# Patient Record
Sex: FEMALE | Race: WHITE | NOT HISPANIC OR LATINO | Employment: OTHER | URBAN - METROPOLITAN AREA
[De-identification: names, ages, dates, MRNs, and addresses within clinical notes are randomized per-mention and may not be internally consistent; named-entity substitution may affect disease eponyms.]

---

## 2017-01-03 ENCOUNTER — OFFICE VISIT - HEALTHEAST (OUTPATIENT)
Dept: INTERNAL MEDICINE | Facility: CLINIC | Age: 77
End: 2017-01-03

## 2017-01-03 ENCOUNTER — COMMUNICATION - HEALTHEAST (OUTPATIENT)
Dept: NURSING | Facility: CLINIC | Age: 77
End: 2017-01-03

## 2017-01-03 DIAGNOSIS — N39.0 RECURRENT UTI (URINARY TRACT INFECTION): ICD-10-CM

## 2017-01-03 DIAGNOSIS — N17.9 AKI (ACUTE KIDNEY INJURY) (H): ICD-10-CM

## 2017-01-03 DIAGNOSIS — R13.10 DYSPHAGIA: ICD-10-CM

## 2017-01-03 DIAGNOSIS — I10 HTN (HYPERTENSION): ICD-10-CM

## 2017-01-03 DIAGNOSIS — G47.00 INSOMNIA: ICD-10-CM

## 2017-01-03 DIAGNOSIS — Z95.2 S/P MVR (MITRAL VALVE REPLACEMENT): ICD-10-CM

## 2017-01-03 DIAGNOSIS — R14.0 ABDOMINAL BLOATING: ICD-10-CM

## 2017-01-03 DIAGNOSIS — F41.1 ANXIETY STATE: ICD-10-CM

## 2017-01-05 ENCOUNTER — RECORDS - HEALTHEAST (OUTPATIENT)
Dept: ADMINISTRATIVE | Facility: OTHER | Age: 77
End: 2017-01-05

## 2017-01-10 ENCOUNTER — COMMUNICATION - HEALTHEAST (OUTPATIENT)
Dept: NURSING | Facility: CLINIC | Age: 77
End: 2017-01-10

## 2017-01-10 ENCOUNTER — AMBULATORY - HEALTHEAST (OUTPATIENT)
Dept: LAB | Facility: CLINIC | Age: 77
End: 2017-01-10

## 2017-01-10 DIAGNOSIS — Z95.2 S/P MVR (MITRAL VALVE REPLACEMENT): ICD-10-CM

## 2017-01-19 ENCOUNTER — COMMUNICATION - HEALTHEAST (OUTPATIENT)
Dept: LAB | Facility: CLINIC | Age: 77
End: 2017-01-19

## 2017-01-19 ENCOUNTER — AMBULATORY - HEALTHEAST (OUTPATIENT)
Dept: LAB | Facility: CLINIC | Age: 77
End: 2017-01-19

## 2017-01-19 DIAGNOSIS — Z95.2 S/P MVR (MITRAL VALVE REPLACEMENT): ICD-10-CM

## 2017-01-27 ENCOUNTER — OFFICE VISIT - HEALTHEAST (OUTPATIENT)
Dept: INTERNAL MEDICINE | Facility: CLINIC | Age: 77
End: 2017-01-27

## 2017-01-27 DIAGNOSIS — F43.21 GRIEF REACTION: ICD-10-CM

## 2017-01-27 DIAGNOSIS — S91.109A OPEN TOE WOUND: ICD-10-CM

## 2017-01-27 DIAGNOSIS — E11.9 DM TYPE 2 (DIABETES MELLITUS, TYPE 2) (H): ICD-10-CM

## 2017-01-27 DIAGNOSIS — I25.10 ASCVD (ARTERIOSCLEROTIC CARDIOVASCULAR DISEASE): ICD-10-CM

## 2017-01-27 DIAGNOSIS — G89.29 CHRONIC PAIN: ICD-10-CM

## 2017-01-27 DIAGNOSIS — Z95.2 S/P MVR (MITRAL VALVE REPLACEMENT): ICD-10-CM

## 2017-01-29 ENCOUNTER — COMMUNICATION - HEALTHEAST (OUTPATIENT)
Dept: INTERNAL MEDICINE | Facility: CLINIC | Age: 77
End: 2017-01-29

## 2017-01-29 DIAGNOSIS — Z95.2 S/P MVR (MITRAL VALVE REPLACEMENT): ICD-10-CM

## 2017-02-03 ENCOUNTER — AMBULATORY - HEALTHEAST (OUTPATIENT)
Dept: LAB | Facility: CLINIC | Age: 77
End: 2017-02-03

## 2017-02-03 ENCOUNTER — COMMUNICATION - HEALTHEAST (OUTPATIENT)
Dept: NURSING | Facility: CLINIC | Age: 77
End: 2017-02-03

## 2017-02-03 DIAGNOSIS — Z95.2 S/P MVR (MITRAL VALVE REPLACEMENT): ICD-10-CM

## 2017-02-04 ENCOUNTER — RECORDS - HEALTHEAST (OUTPATIENT)
Dept: GENERAL RADIOLOGY | Age: 77
End: 2017-02-04

## 2017-02-04 ENCOUNTER — OFFICE VISIT - HEALTHEAST (OUTPATIENT)
Dept: FAMILY MEDICINE | Facility: CLINIC | Age: 77
End: 2017-02-04

## 2017-02-04 DIAGNOSIS — E11.621 DIABETIC TOE ULCER (H): ICD-10-CM

## 2017-02-04 DIAGNOSIS — Z79.4 LONG TERM (CURRENT) USE OF INSULIN (H): ICD-10-CM

## 2017-02-04 DIAGNOSIS — Z79.4 TYPE 2 DIABETES MELLITUS WITHOUT COMPLICATION, WITH LONG-TERM CURRENT USE OF INSULIN (H): ICD-10-CM

## 2017-02-04 DIAGNOSIS — L03.116 CELLULITIS OF FOOT, LEFT: ICD-10-CM

## 2017-02-04 DIAGNOSIS — E11.9 TYPE 2 DIABETES MELLITUS WITHOUT COMPLICATION, WITH LONG-TERM CURRENT USE OF INSULIN (H): ICD-10-CM

## 2017-02-04 DIAGNOSIS — E11.9 TYPE 2 DIABETES MELLITUS WITHOUT COMPLICATIONS (H): ICD-10-CM

## 2017-02-04 DIAGNOSIS — L97.509 DIABETIC TOE ULCER (H): ICD-10-CM

## 2017-02-04 DIAGNOSIS — L03.116 CELLULITIS OF LEFT LOWER LIMB: ICD-10-CM

## 2017-02-06 ENCOUNTER — AMBULATORY - HEALTHEAST (OUTPATIENT)
Dept: FAMILY MEDICINE | Facility: CLINIC | Age: 77
End: 2017-02-06

## 2017-02-06 ENCOUNTER — OFFICE VISIT - HEALTHEAST (OUTPATIENT)
Dept: PODIATRY | Facility: CLINIC | Age: 77
End: 2017-02-06

## 2017-02-06 ENCOUNTER — COMMUNICATION - HEALTHEAST (OUTPATIENT)
Dept: INTERNAL MEDICINE | Facility: CLINIC | Age: 77
End: 2017-02-06

## 2017-02-06 DIAGNOSIS — Z22.322 MRSA (METHICILLIN RESISTANT STAPH AUREUS) CULTURE POSITIVE: ICD-10-CM

## 2017-02-06 DIAGNOSIS — L97.529 DIABETIC ULCER OF TOE OF LEFT FOOT (H): ICD-10-CM

## 2017-02-06 DIAGNOSIS — Z95.2 S/P MVR (MITRAL VALVE REPLACEMENT): ICD-10-CM

## 2017-02-06 DIAGNOSIS — L97.509 DIABETIC FOOT ULCER (H): ICD-10-CM

## 2017-02-06 DIAGNOSIS — E11.621 DIABETIC ULCER OF TOE OF LEFT FOOT (H): ICD-10-CM

## 2017-02-06 DIAGNOSIS — E11.621 DIABETIC FOOT ULCER (H): ICD-10-CM

## 2017-02-06 ASSESSMENT — MIFFLIN-ST. JEOR: SCORE: 1168.49

## 2017-02-07 ENCOUNTER — COMMUNICATION - HEALTHEAST (OUTPATIENT)
Dept: NURSING | Facility: CLINIC | Age: 77
End: 2017-02-07

## 2017-02-09 ENCOUNTER — OFFICE VISIT - HEALTHEAST (OUTPATIENT)
Dept: INTERNAL MEDICINE | Facility: CLINIC | Age: 77
End: 2017-02-09

## 2017-02-09 ENCOUNTER — COMMUNICATION - HEALTHEAST (OUTPATIENT)
Dept: NURSING | Facility: CLINIC | Age: 77
End: 2017-02-09

## 2017-02-09 DIAGNOSIS — Z95.2 S/P MVR (MITRAL VALVE REPLACEMENT): ICD-10-CM

## 2017-02-09 DIAGNOSIS — L97.509 DIABETIC FOOT ULCER (H): ICD-10-CM

## 2017-02-09 DIAGNOSIS — L03.032 CELLULITIS OF TOE OF LEFT FOOT: ICD-10-CM

## 2017-02-09 DIAGNOSIS — S91.109A OPEN TOE WOUND: ICD-10-CM

## 2017-02-09 DIAGNOSIS — Z22.322 MRSA (METHICILLIN RESISTANT STAPH AUREUS) CULTURE POSITIVE: ICD-10-CM

## 2017-02-09 DIAGNOSIS — E11.621 DIABETIC FOOT ULCER (H): ICD-10-CM

## 2017-02-09 ASSESSMENT — MIFFLIN-ST. JEOR: SCORE: 1182.1

## 2017-02-13 ENCOUNTER — AMBULATORY - HEALTHEAST (OUTPATIENT)
Dept: LAB | Facility: CLINIC | Age: 77
End: 2017-02-13

## 2017-02-13 ENCOUNTER — COMMUNICATION - HEALTHEAST (OUTPATIENT)
Dept: NURSING | Facility: CLINIC | Age: 77
End: 2017-02-13

## 2017-02-13 DIAGNOSIS — Z95.2 S/P MVR (MITRAL VALVE REPLACEMENT): ICD-10-CM

## 2017-02-17 ENCOUNTER — AMBULATORY - HEALTHEAST (OUTPATIENT)
Dept: LAB | Facility: CLINIC | Age: 77
End: 2017-02-17

## 2017-02-17 ENCOUNTER — OFFICE VISIT - HEALTHEAST (OUTPATIENT)
Dept: INTERNAL MEDICINE | Facility: CLINIC | Age: 77
End: 2017-02-17

## 2017-02-17 ENCOUNTER — COMMUNICATION - HEALTHEAST (OUTPATIENT)
Dept: NURSING | Facility: CLINIC | Age: 77
End: 2017-02-17

## 2017-02-17 DIAGNOSIS — Z22.322 MRSA (METHICILLIN RESISTANT STAPH AUREUS) CULTURE POSITIVE: ICD-10-CM

## 2017-02-17 DIAGNOSIS — Z95.2 S/P MVR (MITRAL VALVE REPLACEMENT): ICD-10-CM

## 2017-02-17 DIAGNOSIS — S91.109A OPEN TOE WOUND: ICD-10-CM

## 2017-02-21 ENCOUNTER — AMBULATORY - HEALTHEAST (OUTPATIENT)
Dept: LAB | Facility: CLINIC | Age: 77
End: 2017-02-21

## 2017-02-21 ENCOUNTER — COMMUNICATION - HEALTHEAST (OUTPATIENT)
Dept: NURSING | Facility: CLINIC | Age: 77
End: 2017-02-21

## 2017-02-21 DIAGNOSIS — Z95.2 S/P MVR (MITRAL VALVE REPLACEMENT): ICD-10-CM

## 2017-02-27 ENCOUNTER — COMMUNICATION - HEALTHEAST (OUTPATIENT)
Dept: NURSING | Facility: CLINIC | Age: 77
End: 2017-02-27

## 2017-02-27 ENCOUNTER — AMBULATORY - HEALTHEAST (OUTPATIENT)
Dept: LAB | Facility: CLINIC | Age: 77
End: 2017-02-27

## 2017-02-27 DIAGNOSIS — Z95.2 S/P MVR (MITRAL VALVE REPLACEMENT): ICD-10-CM

## 2017-03-06 ENCOUNTER — AMBULATORY - HEALTHEAST (OUTPATIENT)
Dept: LAB | Facility: CLINIC | Age: 77
End: 2017-03-06

## 2017-03-06 ENCOUNTER — COMMUNICATION - HEALTHEAST (OUTPATIENT)
Dept: NURSING | Facility: CLINIC | Age: 77
End: 2017-03-06

## 2017-03-06 DIAGNOSIS — Z95.2 S/P MVR (MITRAL VALVE REPLACEMENT): ICD-10-CM

## 2017-03-11 ENCOUNTER — COMMUNICATION - HEALTHEAST (OUTPATIENT)
Dept: CARDIOLOGY | Facility: CLINIC | Age: 77
End: 2017-03-11

## 2017-03-11 DIAGNOSIS — K25.7 CHRONIC GASTRIC ULCER: ICD-10-CM

## 2017-03-14 ENCOUNTER — OFFICE VISIT - HEALTHEAST (OUTPATIENT)
Dept: INTERNAL MEDICINE | Facility: CLINIC | Age: 77
End: 2017-03-14

## 2017-03-14 ENCOUNTER — COMMUNICATION - HEALTHEAST (OUTPATIENT)
Dept: NURSING | Facility: CLINIC | Age: 77
End: 2017-03-14

## 2017-03-14 DIAGNOSIS — Z95.2 S/P MVR (MITRAL VALVE REPLACEMENT): ICD-10-CM

## 2017-03-14 DIAGNOSIS — I10 HTN (HYPERTENSION): ICD-10-CM

## 2017-03-14 DIAGNOSIS — G47.00 INSOMNIA: ICD-10-CM

## 2017-03-14 DIAGNOSIS — S91.109A OPEN TOE WOUND: ICD-10-CM

## 2017-03-14 DIAGNOSIS — F41.1 ANXIETY STATE: ICD-10-CM

## 2017-03-14 DIAGNOSIS — Z78.9 FULL CODE STATUS: ICD-10-CM

## 2017-03-14 DIAGNOSIS — E11.9 DM TYPE 2 (DIABETES MELLITUS, TYPE 2) (H): ICD-10-CM

## 2017-03-21 ENCOUNTER — COMMUNICATION - HEALTHEAST (OUTPATIENT)
Dept: NURSING | Facility: CLINIC | Age: 77
End: 2017-03-21

## 2017-03-21 ENCOUNTER — AMBULATORY - HEALTHEAST (OUTPATIENT)
Dept: LAB | Facility: CLINIC | Age: 77
End: 2017-03-21

## 2017-03-21 DIAGNOSIS — Z95.2 S/P MVR (MITRAL VALVE REPLACEMENT): ICD-10-CM

## 2017-03-31 ENCOUNTER — AMBULATORY - HEALTHEAST (OUTPATIENT)
Dept: LAB | Facility: CLINIC | Age: 77
End: 2017-03-31

## 2017-03-31 ENCOUNTER — COMMUNICATION - HEALTHEAST (OUTPATIENT)
Dept: NURSING | Facility: CLINIC | Age: 77
End: 2017-03-31

## 2017-03-31 DIAGNOSIS — Z95.2 S/P MVR (MITRAL VALVE REPLACEMENT): ICD-10-CM

## 2017-04-03 ENCOUNTER — COMMUNICATION - HEALTHEAST (OUTPATIENT)
Dept: INTERNAL MEDICINE | Facility: CLINIC | Age: 77
End: 2017-04-03

## 2017-04-03 DIAGNOSIS — Z95.4 S/P MITRAL VALVE REPLACEMENT WITH METALLIC VALVE: ICD-10-CM

## 2017-04-14 ENCOUNTER — COMMUNICATION - HEALTHEAST (OUTPATIENT)
Dept: NURSING | Facility: CLINIC | Age: 77
End: 2017-04-14

## 2017-04-14 ENCOUNTER — AMBULATORY - HEALTHEAST (OUTPATIENT)
Dept: LAB | Facility: CLINIC | Age: 77
End: 2017-04-14

## 2017-04-14 DIAGNOSIS — Z95.2 S/P MVR (MITRAL VALVE REPLACEMENT): ICD-10-CM

## 2017-04-20 ENCOUNTER — COMMUNICATION - HEALTHEAST (OUTPATIENT)
Dept: INTERNAL MEDICINE | Facility: CLINIC | Age: 77
End: 2017-04-20

## 2017-04-24 ENCOUNTER — AMBULATORY - HEALTHEAST (OUTPATIENT)
Dept: INTERNAL MEDICINE | Facility: CLINIC | Age: 77
End: 2017-04-24

## 2017-04-25 ENCOUNTER — OFFICE VISIT - HEALTHEAST (OUTPATIENT)
Dept: INTERNAL MEDICINE | Facility: CLINIC | Age: 77
End: 2017-04-25

## 2017-04-25 ENCOUNTER — COMMUNICATION - HEALTHEAST (OUTPATIENT)
Dept: NURSING | Facility: CLINIC | Age: 77
End: 2017-04-25

## 2017-04-25 DIAGNOSIS — E03.9 HYPOTHYROIDISM: ICD-10-CM

## 2017-04-25 DIAGNOSIS — S90.426A: ICD-10-CM

## 2017-04-25 DIAGNOSIS — M54.9 BACK PAIN: ICD-10-CM

## 2017-04-25 DIAGNOSIS — E11.9 DM TYPE 2 (DIABETES MELLITUS, TYPE 2) (H): ICD-10-CM

## 2017-04-25 DIAGNOSIS — G47.00 INSOMNIA: ICD-10-CM

## 2017-04-25 DIAGNOSIS — Z95.2 S/P MVR (MITRAL VALVE REPLACEMENT): ICD-10-CM

## 2017-04-25 DIAGNOSIS — I10 HTN (HYPERTENSION): ICD-10-CM

## 2017-04-25 DIAGNOSIS — G54.1 LUMBOSACRAL PLEXOPATHY: ICD-10-CM

## 2017-04-25 DIAGNOSIS — I25.10 ASCVD (ARTERIOSCLEROTIC CARDIOVASCULAR DISEASE): ICD-10-CM

## 2017-04-25 DIAGNOSIS — R80.9 PROTEINURIA: ICD-10-CM

## 2017-04-25 DIAGNOSIS — G89.29 CHRONIC PAIN: ICD-10-CM

## 2017-04-25 DIAGNOSIS — K59.00 CN (CONSTIPATION): ICD-10-CM

## 2017-04-25 DIAGNOSIS — M25.50 JOINT PAIN: ICD-10-CM

## 2017-04-25 LAB — HBA1C MFR BLD: 7.3 % (ref 3.5–6)

## 2017-05-01 ENCOUNTER — COMMUNICATION - HEALTHEAST (OUTPATIENT)
Dept: INTERNAL MEDICINE | Facility: CLINIC | Age: 77
End: 2017-05-01

## 2017-05-08 ENCOUNTER — COMMUNICATION - HEALTHEAST (OUTPATIENT)
Dept: INTERNAL MEDICINE | Facility: CLINIC | Age: 77
End: 2017-05-08

## 2017-05-11 ENCOUNTER — COMMUNICATION - HEALTHEAST (OUTPATIENT)
Dept: INTERNAL MEDICINE | Facility: CLINIC | Age: 77
End: 2017-05-11

## 2017-05-16 ENCOUNTER — AMBULATORY - HEALTHEAST (OUTPATIENT)
Dept: INTERNAL MEDICINE | Facility: CLINIC | Age: 77
End: 2017-05-16

## 2017-05-18 ENCOUNTER — OFFICE VISIT - HEALTHEAST (OUTPATIENT)
Dept: INTERNAL MEDICINE | Facility: CLINIC | Age: 77
End: 2017-05-18

## 2017-05-18 ENCOUNTER — COMMUNICATION - HEALTHEAST (OUTPATIENT)
Dept: NURSING | Facility: CLINIC | Age: 77
End: 2017-05-18

## 2017-05-18 DIAGNOSIS — Z79.899 CONTROLLED SUBSTANCE AGREEMENT SIGNED: ICD-10-CM

## 2017-05-18 DIAGNOSIS — E11.9 TYPE 2 DIABETES MELLITUS WITHOUT COMPLICATION, WITH LONG-TERM CURRENT USE OF INSULIN (H): ICD-10-CM

## 2017-05-18 DIAGNOSIS — Z95.2 S/P MVR (MITRAL VALVE REPLACEMENT): ICD-10-CM

## 2017-05-18 DIAGNOSIS — G47.00 INSOMNIA: ICD-10-CM

## 2017-05-18 DIAGNOSIS — S90.426A: ICD-10-CM

## 2017-05-18 DIAGNOSIS — M54.9 BACK PAIN: ICD-10-CM

## 2017-05-18 DIAGNOSIS — Z79.4 TYPE 2 DIABETES MELLITUS WITHOUT COMPLICATION, WITH LONG-TERM CURRENT USE OF INSULIN (H): ICD-10-CM

## 2017-05-18 DIAGNOSIS — M19.90 INFLAMMATORY ARTHROPATHY: ICD-10-CM

## 2017-05-23 ENCOUNTER — RECORDS - HEALTHEAST (OUTPATIENT)
Dept: ADMINISTRATIVE | Facility: OTHER | Age: 77
End: 2017-05-23

## 2017-05-24 ENCOUNTER — COMMUNICATION - HEALTHEAST (OUTPATIENT)
Dept: NURSING | Facility: CLINIC | Age: 77
End: 2017-05-24

## 2017-05-24 ENCOUNTER — AMBULATORY - HEALTHEAST (OUTPATIENT)
Dept: LAB | Facility: CLINIC | Age: 77
End: 2017-05-24

## 2017-05-24 DIAGNOSIS — Z95.2 S/P MVR (MITRAL VALVE REPLACEMENT): ICD-10-CM

## 2017-05-29 ENCOUNTER — RECORDS - HEALTHEAST (OUTPATIENT)
Dept: ADMINISTRATIVE | Facility: OTHER | Age: 77
End: 2017-05-29

## 2017-06-01 ENCOUNTER — AMBULATORY - HEALTHEAST (OUTPATIENT)
Dept: LAB | Facility: CLINIC | Age: 77
End: 2017-06-01

## 2017-06-01 ENCOUNTER — COMMUNICATION - HEALTHEAST (OUTPATIENT)
Dept: FAMILY MEDICINE | Facility: CLINIC | Age: 77
End: 2017-06-01

## 2017-06-01 DIAGNOSIS — Z95.2 S/P MVR (MITRAL VALVE REPLACEMENT): ICD-10-CM

## 2017-06-22 ENCOUNTER — COMMUNICATION - HEALTHEAST (OUTPATIENT)
Dept: FAMILY MEDICINE | Facility: CLINIC | Age: 77
End: 2017-06-22

## 2017-06-22 ENCOUNTER — AMBULATORY - HEALTHEAST (OUTPATIENT)
Dept: LAB | Facility: CLINIC | Age: 77
End: 2017-06-22

## 2017-06-22 DIAGNOSIS — Z95.2 S/P MVR (MITRAL VALVE REPLACEMENT): ICD-10-CM

## 2017-06-30 ENCOUNTER — AMBULATORY - HEALTHEAST (OUTPATIENT)
Dept: INTERNAL MEDICINE | Facility: CLINIC | Age: 77
End: 2017-06-30

## 2017-07-03 ENCOUNTER — COMMUNICATION - HEALTHEAST (OUTPATIENT)
Dept: CARDIOLOGY | Facility: CLINIC | Age: 77
End: 2017-07-03

## 2017-07-03 DIAGNOSIS — R60.0 BILATERAL LEG EDEMA: ICD-10-CM

## 2017-07-05 ENCOUNTER — RECORDS - HEALTHEAST (OUTPATIENT)
Dept: ADMINISTRATIVE | Facility: OTHER | Age: 77
End: 2017-07-05

## 2017-07-06 ENCOUNTER — OFFICE VISIT - HEALTHEAST (OUTPATIENT)
Dept: INTERNAL MEDICINE | Facility: CLINIC | Age: 77
End: 2017-07-06

## 2017-07-06 ENCOUNTER — COMMUNICATION - HEALTHEAST (OUTPATIENT)
Dept: NURSING | Facility: CLINIC | Age: 77
End: 2017-07-06

## 2017-07-06 DIAGNOSIS — G89.29 CHRONIC PAIN: ICD-10-CM

## 2017-07-06 DIAGNOSIS — M54.50 LOW BACK PAIN: ICD-10-CM

## 2017-07-06 DIAGNOSIS — Z95.2 S/P MVR (MITRAL VALVE REPLACEMENT): ICD-10-CM

## 2017-07-06 DIAGNOSIS — E11.9 DM TYPE 2 (DIABETES MELLITUS, TYPE 2) (H): ICD-10-CM

## 2017-07-06 DIAGNOSIS — I10 HTN (HYPERTENSION): ICD-10-CM

## 2017-07-06 DIAGNOSIS — B07.9 VIRAL WART ON FINGER: ICD-10-CM

## 2017-07-06 DIAGNOSIS — G54.1 LUMBOSACRAL PLEXOPATHY: ICD-10-CM

## 2017-07-06 LAB — HBA1C MFR BLD: 8.1 % (ref 3.5–6)

## 2017-07-09 ENCOUNTER — COMMUNICATION - HEALTHEAST (OUTPATIENT)
Dept: INTERNAL MEDICINE | Facility: CLINIC | Age: 77
End: 2017-07-09

## 2017-07-18 ENCOUNTER — COMMUNICATION - HEALTHEAST (OUTPATIENT)
Dept: NURSING | Facility: CLINIC | Age: 77
End: 2017-07-18

## 2017-07-18 ENCOUNTER — AMBULATORY - HEALTHEAST (OUTPATIENT)
Dept: LAB | Facility: CLINIC | Age: 77
End: 2017-07-18

## 2017-07-18 DIAGNOSIS — Z95.2 S/P MVR (MITRAL VALVE REPLACEMENT): ICD-10-CM

## 2017-07-28 ENCOUNTER — COMMUNICATION - HEALTHEAST (OUTPATIENT)
Dept: NURSING | Facility: CLINIC | Age: 77
End: 2017-07-28

## 2017-07-28 ENCOUNTER — AMBULATORY - HEALTHEAST (OUTPATIENT)
Dept: LAB | Facility: CLINIC | Age: 77
End: 2017-07-28

## 2017-07-28 DIAGNOSIS — Z95.2 S/P MVR (MITRAL VALVE REPLACEMENT): ICD-10-CM

## 2017-08-01 ENCOUNTER — COMMUNICATION - HEALTHEAST (OUTPATIENT)
Dept: FAMILY MEDICINE | Facility: CLINIC | Age: 77
End: 2017-08-01

## 2017-08-01 DIAGNOSIS — I48.0 PAROXYSMAL ATRIAL FIBRILLATION (H): ICD-10-CM

## 2017-08-01 DIAGNOSIS — Z95.2 S/P MVR (MITRAL VALVE REPLACEMENT): ICD-10-CM

## 2017-08-11 ENCOUNTER — COMMUNICATION - HEALTHEAST (OUTPATIENT)
Dept: NURSING | Facility: CLINIC | Age: 77
End: 2017-08-11

## 2017-08-11 ENCOUNTER — AMBULATORY - HEALTHEAST (OUTPATIENT)
Dept: LAB | Facility: CLINIC | Age: 77
End: 2017-08-11

## 2017-08-11 DIAGNOSIS — Z95.2 S/P MVR (MITRAL VALVE REPLACEMENT): ICD-10-CM

## 2017-08-11 DIAGNOSIS — I48.0 PAROXYSMAL ATRIAL FIBRILLATION (H): ICD-10-CM

## 2017-09-08 ENCOUNTER — RECORDS - HEALTHEAST (OUTPATIENT)
Dept: ADMINISTRATIVE | Facility: OTHER | Age: 77
End: 2017-09-08

## 2017-09-11 ENCOUNTER — OFFICE VISIT - HEALTHEAST (OUTPATIENT)
Dept: INTERNAL MEDICINE | Facility: CLINIC | Age: 77
End: 2017-09-11

## 2017-09-11 ENCOUNTER — COMMUNICATION - HEALTHEAST (OUTPATIENT)
Dept: NURSING | Facility: CLINIC | Age: 77
End: 2017-09-11

## 2017-09-11 DIAGNOSIS — E78.5 HLD (HYPERLIPIDEMIA): ICD-10-CM

## 2017-09-11 DIAGNOSIS — Z79.01 ANTICOAGULATION GOAL OF INR 2.5 TO 3.5: ICD-10-CM

## 2017-09-11 DIAGNOSIS — H26.9 CATARACT, BILATERAL: ICD-10-CM

## 2017-09-11 DIAGNOSIS — I48.0 PAROXYSMAL ATRIAL FIBRILLATION (H): ICD-10-CM

## 2017-09-11 DIAGNOSIS — I25.10 ASCVD (ARTERIOSCLEROTIC CARDIOVASCULAR DISEASE): ICD-10-CM

## 2017-09-11 DIAGNOSIS — Z95.2 S/P MVR (MITRAL VALVE REPLACEMENT): ICD-10-CM

## 2017-09-11 DIAGNOSIS — E11.9 DM TYPE 2 (DIABETES MELLITUS, TYPE 2) (H): ICD-10-CM

## 2017-09-11 DIAGNOSIS — Z51.81 ANTICOAGULATION GOAL OF INR 2.5 TO 3.5: ICD-10-CM

## 2017-09-11 DIAGNOSIS — Z00.00 HEALTHCARE MAINTENANCE: ICD-10-CM

## 2017-09-11 DIAGNOSIS — Z01.810 PREOPERATIVE CARDIOVASCULAR EXAMINATION: ICD-10-CM

## 2017-09-11 DIAGNOSIS — E03.9 HYPOTHYROIDISM: ICD-10-CM

## 2017-09-11 ASSESSMENT — MIFFLIN-ST. JEOR: SCORE: 1150.35

## 2017-09-20 ENCOUNTER — COMMUNICATION - HEALTHEAST (OUTPATIENT)
Dept: INTERNAL MEDICINE | Facility: CLINIC | Age: 77
End: 2017-09-20

## 2017-09-20 DIAGNOSIS — Z95.4 S/P MITRAL VALVE REPLACEMENT WITH METALLIC VALVE: ICD-10-CM

## 2017-09-21 ENCOUNTER — COMMUNICATION - HEALTHEAST (OUTPATIENT)
Dept: INTERNAL MEDICINE | Facility: CLINIC | Age: 77
End: 2017-09-21

## 2017-10-03 ENCOUNTER — COMMUNICATION - HEALTHEAST (OUTPATIENT)
Dept: NURSING | Facility: CLINIC | Age: 77
End: 2017-10-03

## 2017-10-03 ENCOUNTER — AMBULATORY - HEALTHEAST (OUTPATIENT)
Dept: LAB | Facility: CLINIC | Age: 77
End: 2017-10-03

## 2017-10-03 DIAGNOSIS — Z95.2 S/P MVR (MITRAL VALVE REPLACEMENT): ICD-10-CM

## 2017-10-03 DIAGNOSIS — I48.0 PAROXYSMAL ATRIAL FIBRILLATION (H): ICD-10-CM

## 2017-10-31 ENCOUNTER — AMBULATORY - HEALTHEAST (OUTPATIENT)
Dept: LAB | Facility: CLINIC | Age: 77
End: 2017-10-31

## 2017-10-31 ENCOUNTER — COMMUNICATION - HEALTHEAST (OUTPATIENT)
Dept: NURSING | Facility: CLINIC | Age: 77
End: 2017-10-31

## 2017-10-31 DIAGNOSIS — Z95.2 S/P MVR (MITRAL VALVE REPLACEMENT): ICD-10-CM

## 2017-10-31 DIAGNOSIS — I48.0 PAROXYSMAL ATRIAL FIBRILLATION (H): ICD-10-CM

## 2017-11-28 ENCOUNTER — RECORDS - HEALTHEAST (OUTPATIENT)
Dept: ADMINISTRATIVE | Facility: OTHER | Age: 77
End: 2017-11-28

## 2017-11-28 ENCOUNTER — AMBULATORY - HEALTHEAST (OUTPATIENT)
Dept: LAB | Facility: CLINIC | Age: 77
End: 2017-11-28

## 2017-11-28 ENCOUNTER — OFFICE VISIT - HEALTHEAST (OUTPATIENT)
Dept: FAMILY MEDICINE | Facility: CLINIC | Age: 77
End: 2017-11-28

## 2017-11-28 ENCOUNTER — COMMUNICATION - HEALTHEAST (OUTPATIENT)
Dept: FAMILY MEDICINE | Facility: CLINIC | Age: 77
End: 2017-11-28

## 2017-11-28 DIAGNOSIS — R42 DIZZINESS: ICD-10-CM

## 2017-11-28 DIAGNOSIS — Z95.2 S/P MVR (MITRAL VALVE REPLACEMENT): ICD-10-CM

## 2017-11-28 DIAGNOSIS — M25.512 ACUTE PAIN OF LEFT SHOULDER: ICD-10-CM

## 2017-11-28 DIAGNOSIS — I48.0 PAROXYSMAL ATRIAL FIBRILLATION (H): ICD-10-CM

## 2017-11-29 ENCOUNTER — RECORDS - HEALTHEAST (OUTPATIENT)
Dept: ADMINISTRATIVE | Facility: OTHER | Age: 77
End: 2017-11-29

## 2017-11-29 ASSESSMENT — MIFFLIN-ST. JEOR: SCORE: 1188.91

## 2017-11-30 ENCOUNTER — COMMUNICATION - HEALTHEAST (OUTPATIENT)
Dept: INTERNAL MEDICINE | Facility: CLINIC | Age: 77
End: 2017-11-30

## 2017-11-30 ENCOUNTER — RECORDS - HEALTHEAST (OUTPATIENT)
Dept: ADMINISTRATIVE | Facility: OTHER | Age: 77
End: 2017-11-30

## 2017-11-30 ASSESSMENT — MIFFLIN-ST. JEOR: SCORE: 1179.38

## 2017-12-01 ENCOUNTER — RECORDS - HEALTHEAST (OUTPATIENT)
Dept: ADMINISTRATIVE | Facility: OTHER | Age: 77
End: 2017-12-01

## 2017-12-01 ASSESSMENT — MIFFLIN-ST. JEOR: SCORE: 1167.59

## 2017-12-02 ENCOUNTER — RECORDS - HEALTHEAST (OUTPATIENT)
Dept: ADMINISTRATIVE | Facility: OTHER | Age: 77
End: 2017-12-02

## 2017-12-02 ASSESSMENT — MIFFLIN-ST. JEOR: SCORE: 1145.36

## 2017-12-03 ENCOUNTER — SURGERY - HEALTHEAST (OUTPATIENT)
Dept: GASTROENTEROLOGY | Facility: HOSPITAL | Age: 77
End: 2017-12-03

## 2017-12-03 ENCOUNTER — RECORDS - HEALTHEAST (OUTPATIENT)
Dept: ADMINISTRATIVE | Facility: OTHER | Age: 77
End: 2017-12-03

## 2017-12-03 ASSESSMENT — MIFFLIN-ST. JEOR: SCORE: 1159.42

## 2017-12-04 ENCOUNTER — RECORDS - HEALTHEAST (OUTPATIENT)
Dept: ADMINISTRATIVE | Facility: OTHER | Age: 77
End: 2017-12-04

## 2017-12-04 ASSESSMENT — MIFFLIN-ST. JEOR: SCORE: 1142.18

## 2017-12-05 ENCOUNTER — RECORDS - HEALTHEAST (OUTPATIENT)
Dept: ADMINISTRATIVE | Facility: OTHER | Age: 77
End: 2017-12-05

## 2017-12-05 ASSESSMENT — MIFFLIN-ST. JEOR: SCORE: 1132.21

## 2017-12-06 ENCOUNTER — COMMUNICATION - HEALTHEAST (OUTPATIENT)
Dept: NURSING | Facility: CLINIC | Age: 77
End: 2017-12-06

## 2017-12-06 ENCOUNTER — COMMUNICATION - HEALTHEAST (OUTPATIENT)
Dept: INTERNAL MEDICINE | Facility: CLINIC | Age: 77
End: 2017-12-06

## 2017-12-06 ENCOUNTER — AMBULATORY - HEALTHEAST (OUTPATIENT)
Dept: LAB | Facility: CLINIC | Age: 77
End: 2017-12-06

## 2017-12-06 ENCOUNTER — COMMUNICATION - HEALTHEAST (OUTPATIENT)
Dept: ADMINISTRATIVE | Facility: CLINIC | Age: 77
End: 2017-12-06

## 2017-12-06 DIAGNOSIS — Z95.2 S/P MITRAL VALVE REPLACEMENT: ICD-10-CM

## 2017-12-06 DIAGNOSIS — I48.0 PAROXYSMAL ATRIAL FIBRILLATION (H): ICD-10-CM

## 2017-12-06 DIAGNOSIS — Z95.2 S/P MVR (MITRAL VALVE REPLACEMENT): ICD-10-CM

## 2017-12-08 ENCOUNTER — AMBULATORY - HEALTHEAST (OUTPATIENT)
Dept: LAB | Facility: CLINIC | Age: 77
End: 2017-12-08

## 2017-12-08 ENCOUNTER — COMMUNICATION - HEALTHEAST (OUTPATIENT)
Dept: NURSING | Facility: CLINIC | Age: 77
End: 2017-12-08

## 2017-12-08 DIAGNOSIS — I48.0 PAROXYSMAL ATRIAL FIBRILLATION (H): ICD-10-CM

## 2017-12-08 DIAGNOSIS — Z95.2 S/P MITRAL VALVE REPLACEMENT: ICD-10-CM

## 2017-12-08 DIAGNOSIS — Z95.2 S/P MVR (MITRAL VALVE REPLACEMENT): ICD-10-CM

## 2017-12-10 ENCOUNTER — AMBULATORY - HEALTHEAST (OUTPATIENT)
Dept: INTERNAL MEDICINE | Facility: CLINIC | Age: 77
End: 2017-12-10

## 2017-12-11 ENCOUNTER — RECORDS - HEALTHEAST (OUTPATIENT)
Dept: ADMINISTRATIVE | Facility: OTHER | Age: 77
End: 2017-12-11

## 2017-12-11 ENCOUNTER — COMMUNICATION - HEALTHEAST (OUTPATIENT)
Dept: INTERNAL MEDICINE | Facility: CLINIC | Age: 77
End: 2017-12-11

## 2017-12-11 ENCOUNTER — COMMUNICATION - HEALTHEAST (OUTPATIENT)
Dept: NURSING | Facility: CLINIC | Age: 77
End: 2017-12-11

## 2017-12-11 ENCOUNTER — OFFICE VISIT - HEALTHEAST (OUTPATIENT)
Dept: INTERNAL MEDICINE | Facility: CLINIC | Age: 77
End: 2017-12-11

## 2017-12-11 DIAGNOSIS — Z09 HOSPITAL DISCHARGE FOLLOW-UP: ICD-10-CM

## 2017-12-11 DIAGNOSIS — I48.0 PAROXYSMAL ATRIAL FIBRILLATION (H): ICD-10-CM

## 2017-12-11 DIAGNOSIS — E11.9 DM TYPE 2 (DIABETES MELLITUS, TYPE 2) (H): ICD-10-CM

## 2017-12-11 DIAGNOSIS — D64.9 ANEMIA: ICD-10-CM

## 2017-12-11 DIAGNOSIS — K59.00 CONSTIPATION: ICD-10-CM

## 2017-12-11 DIAGNOSIS — G89.29 CHRONIC PAIN: ICD-10-CM

## 2017-12-11 DIAGNOSIS — Z95.2 S/P MVR (MITRAL VALVE REPLACEMENT): ICD-10-CM

## 2017-12-11 DIAGNOSIS — M54.9 BACK PAIN: ICD-10-CM

## 2017-12-11 DIAGNOSIS — Z95.2 S/P MITRAL VALVE REPLACEMENT: ICD-10-CM

## 2017-12-18 ENCOUNTER — COMMUNICATION - HEALTHEAST (OUTPATIENT)
Dept: NURSING | Facility: CLINIC | Age: 77
End: 2017-12-18

## 2017-12-18 ENCOUNTER — AMBULATORY - HEALTHEAST (OUTPATIENT)
Dept: LAB | Facility: CLINIC | Age: 77
End: 2017-12-18

## 2017-12-18 DIAGNOSIS — Z95.2 S/P MVR (MITRAL VALVE REPLACEMENT): ICD-10-CM

## 2017-12-18 DIAGNOSIS — I48.0 PAROXYSMAL ATRIAL FIBRILLATION (H): ICD-10-CM

## 2017-12-18 DIAGNOSIS — Z95.2 S/P MITRAL VALVE REPLACEMENT: ICD-10-CM

## 2017-12-22 ENCOUNTER — OFFICE VISIT - HEALTHEAST (OUTPATIENT)
Dept: SURGERY | Facility: CLINIC | Age: 77
End: 2017-12-22

## 2017-12-22 DIAGNOSIS — K63.5 POLYP OF ASCENDING COLON, UNSPECIFIED TYPE: ICD-10-CM

## 2017-12-22 ASSESSMENT — MIFFLIN-ST. JEOR: SCORE: 1132.21

## 2018-01-02 ENCOUNTER — OFFICE VISIT - HEALTHEAST (OUTPATIENT)
Dept: INTERNAL MEDICINE | Facility: CLINIC | Age: 78
End: 2018-01-02

## 2018-01-02 ENCOUNTER — RECORDS - HEALTHEAST (OUTPATIENT)
Dept: ADMINISTRATIVE | Facility: OTHER | Age: 78
End: 2018-01-02

## 2018-01-02 ENCOUNTER — COMMUNICATION - HEALTHEAST (OUTPATIENT)
Dept: NURSING | Facility: CLINIC | Age: 78
End: 2018-01-02

## 2018-01-02 DIAGNOSIS — D50.0 BLOOD LOSS ANEMIA: ICD-10-CM

## 2018-01-02 DIAGNOSIS — M54.50 LOW BACK PAIN RADIATING TO BOTH LEGS: ICD-10-CM

## 2018-01-02 DIAGNOSIS — Z95.2 S/P MITRAL VALVE REPLACEMENT: ICD-10-CM

## 2018-01-02 DIAGNOSIS — D12.6 TUBULAR ADENOMA OF COLON: ICD-10-CM

## 2018-01-02 DIAGNOSIS — Z95.2 S/P MVR (MITRAL VALVE REPLACEMENT): ICD-10-CM

## 2018-01-02 DIAGNOSIS — Z79.4 TYPE 2 DIABETES MELLITUS WITHOUT COMPLICATION, WITH LONG-TERM CURRENT USE OF INSULIN (H): ICD-10-CM

## 2018-01-02 DIAGNOSIS — M79.605 LOW BACK PAIN RADIATING TO BOTH LEGS: ICD-10-CM

## 2018-01-02 DIAGNOSIS — M79.604 LOW BACK PAIN RADIATING TO BOTH LEGS: ICD-10-CM

## 2018-01-02 DIAGNOSIS — R14.0 ABDOMINAL BLOATING: ICD-10-CM

## 2018-01-02 DIAGNOSIS — I10 HTN (HYPERTENSION): ICD-10-CM

## 2018-01-02 DIAGNOSIS — E11.9 TYPE 2 DIABETES MELLITUS WITHOUT COMPLICATION, WITH LONG-TERM CURRENT USE OF INSULIN (H): ICD-10-CM

## 2018-01-02 DIAGNOSIS — I48.0 PAROXYSMAL ATRIAL FIBRILLATION (H): ICD-10-CM

## 2018-01-02 LAB
ERYTHROCYTE [DISTWIDTH] IN BLOOD BY AUTOMATED COUNT: 15.4 % (ref 11–14.5)
HCT VFR BLD AUTO: 35.6 % (ref 35–47)
HGB BLD-MCNC: 11.7 G/DL (ref 12–16)
INR PPP: 2.5 (ref 0.9–1.1)
MCH RBC QN AUTO: 27.9 PG (ref 27–34)
MCHC RBC AUTO-ENTMCNC: 32.8 G/DL (ref 32–36)
MCV RBC AUTO: 85 FL (ref 80–100)
PLATELET # BLD AUTO: 153 THOU/UL (ref 140–440)
PMV BLD AUTO: 9.3 FL (ref 7–10)
RBC # BLD AUTO: 4.19 MILL/UL (ref 3.8–5.4)
WBC: 5.6 THOU/UL (ref 4–11)

## 2018-01-02 ASSESSMENT — MIFFLIN-ST. JEOR: SCORE: 1124.5

## 2018-01-03 ENCOUNTER — COMMUNICATION - HEALTHEAST (OUTPATIENT)
Dept: INTERNAL MEDICINE | Facility: CLINIC | Age: 78
End: 2018-01-03

## 2018-01-03 LAB
ANION GAP SERPL CALCULATED.3IONS-SCNC: 12 MMOL/L (ref 5–18)
BUN SERPL-MCNC: 15 MG/DL (ref 8–28)
CALCIUM SERPL-MCNC: 9.2 MG/DL (ref 8.5–10.5)
CHLORIDE BLD-SCNC: 98 MMOL/L (ref 98–107)
CO2 SERPL-SCNC: 26 MMOL/L (ref 22–31)
CREAT SERPL-MCNC: 0.88 MG/DL (ref 0.6–1.1)
FERRITIN SERPL-MCNC: 84 NG/ML (ref 10–130)
GFR SERPL CREATININE-BSD FRML MDRD: >60 ML/MIN/1.73M2
GLUCOSE BLD-MCNC: 195 MG/DL (ref 70–125)
IRON SATN MFR SERPL: 14 % (ref 20–50)
IRON SERPL-MCNC: 53 UG/DL (ref 42–175)
POTASSIUM BLD-SCNC: 4.7 MMOL/L (ref 3.5–5)
SODIUM SERPL-SCNC: 136 MMOL/L (ref 136–145)
TIBC SERPL-MCNC: 383 UG/DL (ref 313–563)
TRANSFERRIN SERPL-MCNC: 307 MG/DL (ref 212–360)

## 2018-01-14 ENCOUNTER — COMMUNICATION - HEALTHEAST (OUTPATIENT)
Dept: INTERNAL MEDICINE | Facility: CLINIC | Age: 78
End: 2018-01-14

## 2018-01-23 ENCOUNTER — COMMUNICATION - HEALTHEAST (OUTPATIENT)
Dept: NURSING | Facility: CLINIC | Age: 78
End: 2018-01-23

## 2018-01-26 ENCOUNTER — COMMUNICATION - HEALTHEAST (OUTPATIENT)
Dept: INTERNAL MEDICINE | Facility: CLINIC | Age: 78
End: 2018-01-26

## 2018-01-30 ENCOUNTER — COMMUNICATION - HEALTHEAST (OUTPATIENT)
Dept: NURSING | Facility: CLINIC | Age: 78
End: 2018-01-30

## 2018-01-30 ENCOUNTER — COMMUNICATION - HEALTHEAST (OUTPATIENT)
Dept: INTERNAL MEDICINE | Facility: CLINIC | Age: 78
End: 2018-01-30

## 2018-01-30 ENCOUNTER — OFFICE VISIT - HEALTHEAST (OUTPATIENT)
Dept: INTERNAL MEDICINE | Facility: CLINIC | Age: 78
End: 2018-01-30

## 2018-01-30 ENCOUNTER — RECORDS - HEALTHEAST (OUTPATIENT)
Dept: ADMINISTRATIVE | Facility: OTHER | Age: 78
End: 2018-01-30

## 2018-01-30 ENCOUNTER — AMBULATORY - HEALTHEAST (OUTPATIENT)
Dept: LAB | Facility: CLINIC | Age: 78
End: 2018-01-30

## 2018-01-30 DIAGNOSIS — Z79.899 CONTROLLED SUBSTANCE AGREEMENT SIGNED: ICD-10-CM

## 2018-01-30 DIAGNOSIS — Z95.2 S/P MVR (MITRAL VALVE REPLACEMENT): ICD-10-CM

## 2018-01-30 DIAGNOSIS — K63.89 COLON DISTENTION: ICD-10-CM

## 2018-01-30 DIAGNOSIS — G47.00 INSOMNIA: ICD-10-CM

## 2018-01-30 DIAGNOSIS — M79.671 RIGHT FOOT PAIN: ICD-10-CM

## 2018-01-30 DIAGNOSIS — Z95.2 S/P MITRAL VALVE REPLACEMENT: ICD-10-CM

## 2018-01-30 DIAGNOSIS — D50.0 BLOOD LOSS ANEMIA: ICD-10-CM

## 2018-01-30 DIAGNOSIS — E11.9 DM TYPE 2 (DIABETES MELLITUS, TYPE 2) (H): ICD-10-CM

## 2018-01-30 DIAGNOSIS — D64.9 ANEMIA: ICD-10-CM

## 2018-01-30 DIAGNOSIS — I48.0 PAROXYSMAL ATRIAL FIBRILLATION (H): ICD-10-CM

## 2018-01-30 DIAGNOSIS — E03.9 HYPOTHYROIDISM: ICD-10-CM

## 2018-01-30 DIAGNOSIS — M25.50 JOINT PAIN: ICD-10-CM

## 2018-01-30 LAB
ERYTHROCYTE [DISTWIDTH] IN BLOOD BY AUTOMATED COUNT: 14.5 % (ref 11–14.5)
FERRITIN SERPL-MCNC: 43 NG/ML (ref 10–130)
HCT VFR BLD AUTO: 38 % (ref 35–47)
HGB BLD-MCNC: 12.7 G/DL (ref 12–16)
INR PPP: 2.1 (ref 0.9–1.1)
IRON SATN MFR SERPL: 25 % (ref 20–50)
IRON SERPL-MCNC: 84 UG/DL (ref 42–175)
MCH RBC QN AUTO: 28.5 PG (ref 27–34)
MCHC RBC AUTO-ENTMCNC: 33.4 G/DL (ref 32–36)
MCV RBC AUTO: 85 FL (ref 80–100)
PLATELET # BLD AUTO: 164 THOU/UL (ref 140–440)
PMV BLD AUTO: 8.8 FL (ref 7–10)
RBC # BLD AUTO: 4.45 MILL/UL (ref 3.8–5.4)
TIBC SERPL-MCNC: 338 UG/DL (ref 313–563)
TRANSFERRIN SERPL-MCNC: 270 MG/DL (ref 212–360)
WBC: 7.1 THOU/UL (ref 4–11)

## 2018-02-05 ENCOUNTER — AMBULATORY - HEALTHEAST (OUTPATIENT)
Dept: LAB | Facility: CLINIC | Age: 78
End: 2018-02-05

## 2018-02-05 ENCOUNTER — COMMUNICATION - HEALTHEAST (OUTPATIENT)
Dept: NURSING | Facility: CLINIC | Age: 78
End: 2018-02-05

## 2018-02-05 ENCOUNTER — COMMUNICATION - HEALTHEAST (OUTPATIENT)
Dept: INTERNAL MEDICINE | Facility: CLINIC | Age: 78
End: 2018-02-05

## 2018-02-05 DIAGNOSIS — I48.0 PAROXYSMAL ATRIAL FIBRILLATION (H): ICD-10-CM

## 2018-02-05 DIAGNOSIS — Z95.2 S/P MVR (MITRAL VALVE REPLACEMENT): ICD-10-CM

## 2018-02-05 DIAGNOSIS — Z95.2 S/P MITRAL VALVE REPLACEMENT: ICD-10-CM

## 2018-02-05 LAB — INR PPP: 3.1 (ref 0.9–1.1)

## 2018-02-07 ENCOUNTER — COMMUNICATION - HEALTHEAST (OUTPATIENT)
Dept: INTERNAL MEDICINE | Facility: CLINIC | Age: 78
End: 2018-02-07

## 2018-02-07 DIAGNOSIS — E11.9 TYPE 2 DIABETES MELLITUS WITHOUT COMPLICATION, WITH LONG-TERM CURRENT USE OF INSULIN (H): ICD-10-CM

## 2018-02-07 DIAGNOSIS — Z79.4 TYPE 2 DIABETES MELLITUS WITHOUT COMPLICATION, WITH LONG-TERM CURRENT USE OF INSULIN (H): ICD-10-CM

## 2018-02-08 ENCOUNTER — COMMUNICATION - HEALTHEAST (OUTPATIENT)
Dept: INTERNAL MEDICINE | Facility: CLINIC | Age: 78
End: 2018-02-08

## 2018-02-08 DIAGNOSIS — E11.9 TYPE 2 DIABETES MELLITUS (H): ICD-10-CM

## 2018-02-13 ENCOUNTER — COMMUNICATION - HEALTHEAST (OUTPATIENT)
Dept: NURSING | Facility: CLINIC | Age: 78
End: 2018-02-13

## 2018-02-13 ENCOUNTER — AMBULATORY - HEALTHEAST (OUTPATIENT)
Dept: LAB | Facility: CLINIC | Age: 78
End: 2018-02-13

## 2018-02-13 ENCOUNTER — COMMUNICATION - HEALTHEAST (OUTPATIENT)
Dept: INTERNAL MEDICINE | Facility: CLINIC | Age: 78
End: 2018-02-13

## 2018-02-13 DIAGNOSIS — Z95.2 S/P MVR (MITRAL VALVE REPLACEMENT): ICD-10-CM

## 2018-02-13 DIAGNOSIS — I48.0 PAROXYSMAL ATRIAL FIBRILLATION (H): ICD-10-CM

## 2018-02-13 DIAGNOSIS — Z95.2 S/P MITRAL VALVE REPLACEMENT: ICD-10-CM

## 2018-02-13 LAB — INR PPP: 3 (ref 0.9–1.1)

## 2018-02-23 ENCOUNTER — RECORDS - HEALTHEAST (OUTPATIENT)
Dept: ADMINISTRATIVE | Facility: OTHER | Age: 78
End: 2018-02-23

## 2018-02-26 ENCOUNTER — COMMUNICATION - HEALTHEAST (OUTPATIENT)
Dept: NURSING | Facility: CLINIC | Age: 78
End: 2018-02-26

## 2018-02-26 ENCOUNTER — OFFICE VISIT - HEALTHEAST (OUTPATIENT)
Dept: INTERNAL MEDICINE | Facility: CLINIC | Age: 78
End: 2018-02-26

## 2018-02-26 DIAGNOSIS — K63.89 COLON DISTENTION: ICD-10-CM

## 2018-02-26 DIAGNOSIS — Z95.2 S/P MITRAL VALVE REPLACEMENT: ICD-10-CM

## 2018-02-26 DIAGNOSIS — Z51.81 ANTICOAGULATION GOAL OF INR 2.5 TO 3.5: ICD-10-CM

## 2018-02-26 DIAGNOSIS — M79.604 LOW BACK PAIN RADIATING TO BOTH LEGS: ICD-10-CM

## 2018-02-26 DIAGNOSIS — M54.50 LOW BACK PAIN RADIATING TO BOTH LEGS: ICD-10-CM

## 2018-02-26 DIAGNOSIS — M79.605 LOW BACK PAIN RADIATING TO BOTH LEGS: ICD-10-CM

## 2018-02-26 DIAGNOSIS — Z79.01 ANTICOAGULATION GOAL OF INR 2.5 TO 3.5: ICD-10-CM

## 2018-02-26 DIAGNOSIS — I25.10 ASCVD (ARTERIOSCLEROTIC CARDIOVASCULAR DISEASE): ICD-10-CM

## 2018-02-26 DIAGNOSIS — Z01.810 PREOPERATIVE CARDIOVASCULAR EXAMINATION: ICD-10-CM

## 2018-02-26 DIAGNOSIS — G89.29 CHRONIC PAIN: ICD-10-CM

## 2018-02-26 DIAGNOSIS — I10 HTN (HYPERTENSION): ICD-10-CM

## 2018-02-26 DIAGNOSIS — R14.0 ABDOMINAL BLOATING: ICD-10-CM

## 2018-02-26 DIAGNOSIS — M25.50 JOINT PAIN: ICD-10-CM

## 2018-02-26 DIAGNOSIS — R93.3 ABNORMAL CT SCAN, COLON: ICD-10-CM

## 2018-02-26 DIAGNOSIS — D64.9 ANEMIA: ICD-10-CM

## 2018-02-26 DIAGNOSIS — I48.0 PAROXYSMAL ATRIAL FIBRILLATION (H): ICD-10-CM

## 2018-02-26 DIAGNOSIS — E11.9 DM TYPE 2 (DIABETES MELLITUS, TYPE 2) (H): ICD-10-CM

## 2018-02-26 DIAGNOSIS — Z95.2 S/P MVR (MITRAL VALVE REPLACEMENT): ICD-10-CM

## 2018-02-26 LAB
C REACTIVE PROTEIN LHE: 0.6 MG/DL (ref 0–0.8)
ERYTHROCYTE [DISTWIDTH] IN BLOOD BY AUTOMATED COUNT: 14.3 % (ref 11–14.5)
ERYTHROCYTE [SEDIMENTATION RATE] IN BLOOD BY WESTERGREN METHOD: 43 MM/HR (ref 0–20)
HCT VFR BLD AUTO: 36.3 % (ref 35–47)
HGB BLD-MCNC: 12.1 G/DL (ref 12–16)
INR PPP: 3.7 (ref 0.9–1.1)
MCH RBC QN AUTO: 27.9 PG (ref 27–34)
MCHC RBC AUTO-ENTMCNC: 33.4 G/DL (ref 32–36)
MCV RBC AUTO: 84 FL (ref 80–100)
PLATELET # BLD AUTO: 135 THOU/UL (ref 140–440)
PMV BLD AUTO: 9.5 FL (ref 7–10)
RBC # BLD AUTO: 4.34 MILL/UL (ref 3.8–5.4)
RHEUMATOID FACT SERPL-ACNC: <15 IU/ML (ref 0–30)
WBC: 4.7 THOU/UL (ref 4–11)

## 2018-02-26 ASSESSMENT — MIFFLIN-ST. JEOR: SCORE: 1107.94

## 2018-02-27 LAB — CCP AB SER IA-ACNC: 1.2 U/ML

## 2018-03-01 ENCOUNTER — COMMUNICATION - HEALTHEAST (OUTPATIENT)
Dept: INTERNAL MEDICINE | Facility: CLINIC | Age: 78
End: 2018-03-01

## 2018-03-09 ENCOUNTER — COMMUNICATION - HEALTHEAST (OUTPATIENT)
Dept: CARDIOLOGY | Facility: CLINIC | Age: 78
End: 2018-03-09

## 2018-03-12 ENCOUNTER — ANESTHESIA - HEALTHEAST (OUTPATIENT)
Dept: SURGERY | Facility: HOSPITAL | Age: 78
End: 2018-03-12

## 2018-03-13 ENCOUNTER — SURGERY - HEALTHEAST (OUTPATIENT)
Dept: SURGERY | Facility: HOSPITAL | Age: 78
End: 2018-03-13

## 2018-03-14 ENCOUNTER — AMBULATORY - HEALTHEAST (OUTPATIENT)
Dept: NURSING | Facility: CLINIC | Age: 78
End: 2018-03-14

## 2018-03-16 ENCOUNTER — AMBULATORY - HEALTHEAST (OUTPATIENT)
Dept: LAB | Facility: CLINIC | Age: 78
End: 2018-03-16

## 2018-03-16 ENCOUNTER — COMMUNICATION - HEALTHEAST (OUTPATIENT)
Dept: NURSING | Facility: CLINIC | Age: 78
End: 2018-03-16

## 2018-03-16 DIAGNOSIS — I48.0 PAROXYSMAL ATRIAL FIBRILLATION (H): ICD-10-CM

## 2018-03-16 DIAGNOSIS — Z95.2 S/P MVR (MITRAL VALVE REPLACEMENT): ICD-10-CM

## 2018-03-16 DIAGNOSIS — Z95.2 S/P MITRAL VALVE REPLACEMENT: ICD-10-CM

## 2018-03-16 LAB — INR PPP: 1.3 (ref 0.9–1.1)

## 2018-03-21 ENCOUNTER — COMMUNICATION - HEALTHEAST (OUTPATIENT)
Dept: NURSING | Facility: CLINIC | Age: 78
End: 2018-03-21

## 2018-03-21 ENCOUNTER — AMBULATORY - HEALTHEAST (OUTPATIENT)
Dept: LAB | Facility: CLINIC | Age: 78
End: 2018-03-21

## 2018-03-21 DIAGNOSIS — I48.0 PAROXYSMAL ATRIAL FIBRILLATION (H): ICD-10-CM

## 2018-03-21 DIAGNOSIS — Z95.2 S/P MVR (MITRAL VALVE REPLACEMENT): ICD-10-CM

## 2018-03-21 DIAGNOSIS — Z95.2 S/P MITRAL VALVE REPLACEMENT: ICD-10-CM

## 2018-03-21 LAB — INR PPP: 2.5 (ref 0.9–1.1)

## 2018-04-04 ENCOUNTER — COMMUNICATION - HEALTHEAST (OUTPATIENT)
Dept: ANTICOAGULATION | Facility: CLINIC | Age: 78
End: 2018-04-04

## 2018-04-04 ENCOUNTER — AMBULATORY - HEALTHEAST (OUTPATIENT)
Dept: LAB | Facility: CLINIC | Age: 78
End: 2018-04-04

## 2018-04-04 DIAGNOSIS — Z95.2 S/P MVR (MITRAL VALVE REPLACEMENT): ICD-10-CM

## 2018-04-04 DIAGNOSIS — I48.0 PAROXYSMAL ATRIAL FIBRILLATION (H): ICD-10-CM

## 2018-04-04 DIAGNOSIS — Z95.2 S/P MITRAL VALVE REPLACEMENT: ICD-10-CM

## 2018-04-04 LAB — INR PPP: 3.9 (ref 0.9–1.1)

## 2018-04-05 ENCOUNTER — RECORDS - HEALTHEAST (OUTPATIENT)
Dept: ADMINISTRATIVE | Facility: OTHER | Age: 78
End: 2018-04-05

## 2018-04-06 ENCOUNTER — OFFICE VISIT - HEALTHEAST (OUTPATIENT)
Dept: INTERNAL MEDICINE | Facility: CLINIC | Age: 78
End: 2018-04-06

## 2018-04-06 DIAGNOSIS — Z95.2 S/P MITRAL VALVE REPLACEMENT: ICD-10-CM

## 2018-04-06 DIAGNOSIS — D64.9 ANEMIA: ICD-10-CM

## 2018-04-06 DIAGNOSIS — G89.29 CHRONIC PAIN: ICD-10-CM

## 2018-04-06 DIAGNOSIS — I48.91 ATRIAL FIBRILLATION (H): ICD-10-CM

## 2018-04-06 DIAGNOSIS — N39.0 RECURRENT UTI (URINARY TRACT INFECTION): ICD-10-CM

## 2018-04-06 DIAGNOSIS — I25.10 ASCVD (ARTERIOSCLEROTIC CARDIOVASCULAR DISEASE): ICD-10-CM

## 2018-04-06 DIAGNOSIS — E11.9 DM TYPE 2 (DIABETES MELLITUS, TYPE 2) (H): ICD-10-CM

## 2018-04-06 DIAGNOSIS — M54.9 BACK PAIN: ICD-10-CM

## 2018-04-06 DIAGNOSIS — G54.1 LUMBOSACRAL PLEXOPATHY: ICD-10-CM

## 2018-04-06 DIAGNOSIS — D50.0 BLOOD LOSS ANEMIA: ICD-10-CM

## 2018-04-06 DIAGNOSIS — K63.5 COLON POLYPS: ICD-10-CM

## 2018-04-06 LAB
AMPHETAMINES UR QL SCN: ABNORMAL
BARBITURATES UR QL: ABNORMAL
BENZODIAZ UR QL: ABNORMAL
CANNABINOIDS UR QL SCN: ABNORMAL
COCAINE UR QL: ABNORMAL
CREAT UR-MCNC: 11.7 MG/DL
ERYTHROCYTE [DISTWIDTH] IN BLOOD BY AUTOMATED COUNT: 15.4 % (ref 11–14.5)
FERRITIN SERPL-MCNC: 19 NG/ML (ref 10–130)
HBA1C MFR BLD: 8.2 % (ref 3.5–6)
HCT VFR BLD AUTO: 30.9 % (ref 35–47)
HGB BLD-MCNC: 10.3 G/DL (ref 12–16)
IRON SATN MFR SERPL: 15 % (ref 20–50)
IRON SERPL-MCNC: 59 UG/DL (ref 42–175)
MCH RBC QN AUTO: 28.6 PG (ref 27–34)
MCHC RBC AUTO-ENTMCNC: 33.4 G/DL (ref 32–36)
MCV RBC AUTO: 86 FL (ref 80–100)
METHADONE UR QL SCN: ABNORMAL
OPIATES UR QL SCN: ABNORMAL
OXYCODONE UR QL: ABNORMAL
PCP UR QL SCN: ABNORMAL
PLATELET # BLD AUTO: 203 THOU/UL (ref 140–440)
PMV BLD AUTO: 9.2 FL (ref 7–10)
RBC # BLD AUTO: 3.6 MILL/UL (ref 3.8–5.4)
TIBC SERPL-MCNC: 406 UG/DL (ref 313–563)
TRANSFERRIN SERPL-MCNC: 325 MG/DL (ref 212–360)
WBC: 5.1 THOU/UL (ref 4–11)

## 2018-04-08 ENCOUNTER — COMMUNICATION - HEALTHEAST (OUTPATIENT)
Dept: INTERNAL MEDICINE | Facility: CLINIC | Age: 78
End: 2018-04-08

## 2018-04-08 ENCOUNTER — AMBULATORY - HEALTHEAST (OUTPATIENT)
Dept: INTERNAL MEDICINE | Facility: CLINIC | Age: 78
End: 2018-04-08

## 2018-04-08 DIAGNOSIS — D64.9 ANEMIA: ICD-10-CM

## 2018-04-12 ENCOUNTER — COMMUNICATION - HEALTHEAST (OUTPATIENT)
Dept: ANTICOAGULATION | Facility: CLINIC | Age: 78
End: 2018-04-12

## 2018-04-12 ENCOUNTER — AMBULATORY - HEALTHEAST (OUTPATIENT)
Dept: LAB | Facility: CLINIC | Age: 78
End: 2018-04-12

## 2018-04-12 DIAGNOSIS — Z95.2 S/P MITRAL VALVE REPLACEMENT: ICD-10-CM

## 2018-04-12 DIAGNOSIS — I48.0 PAROXYSMAL ATRIAL FIBRILLATION (H): ICD-10-CM

## 2018-04-12 DIAGNOSIS — Z95.2 S/P MVR (MITRAL VALVE REPLACEMENT): ICD-10-CM

## 2018-04-12 LAB — INR PPP: 3.8 (ref 0.9–1.1)

## 2018-04-16 ENCOUNTER — COMMUNICATION - HEALTHEAST (OUTPATIENT)
Dept: INTERNAL MEDICINE | Facility: CLINIC | Age: 78
End: 2018-04-16

## 2018-04-17 ENCOUNTER — AMBULATORY - HEALTHEAST (OUTPATIENT)
Dept: LAB | Facility: CLINIC | Age: 78
End: 2018-04-17

## 2018-04-17 ENCOUNTER — COMMUNICATION - HEALTHEAST (OUTPATIENT)
Dept: INTERNAL MEDICINE | Facility: CLINIC | Age: 78
End: 2018-04-17

## 2018-04-17 DIAGNOSIS — Z95.2 S/P MVR (MITRAL VALVE REPLACEMENT): ICD-10-CM

## 2018-04-17 DIAGNOSIS — D64.9 ANEMIA: ICD-10-CM

## 2018-04-17 DIAGNOSIS — I48.0 PAROXYSMAL ATRIAL FIBRILLATION (H): ICD-10-CM

## 2018-04-17 DIAGNOSIS — Z95.2 S/P MITRAL VALVE REPLACEMENT: ICD-10-CM

## 2018-04-17 LAB
HGB BLD-MCNC: 6.3 G/DL (ref 12–16)
INR PPP: 4.1 (ref 0.9–1.1)

## 2018-04-19 ENCOUNTER — COMMUNICATION - HEALTHEAST (OUTPATIENT)
Dept: INTERNAL MEDICINE | Facility: CLINIC | Age: 78
End: 2018-04-19

## 2018-04-19 DIAGNOSIS — D64.9 ANEMIA: ICD-10-CM

## 2018-04-20 ENCOUNTER — COMMUNICATION - HEALTHEAST (OUTPATIENT)
Dept: ANTICOAGULATION | Facility: CLINIC | Age: 78
End: 2018-04-20

## 2018-04-20 ENCOUNTER — AMBULATORY - HEALTHEAST (OUTPATIENT)
Dept: LAB | Facility: CLINIC | Age: 78
End: 2018-04-20

## 2018-04-20 ENCOUNTER — OFFICE VISIT - HEALTHEAST (OUTPATIENT)
Dept: INTERNAL MEDICINE | Facility: CLINIC | Age: 78
End: 2018-04-20

## 2018-04-20 DIAGNOSIS — K92.1 BLACK STOOLS: ICD-10-CM

## 2018-04-20 DIAGNOSIS — D64.9 ANEMIA: ICD-10-CM

## 2018-04-20 DIAGNOSIS — Z95.2 S/P MVR (MITRAL VALVE REPLACEMENT): ICD-10-CM

## 2018-04-20 DIAGNOSIS — Z95.2 S/P MITRAL VALVE REPLACEMENT: ICD-10-CM

## 2018-04-20 DIAGNOSIS — I48.0 PAROXYSMAL ATRIAL FIBRILLATION (H): ICD-10-CM

## 2018-04-20 DIAGNOSIS — Z09 HOSPITAL DISCHARGE FOLLOW-UP: ICD-10-CM

## 2018-04-20 DIAGNOSIS — D50.0 BLOOD LOSS ANEMIA: ICD-10-CM

## 2018-04-20 DIAGNOSIS — Z98.890 S/P COLONOSCOPIC POLYPECTOMY: ICD-10-CM

## 2018-04-20 DIAGNOSIS — K92.2 GIB (GASTROINTESTINAL BLEEDING): ICD-10-CM

## 2018-04-20 LAB
HGB BLD-MCNC: 9.3 G/DL (ref 12–16)
INR PPP: 2.8 (ref 0.9–1.1)

## 2018-04-23 ENCOUNTER — COMMUNICATION - HEALTHEAST (OUTPATIENT)
Dept: ANTICOAGULATION | Facility: CLINIC | Age: 78
End: 2018-04-23

## 2018-04-23 DIAGNOSIS — Z95.2 S/P MITRAL VALVE REPLACEMENT: ICD-10-CM

## 2018-04-26 ENCOUNTER — COMMUNICATION - HEALTHEAST (OUTPATIENT)
Dept: INTERNAL MEDICINE | Facility: CLINIC | Age: 78
End: 2018-04-26

## 2018-04-26 ENCOUNTER — AMBULATORY - HEALTHEAST (OUTPATIENT)
Dept: LAB | Facility: CLINIC | Age: 78
End: 2018-04-26

## 2018-04-26 ENCOUNTER — COMMUNICATION - HEALTHEAST (OUTPATIENT)
Dept: ANTICOAGULATION | Facility: CLINIC | Age: 78
End: 2018-04-26

## 2018-04-26 DIAGNOSIS — Z95.2 S/P MVR (MITRAL VALVE REPLACEMENT): ICD-10-CM

## 2018-04-26 DIAGNOSIS — Z95.2 S/P MITRAL VALVE REPLACEMENT: ICD-10-CM

## 2018-04-26 DIAGNOSIS — D64.9 ANEMIA: ICD-10-CM

## 2018-04-26 DIAGNOSIS — I48.0 PAROXYSMAL ATRIAL FIBRILLATION (H): ICD-10-CM

## 2018-04-26 LAB
FERRITIN SERPL-MCNC: 21 NG/ML (ref 10–130)
HGB BLD-MCNC: 9.9 G/DL (ref 12–16)
INR PPP: 3.6 (ref 0.9–1.1)

## 2018-05-03 ENCOUNTER — AMBULATORY - HEALTHEAST (OUTPATIENT)
Dept: LAB | Facility: CLINIC | Age: 78
End: 2018-05-03

## 2018-05-03 ENCOUNTER — COMMUNICATION - HEALTHEAST (OUTPATIENT)
Dept: ANTICOAGULATION | Facility: CLINIC | Age: 78
End: 2018-05-03

## 2018-05-03 DIAGNOSIS — Z95.2 S/P MVR (MITRAL VALVE REPLACEMENT): ICD-10-CM

## 2018-05-03 DIAGNOSIS — I48.0 PAROXYSMAL ATRIAL FIBRILLATION (H): ICD-10-CM

## 2018-05-03 DIAGNOSIS — Z95.2 S/P MITRAL VALVE REPLACEMENT: ICD-10-CM

## 2018-05-03 LAB — INR PPP: 3 (ref 0.9–1.1)

## 2018-05-07 ENCOUNTER — COMMUNICATION - HEALTHEAST (OUTPATIENT)
Dept: ANTICOAGULATION | Facility: CLINIC | Age: 78
End: 2018-05-07

## 2018-05-07 ENCOUNTER — OFFICE VISIT - HEALTHEAST (OUTPATIENT)
Dept: INTERNAL MEDICINE | Facility: CLINIC | Age: 78
End: 2018-05-07

## 2018-05-07 ENCOUNTER — RECORDS - HEALTHEAST (OUTPATIENT)
Dept: ADMINISTRATIVE | Facility: OTHER | Age: 78
End: 2018-05-07

## 2018-05-07 ENCOUNTER — AMBULATORY - HEALTHEAST (OUTPATIENT)
Dept: LAB | Facility: CLINIC | Age: 78
End: 2018-05-07

## 2018-05-07 DIAGNOSIS — D64.9 ANEMIA: ICD-10-CM

## 2018-05-07 DIAGNOSIS — D50.0 BLOOD LOSS ANEMIA: ICD-10-CM

## 2018-05-07 DIAGNOSIS — Z95.2 S/P MITRAL VALVE REPLACEMENT: ICD-10-CM

## 2018-05-07 DIAGNOSIS — M79.604 LOW BACK PAIN RADIATING TO BOTH LEGS: ICD-10-CM

## 2018-05-07 DIAGNOSIS — Z95.2 S/P MVR (MITRAL VALVE REPLACEMENT): ICD-10-CM

## 2018-05-07 DIAGNOSIS — K92.2 GIB (GASTROINTESTINAL BLEEDING): ICD-10-CM

## 2018-05-07 DIAGNOSIS — F40.240 CLAUSTROPHOBIA: ICD-10-CM

## 2018-05-07 DIAGNOSIS — Z01.810 PREOPERATIVE CARDIOVASCULAR EXAMINATION: ICD-10-CM

## 2018-05-07 DIAGNOSIS — M79.605 LOW BACK PAIN RADIATING TO BOTH LEGS: ICD-10-CM

## 2018-05-07 DIAGNOSIS — G89.29 CHRONIC PAIN: ICD-10-CM

## 2018-05-07 DIAGNOSIS — I48.0 PAROXYSMAL ATRIAL FIBRILLATION (H): ICD-10-CM

## 2018-05-07 DIAGNOSIS — M48.061 FORAMINAL STENOSIS OF LUMBAR REGION: ICD-10-CM

## 2018-05-07 DIAGNOSIS — M54.50 LOW BACK PAIN RADIATING TO BOTH LEGS: ICD-10-CM

## 2018-05-07 LAB
HGB BLD-MCNC: 10.8 G/DL (ref 12–16)
INR PPP: 3.4 (ref 0.9–1.1)

## 2018-05-08 ENCOUNTER — COMMUNICATION - HEALTHEAST (OUTPATIENT)
Dept: INTERNAL MEDICINE | Facility: CLINIC | Age: 78
End: 2018-05-08

## 2018-05-17 ENCOUNTER — COMMUNICATION - HEALTHEAST (OUTPATIENT)
Dept: ANTICOAGULATION | Facility: CLINIC | Age: 78
End: 2018-05-17

## 2018-05-17 ENCOUNTER — AMBULATORY - HEALTHEAST (OUTPATIENT)
Dept: LAB | Facility: CLINIC | Age: 78
End: 2018-05-17

## 2018-05-17 DIAGNOSIS — Z95.2 S/P MITRAL VALVE REPLACEMENT: ICD-10-CM

## 2018-05-17 DIAGNOSIS — Z95.2 S/P MVR (MITRAL VALVE REPLACEMENT): ICD-10-CM

## 2018-05-17 DIAGNOSIS — I48.0 PAROXYSMAL ATRIAL FIBRILLATION (H): ICD-10-CM

## 2018-05-17 LAB — INR PPP: 1.7 (ref 0.9–1.1)

## 2018-05-21 ENCOUNTER — COMMUNICATION - HEALTHEAST (OUTPATIENT)
Dept: INTERNAL MEDICINE | Facility: CLINIC | Age: 78
End: 2018-05-21

## 2018-05-21 DIAGNOSIS — E11.9 TYPE 2 DIABETES MELLITUS (H): ICD-10-CM

## 2018-05-21 DIAGNOSIS — E11.9 DM TYPE 2 (DIABETES MELLITUS, TYPE 2) (H): ICD-10-CM

## 2018-05-23 ENCOUNTER — HOSPITAL ENCOUNTER (OUTPATIENT)
Dept: MRI IMAGING | Facility: HOSPITAL | Age: 78
Discharge: HOME | End: 2018-05-23
Attending: INTERNAL MEDICINE

## 2018-05-23 ENCOUNTER — COMMUNICATION - HEALTHEAST (OUTPATIENT)
Dept: ANTICOAGULATION | Facility: CLINIC | Age: 78
End: 2018-05-23

## 2018-05-23 ENCOUNTER — RECORDS - HEALTHEAST (OUTPATIENT)
Dept: ADMINISTRATIVE | Facility: OTHER | Age: 78
End: 2018-05-23

## 2018-05-23 DIAGNOSIS — M79.605 LOW BACK PAIN RADIATING TO BOTH LEGS: ICD-10-CM

## 2018-05-23 DIAGNOSIS — M54.50 LOW BACK PAIN RADIATING TO BOTH LEGS: ICD-10-CM

## 2018-05-23 DIAGNOSIS — M79.604 LOW BACK PAIN RADIATING TO BOTH LEGS: ICD-10-CM

## 2018-05-23 DIAGNOSIS — M99.83 OTHER BIOMECHANICAL LESIONS OF LUMBAR REGION: ICD-10-CM

## 2018-05-23 DIAGNOSIS — Z95.2 S/P MITRAL VALVE REPLACEMENT: ICD-10-CM

## 2018-05-23 DIAGNOSIS — M48.061 FORAMINAL STENOSIS OF LUMBAR REGION: ICD-10-CM

## 2018-05-23 DIAGNOSIS — G89.29 CHRONIC PAIN: ICD-10-CM

## 2018-05-23 ASSESSMENT — MIFFLIN-ST. JEOR: SCORE: 1123.13

## 2018-05-24 ENCOUNTER — COMMUNICATION - HEALTHEAST (OUTPATIENT)
Dept: INTERNAL MEDICINE | Facility: CLINIC | Age: 78
End: 2018-05-24

## 2018-05-24 LAB — BACTERIA SPEC CULT: NORMAL

## 2018-05-25 ENCOUNTER — COMMUNICATION - HEALTHEAST (OUTPATIENT)
Dept: ANTICOAGULATION | Facility: CLINIC | Age: 78
End: 2018-05-25

## 2018-05-25 ENCOUNTER — AMBULATORY - HEALTHEAST (OUTPATIENT)
Dept: LAB | Facility: CLINIC | Age: 78
End: 2018-05-25

## 2018-05-25 DIAGNOSIS — Z95.2 S/P MITRAL VALVE REPLACEMENT: ICD-10-CM

## 2018-05-25 DIAGNOSIS — Z95.2 S/P MVR (MITRAL VALVE REPLACEMENT): ICD-10-CM

## 2018-05-25 DIAGNOSIS — I48.0 PAROXYSMAL ATRIAL FIBRILLATION (H): ICD-10-CM

## 2018-05-25 LAB — INR PPP: 3.6 (ref 0.9–1.1)

## 2018-05-29 ENCOUNTER — OFFICE VISIT - HEALTHEAST (OUTPATIENT)
Dept: INTERNAL MEDICINE | Facility: CLINIC | Age: 78
End: 2018-05-29

## 2018-05-29 ENCOUNTER — AMBULATORY - HEALTHEAST (OUTPATIENT)
Dept: LAB | Facility: CLINIC | Age: 78
End: 2018-05-29

## 2018-05-29 ENCOUNTER — COMMUNICATION - HEALTHEAST (OUTPATIENT)
Dept: ANTICOAGULATION | Facility: CLINIC | Age: 78
End: 2018-05-29

## 2018-05-29 DIAGNOSIS — R05.9 COUGH: ICD-10-CM

## 2018-05-29 DIAGNOSIS — I48.0 PAROXYSMAL ATRIAL FIBRILLATION (H): ICD-10-CM

## 2018-05-29 DIAGNOSIS — M48.00 MULTILEVEL NEURAL FORAMINAL STENOSIS: ICD-10-CM

## 2018-05-29 DIAGNOSIS — K92.2 GIB (GASTROINTESTINAL BLEEDING): ICD-10-CM

## 2018-05-29 DIAGNOSIS — E11.9 DM TYPE 2 (DIABETES MELLITUS, TYPE 2) (H): ICD-10-CM

## 2018-05-29 DIAGNOSIS — Z95.2 S/P MITRAL VALVE REPLACEMENT: ICD-10-CM

## 2018-05-29 DIAGNOSIS — Z95.2 S/P MVR (MITRAL VALVE REPLACEMENT): ICD-10-CM

## 2018-05-29 DIAGNOSIS — M54.17 LUMBOSACRAL RADICULOPATHY AT S1: ICD-10-CM

## 2018-05-29 DIAGNOSIS — M54.50 LOW BACK PAIN: ICD-10-CM

## 2018-05-29 LAB — INR PPP: 4.2 (ref 0.9–1.1)

## 2018-05-30 ENCOUNTER — AMBULATORY - HEALTHEAST (OUTPATIENT)
Dept: PHYSICAL MEDICINE AND REHAB | Facility: CLINIC | Age: 78
End: 2018-05-30

## 2018-05-30 DIAGNOSIS — M54.16 LUMBAR RADICULITIS: ICD-10-CM

## 2018-05-31 ENCOUNTER — HOSPITAL ENCOUNTER (OUTPATIENT)
Dept: PHYSICAL MEDICINE AND REHAB | Facility: CLINIC | Age: 78
Discharge: HOME | End: 2018-05-31
Attending: PHYSICAL MEDICINE & REHABILITATION

## 2018-05-31 ENCOUNTER — COMMUNICATION - HEALTHEAST (OUTPATIENT)
Dept: INTERNAL MEDICINE | Facility: CLINIC | Age: 78
End: 2018-05-31

## 2018-05-31 DIAGNOSIS — E11.9 TYPE 2 DIABETES MELLITUS WITHOUT COMPLICATION, WITH LONG-TERM CURRENT USE OF INSULIN (H): ICD-10-CM

## 2018-05-31 DIAGNOSIS — Z79.01 ON WARFARIN THERAPY: ICD-10-CM

## 2018-05-31 DIAGNOSIS — M54.16 LUMBAR RADICULITIS: ICD-10-CM

## 2018-05-31 DIAGNOSIS — Z79.4 TYPE 2 DIABETES MELLITUS WITHOUT COMPLICATION, WITH LONG-TERM CURRENT USE OF INSULIN (H): ICD-10-CM

## 2018-05-31 LAB
GLUCOSE BLDC GLUCOMTR-MCNC: 256 MG/DL (ref 70–125)
POC INR - HE - HISTORICAL: 2.5 (ref 0.9–1.1)

## 2018-06-07 ENCOUNTER — AMBULATORY - HEALTHEAST (OUTPATIENT)
Dept: LAB | Facility: CLINIC | Age: 78
End: 2018-06-07

## 2018-06-07 ENCOUNTER — COMMUNICATION - HEALTHEAST (OUTPATIENT)
Dept: INTERNAL MEDICINE | Facility: CLINIC | Age: 78
End: 2018-06-07

## 2018-06-07 ENCOUNTER — COMMUNICATION - HEALTHEAST (OUTPATIENT)
Dept: ANTICOAGULATION | Facility: CLINIC | Age: 78
End: 2018-06-07

## 2018-06-07 DIAGNOSIS — Z95.2 S/P MVR (MITRAL VALVE REPLACEMENT): ICD-10-CM

## 2018-06-07 DIAGNOSIS — I48.0 PAROXYSMAL ATRIAL FIBRILLATION (H): ICD-10-CM

## 2018-06-07 DIAGNOSIS — Z95.2 S/P MITRAL VALVE REPLACEMENT: ICD-10-CM

## 2018-06-07 LAB — INR PPP: 4.3 (ref 0.9–1.1)

## 2018-06-15 ENCOUNTER — AMBULATORY - HEALTHEAST (OUTPATIENT)
Dept: LAB | Facility: CLINIC | Age: 78
End: 2018-06-15

## 2018-06-15 ENCOUNTER — COMMUNICATION - HEALTHEAST (OUTPATIENT)
Dept: ANTICOAGULATION | Facility: CLINIC | Age: 78
End: 2018-06-15

## 2018-06-15 DIAGNOSIS — Z95.2 S/P MITRAL VALVE REPLACEMENT: ICD-10-CM

## 2018-06-15 DIAGNOSIS — Z95.2 S/P MVR (MITRAL VALVE REPLACEMENT): ICD-10-CM

## 2018-06-15 DIAGNOSIS — I48.0 PAROXYSMAL ATRIAL FIBRILLATION (H): ICD-10-CM

## 2018-06-15 LAB — INR PPP: 3.4 (ref 0.9–1.1)

## 2018-06-29 ENCOUNTER — AMBULATORY - HEALTHEAST (OUTPATIENT)
Dept: LAB | Facility: CLINIC | Age: 78
End: 2018-06-29

## 2018-06-29 ENCOUNTER — COMMUNICATION - HEALTHEAST (OUTPATIENT)
Dept: ANTICOAGULATION | Facility: CLINIC | Age: 78
End: 2018-06-29

## 2018-06-29 DIAGNOSIS — Z95.2 S/P MVR (MITRAL VALVE REPLACEMENT): ICD-10-CM

## 2018-06-29 DIAGNOSIS — I48.0 PAROXYSMAL ATRIAL FIBRILLATION (H): ICD-10-CM

## 2018-06-29 DIAGNOSIS — Z95.2 S/P MITRAL VALVE REPLACEMENT: ICD-10-CM

## 2018-06-29 LAB — INR PPP: 2.6 (ref 0.9–1.1)

## 2018-07-16 ENCOUNTER — COMMUNICATION - HEALTHEAST (OUTPATIENT)
Dept: ANTICOAGULATION | Facility: CLINIC | Age: 78
End: 2018-07-16

## 2018-07-16 DIAGNOSIS — Z51.81 ANTICOAGULATION GOAL OF INR 2.5 TO 3.5: ICD-10-CM

## 2018-07-16 DIAGNOSIS — I48.91 ATRIAL FIBRILLATION (H): ICD-10-CM

## 2018-07-16 DIAGNOSIS — Z95.2 S/P MITRAL VALVE REPLACEMENT: ICD-10-CM

## 2018-07-16 DIAGNOSIS — Z79.01 ANTICOAGULATION GOAL OF INR 2.5 TO 3.5: ICD-10-CM

## 2018-07-17 ENCOUNTER — COMMUNICATION - HEALTHEAST (OUTPATIENT)
Dept: ANTICOAGULATION | Facility: CLINIC | Age: 78
End: 2018-07-17

## 2018-07-17 ENCOUNTER — OFFICE VISIT - HEALTHEAST (OUTPATIENT)
Dept: INTERNAL MEDICINE | Facility: CLINIC | Age: 78
End: 2018-07-17

## 2018-07-17 ENCOUNTER — COMMUNICATION - HEALTHEAST (OUTPATIENT)
Dept: INTERNAL MEDICINE | Facility: CLINIC | Age: 78
End: 2018-07-17

## 2018-07-17 DIAGNOSIS — M48.061 SPINAL STENOSIS OF LUMBAR REGION: ICD-10-CM

## 2018-07-17 DIAGNOSIS — B07.9 VIRAL WART ON FINGER: ICD-10-CM

## 2018-07-17 DIAGNOSIS — I48.91 ATRIAL FIBRILLATION (H): ICD-10-CM

## 2018-07-17 DIAGNOSIS — Z51.81 ANTICOAGULATION GOAL OF INR 2.5 TO 3.5: ICD-10-CM

## 2018-07-17 DIAGNOSIS — Z95.2 S/P MITRAL VALVE REPLACEMENT: ICD-10-CM

## 2018-07-17 DIAGNOSIS — Z79.01 ANTICOAGULATION GOAL OF INR 2.5 TO 3.5: ICD-10-CM

## 2018-07-17 DIAGNOSIS — M54.50 LOW BACK PAIN: ICD-10-CM

## 2018-07-17 DIAGNOSIS — D64.9 ANEMIA: ICD-10-CM

## 2018-07-17 DIAGNOSIS — M48.00 MULTILEVEL NEURAL FORAMINAL STENOSIS: ICD-10-CM

## 2018-07-17 LAB
ERYTHROCYTE [DISTWIDTH] IN BLOOD BY AUTOMATED COUNT: 14.1 % (ref 11–14.5)
HCT VFR BLD AUTO: 35.7 % (ref 35–47)
HGB BLD-MCNC: 11.8 G/DL (ref 12–16)
INR PPP: 1.9 (ref 0.9–1.1)
MCH RBC QN AUTO: 28.1 PG (ref 27–34)
MCHC RBC AUTO-ENTMCNC: 33.1 G/DL (ref 32–36)
MCV RBC AUTO: 85 FL (ref 80–100)
PLATELET # BLD AUTO: 157 THOU/UL (ref 140–440)
PMV BLD AUTO: 9.5 FL (ref 7–10)
RBC # BLD AUTO: 4.21 MILL/UL (ref 3.8–5.4)
WBC: 5.3 THOU/UL (ref 4–11)

## 2018-07-24 ENCOUNTER — AMBULATORY - HEALTHEAST (OUTPATIENT)
Dept: LAB | Facility: CLINIC | Age: 78
End: 2018-07-24

## 2018-07-24 ENCOUNTER — COMMUNICATION - HEALTHEAST (OUTPATIENT)
Dept: ANTICOAGULATION | Facility: CLINIC | Age: 78
End: 2018-07-24

## 2018-07-24 DIAGNOSIS — Z79.01 ANTICOAGULATION GOAL OF INR 2.5 TO 3.5: ICD-10-CM

## 2018-07-24 DIAGNOSIS — Z95.2 S/P MITRAL VALVE REPLACEMENT: ICD-10-CM

## 2018-07-24 DIAGNOSIS — Z51.81 ANTICOAGULATION GOAL OF INR 2.5 TO 3.5: ICD-10-CM

## 2018-07-24 DIAGNOSIS — I48.91 ATRIAL FIBRILLATION (H): ICD-10-CM

## 2018-07-24 LAB — INR PPP: 2.6 (ref 0.9–1.1)

## 2018-07-29 ENCOUNTER — COMMUNICATION - HEALTHEAST (OUTPATIENT)
Dept: CARDIOLOGY | Facility: CLINIC | Age: 78
End: 2018-07-29

## 2018-07-29 DIAGNOSIS — R60.0 BILATERAL LEG EDEMA: ICD-10-CM

## 2018-08-01 ENCOUNTER — HOSPITAL ENCOUNTER (OUTPATIENT)
Dept: PALLIATIVE MEDICINE | Facility: OTHER | Age: 78
Discharge: HOME | End: 2018-08-01
Attending: INTERNAL MEDICINE

## 2018-08-01 DIAGNOSIS — M54.16 LUMBAR RADICULOPATHY: ICD-10-CM

## 2018-08-01 DIAGNOSIS — G89.4 CHRONIC PAIN SYNDROME: ICD-10-CM

## 2018-08-01 DIAGNOSIS — M48.062 SPINAL STENOSIS OF LUMBAR REGION WITH NEUROGENIC CLAUDICATION: ICD-10-CM

## 2018-08-01 DIAGNOSIS — M51.369 DDD (DEGENERATIVE DISC DISEASE), LUMBAR: ICD-10-CM

## 2018-08-01 ASSESSMENT — MIFFLIN-ST. JEOR: SCORE: 1132.21

## 2018-08-02 ENCOUNTER — COMMUNICATION - HEALTHEAST (OUTPATIENT)
Dept: INTERNAL MEDICINE | Facility: CLINIC | Age: 78
End: 2018-08-02

## 2018-08-07 ENCOUNTER — AMBULATORY - HEALTHEAST (OUTPATIENT)
Dept: LAB | Facility: CLINIC | Age: 78
End: 2018-08-07

## 2018-08-07 ENCOUNTER — COMMUNICATION - HEALTHEAST (OUTPATIENT)
Dept: ANTICOAGULATION | Facility: CLINIC | Age: 78
End: 2018-08-07

## 2018-08-07 DIAGNOSIS — Z95.2 S/P MITRAL VALVE REPLACEMENT: ICD-10-CM

## 2018-08-07 DIAGNOSIS — I48.91 ATRIAL FIBRILLATION (H): ICD-10-CM

## 2018-08-07 DIAGNOSIS — Z51.81 ANTICOAGULATION GOAL OF INR 2.5 TO 3.5: ICD-10-CM

## 2018-08-07 DIAGNOSIS — Z79.01 ANTICOAGULATION GOAL OF INR 2.5 TO 3.5: ICD-10-CM

## 2018-08-07 LAB — INR PPP: 2.9 (ref 0.9–1.1)

## 2018-08-10 ENCOUNTER — COMMUNICATION - HEALTHEAST (OUTPATIENT)
Dept: PALLIATIVE MEDICINE | Facility: OTHER | Age: 78
End: 2018-08-10

## 2018-08-13 ENCOUNTER — COMMUNICATION - HEALTHEAST (OUTPATIENT)
Dept: ANTICOAGULATION | Facility: CLINIC | Age: 78
End: 2018-08-13

## 2018-08-13 ENCOUNTER — RECORDS - HEALTHEAST (OUTPATIENT)
Dept: ADMINISTRATIVE | Facility: OTHER | Age: 78
End: 2018-08-13

## 2018-08-13 ENCOUNTER — AMBULATORY - HEALTHEAST (OUTPATIENT)
Dept: LAB | Facility: CLINIC | Age: 78
End: 2018-08-13

## 2018-08-13 ENCOUNTER — HOSPITAL ENCOUNTER (OUTPATIENT)
Dept: PALLIATIVE MEDICINE | Facility: OTHER | Age: 78
Discharge: HOME | End: 2018-08-13
Attending: PSYCHIATRY & NEUROLOGY | Admitting: PSYCHIATRY & NEUROLOGY

## 2018-08-13 ENCOUNTER — COMMUNICATION - HEALTHEAST (OUTPATIENT)
Dept: INTERNAL MEDICINE | Facility: CLINIC | Age: 78
End: 2018-08-13

## 2018-08-13 DIAGNOSIS — Z95.2 S/P MITRAL VALVE REPLACEMENT: ICD-10-CM

## 2018-08-13 DIAGNOSIS — G89.4 CHRONIC PAIN SYNDROME: ICD-10-CM

## 2018-08-13 DIAGNOSIS — Z79.01 ANTICOAGULATION GOAL OF INR 2.5 TO 3.5: ICD-10-CM

## 2018-08-13 DIAGNOSIS — M51.369 DDD (DEGENERATIVE DISC DISEASE), LUMBAR: ICD-10-CM

## 2018-08-13 DIAGNOSIS — G89.29 CHRONIC PAIN: ICD-10-CM

## 2018-08-13 DIAGNOSIS — I48.91 ATRIAL FIBRILLATION (H): ICD-10-CM

## 2018-08-13 DIAGNOSIS — Z51.81 ANTICOAGULATION GOAL OF INR 2.5 TO 3.5: ICD-10-CM

## 2018-08-13 DIAGNOSIS — M54.16 LUMBAR RADICULOPATHY: ICD-10-CM

## 2018-08-13 DIAGNOSIS — M48.062 SPINAL STENOSIS OF LUMBAR REGION WITH NEUROGENIC CLAUDICATION: ICD-10-CM

## 2018-08-13 LAB — INR PPP: 1 (ref 0.9–1.1)

## 2018-08-13 ASSESSMENT — MIFFLIN-ST. JEOR: SCORE: 1132.21

## 2018-08-14 ENCOUNTER — RECORDS - HEALTHEAST (OUTPATIENT)
Dept: ADMINISTRATIVE | Facility: OTHER | Age: 78
End: 2018-08-14

## 2018-08-14 ENCOUNTER — COMMUNICATION - HEALTHEAST (OUTPATIENT)
Dept: PALLIATIVE MEDICINE | Facility: OTHER | Age: 78
End: 2018-08-14

## 2018-08-17 ENCOUNTER — AMBULATORY - HEALTHEAST (OUTPATIENT)
Dept: LAB | Facility: CLINIC | Age: 78
End: 2018-08-17

## 2018-08-17 ENCOUNTER — COMMUNICATION - HEALTHEAST (OUTPATIENT)
Dept: ANTICOAGULATION | Facility: CLINIC | Age: 78
End: 2018-08-17

## 2018-08-17 ENCOUNTER — COMMUNICATION - HEALTHEAST (OUTPATIENT)
Dept: SCHEDULING | Facility: CLINIC | Age: 78
End: 2018-08-17

## 2018-08-17 DIAGNOSIS — Z95.2 S/P MITRAL VALVE REPLACEMENT: ICD-10-CM

## 2018-08-17 DIAGNOSIS — I48.91 ATRIAL FIBRILLATION (H): ICD-10-CM

## 2018-08-17 DIAGNOSIS — Z79.01 ANTICOAGULATION GOAL OF INR 2.5 TO 3.5: ICD-10-CM

## 2018-08-17 DIAGNOSIS — Z51.81 ANTICOAGULATION GOAL OF INR 2.5 TO 3.5: ICD-10-CM

## 2018-08-17 LAB — INR PPP: 1.3 (ref 0.9–1.1)

## 2018-08-20 ENCOUNTER — COMMUNICATION - HEALTHEAST (OUTPATIENT)
Dept: ANTICOAGULATION | Facility: CLINIC | Age: 78
End: 2018-08-20

## 2018-08-20 ENCOUNTER — AMBULATORY - HEALTHEAST (OUTPATIENT)
Dept: LAB | Facility: CLINIC | Age: 78
End: 2018-08-20

## 2018-08-20 DIAGNOSIS — I48.91 ATRIAL FIBRILLATION (H): ICD-10-CM

## 2018-08-20 DIAGNOSIS — Z51.81 ANTICOAGULATION GOAL OF INR 2.5 TO 3.5: ICD-10-CM

## 2018-08-20 DIAGNOSIS — Z95.2 S/P MITRAL VALVE REPLACEMENT: ICD-10-CM

## 2018-08-20 DIAGNOSIS — Z79.01 ANTICOAGULATION GOAL OF INR 2.5 TO 3.5: ICD-10-CM

## 2018-08-20 LAB — INR PPP: 2.2 (ref 0.9–1.1)

## 2018-08-28 ENCOUNTER — COMMUNICATION - HEALTHEAST (OUTPATIENT)
Dept: INTERNAL MEDICINE | Facility: CLINIC | Age: 78
End: 2018-08-28

## 2018-08-28 DIAGNOSIS — G47.00 INSOMNIA: ICD-10-CM

## 2018-08-31 ENCOUNTER — OFFICE VISIT - HEALTHEAST (OUTPATIENT)
Dept: INTERNAL MEDICINE | Facility: CLINIC | Age: 78
End: 2018-08-31

## 2018-08-31 ENCOUNTER — COMMUNICATION - HEALTHEAST (OUTPATIENT)
Dept: ANTICOAGULATION | Facility: CLINIC | Age: 78
End: 2018-08-31

## 2018-08-31 DIAGNOSIS — I48.91 ATRIAL FIBRILLATION (H): ICD-10-CM

## 2018-08-31 DIAGNOSIS — Z79.4 TYPE 2 DIABETES MELLITUS WITH MICROALBUMINURIA, WITH LONG-TERM CURRENT USE OF INSULIN (H): ICD-10-CM

## 2018-08-31 DIAGNOSIS — E03.9 HYPOTHYROIDISM: ICD-10-CM

## 2018-08-31 DIAGNOSIS — I10 HTN (HYPERTENSION): ICD-10-CM

## 2018-08-31 DIAGNOSIS — D69.9 BLEEDING DIATHESIS (H): ICD-10-CM

## 2018-08-31 DIAGNOSIS — Z79.01 ANTICOAGULATION GOAL OF INR 2.5 TO 3.5: ICD-10-CM

## 2018-08-31 DIAGNOSIS — Z95.2 S/P MITRAL VALVE REPLACEMENT: ICD-10-CM

## 2018-08-31 DIAGNOSIS — I25.10 ASCVD (ARTERIOSCLEROTIC CARDIOVASCULAR DISEASE): ICD-10-CM

## 2018-08-31 DIAGNOSIS — M18.0 OSTEOARTHRITIS OF CARPOMETACARPAL (CMC) JOINT OF BOTH THUMBS: ICD-10-CM

## 2018-08-31 DIAGNOSIS — M48.061 SPINAL STENOSIS OF LUMBAR REGION: ICD-10-CM

## 2018-08-31 DIAGNOSIS — Z51.81 ANTICOAGULATION GOAL OF INR 2.5 TO 3.5: ICD-10-CM

## 2018-08-31 DIAGNOSIS — M48.00 MULTILEVEL NEURAL FORAMINAL STENOSIS: ICD-10-CM

## 2018-08-31 DIAGNOSIS — E11.29 TYPE 2 DIABETES MELLITUS WITH MICROALBUMINURIA, WITH LONG-TERM CURRENT USE OF INSULIN (H): ICD-10-CM

## 2018-08-31 DIAGNOSIS — R80.9 TYPE 2 DIABETES MELLITUS WITH MICROALBUMINURIA, WITH LONG-TERM CURRENT USE OF INSULIN (H): ICD-10-CM

## 2018-08-31 DIAGNOSIS — M54.50 LOW BACK PAIN: ICD-10-CM

## 2018-08-31 DIAGNOSIS — I77.1 SUBCLAVIAN ARTERY STENOSIS, LEFT (H): ICD-10-CM

## 2018-08-31 LAB
ANION GAP SERPL CALCULATED.3IONS-SCNC: 11 MMOL/L (ref 5–18)
BUN SERPL-MCNC: 22 MG/DL (ref 8–28)
CALCIUM SERPL-MCNC: 9.2 MG/DL (ref 8.5–10.5)
CHLORIDE BLD-SCNC: 107 MMOL/L (ref 98–107)
CO2 SERPL-SCNC: 24 MMOL/L (ref 22–31)
CREAT SERPL-MCNC: 0.82 MG/DL (ref 0.6–1.1)
GFR SERPL CREATININE-BSD FRML MDRD: >60 ML/MIN/1.73M2
GLUCOSE BLD-MCNC: 83 MG/DL (ref 70–125)
HBA1C MFR BLD: 8.5 % (ref 3.5–6)
HGB BLD-MCNC: 11.6 G/DL (ref 12–16)
INR PPP: 2.8 (ref 0.9–1.1)
POTASSIUM BLD-SCNC: 3.9 MMOL/L (ref 3.5–5)
SODIUM SERPL-SCNC: 142 MMOL/L (ref 136–145)
TSH SERPL DL<=0.005 MIU/L-ACNC: 1.28 UIU/ML (ref 0.3–5)

## 2018-09-03 ENCOUNTER — COMMUNICATION - HEALTHEAST (OUTPATIENT)
Dept: INTERNAL MEDICINE | Facility: CLINIC | Age: 78
End: 2018-09-03

## 2018-09-04 ENCOUNTER — COMMUNICATION - HEALTHEAST (OUTPATIENT)
Dept: INTERNAL MEDICINE | Facility: CLINIC | Age: 78
End: 2018-09-04

## 2018-09-12 ENCOUNTER — AMBULATORY - HEALTHEAST (OUTPATIENT)
Dept: LAB | Facility: CLINIC | Age: 78
End: 2018-09-12

## 2018-09-12 ENCOUNTER — COMMUNICATION - HEALTHEAST (OUTPATIENT)
Dept: ANTICOAGULATION | Facility: CLINIC | Age: 78
End: 2018-09-12

## 2018-09-12 DIAGNOSIS — Z51.81 ANTICOAGULATION GOAL OF INR 2.5 TO 3.5: ICD-10-CM

## 2018-09-12 DIAGNOSIS — Z95.2 S/P MITRAL VALVE REPLACEMENT: ICD-10-CM

## 2018-09-12 DIAGNOSIS — I48.91 ATRIAL FIBRILLATION (H): ICD-10-CM

## 2018-09-12 DIAGNOSIS — Z79.01 ANTICOAGULATION GOAL OF INR 2.5 TO 3.5: ICD-10-CM

## 2018-09-12 LAB — INR PPP: 2.3 (ref 0.9–1.1)

## 2018-09-26 ENCOUNTER — COMMUNICATION - HEALTHEAST (OUTPATIENT)
Dept: ANTICOAGULATION | Facility: CLINIC | Age: 78
End: 2018-09-26

## 2018-09-26 ENCOUNTER — AMBULATORY - HEALTHEAST (OUTPATIENT)
Dept: LAB | Facility: CLINIC | Age: 78
End: 2018-09-26

## 2018-09-26 DIAGNOSIS — Z51.81 ANTICOAGULATION GOAL OF INR 2.5 TO 3.5: ICD-10-CM

## 2018-09-26 DIAGNOSIS — Z95.2 S/P MITRAL VALVE REPLACEMENT: ICD-10-CM

## 2018-09-26 DIAGNOSIS — Z79.01 ANTICOAGULATION GOAL OF INR 2.5 TO 3.5: ICD-10-CM

## 2018-09-26 DIAGNOSIS — I48.91 ATRIAL FIBRILLATION (H): ICD-10-CM

## 2018-09-26 LAB — INR PPP: 3.1 (ref 0.9–1.1)

## 2018-10-10 ENCOUNTER — AMBULATORY - HEALTHEAST (OUTPATIENT)
Dept: LAB | Facility: CLINIC | Age: 78
End: 2018-10-10

## 2018-10-10 ENCOUNTER — COMMUNICATION - HEALTHEAST (OUTPATIENT)
Dept: ANTICOAGULATION | Facility: CLINIC | Age: 78
End: 2018-10-10

## 2018-10-10 DIAGNOSIS — Z95.2 S/P MITRAL VALVE REPLACEMENT: ICD-10-CM

## 2018-10-10 DIAGNOSIS — I48.91 ATRIAL FIBRILLATION (H): ICD-10-CM

## 2018-10-10 DIAGNOSIS — Z51.81 ANTICOAGULATION GOAL OF INR 2.5 TO 3.5: ICD-10-CM

## 2018-10-10 DIAGNOSIS — Z79.01 ANTICOAGULATION GOAL OF INR 2.5 TO 3.5: ICD-10-CM

## 2018-10-10 LAB — INR PPP: 1.6 (ref 0.9–1.1)

## 2018-10-16 ENCOUNTER — COMMUNICATION - HEALTHEAST (OUTPATIENT)
Dept: ANTICOAGULATION | Facility: CLINIC | Age: 78
End: 2018-10-16

## 2018-10-16 ENCOUNTER — AMBULATORY - HEALTHEAST (OUTPATIENT)
Dept: LAB | Facility: CLINIC | Age: 78
End: 2018-10-16

## 2018-10-16 DIAGNOSIS — I48.91 ATRIAL FIBRILLATION (H): ICD-10-CM

## 2018-10-16 DIAGNOSIS — Z95.2 S/P MITRAL VALVE REPLACEMENT: ICD-10-CM

## 2018-10-16 DIAGNOSIS — Z79.01 ANTICOAGULATION GOAL OF INR 2.5 TO 3.5: ICD-10-CM

## 2018-10-16 DIAGNOSIS — Z51.81 ANTICOAGULATION GOAL OF INR 2.5 TO 3.5: ICD-10-CM

## 2018-10-16 LAB — INR PPP: 3.3 (ref 0.9–1.1)

## 2018-10-30 ENCOUNTER — COMMUNICATION - HEALTHEAST (OUTPATIENT)
Dept: INTERNAL MEDICINE | Facility: CLINIC | Age: 78
End: 2018-10-30

## 2018-10-30 ENCOUNTER — COMMUNICATION - HEALTHEAST (OUTPATIENT)
Dept: ANTICOAGULATION | Facility: CLINIC | Age: 78
End: 2018-10-30

## 2018-10-30 ENCOUNTER — OFFICE VISIT - HEALTHEAST (OUTPATIENT)
Dept: INTERNAL MEDICINE | Facility: CLINIC | Age: 78
End: 2018-10-30

## 2018-10-30 DIAGNOSIS — D69.9 BLEEDING DIATHESIS (H): ICD-10-CM

## 2018-10-30 DIAGNOSIS — Z79.01 ANTICOAGULATION GOAL OF INR 2.5 TO 3.5: ICD-10-CM

## 2018-10-30 DIAGNOSIS — I48.91 ATRIAL FIBRILLATION (H): ICD-10-CM

## 2018-10-30 DIAGNOSIS — Z23 NEED FOR INFLUENZA VACCINATION: ICD-10-CM

## 2018-10-30 DIAGNOSIS — Z51.81 ANTICOAGULATION GOAL OF INR 2.5 TO 3.5: ICD-10-CM

## 2018-10-30 DIAGNOSIS — M18.0 ARTHRITIS OF CARPOMETACARPAL (CMC) JOINT OF BOTH THUMBS: ICD-10-CM

## 2018-10-30 DIAGNOSIS — E11.9 DM TYPE 2 (DIABETES MELLITUS, TYPE 2) (H): ICD-10-CM

## 2018-10-30 DIAGNOSIS — Z95.2 S/P MITRAL VALVE REPLACEMENT: ICD-10-CM

## 2018-10-30 DIAGNOSIS — M48.061 SPINAL STENOSIS OF LUMBAR REGION: ICD-10-CM

## 2018-10-30 DIAGNOSIS — R52 DIFFUSE PAIN: ICD-10-CM

## 2018-10-30 DIAGNOSIS — D50.0 BLOOD LOSS ANEMIA: ICD-10-CM

## 2018-10-30 LAB
ANION GAP SERPL CALCULATED.3IONS-SCNC: 15 MMOL/L (ref 5–18)
BUN SERPL-MCNC: 24 MG/DL (ref 8–28)
C REACTIVE PROTEIN LHE: 0.4 MG/DL (ref 0–0.8)
CALCIUM SERPL-MCNC: 9.9 MG/DL (ref 8.5–10.5)
CHLORIDE BLD-SCNC: 98 MMOL/L (ref 98–107)
CO2 SERPL-SCNC: 26 MMOL/L (ref 22–31)
CREAT SERPL-MCNC: 0.88 MG/DL (ref 0.6–1.1)
ERYTHROCYTE [DISTWIDTH] IN BLOOD BY AUTOMATED COUNT: 12.3 % (ref 11–14.5)
ERYTHROCYTE [SEDIMENTATION RATE] IN BLOOD BY WESTERGREN METHOD: 42 MM/HR (ref 0–20)
FERRITIN SERPL-MCNC: 40 NG/ML (ref 10–130)
GFR SERPL CREATININE-BSD FRML MDRD: >60 ML/MIN/1.73M2
GLUCOSE BLD-MCNC: 155 MG/DL (ref 70–125)
HBA1C MFR BLD: 7.8 % (ref 3.5–6)
HCT VFR BLD AUTO: 36.7 % (ref 35–47)
HGB BLD-MCNC: 12.8 G/DL (ref 12–16)
INR PPP: 3.6 (ref 0.9–1.1)
IRON SATN MFR SERPL: 14 % (ref 20–50)
IRON SERPL-MCNC: 50 UG/DL (ref 42–175)
MCH RBC QN AUTO: 30.3 PG (ref 27–34)
MCHC RBC AUTO-ENTMCNC: 34.9 G/DL (ref 32–36)
MCV RBC AUTO: 87 FL (ref 80–100)
PLATELET # BLD AUTO: 162 THOU/UL (ref 140–440)
PMV BLD AUTO: 9.5 FL (ref 7–10)
POTASSIUM BLD-SCNC: 4.2 MMOL/L (ref 3.5–5)
RBC # BLD AUTO: 4.23 MILL/UL (ref 3.8–5.4)
SODIUM SERPL-SCNC: 139 MMOL/L (ref 136–145)
TIBC SERPL-MCNC: 348 UG/DL (ref 313–563)
TRANSFERRIN SERPL-MCNC: 278 MG/DL (ref 212–360)
WBC: 5.9 THOU/UL (ref 4–11)

## 2018-11-12 ENCOUNTER — COMMUNICATION - HEALTHEAST (OUTPATIENT)
Dept: INTERNAL MEDICINE | Facility: CLINIC | Age: 78
End: 2018-11-12

## 2018-11-12 ENCOUNTER — COMMUNICATION - HEALTHEAST (OUTPATIENT)
Dept: ANTICOAGULATION | Facility: CLINIC | Age: 78
End: 2018-11-12

## 2018-11-12 ENCOUNTER — AMBULATORY - HEALTHEAST (OUTPATIENT)
Dept: LAB | Facility: CLINIC | Age: 78
End: 2018-11-12

## 2018-11-12 DIAGNOSIS — I48.91 ATRIAL FIBRILLATION (H): ICD-10-CM

## 2018-11-12 DIAGNOSIS — G89.29 CHRONIC PAIN: ICD-10-CM

## 2018-11-12 DIAGNOSIS — Z95.2 S/P MITRAL VALVE REPLACEMENT: ICD-10-CM

## 2018-11-12 DIAGNOSIS — Z51.81 ANTICOAGULATION GOAL OF INR 2.5 TO 3.5: ICD-10-CM

## 2018-11-12 DIAGNOSIS — Z79.01 ANTICOAGULATION GOAL OF INR 2.5 TO 3.5: ICD-10-CM

## 2018-11-12 LAB — INR PPP: 3.7 (ref 0.9–1.1)

## 2018-11-28 ENCOUNTER — COMMUNICATION - HEALTHEAST (OUTPATIENT)
Dept: ANTICOAGULATION | Facility: CLINIC | Age: 78
End: 2018-11-28

## 2018-11-28 ENCOUNTER — AMBULATORY - HEALTHEAST (OUTPATIENT)
Dept: LAB | Facility: CLINIC | Age: 78
End: 2018-11-28

## 2018-11-28 DIAGNOSIS — Z95.2 S/P MITRAL VALVE REPLACEMENT: ICD-10-CM

## 2018-11-28 DIAGNOSIS — I48.91 ATRIAL FIBRILLATION (H): ICD-10-CM

## 2018-11-28 DIAGNOSIS — Z51.81 ANTICOAGULATION GOAL OF INR 2.5 TO 3.5: ICD-10-CM

## 2018-11-28 DIAGNOSIS — Z79.01 ANTICOAGULATION GOAL OF INR 2.5 TO 3.5: ICD-10-CM

## 2018-11-28 LAB — INR PPP: 3.5 (ref 0.9–1.1)

## 2018-12-12 ENCOUNTER — COMMUNICATION - HEALTHEAST (OUTPATIENT)
Dept: ANTICOAGULATION | Facility: CLINIC | Age: 78
End: 2018-12-12

## 2018-12-12 ENCOUNTER — AMBULATORY - HEALTHEAST (OUTPATIENT)
Dept: LAB | Facility: CLINIC | Age: 78
End: 2018-12-12

## 2018-12-12 DIAGNOSIS — Z95.2 S/P MITRAL VALVE REPLACEMENT: ICD-10-CM

## 2018-12-12 DIAGNOSIS — Z79.01 ANTICOAGULATION GOAL OF INR 2.5 TO 3.5: ICD-10-CM

## 2018-12-12 DIAGNOSIS — Z51.81 ANTICOAGULATION GOAL OF INR 2.5 TO 3.5: ICD-10-CM

## 2018-12-12 DIAGNOSIS — I48.91 ATRIAL FIBRILLATION (H): ICD-10-CM

## 2018-12-12 LAB — INR PPP: 3.3 (ref 0.9–1.1)

## 2019-01-11 ENCOUNTER — COMMUNICATION - HEALTHEAST (OUTPATIENT)
Dept: INTERNAL MEDICINE | Facility: CLINIC | Age: 79
End: 2019-01-11

## 2019-01-11 DIAGNOSIS — G89.29 CHRONIC PAIN: ICD-10-CM

## 2019-01-15 ENCOUNTER — OFFICE VISIT - HEALTHEAST (OUTPATIENT)
Dept: INTERNAL MEDICINE | Facility: CLINIC | Age: 79
End: 2019-01-15

## 2019-01-15 ENCOUNTER — COMMUNICATION - HEALTHEAST (OUTPATIENT)
Dept: INTERNAL MEDICINE | Facility: CLINIC | Age: 79
End: 2019-01-15

## 2019-01-15 ENCOUNTER — COMMUNICATION - HEALTHEAST (OUTPATIENT)
Dept: ANTICOAGULATION | Facility: CLINIC | Age: 79
End: 2019-01-15

## 2019-01-15 ENCOUNTER — HOSPITAL ENCOUNTER (OUTPATIENT)
Dept: LAB | Age: 79
Setting detail: SPECIMEN
Discharge: HOME | End: 2019-01-15

## 2019-01-15 DIAGNOSIS — Z95.2 S/P MITRAL VALVE REPLACEMENT: ICD-10-CM

## 2019-01-15 DIAGNOSIS — G89.4 CHRONIC PAIN SYNDROME: ICD-10-CM

## 2019-01-15 DIAGNOSIS — D69.9 BLEEDING DIATHESIS (H): ICD-10-CM

## 2019-01-15 DIAGNOSIS — R14.0 ABDOMINAL BLOATING: ICD-10-CM

## 2019-01-15 DIAGNOSIS — I10 ESSENTIAL HYPERTENSION: ICD-10-CM

## 2019-01-15 DIAGNOSIS — E11.29 MICROALBUMINURIA DUE TO TYPE 2 DIABETES MELLITUS (H): ICD-10-CM

## 2019-01-15 DIAGNOSIS — R80.9 TYPE 2 DIABETES MELLITUS WITH MICROALBUMINURIA, WITH LONG-TERM CURRENT USE OF INSULIN (H): ICD-10-CM

## 2019-01-15 DIAGNOSIS — I77.1 SUBCLAVIAN ARTERY STENOSIS, LEFT (H): ICD-10-CM

## 2019-01-15 DIAGNOSIS — Z51.81 ANTICOAGULATION GOAL OF INR 2.5 TO 3.5: ICD-10-CM

## 2019-01-15 DIAGNOSIS — Z79.01 ANTICOAGULATION GOAL OF INR 2.5 TO 3.5: ICD-10-CM

## 2019-01-15 DIAGNOSIS — D50.0 BLOOD LOSS ANEMIA: ICD-10-CM

## 2019-01-15 DIAGNOSIS — M48.061 SPINAL STENOSIS OF LUMBAR REGION, UNSPECIFIED WHETHER NEUROGENIC CLAUDICATION PRESENT: ICD-10-CM

## 2019-01-15 DIAGNOSIS — Z79.4 TYPE 2 DIABETES MELLITUS WITH MICROALBUMINURIA, WITH LONG-TERM CURRENT USE OF INSULIN (H): ICD-10-CM

## 2019-01-15 DIAGNOSIS — E11.29 TYPE 2 DIABETES MELLITUS WITH MICROALBUMINURIA, WITH LONG-TERM CURRENT USE OF INSULIN (H): ICD-10-CM

## 2019-01-15 DIAGNOSIS — I48.91 ATRIAL FIBRILLATION (H): ICD-10-CM

## 2019-01-15 DIAGNOSIS — I48.91 ATRIAL FIBRILLATION, UNSPECIFIED TYPE (H): ICD-10-CM

## 2019-01-15 DIAGNOSIS — E11.3293 MILD NONPROLIFERATIVE DIABETIC RETINOPATHY OF BOTH EYES WITHOUT MACULAR EDEMA ASSOCIATED WITH TYPE 2 DIABETES MELLITUS (H): ICD-10-CM

## 2019-01-15 DIAGNOSIS — R80.9 MICROALBUMINURIA DUE TO TYPE 2 DIABETES MELLITUS (H): ICD-10-CM

## 2019-01-15 LAB
ANION GAP SERPL CALCULATED.3IONS-SCNC: 9 MMOL/L (ref 5–18)
BUN SERPL-MCNC: 22 MG/DL (ref 8–28)
CALCIUM SERPL-MCNC: 9.2 MG/DL (ref 8.5–10.5)
CHLORIDE BLD-SCNC: 102 MMOL/L (ref 98–107)
CO2 SERPL-SCNC: 26 MMOL/L (ref 22–31)
CREAT SERPL-MCNC: 1 MG/DL (ref 0.6–1.1)
ERYTHROCYTE [DISTWIDTH] IN BLOOD BY AUTOMATED COUNT: 12 % (ref 11–14.5)
GFR SERPL CREATININE-BSD FRML MDRD: 53 ML/MIN/1.73M2
GLUCOSE BLD-MCNC: 171 MG/DL (ref 70–125)
HCT VFR BLD AUTO: 36.9 % (ref 35–47)
HGB BLD-MCNC: 12.4 G/DL (ref 12–16)
INR PPP: 3.6 (ref 0.9–1.1)
MCH RBC QN AUTO: 29 PG (ref 27–34)
MCHC RBC AUTO-ENTMCNC: 33.5 G/DL (ref 32–36)
MCV RBC AUTO: 87 FL (ref 80–100)
PLATELET # BLD AUTO: 146 THOU/UL (ref 140–440)
PMV BLD AUTO: 9.5 FL (ref 7–10)
POTASSIUM BLD-SCNC: 4.2 MMOL/L (ref 3.5–5)
RBC # BLD AUTO: 4.27 MILL/UL (ref 3.8–5.4)
SODIUM SERPL-SCNC: 137 MMOL/L (ref 136–145)
WBC: 4.9 THOU/UL (ref 4–11)

## 2019-02-05 ENCOUNTER — AMBULATORY - HEALTHEAST (OUTPATIENT)
Dept: LAB | Facility: CLINIC | Age: 79
End: 2019-02-05

## 2019-02-05 ENCOUNTER — COMMUNICATION - HEALTHEAST (OUTPATIENT)
Dept: ANTICOAGULATION | Facility: CLINIC | Age: 79
End: 2019-02-05

## 2019-02-05 DIAGNOSIS — Z95.2 S/P MITRAL VALVE REPLACEMENT: ICD-10-CM

## 2019-02-05 DIAGNOSIS — Z79.01 ANTICOAGULATION GOAL OF INR 2.5 TO 3.5: ICD-10-CM

## 2019-02-05 DIAGNOSIS — Z51.81 ANTICOAGULATION GOAL OF INR 2.5 TO 3.5: ICD-10-CM

## 2019-02-05 DIAGNOSIS — I48.91 ATRIAL FIBRILLATION (H): ICD-10-CM

## 2019-02-05 LAB — INR PPP: 4.3 (ref 0.9–1.1)

## 2019-02-11 ENCOUNTER — COMMUNICATION - HEALTHEAST (OUTPATIENT)
Dept: INTERNAL MEDICINE | Facility: CLINIC | Age: 79
End: 2019-02-11

## 2019-02-11 DIAGNOSIS — E03.9 HYPOTHYROIDISM: ICD-10-CM

## 2019-02-12 ENCOUNTER — AMBULATORY - HEALTHEAST (OUTPATIENT)
Dept: LAB | Facility: CLINIC | Age: 79
End: 2019-02-12

## 2019-02-12 ENCOUNTER — COMMUNICATION - HEALTHEAST (OUTPATIENT)
Dept: ANTICOAGULATION | Facility: CLINIC | Age: 79
End: 2019-02-12

## 2019-02-12 DIAGNOSIS — Z51.81 ANTICOAGULATION GOAL OF INR 2.5 TO 3.5: ICD-10-CM

## 2019-02-12 DIAGNOSIS — Z95.2 S/P MITRAL VALVE REPLACEMENT: ICD-10-CM

## 2019-02-12 DIAGNOSIS — I48.91 ATRIAL FIBRILLATION (H): ICD-10-CM

## 2019-02-12 DIAGNOSIS — Z79.01 ANTICOAGULATION GOAL OF INR 2.5 TO 3.5: ICD-10-CM

## 2019-02-12 LAB — INR PPP: 3 (ref 0.9–1.1)

## 2019-02-21 ENCOUNTER — COMMUNICATION - HEALTHEAST (OUTPATIENT)
Dept: INTERNAL MEDICINE | Facility: CLINIC | Age: 79
End: 2019-02-21

## 2019-03-05 ENCOUNTER — COMMUNICATION - HEALTHEAST (OUTPATIENT)
Dept: ANTICOAGULATION | Facility: CLINIC | Age: 79
End: 2019-03-05

## 2019-03-07 ENCOUNTER — COMMUNICATION - HEALTHEAST (OUTPATIENT)
Dept: TELEHEALTH | Facility: CLINIC | Age: 79
End: 2019-03-07

## 2019-03-07 ENCOUNTER — AMBULATORY - HEALTHEAST (OUTPATIENT)
Dept: LAB | Facility: CLINIC | Age: 79
End: 2019-03-07

## 2019-03-07 ENCOUNTER — COMMUNICATION - HEALTHEAST (OUTPATIENT)
Dept: ANTICOAGULATION | Facility: CLINIC | Age: 79
End: 2019-03-07

## 2019-03-07 DIAGNOSIS — Z95.2 S/P MITRAL VALVE REPLACEMENT: ICD-10-CM

## 2019-03-07 DIAGNOSIS — I48.91 ATRIAL FIBRILLATION (H): ICD-10-CM

## 2019-03-07 DIAGNOSIS — Z79.01 ANTICOAGULATION GOAL OF INR 2.5 TO 3.5: ICD-10-CM

## 2019-03-07 DIAGNOSIS — Z51.81 ANTICOAGULATION GOAL OF INR 2.5 TO 3.5: ICD-10-CM

## 2019-03-07 LAB — INR PPP: 3.5 (ref 0.9–1.1)

## 2019-03-19 ENCOUNTER — OFFICE VISIT - HEALTHEAST (OUTPATIENT)
Dept: INTERNAL MEDICINE | Facility: CLINIC | Age: 79
End: 2019-03-19

## 2019-03-19 ENCOUNTER — COMMUNICATION - HEALTHEAST (OUTPATIENT)
Dept: ANTICOAGULATION | Facility: CLINIC | Age: 79
End: 2019-03-19

## 2019-03-19 DIAGNOSIS — L97.529 SKIN ULCER OF LEFT GREAT TOE, UNSPECIFIED ULCER STAGE (H): ICD-10-CM

## 2019-03-19 DIAGNOSIS — Z51.81 ANTICOAGULATION GOAL OF INR 2.5 TO 3.5: ICD-10-CM

## 2019-03-19 DIAGNOSIS — R80.9 MICROALBUMINURIA DUE TO TYPE 2 DIABETES MELLITUS (H): ICD-10-CM

## 2019-03-19 DIAGNOSIS — Z95.2 S/P MITRAL VALVE REPLACEMENT: ICD-10-CM

## 2019-03-19 DIAGNOSIS — E03.9 HYPOTHYROIDISM, UNSPECIFIED TYPE: ICD-10-CM

## 2019-03-19 DIAGNOSIS — Z79.4 TYPE 2 DIABETES MELLITUS WITH MICROALBUMINURIA, WITH LONG-TERM CURRENT USE OF INSULIN (H): ICD-10-CM

## 2019-03-19 DIAGNOSIS — Z79.01 ANTICOAGULATION GOAL OF INR 2.5 TO 3.5: ICD-10-CM

## 2019-03-19 DIAGNOSIS — R80.9 TYPE 2 DIABETES MELLITUS WITH MICROALBUMINURIA, WITH LONG-TERM CURRENT USE OF INSULIN (H): ICD-10-CM

## 2019-03-19 DIAGNOSIS — E11.29 MICROALBUMINURIA DUE TO TYPE 2 DIABETES MELLITUS (H): ICD-10-CM

## 2019-03-19 DIAGNOSIS — R07.89 CHEST WALL PAIN: ICD-10-CM

## 2019-03-19 DIAGNOSIS — R14.0 ABDOMINAL BLOATING: ICD-10-CM

## 2019-03-19 DIAGNOSIS — E11.29 TYPE 2 DIABETES MELLITUS WITH MICROALBUMINURIA, WITH LONG-TERM CURRENT USE OF INSULIN (H): ICD-10-CM

## 2019-03-19 DIAGNOSIS — I48.91 ATRIAL FIBRILLATION, UNSPECIFIED TYPE (H): ICD-10-CM

## 2019-03-19 DIAGNOSIS — D50.0 BLOOD LOSS ANEMIA: ICD-10-CM

## 2019-03-19 DIAGNOSIS — G89.4 CHRONIC PAIN SYNDROME: ICD-10-CM

## 2019-03-19 DIAGNOSIS — I10 ESSENTIAL HYPERTENSION: ICD-10-CM

## 2019-03-19 LAB
ANION GAP SERPL CALCULATED.3IONS-SCNC: 11 MMOL/L (ref 5–18)
BUN SERPL-MCNC: 23 MG/DL (ref 8–28)
CALCIUM SERPL-MCNC: 9.7 MG/DL (ref 8.5–10.5)
CHLORIDE BLD-SCNC: 97 MMOL/L (ref 98–107)
CO2 SERPL-SCNC: 29 MMOL/L (ref 22–31)
CREAT SERPL-MCNC: 1.01 MG/DL (ref 0.6–1.1)
ERYTHROCYTE [DISTWIDTH] IN BLOOD BY AUTOMATED COUNT: 13 % (ref 11–14.5)
FERRITIN SERPL-MCNC: 57 NG/ML (ref 10–130)
GFR SERPL CREATININE-BSD FRML MDRD: 53 ML/MIN/1.73M2
GLUCOSE BLD-MCNC: 215 MG/DL (ref 70–125)
HBA1C MFR BLD: 8.2 % (ref 3.5–6)
HCT VFR BLD AUTO: 37.6 % (ref 35–47)
HGB BLD-MCNC: 12.7 G/DL (ref 12–16)
INR PPP: 3.5 (ref 0.9–1.1)
IRON SATN MFR SERPL: 26 % (ref 20–50)
IRON SERPL-MCNC: 85 UG/DL (ref 42–175)
MCH RBC QN AUTO: 29.2 PG (ref 27–34)
MCHC RBC AUTO-ENTMCNC: 33.7 G/DL (ref 32–36)
MCV RBC AUTO: 87 FL (ref 80–100)
PLATELET # BLD AUTO: 140 THOU/UL (ref 140–440)
PMV BLD AUTO: 9.6 FL (ref 7–10)
POTASSIUM BLD-SCNC: 4.2 MMOL/L (ref 3.5–5)
RBC # BLD AUTO: 4.33 MILL/UL (ref 3.8–5.4)
SODIUM SERPL-SCNC: 137 MMOL/L (ref 136–145)
TIBC SERPL-MCNC: 326 UG/DL (ref 313–563)
TRANSFERRIN SERPL-MCNC: 260 MG/DL (ref 212–360)
TSH SERPL DL<=0.005 MIU/L-ACNC: 1.46 UIU/ML (ref 0.3–5)
WBC: 4.9 THOU/UL (ref 4–11)

## 2019-03-19 ASSESSMENT — MIFFLIN-ST. JEOR: SCORE: 1140.37

## 2019-03-20 ENCOUNTER — AMBULATORY - HEALTHEAST (OUTPATIENT)
Dept: INTERNAL MEDICINE | Facility: CLINIC | Age: 79
End: 2019-03-20

## 2019-03-21 ENCOUNTER — COMMUNICATION - HEALTHEAST (OUTPATIENT)
Dept: INTERNAL MEDICINE | Facility: CLINIC | Age: 79
End: 2019-03-21

## 2019-03-28 ENCOUNTER — COMMUNICATION - HEALTHEAST (OUTPATIENT)
Dept: INTERNAL MEDICINE | Facility: CLINIC | Age: 79
End: 2019-03-28

## 2019-03-28 DIAGNOSIS — E11.9 DM TYPE 2 (DIABETES MELLITUS, TYPE 2) (H): ICD-10-CM

## 2019-03-28 DIAGNOSIS — R80.9 TYPE 2 DIABETES MELLITUS WITH MICROALBUMINURIA, WITH LONG-TERM CURRENT USE OF INSULIN (H): ICD-10-CM

## 2019-03-28 DIAGNOSIS — Z79.4 TYPE 2 DIABETES MELLITUS WITH MICROALBUMINURIA, WITH LONG-TERM CURRENT USE OF INSULIN (H): ICD-10-CM

## 2019-03-28 DIAGNOSIS — E11.29 TYPE 2 DIABETES MELLITUS WITH MICROALBUMINURIA, WITH LONG-TERM CURRENT USE OF INSULIN (H): ICD-10-CM

## 2019-04-01 ENCOUNTER — RECORDS - HEALTHEAST (OUTPATIENT)
Dept: ADMINISTRATIVE | Facility: OTHER | Age: 79
End: 2019-04-01

## 2019-04-09 ENCOUNTER — RECORDS - HEALTHEAST (OUTPATIENT)
Dept: HEALTH INFORMATION MANAGEMENT | Facility: CLINIC | Age: 79
End: 2019-04-09

## 2019-04-15 ENCOUNTER — COMMUNICATION - HEALTHEAST (OUTPATIENT)
Dept: INTERNAL MEDICINE | Facility: CLINIC | Age: 79
End: 2019-04-15

## 2019-04-15 DIAGNOSIS — G47.00 INSOMNIA: ICD-10-CM

## 2019-04-16 ENCOUNTER — AMBULATORY - HEALTHEAST (OUTPATIENT)
Dept: LAB | Facility: CLINIC | Age: 79
End: 2019-04-16

## 2019-04-16 ENCOUNTER — COMMUNICATION - HEALTHEAST (OUTPATIENT)
Dept: ANTICOAGULATION | Facility: CLINIC | Age: 79
End: 2019-04-16

## 2019-04-16 DIAGNOSIS — Z79.01 ANTICOAGULATION GOAL OF INR 2.5 TO 3.5: ICD-10-CM

## 2019-04-16 DIAGNOSIS — Z95.2 S/P MITRAL VALVE REPLACEMENT: ICD-10-CM

## 2019-04-16 DIAGNOSIS — Z51.81 ANTICOAGULATION GOAL OF INR 2.5 TO 3.5: ICD-10-CM

## 2019-04-16 DIAGNOSIS — I48.91 ATRIAL FIBRILLATION, UNSPECIFIED TYPE (H): ICD-10-CM

## 2019-04-16 LAB — INR PPP: 3.9 (ref 0.9–1.1)

## 2019-04-30 ENCOUNTER — AMBULATORY - HEALTHEAST (OUTPATIENT)
Dept: LAB | Facility: CLINIC | Age: 79
End: 2019-04-30

## 2019-04-30 ENCOUNTER — COMMUNICATION - HEALTHEAST (OUTPATIENT)
Dept: ANTICOAGULATION | Facility: CLINIC | Age: 79
End: 2019-04-30

## 2019-04-30 DIAGNOSIS — Z79.01 ANTICOAGULATION GOAL OF INR 2.5 TO 3.5: ICD-10-CM

## 2019-04-30 DIAGNOSIS — Z95.2 S/P MITRAL VALVE REPLACEMENT: ICD-10-CM

## 2019-04-30 DIAGNOSIS — Z51.81 ANTICOAGULATION GOAL OF INR 2.5 TO 3.5: ICD-10-CM

## 2019-04-30 DIAGNOSIS — I48.91 ATRIAL FIBRILLATION, UNSPECIFIED TYPE (H): ICD-10-CM

## 2019-04-30 LAB — INR PPP: 4.7 (ref 0.9–1.1)

## 2019-05-08 ENCOUNTER — AMBULATORY - HEALTHEAST (OUTPATIENT)
Dept: LAB | Facility: CLINIC | Age: 79
End: 2019-05-08

## 2019-05-08 ENCOUNTER — COMMUNICATION - HEALTHEAST (OUTPATIENT)
Dept: ANTICOAGULATION | Facility: CLINIC | Age: 79
End: 2019-05-08

## 2019-05-08 DIAGNOSIS — Z79.01 ANTICOAGULATION GOAL OF INR 2.5 TO 3.5: ICD-10-CM

## 2019-05-08 DIAGNOSIS — Z95.2 S/P MITRAL VALVE REPLACEMENT: ICD-10-CM

## 2019-05-08 DIAGNOSIS — I48.91 ATRIAL FIBRILLATION, UNSPECIFIED TYPE (H): ICD-10-CM

## 2019-05-08 DIAGNOSIS — Z51.81 ANTICOAGULATION GOAL OF INR 2.5 TO 3.5: ICD-10-CM

## 2019-05-08 LAB — INR PPP: 2.9 (ref 0.9–1.1)

## 2019-05-22 ENCOUNTER — AMBULATORY - HEALTHEAST (OUTPATIENT)
Dept: LAB | Facility: CLINIC | Age: 79
End: 2019-05-22

## 2019-05-22 ENCOUNTER — COMMUNICATION - HEALTHEAST (OUTPATIENT)
Dept: ANTICOAGULATION | Facility: CLINIC | Age: 79
End: 2019-05-22

## 2019-05-22 DIAGNOSIS — Z95.2 S/P MITRAL VALVE REPLACEMENT: ICD-10-CM

## 2019-05-22 DIAGNOSIS — Z51.81 ANTICOAGULATION GOAL OF INR 2.5 TO 3.5: ICD-10-CM

## 2019-05-22 DIAGNOSIS — I48.91 ATRIAL FIBRILLATION, UNSPECIFIED TYPE (H): ICD-10-CM

## 2019-05-22 DIAGNOSIS — Z79.01 ANTICOAGULATION GOAL OF INR 2.5 TO 3.5: ICD-10-CM

## 2019-05-22 LAB — INR PPP: 2.7 (ref 0.9–1.1)

## 2019-05-30 ENCOUNTER — COMMUNICATION - HEALTHEAST (OUTPATIENT)
Dept: INTERNAL MEDICINE | Facility: CLINIC | Age: 79
End: 2019-05-30

## 2019-05-30 ENCOUNTER — COMMUNICATION - HEALTHEAST (OUTPATIENT)
Dept: ANTICOAGULATION | Facility: CLINIC | Age: 79
End: 2019-05-30

## 2019-05-30 ENCOUNTER — OFFICE VISIT - HEALTHEAST (OUTPATIENT)
Dept: INTERNAL MEDICINE | Facility: CLINIC | Age: 79
End: 2019-05-30

## 2019-05-30 DIAGNOSIS — I48.91 ATRIAL FIBRILLATION, UNSPECIFIED TYPE (H): ICD-10-CM

## 2019-05-30 DIAGNOSIS — L97.521 SKIN ULCER OF LEFT GREAT TOE, LIMITED TO BREAKDOWN OF SKIN (H): ICD-10-CM

## 2019-05-30 DIAGNOSIS — G89.4 CHRONIC PAIN SYNDROME: ICD-10-CM

## 2019-05-30 DIAGNOSIS — Z51.81 ANTICOAGULATION GOAL OF INR 2.5 TO 3.5: ICD-10-CM

## 2019-05-30 DIAGNOSIS — Z95.2 S/P MITRAL VALVE REPLACEMENT: ICD-10-CM

## 2019-05-30 DIAGNOSIS — M48.00 MULTILEVEL NEURAL FORAMINAL STENOSIS: ICD-10-CM

## 2019-05-30 DIAGNOSIS — M48.062 SPINAL STENOSIS OF LUMBAR REGION WITH NEUROGENIC CLAUDICATION: ICD-10-CM

## 2019-05-30 DIAGNOSIS — R14.0 ABDOMINAL BLOATING: ICD-10-CM

## 2019-05-30 DIAGNOSIS — Z79.01 ANTICOAGULATION GOAL OF INR 2.5 TO 3.5: ICD-10-CM

## 2019-05-30 LAB
ERYTHROCYTE [DISTWIDTH] IN BLOOD BY AUTOMATED COUNT: 12.4 % (ref 11–14.5)
HCT VFR BLD AUTO: 38.3 % (ref 35–47)
HGB BLD-MCNC: 12.8 G/DL (ref 12–16)
INR PPP: 2.7 (ref 0.9–1.1)
MCH RBC QN AUTO: 29.6 PG (ref 27–34)
MCHC RBC AUTO-ENTMCNC: 33.6 G/DL (ref 32–36)
MCV RBC AUTO: 88 FL (ref 80–100)
PLATELET # BLD AUTO: 145 THOU/UL (ref 140–440)
PMV BLD AUTO: 10.1 FL (ref 7–10)
RBC # BLD AUTO: 4.34 MILL/UL (ref 3.8–5.4)
WBC: 7.8 THOU/UL (ref 4–11)

## 2019-06-17 ENCOUNTER — COMMUNICATION - HEALTHEAST (OUTPATIENT)
Dept: INTERNAL MEDICINE | Facility: CLINIC | Age: 79
End: 2019-06-17

## 2019-06-17 DIAGNOSIS — G89.4 CHRONIC PAIN SYNDROME: ICD-10-CM

## 2019-06-19 ENCOUNTER — AMBULATORY - HEALTHEAST (OUTPATIENT)
Dept: LAB | Facility: CLINIC | Age: 79
End: 2019-06-19

## 2019-06-19 ENCOUNTER — COMMUNICATION - HEALTHEAST (OUTPATIENT)
Dept: ANTICOAGULATION | Facility: CLINIC | Age: 79
End: 2019-06-19

## 2019-06-19 DIAGNOSIS — Z95.2 S/P MITRAL VALVE REPLACEMENT: ICD-10-CM

## 2019-06-19 DIAGNOSIS — Z79.01 ANTICOAGULATION GOAL OF INR 2.5 TO 3.5: ICD-10-CM

## 2019-06-19 DIAGNOSIS — Z51.81 ANTICOAGULATION GOAL OF INR 2.5 TO 3.5: ICD-10-CM

## 2019-06-19 DIAGNOSIS — I48.91 ATRIAL FIBRILLATION, UNSPECIFIED TYPE (H): ICD-10-CM

## 2019-06-19 LAB — INR PPP: 2.6 (ref 0.9–1.1)

## 2019-06-26 ENCOUNTER — COMMUNICATION - HEALTHEAST (OUTPATIENT)
Dept: ANTICOAGULATION | Facility: CLINIC | Age: 79
End: 2019-06-26

## 2019-06-26 ENCOUNTER — AMBULATORY - HEALTHEAST (OUTPATIENT)
Dept: LAB | Facility: CLINIC | Age: 79
End: 2019-06-26

## 2019-06-26 DIAGNOSIS — I48.91 ATRIAL FIBRILLATION, UNSPECIFIED TYPE (H): ICD-10-CM

## 2019-06-26 DIAGNOSIS — Z95.2 S/P MITRAL VALVE REPLACEMENT: ICD-10-CM

## 2019-06-26 DIAGNOSIS — Z51.81 ANTICOAGULATION GOAL OF INR 2.5 TO 3.5: ICD-10-CM

## 2019-06-26 DIAGNOSIS — Z79.01 ANTICOAGULATION GOAL OF INR 2.5 TO 3.5: ICD-10-CM

## 2019-06-26 LAB — INR PPP: 2.3 (ref 0.9–1.1)

## 2019-07-10 ENCOUNTER — COMMUNICATION - HEALTHEAST (OUTPATIENT)
Dept: ANTICOAGULATION | Facility: CLINIC | Age: 79
End: 2019-07-10

## 2019-07-10 ENCOUNTER — AMBULATORY - HEALTHEAST (OUTPATIENT)
Dept: LAB | Facility: CLINIC | Age: 79
End: 2019-07-10

## 2019-07-10 DIAGNOSIS — Z79.01 ANTICOAGULATION GOAL OF INR 2.5 TO 3.5: ICD-10-CM

## 2019-07-10 DIAGNOSIS — Z95.2 S/P MITRAL VALVE REPLACEMENT: ICD-10-CM

## 2019-07-10 DIAGNOSIS — I48.91 ATRIAL FIBRILLATION, UNSPECIFIED TYPE (H): ICD-10-CM

## 2019-07-10 DIAGNOSIS — Z51.81 ANTICOAGULATION GOAL OF INR 2.5 TO 3.5: ICD-10-CM

## 2019-07-10 LAB — INR PPP: 2.9 (ref 0.9–1.1)

## 2019-07-16 ENCOUNTER — COMMUNICATION - HEALTHEAST (OUTPATIENT)
Dept: ANTICOAGULATION | Facility: CLINIC | Age: 79
End: 2019-07-16

## 2019-07-16 DIAGNOSIS — Z51.81 ANTICOAGULATION GOAL OF INR 2.5 TO 3.5: ICD-10-CM

## 2019-07-16 DIAGNOSIS — Z95.2 S/P MITRAL VALVE REPLACEMENT: ICD-10-CM

## 2019-07-16 DIAGNOSIS — Z79.01 ANTICOAGULATION GOAL OF INR 2.5 TO 3.5: ICD-10-CM

## 2019-07-16 DIAGNOSIS — I48.91 ATRIAL FIBRILLATION, UNSPECIFIED TYPE (H): ICD-10-CM

## 2019-07-17 ENCOUNTER — COMMUNICATION - HEALTHEAST (OUTPATIENT)
Dept: INTERNAL MEDICINE | Facility: CLINIC | Age: 79
End: 2019-07-17

## 2019-07-18 ENCOUNTER — COMMUNICATION - HEALTHEAST (OUTPATIENT)
Dept: INTERNAL MEDICINE | Facility: CLINIC | Age: 79
End: 2019-07-18

## 2019-07-18 DIAGNOSIS — G89.4 CHRONIC PAIN SYNDROME: ICD-10-CM

## 2019-07-20 ENCOUNTER — COMMUNICATION - HEALTHEAST (OUTPATIENT)
Dept: INTERNAL MEDICINE | Facility: CLINIC | Age: 79
End: 2019-07-20

## 2019-07-20 DIAGNOSIS — G89.4 CHRONIC PAIN SYNDROME: ICD-10-CM

## 2019-07-26 ENCOUNTER — AMBULATORY - HEALTHEAST (OUTPATIENT)
Dept: LAB | Facility: CLINIC | Age: 79
End: 2019-07-26

## 2019-07-26 ENCOUNTER — COMMUNICATION - HEALTHEAST (OUTPATIENT)
Dept: ANTICOAGULATION | Facility: CLINIC | Age: 79
End: 2019-07-26

## 2019-07-26 DIAGNOSIS — Z95.2 S/P MITRAL VALVE REPLACEMENT: ICD-10-CM

## 2019-07-26 DIAGNOSIS — Z79.01 ANTICOAGULATION GOAL OF INR 2.5 TO 3.5: ICD-10-CM

## 2019-07-26 DIAGNOSIS — I48.91 ATRIAL FIBRILLATION, UNSPECIFIED TYPE (H): ICD-10-CM

## 2019-07-26 DIAGNOSIS — Z51.81 ANTICOAGULATION GOAL OF INR 2.5 TO 3.5: ICD-10-CM

## 2019-07-26 LAB — INR PPP: 2.1 (ref 0.9–1.1)

## 2019-07-27 ENCOUNTER — COMMUNICATION - HEALTHEAST (OUTPATIENT)
Dept: INTERNAL MEDICINE | Facility: CLINIC | Age: 79
End: 2019-07-27

## 2019-07-27 DIAGNOSIS — E11.9 DM TYPE 2 (DIABETES MELLITUS, TYPE 2) (H): ICD-10-CM

## 2019-08-05 ENCOUNTER — COMMUNICATION - HEALTHEAST (OUTPATIENT)
Dept: INTERNAL MEDICINE | Facility: CLINIC | Age: 79
End: 2019-08-05

## 2019-08-05 ENCOUNTER — COMMUNICATION - HEALTHEAST (OUTPATIENT)
Dept: ANTICOAGULATION | Facility: CLINIC | Age: 79
End: 2019-08-05

## 2019-08-05 ENCOUNTER — OFFICE VISIT - HEALTHEAST (OUTPATIENT)
Dept: INTERNAL MEDICINE | Facility: CLINIC | Age: 79
End: 2019-08-05

## 2019-08-05 DIAGNOSIS — Z79.01 ANTICOAGULATION GOAL OF INR 2.5 TO 3.5: ICD-10-CM

## 2019-08-05 DIAGNOSIS — R80.9 TYPE 2 DIABETES MELLITUS WITH MICROALBUMINURIA, WITH LONG-TERM CURRENT USE OF INSULIN (H): ICD-10-CM

## 2019-08-05 DIAGNOSIS — Z51.81 ENCOUNTER FOR THERAPEUTIC DRUG MONITORING: ICD-10-CM

## 2019-08-05 DIAGNOSIS — M48.062 SPINAL STENOSIS OF LUMBAR REGION WITH NEUROGENIC CLAUDICATION: ICD-10-CM

## 2019-08-05 DIAGNOSIS — R00.0 TACHYCARDIA: ICD-10-CM

## 2019-08-05 DIAGNOSIS — I48.92 ATRIAL FLUTTER, UNSPECIFIED TYPE (H): ICD-10-CM

## 2019-08-05 DIAGNOSIS — D62 ANEMIA DUE TO BLOOD LOSS, ACUTE: ICD-10-CM

## 2019-08-05 DIAGNOSIS — Z79.4 TYPE 2 DIABETES MELLITUS WITH MICROALBUMINURIA, WITH LONG-TERM CURRENT USE OF INSULIN (H): ICD-10-CM

## 2019-08-05 DIAGNOSIS — E11.29 TYPE 2 DIABETES MELLITUS WITH MICROALBUMINURIA, WITH LONG-TERM CURRENT USE OF INSULIN (H): ICD-10-CM

## 2019-08-05 DIAGNOSIS — Z51.81 ANTICOAGULATION GOAL OF INR 2.5 TO 3.5: ICD-10-CM

## 2019-08-05 DIAGNOSIS — Z95.2 S/P MITRAL VALVE REPLACEMENT: ICD-10-CM

## 2019-08-05 DIAGNOSIS — G89.4 CHRONIC PAIN SYNDROME: ICD-10-CM

## 2019-08-05 DIAGNOSIS — R06.09 DOE (DYSPNEA ON EXERTION): ICD-10-CM

## 2019-08-05 DIAGNOSIS — M48.00 MULTILEVEL NEURAL FORAMINAL STENOSIS: ICD-10-CM

## 2019-08-05 DIAGNOSIS — D69.9 BLEEDING DIATHESIS (H): ICD-10-CM

## 2019-08-05 LAB
ANION GAP SERPL CALCULATED.3IONS-SCNC: 11 MMOL/L (ref 5–18)
ATRIAL RATE - MUSE: 129 BPM
BUN SERPL-MCNC: 28 MG/DL (ref 8–28)
CALCIUM SERPL-MCNC: 9.3 MG/DL (ref 8.5–10.5)
CHLORIDE BLD-SCNC: 100 MMOL/L (ref 98–107)
CO2 SERPL-SCNC: 28 MMOL/L (ref 22–31)
CREAT SERPL-MCNC: 1.12 MG/DL (ref 0.6–1.1)
DIASTOLIC BLOOD PRESSURE - MUSE: NORMAL MMHG
ERYTHROCYTE [DISTWIDTH] IN BLOOD BY AUTOMATED COUNT: 14.2 % (ref 11–14.5)
GFR SERPL CREATININE-BSD FRML MDRD: 47 ML/MIN/1.73M2
GLUCOSE BLD-MCNC: 207 MG/DL (ref 70–125)
HBA1C MFR BLD: 6.7 % (ref 3.5–6)
HCT VFR BLD AUTO: 21 % (ref 35–47)
HGB BLD-MCNC: 6.8 G/DL (ref 12–16)
INR PPP: 3.7 (ref 0.9–1.1)
INTERPRETATION ECG - MUSE: NORMAL
MCH RBC QN AUTO: 29.5 PG (ref 27–34)
MCHC RBC AUTO-ENTMCNC: 32.2 G/DL (ref 32–36)
MCV RBC AUTO: 92 FL (ref 80–100)
P AXIS - MUSE: NORMAL DEGREES
PLATELET # BLD AUTO: 181 THOU/UL (ref 140–440)
PMV BLD AUTO: 9.5 FL (ref 7–10)
POTASSIUM BLD-SCNC: 3.8 MMOL/L (ref 3.5–5)
PR INTERVAL - MUSE: NORMAL MS
QRS DURATION - MUSE: 74 MS
QT - MUSE: 332 MS
QTC - MUSE: 486 MS
R AXIS - MUSE: -38 DEGREES
RBC # BLD AUTO: 2.29 MILL/UL (ref 3.8–5.4)
SODIUM SERPL-SCNC: 139 MMOL/L (ref 136–145)
SYSTOLIC BLOOD PRESSURE - MUSE: NORMAL MMHG
T AXIS - MUSE: -50 DEGREES
TSH SERPL DL<=0.005 MIU/L-ACNC: 16.41 UIU/ML (ref 0.3–5)
VENTRICULAR RATE- MUSE: 129 BPM
WBC: 5.8 THOU/UL (ref 4–11)

## 2019-08-06 ENCOUNTER — AMBULATORY - HEALTHEAST (OUTPATIENT)
Dept: INFUSION THERAPY | Facility: HOSPITAL | Age: 79
End: 2019-08-06

## 2019-08-06 DIAGNOSIS — D64.9 ANEMIA: ICD-10-CM

## 2019-08-07 ENCOUNTER — HOSPITAL ENCOUNTER (OUTPATIENT)
Dept: NUCLEAR MEDICINE | Facility: HOSPITAL | Age: 79
Discharge: HOME | End: 2019-08-07
Attending: INTERNAL MEDICINE

## 2019-08-07 DIAGNOSIS — D62 ANEMIA DUE TO BLOOD LOSS, ACUTE: ICD-10-CM

## 2019-08-07 LAB — BNP SERPL-MCNC: 162 PG/ML (ref 0–155)

## 2019-08-07 ASSESSMENT — MIFFLIN-ST. JEOR: SCORE: 1132.21

## 2019-08-08 ENCOUNTER — INFUSION - HEALTHEAST (OUTPATIENT)
Dept: INFUSION THERAPY | Facility: HOSPITAL | Age: 79
End: 2019-08-08

## 2019-08-08 DIAGNOSIS — D64.9 ANEMIA: ICD-10-CM

## 2019-08-08 LAB
ABO/RH(D): NORMAL
ANTIBODY SCREEN: NORMAL

## 2019-08-09 ENCOUNTER — OFFICE VISIT - HEALTHEAST (OUTPATIENT)
Dept: INTERNAL MEDICINE | Facility: CLINIC | Age: 79
End: 2019-08-09

## 2019-08-09 ENCOUNTER — COMMUNICATION - HEALTHEAST (OUTPATIENT)
Dept: INTERNAL MEDICINE | Facility: CLINIC | Age: 79
End: 2019-08-09

## 2019-08-09 DIAGNOSIS — I48.92 ATRIAL FLUTTER, UNSPECIFIED TYPE (H): ICD-10-CM

## 2019-08-09 DIAGNOSIS — G89.4 CHRONIC PAIN SYNDROME: ICD-10-CM

## 2019-08-09 DIAGNOSIS — Z95.2 S/P MITRAL VALVE REPLACEMENT: ICD-10-CM

## 2019-08-09 DIAGNOSIS — D64.9 ANEMIA, UNSPECIFIED TYPE: ICD-10-CM

## 2019-08-09 DIAGNOSIS — I25.10 ASCVD (ARTERIOSCLEROTIC CARDIOVASCULAR DISEASE): ICD-10-CM

## 2019-08-09 LAB
BLD PROD TYP BPU: NORMAL
BLD PROD TYP BPU: NORMAL
BLOOD EXPIRATION DATE: NORMAL
BLOOD EXPIRATION DATE: NORMAL
BLOOD TYPE: 5100
BLOOD TYPE: 5100
CODING SYSTEM: NORMAL
CODING SYSTEM: NORMAL
COMPONENT (HISTORICAL CONVERSION): NORMAL
COMPONENT (HISTORICAL CONVERSION): NORMAL
CROSSMATCH: NORMAL
CROSSMATCH: NORMAL
DIGOXIN LEVEL LHE- HISTORICAL: <0.3 NG/ML (ref 0.5–2)
FERRITIN SERPL-MCNC: 25 NG/ML (ref 10–130)
HGB BLD-MCNC: 9.1 G/DL (ref 12–16)
IRON SATN MFR SERPL: 92 % (ref 20–50)
IRON SERPL-MCNC: 336 UG/DL (ref 42–175)
ISSUE DATE AND TIME: NORMAL
ISSUE DATE AND TIME: NORMAL
STATUS (HISTORICAL CONVERSION): NORMAL
STATUS (HISTORICAL CONVERSION): NORMAL
TIBC SERPL-MCNC: 365 UG/DL (ref 313–563)
TRANSFERRIN SERPL-MCNC: 292 MG/DL (ref 212–360)
UNIT ABO/RH (HISTORICAL CONVERSION): NORMAL
UNIT ABO/RH (HISTORICAL CONVERSION): NORMAL
UNIT NUMBER: NORMAL
UNIT NUMBER: NORMAL

## 2019-08-12 ENCOUNTER — COMMUNICATION - HEALTHEAST (OUTPATIENT)
Dept: ANTICOAGULATION | Facility: CLINIC | Age: 79
End: 2019-08-12

## 2019-08-12 ENCOUNTER — OFFICE VISIT - HEALTHEAST (OUTPATIENT)
Dept: INTERNAL MEDICINE | Facility: CLINIC | Age: 79
End: 2019-08-12

## 2019-08-12 DIAGNOSIS — I48.92 ATRIAL FLUTTER, UNSPECIFIED TYPE (H): ICD-10-CM

## 2019-08-12 DIAGNOSIS — I10 ESSENTIAL HYPERTENSION: ICD-10-CM

## 2019-08-12 DIAGNOSIS — D64.9 ANEMIA, UNSPECIFIED TYPE: ICD-10-CM

## 2019-08-12 DIAGNOSIS — Z79.01 ANTICOAGULATION GOAL OF INR 2.5 TO 3.5: ICD-10-CM

## 2019-08-12 DIAGNOSIS — Z95.2 S/P MITRAL VALVE REPLACEMENT: ICD-10-CM

## 2019-08-12 DIAGNOSIS — Z51.81 ANTICOAGULATION GOAL OF INR 2.5 TO 3.5: ICD-10-CM

## 2019-08-12 LAB — INR PPP: 3.2 (ref 0.9–1.1)

## 2019-08-16 ENCOUNTER — COMMUNICATION - HEALTHEAST (OUTPATIENT)
Dept: INTERNAL MEDICINE | Facility: CLINIC | Age: 79
End: 2019-08-16

## 2019-08-16 ENCOUNTER — COMMUNICATION - HEALTHEAST (OUTPATIENT)
Dept: CARDIOLOGY | Facility: CLINIC | Age: 79
End: 2019-08-16

## 2019-08-16 DIAGNOSIS — R60.0 BILATERAL LEG EDEMA: ICD-10-CM

## 2019-08-16 DIAGNOSIS — G89.4 CHRONIC PAIN SYNDROME: ICD-10-CM

## 2019-08-20 ENCOUNTER — COMMUNICATION - HEALTHEAST (OUTPATIENT)
Dept: INTERNAL MEDICINE | Facility: CLINIC | Age: 79
End: 2019-08-20

## 2019-08-20 ENCOUNTER — OFFICE VISIT - HEALTHEAST (OUTPATIENT)
Dept: INTERNAL MEDICINE | Facility: CLINIC | Age: 79
End: 2019-08-20

## 2019-08-20 ENCOUNTER — COMMUNICATION - HEALTHEAST (OUTPATIENT)
Dept: ANTICOAGULATION | Facility: CLINIC | Age: 79
End: 2019-08-20

## 2019-08-20 DIAGNOSIS — Z51.81 ANTICOAGULATION GOAL OF INR 2.5 TO 3.5: ICD-10-CM

## 2019-08-20 DIAGNOSIS — D64.9 ANEMIA, UNSPECIFIED TYPE: ICD-10-CM

## 2019-08-20 DIAGNOSIS — Z51.81 ENCOUNTER FOR THERAPEUTIC DRUG MONITORING: ICD-10-CM

## 2019-08-20 DIAGNOSIS — Z95.2 S/P MITRAL VALVE REPLACEMENT: ICD-10-CM

## 2019-08-20 DIAGNOSIS — I48.92 ATRIAL FLUTTER, UNSPECIFIED TYPE (H): ICD-10-CM

## 2019-08-20 DIAGNOSIS — Z79.01 ANTICOAGULATION GOAL OF INR 2.5 TO 3.5: ICD-10-CM

## 2019-08-20 DIAGNOSIS — M48.00 MULTILEVEL NEURAL FORAMINAL STENOSIS: ICD-10-CM

## 2019-08-20 DIAGNOSIS — I10 ESSENTIAL HYPERTENSION: ICD-10-CM

## 2019-08-20 LAB
HGB BLD-MCNC: 8 G/DL (ref 12–16)
INR PPP: 3.7 (ref 0.9–1.1)

## 2019-08-21 ENCOUNTER — AMBULATORY - HEALTHEAST (OUTPATIENT)
Dept: LAB | Facility: CLINIC | Age: 79
End: 2019-08-21

## 2019-08-21 DIAGNOSIS — Z51.81 ENCOUNTER FOR THERAPEUTIC DRUG MONITORING: ICD-10-CM

## 2019-08-21 LAB
AMPHETAMINES UR QL SCN: ABNORMAL
BARBITURATES UR QL: ABNORMAL
BENZODIAZ UR QL: ABNORMAL
CANNABINOIDS UR QL SCN: ABNORMAL
COCAINE UR QL: ABNORMAL
CREAT UR-MCNC: 86.4 MG/DL
OPIATES UR QL SCN: ABNORMAL
OXYCODONE UR QL: ABNORMAL
PCP UR QL SCN: ABNORMAL

## 2019-09-03 ENCOUNTER — OFFICE VISIT - HEALTHEAST (OUTPATIENT)
Dept: INTERNAL MEDICINE | Facility: CLINIC | Age: 79
End: 2019-09-03

## 2019-09-03 ENCOUNTER — COMMUNICATION - HEALTHEAST (OUTPATIENT)
Dept: ANTICOAGULATION | Facility: CLINIC | Age: 79
End: 2019-09-03

## 2019-09-03 DIAGNOSIS — I48.92 ATRIAL FLUTTER, UNSPECIFIED TYPE (H): ICD-10-CM

## 2019-09-03 DIAGNOSIS — Z95.2 S/P MITRAL VALVE REPLACEMENT: ICD-10-CM

## 2019-09-03 DIAGNOSIS — R06.02 SOB (SHORTNESS OF BREATH): ICD-10-CM

## 2019-09-03 DIAGNOSIS — Z51.81 ANTICOAGULATION GOAL OF INR 2.5 TO 3.5: ICD-10-CM

## 2019-09-03 DIAGNOSIS — G89.4 CHRONIC PAIN SYNDROME: ICD-10-CM

## 2019-09-03 DIAGNOSIS — Z79.01 ANTICOAGULATION GOAL OF INR 2.5 TO 3.5: ICD-10-CM

## 2019-09-03 DIAGNOSIS — I10 ESSENTIAL HYPERTENSION: ICD-10-CM

## 2019-09-03 DIAGNOSIS — M48.00 MULTILEVEL NEURAL FORAMINAL STENOSIS: ICD-10-CM

## 2019-09-03 DIAGNOSIS — R60.0 BILATERAL LOWER EXTREMITY EDEMA: ICD-10-CM

## 2019-09-03 DIAGNOSIS — D64.9 ANEMIA, UNSPECIFIED TYPE: ICD-10-CM

## 2019-09-03 LAB
ANION GAP SERPL CALCULATED.3IONS-SCNC: 14 MMOL/L (ref 5–18)
BNP SERPL-MCNC: 192 PG/ML (ref 0–155)
BUN SERPL-MCNC: 21 MG/DL (ref 8–28)
CALCIUM SERPL-MCNC: 9 MG/DL (ref 8.5–10.5)
CHLORIDE BLD-SCNC: 103 MMOL/L (ref 98–107)
CO2 SERPL-SCNC: 24 MMOL/L (ref 22–31)
CREAT SERPL-MCNC: 1.16 MG/DL (ref 0.6–1.1)
ERYTHROCYTE [DISTWIDTH] IN BLOOD BY AUTOMATED COUNT: 13.6 % (ref 11–14.5)
GFR SERPL CREATININE-BSD FRML MDRD: 45 ML/MIN/1.73M2
GLUCOSE BLD-MCNC: 268 MG/DL (ref 70–125)
HCT VFR BLD AUTO: 26.2 % (ref 35–47)
HGB BLD-MCNC: 8.3 G/DL (ref 12–16)
INR PPP: 4.5 (ref 0.9–1.1)
MCH RBC QN AUTO: 30.6 PG (ref 27–34)
MCHC RBC AUTO-ENTMCNC: 31.8 G/DL (ref 32–36)
MCV RBC AUTO: 96 FL (ref 80–100)
PLATELET # BLD AUTO: 172 THOU/UL (ref 140–440)
PMV BLD AUTO: 10 FL (ref 7–10)
POTASSIUM BLD-SCNC: 4.4 MMOL/L (ref 3.5–5)
RBC # BLD AUTO: 2.72 MILL/UL (ref 3.8–5.4)
RETICS # AUTO: 0.21 MILL/UL (ref 0.01–0.11)
RETICS/RBC NFR AUTO: 8.09 % (ref 0.8–2.7)
SODIUM SERPL-SCNC: 141 MMOL/L (ref 136–145)
WBC: 4.6 THOU/UL (ref 4–11)

## 2019-09-04 ENCOUNTER — COMMUNICATION - HEALTHEAST (OUTPATIENT)
Dept: INTERNAL MEDICINE | Facility: CLINIC | Age: 79
End: 2019-09-04

## 2019-09-04 LAB — DIGOXIN LEVEL LHE- HISTORICAL: 0.5 NG/ML (ref 0.5–2)

## 2019-09-10 ENCOUNTER — COMMUNICATION - HEALTHEAST (OUTPATIENT)
Dept: ANTICOAGULATION | Facility: CLINIC | Age: 79
End: 2019-09-10

## 2019-09-10 ENCOUNTER — AMBULATORY - HEALTHEAST (OUTPATIENT)
Dept: LAB | Facility: CLINIC | Age: 79
End: 2019-09-10

## 2019-09-10 DIAGNOSIS — Z95.2 S/P MITRAL VALVE REPLACEMENT: ICD-10-CM

## 2019-09-10 DIAGNOSIS — Z79.01 ANTICOAGULATION GOAL OF INR 2.5 TO 3.5: ICD-10-CM

## 2019-09-10 DIAGNOSIS — I48.92 ATRIAL FLUTTER, UNSPECIFIED TYPE (H): ICD-10-CM

## 2019-09-10 DIAGNOSIS — Z51.81 ANTICOAGULATION GOAL OF INR 2.5 TO 3.5: ICD-10-CM

## 2019-09-10 LAB — INR PPP: 3 (ref 0.9–1.1)

## 2019-09-12 ENCOUNTER — RECORDS - HEALTHEAST (OUTPATIENT)
Dept: ADMINISTRATIVE | Facility: OTHER | Age: 79
End: 2019-09-12

## 2019-09-20 ENCOUNTER — COMMUNICATION - HEALTHEAST (OUTPATIENT)
Dept: INTERNAL MEDICINE | Facility: CLINIC | Age: 79
End: 2019-09-20

## 2019-09-26 ENCOUNTER — COMMUNICATION - HEALTHEAST (OUTPATIENT)
Dept: LAB | Facility: CLINIC | Age: 79
End: 2019-09-26

## 2019-09-26 ENCOUNTER — COMMUNICATION - HEALTHEAST (OUTPATIENT)
Dept: INTERNAL MEDICINE | Facility: CLINIC | Age: 79
End: 2019-09-26

## 2019-09-26 ENCOUNTER — OFFICE VISIT - HEALTHEAST (OUTPATIENT)
Dept: INTERNAL MEDICINE | Facility: CLINIC | Age: 79
End: 2019-09-26

## 2019-09-26 DIAGNOSIS — D50.0 BLOOD LOSS ANEMIA: ICD-10-CM

## 2019-09-26 DIAGNOSIS — Z95.2 S/P MITRAL VALVE REPLACEMENT: ICD-10-CM

## 2019-09-26 DIAGNOSIS — D69.9 BLEEDING DIATHESIS (H): ICD-10-CM

## 2019-09-26 DIAGNOSIS — M48.062 SPINAL STENOSIS OF LUMBAR REGION WITH NEUROGENIC CLAUDICATION: ICD-10-CM

## 2019-09-26 DIAGNOSIS — Z79.01 ANTICOAGULATION GOAL OF INR 2.5 TO 3.5: ICD-10-CM

## 2019-09-26 DIAGNOSIS — G89.4 CHRONIC PAIN SYNDROME: ICD-10-CM

## 2019-09-26 DIAGNOSIS — D64.9 ANEMIA, UNSPECIFIED TYPE: ICD-10-CM

## 2019-09-26 DIAGNOSIS — R60.0 BILATERAL LEG EDEMA: ICD-10-CM

## 2019-09-26 DIAGNOSIS — I48.92 ATRIAL FLUTTER, UNSPECIFIED TYPE (H): ICD-10-CM

## 2019-09-26 DIAGNOSIS — M48.00 MULTILEVEL NEURAL FORAMINAL STENOSIS: ICD-10-CM

## 2019-09-26 DIAGNOSIS — Z51.81 ANTICOAGULATION GOAL OF INR 2.5 TO 3.5: ICD-10-CM

## 2019-09-26 LAB
ANION GAP SERPL CALCULATED.3IONS-SCNC: 11 MMOL/L (ref 5–18)
BUN SERPL-MCNC: 19 MG/DL (ref 8–28)
CALCIUM SERPL-MCNC: 9.4 MG/DL (ref 8.5–10.5)
CHLORIDE BLD-SCNC: 99 MMOL/L (ref 98–107)
CO2 SERPL-SCNC: 28 MMOL/L (ref 22–31)
CREAT SERPL-MCNC: 1.08 MG/DL (ref 0.6–1.1)
ERYTHROCYTE [DISTWIDTH] IN BLOOD BY AUTOMATED COUNT: 12.9 % (ref 11–14.5)
GFR SERPL CREATININE-BSD FRML MDRD: 49 ML/MIN/1.73M2
GLUCOSE BLD-MCNC: 309 MG/DL (ref 70–125)
HCT VFR BLD AUTO: 36.8 % (ref 35–47)
HGB BLD-MCNC: 11.6 G/DL (ref 12–16)
INR PPP: 4.72 (ref 0.9–1.1)
MCH RBC QN AUTO: 29.3 PG (ref 27–34)
MCHC RBC AUTO-ENTMCNC: 31.5 G/DL (ref 32–36)
MCV RBC AUTO: 93 FL (ref 80–100)
PLATELET # BLD AUTO: 168 THOU/UL (ref 140–440)
PMV BLD AUTO: 12.6 FL (ref 8.5–12.5)
POTASSIUM BLD-SCNC: 4.1 MMOL/L (ref 3.5–5)
RBC # BLD AUTO: 3.96 MILL/UL (ref 3.8–5.4)
SODIUM SERPL-SCNC: 138 MMOL/L (ref 136–145)
WBC: 7.2 THOU/UL (ref 4–11)

## 2019-09-27 LAB
BASOPHILS # BLD AUTO: 0 THOU/UL (ref 0–0.2)
BASOPHILS NFR BLD AUTO: 1 % (ref 0–2)
EOSINOPHIL # BLD AUTO: 0.4 THOU/UL (ref 0–0.4)
EOSINOPHIL NFR BLD AUTO: 6 % (ref 0–6)
ERYTHROCYTE [DISTWIDTH] IN BLOOD BY AUTOMATED COUNT: 12.7 % (ref 11–14.5)
HCT VFR BLD AUTO: 37 % (ref 35–47)
HGB BLD-MCNC: 11.7 G/DL (ref 12–16)
LAB AP CHARGES (HE HISTORICAL CONVERSION): NORMAL
LYMPHOCYTES # BLD AUTO: 0.9 THOU/UL (ref 0.8–4.4)
LYMPHOCYTES NFR BLD AUTO: 13 % (ref 20–40)
MCH RBC QN AUTO: 29.7 PG (ref 27–34)
MCHC RBC AUTO-ENTMCNC: 31.6 G/DL (ref 32–36)
MCV RBC AUTO: 94 FL (ref 80–100)
MONOCYTES # BLD AUTO: 0.5 THOU/UL (ref 0–0.9)
MONOCYTES NFR BLD AUTO: 8 % (ref 2–10)
NEUTROPHILS # BLD AUTO: 5.2 THOU/UL (ref 2–7.7)
NEUTROPHILS NFR BLD AUTO: 72 % (ref 50–70)
PATH REPORT.COMMENTS IMP SPEC: NORMAL
PATH REPORT.COMMENTS IMP SPEC: NORMAL
PATH REPORT.FINAL DX SPEC: NORMAL
PATH REPORT.MICROSCOPIC SPEC OTHER STN: ABNORMAL
PLATELET # BLD AUTO: 165 THOU/UL (ref 140–440)
PMV BLD AUTO: 13 FL (ref 8.5–12.5)
RBC # BLD AUTO: 3.94 MILL/UL (ref 3.8–5.4)
WBC: 7.1 THOU/UL (ref 4–11)

## 2019-09-29 ENCOUNTER — COMMUNICATION - HEALTHEAST (OUTPATIENT)
Dept: INTERNAL MEDICINE | Facility: CLINIC | Age: 79
End: 2019-09-29

## 2019-10-08 ENCOUNTER — COMMUNICATION - HEALTHEAST (OUTPATIENT)
Dept: ANTICOAGULATION | Facility: CLINIC | Age: 79
End: 2019-10-08

## 2019-10-10 ENCOUNTER — AMBULATORY - HEALTHEAST (OUTPATIENT)
Dept: LAB | Facility: CLINIC | Age: 79
End: 2019-10-10

## 2019-10-10 ENCOUNTER — COMMUNICATION - HEALTHEAST (OUTPATIENT)
Dept: ANTICOAGULATION | Facility: CLINIC | Age: 79
End: 2019-10-10

## 2019-10-10 DIAGNOSIS — Z95.2 S/P MITRAL VALVE REPLACEMENT: ICD-10-CM

## 2019-10-10 DIAGNOSIS — Z51.81 ANTICOAGULATION GOAL OF INR 2.5 TO 3.5: ICD-10-CM

## 2019-10-10 DIAGNOSIS — Z79.01 ANTICOAGULATION GOAL OF INR 2.5 TO 3.5: ICD-10-CM

## 2019-10-10 DIAGNOSIS — I48.92 ATRIAL FLUTTER, UNSPECIFIED TYPE (H): ICD-10-CM

## 2019-10-10 LAB — INR PPP: 4.8 (ref 0.9–1.1)

## 2019-10-11 ENCOUNTER — COMMUNICATION - HEALTHEAST (OUTPATIENT)
Dept: INTERNAL MEDICINE | Facility: CLINIC | Age: 79
End: 2019-10-11

## 2019-10-18 ENCOUNTER — OFFICE VISIT - HEALTHEAST (OUTPATIENT)
Dept: INTERNAL MEDICINE | Facility: CLINIC | Age: 79
End: 2019-10-18

## 2019-10-18 ENCOUNTER — COMMUNICATION - HEALTHEAST (OUTPATIENT)
Dept: INTERNAL MEDICINE | Facility: CLINIC | Age: 79
End: 2019-10-18

## 2019-10-18 ENCOUNTER — COMMUNICATION - HEALTHEAST (OUTPATIENT)
Dept: ANTICOAGULATION | Facility: CLINIC | Age: 79
End: 2019-10-18

## 2019-10-18 DIAGNOSIS — G89.4 CHRONIC PAIN SYNDROME: ICD-10-CM

## 2019-10-18 DIAGNOSIS — D50.0 BLOOD LOSS ANEMIA: ICD-10-CM

## 2019-10-18 DIAGNOSIS — Z79.01 ANTICOAGULATION GOAL OF INR 2.5 TO 3.5: ICD-10-CM

## 2019-10-18 DIAGNOSIS — Z95.2 S/P MITRAL VALVE REPLACEMENT: ICD-10-CM

## 2019-10-18 DIAGNOSIS — I48.91 A-FIB (H): ICD-10-CM

## 2019-10-18 DIAGNOSIS — Z51.81 ANTICOAGULATION GOAL OF INR 2.5 TO 3.5: ICD-10-CM

## 2019-10-18 DIAGNOSIS — I48.92 ATRIAL FLUTTER, UNSPECIFIED TYPE (H): ICD-10-CM

## 2019-10-18 DIAGNOSIS — M48.00 MULTILEVEL NEURAL FORAMINAL STENOSIS: ICD-10-CM

## 2019-10-18 DIAGNOSIS — M48.062 SPINAL STENOSIS OF LUMBAR REGION WITH NEUROGENIC CLAUDICATION: ICD-10-CM

## 2019-10-18 LAB
ANION GAP SERPL CALCULATED.3IONS-SCNC: 14 MMOL/L (ref 5–18)
ATRIAL RATE - MUSE: 85 BPM
BUN SERPL-MCNC: 23 MG/DL (ref 8–28)
CALCIUM SERPL-MCNC: 10.2 MG/DL (ref 8.5–10.5)
CHLORIDE BLD-SCNC: 93 MMOL/L (ref 98–107)
CO2 SERPL-SCNC: 30 MMOL/L (ref 22–31)
CREAT SERPL-MCNC: 1.22 MG/DL (ref 0.6–1.1)
DIASTOLIC BLOOD PRESSURE - MUSE: NORMAL
GFR SERPL CREATININE-BSD FRML MDRD: 42 ML/MIN/1.73M2
GLUCOSE BLD-MCNC: 334 MG/DL (ref 70–125)
HGB BLD-MCNC: 12.8 G/DL (ref 12–16)
INR PPP: 4.7 (ref 0.9–1.1)
INTERPRETATION ECG - MUSE: NORMAL
P AXIS - MUSE: NORMAL
POTASSIUM BLD-SCNC: 4.2 MMOL/L (ref 3.5–5)
PR INTERVAL - MUSE: NORMAL
QRS DURATION - MUSE: 82 MS
QT - MUSE: 366 MS
QTC - MUSE: 435 MS
R AXIS - MUSE: -46 DEGREES
SODIUM SERPL-SCNC: 137 MMOL/L (ref 136–145)
SYSTOLIC BLOOD PRESSURE - MUSE: NORMAL
T AXIS - MUSE: -60 DEGREES
VENTRICULAR RATE- MUSE: 85 BPM

## 2019-10-25 ENCOUNTER — OFFICE VISIT - HEALTHEAST (OUTPATIENT)
Dept: INTERNAL MEDICINE | Facility: CLINIC | Age: 79
End: 2019-10-25

## 2019-10-25 ENCOUNTER — AMBULATORY - HEALTHEAST (OUTPATIENT)
Dept: LAB | Facility: CLINIC | Age: 79
End: 2019-10-25

## 2019-10-25 DIAGNOSIS — M79.604 BILATERAL LEG PAIN: ICD-10-CM

## 2019-10-25 DIAGNOSIS — M25.552 BILATERAL HIP PAIN: ICD-10-CM

## 2019-10-25 DIAGNOSIS — M25.551 BILATERAL HIP PAIN: ICD-10-CM

## 2019-10-25 DIAGNOSIS — M25.562 CHRONIC PAIN OF BOTH KNEES: ICD-10-CM

## 2019-10-25 DIAGNOSIS — R14.0 ABDOMINAL BLOATING: ICD-10-CM

## 2019-10-25 DIAGNOSIS — M79.89 OTHER SPECIFIED SOFT TISSUE DISORDERS: ICD-10-CM

## 2019-10-25 DIAGNOSIS — Z79.01 ANTICOAGULATION GOAL OF INR 2.5 TO 3.5: ICD-10-CM

## 2019-10-25 DIAGNOSIS — Z51.81 ANTICOAGULATION GOAL OF INR 2.5 TO 3.5: ICD-10-CM

## 2019-10-25 DIAGNOSIS — G89.29 CHRONIC PAIN OF BOTH KNEES: ICD-10-CM

## 2019-10-25 DIAGNOSIS — E03.9 HYPOTHYROIDISM: ICD-10-CM

## 2019-10-25 DIAGNOSIS — I48.92 ATRIAL FLUTTER, UNSPECIFIED TYPE (H): ICD-10-CM

## 2019-10-25 DIAGNOSIS — M79.605 BILATERAL LEG PAIN: ICD-10-CM

## 2019-10-25 DIAGNOSIS — I10 ESSENTIAL (PRIMARY) HYPERTENSION: ICD-10-CM

## 2019-10-25 DIAGNOSIS — M48.062 SPINAL STENOSIS OF LUMBAR REGION WITH NEUROGENIC CLAUDICATION: ICD-10-CM

## 2019-10-25 DIAGNOSIS — Z95.2 S/P MITRAL VALVE REPLACEMENT: ICD-10-CM

## 2019-10-25 DIAGNOSIS — M25.561 CHRONIC PAIN OF BOTH KNEES: ICD-10-CM

## 2019-10-25 LAB
ALBUMIN SERPL-MCNC: 3.9 G/DL (ref 3.5–5)
ALP SERPL-CCNC: 70 U/L (ref 45–120)
ALT SERPL W P-5'-P-CCNC: 21 U/L (ref 0–45)
ANION GAP SERPL CALCULATED.3IONS-SCNC: 13 MMOL/L (ref 5–18)
AST SERPL W P-5'-P-CCNC: 21 U/L (ref 0–40)
BILIRUB SERPL-MCNC: 0.4 MG/DL (ref 0–1)
BUN SERPL-MCNC: 28 MG/DL (ref 8–28)
C REACTIVE PROTEIN LHE: 1.5 MG/DL (ref 0–0.8)
CALCIUM SERPL-MCNC: 8.9 MG/DL (ref 8.5–10.5)
CHLORIDE BLD-SCNC: 93 MMOL/L (ref 98–107)
CO2 SERPL-SCNC: 28 MMOL/L (ref 22–31)
CREAT SERPL-MCNC: 1.21 MG/DL (ref 0.6–1.1)
ERYTHROCYTE [DISTWIDTH] IN BLOOD BY AUTOMATED COUNT: 12.2 % (ref 11–14.5)
ERYTHROCYTE [SEDIMENTATION RATE] IN BLOOD BY WESTERGREN METHOD: 45 MM/HR (ref 0–20)
GFR SERPL CREATININE-BSD FRML MDRD: 43 ML/MIN/1.73M2
GLUCOSE BLD-MCNC: 293 MG/DL (ref 70–125)
HCT VFR BLD AUTO: 38 % (ref 35–47)
HGB BLD-MCNC: 12.7 G/DL (ref 12–16)
INR PPP: 5.4 (ref 0.9–1.1)
MCH RBC QN AUTO: 29.6 PG (ref 27–34)
MCHC RBC AUTO-ENTMCNC: 33.4 G/DL (ref 32–36)
MCV RBC AUTO: 88 FL (ref 80–100)
PLATELET # BLD AUTO: 142 THOU/UL (ref 140–440)
PMV BLD AUTO: 10.2 FL (ref 7–10)
POTASSIUM BLD-SCNC: 4.2 MMOL/L (ref 3.5–5)
PROT SERPL-MCNC: 7.6 G/DL (ref 6–8)
RBC # BLD AUTO: 4.3 MILL/UL (ref 3.8–5.4)
SODIUM SERPL-SCNC: 134 MMOL/L (ref 136–145)
TSH SERPL DL<=0.005 MIU/L-ACNC: 12.66 UIU/ML (ref 0.3–5)
WBC: 7.7 THOU/UL (ref 4–11)

## 2019-10-26 ENCOUNTER — RECORDS - HEALTHEAST (OUTPATIENT)
Dept: ADMINISTRATIVE | Facility: OTHER | Age: 79
End: 2019-10-26

## 2019-10-26 ENCOUNTER — COMMUNICATION - HEALTHEAST (OUTPATIENT)
Dept: LAB | Facility: CLINIC | Age: 79
End: 2019-10-26

## 2019-10-26 ENCOUNTER — COMMUNICATION - HEALTHEAST (OUTPATIENT)
Dept: SCHEDULING | Facility: CLINIC | Age: 79
End: 2019-10-26

## 2019-10-27 ENCOUNTER — RECORDS - HEALTHEAST (OUTPATIENT)
Dept: ADMINISTRATIVE | Facility: OTHER | Age: 79
End: 2019-10-27

## 2019-10-28 ENCOUNTER — RECORDS - HEALTHEAST (OUTPATIENT)
Dept: ADMINISTRATIVE | Facility: OTHER | Age: 79
End: 2019-10-28

## 2019-10-28 ENCOUNTER — COMMUNICATION - HEALTHEAST (OUTPATIENT)
Dept: ANTICOAGULATION | Facility: CLINIC | Age: 79
End: 2019-10-28

## 2019-10-29 ENCOUNTER — RECORDS - HEALTHEAST (OUTPATIENT)
Dept: ADMINISTRATIVE | Facility: OTHER | Age: 79
End: 2019-10-29

## 2019-10-29 LAB
B LOCUS: NORMAL
B27TEST METHOD: NORMAL

## 2019-10-30 ENCOUNTER — RECORDS - HEALTHEAST (OUTPATIENT)
Dept: ADMINISTRATIVE | Facility: OTHER | Age: 79
End: 2019-10-30

## 2019-10-31 ENCOUNTER — HOME CARE/HOSPICE - HEALTHEAST (OUTPATIENT)
Dept: HOME HEALTH SERVICES | Facility: HOME HEALTH | Age: 79
End: 2019-10-31

## 2019-11-01 ENCOUNTER — COMMUNICATION - HEALTHEAST (OUTPATIENT)
Dept: CARE COORDINATION | Facility: CLINIC | Age: 79
End: 2019-11-01

## 2019-11-01 ENCOUNTER — COMMUNICATION - HEALTHEAST (OUTPATIENT)
Dept: ANTICOAGULATION | Facility: CLINIC | Age: 79
End: 2019-11-01

## 2019-11-01 ENCOUNTER — COMMUNICATION - HEALTHEAST (OUTPATIENT)
Dept: NURSING | Facility: CLINIC | Age: 79
End: 2019-11-01

## 2019-11-01 ENCOUNTER — AMBULATORY - HEALTHEAST (OUTPATIENT)
Dept: CARE COORDINATION | Facility: CLINIC | Age: 79
End: 2019-11-01

## 2019-11-01 ENCOUNTER — COMMUNICATION - HEALTHEAST (OUTPATIENT)
Dept: INTERNAL MEDICINE | Facility: CLINIC | Age: 79
End: 2019-11-01

## 2019-11-01 DIAGNOSIS — I10 ESSENTIAL HYPERTENSION: ICD-10-CM

## 2019-11-01 DIAGNOSIS — M79.605 LEG PAIN, BILATERAL: ICD-10-CM

## 2019-11-01 DIAGNOSIS — Z95.2 S/P MITRAL VALVE REPLACEMENT: ICD-10-CM

## 2019-11-01 DIAGNOSIS — M79.604 LEG PAIN, BILATERAL: ICD-10-CM

## 2019-11-01 DIAGNOSIS — I25.10 ATHEROSCLEROSIS OF NATIVE CORONARY ARTERY OF NATIVE HEART WITHOUT ANGINA PECTORIS: ICD-10-CM

## 2019-11-04 ENCOUNTER — OFFICE VISIT - HEALTHEAST (OUTPATIENT)
Dept: INTERNAL MEDICINE | Facility: CLINIC | Age: 79
End: 2019-11-04

## 2019-11-04 ENCOUNTER — COMMUNICATION - HEALTHEAST (OUTPATIENT)
Dept: ANTICOAGULATION | Facility: CLINIC | Age: 79
End: 2019-11-04

## 2019-11-04 ENCOUNTER — COMMUNICATION - HEALTHEAST (OUTPATIENT)
Dept: INTERNAL MEDICINE | Facility: CLINIC | Age: 79
End: 2019-11-04

## 2019-11-04 ENCOUNTER — COMMUNICATION - HEALTHEAST (OUTPATIENT)
Dept: NURSING | Facility: CLINIC | Age: 79
End: 2019-11-04

## 2019-11-04 DIAGNOSIS — T46.2X5A ADVERSE EFFECT OF AMIODARONE, INITIAL ENCOUNTER: ICD-10-CM

## 2019-11-04 DIAGNOSIS — I73.9 PVD (PERIPHERAL VASCULAR DISEASE) (H): ICD-10-CM

## 2019-11-04 DIAGNOSIS — L97.521 SKIN ULCER OF LEFT GREAT TOE, LIMITED TO BREAKDOWN OF SKIN (H): ICD-10-CM

## 2019-11-04 DIAGNOSIS — D64.9 NORMOCYTIC ANEMIA: ICD-10-CM

## 2019-11-04 DIAGNOSIS — Z95.2 S/P MITRAL VALVE REPLACEMENT: ICD-10-CM

## 2019-11-04 DIAGNOSIS — I48.92 ATRIAL FLUTTER, UNSPECIFIED TYPE (H): ICD-10-CM

## 2019-11-04 DIAGNOSIS — Z09 HOSPITAL DISCHARGE FOLLOW-UP: ICD-10-CM

## 2019-11-04 DIAGNOSIS — Z79.01 ANTICOAGULATION GOAL OF INR 2.5 TO 3.5: ICD-10-CM

## 2019-11-04 DIAGNOSIS — D50.0 BLOOD LOSS ANEMIA: ICD-10-CM

## 2019-11-04 DIAGNOSIS — Z51.81 ANTICOAGULATION GOAL OF INR 2.5 TO 3.5: ICD-10-CM

## 2019-11-04 DIAGNOSIS — I47.21 TORSADES DE POINTES (H): ICD-10-CM

## 2019-11-04 DIAGNOSIS — I35.0 MODERATE AORTIC STENOSIS: ICD-10-CM

## 2019-11-04 DIAGNOSIS — M48.00 MULTILEVEL NEURAL FORAMINAL STENOSIS: ICD-10-CM

## 2019-11-04 DIAGNOSIS — Z53.09 ASPIRIN CONTRAINDICATED: ICD-10-CM

## 2019-11-04 DIAGNOSIS — M48.062 SPINAL STENOSIS OF LUMBAR REGION WITH NEUROGENIC CLAUDICATION: ICD-10-CM

## 2019-11-04 LAB — INR PPP: 2.2 (ref 0.9–1.1)

## 2019-11-08 ENCOUNTER — COMMUNICATION - HEALTHEAST (OUTPATIENT)
Dept: ANTICOAGULATION | Facility: CLINIC | Age: 79
End: 2019-11-08

## 2019-11-12 ENCOUNTER — COMMUNICATION - HEALTHEAST (OUTPATIENT)
Dept: ANTICOAGULATION | Facility: CLINIC | Age: 79
End: 2019-11-12

## 2019-11-12 ENCOUNTER — AMBULATORY - HEALTHEAST (OUTPATIENT)
Dept: LAB | Facility: CLINIC | Age: 79
End: 2019-11-12

## 2019-11-12 DIAGNOSIS — I48.92 ATRIAL FLUTTER, UNSPECIFIED TYPE (H): ICD-10-CM

## 2019-11-12 DIAGNOSIS — Z79.01 ANTICOAGULATION GOAL OF INR 2.5 TO 3.5: ICD-10-CM

## 2019-11-12 DIAGNOSIS — Z95.2 S/P MITRAL VALVE REPLACEMENT: ICD-10-CM

## 2019-11-12 DIAGNOSIS — Z51.81 ANTICOAGULATION GOAL OF INR 2.5 TO 3.5: ICD-10-CM

## 2019-11-12 LAB — INR PPP: 2.9 (ref 0.9–1.1)

## 2019-11-18 ENCOUNTER — AMBULATORY - HEALTHEAST (OUTPATIENT)
Dept: LAB | Facility: CLINIC | Age: 79
End: 2019-11-18

## 2019-11-18 ENCOUNTER — COMMUNICATION - HEALTHEAST (OUTPATIENT)
Dept: ANTICOAGULATION | Facility: CLINIC | Age: 79
End: 2019-11-18

## 2019-11-18 DIAGNOSIS — I48.92 ATRIAL FLUTTER, UNSPECIFIED TYPE (H): ICD-10-CM

## 2019-11-18 DIAGNOSIS — Z95.2 S/P MITRAL VALVE REPLACEMENT: ICD-10-CM

## 2019-11-18 DIAGNOSIS — Z51.81 ANTICOAGULATION GOAL OF INR 2.5 TO 3.5: ICD-10-CM

## 2019-11-18 DIAGNOSIS — Z79.01 ANTICOAGULATION GOAL OF INR 2.5 TO 3.5: ICD-10-CM

## 2019-11-18 LAB — INR PPP: 5.3 (ref 0.9–1.1)

## 2019-11-25 ENCOUNTER — COMMUNICATION - HEALTHEAST (OUTPATIENT)
Dept: ANTICOAGULATION | Facility: CLINIC | Age: 79
End: 2019-11-25

## 2019-11-25 ENCOUNTER — OFFICE VISIT - HEALTHEAST (OUTPATIENT)
Dept: INTERNAL MEDICINE | Facility: CLINIC | Age: 79
End: 2019-11-25

## 2019-11-25 DIAGNOSIS — Z51.81 ANTICOAGULATION GOAL OF INR 2.5 TO 3.5: ICD-10-CM

## 2019-11-25 DIAGNOSIS — Z95.2 S/P MITRAL VALVE REPLACEMENT: ICD-10-CM

## 2019-11-25 DIAGNOSIS — I48.92 ATRIAL FLUTTER, UNSPECIFIED TYPE (H): ICD-10-CM

## 2019-11-25 DIAGNOSIS — D50.0 BLOOD LOSS ANEMIA: ICD-10-CM

## 2019-11-25 DIAGNOSIS — M48.00 MULTILEVEL NEURAL FORAMINAL STENOSIS: ICD-10-CM

## 2019-11-25 DIAGNOSIS — D64.9 ANEMIA, UNSPECIFIED TYPE: ICD-10-CM

## 2019-11-25 DIAGNOSIS — I47.21 TORSADES DE POINTES (H): ICD-10-CM

## 2019-11-25 DIAGNOSIS — E03.9 ACQUIRED HYPOTHYROIDISM: ICD-10-CM

## 2019-11-25 DIAGNOSIS — Z79.01 ANTICOAGULATION GOAL OF INR 2.5 TO 3.5: ICD-10-CM

## 2019-11-25 DIAGNOSIS — I73.9 PVD (PERIPHERAL VASCULAR DISEASE) (H): ICD-10-CM

## 2019-11-25 DIAGNOSIS — M48.062 SPINAL STENOSIS OF LUMBAR REGION WITH NEUROGENIC CLAUDICATION: ICD-10-CM

## 2019-11-25 LAB
HGB BLD-MCNC: 13.5 G/DL (ref 12–16)
INR PPP: 4 (ref 0.9–1.1)

## 2019-11-26 ENCOUNTER — COMMUNICATION - HEALTHEAST (OUTPATIENT)
Dept: INTERNAL MEDICINE | Facility: CLINIC | Age: 79
End: 2019-11-26

## 2019-11-27 ENCOUNTER — AMBULATORY - HEALTHEAST (OUTPATIENT)
Dept: INTERNAL MEDICINE | Facility: CLINIC | Age: 79
End: 2019-11-27

## 2019-12-03 ENCOUNTER — COMMUNICATION - HEALTHEAST (OUTPATIENT)
Dept: LAB | Facility: CLINIC | Age: 79
End: 2019-12-03

## 2019-12-03 ENCOUNTER — OFFICE VISIT - HEALTHEAST (OUTPATIENT)
Dept: INTERNAL MEDICINE | Facility: CLINIC | Age: 79
End: 2019-12-03

## 2019-12-03 DIAGNOSIS — Z51.81 ANTICOAGULATION GOAL OF INR 2.5 TO 3.5: ICD-10-CM

## 2019-12-03 DIAGNOSIS — G89.4 CHRONIC PAIN SYNDROME: ICD-10-CM

## 2019-12-03 DIAGNOSIS — Z79.01 ANTICOAGULATION GOAL OF INR 2.5 TO 3.5: ICD-10-CM

## 2019-12-03 DIAGNOSIS — F51.01 PRIMARY INSOMNIA: ICD-10-CM

## 2019-12-03 DIAGNOSIS — I48.92 ATRIAL FLUTTER, UNSPECIFIED TYPE (H): ICD-10-CM

## 2019-12-03 DIAGNOSIS — Z95.2 S/P MITRAL VALVE REPLACEMENT: ICD-10-CM

## 2019-12-03 DIAGNOSIS — K59.00 CONSTIPATION, UNSPECIFIED CONSTIPATION TYPE: ICD-10-CM

## 2019-12-03 LAB — INR PPP: 3.95 (ref 0.9–1.1)

## 2019-12-09 ENCOUNTER — AMBULATORY - HEALTHEAST (OUTPATIENT)
Dept: NURSING | Facility: CLINIC | Age: 79
End: 2019-12-09

## 2019-12-09 ENCOUNTER — AMBULATORY - HEALTHEAST (OUTPATIENT)
Dept: LAB | Facility: CLINIC | Age: 79
End: 2019-12-09

## 2019-12-09 DIAGNOSIS — Z95.2 S/P MITRAL VALVE REPLACEMENT: ICD-10-CM

## 2019-12-09 DIAGNOSIS — Z51.81 ANTICOAGULATION GOAL OF INR 2.5 TO 3.5: ICD-10-CM

## 2019-12-09 DIAGNOSIS — Z79.01 ANTICOAGULATION GOAL OF INR 2.5 TO 3.5: ICD-10-CM

## 2019-12-09 DIAGNOSIS — I48.92 ATRIAL FLUTTER, UNSPECIFIED TYPE (H): ICD-10-CM

## 2019-12-09 LAB — INR PPP: 2.7 (ref 0.9–1.1)

## 2019-12-16 ENCOUNTER — AMBULATORY - HEALTHEAST (OUTPATIENT)
Dept: NURSING | Facility: CLINIC | Age: 79
End: 2019-12-16

## 2019-12-16 ENCOUNTER — AMBULATORY - HEALTHEAST (OUTPATIENT)
Dept: LAB | Facility: CLINIC | Age: 79
End: 2019-12-16

## 2019-12-16 DIAGNOSIS — I48.92 ATRIAL FLUTTER, UNSPECIFIED TYPE (H): ICD-10-CM

## 2019-12-16 DIAGNOSIS — Z95.2 S/P MITRAL VALVE REPLACEMENT: ICD-10-CM

## 2019-12-16 DIAGNOSIS — Z79.01 ANTICOAGULATION GOAL OF INR 2.5 TO 3.5: ICD-10-CM

## 2019-12-16 DIAGNOSIS — Z51.81 ANTICOAGULATION GOAL OF INR 2.5 TO 3.5: ICD-10-CM

## 2019-12-16 LAB — INR PPP: 2.2 (ref 0.9–1.1)

## 2019-12-19 ENCOUNTER — COMMUNICATION - HEALTHEAST (OUTPATIENT)
Dept: INTERNAL MEDICINE | Facility: CLINIC | Age: 79
End: 2019-12-19

## 2019-12-19 DIAGNOSIS — F51.01 PRIMARY INSOMNIA: ICD-10-CM

## 2019-12-31 ENCOUNTER — COMMUNICATION - HEALTHEAST (OUTPATIENT)
Dept: ANTICOAGULATION | Facility: CLINIC | Age: 79
End: 2019-12-31

## 2020-01-01 ENCOUNTER — COMMUNICATION - HEALTHEAST (OUTPATIENT)
Dept: INTERNAL MEDICINE | Facility: CLINIC | Age: 80
End: 2020-01-01

## 2020-01-01 ENCOUNTER — AMBULATORY - HEALTHEAST (OUTPATIENT)
Dept: ANTICOAGULATION | Facility: CLINIC | Age: 80
End: 2020-01-01

## 2020-01-01 ENCOUNTER — COMMUNICATION - HEALTHEAST (OUTPATIENT)
Dept: SCHEDULING | Facility: CLINIC | Age: 80
End: 2020-01-01

## 2020-01-01 ENCOUNTER — RECORDS - HEALTHEAST (OUTPATIENT)
Dept: ADMINISTRATIVE | Facility: OTHER | Age: 80
End: 2020-01-01

## 2020-01-01 ENCOUNTER — OFFICE VISIT - HEALTHEAST (OUTPATIENT)
Dept: INTERNAL MEDICINE | Facility: CLINIC | Age: 80
End: 2020-01-01

## 2020-01-01 ENCOUNTER — AMBULATORY - HEALTHEAST (OUTPATIENT)
Dept: CARE COORDINATION | Facility: CLINIC | Age: 80
End: 2020-01-01

## 2020-01-01 ENCOUNTER — AMBULATORY - HEALTHEAST (OUTPATIENT)
Dept: LAB | Facility: CLINIC | Age: 80
End: 2020-01-01

## 2020-01-01 ENCOUNTER — COMMUNICATION - HEALTHEAST (OUTPATIENT)
Dept: ANTICOAGULATION | Facility: CLINIC | Age: 80
End: 2020-01-01

## 2020-01-01 ENCOUNTER — COMMUNICATION - HEALTHEAST (OUTPATIENT)
Dept: NURSING | Facility: CLINIC | Age: 80
End: 2020-01-01

## 2020-01-01 DIAGNOSIS — E11.29 TYPE 2 DIABETES MELLITUS WITH MICROALBUMINURIA, WITH LONG-TERM CURRENT USE OF INSULIN (H): ICD-10-CM

## 2020-01-01 DIAGNOSIS — G89.29 CHRONIC MIDLINE LOW BACK PAIN WITH BILATERAL SCIATICA: ICD-10-CM

## 2020-01-01 DIAGNOSIS — Z95.2 S/P MITRAL VALVE REPLACEMENT: ICD-10-CM

## 2020-01-01 DIAGNOSIS — G89.4 CHRONIC PAIN SYNDROME: ICD-10-CM

## 2020-01-01 DIAGNOSIS — M62.82 NON-TRAUMATIC RHABDOMYOLYSIS: ICD-10-CM

## 2020-01-01 DIAGNOSIS — Z71.89 OTHER SPECIFIED COUNSELING: Chronic | ICD-10-CM

## 2020-01-01 DIAGNOSIS — I48.0 PAROXYSMAL ATRIAL FIBRILLATION (H): ICD-10-CM

## 2020-01-01 DIAGNOSIS — M54.42 CHRONIC MIDLINE LOW BACK PAIN WITH BILATERAL SCIATICA: ICD-10-CM

## 2020-01-01 DIAGNOSIS — Z79.4 TYPE 2 DIABETES MELLITUS WITH MICROALBUMINURIA, WITH LONG-TERM CURRENT USE OF INSULIN (H): ICD-10-CM

## 2020-01-01 DIAGNOSIS — L97.429 ULCER OF HEEL AND MIDFOOT, LEFT, WITH UNSPECIFIED SEVERITY (H): ICD-10-CM

## 2020-01-01 DIAGNOSIS — R14.0 POSTPRANDIAL ABDOMINAL BLOATING: ICD-10-CM

## 2020-01-01 DIAGNOSIS — I48.0 INTERMITTENT ATRIAL FIBRILLATION (H): ICD-10-CM

## 2020-01-01 DIAGNOSIS — I48.91 ATRIAL FIBRILLATION WITH RAPID VENTRICULAR RESPONSE (H): ICD-10-CM

## 2020-01-01 DIAGNOSIS — R80.9 TYPE 2 DIABETES MELLITUS WITH MICROALBUMINURIA, WITH LONG-TERM CURRENT USE OF INSULIN (H): ICD-10-CM

## 2020-01-01 DIAGNOSIS — R52 DIFFUSE PAIN: ICD-10-CM

## 2020-01-01 DIAGNOSIS — E03.9 ACQUIRED HYPOTHYROIDISM: ICD-10-CM

## 2020-01-01 DIAGNOSIS — K59.01 SLOW TRANSIT CONSTIPATION: ICD-10-CM

## 2020-01-01 DIAGNOSIS — Z91.148 PATIENT FORGETS TO TAKE MEDICATION: ICD-10-CM

## 2020-01-01 DIAGNOSIS — N39.0 SEPSIS DUE TO URINARY TRACT INFECTION (H): ICD-10-CM

## 2020-01-01 DIAGNOSIS — A41.9 SEPSIS DUE TO URINARY TRACT INFECTION (H): ICD-10-CM

## 2020-01-01 DIAGNOSIS — R41.82 ALTERED MENTAL STATUS: ICD-10-CM

## 2020-01-01 DIAGNOSIS — F51.01 PRIMARY INSOMNIA: ICD-10-CM

## 2020-01-01 DIAGNOSIS — Z79.01 CHRONIC ANTICOAGULATION: ICD-10-CM

## 2020-01-01 DIAGNOSIS — R60.0 BILATERAL LOWER EXTREMITY EDEMA: ICD-10-CM

## 2020-01-01 DIAGNOSIS — R06.02 SOB (SHORTNESS OF BREATH): ICD-10-CM

## 2020-01-01 DIAGNOSIS — Z09 HOSPITAL DISCHARGE FOLLOW-UP: ICD-10-CM

## 2020-01-01 DIAGNOSIS — M48.00 MULTILEVEL NEURAL FORAMINAL STENOSIS: ICD-10-CM

## 2020-01-01 DIAGNOSIS — I10 ESSENTIAL HYPERTENSION: ICD-10-CM

## 2020-01-01 DIAGNOSIS — N18.31 STAGE 3A CHRONIC KIDNEY DISEASE (H): ICD-10-CM

## 2020-01-01 DIAGNOSIS — D64.9 NORMOCYTIC ANEMIA: ICD-10-CM

## 2020-01-01 DIAGNOSIS — M54.41 CHRONIC MIDLINE LOW BACK PAIN WITH BILATERAL SCIATICA: ICD-10-CM

## 2020-01-01 LAB
ALBUMIN SERPL-MCNC: 3.6 G/DL (ref 3.5–5)
ALP SERPL-CCNC: 76 U/L (ref 45–120)
ALT SERPL W P-5'-P-CCNC: 20 U/L (ref 0–45)
ANION GAP SERPL CALCULATED.3IONS-SCNC: 10 MMOL/L (ref 5–18)
ANION GAP SERPL CALCULATED.3IONS-SCNC: 9 MMOL/L (ref 5–18)
AST SERPL W P-5'-P-CCNC: 20 U/L (ref 0–40)
BILIRUB SERPL-MCNC: 0.4 MG/DL (ref 0–1)
BNP SERPL-MCNC: 404 PG/ML (ref 0–163)
BUN SERPL-MCNC: 24 MG/DL (ref 8–28)
BUN SERPL-MCNC: 33 MG/DL (ref 8–28)
CALCIUM SERPL-MCNC: 8.5 MG/DL (ref 8.5–10.5)
CALCIUM SERPL-MCNC: 8.8 MG/DL (ref 8.5–10.5)
CHLORIDE BLD-SCNC: 105 MMOL/L (ref 98–107)
CHLORIDE BLD-SCNC: 107 MMOL/L (ref 98–107)
CO2 SERPL-SCNC: 23 MMOL/L (ref 22–31)
CO2 SERPL-SCNC: 24 MMOL/L (ref 22–31)
CREAT SERPL-MCNC: 0.87 MG/DL (ref 0.6–1.1)
CREAT SERPL-MCNC: 1.14 MG/DL (ref 0.6–1.1)
ERYTHROCYTE [DISTWIDTH] IN BLOOD BY AUTOMATED COUNT: 13.3 % (ref 11–14.5)
ERYTHROCYTE [DISTWIDTH] IN BLOOD BY AUTOMATED COUNT: 13.7 % (ref 11–14.5)
ERYTHROCYTE [DISTWIDTH] IN BLOOD BY AUTOMATED COUNT: 14 % (ref 11–14.5)
GFR SERPL CREATININE-BSD FRML MDRD: 46 ML/MIN/1.73M2
GFR SERPL CREATININE-BSD FRML MDRD: >60 ML/MIN/1.73M2
GLUCOSE BLD-MCNC: 167 MG/DL (ref 70–125)
GLUCOSE BLD-MCNC: 258 MG/DL (ref 70–125)
HBA1C MFR BLD: 11.9 %
HBA1C MFR BLD: 7 %
HCT VFR BLD AUTO: 21.6 % (ref 35–47)
HCT VFR BLD AUTO: 30.8 % (ref 35–47)
HCT VFR BLD AUTO: 37.7 % (ref 35–47)
HGB BLD-MCNC: 10.1 G/DL (ref 12–16)
HGB BLD-MCNC: 13 G/DL (ref 12–16)
HGB BLD-MCNC: 7.4 G/DL (ref 12–16)
INR PPP: 1.5 (ref 0.9–1.1)
INR PPP: 2.1 (ref 0.9–1.1)
INR PPP: 2.6 (ref 0.9–1.1)
MCH RBC QN AUTO: 30.9 PG (ref 27–34)
MCH RBC QN AUTO: 31 PG (ref 27–34)
MCH RBC QN AUTO: 31.8 PG (ref 27–34)
MCHC RBC AUTO-ENTMCNC: 32.8 G/DL (ref 32–36)
MCHC RBC AUTO-ENTMCNC: 34.2 G/DL (ref 32–36)
MCHC RBC AUTO-ENTMCNC: 34.5 G/DL (ref 32–36)
MCV RBC AUTO: 90 FL (ref 80–100)
MCV RBC AUTO: 93 FL (ref 80–100)
MCV RBC AUTO: 94 FL (ref 80–100)
PLATELET # BLD AUTO: 104 THOU/UL (ref 140–440)
PLATELET # BLD AUTO: 178 THOU/UL (ref 140–440)
PLATELET # BLD AUTO: 179 THOU/UL (ref 140–440)
PMV BLD AUTO: 10 FL (ref 7–10)
PMV BLD AUTO: 9.5 FL (ref 7–10)
PMV BLD AUTO: 9.7 FL (ref 7–10)
POTASSIUM BLD-SCNC: 4.3 MMOL/L (ref 3.5–5)
POTASSIUM BLD-SCNC: 4.7 MMOL/L (ref 3.5–5)
PROT SERPL-MCNC: 6.9 G/DL (ref 6–8)
RBC # BLD AUTO: 2.32 MILL/UL (ref 3.8–5.4)
RBC # BLD AUTO: 3.26 MILL/UL (ref 3.8–5.4)
RBC # BLD AUTO: 4.2 MILL/UL (ref 3.8–5.4)
SODIUM SERPL-SCNC: 138 MMOL/L (ref 136–145)
SODIUM SERPL-SCNC: 140 MMOL/L (ref 136–145)
WBC: 5.7 THOU/UL (ref 4–11)
WBC: 5.7 THOU/UL (ref 4–11)
WBC: 6 THOU/UL (ref 4–11)

## 2020-01-01 RX ORDER — GLUCOSAMINE HCL/CHONDROITIN SU 500-400 MG
1 CAPSULE ORAL 3 TIMES DAILY
Qty: 300 STRIP | Refills: 3 | Status: SHIPPED | OUTPATIENT
Start: 2020-01-01

## 2020-01-01 RX ORDER — LEVOTHYROXINE SODIUM 125 UG/1
125 TABLET ORAL DAILY
Qty: 90 TABLET | Refills: 3 | Status: ON HOLD | OUTPATIENT
Start: 2020-01-01 | End: 2021-01-01

## 2020-01-01 ASSESSMENT — MIFFLIN-ST. JEOR: SCORE: 1154.89

## 2020-01-10 ENCOUNTER — COMMUNICATION - HEALTHEAST (OUTPATIENT)
Dept: ANTICOAGULATION | Facility: CLINIC | Age: 80
End: 2020-01-10

## 2020-01-10 ENCOUNTER — AMBULATORY - HEALTHEAST (OUTPATIENT)
Dept: LAB | Facility: CLINIC | Age: 80
End: 2020-01-10

## 2020-01-10 DIAGNOSIS — I48.92 ATRIAL FLUTTER, UNSPECIFIED TYPE (H): ICD-10-CM

## 2020-01-10 DIAGNOSIS — Z79.01 ANTICOAGULATION GOAL OF INR 2.5 TO 3.5: ICD-10-CM

## 2020-01-10 DIAGNOSIS — Z51.81 ANTICOAGULATION GOAL OF INR 2.5 TO 3.5: ICD-10-CM

## 2020-01-10 DIAGNOSIS — Z95.2 S/P MITRAL VALVE REPLACEMENT: ICD-10-CM

## 2020-01-10 LAB — INR PPP: 3.7 (ref 0.9–1.1)

## 2020-01-24 ENCOUNTER — COMMUNICATION - HEALTHEAST (OUTPATIENT)
Dept: INTERNAL MEDICINE | Facility: CLINIC | Age: 80
End: 2020-01-24

## 2020-01-24 DIAGNOSIS — G89.4 CHRONIC PAIN SYNDROME: ICD-10-CM

## 2020-01-27 ENCOUNTER — AMBULATORY - HEALTHEAST (OUTPATIENT)
Dept: NURSING | Facility: CLINIC | Age: 80
End: 2020-01-27

## 2020-01-27 ENCOUNTER — AMBULATORY - HEALTHEAST (OUTPATIENT)
Dept: LAB | Facility: CLINIC | Age: 80
End: 2020-01-27

## 2020-01-27 DIAGNOSIS — Z95.2 S/P MITRAL VALVE REPLACEMENT: ICD-10-CM

## 2020-01-27 DIAGNOSIS — Z79.01 ANTICOAGULATION GOAL OF INR 2.5 TO 3.5: ICD-10-CM

## 2020-01-27 DIAGNOSIS — Z51.81 ANTICOAGULATION GOAL OF INR 2.5 TO 3.5: ICD-10-CM

## 2020-01-27 DIAGNOSIS — I48.92 ATRIAL FLUTTER, UNSPECIFIED TYPE (H): ICD-10-CM

## 2020-01-27 LAB — INR PPP: 4.4 (ref 0.9–1.1)

## 2020-02-03 ENCOUNTER — AMBULATORY - HEALTHEAST (OUTPATIENT)
Dept: LAB | Facility: CLINIC | Age: 80
End: 2020-02-03

## 2020-02-03 ENCOUNTER — AMBULATORY - HEALTHEAST (OUTPATIENT)
Dept: NURSING | Facility: CLINIC | Age: 80
End: 2020-02-03

## 2020-02-03 DIAGNOSIS — Z79.01 ANTICOAGULATION GOAL OF INR 2.5 TO 3.5: ICD-10-CM

## 2020-02-03 DIAGNOSIS — Z51.81 ANTICOAGULATION GOAL OF INR 2.5 TO 3.5: ICD-10-CM

## 2020-02-03 DIAGNOSIS — Z95.2 S/P MITRAL VALVE REPLACEMENT: ICD-10-CM

## 2020-02-03 DIAGNOSIS — I48.92 ATRIAL FLUTTER, UNSPECIFIED TYPE (H): ICD-10-CM

## 2020-02-03 LAB — INR PPP: 2.7 (ref 0.9–1.1)

## 2020-02-10 ENCOUNTER — COMMUNICATION - HEALTHEAST (OUTPATIENT)
Dept: INTERNAL MEDICINE | Facility: CLINIC | Age: 80
End: 2020-02-10

## 2020-02-10 ENCOUNTER — OFFICE VISIT - HEALTHEAST (OUTPATIENT)
Dept: INTERNAL MEDICINE | Facility: CLINIC | Age: 80
End: 2020-02-10

## 2020-02-10 ENCOUNTER — AMBULATORY - HEALTHEAST (OUTPATIENT)
Dept: NURSING | Facility: CLINIC | Age: 80
End: 2020-02-10

## 2020-02-10 DIAGNOSIS — R53.83 FATIGUE, UNSPECIFIED TYPE: ICD-10-CM

## 2020-02-10 DIAGNOSIS — E11.29 TYPE 2 DIABETES MELLITUS WITH MICROALBUMINURIA, WITH LONG-TERM CURRENT USE OF INSULIN (H): ICD-10-CM

## 2020-02-10 DIAGNOSIS — Z51.81 ANTICOAGULATION GOAL OF INR 2.5 TO 3.5: ICD-10-CM

## 2020-02-10 DIAGNOSIS — I35.0 MODERATE AORTIC STENOSIS: ICD-10-CM

## 2020-02-10 DIAGNOSIS — L97.511 SKIN ULCER OF RIGHT GREAT TOE, LIMITED TO BREAKDOWN OF SKIN (H): ICD-10-CM

## 2020-02-10 DIAGNOSIS — I73.9 PVD (PERIPHERAL VASCULAR DISEASE) (H): ICD-10-CM

## 2020-02-10 DIAGNOSIS — E03.9 ACQUIRED HYPOTHYROIDISM: ICD-10-CM

## 2020-02-10 DIAGNOSIS — Z95.2 S/P MITRAL VALVE REPLACEMENT: ICD-10-CM

## 2020-02-10 DIAGNOSIS — Z79.4 TYPE 2 DIABETES MELLITUS WITH MICROALBUMINURIA, WITH LONG-TERM CURRENT USE OF INSULIN (H): ICD-10-CM

## 2020-02-10 DIAGNOSIS — G89.4 CHRONIC PAIN SYNDROME: ICD-10-CM

## 2020-02-10 DIAGNOSIS — M48.062 SPINAL STENOSIS OF LUMBAR REGION WITH NEUROGENIC CLAUDICATION: ICD-10-CM

## 2020-02-10 DIAGNOSIS — R80.9 TYPE 2 DIABETES MELLITUS WITH MICROALBUMINURIA, WITH LONG-TERM CURRENT USE OF INSULIN (H): ICD-10-CM

## 2020-02-10 DIAGNOSIS — D69.9 BLEEDING DIATHESIS (H): ICD-10-CM

## 2020-02-10 DIAGNOSIS — Z79.01 ANTICOAGULATION GOAL OF INR 2.5 TO 3.5: ICD-10-CM

## 2020-02-10 DIAGNOSIS — I48.92 ATRIAL FLUTTER, UNSPECIFIED TYPE (H): ICD-10-CM

## 2020-02-10 LAB
ANION GAP SERPL CALCULATED.3IONS-SCNC: 9 MMOL/L (ref 5–18)
BUN SERPL-MCNC: 27 MG/DL (ref 8–28)
CALCIUM SERPL-MCNC: 9.6 MG/DL (ref 8.5–10.5)
CHLORIDE BLD-SCNC: 102 MMOL/L (ref 98–107)
CO2 SERPL-SCNC: 27 MMOL/L (ref 22–31)
CREAT SERPL-MCNC: 1.04 MG/DL (ref 0.6–1.1)
ERYTHROCYTE [DISTWIDTH] IN BLOOD BY AUTOMATED COUNT: 13.7 % (ref 11–14.5)
GFR SERPL CREATININE-BSD FRML MDRD: 51 ML/MIN/1.73M2
GLUCOSE BLD-MCNC: 136 MG/DL (ref 70–125)
HBA1C MFR BLD: 10.2 % (ref 3.5–6)
HCT VFR BLD AUTO: 39.6 % (ref 35–47)
HGB BLD-MCNC: 12.5 G/DL (ref 12–16)
INR PPP: 3.2 (ref 0.9–1.1)
MCH RBC QN AUTO: 29.3 PG (ref 27–34)
MCHC RBC AUTO-ENTMCNC: 31.6 G/DL (ref 32–36)
MCV RBC AUTO: 93 FL (ref 80–100)
PLATELET # BLD AUTO: 163 THOU/UL (ref 140–440)
PMV BLD AUTO: 13.2 FL (ref 8.5–12.5)
POTASSIUM BLD-SCNC: 4.4 MMOL/L (ref 3.5–5)
RBC # BLD AUTO: 4.27 MILL/UL (ref 3.8–5.4)
SODIUM SERPL-SCNC: 138 MMOL/L (ref 136–145)
TSH SERPL DL<=0.005 MIU/L-ACNC: 4.96 UIU/ML (ref 0.3–5)
WBC: 7.2 THOU/UL (ref 4–11)

## 2020-02-12 ENCOUNTER — COMMUNICATION - HEALTHEAST (OUTPATIENT)
Dept: INTERNAL MEDICINE | Facility: CLINIC | Age: 80
End: 2020-02-12

## 2020-02-12 ENCOUNTER — COMMUNICATION - HEALTHEAST (OUTPATIENT)
Dept: SCHEDULING | Facility: CLINIC | Age: 80
End: 2020-02-12

## 2020-02-12 ENCOUNTER — OFFICE VISIT - HEALTHEAST (OUTPATIENT)
Dept: INTERNAL MEDICINE | Facility: CLINIC | Age: 80
End: 2020-02-12

## 2020-02-12 DIAGNOSIS — M65.949 TENOSYNOVITIS OF FINGER: ICD-10-CM

## 2020-02-18 ENCOUNTER — COMMUNICATION - HEALTHEAST (OUTPATIENT)
Dept: SCHEDULING | Facility: CLINIC | Age: 80
End: 2020-02-18

## 2020-02-22 ENCOUNTER — HOME CARE/HOSPICE - HEALTHEAST (OUTPATIENT)
Dept: HOME HEALTH SERVICES | Facility: HOME HEALTH | Age: 80
End: 2020-02-22

## 2020-02-24 ENCOUNTER — AMBULATORY - HEALTHEAST (OUTPATIENT)
Dept: ANTICOAGULATION | Facility: CLINIC | Age: 80
End: 2020-02-24

## 2020-02-24 DIAGNOSIS — Z95.2 S/P MITRAL VALVE REPLACEMENT: ICD-10-CM

## 2020-02-25 ENCOUNTER — COMMUNICATION - HEALTHEAST (OUTPATIENT)
Dept: HOME HEALTH SERVICES | Facility: HOME HEALTH | Age: 80
End: 2020-02-25

## 2020-02-25 ENCOUNTER — COMMUNICATION - HEALTHEAST (OUTPATIENT)
Dept: INTERNAL MEDICINE | Facility: CLINIC | Age: 80
End: 2020-02-25

## 2020-02-25 ENCOUNTER — COMMUNICATION - HEALTHEAST (OUTPATIENT)
Dept: CARE COORDINATION | Facility: CLINIC | Age: 80
End: 2020-02-25

## 2020-02-28 ENCOUNTER — COMMUNICATION - HEALTHEAST (OUTPATIENT)
Dept: ANTICOAGULATION | Facility: CLINIC | Age: 80
End: 2020-02-28

## 2020-03-02 ENCOUNTER — AMBULATORY - HEALTHEAST (OUTPATIENT)
Dept: NURSING | Facility: CLINIC | Age: 80
End: 2020-03-02

## 2020-03-02 ENCOUNTER — OFFICE VISIT - HEALTHEAST (OUTPATIENT)
Dept: INTERNAL MEDICINE | Facility: CLINIC | Age: 80
End: 2020-03-02

## 2020-03-02 DIAGNOSIS — I48.92 ATRIAL FLUTTER, UNSPECIFIED TYPE (H): ICD-10-CM

## 2020-03-02 DIAGNOSIS — G89.29 CHRONIC HEEL PAIN, LEFT: ICD-10-CM

## 2020-03-02 DIAGNOSIS — M53.3 PAIN IN THE COCCYX: ICD-10-CM

## 2020-03-02 DIAGNOSIS — Z79.01 ANTICOAGULATION GOAL OF INR 2.5 TO 3.5: ICD-10-CM

## 2020-03-02 DIAGNOSIS — Z95.2 S/P MITRAL VALVE REPLACEMENT: ICD-10-CM

## 2020-03-02 DIAGNOSIS — T40.2X5A THERAPEUTIC OPIOID INDUCED CONSTIPATION: ICD-10-CM

## 2020-03-02 DIAGNOSIS — L97.429 ULCER OF LEFT HEEL AND MIDFOOT, UNSPECIFIED ULCER STAGE (H): ICD-10-CM

## 2020-03-02 DIAGNOSIS — I73.9 PAD (PERIPHERAL ARTERY DISEASE) (H): ICD-10-CM

## 2020-03-02 DIAGNOSIS — F51.01 PRIMARY INSOMNIA: ICD-10-CM

## 2020-03-02 DIAGNOSIS — Z09 HOSPITAL DISCHARGE FOLLOW-UP: ICD-10-CM

## 2020-03-02 DIAGNOSIS — M79.672 CHRONIC HEEL PAIN, LEFT: ICD-10-CM

## 2020-03-02 DIAGNOSIS — Z51.81 ANTICOAGULATION GOAL OF INR 2.5 TO 3.5: ICD-10-CM

## 2020-03-02 DIAGNOSIS — K59.00 CONSTIPATION, UNSPECIFIED CONSTIPATION TYPE: ICD-10-CM

## 2020-03-02 DIAGNOSIS — I48.91 ATRIAL FIBRILLATION WITH RAPID VENTRICULAR RESPONSE (H): ICD-10-CM

## 2020-03-02 DIAGNOSIS — K59.03 THERAPEUTIC OPIOID INDUCED CONSTIPATION: ICD-10-CM

## 2020-03-02 LAB — INR PPP: 2.3 (ref 0.9–1.1)

## 2020-03-03 ENCOUNTER — HOSPITAL ENCOUNTER (OUTPATIENT)
Dept: MRI IMAGING | Facility: HOSPITAL | Age: 80
Discharge: HOME | End: 2020-03-03
Attending: NURSE PRACTITIONER

## 2020-03-03 ENCOUNTER — RECORDS - HEALTHEAST (OUTPATIENT)
Dept: VASCULAR ULTRASOUND | Facility: CLINIC | Age: 80
End: 2020-03-03

## 2020-03-03 ENCOUNTER — OFFICE VISIT - HEALTHEAST (OUTPATIENT)
Dept: VASCULAR SURGERY | Facility: CLINIC | Age: 80
End: 2020-03-03

## 2020-03-03 DIAGNOSIS — M79.604 PAIN IN BOTH LOWER EXTREMITIES: ICD-10-CM

## 2020-03-03 DIAGNOSIS — I73.9 PAD (PERIPHERAL ARTERY DISEASE) (H): ICD-10-CM

## 2020-03-03 DIAGNOSIS — I73.9 CLAUDICATION (H): ICD-10-CM

## 2020-03-03 DIAGNOSIS — I73.9 PERIPHERAL VASCULAR DISEASE, UNSPECIFIED (H): ICD-10-CM

## 2020-03-03 DIAGNOSIS — L97.514 NON-PRESSURE CHRONIC ULCER OF OTHER PART OF RIGHT FOOT WITH NECROSIS OF BONE (H): ICD-10-CM

## 2020-03-03 DIAGNOSIS — M79.605 PAIN IN LEFT LEG: ICD-10-CM

## 2020-03-03 DIAGNOSIS — L97.529 SKIN ULCER OF LEFT GREAT TOE, UNSPECIFIED ULCER STAGE (H): ICD-10-CM

## 2020-03-03 DIAGNOSIS — E11.621 TYPE 2 DIABETES MELLITUS WITH FOOT ULCER, UNSPECIFIED WHETHER LONG TERM INSULIN USE (H): ICD-10-CM

## 2020-03-03 DIAGNOSIS — M79.605 PAIN IN BOTH LOWER EXTREMITIES: ICD-10-CM

## 2020-03-03 DIAGNOSIS — L89.620 PRESSURE INJURY OF LEFT HEEL, UNSTAGEABLE (H): ICD-10-CM

## 2020-03-03 DIAGNOSIS — L97.514 SKIN ULCER OF RIGHT GREAT TOE WITH NECROSIS OF BONE (H): ICD-10-CM

## 2020-03-03 DIAGNOSIS — L97.529 NON-PRESSURE CHRONIC ULCER OF OTHER PART OF LEFT FOOT WITH UNSPECIFIED SEVERITY (H): ICD-10-CM

## 2020-03-03 DIAGNOSIS — L89.620 PRESSURE ULCER OF LEFT HEEL, UNSTAGEABLE (H): ICD-10-CM

## 2020-03-03 DIAGNOSIS — L97.509 TYPE 2 DIABETES MELLITUS WITH FOOT ULCER, UNSPECIFIED WHETHER LONG TERM INSULIN USE (H): ICD-10-CM

## 2020-03-03 DIAGNOSIS — M79.604 PAIN IN RIGHT LEG: ICD-10-CM

## 2020-03-03 ASSESSMENT — MIFFLIN-ST. JEOR: SCORE: 1095.01

## 2020-03-04 ENCOUNTER — COMMUNICATION - HEALTHEAST (OUTPATIENT)
Dept: VASCULAR SURGERY | Facility: CLINIC | Age: 80
End: 2020-03-04

## 2020-03-04 ENCOUNTER — AMBULATORY - HEALTHEAST (OUTPATIENT)
Dept: VASCULAR SURGERY | Facility: CLINIC | Age: 80
End: 2020-03-04

## 2020-03-06 ENCOUNTER — AMBULATORY - HEALTHEAST (OUTPATIENT)
Dept: VASCULAR SURGERY | Facility: CLINIC | Age: 80
End: 2020-03-06

## 2020-03-08 ENCOUNTER — HOSPITAL ENCOUNTER (OUTPATIENT)
Dept: MRI IMAGING | Facility: CLINIC | Age: 80
Discharge: HOME | End: 2020-03-08
Attending: NURSE PRACTITIONER

## 2020-03-08 DIAGNOSIS — M79.604 PAIN IN BOTH LOWER EXTREMITIES: ICD-10-CM

## 2020-03-08 DIAGNOSIS — I73.9 CLAUDICATION (H): ICD-10-CM

## 2020-03-08 DIAGNOSIS — M79.605 PAIN IN BOTH LOWER EXTREMITIES: ICD-10-CM

## 2020-03-08 DIAGNOSIS — L89.620 PRESSURE INJURY OF LEFT HEEL, UNSTAGEABLE (H): ICD-10-CM

## 2020-03-08 DIAGNOSIS — L97.529 SKIN ULCER OF LEFT GREAT TOE, UNSPECIFIED ULCER STAGE (H): ICD-10-CM

## 2020-03-08 DIAGNOSIS — E11.621 TYPE 2 DIABETES MELLITUS WITH FOOT ULCER, UNSPECIFIED WHETHER LONG TERM INSULIN USE (H): ICD-10-CM

## 2020-03-08 DIAGNOSIS — L97.514 SKIN ULCER OF RIGHT GREAT TOE WITH NECROSIS OF BONE (H): ICD-10-CM

## 2020-03-08 DIAGNOSIS — I73.9 PAD (PERIPHERAL ARTERY DISEASE) (H): ICD-10-CM

## 2020-03-08 DIAGNOSIS — L97.509 TYPE 2 DIABETES MELLITUS WITH FOOT ULCER, UNSPECIFIED WHETHER LONG TERM INSULIN USE (H): ICD-10-CM

## 2020-03-09 ENCOUNTER — COMMUNICATION - HEALTHEAST (OUTPATIENT)
Dept: VASCULAR SURGERY | Facility: CLINIC | Age: 80
End: 2020-03-09

## 2020-03-10 ENCOUNTER — OFFICE VISIT - HEALTHEAST (OUTPATIENT)
Dept: VASCULAR SURGERY | Facility: CLINIC | Age: 80
End: 2020-03-10

## 2020-03-10 DIAGNOSIS — L97.429 ULCER OF HEEL AND MIDFOOT, LEFT, WITH UNSPECIFIED SEVERITY (H): ICD-10-CM

## 2020-03-10 DIAGNOSIS — M86.9 OSTEOMYELITIS OF ANKLE AND FOOT (H): ICD-10-CM

## 2020-03-13 ENCOUNTER — COMMUNICATION - HEALTHEAST (OUTPATIENT)
Dept: INTERNAL MEDICINE | Facility: CLINIC | Age: 80
End: 2020-03-13

## 2020-03-16 ENCOUNTER — OFFICE VISIT - HEALTHEAST (OUTPATIENT)
Dept: INTERNAL MEDICINE | Facility: CLINIC | Age: 80
End: 2020-03-16

## 2020-03-16 ENCOUNTER — COMMUNICATION - HEALTHEAST (OUTPATIENT)
Dept: INTERNAL MEDICINE | Facility: CLINIC | Age: 80
End: 2020-03-16

## 2020-03-16 ENCOUNTER — COMMUNICATION - HEALTHEAST (OUTPATIENT)
Dept: ANTICOAGULATION | Facility: CLINIC | Age: 80
End: 2020-03-16

## 2020-03-16 DIAGNOSIS — Z95.2 S/P MITRAL VALVE REPLACEMENT: ICD-10-CM

## 2020-03-16 DIAGNOSIS — L97.429 ULCER OF HEEL AND MIDFOOT, LEFT, WITH UNSPECIFIED SEVERITY (H): ICD-10-CM

## 2020-03-16 DIAGNOSIS — Z51.81 ANTICOAGULATION GOAL OF INR 2.5 TO 3.5: ICD-10-CM

## 2020-03-16 DIAGNOSIS — I48.0 PAROXYSMAL ATRIAL FIBRILLATION (H): ICD-10-CM

## 2020-03-16 DIAGNOSIS — Z79.01 ANTICOAGULATION GOAL OF INR 2.5 TO 3.5: ICD-10-CM

## 2020-03-16 DIAGNOSIS — I48.92 ATRIAL FLUTTER, UNSPECIFIED TYPE (H): ICD-10-CM

## 2020-03-16 DIAGNOSIS — I73.9 PAD (PERIPHERAL ARTERY DISEASE) (H): ICD-10-CM

## 2020-03-16 DIAGNOSIS — E03.9 ACQUIRED HYPOTHYROIDISM: ICD-10-CM

## 2020-03-16 DIAGNOSIS — L97.501 SKIN ULCER OF TOE, LIMITED TO BREAKDOWN OF SKIN, UNSPECIFIED LATERALITY (H): ICD-10-CM

## 2020-03-16 DIAGNOSIS — I35.0 MODERATE AORTIC STENOSIS: ICD-10-CM

## 2020-03-16 LAB — INR PPP: 3.3 (ref 0.9–1.1)

## 2020-03-18 ENCOUNTER — RECORDS - HEALTHEAST (OUTPATIENT)
Dept: ADMINISTRATIVE | Facility: OTHER | Age: 80
End: 2020-03-18

## 2020-03-25 ENCOUNTER — COMMUNICATION - HEALTHEAST (OUTPATIENT)
Dept: INTERNAL MEDICINE | Facility: CLINIC | Age: 80
End: 2020-03-25

## 2020-03-26 ENCOUNTER — COMMUNICATION - HEALTHEAST (OUTPATIENT)
Dept: INTERNAL MEDICINE | Facility: CLINIC | Age: 80
End: 2020-03-26

## 2020-03-26 ENCOUNTER — COMMUNICATION - HEALTHEAST (OUTPATIENT)
Dept: ANTICOAGULATION | Facility: CLINIC | Age: 80
End: 2020-03-26

## 2020-03-26 ENCOUNTER — OFFICE VISIT - HEALTHEAST (OUTPATIENT)
Dept: INTERNAL MEDICINE | Facility: CLINIC | Age: 80
End: 2020-03-26

## 2020-03-26 DIAGNOSIS — I48.92 ATRIAL FLUTTER, UNSPECIFIED TYPE (H): ICD-10-CM

## 2020-03-26 DIAGNOSIS — Z95.2 S/P MITRAL VALVE REPLACEMENT: ICD-10-CM

## 2020-03-26 DIAGNOSIS — I48.0 INTERMITTENT ATRIAL FIBRILLATION (H): ICD-10-CM

## 2020-03-26 DIAGNOSIS — Z79.899 ON POTASSIUM WASTING DIURETIC THERAPY: ICD-10-CM

## 2020-03-26 DIAGNOSIS — L97.429 ULCER OF HEEL AND MIDFOOT, LEFT, WITH UNSPECIFIED SEVERITY (H): ICD-10-CM

## 2020-03-26 DIAGNOSIS — Z79.01 ANTICOAGULATION GOAL OF INR 2.5 TO 3.5: ICD-10-CM

## 2020-03-26 DIAGNOSIS — Z51.81 ANTICOAGULATION GOAL OF INR 2.5 TO 3.5: ICD-10-CM

## 2020-03-26 DIAGNOSIS — L97.501 SKIN ULCER OF TOE, LIMITED TO BREAKDOWN OF SKIN, UNSPECIFIED LATERALITY (H): ICD-10-CM

## 2020-03-26 DIAGNOSIS — M86.9 OSTEOMYELITIS OF GREAT TOE (H): ICD-10-CM

## 2020-03-26 DIAGNOSIS — I35.0 MODERATE AORTIC STENOSIS: ICD-10-CM

## 2020-03-26 DIAGNOSIS — G89.4 CHRONIC PAIN SYNDROME: ICD-10-CM

## 2020-03-26 LAB
ANION GAP SERPL CALCULATED.3IONS-SCNC: 12 MMOL/L (ref 5–18)
BASOPHILS # BLD AUTO: 0 THOU/UL (ref 0–0.2)
BASOPHILS NFR BLD AUTO: 0 % (ref 0–2)
BUN SERPL-MCNC: 14 MG/DL (ref 8–28)
C REACTIVE PROTEIN LHE: 1.4 MG/DL (ref 0–0.8)
CALCIUM SERPL-MCNC: 8.8 MG/DL (ref 8.5–10.5)
CHLORIDE BLD-SCNC: 96 MMOL/L (ref 98–107)
CO2 SERPL-SCNC: 24 MMOL/L (ref 22–31)
CREAT SERPL-MCNC: 0.92 MG/DL (ref 0.6–1.1)
EOSINOPHIL # BLD AUTO: 0.2 THOU/UL (ref 0–0.4)
EOSINOPHIL NFR BLD AUTO: 2 % (ref 0–6)
ERYTHROCYTE [DISTWIDTH] IN BLOOD BY AUTOMATED COUNT: 14.2 % (ref 11–14.5)
ERYTHROCYTE [SEDIMENTATION RATE] IN BLOOD BY WESTERGREN METHOD: 53 MM/HR (ref 0–20)
GFR SERPL CREATININE-BSD FRML MDRD: 59 ML/MIN/1.73M2
GLUCOSE BLD-MCNC: 353 MG/DL (ref 70–125)
HCT VFR BLD AUTO: 39.3 % (ref 35–47)
HGB BLD-MCNC: 13 G/DL (ref 12–16)
INR PPP: 5.4 (ref 0.9–1.1)
LYMPHOCYTES # BLD AUTO: 1.1 THOU/UL (ref 0.8–4.4)
LYMPHOCYTES NFR BLD AUTO: 15 % (ref 20–40)
MCH RBC QN AUTO: 29 PG (ref 27–34)
MCHC RBC AUTO-ENTMCNC: 33.1 G/DL (ref 32–36)
MCV RBC AUTO: 88 FL (ref 80–100)
MONOCYTES # BLD AUTO: 0.5 THOU/UL (ref 0–0.9)
MONOCYTES NFR BLD AUTO: 8 % (ref 2–10)
NEUTROPHILS # BLD AUTO: 5.3 THOU/UL (ref 2–7.7)
NEUTROPHILS NFR BLD AUTO: 74 % (ref 50–70)
PLATELET # BLD AUTO: 168 THOU/UL (ref 140–440)
PMV BLD AUTO: 12.9 FL (ref 8.5–12.5)
POTASSIUM BLD-SCNC: 4.1 MMOL/L (ref 3.5–5)
RBC # BLD AUTO: 4.48 MILL/UL (ref 3.8–5.4)
SODIUM SERPL-SCNC: 132 MMOL/L (ref 136–145)
WBC: 7.2 THOU/UL (ref 4–11)

## 2020-03-27 ENCOUNTER — COMMUNICATION - HEALTHEAST (OUTPATIENT)
Dept: INTERNAL MEDICINE | Facility: CLINIC | Age: 80
End: 2020-03-27

## 2020-03-30 ENCOUNTER — AMBULATORY - HEALTHEAST (OUTPATIENT)
Dept: LAB | Facility: CLINIC | Age: 80
End: 2020-03-30

## 2020-03-30 ENCOUNTER — COMMUNICATION - HEALTHEAST (OUTPATIENT)
Dept: ANTICOAGULATION | Facility: CLINIC | Age: 80
End: 2020-03-30

## 2020-03-30 DIAGNOSIS — Z79.01 ANTICOAGULATION GOAL OF INR 2.5 TO 3.5: ICD-10-CM

## 2020-03-30 DIAGNOSIS — Z51.81 ANTICOAGULATION GOAL OF INR 2.5 TO 3.5: ICD-10-CM

## 2020-03-30 DIAGNOSIS — Z95.2 S/P MITRAL VALVE REPLACEMENT: ICD-10-CM

## 2020-03-30 DIAGNOSIS — I48.92 ATRIAL FLUTTER, UNSPECIFIED TYPE (H): ICD-10-CM

## 2020-03-30 LAB — INR PPP: 2.3 (ref 0.9–1.1)

## 2020-04-03 ENCOUNTER — COMMUNICATION - HEALTHEAST (OUTPATIENT)
Dept: INTERNAL MEDICINE | Facility: CLINIC | Age: 80
End: 2020-04-03

## 2020-04-06 ENCOUNTER — COMMUNICATION - HEALTHEAST (OUTPATIENT)
Dept: INTERNAL MEDICINE | Facility: CLINIC | Age: 80
End: 2020-04-06

## 2020-04-06 ENCOUNTER — OFFICE VISIT - HEALTHEAST (OUTPATIENT)
Dept: INTERNAL MEDICINE | Facility: CLINIC | Age: 80
End: 2020-04-06

## 2020-04-06 DIAGNOSIS — Z95.2 S/P MITRAL VALVE REPLACEMENT: ICD-10-CM

## 2020-04-06 DIAGNOSIS — M86.9 OSTEOMYELITIS OF GREAT TOE (H): ICD-10-CM

## 2020-04-06 DIAGNOSIS — L97.429 ULCER OF HEEL AND MIDFOOT, LEFT, WITH UNSPECIFIED SEVERITY (H): ICD-10-CM

## 2020-04-06 DIAGNOSIS — L97.501 SKIN ULCER OF TOE, LIMITED TO BREAKDOWN OF SKIN, UNSPECIFIED LATERALITY (H): ICD-10-CM

## 2020-04-06 DIAGNOSIS — I48.0 INTERMITTENT ATRIAL FIBRILLATION (H): ICD-10-CM

## 2020-04-07 ENCOUNTER — COMMUNICATION - HEALTHEAST (OUTPATIENT)
Dept: ANTICOAGULATION | Facility: CLINIC | Age: 80
End: 2020-04-07

## 2020-04-07 ENCOUNTER — RECORDS - HEALTHEAST (OUTPATIENT)
Dept: ADMINISTRATIVE | Facility: OTHER | Age: 80
End: 2020-04-07

## 2020-04-20 ENCOUNTER — COMMUNICATION - HEALTHEAST (OUTPATIENT)
Dept: INTERNAL MEDICINE | Facility: CLINIC | Age: 80
End: 2020-04-20

## 2020-04-20 ENCOUNTER — OFFICE VISIT - HEALTHEAST (OUTPATIENT)
Dept: INTERNAL MEDICINE | Facility: CLINIC | Age: 80
End: 2020-04-20

## 2020-04-20 DIAGNOSIS — M86.9 OSTEOMYELITIS OF GREAT TOE (H): ICD-10-CM

## 2020-04-20 DIAGNOSIS — Z79.4 TYPE 2 DIABETES MELLITUS WITH MICROALBUMINURIA, WITH LONG-TERM CURRENT USE OF INSULIN (H): ICD-10-CM

## 2020-04-20 DIAGNOSIS — R80.9 TYPE 2 DIABETES MELLITUS WITH MICROALBUMINURIA, WITH LONG-TERM CURRENT USE OF INSULIN (H): ICD-10-CM

## 2020-04-20 DIAGNOSIS — Z95.2 S/P MITRAL VALVE REPLACEMENT: ICD-10-CM

## 2020-04-20 DIAGNOSIS — L97.429 ULCER OF HEEL AND MIDFOOT, LEFT, WITH UNSPECIFIED SEVERITY (H): ICD-10-CM

## 2020-04-20 DIAGNOSIS — E11.29 TYPE 2 DIABETES MELLITUS WITH MICROALBUMINURIA, WITH LONG-TERM CURRENT USE OF INSULIN (H): ICD-10-CM

## 2020-04-20 DIAGNOSIS — L97.501 SKIN ULCER OF TOE, LIMITED TO BREAKDOWN OF SKIN, UNSPECIFIED LATERALITY (H): ICD-10-CM

## 2020-04-20 DIAGNOSIS — I48.0 INTERMITTENT ATRIAL FIBRILLATION (H): ICD-10-CM

## 2020-04-22 ENCOUNTER — COMMUNICATION - HEALTHEAST (OUTPATIENT)
Dept: INTERNAL MEDICINE | Facility: CLINIC | Age: 80
End: 2020-04-22

## 2020-04-22 DIAGNOSIS — F51.01 PRIMARY INSOMNIA: ICD-10-CM

## 2020-04-22 DIAGNOSIS — Z79.4 TYPE 2 DIABETES MELLITUS WITH MICROALBUMINURIA, WITH LONG-TERM CURRENT USE OF INSULIN (H): ICD-10-CM

## 2020-04-22 DIAGNOSIS — T81.49XA WOUND, SURGICAL, INFECTED: ICD-10-CM

## 2020-04-22 DIAGNOSIS — R80.9 TYPE 2 DIABETES MELLITUS WITH MICROALBUMINURIA, WITH LONG-TERM CURRENT USE OF INSULIN (H): ICD-10-CM

## 2020-04-22 DIAGNOSIS — E11.29 TYPE 2 DIABETES MELLITUS WITH MICROALBUMINURIA, WITH LONG-TERM CURRENT USE OF INSULIN (H): ICD-10-CM

## 2020-04-22 DIAGNOSIS — R60.0 BILATERAL LEG EDEMA: ICD-10-CM

## 2020-04-22 RX ORDER — GABAPENTIN 300 MG/1
300 CAPSULE ORAL AT BEDTIME
Qty: 90 CAPSULE | Refills: 3 | Status: ON HOLD | OUTPATIENT
Start: 2020-04-22 | End: 2021-01-01

## 2020-04-24 ENCOUNTER — COMMUNICATION - HEALTHEAST (OUTPATIENT)
Dept: INTERNAL MEDICINE | Facility: CLINIC | Age: 80
End: 2020-04-24

## 2020-04-27 ENCOUNTER — OFFICE VISIT - HEALTHEAST (OUTPATIENT)
Dept: INTERNAL MEDICINE | Facility: CLINIC | Age: 80
End: 2020-04-27

## 2020-04-27 ENCOUNTER — COMMUNICATION - HEALTHEAST (OUTPATIENT)
Dept: ANTICOAGULATION | Facility: CLINIC | Age: 80
End: 2020-04-27

## 2020-04-27 ENCOUNTER — AMBULATORY - HEALTHEAST (OUTPATIENT)
Dept: LAB | Facility: CLINIC | Age: 80
End: 2020-04-27

## 2020-04-27 ENCOUNTER — COMMUNICATION - HEALTHEAST (OUTPATIENT)
Dept: LAB | Facility: CLINIC | Age: 80
End: 2020-04-27

## 2020-04-27 DIAGNOSIS — Z91.148 PATIENT FORGETS TO TAKE MEDICATION: ICD-10-CM

## 2020-04-27 DIAGNOSIS — I48.91 ATRIAL FIBRILLATION WITH RAPID VENTRICULAR RESPONSE (H): ICD-10-CM

## 2020-04-27 DIAGNOSIS — Z95.2 S/P MITRAL VALVE REPLACEMENT: ICD-10-CM

## 2020-04-27 DIAGNOSIS — M48.062 SPINAL STENOSIS OF LUMBAR REGION WITH NEUROGENIC CLAUDICATION: ICD-10-CM

## 2020-04-27 DIAGNOSIS — Z51.81 ANTICOAGULATION GOAL OF INR 2.5 TO 3.5: ICD-10-CM

## 2020-04-27 DIAGNOSIS — L97.429 ULCER OF HEEL AND MIDFOOT, LEFT, WITH UNSPECIFIED SEVERITY (H): ICD-10-CM

## 2020-04-27 DIAGNOSIS — I48.92 ATRIAL FLUTTER, UNSPECIFIED TYPE (H): ICD-10-CM

## 2020-04-27 DIAGNOSIS — Z79.01 ANTICOAGULATION GOAL OF INR 2.5 TO 3.5: ICD-10-CM

## 2020-04-27 DIAGNOSIS — T46.2X5A ADVERSE EFFECT OF AMIODARONE, INITIAL ENCOUNTER: ICD-10-CM

## 2020-04-27 DIAGNOSIS — E03.9 ACQUIRED HYPOTHYROIDISM: ICD-10-CM

## 2020-04-27 DIAGNOSIS — L97.521 SKIN ULCER OF TOE OF LEFT FOOT, LIMITED TO BREAKDOWN OF SKIN (H): ICD-10-CM

## 2020-04-27 DIAGNOSIS — M86.9 OSTEOMYELITIS OF GREAT TOE (H): ICD-10-CM

## 2020-04-27 LAB — INR PPP: 1.8 (ref 0.9–1.1)

## 2020-04-28 ENCOUNTER — RECORDS - HEALTHEAST (OUTPATIENT)
Dept: ADMINISTRATIVE | Facility: OTHER | Age: 80
End: 2020-04-28

## 2020-04-29 ENCOUNTER — COMMUNICATION - HEALTHEAST (OUTPATIENT)
Dept: INTERNAL MEDICINE | Facility: CLINIC | Age: 80
End: 2020-04-29

## 2020-04-30 ENCOUNTER — OFFICE VISIT - HEALTHEAST (OUTPATIENT)
Dept: INTERNAL MEDICINE | Facility: CLINIC | Age: 80
End: 2020-04-30

## 2020-04-30 DIAGNOSIS — Z53.21 PATIENT LEFT WITHOUT BEING SEEN: ICD-10-CM

## 2020-05-01 ENCOUNTER — COMMUNICATION - HEALTHEAST (OUTPATIENT)
Dept: INTERNAL MEDICINE | Facility: CLINIC | Age: 80
End: 2020-05-01

## 2020-05-05 ENCOUNTER — COMMUNICATION - HEALTHEAST (OUTPATIENT)
Dept: ANTICOAGULATION | Facility: CLINIC | Age: 80
End: 2020-05-05

## 2020-05-07 ENCOUNTER — AMBULATORY - HEALTHEAST (OUTPATIENT)
Dept: LAB | Facility: CLINIC | Age: 80
End: 2020-05-07

## 2020-05-07 ENCOUNTER — COMMUNICATION - HEALTHEAST (OUTPATIENT)
Dept: ANTICOAGULATION | Facility: CLINIC | Age: 80
End: 2020-05-07

## 2020-05-07 DIAGNOSIS — Z79.01 ANTICOAGULATION GOAL OF INR 2.5 TO 3.5: ICD-10-CM

## 2020-05-07 DIAGNOSIS — I48.92 ATRIAL FLUTTER, UNSPECIFIED TYPE (H): ICD-10-CM

## 2020-05-07 DIAGNOSIS — Z51.81 ANTICOAGULATION GOAL OF INR 2.5 TO 3.5: ICD-10-CM

## 2020-05-07 DIAGNOSIS — Z95.2 S/P MITRAL VALVE REPLACEMENT: ICD-10-CM

## 2020-05-07 LAB — INR PPP: 1.6 (ref 0.9–1.1)

## 2020-05-08 ENCOUNTER — OFFICE VISIT - HEALTHEAST (OUTPATIENT)
Dept: INTERNAL MEDICINE | Facility: CLINIC | Age: 80
End: 2020-05-08

## 2020-05-08 DIAGNOSIS — Z91.148 PATIENT FORGETS TO TAKE MEDICATION: ICD-10-CM

## 2020-05-08 DIAGNOSIS — L97.521 SKIN ULCER OF TOE OF LEFT FOOT, LIMITED TO BREAKDOWN OF SKIN (H): ICD-10-CM

## 2020-05-08 DIAGNOSIS — G89.4 CHRONIC PAIN SYNDROME: ICD-10-CM

## 2020-05-08 DIAGNOSIS — M48.00 MULTILEVEL NEURAL FORAMINAL STENOSIS: ICD-10-CM

## 2020-05-08 DIAGNOSIS — L97.429 ULCER OF HEEL AND MIDFOOT, LEFT, WITH UNSPECIFIED SEVERITY (H): ICD-10-CM

## 2020-05-08 DIAGNOSIS — M48.062 SPINAL STENOSIS OF LUMBAR REGION WITH NEUROGENIC CLAUDICATION: ICD-10-CM

## 2020-05-08 DIAGNOSIS — M86.9 OSTEOMYELITIS OF GREAT TOE (H): ICD-10-CM

## 2020-05-13 ENCOUNTER — COMMUNICATION - HEALTHEAST (OUTPATIENT)
Dept: ANTICOAGULATION | Facility: CLINIC | Age: 80
End: 2020-05-13

## 2020-05-13 ENCOUNTER — AMBULATORY - HEALTHEAST (OUTPATIENT)
Dept: LAB | Facility: CLINIC | Age: 80
End: 2020-05-13

## 2020-05-13 DIAGNOSIS — I48.92 ATRIAL FLUTTER, UNSPECIFIED TYPE (H): ICD-10-CM

## 2020-05-13 DIAGNOSIS — Z51.81 ANTICOAGULATION GOAL OF INR 2.5 TO 3.5: ICD-10-CM

## 2020-05-13 DIAGNOSIS — Z95.2 S/P MITRAL VALVE REPLACEMENT: ICD-10-CM

## 2020-05-13 DIAGNOSIS — Z79.01 ANTICOAGULATION GOAL OF INR 2.5 TO 3.5: ICD-10-CM

## 2020-05-13 LAB — INR PPP: 2.2 (ref 0.9–1.1)

## 2020-05-22 ENCOUNTER — AMBULATORY - HEALTHEAST (OUTPATIENT)
Dept: LAB | Facility: CLINIC | Age: 80
End: 2020-05-22

## 2020-05-22 ENCOUNTER — COMMUNICATION - HEALTHEAST (OUTPATIENT)
Dept: ANTICOAGULATION | Facility: CLINIC | Age: 80
End: 2020-05-22

## 2020-05-22 ENCOUNTER — OFFICE VISIT - HEALTHEAST (OUTPATIENT)
Dept: INTERNAL MEDICINE | Facility: CLINIC | Age: 80
End: 2020-05-22

## 2020-05-22 ENCOUNTER — RECORDS - HEALTHEAST (OUTPATIENT)
Dept: ADMINISTRATIVE | Facility: OTHER | Age: 80
End: 2020-05-22

## 2020-05-22 DIAGNOSIS — I10 ESSENTIAL HYPERTENSION: ICD-10-CM

## 2020-05-22 DIAGNOSIS — Z95.2 S/P MITRAL VALVE REPLACEMENT: ICD-10-CM

## 2020-05-22 DIAGNOSIS — M86.9 OSTEOMYELITIS OF GREAT TOE (H): ICD-10-CM

## 2020-05-22 DIAGNOSIS — R11.0 NAUSEA: ICD-10-CM

## 2020-05-22 DIAGNOSIS — Z79.01 ANTICOAGULATION GOAL OF INR 2.5 TO 3.5: ICD-10-CM

## 2020-05-22 DIAGNOSIS — L97.511 SKIN ULCER OF RIGHT GREAT TOE, LIMITED TO BREAKDOWN OF SKIN (H): ICD-10-CM

## 2020-05-22 DIAGNOSIS — L97.429 ULCER OF HEEL AND MIDFOOT, LEFT, WITH UNSPECIFIED SEVERITY (H): ICD-10-CM

## 2020-05-22 DIAGNOSIS — Z51.81 ANTICOAGULATION GOAL OF INR 2.5 TO 3.5: ICD-10-CM

## 2020-05-22 DIAGNOSIS — I48.92 ATRIAL FLUTTER, UNSPECIFIED TYPE (H): ICD-10-CM

## 2020-05-22 LAB
ANION GAP SERPL CALCULATED.3IONS-SCNC: 12 MMOL/L (ref 5–18)
BASOPHILS # BLD AUTO: 0 THOU/UL (ref 0–0.2)
BASOPHILS NFR BLD AUTO: 1 % (ref 0–2)
BUN SERPL-MCNC: 31 MG/DL (ref 8–28)
C REACTIVE PROTEIN LHE: 0.4 MG/DL (ref 0–0.8)
CALCIUM SERPL-MCNC: 9.3 MG/DL (ref 8.5–10.5)
CHLORIDE BLD-SCNC: 100 MMOL/L (ref 98–107)
CO2 SERPL-SCNC: 26 MMOL/L (ref 22–31)
CREAT SERPL-MCNC: 1.25 MG/DL (ref 0.6–1.1)
EOSINOPHIL # BLD AUTO: 0.1 THOU/UL (ref 0–0.4)
EOSINOPHIL NFR BLD AUTO: 2 % (ref 0–6)
ERYTHROCYTE [DISTWIDTH] IN BLOOD BY AUTOMATED COUNT: 14.7 % (ref 11–14.5)
ERYTHROCYTE [SEDIMENTATION RATE] IN BLOOD BY WESTERGREN METHOD: 44 MM/HR (ref 0–20)
GFR SERPL CREATININE-BSD FRML MDRD: 41 ML/MIN/1.73M2
GLUCOSE BLD-MCNC: 252 MG/DL (ref 70–125)
HCT VFR BLD AUTO: 35.7 % (ref 35–47)
HGB BLD-MCNC: 11.7 G/DL (ref 12–16)
INR PPP: 2.5 (ref 0.9–1.1)
LYMPHOCYTES # BLD AUTO: 1.3 THOU/UL (ref 0.8–4.4)
LYMPHOCYTES NFR BLD AUTO: 20 % (ref 20–40)
MCH RBC QN AUTO: 30.4 PG (ref 27–34)
MCHC RBC AUTO-ENTMCNC: 32.8 G/DL (ref 32–36)
MCV RBC AUTO: 93 FL (ref 80–100)
MONOCYTES # BLD AUTO: 0.5 THOU/UL (ref 0–0.9)
MONOCYTES NFR BLD AUTO: 8 % (ref 2–10)
NEUTROPHILS # BLD AUTO: 4.1 THOU/UL (ref 2–7.7)
NEUTROPHILS NFR BLD AUTO: 65 % (ref 50–70)
PLATELET # BLD AUTO: 148 THOU/UL (ref 140–440)
PMV BLD AUTO: 13.4 FL (ref 8.5–12.5)
POTASSIUM BLD-SCNC: 4.5 MMOL/L (ref 3.5–5)
RBC # BLD AUTO: 3.85 MILL/UL (ref 3.8–5.4)
SODIUM SERPL-SCNC: 138 MMOL/L (ref 136–145)
WBC: 6.3 THOU/UL (ref 4–11)

## 2020-05-24 ENCOUNTER — COMMUNICATION - HEALTHEAST (OUTPATIENT)
Dept: INTERNAL MEDICINE | Facility: CLINIC | Age: 80
End: 2020-05-24

## 2020-05-25 ENCOUNTER — COMMUNICATION - HEALTHEAST (OUTPATIENT)
Dept: INTERNAL MEDICINE | Facility: CLINIC | Age: 80
End: 2020-05-25

## 2020-06-02 ENCOUNTER — RECORDS - HEALTHEAST (OUTPATIENT)
Dept: ADMINISTRATIVE | Facility: OTHER | Age: 80
End: 2020-06-02

## 2020-06-02 LAB — RETINOPATHY: POSITIVE

## 2020-06-08 ENCOUNTER — COMMUNICATION - HEALTHEAST (OUTPATIENT)
Dept: ANTICOAGULATION | Facility: CLINIC | Age: 80
End: 2020-06-08

## 2020-06-09 ENCOUNTER — RECORDS - HEALTHEAST (OUTPATIENT)
Dept: HEALTH INFORMATION MANAGEMENT | Facility: CLINIC | Age: 80
End: 2020-06-09

## 2020-06-12 ENCOUNTER — COMMUNICATION - HEALTHEAST (OUTPATIENT)
Dept: INTERNAL MEDICINE | Facility: CLINIC | Age: 80
End: 2020-06-12

## 2020-06-12 DIAGNOSIS — G89.4 CHRONIC PAIN SYNDROME: ICD-10-CM

## 2020-06-18 ENCOUNTER — AMBULATORY - HEALTHEAST (OUTPATIENT)
Dept: LAB | Facility: CLINIC | Age: 80
End: 2020-06-18

## 2020-06-18 ENCOUNTER — OFFICE VISIT - HEALTHEAST (OUTPATIENT)
Dept: INTERNAL MEDICINE | Facility: CLINIC | Age: 80
End: 2020-06-18

## 2020-06-18 ENCOUNTER — COMMUNICATION - HEALTHEAST (OUTPATIENT)
Dept: ANTICOAGULATION | Facility: CLINIC | Age: 80
End: 2020-06-18

## 2020-06-18 DIAGNOSIS — L97.429 ULCER OF HEEL AND MIDFOOT, LEFT, WITH UNSPECIFIED SEVERITY (H): ICD-10-CM

## 2020-06-18 DIAGNOSIS — R80.9 TYPE 2 DIABETES MELLITUS WITH MICROALBUMINURIA, WITH LONG-TERM CURRENT USE OF INSULIN (H): ICD-10-CM

## 2020-06-18 DIAGNOSIS — L89.159 PRESSURE INJURY OF SKIN OF SACRAL REGION, UNSPECIFIED INJURY STAGE: ICD-10-CM

## 2020-06-18 DIAGNOSIS — Z79.4 TYPE 2 DIABETES MELLITUS WITH MICROALBUMINURIA, WITH LONG-TERM CURRENT USE OF INSULIN (H): ICD-10-CM

## 2020-06-18 DIAGNOSIS — Z79.01 ANTICOAGULATION GOAL OF INR 2.5 TO 3.5: ICD-10-CM

## 2020-06-18 DIAGNOSIS — E11.29 TYPE 2 DIABETES MELLITUS WITH MICROALBUMINURIA, WITH LONG-TERM CURRENT USE OF INSULIN (H): ICD-10-CM

## 2020-06-18 DIAGNOSIS — L97.511 SKIN ULCER OF RIGHT GREAT TOE, LIMITED TO BREAKDOWN OF SKIN (H): ICD-10-CM

## 2020-06-18 DIAGNOSIS — Z95.2 S/P MITRAL VALVE REPLACEMENT: ICD-10-CM

## 2020-06-18 DIAGNOSIS — G89.4 CHRONIC PAIN SYNDROME: ICD-10-CM

## 2020-06-18 DIAGNOSIS — M48.00 MULTILEVEL NEURAL FORAMINAL STENOSIS: ICD-10-CM

## 2020-06-18 DIAGNOSIS — I48.92 ATRIAL FLUTTER, UNSPECIFIED TYPE (H): ICD-10-CM

## 2020-06-18 DIAGNOSIS — Z51.81 ANTICOAGULATION GOAL OF INR 2.5 TO 3.5: ICD-10-CM

## 2020-06-18 DIAGNOSIS — I10 ESSENTIAL HYPERTENSION: ICD-10-CM

## 2020-06-18 LAB — INR PPP: 3.9 (ref 0.9–1.1)

## 2020-07-06 ENCOUNTER — COMMUNICATION - HEALTHEAST (OUTPATIENT)
Dept: ANTICOAGULATION | Facility: CLINIC | Age: 80
End: 2020-07-06

## 2020-07-10 ENCOUNTER — COMMUNICATION - HEALTHEAST (OUTPATIENT)
Dept: INTERNAL MEDICINE | Facility: CLINIC | Age: 80
End: 2020-07-10

## 2020-07-10 DIAGNOSIS — I48.91 ATRIAL FIBRILLATION WITH RAPID VENTRICULAR RESPONSE (H): ICD-10-CM

## 2020-07-10 DIAGNOSIS — Z95.2 S/P MITRAL VALVE REPLACEMENT: ICD-10-CM

## 2020-07-13 ENCOUNTER — AMBULATORY - HEALTHEAST (OUTPATIENT)
Dept: LAB | Facility: CLINIC | Age: 80
End: 2020-07-13

## 2020-07-13 ENCOUNTER — COMMUNICATION - HEALTHEAST (OUTPATIENT)
Dept: ANTICOAGULATION | Facility: CLINIC | Age: 80
End: 2020-07-13

## 2020-07-13 DIAGNOSIS — I48.92 ATRIAL FLUTTER, UNSPECIFIED TYPE (H): ICD-10-CM

## 2020-07-13 DIAGNOSIS — Z79.01 ANTICOAGULATION GOAL OF INR 2.5 TO 3.5: ICD-10-CM

## 2020-07-13 DIAGNOSIS — Z51.81 ANTICOAGULATION GOAL OF INR 2.5 TO 3.5: ICD-10-CM

## 2020-07-13 DIAGNOSIS — Z95.2 S/P MITRAL VALVE REPLACEMENT: ICD-10-CM

## 2020-07-13 LAB — INR PPP: 5.2 (ref 0.9–1.1)

## 2020-07-17 ENCOUNTER — AMBULATORY - HEALTHEAST (OUTPATIENT)
Dept: ANTICOAGULATION | Facility: CLINIC | Age: 80
End: 2020-07-17

## 2020-07-17 ENCOUNTER — COMMUNICATION - HEALTHEAST (OUTPATIENT)
Dept: ANTICOAGULATION | Facility: CLINIC | Age: 80
End: 2020-07-17

## 2020-07-17 ENCOUNTER — AMBULATORY - HEALTHEAST (OUTPATIENT)
Dept: LAB | Facility: CLINIC | Age: 80
End: 2020-07-17

## 2020-07-17 DIAGNOSIS — I48.0 PAROXYSMAL ATRIAL FIBRILLATION (H): ICD-10-CM

## 2020-07-17 DIAGNOSIS — Z95.2 S/P MITRAL VALVE REPLACEMENT: ICD-10-CM

## 2020-07-17 LAB — INR PPP: 2.4 (ref 0.9–1.1)

## 2020-07-21 ENCOUNTER — COMMUNICATION - HEALTHEAST (OUTPATIENT)
Dept: ANTICOAGULATION | Facility: CLINIC | Age: 80
End: 2020-07-21

## 2020-07-21 DIAGNOSIS — Z95.2 S/P MITRAL VALVE REPLACEMENT: ICD-10-CM

## 2020-07-23 ENCOUNTER — OFFICE VISIT - HEALTHEAST (OUTPATIENT)
Dept: INTERNAL MEDICINE | Facility: CLINIC | Age: 80
End: 2020-07-23

## 2020-07-23 ENCOUNTER — AMBULATORY - HEALTHEAST (OUTPATIENT)
Dept: LAB | Facility: CLINIC | Age: 80
End: 2020-07-23

## 2020-07-23 ENCOUNTER — COMMUNICATION - HEALTHEAST (OUTPATIENT)
Dept: ANTICOAGULATION | Facility: CLINIC | Age: 80
End: 2020-07-23

## 2020-07-23 DIAGNOSIS — I48.0 PAROXYSMAL ATRIAL FIBRILLATION (H): ICD-10-CM

## 2020-07-23 DIAGNOSIS — Z95.2 S/P MITRAL VALVE REPLACEMENT: ICD-10-CM

## 2020-07-23 DIAGNOSIS — L97.429 ULCER OF HEEL AND MIDFOOT, LEFT, WITH UNSPECIFIED SEVERITY (H): ICD-10-CM

## 2020-07-23 DIAGNOSIS — L97.511 SKIN ULCER OF RIGHT GREAT TOE, LIMITED TO BREAKDOWN OF SKIN (H): ICD-10-CM

## 2020-07-23 DIAGNOSIS — Z79.4 TYPE 2 DIABETES MELLITUS WITH MICROALBUMINURIA, WITH LONG-TERM CURRENT USE OF INSULIN (H): ICD-10-CM

## 2020-07-23 DIAGNOSIS — R80.9 TYPE 2 DIABETES MELLITUS WITH MICROALBUMINURIA, WITH LONG-TERM CURRENT USE OF INSULIN (H): ICD-10-CM

## 2020-07-23 DIAGNOSIS — G89.4 CHRONIC PAIN SYNDROME: ICD-10-CM

## 2020-07-23 DIAGNOSIS — M48.00 MULTILEVEL NEURAL FORAMINAL STENOSIS: ICD-10-CM

## 2020-07-23 DIAGNOSIS — K59.00 CONSTIPATION, UNSPECIFIED CONSTIPATION TYPE: ICD-10-CM

## 2020-07-23 DIAGNOSIS — E11.29 TYPE 2 DIABETES MELLITUS WITH MICROALBUMINURIA, WITH LONG-TERM CURRENT USE OF INSULIN (H): ICD-10-CM

## 2020-07-23 DIAGNOSIS — I48.0 INTERMITTENT ATRIAL FIBRILLATION (H): ICD-10-CM

## 2020-07-23 LAB
ANION GAP SERPL CALCULATED.3IONS-SCNC: 11 MMOL/L (ref 5–18)
BUN SERPL-MCNC: 23 MG/DL (ref 8–28)
CALCIUM SERPL-MCNC: 9.7 MG/DL (ref 8.5–10.5)
CHLORIDE BLD-SCNC: 99 MMOL/L (ref 98–107)
CHOLEST SERPL-MCNC: 228 MG/DL
CO2 SERPL-SCNC: 29 MMOL/L (ref 22–31)
CREAT SERPL-MCNC: 1.04 MG/DL (ref 0.6–1.1)
ERYTHROCYTE [DISTWIDTH] IN BLOOD BY AUTOMATED COUNT: 11.9 % (ref 11–14.5)
FASTING STATUS PATIENT QL REPORTED: NO
GFR SERPL CREATININE-BSD FRML MDRD: 51 ML/MIN/1.73M2
GLUCOSE BLD-MCNC: 213 MG/DL (ref 70–125)
HBA1C MFR BLD: 9 % (ref 3.5–6)
HCT VFR BLD AUTO: 34.3 % (ref 35–47)
HDLC SERPL-MCNC: 31 MG/DL
HGB BLD-MCNC: 11.9 G/DL (ref 12–16)
INR PPP: 3.6 (ref 0.9–1.1)
LDLC SERPL CALC-MCNC: 86 MG/DL
LDLC SERPL CALC-MCNC: ABNORMAL MG/DL
MCH RBC QN AUTO: 31.2 PG (ref 27–34)
MCHC RBC AUTO-ENTMCNC: 34.7 G/DL (ref 32–36)
MCV RBC AUTO: 90 FL (ref 80–100)
PLATELET # BLD AUTO: 141 THOU/UL (ref 140–440)
PMV BLD AUTO: 9.5 FL (ref 7–10)
POTASSIUM BLD-SCNC: 4.4 MMOL/L (ref 3.5–5)
RBC # BLD AUTO: 3.81 MILL/UL (ref 3.8–5.4)
SODIUM SERPL-SCNC: 139 MMOL/L (ref 136–145)
TRIGL SERPL-MCNC: 690 MG/DL
WBC: 6 THOU/UL (ref 4–11)

## 2020-07-23 RX ORDER — DILTIAZEM HYDROCHLORIDE 180 MG/1
180 CAPSULE, COATED, EXTENDED RELEASE ORAL DAILY
Qty: 90 CAPSULE | Refills: 3 | Status: SHIPPED | OUTPATIENT
Start: 2020-07-23 | End: 2021-01-01

## 2020-07-24 ENCOUNTER — COMMUNICATION - HEALTHEAST (OUTPATIENT)
Dept: INTERNAL MEDICINE | Facility: CLINIC | Age: 80
End: 2020-07-24

## 2020-07-24 DIAGNOSIS — Z79.4 TYPE 2 DIABETES MELLITUS WITH MICROALBUMINURIA, WITH LONG-TERM CURRENT USE OF INSULIN (H): ICD-10-CM

## 2020-07-24 DIAGNOSIS — E11.29 TYPE 2 DIABETES MELLITUS WITH MICROALBUMINURIA, WITH LONG-TERM CURRENT USE OF INSULIN (H): ICD-10-CM

## 2020-07-24 DIAGNOSIS — R80.9 TYPE 2 DIABETES MELLITUS WITH MICROALBUMINURIA, WITH LONG-TERM CURRENT USE OF INSULIN (H): ICD-10-CM

## 2020-07-27 ENCOUNTER — RECORDS - HEALTHEAST (OUTPATIENT)
Dept: ADMINISTRATIVE | Facility: OTHER | Age: 80
End: 2020-07-27

## 2020-07-29 ENCOUNTER — COMMUNICATION - HEALTHEAST (OUTPATIENT)
Dept: INTERNAL MEDICINE | Facility: CLINIC | Age: 80
End: 2020-07-29

## 2020-08-06 ENCOUNTER — COMMUNICATION - HEALTHEAST (OUTPATIENT)
Dept: ANTICOAGULATION | Facility: CLINIC | Age: 80
End: 2020-08-06

## 2020-08-06 ENCOUNTER — AMBULATORY - HEALTHEAST (OUTPATIENT)
Dept: LAB | Facility: CLINIC | Age: 80
End: 2020-08-06

## 2020-08-06 DIAGNOSIS — Z95.2 S/P MITRAL VALVE REPLACEMENT: ICD-10-CM

## 2020-08-06 DIAGNOSIS — I48.0 PAROXYSMAL ATRIAL FIBRILLATION (H): ICD-10-CM

## 2020-08-06 LAB — INR PPP: 4.2 (ref 0.9–1.1)

## 2020-08-18 ENCOUNTER — AMBULATORY - HEALTHEAST (OUTPATIENT)
Dept: INTERNAL MEDICINE | Facility: CLINIC | Age: 80
End: 2020-08-18

## 2020-08-18 ENCOUNTER — COMMUNICATION - HEALTHEAST (OUTPATIENT)
Dept: ANTICOAGULATION | Facility: CLINIC | Age: 80
End: 2020-08-18

## 2020-08-18 ENCOUNTER — AMBULATORY - HEALTHEAST (OUTPATIENT)
Dept: LAB | Facility: CLINIC | Age: 80
End: 2020-08-18

## 2020-08-18 DIAGNOSIS — L97.511 SKIN ULCER OF RIGHT GREAT TOE, LIMITED TO BREAKDOWN OF SKIN (H): ICD-10-CM

## 2020-08-18 DIAGNOSIS — Z95.2 S/P MITRAL VALVE REPLACEMENT: ICD-10-CM

## 2020-08-18 DIAGNOSIS — I48.0 PAROXYSMAL ATRIAL FIBRILLATION (H): ICD-10-CM

## 2020-08-18 LAB — INR PPP: 2.8 (ref 0.9–1.1)

## 2020-08-29 ENCOUNTER — COMMUNICATION - HEALTHEAST (OUTPATIENT)
Dept: SCHEDULING | Facility: CLINIC | Age: 80
End: 2020-08-29

## 2020-09-01 ENCOUNTER — COMMUNICATION - HEALTHEAST (OUTPATIENT)
Dept: ANTICOAGULATION | Facility: CLINIC | Age: 80
End: 2020-09-01

## 2020-09-01 ENCOUNTER — AMBULATORY - HEALTHEAST (OUTPATIENT)
Dept: LAB | Facility: CLINIC | Age: 80
End: 2020-09-01

## 2020-09-01 ENCOUNTER — OFFICE VISIT - HEALTHEAST (OUTPATIENT)
Dept: INTERNAL MEDICINE | Facility: CLINIC | Age: 80
End: 2020-09-01

## 2020-09-01 DIAGNOSIS — D69.9 BLEEDING DIATHESIS (H): ICD-10-CM

## 2020-09-01 DIAGNOSIS — M48.00 MULTILEVEL NEURAL FORAMINAL STENOSIS: ICD-10-CM

## 2020-09-01 DIAGNOSIS — Z23 IMMUNIZATION DUE: ICD-10-CM

## 2020-09-01 DIAGNOSIS — Z95.2 S/P MITRAL VALVE REPLACEMENT: ICD-10-CM

## 2020-09-01 DIAGNOSIS — M48.062 SPINAL STENOSIS OF LUMBAR REGION WITH NEUROGENIC CLAUDICATION: ICD-10-CM

## 2020-09-01 DIAGNOSIS — F51.01 PRIMARY INSOMNIA: ICD-10-CM

## 2020-09-01 DIAGNOSIS — I48.0 PAROXYSMAL ATRIAL FIBRILLATION (H): ICD-10-CM

## 2020-09-01 DIAGNOSIS — R80.9 TYPE 2 DIABETES MELLITUS WITH MICROALBUMINURIA, WITH LONG-TERM CURRENT USE OF INSULIN (H): ICD-10-CM

## 2020-09-01 DIAGNOSIS — G89.4 CHRONIC PAIN SYNDROME: ICD-10-CM

## 2020-09-01 DIAGNOSIS — L97.429 ULCER OF HEEL AND MIDFOOT, LEFT, WITH UNSPECIFIED SEVERITY (H): ICD-10-CM

## 2020-09-01 DIAGNOSIS — Z79.4 TYPE 2 DIABETES MELLITUS WITH MICROALBUMINURIA, WITH LONG-TERM CURRENT USE OF INSULIN (H): ICD-10-CM

## 2020-09-01 DIAGNOSIS — E11.29 TYPE 2 DIABETES MELLITUS WITH MICROALBUMINURIA, WITH LONG-TERM CURRENT USE OF INSULIN (H): ICD-10-CM

## 2020-09-01 LAB — INR PPP: 2.3 (ref 0.9–1.1)

## 2020-09-10 ENCOUNTER — COMMUNICATION - HEALTHEAST (OUTPATIENT)
Dept: INTERNAL MEDICINE | Facility: CLINIC | Age: 80
End: 2020-09-10

## 2020-09-10 DIAGNOSIS — Z79.4 TYPE 2 DIABETES MELLITUS WITH MICROALBUMINURIA, WITH LONG-TERM CURRENT USE OF INSULIN (H): ICD-10-CM

## 2020-09-10 DIAGNOSIS — R80.9 TYPE 2 DIABETES MELLITUS WITH MICROALBUMINURIA, WITH LONG-TERM CURRENT USE OF INSULIN (H): ICD-10-CM

## 2020-09-10 DIAGNOSIS — E11.29 TYPE 2 DIABETES MELLITUS WITH MICROALBUMINURIA, WITH LONG-TERM CURRENT USE OF INSULIN (H): ICD-10-CM

## 2020-09-12 ENCOUNTER — COMMUNICATION - HEALTHEAST (OUTPATIENT)
Dept: INTERNAL MEDICINE | Facility: CLINIC | Age: 80
End: 2020-09-12

## 2020-09-13 ENCOUNTER — COMMUNICATION - HEALTHEAST (OUTPATIENT)
Dept: INTERNAL MEDICINE | Facility: CLINIC | Age: 80
End: 2020-09-13

## 2020-09-13 DIAGNOSIS — G89.4 CHRONIC PAIN SYNDROME: ICD-10-CM

## 2020-09-15 ENCOUNTER — AMBULATORY - HEALTHEAST (OUTPATIENT)
Dept: LAB | Facility: CLINIC | Age: 80
End: 2020-09-15

## 2020-09-15 ENCOUNTER — COMMUNICATION - HEALTHEAST (OUTPATIENT)
Dept: ANTICOAGULATION | Facility: CLINIC | Age: 80
End: 2020-09-15

## 2020-09-15 DIAGNOSIS — I48.0 PAROXYSMAL ATRIAL FIBRILLATION (H): ICD-10-CM

## 2020-09-15 DIAGNOSIS — Z95.2 S/P MITRAL VALVE REPLACEMENT: ICD-10-CM

## 2020-09-15 LAB — INR PPP: 2 (ref 0.9–1.1)

## 2020-09-29 ENCOUNTER — COMMUNICATION - HEALTHEAST (OUTPATIENT)
Dept: ANTICOAGULATION | Facility: CLINIC | Age: 80
End: 2020-09-29

## 2020-09-29 ENCOUNTER — AMBULATORY - HEALTHEAST (OUTPATIENT)
Dept: LAB | Facility: CLINIC | Age: 80
End: 2020-09-29

## 2020-09-29 DIAGNOSIS — Z95.2 S/P MITRAL VALVE REPLACEMENT: ICD-10-CM

## 2020-09-29 DIAGNOSIS — I48.0 PAROXYSMAL ATRIAL FIBRILLATION (H): ICD-10-CM

## 2020-09-29 LAB — INR PPP: 2.5 (ref 0.9–1.1)

## 2020-10-15 ENCOUNTER — AMBULATORY - HEALTHEAST (OUTPATIENT)
Dept: LAB | Facility: CLINIC | Age: 80
End: 2020-10-15

## 2020-10-15 ENCOUNTER — COMMUNICATION - HEALTHEAST (OUTPATIENT)
Dept: ANTICOAGULATION | Facility: CLINIC | Age: 80
End: 2020-10-15

## 2020-10-15 DIAGNOSIS — Z95.2 S/P MITRAL VALVE REPLACEMENT: ICD-10-CM

## 2020-10-15 DIAGNOSIS — I48.0 PAROXYSMAL ATRIAL FIBRILLATION (H): ICD-10-CM

## 2020-10-15 LAB — INR PPP: 2.4 (ref 0.9–1.1)

## 2021-01-01 ENCOUNTER — COMMUNICATION - HEALTHEAST (OUTPATIENT)
Dept: ANTICOAGULATION | Facility: CLINIC | Age: 81
End: 2021-01-01

## 2021-01-01 ENCOUNTER — COMMUNICATION - HEALTHEAST (OUTPATIENT)
Dept: INTERNAL MEDICINE | Facility: CLINIC | Age: 81
End: 2021-01-01

## 2021-01-01 ENCOUNTER — APPOINTMENT (OUTPATIENT)
Dept: SPEECH THERAPY | Facility: HOSPITAL | Age: 81
DRG: 377 | End: 2021-01-01
Payer: MEDICARE

## 2021-01-01 ENCOUNTER — DOCUMENTATION ONLY (OUTPATIENT)
Dept: ANTICOAGULATION | Facility: CLINIC | Age: 81
End: 2021-01-01

## 2021-01-01 ENCOUNTER — APPOINTMENT (OUTPATIENT)
Dept: RADIOLOGY | Facility: HOSPITAL | Age: 81
DRG: 377 | End: 2021-01-01
Attending: INTERNAL MEDICINE
Payer: MEDICARE

## 2021-01-01 ENCOUNTER — TELEPHONE (OUTPATIENT)
Dept: INTERNAL MEDICINE | Facility: CLINIC | Age: 81
End: 2021-01-01

## 2021-01-01 ENCOUNTER — TELEPHONE (OUTPATIENT)
Dept: VASCULAR SURGERY | Facility: CLINIC | Age: 81
End: 2021-01-01

## 2021-01-01 ENCOUNTER — HOSPITAL ENCOUNTER (INPATIENT)
Facility: HOSPITAL | Age: 81
LOS: 9 days | Discharge: NURSING, REHAB OR LONG-TERM CARE | DRG: 474 | End: 2021-09-26
Attending: PODIATRIST | Admitting: PODIATRIST
Payer: MEDICARE

## 2021-01-01 ENCOUNTER — ANCILLARY PROCEDURE (OUTPATIENT)
Dept: ULTRASOUND IMAGING | Facility: HOSPITAL | Age: 81
DRG: 474 | End: 2021-01-01
Attending: ANESTHESIOLOGY
Payer: MEDICARE

## 2021-01-01 ENCOUNTER — APPOINTMENT (OUTPATIENT)
Dept: OCCUPATIONAL THERAPY | Facility: HOSPITAL | Age: 81
DRG: 474 | End: 2021-01-01
Attending: PODIATRIST
Payer: MEDICARE

## 2021-01-01 ENCOUNTER — AMBULATORY - HEALTHEAST (OUTPATIENT)
Dept: LAB | Facility: CLINIC | Age: 81
End: 2021-01-01

## 2021-01-01 ENCOUNTER — HOSPITAL ENCOUNTER (INPATIENT)
Facility: HOSPITAL | Age: 81
LOS: 15 days | Discharge: HOSPICE | DRG: 377 | End: 2021-10-26
Attending: EMERGENCY MEDICINE | Admitting: FAMILY MEDICINE
Payer: MEDICARE

## 2021-01-01 ENCOUNTER — DOCUMENTATION ONLY (OUTPATIENT)
Dept: INTERNAL MEDICINE | Facility: CLINIC | Age: 81
End: 2021-01-01

## 2021-01-01 ENCOUNTER — ANESTHESIA EVENT (OUTPATIENT)
Dept: SURGERY | Facility: HOSPITAL | Age: 81
DRG: 474 | End: 2021-01-01
Payer: MEDICARE

## 2021-01-01 ENCOUNTER — APPOINTMENT (OUTPATIENT)
Dept: PHYSICAL THERAPY | Facility: HOSPITAL | Age: 81
DRG: 474 | End: 2021-01-01
Attending: PODIATRIST
Payer: MEDICARE

## 2021-01-01 ENCOUNTER — TELEPHONE (OUTPATIENT)
Dept: GERIATRICS | Facility: CLINIC | Age: 81
End: 2021-01-01

## 2021-01-01 ENCOUNTER — OFFICE VISIT (OUTPATIENT)
Dept: VASCULAR SURGERY | Facility: CLINIC | Age: 81
End: 2021-01-01
Attending: PODIATRIST
Payer: MEDICARE

## 2021-01-01 ENCOUNTER — OFFICE VISIT - HEALTHEAST (OUTPATIENT)
Dept: INTERNAL MEDICINE | Facility: CLINIC | Age: 81
End: 2021-01-01

## 2021-01-01 ENCOUNTER — APPOINTMENT (OUTPATIENT)
Dept: PHYSICAL THERAPY | Facility: HOSPITAL | Age: 81
DRG: 377 | End: 2021-01-01
Attending: INTERNAL MEDICINE
Payer: MEDICARE

## 2021-01-01 ENCOUNTER — OFFICE VISIT (OUTPATIENT)
Dept: FAMILY MEDICINE | Facility: CLINIC | Age: 81
End: 2021-01-01
Payer: MEDICARE

## 2021-01-01 ENCOUNTER — ANTICOAGULATION THERAPY VISIT (OUTPATIENT)
Dept: ANTICOAGULATION | Facility: CLINIC | Age: 81
End: 2021-01-01

## 2021-01-01 ENCOUNTER — APPOINTMENT (OUTPATIENT)
Dept: PHYSICAL THERAPY | Facility: HOSPITAL | Age: 81
DRG: 377 | End: 2021-01-01
Payer: MEDICARE

## 2021-01-01 ENCOUNTER — APPOINTMENT (OUTPATIENT)
Dept: OCCUPATIONAL THERAPY | Facility: HOSPITAL | Age: 81
DRG: 377 | End: 2021-01-01
Payer: MEDICARE

## 2021-01-01 ENCOUNTER — LAB REQUISITION (OUTPATIENT)
Dept: LAB | Facility: CLINIC | Age: 81
End: 2021-01-01
Payer: MEDICARE

## 2021-01-01 ENCOUNTER — APPOINTMENT (OUTPATIENT)
Dept: OCCUPATIONAL THERAPY | Facility: HOSPITAL | Age: 81
DRG: 377 | End: 2021-01-01
Attending: FAMILY MEDICINE
Payer: MEDICARE

## 2021-01-01 ENCOUNTER — APPOINTMENT (OUTPATIENT)
Dept: CT IMAGING | Facility: HOSPITAL | Age: 81
DRG: 377 | End: 2021-01-01
Attending: EMERGENCY MEDICINE
Payer: MEDICARE

## 2021-01-01 ENCOUNTER — ANCILLARY PROCEDURE (OUTPATIENT)
Dept: VASCULAR ULTRASOUND | Facility: CLINIC | Age: 81
DRG: 474 | End: 2021-01-01
Attending: PODIATRIST
Payer: MEDICARE

## 2021-01-01 ENCOUNTER — OFFICE VISIT (OUTPATIENT)
Dept: VASCULAR SURGERY | Facility: CLINIC | Age: 81
End: 2021-01-01
Attending: FAMILY MEDICINE
Payer: MEDICARE

## 2021-01-01 ENCOUNTER — ANESTHESIA (OUTPATIENT)
Dept: SURGERY | Facility: HOSPITAL | Age: 81
DRG: 377 | End: 2021-01-01
Payer: MEDICARE

## 2021-01-01 ENCOUNTER — HOSPITAL ENCOUNTER (OUTPATIENT)
Dept: CARDIOLOGY | Facility: HOSPITAL | Age: 81
Discharge: HOME | End: 2021-07-22
Attending: INTERNAL MEDICINE | Admitting: INTERNAL MEDICINE
Payer: MEDICARE

## 2021-01-01 ENCOUNTER — TRANSITIONAL CARE UNIT VISIT (OUTPATIENT)
Dept: GERIATRICS | Facility: CLINIC | Age: 81
End: 2021-01-01
Payer: MEDICARE

## 2021-01-01 ENCOUNTER — ANESTHESIA EVENT (OUTPATIENT)
Dept: SURGERY | Facility: HOSPITAL | Age: 81
DRG: 377 | End: 2021-01-01
Payer: MEDICARE

## 2021-01-01 ENCOUNTER — PATIENT OUTREACH (OUTPATIENT)
Dept: NURSING | Facility: CLINIC | Age: 81
End: 2021-01-01

## 2021-01-01 ENCOUNTER — APPOINTMENT (OUTPATIENT)
Dept: CARDIOLOGY | Facility: HOSPITAL | Age: 81
DRG: 377 | End: 2021-01-01
Attending: INTERNAL MEDICINE
Payer: MEDICARE

## 2021-01-01 ENCOUNTER — OFFICE VISIT (OUTPATIENT)
Dept: INTERNAL MEDICINE | Facility: CLINIC | Age: 81
End: 2021-01-01
Payer: MEDICARE

## 2021-01-01 ENCOUNTER — COMMUNICATION - HEALTHEAST (OUTPATIENT)
Dept: SCHEDULING | Facility: CLINIC | Age: 81
End: 2021-01-01

## 2021-01-01 ENCOUNTER — TELEPHONE (OUTPATIENT)
Dept: PODIATRY | Facility: CLINIC | Age: 81
End: 2021-01-01

## 2021-01-01 ENCOUNTER — DOCUMENTATION ONLY (OUTPATIENT)
Dept: VASCULAR SURGERY | Facility: CLINIC | Age: 81
End: 2021-01-01

## 2021-01-01 ENCOUNTER — AMBULATORY - HEALTHEAST (OUTPATIENT)
Dept: NURSING | Facility: CLINIC | Age: 81
End: 2021-01-01

## 2021-01-01 ENCOUNTER — APPOINTMENT (OUTPATIENT)
Dept: ULTRASOUND IMAGING | Facility: HOSPITAL | Age: 81
DRG: 474 | End: 2021-01-01
Attending: INTERNAL MEDICINE
Payer: MEDICARE

## 2021-01-01 ENCOUNTER — PREP FOR PROCEDURE (OUTPATIENT)
Dept: PODIATRY | Facility: CLINIC | Age: 81
End: 2021-01-01

## 2021-01-01 ENCOUNTER — APPOINTMENT (OUTPATIENT)
Dept: RADIOLOGY | Facility: HOSPITAL | Age: 81
DRG: 377 | End: 2021-01-01
Attending: EMERGENCY MEDICINE
Payer: MEDICARE

## 2021-01-01 ENCOUNTER — DOCUMENTATION ONLY (OUTPATIENT)
Dept: PODIATRY | Facility: CLINIC | Age: 81
End: 2021-01-01

## 2021-01-01 ENCOUNTER — PATIENT OUTREACH (OUTPATIENT)
Dept: CARE COORDINATION | Facility: CLINIC | Age: 81
End: 2021-01-01

## 2021-01-01 ENCOUNTER — APPOINTMENT (OUTPATIENT)
Dept: CT IMAGING | Facility: HOSPITAL | Age: 81
DRG: 474 | End: 2021-01-01
Attending: INTERNAL MEDICINE
Payer: MEDICARE

## 2021-01-01 ENCOUNTER — ANESTHESIA (OUTPATIENT)
Dept: SURGERY | Facility: HOSPITAL | Age: 81
DRG: 474 | End: 2021-01-01
Payer: MEDICARE

## 2021-01-01 ENCOUNTER — RECORDS - HEALTHEAST (OUTPATIENT)
Dept: ADMINISTRATIVE | Facility: OTHER | Age: 81
End: 2021-01-01

## 2021-01-01 ENCOUNTER — APPOINTMENT (OUTPATIENT)
Dept: MRI IMAGING | Facility: HOSPITAL | Age: 81
DRG: 377 | End: 2021-01-01
Attending: PSYCHIATRY & NEUROLOGY
Payer: MEDICARE

## 2021-01-01 ENCOUNTER — HEALTH MAINTENANCE LETTER (OUTPATIENT)
Age: 81
End: 2021-01-01

## 2021-01-01 ENCOUNTER — COMMUNICATION - HEALTHEAST (OUTPATIENT)
Dept: ADMINISTRATIVE | Facility: CLINIC | Age: 81
End: 2021-01-01

## 2021-01-01 ENCOUNTER — APPOINTMENT (OUTPATIENT)
Dept: CT IMAGING | Facility: HOSPITAL | Age: 81
DRG: 377 | End: 2021-01-01
Payer: MEDICARE

## 2021-01-01 ENCOUNTER — TRANSFERRED RECORDS (OUTPATIENT)
Dept: HEALTH INFORMATION MANAGEMENT | Facility: CLINIC | Age: 81
End: 2021-01-01

## 2021-01-01 ENCOUNTER — LAB (OUTPATIENT)
Dept: FAMILY MEDICINE | Facility: CLINIC | Age: 81
End: 2021-01-01
Attending: PODIATRIST
Payer: MEDICARE

## 2021-01-01 ENCOUNTER — APPOINTMENT (OUTPATIENT)
Dept: RADIOLOGY | Facility: HOSPITAL | Age: 81
DRG: 377 | End: 2021-01-01
Payer: MEDICARE

## 2021-01-01 ENCOUNTER — TELEPHONE (OUTPATIENT)
Dept: ANTICOAGULATION | Facility: CLINIC | Age: 81
End: 2021-01-01

## 2021-01-01 ENCOUNTER — APPOINTMENT (OUTPATIENT)
Dept: RADIOLOGY | Facility: HOSPITAL | Age: 81
DRG: 377 | End: 2021-01-01
Attending: PSYCHIATRY & NEUROLOGY
Payer: MEDICARE

## 2021-01-01 ENCOUNTER — APPOINTMENT (OUTPATIENT)
Dept: CT IMAGING | Facility: HOSPITAL | Age: 81
DRG: 377 | End: 2021-01-01
Attending: INTERNAL MEDICINE
Payer: MEDICARE

## 2021-01-01 VITALS — HEART RATE: 72 BPM | SYSTOLIC BLOOD PRESSURE: 128 MMHG | OXYGEN SATURATION: 97 % | DIASTOLIC BLOOD PRESSURE: 60 MMHG

## 2021-01-01 VITALS — WEIGHT: 151.3 LBS | BODY MASS INDEX: 26.8 KG/M2 | WEIGHT: 150.8 LBS | BODY MASS INDEX: 26.71 KG/M2

## 2021-01-01 VITALS
TEMPERATURE: 98.3 F | OXYGEN SATURATION: 98 % | SYSTOLIC BLOOD PRESSURE: 110 MMHG | DIASTOLIC BLOOD PRESSURE: 70 MMHG | HEART RATE: 115 BPM | RESPIRATION RATE: 20 BRPM

## 2021-01-01 VITALS — BODY MASS INDEX: 29.41 KG/M2 | WEIGHT: 166 LBS

## 2021-01-01 VITALS
TEMPERATURE: 97.8 F | HEIGHT: 60 IN | SYSTOLIC BLOOD PRESSURE: 121 MMHG | RESPIRATION RATE: 18 BRPM | WEIGHT: 149.4 LBS | DIASTOLIC BLOOD PRESSURE: 82 MMHG | HEART RATE: 117 BPM | OXYGEN SATURATION: 97 % | BODY MASS INDEX: 29.33 KG/M2

## 2021-01-01 VITALS
DIASTOLIC BLOOD PRESSURE: 66 MMHG | HEART RATE: 76 BPM | SYSTOLIC BLOOD PRESSURE: 106 MMHG | WEIGHT: 148 LBS | BODY MASS INDEX: 26.22 KG/M2

## 2021-01-01 VITALS
DIASTOLIC BLOOD PRESSURE: 62 MMHG | SYSTOLIC BLOOD PRESSURE: 128 MMHG | HEART RATE: 101 BPM | WEIGHT: 147 LBS | BODY MASS INDEX: 26.04 KG/M2 | OXYGEN SATURATION: 95 %

## 2021-01-01 VITALS — BODY MASS INDEX: 28.88 KG/M2 | WEIGHT: 163 LBS | HEIGHT: 63 IN

## 2021-01-01 VITALS
WEIGHT: 146.8 LBS | TEMPERATURE: 97.8 F | SYSTOLIC BLOOD PRESSURE: 124 MMHG | DIASTOLIC BLOOD PRESSURE: 66 MMHG | RESPIRATION RATE: 16 BRPM | BODY MASS INDEX: 26.01 KG/M2 | HEART RATE: 84 BPM | HEIGHT: 63 IN

## 2021-01-01 VITALS — HEART RATE: 69 BPM | SYSTOLIC BLOOD PRESSURE: 134 MMHG | DIASTOLIC BLOOD PRESSURE: 78 MMHG | OXYGEN SATURATION: 98 %

## 2021-01-01 VITALS
DIASTOLIC BLOOD PRESSURE: 72 MMHG | BODY MASS INDEX: 25.86 KG/M2 | SYSTOLIC BLOOD PRESSURE: 128 MMHG | HEART RATE: 76 BPM | WEIGHT: 146 LBS | OXYGEN SATURATION: 97 %

## 2021-01-01 VITALS
BODY MASS INDEX: 30.1 KG/M2 | HEART RATE: 128 BPM | RESPIRATION RATE: 24 BRPM | DIASTOLIC BLOOD PRESSURE: 73 MMHG | OXYGEN SATURATION: 92 % | TEMPERATURE: 99 F | SYSTOLIC BLOOD PRESSURE: 150 MMHG | WEIGHT: 154.1 LBS

## 2021-01-01 VITALS
DIASTOLIC BLOOD PRESSURE: 72 MMHG | WEIGHT: 148.2 LBS | SYSTOLIC BLOOD PRESSURE: 168 MMHG | BODY MASS INDEX: 26.25 KG/M2 | HEART RATE: 60 BPM

## 2021-01-01 VITALS — BODY MASS INDEX: 28.84 KG/M2 | WEIGHT: 162.8 LBS | WEIGHT: 157.2 LBS | BODY MASS INDEX: 27.85 KG/M2

## 2021-01-01 VITALS
OXYGEN SATURATION: 95 % | DIASTOLIC BLOOD PRESSURE: 76 MMHG | BODY MASS INDEX: 27.48 KG/M2 | HEART RATE: 114 BPM | WEIGHT: 140 LBS | SYSTOLIC BLOOD PRESSURE: 128 MMHG | TEMPERATURE: 98.2 F | HEIGHT: 60 IN

## 2021-01-01 VITALS
DIASTOLIC BLOOD PRESSURE: 68 MMHG | HEART RATE: 90 BPM | OXYGEN SATURATION: 95 % | SYSTOLIC BLOOD PRESSURE: 134 MMHG | TEMPERATURE: 97.6 F

## 2021-01-01 VITALS — WEIGHT: 167.5 LBS | BODY MASS INDEX: 29.67 KG/M2

## 2021-01-01 VITALS — SYSTOLIC BLOOD PRESSURE: 132 MMHG | OXYGEN SATURATION: 99 % | DIASTOLIC BLOOD PRESSURE: 74 MMHG | HEART RATE: 122 BPM

## 2021-01-01 VITALS
SYSTOLIC BLOOD PRESSURE: 124 MMHG | HEIGHT: 60 IN | HEART RATE: 88 BPM | RESPIRATION RATE: 20 BRPM | TEMPERATURE: 98.1 F | BODY MASS INDEX: 27.48 KG/M2 | DIASTOLIC BLOOD PRESSURE: 68 MMHG | WEIGHT: 140 LBS

## 2021-01-01 VITALS — WEIGHT: 156.8 LBS | BODY MASS INDEX: 27.78 KG/M2 | HEIGHT: 63 IN

## 2021-01-01 VITALS — BODY MASS INDEX: 28.24 KG/M2 | WEIGHT: 159.4 LBS

## 2021-01-01 VITALS — WEIGHT: 155 LBS | BODY MASS INDEX: 27.46 KG/M2

## 2021-01-01 VITALS
BODY MASS INDEX: 25.9 KG/M2 | DIASTOLIC BLOOD PRESSURE: 58 MMHG | WEIGHT: 146.2 LBS | OXYGEN SATURATION: 96 % | SYSTOLIC BLOOD PRESSURE: 116 MMHG | HEART RATE: 78 BPM

## 2021-01-01 VITALS
SYSTOLIC BLOOD PRESSURE: 132 MMHG | HEART RATE: 74 BPM | OXYGEN SATURATION: 98 % | WEIGHT: 147 LBS | BODY MASS INDEX: 26.04 KG/M2 | DIASTOLIC BLOOD PRESSURE: 68 MMHG

## 2021-01-01 VITALS
HEART RATE: 80 BPM | SYSTOLIC BLOOD PRESSURE: 118 MMHG | BODY MASS INDEX: 26.11 KG/M2 | DIASTOLIC BLOOD PRESSURE: 60 MMHG | WEIGHT: 147.38 LBS

## 2021-01-01 VITALS
HEART RATE: 61 BPM | TEMPERATURE: 97.9 F | SYSTOLIC BLOOD PRESSURE: 138 MMHG | OXYGEN SATURATION: 96 % | DIASTOLIC BLOOD PRESSURE: 76 MMHG

## 2021-01-01 VITALS — WEIGHT: 156.9 LBS | BODY MASS INDEX: 27.79 KG/M2

## 2021-01-01 VITALS — BODY MASS INDEX: 29.44 KG/M2 | WEIGHT: 166.2 LBS

## 2021-01-01 VITALS — BODY MASS INDEX: 26.71 KG/M2 | HEIGHT: 63 IN | WEIGHT: 150.8 LBS | BODY MASS INDEX: 26.22 KG/M2

## 2021-01-01 VITALS — BODY MASS INDEX: 30.93 KG/M2 | WEIGHT: 161 LBS

## 2021-01-01 VITALS
SYSTOLIC BLOOD PRESSURE: 132 MMHG | DIASTOLIC BLOOD PRESSURE: 70 MMHG | WEIGHT: 147 LBS | OXYGEN SATURATION: 97 % | HEART RATE: 88 BPM

## 2021-01-01 VITALS — DIASTOLIC BLOOD PRESSURE: 76 MMHG | OXYGEN SATURATION: 96 % | HEART RATE: 63 BPM | SYSTOLIC BLOOD PRESSURE: 132 MMHG

## 2021-01-01 VITALS — WEIGHT: 155.6 LBS | BODY MASS INDEX: 27.56 KG/M2

## 2021-01-01 VITALS — WEIGHT: 155 LBS | HEIGHT: 63 IN | BODY MASS INDEX: 27.46 KG/M2

## 2021-01-01 VITALS — BODY MASS INDEX: 27.97 KG/M2 | WEIGHT: 157.9 LBS

## 2021-01-01 VITALS — HEIGHT: 63 IN | WEIGHT: 155 LBS | BODY MASS INDEX: 27.46 KG/M2 | WEIGHT: 155 LBS | BODY MASS INDEX: 27.46 KG/M2

## 2021-01-01 VITALS — WEIGHT: 159.5 LBS | BODY MASS INDEX: 29.17 KG/M2

## 2021-01-01 VITALS — HEART RATE: 70 BPM | DIASTOLIC BLOOD PRESSURE: 60 MMHG | OXYGEN SATURATION: 97 % | SYSTOLIC BLOOD PRESSURE: 132 MMHG

## 2021-01-01 VITALS
WEIGHT: 142.4 LBS | DIASTOLIC BLOOD PRESSURE: 58 MMHG | OXYGEN SATURATION: 98 % | HEART RATE: 74 BPM | SYSTOLIC BLOOD PRESSURE: 116 MMHG | BODY MASS INDEX: 25.23 KG/M2

## 2021-01-01 VITALS
DIASTOLIC BLOOD PRESSURE: 65 MMHG | OXYGEN SATURATION: 94 % | SYSTOLIC BLOOD PRESSURE: 115 MMHG | TEMPERATURE: 97.6 F | BODY MASS INDEX: 26.37 KG/M2 | WEIGHT: 148.89 LBS | RESPIRATION RATE: 10 BRPM | HEART RATE: 115 BPM

## 2021-01-01 VITALS — HEIGHT: 61 IN | BODY MASS INDEX: 30.43 KG/M2

## 2021-01-01 VITALS
SYSTOLIC BLOOD PRESSURE: 128 MMHG | OXYGEN SATURATION: 98 % | DIASTOLIC BLOOD PRESSURE: 62 MMHG | HEIGHT: 63 IN | BODY MASS INDEX: 28.35 KG/M2 | WEIGHT: 160 LBS | HEART RATE: 77 BPM

## 2021-01-01 VITALS — BODY MASS INDEX: 28.17 KG/M2 | WEIGHT: 159 LBS | HEIGHT: 63 IN

## 2021-01-01 VITALS — BODY MASS INDEX: 30.26 KG/M2 | HEIGHT: 61 IN | WEIGHT: 160.3 LBS

## 2021-01-01 VITALS — HEART RATE: 96 BPM | SYSTOLIC BLOOD PRESSURE: 122 MMHG | TEMPERATURE: 97.9 F | DIASTOLIC BLOOD PRESSURE: 70 MMHG

## 2021-01-01 VITALS
HEART RATE: 77 BPM | BODY MASS INDEX: 26.75 KG/M2 | WEIGHT: 151 LBS | SYSTOLIC BLOOD PRESSURE: 134 MMHG | DIASTOLIC BLOOD PRESSURE: 72 MMHG | OXYGEN SATURATION: 97 %

## 2021-01-01 VITALS — WEIGHT: 158.4 LBS | BODY MASS INDEX: 29.91 KG/M2 | HEIGHT: 61 IN

## 2021-01-01 VITALS
BODY MASS INDEX: 28.17 KG/M2 | WEIGHT: 159 LBS | WEIGHT: 160 LBS | HEIGHT: 63 IN | BODY MASS INDEX: 28.17 KG/M2 | BODY MASS INDEX: 28.34 KG/M2

## 2021-01-01 VITALS — WEIGHT: 159.2 LBS | BODY MASS INDEX: 28.2 KG/M2

## 2021-01-01 VITALS
OXYGEN SATURATION: 97 % | BODY MASS INDEX: 26.7 KG/M2 | WEIGHT: 150.7 LBS | SYSTOLIC BLOOD PRESSURE: 134 MMHG | HEART RATE: 126 BPM | DIASTOLIC BLOOD PRESSURE: 72 MMHG

## 2021-01-01 VITALS — HEIGHT: 63 IN | WEIGHT: 153 LBS | BODY MASS INDEX: 27.11 KG/M2

## 2021-01-01 VITALS — HEART RATE: 72 BPM | SYSTOLIC BLOOD PRESSURE: 134 MMHG | OXYGEN SATURATION: 97 % | DIASTOLIC BLOOD PRESSURE: 62 MMHG

## 2021-01-01 VITALS — WEIGHT: 161 LBS | BODY MASS INDEX: 28.52 KG/M2

## 2021-01-01 VITALS — WEIGHT: 155 LBS | BODY MASS INDEX: 27.46 KG/M2 | HEIGHT: 63 IN

## 2021-01-01 VITALS — BODY MASS INDEX: 28.98 KG/M2 | WEIGHT: 163.6 LBS

## 2021-01-01 VITALS — BODY MASS INDEX: 27.19 KG/M2 | WEIGHT: 153.5 LBS

## 2021-01-01 VITALS
WEIGHT: 167 LBS | SYSTOLIC BLOOD PRESSURE: 136 MMHG | OXYGEN SATURATION: 96 % | DIASTOLIC BLOOD PRESSURE: 78 MMHG | BODY MASS INDEX: 29.58 KG/M2 | HEART RATE: 78 BPM

## 2021-01-01 VITALS — BODY MASS INDEX: 27.46 KG/M2 | HEIGHT: 63 IN | WEIGHT: 155 LBS

## 2021-01-01 VITALS — HEIGHT: 63 IN | BODY MASS INDEX: 29.41 KG/M2 | WEIGHT: 166 LBS

## 2021-01-01 VITALS
BODY MASS INDEX: 29.18 KG/M2 | SYSTOLIC BLOOD PRESSURE: 99 MMHG | WEIGHT: 149.4 LBS | RESPIRATION RATE: 18 BRPM | OXYGEN SATURATION: 96 % | TEMPERATURE: 98.1 F | DIASTOLIC BLOOD PRESSURE: 58 MMHG | HEART RATE: 121 BPM

## 2021-01-01 VITALS — BODY MASS INDEX: 29.74 KG/M2 | WEIGHT: 157.4 LBS

## 2021-01-01 VITALS — WEIGHT: 163 LBS | BODY MASS INDEX: 28.87 KG/M2

## 2021-01-01 VITALS — BODY MASS INDEX: 29.21 KG/M2 | WEIGHT: 159.7 LBS

## 2021-01-01 VITALS — HEART RATE: 94 BPM | DIASTOLIC BLOOD PRESSURE: 70 MMHG | OXYGEN SATURATION: 95 % | SYSTOLIC BLOOD PRESSURE: 126 MMHG

## 2021-01-01 VITALS
HEART RATE: 99 BPM | BODY MASS INDEX: 26.22 KG/M2 | OXYGEN SATURATION: 94 % | WEIGHT: 148 LBS | SYSTOLIC BLOOD PRESSURE: 134 MMHG | DIASTOLIC BLOOD PRESSURE: 72 MMHG

## 2021-01-01 VITALS — WEIGHT: 147.6 LBS | BODY MASS INDEX: 26.15 KG/M2

## 2021-01-01 VITALS — WEIGHT: 153.3 LBS | BODY MASS INDEX: 27.16 KG/M2

## 2021-01-01 VITALS — BODY MASS INDEX: 27.55 KG/M2 | WEIGHT: 155.5 LBS

## 2021-01-01 VITALS — HEART RATE: 72 BPM | DIASTOLIC BLOOD PRESSURE: 68 MMHG | SYSTOLIC BLOOD PRESSURE: 130 MMHG | OXYGEN SATURATION: 97 %

## 2021-01-01 VITALS — WEIGHT: 162 LBS | BODY MASS INDEX: 28.7 KG/M2

## 2021-01-01 VITALS
SYSTOLIC BLOOD PRESSURE: 128 MMHG | OXYGEN SATURATION: 95 % | DIASTOLIC BLOOD PRESSURE: 76 MMHG | WEIGHT: 152 LBS | BODY MASS INDEX: 26.93 KG/M2 | HEART RATE: 77 BPM

## 2021-01-01 VITALS — WEIGHT: 150.3 LBS | BODY MASS INDEX: 26.62 KG/M2

## 2021-01-01 VITALS — WEIGHT: 142 LBS | BODY MASS INDEX: 25.15 KG/M2

## 2021-01-01 VITALS — BODY MASS INDEX: 29.3 KG/M2 | WEIGHT: 165.4 LBS

## 2021-01-01 VITALS — BODY MASS INDEX: 27.63 KG/M2 | WEIGHT: 156 LBS

## 2021-01-01 DIAGNOSIS — Z95.2 S/P MITRAL VALVE REPLACEMENT: ICD-10-CM

## 2021-01-01 DIAGNOSIS — Z20.822 CONTACT WITH AND (SUSPECTED) EXPOSURE TO COVID-19: ICD-10-CM

## 2021-01-01 DIAGNOSIS — M62.81 GENERALIZED MUSCLE WEAKNESS: ICD-10-CM

## 2021-01-01 DIAGNOSIS — I48.0 INTERMITTENT ATRIAL FIBRILLATION (H): ICD-10-CM

## 2021-01-01 DIAGNOSIS — E03.9 ACQUIRED HYPOTHYROIDISM: ICD-10-CM

## 2021-01-01 DIAGNOSIS — D69.9 BLEEDING DIATHESIS (H): ICD-10-CM

## 2021-01-01 DIAGNOSIS — M86.9 OSTEOMYELITIS OF SECOND TOE OF LEFT FOOT (H): ICD-10-CM

## 2021-01-01 DIAGNOSIS — I48.91 ATRIAL FIBRILLATION WITH RAPID VENTRICULAR RESPONSE (H): ICD-10-CM

## 2021-01-01 DIAGNOSIS — I35.0 MODERATE AORTIC STENOSIS: ICD-10-CM

## 2021-01-01 DIAGNOSIS — I87.2 VENOUS STASIS DERMATITIS OF BOTH LOWER EXTREMITIES: Primary | ICD-10-CM

## 2021-01-01 DIAGNOSIS — G89.4 CHRONIC PAIN SYNDROME: ICD-10-CM

## 2021-01-01 DIAGNOSIS — I07.1 MODERATE TRICUSPID INSUFFICIENCY: Primary | ICD-10-CM

## 2021-01-01 DIAGNOSIS — D48.5 NEOPLASM OF UNCERTAIN BEHAVIOR OF SKIN: ICD-10-CM

## 2021-01-01 DIAGNOSIS — N18.31 STAGE 3A CHRONIC KIDNEY DISEASE (H): ICD-10-CM

## 2021-01-01 DIAGNOSIS — I48.0 PAROXYSMAL ATRIAL FIBRILLATION (H): ICD-10-CM

## 2021-01-01 DIAGNOSIS — Z79.01 ANTICOAGULATED ON COUMADIN: ICD-10-CM

## 2021-01-01 DIAGNOSIS — G30.1 LATE ONSET ALZHEIMER'S DEMENTIA WITH BEHAVIORAL DISTURBANCE (H): Primary | ICD-10-CM

## 2021-01-01 DIAGNOSIS — S99.922A FOOT INJURY, LEFT, INITIAL ENCOUNTER: ICD-10-CM

## 2021-01-01 DIAGNOSIS — M86.9 OSTEOMYELITIS OF ANKLE AND FOOT (H): Primary | ICD-10-CM

## 2021-01-01 DIAGNOSIS — Z95.2 S/P MITRAL VALVE REPLACEMENT: Primary | ICD-10-CM

## 2021-01-01 DIAGNOSIS — E11.9 DIABETES MELLITUS TYPE 2, INSULIN DEPENDENT (H): ICD-10-CM

## 2021-01-01 DIAGNOSIS — I48.92 ATRIAL FLUTTER, UNSPECIFIED TYPE (H): ICD-10-CM

## 2021-01-01 DIAGNOSIS — R22.9 SKIN NODULE: ICD-10-CM

## 2021-01-01 DIAGNOSIS — R00.0 TACHYCARDIA: ICD-10-CM

## 2021-01-01 DIAGNOSIS — M86.9 OSTEOMYELITIS OF LEFT FOOT, UNSPECIFIED TYPE (H): Primary | ICD-10-CM

## 2021-01-01 DIAGNOSIS — K59.00 CONSTIPATION, UNSPECIFIED CONSTIPATION TYPE: ICD-10-CM

## 2021-01-01 DIAGNOSIS — R80.9 TYPE 2 DIABETES MELLITUS WITH MICROALBUMINURIA, WITH LONG-TERM CURRENT USE OF INSULIN (H): ICD-10-CM

## 2021-01-01 DIAGNOSIS — S90.822A BLISTER OF LEFT FOOT, INITIAL ENCOUNTER: ICD-10-CM

## 2021-01-01 DIAGNOSIS — Z01.810 PREOPERATIVE CARDIOVASCULAR EXAMINATION: Primary | ICD-10-CM

## 2021-01-01 DIAGNOSIS — Z51.81 ENCOUNTER FOR THERAPEUTIC DRUG LEVEL MONITORING: ICD-10-CM

## 2021-01-01 DIAGNOSIS — L97.521 SKIN ULCER OF TOE OF LEFT FOOT, LIMITED TO BREAKDOWN OF SKIN (H): ICD-10-CM

## 2021-01-01 DIAGNOSIS — R60.0 BILATERAL LOWER EXTREMITY EDEMA: ICD-10-CM

## 2021-01-01 DIAGNOSIS — R14.0 ABDOMINAL BLOATING: ICD-10-CM

## 2021-01-01 DIAGNOSIS — Z11.59 ENCOUNTER FOR SCREENING FOR OTHER VIRAL DISEASES: ICD-10-CM

## 2021-01-01 DIAGNOSIS — E11.29 TYPE 2 DIABETES MELLITUS WITH MICROALBUMINURIA, WITH LONG-TERM CURRENT USE OF INSULIN (H): ICD-10-CM

## 2021-01-01 DIAGNOSIS — I34.2 NONRHEUMATIC MITRAL VALVE STENOSIS: ICD-10-CM

## 2021-01-01 DIAGNOSIS — Z79.4 TYPE 2 DIABETES MELLITUS WITH MICROALBUMINURIA, WITH LONG-TERM CURRENT USE OF INSULIN (H): ICD-10-CM

## 2021-01-01 DIAGNOSIS — I10 ESSENTIAL HYPERTENSION: ICD-10-CM

## 2021-01-01 DIAGNOSIS — M79.605 BILATERAL LEG PAIN: ICD-10-CM

## 2021-01-01 DIAGNOSIS — I73.9 PAD (PERIPHERAL ARTERY DISEASE) (H): ICD-10-CM

## 2021-01-01 DIAGNOSIS — L97.524 DIABETIC ULCER OF TOE OF LEFT FOOT ASSOCIATED WITH TYPE 2 DIABETES MELLITUS, WITH NECROSIS OF BONE (H): ICD-10-CM

## 2021-01-01 DIAGNOSIS — E11.621 DIABETIC ULCER OF TOE OF LEFT FOOT ASSOCIATED WITH TYPE 2 DIABETES MELLITUS, WITH NECROSIS OF BONE (H): ICD-10-CM

## 2021-01-01 DIAGNOSIS — E11.621 DIABETIC ULCER OF TOE OF LEFT FOOT ASSOCIATED WITH TYPE 2 DIABETES MELLITUS, WITH NECROSIS OF BONE (H): Primary | ICD-10-CM

## 2021-01-01 DIAGNOSIS — R53.81 PHYSICAL DECONDITIONING: ICD-10-CM

## 2021-01-01 DIAGNOSIS — F02.818 LATE ONSET ALZHEIMER'S DEMENTIA WITH BEHAVIORAL DISTURBANCE (H): Primary | ICD-10-CM

## 2021-01-01 DIAGNOSIS — I87.2 VENOUS STASIS DERMATITIS OF BOTH LOWER EXTREMITIES: ICD-10-CM

## 2021-01-01 DIAGNOSIS — M79.604 BILATERAL LEG PAIN: ICD-10-CM

## 2021-01-01 DIAGNOSIS — Z89.422 HISTORY OF PARTIAL RAY AMPUTATION OF SECOND TOE OF LEFT FOOT (H): ICD-10-CM

## 2021-01-01 DIAGNOSIS — L89.620 PRESSURE INJURY OF LEFT HEEL, UNSTAGEABLE (H): ICD-10-CM

## 2021-01-01 DIAGNOSIS — G93.40 ENCEPHALOPATHY: ICD-10-CM

## 2021-01-01 DIAGNOSIS — L97.524 DIABETIC ULCER OF TOE OF LEFT FOOT ASSOCIATED WITH TYPE 2 DIABETES MELLITUS, WITH NECROSIS OF BONE (H): Primary | ICD-10-CM

## 2021-01-01 DIAGNOSIS — I07.1 MODERATE TRICUSPID INSUFFICIENCY: ICD-10-CM

## 2021-01-01 DIAGNOSIS — R06.02 SOB (SHORTNESS OF BREATH): ICD-10-CM

## 2021-01-01 DIAGNOSIS — N18.30 STAGE 3 CHRONIC KIDNEY DISEASE, UNSPECIFIED WHETHER STAGE 3A OR 3B CKD (H): ICD-10-CM

## 2021-01-01 DIAGNOSIS — M86.9 OSTEOMYELITIS OF ANKLE AND FOOT (H): ICD-10-CM

## 2021-01-01 DIAGNOSIS — L03.032 CELLULITIS OF LEFT TOE: ICD-10-CM

## 2021-01-01 DIAGNOSIS — F11.90 CHRONIC, CONTINUOUS USE OF OPIOIDS: ICD-10-CM

## 2021-01-01 DIAGNOSIS — I10 ESSENTIAL HYPERTENSION: Chronic | ICD-10-CM

## 2021-01-01 DIAGNOSIS — E11.42 TYPE 2 DIABETES MELLITUS WITH DIABETIC POLYNEUROPATHY, WITH LONG-TERM CURRENT USE OF INSULIN (H): ICD-10-CM

## 2021-01-01 DIAGNOSIS — T81.49XA WOUND, SURGICAL, INFECTED: ICD-10-CM

## 2021-01-01 DIAGNOSIS — Z79.01 ANTICOAGULATION GOAL OF INR 2.5 TO 3.5: ICD-10-CM

## 2021-01-01 DIAGNOSIS — I48.0 PAROXYSMAL ATRIAL FIBRILLATION WITH RVR (H): ICD-10-CM

## 2021-01-01 DIAGNOSIS — Z79.4 DIABETES MELLITUS TYPE 2, INSULIN DEPENDENT (H): ICD-10-CM

## 2021-01-01 DIAGNOSIS — D50.0 IRON DEFICIENCY ANEMIA DUE TO CHRONIC BLOOD LOSS: ICD-10-CM

## 2021-01-01 DIAGNOSIS — M86.9 OSTEOMYELITIS OF GREAT TOE (H): ICD-10-CM

## 2021-01-01 DIAGNOSIS — L03.032 CELLULITIS OF SECOND TOE OF LEFT FOOT: ICD-10-CM

## 2021-01-01 DIAGNOSIS — Z86.69 HISTORY OF ENCEPHALOPATHY: ICD-10-CM

## 2021-01-01 DIAGNOSIS — I35.0 MODERATE TO SEVERE AORTIC STENOSIS: ICD-10-CM

## 2021-01-01 DIAGNOSIS — K92.2 UPPER GI BLEED: ICD-10-CM

## 2021-01-01 DIAGNOSIS — I25.10 ASCVD (ARTERIOSCLEROTIC CARDIOVASCULAR DISEASE): ICD-10-CM

## 2021-01-01 DIAGNOSIS — I48.91 ATRIAL FIBRILLATION WITH RAPID VENTRICULAR RESPONSE (H): Primary | ICD-10-CM

## 2021-01-01 DIAGNOSIS — E11.59 TYPE 2 DIABETES MELLITUS WITH OTHER CIRCULATORY COMPLICATIONS (H): ICD-10-CM

## 2021-01-01 DIAGNOSIS — Z51.81 ANTICOAGULATION GOAL OF INR 2.5 TO 3.5: ICD-10-CM

## 2021-01-01 DIAGNOSIS — Z79.4 TYPE 2 DIABETES MELLITUS WITH DIABETIC POLYNEUROPATHY, WITH LONG-TERM CURRENT USE OF INSULIN (H): ICD-10-CM

## 2021-01-01 DIAGNOSIS — M86.9 OSTEOMYELITIS OF TOE OF LEFT FOOT (H): Primary | ICD-10-CM

## 2021-01-01 LAB
ABO/RH(D): NORMAL
ALBUMIN SERPL-MCNC: 2.7 G/DL (ref 3.5–5)
ALBUMIN SERPL-MCNC: 2.9 G/DL (ref 3.5–5)
ALBUMIN SERPL-MCNC: 2.9 G/DL (ref 3.5–5)
ALBUMIN SERPL-MCNC: 3.1 G/DL (ref 3.5–5)
ALBUMIN UR-MCNC: 10 MG/DL
ALBUMIN UR-MCNC: NEGATIVE MG/DL
ALP SERPL-CCNC: 52 U/L (ref 45–120)
ALP SERPL-CCNC: 55 U/L (ref 45–120)
ALP SERPL-CCNC: 58 U/L (ref 45–120)
ALP SERPL-CCNC: 65 U/L (ref 45–120)
ALT SERPL W P-5'-P-CCNC: 14 U/L (ref 0–45)
ALT SERPL W P-5'-P-CCNC: 16 U/L (ref 0–45)
ALT SERPL W P-5'-P-CCNC: 19 U/L (ref 0–45)
ALT SERPL W P-5'-P-CCNC: <9 U/L (ref 0–45)
AMPHETAMINES UR QL SCN: ABNORMAL
ANION GAP SERPL CALCULATED.3IONS-SCNC: 10 MMOL/L (ref 5–18)
ANION GAP SERPL CALCULATED.3IONS-SCNC: 10 MMOL/L (ref 5–18)
ANION GAP SERPL CALCULATED.3IONS-SCNC: 12 MMOL/L (ref 5–18)
ANION GAP SERPL CALCULATED.3IONS-SCNC: 12 MMOL/L (ref 5–18)
ANION GAP SERPL CALCULATED.3IONS-SCNC: 13 MMOL/L (ref 5–18)
ANION GAP SERPL CALCULATED.3IONS-SCNC: 14 MMOL/L (ref 5–18)
ANION GAP SERPL CALCULATED.3IONS-SCNC: 15 MMOL/L (ref 5–18)
ANION GAP SERPL CALCULATED.3IONS-SCNC: 6 MMOL/L (ref 5–18)
ANION GAP SERPL CALCULATED.3IONS-SCNC: 6 MMOL/L (ref 5–18)
ANION GAP SERPL CALCULATED.3IONS-SCNC: 8 MMOL/L (ref 5–18)
ANION GAP SERPL CALCULATED.3IONS-SCNC: 8 MMOL/L (ref 5–18)
ANION GAP SERPL CALCULATED.3IONS-SCNC: 9 MMOL/L (ref 5–18)
ANTIBODY SCREEN: NEGATIVE
APPEARANCE UR: ABNORMAL
APPEARANCE UR: CLEAR
AST SERPL W P-5'-P-CCNC: 18 U/L (ref 0–40)
AST SERPL W P-5'-P-CCNC: 23 U/L (ref 0–40)
AST SERPL W P-5'-P-CCNC: 29 U/L (ref 0–40)
AST SERPL W P-5'-P-CCNC: 33 U/L (ref 0–40)
ATRIAL RATE - MUSE: 117 BPM
ATRIAL RATE - MUSE: 117 BPM
ATRIAL RATE - MUSE: 122 BPM
ATRIAL RATE - MUSE: 122 BPM
ATRIAL RATE - MUSE: 129 BPM
ATRIAL RATE - MUSE: 250 BPM
ATRIAL RATE - MUSE: 78 BPM
BACTERIA #/AREA URNS HPF: ABNORMAL /HPF
BACTERIA SPEC CULT: NO GROWTH
BACTERIA SPEC CULT: NORMAL
BARBITURATES UR QL: ABNORMAL
BASE EXCESS BLDA CALC-SCNC: 0 MMOL/L
BASE EXCESS BLDV CALC-SCNC: 0.1 MMOL/L
BASOPHILS # BLD AUTO: 0 10E3/UL (ref 0–0.2)
BASOPHILS NFR BLD AUTO: 0 %
BENZODIAZ UR QL: ABNORMAL
BILIRUB SERPL-MCNC: 0.5 MG/DL (ref 0–1)
BILIRUB SERPL-MCNC: 1 MG/DL (ref 0–1)
BILIRUB SERPL-MCNC: 1 MG/DL (ref 0–1)
BILIRUB SERPL-MCNC: 1.2 MG/DL (ref 0–1)
BILIRUB UR QL STRIP: NEGATIVE
BILIRUB UR QL STRIP: NEGATIVE
BLD PROD TYP BPU: NORMAL
BLOOD COMPONENT TYPE: NORMAL
BNP SERPL-MCNC: 523 PG/ML (ref 0–167)
BUN SERPL-MCNC: 13 MG/DL (ref 8–28)
BUN SERPL-MCNC: 13 MG/DL (ref 8–28)
BUN SERPL-MCNC: 17 MG/DL (ref 8–28)
BUN SERPL-MCNC: 18 MG/DL (ref 8–28)
BUN SERPL-MCNC: 18 MG/DL (ref 8–28)
BUN SERPL-MCNC: 19 MG/DL (ref 8–28)
BUN SERPL-MCNC: 19 MG/DL (ref 8–28)
BUN SERPL-MCNC: 22 MG/DL (ref 8–28)
BUN SERPL-MCNC: 22 MG/DL (ref 8–28)
BUN SERPL-MCNC: 24 MG/DL (ref 8–28)
BUN SERPL-MCNC: 25 MG/DL (ref 8–28)
BUN SERPL-MCNC: 37 MG/DL (ref 8–28)
C DIFF TOX B STL QL: NEGATIVE
C REACTIVE PROTEIN LHE: 1 MG/DL (ref 0–0.8)
CALCIUM SERPL-MCNC: 7.7 MG/DL (ref 8.5–10.5)
CALCIUM SERPL-MCNC: 8.3 MG/DL (ref 8.5–10.5)
CALCIUM SERPL-MCNC: 8.4 MG/DL (ref 8.5–10.5)
CALCIUM SERPL-MCNC: 8.5 MG/DL (ref 8.5–10.5)
CALCIUM SERPL-MCNC: 8.6 MG/DL (ref 8.5–10.5)
CALCIUM SERPL-MCNC: 8.7 MG/DL (ref 8.5–10.5)
CALCIUM SERPL-MCNC: 9 MG/DL (ref 8.5–10.5)
CALCIUM SERPL-MCNC: 9 MG/DL (ref 8.5–10.5)
CALCIUM SERPL-MCNC: 9.1 MG/DL (ref 8.5–10.5)
CALCIUM SERPL-MCNC: 9.2 MG/DL (ref 8.5–10.5)
CANNABINOIDS UR QL SCN: ABNORMAL
CHLORIDE BLD-SCNC: 101 MMOL/L (ref 98–107)
CHLORIDE BLD-SCNC: 102 MMOL/L (ref 98–107)
CHLORIDE BLD-SCNC: 104 MMOL/L (ref 98–107)
CHLORIDE BLD-SCNC: 104 MMOL/L (ref 98–107)
CHLORIDE BLD-SCNC: 105 MMOL/L (ref 98–107)
CHLORIDE BLD-SCNC: 107 MMOL/L (ref 98–107)
CHLORIDE BLD-SCNC: 108 MMOL/L (ref 98–107)
CHLORIDE BLD-SCNC: 108 MMOL/L (ref 98–107)
CHLORIDE BLD-SCNC: 109 MMOL/L (ref 98–107)
CHLORIDE BLD-SCNC: 111 MMOL/L (ref 98–107)
CHLORIDE BLD-SCNC: 114 MMOL/L (ref 98–107)
CHLORIDE BLD-SCNC: 99 MMOL/L (ref 98–107)
CO2 SERPL-SCNC: 20 MMOL/L (ref 22–31)
CO2 SERPL-SCNC: 21 MMOL/L (ref 22–31)
CO2 SERPL-SCNC: 23 MMOL/L (ref 22–31)
CO2 SERPL-SCNC: 23 MMOL/L (ref 22–31)
CO2 SERPL-SCNC: 24 MMOL/L (ref 22–31)
CO2 SERPL-SCNC: 24 MMOL/L (ref 22–31)
CO2 SERPL-SCNC: 25 MMOL/L (ref 22–31)
CO2 SERPL-SCNC: 26 MMOL/L (ref 22–31)
CO2 SERPL-SCNC: 26 MMOL/L (ref 22–31)
CO2 SERPL-SCNC: 27 MMOL/L (ref 22–31)
COCAINE UR QL: ABNORMAL
CODING SYSTEM: NORMAL
COHGB MFR BLD: 95.5 % (ref 95–96)
COLOR UR AUTO: ABNORMAL
COLOR UR AUTO: COLORLESS
CREAT SERPL-MCNC: 0.81 MG/DL (ref 0.6–1.1)
CREAT SERPL-MCNC: 0.82 MG/DL (ref 0.6–1.1)
CREAT SERPL-MCNC: 0.82 MG/DL (ref 0.6–1.1)
CREAT SERPL-MCNC: 0.87 MG/DL (ref 0.6–1.1)
CREAT SERPL-MCNC: 0.9 MG/DL (ref 0.6–1.1)
CREAT SERPL-MCNC: 0.92 MG/DL (ref 0.6–1.1)
CREAT SERPL-MCNC: 0.92 MG/DL (ref 0.6–1.1)
CREAT SERPL-MCNC: 0.95 MG/DL (ref 0.6–1.1)
CREAT SERPL-MCNC: 1 MG/DL (ref 0.6–1.1)
CREAT SERPL-MCNC: 1 MG/DL (ref 0.6–1.1)
CREAT SERPL-MCNC: 1.14 MG/DL (ref 0.6–1.1)
CREAT SERPL-MCNC: 1.17 MG/DL (ref 0.6–1.1)
CREAT SERPL-MCNC: 1.22 MG/DL (ref 0.6–1.1)
CREAT UR-MCNC: 51.1 MG/DL
CROSSMATCH: NORMAL
DIASTOLIC BLOOD PRESSURE - MUSE: NORMAL MMHG
EOSINOPHIL # BLD AUTO: 0.1 10E3/UL (ref 0–0.7)
EOSINOPHIL NFR BLD AUTO: 1 %
ERYTHROCYTE [DISTWIDTH] IN BLOOD BY AUTOMATED COUNT: 15 % (ref 11–14.5)
ERYTHROCYTE [DISTWIDTH] IN BLOOD BY AUTOMATED COUNT: 16.6 % (ref 11–14.5)
ERYTHROCYTE [DISTWIDTH] IN BLOOD BY AUTOMATED COUNT: 18.6 % (ref 10–15)
ERYTHROCYTE [DISTWIDTH] IN BLOOD BY AUTOMATED COUNT: 18.9 % (ref 10–15)
ERYTHROCYTE [DISTWIDTH] IN BLOOD BY AUTOMATED COUNT: 19.4 % (ref 10–15)
ERYTHROCYTE [DISTWIDTH] IN BLOOD BY AUTOMATED COUNT: 19.6 % (ref 10–15)
ERYTHROCYTE [DISTWIDTH] IN BLOOD BY AUTOMATED COUNT: 19.6 % (ref 10–15)
ERYTHROCYTE [DISTWIDTH] IN BLOOD BY AUTOMATED COUNT: 20.1 % (ref 10–15)
ERYTHROCYTE [DISTWIDTH] IN BLOOD BY AUTOMATED COUNT: 20.5 % (ref 10–15)
ERYTHROCYTE [DISTWIDTH] IN BLOOD BY AUTOMATED COUNT: 21.1 % (ref 10–15)
ERYTHROCYTE [SEDIMENTATION RATE] IN BLOOD BY WESTERGREN METHOD: 63 MM/HR (ref 0–20)
GFR SERPL CREATININE-BSD FRML MDRD: 41 ML/MIN/1.73M2
GFR SERPL CREATININE-BSD FRML MDRD: 43 ML/MIN/1.73M2
GFR SERPL CREATININE-BSD FRML MDRD: 45 ML/MIN/1.73M2
GFR SERPL CREATININE-BSD FRML MDRD: 52 ML/MIN/1.73M2
GFR SERPL CREATININE-BSD FRML MDRD: 52 ML/MIN/1.73M2
GFR SERPL CREATININE-BSD FRML MDRD: 56 ML/MIN/1.73M2
GFR SERPL CREATININE-BSD FRML MDRD: 58 ML/MIN/1.73M2
GFR SERPL CREATININE-BSD FRML MDRD: 58 ML/MIN/1.73M2
GFR SERPL CREATININE-BSD FRML MDRD: 59 ML/MIN/1.73M2
GFR SERPL CREATININE-BSD FRML MDRD: 62 ML/MIN/1.73M2
GFR SERPL CREATININE-BSD FRML MDRD: 66 ML/MIN/1.73M2
GFR SERPL CREATININE-BSD FRML MDRD: 66 ML/MIN/1.73M2
GFR SERPL CREATININE-BSD FRML MDRD: 67 ML/MIN/1.73M2
GLUCOSE BLD-MCNC: 109 MG/DL (ref 70–125)
GLUCOSE BLD-MCNC: 127 MG/DL (ref 70–125)
GLUCOSE BLD-MCNC: 144 MG/DL (ref 70–125)
GLUCOSE BLD-MCNC: 182 MG/DL (ref 70–125)
GLUCOSE BLD-MCNC: 209 MG/DL (ref 70–125)
GLUCOSE BLD-MCNC: 216 MG/DL (ref 70–125)
GLUCOSE BLD-MCNC: 280 MG/DL (ref 70–125)
GLUCOSE BLD-MCNC: 310 MG/DL (ref 70–125)
GLUCOSE BLD-MCNC: 362 MG/DL (ref 70–125)
GLUCOSE BLD-MCNC: 68 MG/DL (ref 70–125)
GLUCOSE BLD-MCNC: 72 MG/DL (ref 70–125)
GLUCOSE BLD-MCNC: 92 MG/DL (ref 70–125)
GLUCOSE BLDC GLUCOMTR-MCNC: 100 MG/DL (ref 70–99)
GLUCOSE BLDC GLUCOMTR-MCNC: 106 MG/DL (ref 70–99)
GLUCOSE BLDC GLUCOMTR-MCNC: 108 MG/DL (ref 70–99)
GLUCOSE BLDC GLUCOMTR-MCNC: 110 MG/DL (ref 70–99)
GLUCOSE BLDC GLUCOMTR-MCNC: 111 MG/DL (ref 70–99)
GLUCOSE BLDC GLUCOMTR-MCNC: 118 MG/DL (ref 70–99)
GLUCOSE BLDC GLUCOMTR-MCNC: 118 MG/DL (ref 70–99)
GLUCOSE BLDC GLUCOMTR-MCNC: 122 MG/DL (ref 70–99)
GLUCOSE BLDC GLUCOMTR-MCNC: 128 MG/DL (ref 70–99)
GLUCOSE BLDC GLUCOMTR-MCNC: 134 MG/DL (ref 70–99)
GLUCOSE BLDC GLUCOMTR-MCNC: 138 MG/DL (ref 70–99)
GLUCOSE BLDC GLUCOMTR-MCNC: 139 MG/DL (ref 70–99)
GLUCOSE BLDC GLUCOMTR-MCNC: 143 MG/DL (ref 70–99)
GLUCOSE BLDC GLUCOMTR-MCNC: 143 MG/DL (ref 70–99)
GLUCOSE BLDC GLUCOMTR-MCNC: 145 MG/DL (ref 70–99)
GLUCOSE BLDC GLUCOMTR-MCNC: 146 MG/DL (ref 70–99)
GLUCOSE BLDC GLUCOMTR-MCNC: 150 MG/DL (ref 70–99)
GLUCOSE BLDC GLUCOMTR-MCNC: 150 MG/DL (ref 70–99)
GLUCOSE BLDC GLUCOMTR-MCNC: 152 MG/DL (ref 70–99)
GLUCOSE BLDC GLUCOMTR-MCNC: 153 MG/DL (ref 70–99)
GLUCOSE BLDC GLUCOMTR-MCNC: 162 MG/DL (ref 70–99)
GLUCOSE BLDC GLUCOMTR-MCNC: 163 MG/DL (ref 70–99)
GLUCOSE BLDC GLUCOMTR-MCNC: 163 MG/DL (ref 70–99)
GLUCOSE BLDC GLUCOMTR-MCNC: 164 MG/DL (ref 70–99)
GLUCOSE BLDC GLUCOMTR-MCNC: 165 MG/DL (ref 70–99)
GLUCOSE BLDC GLUCOMTR-MCNC: 166 MG/DL (ref 70–99)
GLUCOSE BLDC GLUCOMTR-MCNC: 167 MG/DL (ref 70–99)
GLUCOSE BLDC GLUCOMTR-MCNC: 167 MG/DL (ref 70–99)
GLUCOSE BLDC GLUCOMTR-MCNC: 171 MG/DL (ref 70–99)
GLUCOSE BLDC GLUCOMTR-MCNC: 173 MG/DL (ref 70–99)
GLUCOSE BLDC GLUCOMTR-MCNC: 173 MG/DL (ref 70–99)
GLUCOSE BLDC GLUCOMTR-MCNC: 174 MG/DL (ref 70–99)
GLUCOSE BLDC GLUCOMTR-MCNC: 174 MG/DL (ref 70–99)
GLUCOSE BLDC GLUCOMTR-MCNC: 176 MG/DL (ref 70–99)
GLUCOSE BLDC GLUCOMTR-MCNC: 179 MG/DL (ref 70–99)
GLUCOSE BLDC GLUCOMTR-MCNC: 183 MG/DL (ref 70–99)
GLUCOSE BLDC GLUCOMTR-MCNC: 185 MG/DL (ref 70–99)
GLUCOSE BLDC GLUCOMTR-MCNC: 186 MG/DL (ref 70–99)
GLUCOSE BLDC GLUCOMTR-MCNC: 190 MG/DL (ref 70–99)
GLUCOSE BLDC GLUCOMTR-MCNC: 192 MG/DL (ref 70–99)
GLUCOSE BLDC GLUCOMTR-MCNC: 193 MG/DL (ref 70–99)
GLUCOSE BLDC GLUCOMTR-MCNC: 194 MG/DL (ref 70–99)
GLUCOSE BLDC GLUCOMTR-MCNC: 195 MG/DL (ref 70–99)
GLUCOSE BLDC GLUCOMTR-MCNC: 199 MG/DL (ref 70–99)
GLUCOSE BLDC GLUCOMTR-MCNC: 200 MG/DL (ref 70–99)
GLUCOSE BLDC GLUCOMTR-MCNC: 201 MG/DL (ref 70–99)
GLUCOSE BLDC GLUCOMTR-MCNC: 209 MG/DL (ref 70–99)
GLUCOSE BLDC GLUCOMTR-MCNC: 210 MG/DL (ref 70–99)
GLUCOSE BLDC GLUCOMTR-MCNC: 210 MG/DL (ref 70–99)
GLUCOSE BLDC GLUCOMTR-MCNC: 212 MG/DL (ref 70–99)
GLUCOSE BLDC GLUCOMTR-MCNC: 215 MG/DL (ref 70–99)
GLUCOSE BLDC GLUCOMTR-MCNC: 218 MG/DL (ref 70–99)
GLUCOSE BLDC GLUCOMTR-MCNC: 221 MG/DL (ref 70–99)
GLUCOSE BLDC GLUCOMTR-MCNC: 222 MG/DL (ref 70–99)
GLUCOSE BLDC GLUCOMTR-MCNC: 224 MG/DL (ref 70–99)
GLUCOSE BLDC GLUCOMTR-MCNC: 236 MG/DL (ref 70–99)
GLUCOSE BLDC GLUCOMTR-MCNC: 237 MG/DL (ref 70–99)
GLUCOSE BLDC GLUCOMTR-MCNC: 237 MG/DL (ref 70–99)
GLUCOSE BLDC GLUCOMTR-MCNC: 238 MG/DL (ref 70–99)
GLUCOSE BLDC GLUCOMTR-MCNC: 238 MG/DL (ref 70–99)
GLUCOSE BLDC GLUCOMTR-MCNC: 248 MG/DL (ref 70–99)
GLUCOSE BLDC GLUCOMTR-MCNC: 250 MG/DL (ref 70–99)
GLUCOSE BLDC GLUCOMTR-MCNC: 254 MG/DL (ref 70–99)
GLUCOSE BLDC GLUCOMTR-MCNC: 256 MG/DL (ref 70–99)
GLUCOSE BLDC GLUCOMTR-MCNC: 263 MG/DL (ref 70–99)
GLUCOSE BLDC GLUCOMTR-MCNC: 265 MG/DL (ref 70–99)
GLUCOSE BLDC GLUCOMTR-MCNC: 289 MG/DL (ref 70–99)
GLUCOSE BLDC GLUCOMTR-MCNC: 291 MG/DL (ref 70–99)
GLUCOSE BLDC GLUCOMTR-MCNC: 294 MG/DL (ref 70–99)
GLUCOSE BLDC GLUCOMTR-MCNC: 297 MG/DL (ref 70–99)
GLUCOSE BLDC GLUCOMTR-MCNC: 299 MG/DL (ref 70–99)
GLUCOSE BLDC GLUCOMTR-MCNC: 304 MG/DL (ref 70–99)
GLUCOSE BLDC GLUCOMTR-MCNC: 306 MG/DL (ref 70–99)
GLUCOSE BLDC GLUCOMTR-MCNC: 315 MG/DL (ref 70–99)
GLUCOSE BLDC GLUCOMTR-MCNC: 324 MG/DL (ref 70–99)
GLUCOSE BLDC GLUCOMTR-MCNC: 338 MG/DL (ref 70–99)
GLUCOSE BLDC GLUCOMTR-MCNC: 341 MG/DL (ref 70–99)
GLUCOSE BLDC GLUCOMTR-MCNC: 343 MG/DL (ref 70–99)
GLUCOSE BLDC GLUCOMTR-MCNC: 353 MG/DL (ref 70–99)
GLUCOSE BLDC GLUCOMTR-MCNC: 391 MG/DL (ref 70–99)
GLUCOSE BLDC GLUCOMTR-MCNC: 57 MG/DL (ref 70–99)
GLUCOSE BLDC GLUCOMTR-MCNC: 64 MG/DL (ref 70–99)
GLUCOSE BLDC GLUCOMTR-MCNC: 72 MG/DL (ref 70–99)
GLUCOSE BLDC GLUCOMTR-MCNC: 73 MG/DL (ref 70–99)
GLUCOSE BLDC GLUCOMTR-MCNC: 75 MG/DL (ref 70–99)
GLUCOSE BLDC GLUCOMTR-MCNC: 77 MG/DL (ref 70–99)
GLUCOSE BLDC GLUCOMTR-MCNC: 77 MG/DL (ref 70–99)
GLUCOSE BLDC GLUCOMTR-MCNC: 80 MG/DL (ref 70–99)
GLUCOSE BLDC GLUCOMTR-MCNC: 83 MG/DL (ref 70–99)
GLUCOSE BLDC GLUCOMTR-MCNC: 93 MG/DL (ref 70–99)
GLUCOSE UR STRIP-MCNC: 100 MG/DL
GLUCOSE UR STRIP-MCNC: NEGATIVE MG/DL
GRAM STAIN RESULT: ABNORMAL
GRAM STAIN RESULT: ABNORMAL
HBA1C MFR BLD: 7.7 %
HBA1C MFR BLD: 9 % (ref 0–5.6)
HCO3 BLD-SCNC: 25 MMOL/L (ref 23–29)
HCO3 BLDV-SCNC: 24 MMOL/L (ref 24–30)
HCT VFR BLD AUTO: 25.4 % (ref 35–47)
HCT VFR BLD AUTO: 29.1 % (ref 35–47)
HCT VFR BLD AUTO: 29.7 % (ref 35–47)
HCT VFR BLD AUTO: 30.7 % (ref 35–47)
HCT VFR BLD AUTO: 31.6 % (ref 35–47)
HCT VFR BLD AUTO: 34.1 % (ref 35–47)
HCT VFR BLD AUTO: 35.2 % (ref 35–47)
HCT VFR BLD AUTO: 35.7 % (ref 35–47)
HCT VFR BLD AUTO: 37 % (ref 35–47)
HCT VFR BLD AUTO: 37.6 % (ref 35–47)
HGB BLD-MCNC: 10.1 G/DL (ref 12–16)
HGB BLD-MCNC: 10.2 G/DL (ref 11.7–15.7)
HGB BLD-MCNC: 10.8 G/DL (ref 11.7–15.7)
HGB BLD-MCNC: 11.2 G/DL (ref 11.7–15.7)
HGB BLD-MCNC: 11.3 G/DL (ref 11.7–15.7)
HGB BLD-MCNC: 11.7 G/DL (ref 12–16)
HGB BLD-MCNC: 7.6 G/DL (ref 11.7–15.7)
HGB BLD-MCNC: 8.1 G/DL (ref 11.7–15.7)
HGB BLD-MCNC: 8.4 G/DL (ref 11.7–15.7)
HGB BLD-MCNC: 8.5 G/DL (ref 11.7–15.7)
HGB BLD-MCNC: 8.6 G/DL (ref 11.7–15.7)
HGB BLD-MCNC: 9.1 G/DL (ref 11.7–15.7)
HGB BLD-MCNC: 9.9 G/DL (ref 11.7–15.7)
HGB UR QL STRIP: NEGATIVE
HGB UR QL STRIP: NEGATIVE
HOLD SPECIMEN: NORMAL
IMM GRANULOCYTES # BLD: 0.2 10E3/UL
IMM GRANULOCYTES NFR BLD: 1 %
INR BLD: 3.5 (ref 0.9–1.1)
INR PPP: 1.54 (ref 0.85–1.15)
INR PPP: 1.57 (ref 0.85–1.15)
INR PPP: 1.79 (ref 0.85–1.15)
INR PPP: 1.9 (ref 0.9–1.1)
INR PPP: 1.9 (ref 0.9–1.15)
INR PPP: 2.11 (ref 0.9–1.15)
INR PPP: 2.45 (ref 0.85–1.15)
INR PPP: 2.52 (ref 0.85–1.15)
INR PPP: 2.52 (ref 0.85–1.15)
INR PPP: 2.54 (ref 0.85–1.15)
INR PPP: 2.62 (ref 0.85–1.15)
INR PPP: 2.8 (ref 0.9–1.1)
INR PPP: 2.9 (ref 0.9–1.1)
INR PPP: 3.1 (ref 0.9–1.1)
INR PPP: 3.12 (ref 0.85–1.15)
INR PPP: 3.18 (ref 0.9–1.15)
INR PPP: 3.18 (ref 0.9–1.15)
INR PPP: 3.19 (ref 0.9–1.15)
INR PPP: 3.2 (ref 0.9–1.1)
INR PPP: 3.2 (ref 0.9–1.1)
INR PPP: 3.37 (ref 0.9–1.15)
INR PPP: 3.6 (ref 0.9–1.1)
INR PPP: 3.72 (ref 0.9–1.15)
INR PPP: 3.9 (ref 0.9–1.1)
INR PPP: 3.9 (ref 0.9–1.1)
INR PPP: 4 (ref 0.9–1.1)
INR PPP: 4.02 (ref 0.85–1.15)
INR PPP: 4.2 (ref 0.9–1.15)
INR PPP: 4.6 (ref 0.9–1.1)
INR PPP: 4.7 (ref 0.85–1.15)
INR PPP: 4.8 (ref 0.9–1.1)
INTERPRETATION ECG - MUSE: NORMAL
KETONES UR STRIP-MCNC: NEGATIVE MG/DL
KETONES UR STRIP-MCNC: NEGATIVE MG/DL
LAB AP CHARGES (HE HISTORICAL CONVERSION): NORMAL
LACTATE SERPL-SCNC: 1 MMOL/L (ref 0.7–2)
LACTATE SERPL-SCNC: 1.2 MMOL/L (ref 0.7–2)
LACTATE SERPL-SCNC: 1.3 MMOL/L (ref 0.7–2)
LACTATE SERPL-SCNC: 1.5 MMOL/L (ref 0.7–2)
LACTATE SERPL-SCNC: 1.6 MMOL/L (ref 0.7–2)
LACTATE SERPL-SCNC: 4 MMOL/L (ref 0.7–2)
LEUKOCYTE ESTERASE UR QL STRIP: ABNORMAL
LEUKOCYTE ESTERASE UR QL STRIP: NEGATIVE
LVEF ECHO: NORMAL
LYMPHOCYTES # BLD AUTO: 1.2 10E3/UL (ref 0.8–5.3)
LYMPHOCYTES NFR BLD AUTO: 8 %
MAGNESIUM SERPL-MCNC: 1.7 MG/DL (ref 1.8–2.6)
MAGNESIUM SERPL-MCNC: 1.7 MG/DL (ref 1.8–2.6)
MAGNESIUM SERPL-MCNC: 1.8 MG/DL (ref 1.8–2.6)
MAGNESIUM SERPL-MCNC: 1.8 MG/DL (ref 1.8–2.6)
MAGNESIUM SERPL-MCNC: 1.9 MG/DL (ref 1.8–2.6)
MAGNESIUM SERPL-MCNC: 2 MG/DL (ref 1.8–2.6)
MAGNESIUM SERPL-MCNC: 2.1 MG/DL (ref 1.8–2.6)
MAGNESIUM SERPL-MCNC: 2.1 MG/DL (ref 1.8–2.6)
MAGNESIUM SERPL-MCNC: 2.2 MG/DL (ref 1.8–2.6)
MAGNESIUM SERPL-MCNC: 2.2 MG/DL (ref 1.8–2.6)
MCH RBC QN AUTO: 25.7 PG (ref 26.5–33)
MCH RBC QN AUTO: 25.8 PG (ref 26.5–33)
MCH RBC QN AUTO: 26 PG (ref 26.5–33)
MCH RBC QN AUTO: 26.1 PG (ref 26.5–33)
MCH RBC QN AUTO: 26.2 PG (ref 27–34)
MCH RBC QN AUTO: 26.4 PG (ref 26.5–33)
MCH RBC QN AUTO: 26.4 PG (ref 27–34)
MCH RBC QN AUTO: 26.8 PG (ref 26.5–33)
MCH RBC QN AUTO: 26.9 PG (ref 26.5–33)
MCH RBC QN AUTO: 27 PG (ref 26.5–33)
MCHC RBC AUTO-ENTMCNC: 28.6 G/DL (ref 31.5–36.5)
MCHC RBC AUTO-ENTMCNC: 28.9 G/DL (ref 31.5–36.5)
MCHC RBC AUTO-ENTMCNC: 29.6 G/DL (ref 31.5–36.5)
MCHC RBC AUTO-ENTMCNC: 29.9 G/DL (ref 31.5–36.5)
MCHC RBC AUTO-ENTMCNC: 29.9 G/DL (ref 31.5–36.5)
MCHC RBC AUTO-ENTMCNC: 30.1 G/DL (ref 31.5–36.5)
MCHC RBC AUTO-ENTMCNC: 30.7 G/DL (ref 31.5–36.5)
MCHC RBC AUTO-ENTMCNC: 31.4 G/DL (ref 31.5–36.5)
MCHC RBC AUTO-ENTMCNC: 31.6 G/DL (ref 32–36)
MCHC RBC AUTO-ENTMCNC: 32 G/DL (ref 32–36)
MCV RBC AUTO: 83 FL (ref 78–100)
MCV RBC AUTO: 83 FL (ref 80–100)
MCV RBC AUTO: 83 FL (ref 80–100)
MCV RBC AUTO: 84 FL (ref 78–100)
MCV RBC AUTO: 86 FL (ref 78–100)
MCV RBC AUTO: 87 FL (ref 78–100)
MCV RBC AUTO: 89 FL (ref 78–100)
MCV RBC AUTO: 90 FL (ref 78–100)
MCV RBC AUTO: 93 FL (ref 78–100)
MCV RBC AUTO: 94 FL (ref 78–100)
METHADONE UR QL SCN: ABNORMAL
MONOCYTES # BLD AUTO: 1 10E3/UL (ref 0–1.3)
MONOCYTES NFR BLD AUTO: 7 %
MUCOUS THREADS #/AREA URNS LPF: PRESENT /LPF
NEUTROPHILS # BLD AUTO: 12.4 10E3/UL (ref 1.6–8.3)
NEUTROPHILS NFR BLD AUTO: 83 %
NITRATE UR QL: NEGATIVE
NITRATE UR QL: NEGATIVE
NRBC # BLD AUTO: 0 10E3/UL
NRBC BLD AUTO-RTO: 0 /100
OPIATES UR QL SCN: ABNORMAL
OXYCODONE UR QL: ABNORMAL
OXYHGB MFR BLD: 93.4 % (ref 95–96)
OXYHGB MFR BLDV: 80.9 % (ref 70–75)
P AXIS - MUSE: NORMAL DEGREES
PATH REPORT.COMMENTS IMP SPEC: NORMAL
PATH REPORT.FINAL DX SPEC: NORMAL
PATH REPORT.GROSS SPEC: NORMAL
PATH REPORT.MICROSCOPIC SPEC OTHER STN: NORMAL
PATH REPORT.RELEVANT HX SPEC: NORMAL
PCO2 BLD: 36 MM HG (ref 35–45)
PCO2 BLDV: 37 MM HG (ref 35–50)
PCP UR QL SCN: ABNORMAL
PH BLD: 7.43 [PH] (ref 7.37–7.44)
PH BLDV: 7.43 [PH] (ref 7.35–7.45)
PH UR STRIP: 5 [PH] (ref 5–7)
PH UR STRIP: 7.5 [PH] (ref 5–7)
PHOTO IMAGE: NORMAL
PHOTO IMAGE: NORMAL
PLATELET # BLD AUTO: 139 10E3/UL (ref 150–450)
PLATELET # BLD AUTO: 154 10E3/UL (ref 150–450)
PLATELET # BLD AUTO: 154 10E3/UL (ref 150–450)
PLATELET # BLD AUTO: 173 10E3/UL (ref 150–450)
PLATELET # BLD AUTO: 179 10E3/UL (ref 150–450)
PLATELET # BLD AUTO: 182 10E3/UL (ref 150–450)
PLATELET # BLD AUTO: 204 10E3/UL (ref 150–450)
PLATELET # BLD AUTO: 207 THOU/UL (ref 140–440)
PLATELET # BLD AUTO: 232 THOU/UL (ref 140–440)
PLATELET # BLD AUTO: 239 10E3/UL (ref 150–450)
PMV BLD AUTO: 11.3 FL (ref 7–10)
PMV BLD AUTO: 12.1 FL (ref 7–10)
PO2 BLD: 74 MM HG (ref 75–85)
PO2 BLDV: 49 MM HG (ref 25–47)
POTASSIUM BLD-SCNC: 2.7 MMOL/L (ref 3.5–5)
POTASSIUM BLD-SCNC: 2.8 MMOL/L (ref 3.5–5)
POTASSIUM BLD-SCNC: 2.9 MMOL/L (ref 3.5–5)
POTASSIUM BLD-SCNC: 3 MMOL/L (ref 3.5–5)
POTASSIUM BLD-SCNC: 3.1 MMOL/L (ref 3.5–5)
POTASSIUM BLD-SCNC: 3.1 MMOL/L (ref 3.5–5)
POTASSIUM BLD-SCNC: 3.2 MMOL/L (ref 3.5–5)
POTASSIUM BLD-SCNC: 3.4 MMOL/L (ref 3.5–5)
POTASSIUM BLD-SCNC: 3.4 MMOL/L (ref 3.5–5)
POTASSIUM BLD-SCNC: 3.5 MMOL/L (ref 3.5–5)
POTASSIUM BLD-SCNC: 3.5 MMOL/L (ref 3.5–5)
POTASSIUM BLD-SCNC: 3.7 MMOL/L (ref 3.5–5)
POTASSIUM BLD-SCNC: 3.7 MMOL/L (ref 3.5–5)
POTASSIUM BLD-SCNC: 3.8 MMOL/L (ref 3.5–5)
POTASSIUM BLD-SCNC: 3.9 MMOL/L (ref 3.5–5)
POTASSIUM BLD-SCNC: 4.1 MMOL/L (ref 3.5–5)
POTASSIUM BLD-SCNC: 4.2 MMOL/L (ref 3.5–5)
POTASSIUM BLD-SCNC: 4.2 MMOL/L (ref 3.5–5)
POTASSIUM BLD-SCNC: 4.4 MMOL/L (ref 3.5–5)
POTASSIUM BLD-SCNC: 4.5 MMOL/L (ref 3.5–5)
POTASSIUM BLD-SCNC: 4.5 MMOL/L (ref 3.5–5)
POTASSIUM BLD-SCNC: 4.8 MMOL/L (ref 3.5–5)
PR INTERVAL - MUSE: 104 MS
PR INTERVAL - MUSE: 136 MS
PR INTERVAL - MUSE: 152 MS
PR INTERVAL - MUSE: NORMAL MS
PROCALCITONIN SERPL-MCNC: 0.06 NG/ML (ref 0–0.49)
PROT SERPL-MCNC: 5.7 G/DL (ref 6–8)
PROT SERPL-MCNC: 6.2 G/DL (ref 6–8)
PROT SERPL-MCNC: 6.3 G/DL (ref 6–8)
PROT SERPL-MCNC: 7.6 G/DL (ref 6–8)
QRS DURATION - MUSE: 76 MS
QRS DURATION - MUSE: 82 MS
QRS DURATION - MUSE: 84 MS
QRS DURATION - MUSE: 84 MS
QRS DURATION - MUSE: 90 MS
QT - MUSE: 342 MS
QT - MUSE: 352 MS
QT - MUSE: 352 MS
QT - MUSE: 358 MS
QT - MUSE: 366 MS
QT - MUSE: 372 MS
QT - MUSE: 378 MS
QTC - MUSE: 439 MS
QTC - MUSE: 486 MS
QTC - MUSE: 489 MS
QTC - MUSE: 501 MS
QTC - MUSE: 501 MS
QTC - MUSE: 510 MS
QTC - MUSE: 528 MS
R AXIS - MUSE: -41 DEGREES
R AXIS - MUSE: -43 DEGREES
R AXIS - MUSE: -52 DEGREES
R AXIS - MUSE: -54 DEGREES
R AXIS - MUSE: -57 DEGREES
R AXIS - MUSE: -69 DEGREES
R AXIS - MUSE: -82 DEGREES
RBC # BLD AUTO: 2.82 10E6/UL (ref 3.8–5.2)
RBC # BLD AUTO: 3.13 10E6/UL (ref 3.8–5.2)
RBC # BLD AUTO: 3.16 10E6/UL (ref 3.8–5.2)
RBC # BLD AUTO: 3.45 10E6/UL (ref 3.8–5.2)
RBC # BLD AUTO: 3.82 MILL/UL (ref 3.8–5.4)
RBC # BLD AUTO: 3.96 10E6/UL (ref 3.8–5.2)
RBC # BLD AUTO: 4.21 10E6/UL (ref 3.8–5.2)
RBC # BLD AUTO: 4.31 10E6/UL (ref 3.8–5.2)
RBC # BLD AUTO: 4.33 10E6/UL (ref 3.8–5.2)
RBC # BLD AUTO: 4.47 MILL/UL (ref 3.8–5.4)
RBC URINE: 2 /HPF
RESULT FLAG (HE HISTORICAL CONVERSION): NORMAL
SAO2 % BLDV: 82.8 % (ref 70–75)
SARS-COV-2 RNA RESP QL NAA+PROBE: NEGATIVE
SARS-COV-2 RNA RESP QL NAA+PROBE: NOT DETECTED
SARS-COV-2 RNA RESP QL NAA+PROBE: NOT DETECTED
SODIUM SERPL-SCNC: 135 MMOL/L (ref 136–145)
SODIUM SERPL-SCNC: 137 MMOL/L (ref 136–145)
SODIUM SERPL-SCNC: 138 MMOL/L (ref 136–145)
SODIUM SERPL-SCNC: 138 MMOL/L (ref 136–145)
SODIUM SERPL-SCNC: 140 MMOL/L (ref 136–145)
SODIUM SERPL-SCNC: 140 MMOL/L (ref 136–145)
SODIUM SERPL-SCNC: 141 MMOL/L (ref 136–145)
SODIUM SERPL-SCNC: 142 MMOL/L (ref 136–145)
SODIUM SERPL-SCNC: 142 MMOL/L (ref 136–145)
SODIUM SERPL-SCNC: 143 MMOL/L (ref 136–145)
SODIUM SERPL-SCNC: 143 MMOL/L (ref 136–145)
SODIUM SERPL-SCNC: 145 MMOL/L (ref 136–145)
SP GR UR STRIP: 1.01 (ref 1–1.03)
SP GR UR STRIP: 1.01 (ref 1–1.03)
SPECIMEN EXPIRATION DATE: NORMAL
SQUAMOUS EPITHELIAL: 1 /HPF
SYSTOLIC BLOOD PRESSURE - MUSE: NORMAL MMHG
T AXIS - MUSE: -63 DEGREES
T AXIS - MUSE: -64 DEGREES
T AXIS - MUSE: -72 DEGREES
T AXIS - MUSE: 0 DEGREES
T AXIS - MUSE: 207 DEGREES
T AXIS - MUSE: 223 DEGREES
T AXIS - MUSE: 61 DEGREES
TEMPERATURE: 37 DEGREES C
TROPONIN I SERPL-MCNC: 0.02 NG/ML (ref 0–0.29)
TROPONIN I SERPL-MCNC: <0.01 NG/ML (ref 0–0.29)
TSH SERPL DL<=0.005 MIU/L-ACNC: 0.21 UIU/ML (ref 0.3–5)
TSH SERPL DL<=0.005 MIU/L-ACNC: 0.44 UIU/ML (ref 0.3–5)
TSH SERPL DL<=0.005 MIU/L-ACNC: 3.84 UIU/ML (ref 0.3–5)
TSH SERPL DL<=0.005 MIU/L-ACNC: 4.55 UIU/ML (ref 0.3–5)
UNIT ABO/RH: NORMAL
UNIT NUMBER: NORMAL
UNIT STATUS: NORMAL
UNIT TYPE ISBT: 5100
UPPER GI ENDOSCOPY: NORMAL
UROBILINOGEN UR STRIP-MCNC: <2 MG/DL
UROBILINOGEN UR STRIP-MCNC: <2 MG/DL
VANCOMYCIN SERPL-MCNC: 14.9 MG/L
VANCOMYCIN SERPL-MCNC: 8.6 MG/L
VENTRICULAR RATE- MUSE: 111 BPM
VENTRICULAR RATE- MUSE: 116 BPM
VENTRICULAR RATE- MUSE: 117 BPM
VENTRICULAR RATE- MUSE: 121 BPM
VENTRICULAR RATE- MUSE: 122 BPM
VENTRICULAR RATE- MUSE: 129 BPM
VENTRICULAR RATE- MUSE: 81 BPM
VIT B1 PYROPHOSHATE BLD-SCNC: 125 NMOL/L
VIT B12 SERPL-MCNC: 653 PG/ML (ref 213–816)
WBC # BLD AUTO: 10.3 10E3/UL (ref 4–11)
WBC # BLD AUTO: 14.7 10E3/UL (ref 4–11)
WBC # BLD AUTO: 6.5 10E3/UL (ref 4–11)
WBC # BLD AUTO: 6.8 10E3/UL (ref 4–11)
WBC # BLD AUTO: 6.8 10E3/UL (ref 4–11)
WBC # BLD AUTO: 7.9 10E3/UL (ref 4–11)
WBC # BLD AUTO: 9.4 10E3/UL (ref 4–11)
WBC # BLD AUTO: 9.5 10E3/UL (ref 4–11)
WBC URINE: 2 /HPF
WBC: 8.4 THOU/UL (ref 4–11)
WBC: 9.3 THOU/UL (ref 4–11)

## 2021-01-01 PROCEDURE — 84132 ASSAY OF SERUM POTASSIUM: CPT | Performed by: STUDENT IN AN ORGANIZED HEALTH CARE EDUCATION/TRAINING PROGRAM

## 2021-01-01 PROCEDURE — 210N000001 HC R&B IMCU HEART CARE

## 2021-01-01 PROCEDURE — 88305 TISSUE EXAM BY PATHOLOGIST: CPT | Mod: TC | Performed by: INTERNAL MEDICINE

## 2021-01-01 PROCEDURE — 99232 SBSQ HOSP IP/OBS MODERATE 35: CPT | Performed by: STUDENT IN AN ORGANIZED HEALTH CARE EDUCATION/TRAINING PROGRAM

## 2021-01-01 PROCEDURE — 99232 SBSQ HOSP IP/OBS MODERATE 35: CPT | Performed by: INTERNAL MEDICINE

## 2021-01-01 PROCEDURE — 82565 ASSAY OF CREATININE: CPT | Performed by: INTERNAL MEDICINE

## 2021-01-01 PROCEDURE — 258N000003 HC RX IP 258 OP 636: Performed by: INTERNAL MEDICINE

## 2021-01-01 PROCEDURE — 120N000001 HC R&B MED SURG/OB

## 2021-01-01 PROCEDURE — 250N000012 HC RX MED GY IP 250 OP 636 PS 637: Performed by: INTERNAL MEDICINE

## 2021-01-01 PROCEDURE — 28122 PARTIAL REMOVAL OF FOOT BONE: CPT | Mod: LT | Performed by: PODIATRIST

## 2021-01-01 PROCEDURE — 88305 TISSUE EXAM BY PATHOLOGIST: CPT | Mod: 26 | Performed by: PATHOLOGY

## 2021-01-01 PROCEDURE — 97535 SELF CARE MNGMENT TRAINING: CPT | Mod: GO

## 2021-01-01 PROCEDURE — 250N000013 HC RX MED GY IP 250 OP 250 PS 637: Performed by: INTERNAL MEDICINE

## 2021-01-01 PROCEDURE — 36415 COLL VENOUS BLD VENIPUNCTURE: CPT | Performed by: INTERNAL MEDICINE

## 2021-01-01 PROCEDURE — 360N000075 HC SURGERY LEVEL 2, PER MIN: Performed by: INTERNAL MEDICINE

## 2021-01-01 PROCEDURE — 97162 PT EVAL MOD COMPLEX 30 MIN: CPT | Mod: GP

## 2021-01-01 PROCEDURE — 93010 ELECTROCARDIOGRAM REPORT: CPT | Mod: HIP | Performed by: INTERNAL MEDICINE

## 2021-01-01 PROCEDURE — 250N000013 HC RX MED GY IP 250 OP 250 PS 637: Performed by: FAMILY MEDICINE

## 2021-01-01 PROCEDURE — 250N000013 HC RX MED GY IP 250 OP 250 PS 637: Performed by: PSYCHIATRY & NEUROLOGY

## 2021-01-01 PROCEDURE — 85610 PROTHROMBIN TIME: CPT | Performed by: INTERNAL MEDICINE

## 2021-01-01 PROCEDURE — 258N000003 HC RX IP 258 OP 636: Performed by: ANESTHESIOLOGY

## 2021-01-01 PROCEDURE — 93005 ELECTROCARDIOGRAM TRACING: CPT

## 2021-01-01 PROCEDURE — 258N000001 HC RX 258: Performed by: INTERNAL MEDICINE

## 2021-01-01 PROCEDURE — 250N000011 HC RX IP 250 OP 636: Performed by: HOSPITALIST

## 2021-01-01 PROCEDURE — 90662 IIV NO PRSV INCREASED AG IM: CPT | Performed by: INTERNAL MEDICINE

## 2021-01-01 PROCEDURE — 84155 ASSAY OF PROTEIN SERUM: CPT | Performed by: STUDENT IN AN ORGANIZED HEALTH CARE EDUCATION/TRAINING PROGRAM

## 2021-01-01 PROCEDURE — 250N000009 HC RX 250: Performed by: FAMILY MEDICINE

## 2021-01-01 PROCEDURE — 93005 ELECTROCARDIOGRAM TRACING: CPT | Performed by: INTERNAL MEDICINE

## 2021-01-01 PROCEDURE — 99233 SBSQ HOSP IP/OBS HIGH 50: CPT | Performed by: INTERNAL MEDICINE

## 2021-01-01 PROCEDURE — 250N000009 HC RX 250: Performed by: INTERNAL MEDICINE

## 2021-01-01 PROCEDURE — 250N000011 HC RX IP 250 OP 636: Performed by: INTERNAL MEDICINE

## 2021-01-01 PROCEDURE — 87635 SARS-COV-2 COVID-19 AMP PRB: CPT | Performed by: INTERNAL MEDICINE

## 2021-01-01 PROCEDURE — 250N000013 HC RX MED GY IP 250 OP 250 PS 637: Performed by: PODIATRIST

## 2021-01-01 PROCEDURE — 71045 X-RAY EXAM CHEST 1 VIEW: CPT

## 2021-01-01 PROCEDURE — 81001 URINALYSIS AUTO W/SCOPE: CPT | Performed by: EMERGENCY MEDICINE

## 2021-01-01 PROCEDURE — 94640 AIRWAY INHALATION TREATMENT: CPT | Mod: 76

## 2021-01-01 PROCEDURE — 94640 AIRWAY INHALATION TREATMENT: CPT

## 2021-01-01 PROCEDURE — 250N000013 HC RX MED GY IP 250 OP 250 PS 637: Performed by: NURSE PRACTITIONER

## 2021-01-01 PROCEDURE — 97530 THERAPEUTIC ACTIVITIES: CPT | Mod: GP

## 2021-01-01 PROCEDURE — 99207 PR CDG-CUT & PASTE-POTENTIAL IMPACT ON LEVEL: CPT | Performed by: STUDENT IN AN ORGANIZED HEALTH CARE EDUCATION/TRAINING PROGRAM

## 2021-01-01 PROCEDURE — 999N000157 HC STATISTIC RCP TIME EA 10 MIN

## 2021-01-01 PROCEDURE — 87205 SMEAR GRAM STAIN: CPT | Performed by: PODIATRIST

## 2021-01-01 PROCEDURE — 84132 ASSAY OF SERUM POTASSIUM: CPT | Performed by: FAMILY MEDICINE

## 2021-01-01 PROCEDURE — 258N000003 HC RX IP 258 OP 636: Performed by: EMERGENCY MEDICINE

## 2021-01-01 PROCEDURE — 83605 ASSAY OF LACTIC ACID: CPT | Performed by: STUDENT IN AN ORGANIZED HEALTH CARE EDUCATION/TRAINING PROGRAM

## 2021-01-01 PROCEDURE — 99358 PROLONG SERVICE W/O CONTACT: CPT | Performed by: FAMILY MEDICINE

## 2021-01-01 PROCEDURE — 99232 SBSQ HOSP IP/OBS MODERATE 35: CPT | Performed by: FAMILY MEDICINE

## 2021-01-01 PROCEDURE — 87635 SARS-COV-2 COVID-19 AMP PRB: CPT | Performed by: STUDENT IN AN ORGANIZED HEALTH CARE EDUCATION/TRAINING PROGRAM

## 2021-01-01 PROCEDURE — 85027 COMPLETE CBC AUTOMATED: CPT | Performed by: INTERNAL MEDICINE

## 2021-01-01 PROCEDURE — C9113 INJ PANTOPRAZOLE SODIUM, VIA: HCPCS | Performed by: EMERGENCY MEDICINE

## 2021-01-01 PROCEDURE — 82805 BLOOD GASES W/O2 SATURATION: CPT | Performed by: INTERNAL MEDICINE

## 2021-01-01 PROCEDURE — 258N000003 HC RX IP 258 OP 636: Performed by: NURSE ANESTHETIST, CERTIFIED REGISTERED

## 2021-01-01 PROCEDURE — 84484 ASSAY OF TROPONIN QUANT: CPT | Performed by: EMERGENCY MEDICINE

## 2021-01-01 PROCEDURE — 36415 COLL VENOUS BLD VENIPUNCTURE: CPT | Performed by: FAMILY MEDICINE

## 2021-01-01 PROCEDURE — 250N000009 HC RX 250: Performed by: NURSE ANESTHETIST, CERTIFIED REGISTERED

## 2021-01-01 PROCEDURE — 99232 SBSQ HOSP IP/OBS MODERATE 35: CPT | Performed by: PSYCHIATRY & NEUROLOGY

## 2021-01-01 PROCEDURE — 83735 ASSAY OF MAGNESIUM: CPT | Performed by: INTERNAL MEDICINE

## 2021-01-01 PROCEDURE — 97110 THERAPEUTIC EXERCISES: CPT | Mod: GO | Performed by: OCCUPATIONAL THERAPIST

## 2021-01-01 PROCEDURE — C9113 INJ PANTOPRAZOLE SODIUM, VIA: HCPCS | Performed by: INTERNAL MEDICINE

## 2021-01-01 PROCEDURE — 84132 ASSAY OF SERUM POTASSIUM: CPT | Performed by: INTERNAL MEDICINE

## 2021-01-01 PROCEDURE — 84484 ASSAY OF TROPONIN QUANT: CPT | Performed by: INTERNAL MEDICINE

## 2021-01-01 PROCEDURE — G0008 ADMIN INFLUENZA VIRUS VAC: HCPCS | Performed by: INTERNAL MEDICINE

## 2021-01-01 PROCEDURE — 36415 COLL VENOUS BLD VENIPUNCTURE: CPT | Performed by: EMERGENCY MEDICINE

## 2021-01-01 PROCEDURE — 36415 COLL VENOUS BLD VENIPUNCTURE: CPT | Performed by: HOSPITALIST

## 2021-01-01 PROCEDURE — 87075 CULTR BACTERIA EXCEPT BLOOD: CPT | Performed by: PODIATRIST

## 2021-01-01 PROCEDURE — 99233 SBSQ HOSP IP/OBS HIGH 50: CPT | Performed by: NURSE PRACTITIONER

## 2021-01-01 PROCEDURE — 96376 TX/PRO/DX INJ SAME DRUG ADON: CPT

## 2021-01-01 PROCEDURE — 81003 URINALYSIS AUTO W/O SCOPE: CPT | Performed by: INTERNAL MEDICINE

## 2021-01-01 PROCEDURE — 92526 ORAL FUNCTION THERAPY: CPT | Mod: GN

## 2021-01-01 PROCEDURE — C9113 INJ PANTOPRAZOLE SODIUM, VIA: HCPCS | Performed by: FAMILY MEDICINE

## 2021-01-01 PROCEDURE — 93306 TTE W/DOPPLER COMPLETE: CPT | Mod: 26 | Performed by: INTERNAL MEDICINE

## 2021-01-01 PROCEDURE — 85027 COMPLETE CBC AUTOMATED: CPT | Performed by: FAMILY MEDICINE

## 2021-01-01 PROCEDURE — 999N000208 ECHOCARDIOGRAM COMPLETE

## 2021-01-01 PROCEDURE — 93971 EXTREMITY STUDY: CPT | Mod: LT

## 2021-01-01 PROCEDURE — 370N000017 HC ANESTHESIA TECHNICAL FEE, PER MIN: Performed by: INTERNAL MEDICINE

## 2021-01-01 PROCEDURE — 99232 SBSQ HOSP IP/OBS MODERATE 35: CPT | Mod: 25 | Performed by: INTERNAL MEDICINE

## 2021-01-01 PROCEDURE — 272N000001 HC OR GENERAL SUPPLY STERILE: Performed by: INTERNAL MEDICINE

## 2021-01-01 PROCEDURE — 80048 BASIC METABOLIC PNL TOTAL CA: CPT | Performed by: EMERGENCY MEDICINE

## 2021-01-01 PROCEDURE — 250N000011 HC RX IP 250 OP 636: Performed by: EMERGENCY MEDICINE

## 2021-01-01 PROCEDURE — 250N000011 HC RX IP 250 OP 636: Performed by: ANESTHESIOLOGY

## 2021-01-01 PROCEDURE — 88311 DECALCIFY TISSUE: CPT | Mod: 26 | Performed by: PATHOLOGY

## 2021-01-01 PROCEDURE — 99239 HOSP IP/OBS DSCHRG MGMT >30: CPT | Performed by: HOSPITALIST

## 2021-01-01 PROCEDURE — 99024 POSTOP FOLLOW-UP VISIT: CPT | Performed by: PODIATRIST

## 2021-01-01 PROCEDURE — 80048 BASIC METABOLIC PNL TOTAL CA: CPT | Performed by: INTERNAL MEDICINE

## 2021-01-01 PROCEDURE — 83605 ASSAY OF LACTIC ACID: CPT | Performed by: INTERNAL MEDICINE

## 2021-01-01 PROCEDURE — 99207 PR CONSULT E&M CHANGED TO INITIAL LEVEL: CPT | Performed by: INTERNAL MEDICINE

## 2021-01-01 PROCEDURE — 83735 ASSAY OF MAGNESIUM: CPT | Performed by: FAMILY MEDICINE

## 2021-01-01 PROCEDURE — 70450 CT HEAD/BRAIN W/O DYE: CPT

## 2021-01-01 PROCEDURE — 84132 ASSAY OF SERUM POTASSIUM: CPT | Performed by: HOSPITALIST

## 2021-01-01 PROCEDURE — 99222 1ST HOSP IP/OBS MODERATE 55: CPT | Mod: 24 | Performed by: NURSE PRACTITIONER

## 2021-01-01 PROCEDURE — 92526 ORAL FUNCTION THERAPY: CPT | Mod: GN | Performed by: SPEECH-LANGUAGE PATHOLOGIST

## 2021-01-01 PROCEDURE — 11044 DBRDMT BONE 1ST 20 SQ CM/<: CPT | Performed by: PODIATRIST

## 2021-01-01 PROCEDURE — 99233 SBSQ HOSP IP/OBS HIGH 50: CPT | Performed by: CLINICAL NURSE SPECIALIST

## 2021-01-01 PROCEDURE — 97166 OT EVAL MOD COMPLEX 45 MIN: CPT | Mod: GO

## 2021-01-01 PROCEDURE — 83036 HEMOGLOBIN GLYCOSYLATED A1C: CPT | Performed by: INTERNAL MEDICINE

## 2021-01-01 PROCEDURE — 92610 EVALUATE SWALLOWING FUNCTION: CPT | Mod: GN | Performed by: SPEECH-LANGUAGE PATHOLOGIST

## 2021-01-01 PROCEDURE — 88342 IMHCHEM/IMCYTCHM 1ST ANTB: CPT | Mod: 26 | Performed by: PATHOLOGY

## 2021-01-01 PROCEDURE — 82040 ASSAY OF SERUM ALBUMIN: CPT | Performed by: INTERNAL MEDICINE

## 2021-01-01 PROCEDURE — 85025 COMPLETE CBC W/AUTO DIFF WBC: CPT | Performed by: EMERGENCY MEDICINE

## 2021-01-01 PROCEDURE — 99222 1ST HOSP IP/OBS MODERATE 55: CPT | Performed by: INTERNAL MEDICINE

## 2021-01-01 PROCEDURE — 250N000011 HC RX IP 250 OP 636: Performed by: NURSE PRACTITIONER

## 2021-01-01 PROCEDURE — 96361 HYDRATE IV INFUSION ADD-ON: CPT

## 2021-01-01 PROCEDURE — 83880 ASSAY OF NATRIURETIC PEPTIDE: CPT | Performed by: EMERGENCY MEDICINE

## 2021-01-01 PROCEDURE — 87493 C DIFF AMPLIFIED PROBE: CPT | Performed by: STUDENT IN AN ORGANIZED HEALTH CARE EDUCATION/TRAINING PROGRAM

## 2021-01-01 PROCEDURE — 99222 1ST HOSP IP/OBS MODERATE 55: CPT | Performed by: PSYCHIATRY & NEUROLOGY

## 2021-01-01 PROCEDURE — 272N000452 HC KIT SHRLOCK 5FR POWER PICC TRIPLE LUMEN

## 2021-01-01 PROCEDURE — 36415 COLL VENOUS BLD VENIPUNCTURE: CPT | Performed by: STUDENT IN AN ORGANIZED HEALTH CARE EDUCATION/TRAINING PROGRAM

## 2021-01-01 PROCEDURE — 99306 1ST NF CARE HIGH MDM 50: CPT | Performed by: NURSE PRACTITIONER

## 2021-01-01 PROCEDURE — 999N000141 HC STATISTIC PRE-PROCEDURE NURSING ASSESSMENT: Performed by: INTERNAL MEDICINE

## 2021-01-01 PROCEDURE — 250N000013 HC RX MED GY IP 250 OP 250 PS 637: Performed by: EMERGENCY MEDICINE

## 2021-01-01 PROCEDURE — 83605 ASSAY OF LACTIC ACID: CPT | Performed by: FAMILY MEDICINE

## 2021-01-01 PROCEDURE — 36600 WITHDRAWAL OF ARTERIAL BLOOD: CPT

## 2021-01-01 PROCEDURE — 83735 ASSAY OF MAGNESIUM: CPT | Performed by: HOSPITALIST

## 2021-01-01 PROCEDURE — 36416 COLLJ CAPILLARY BLOOD SPEC: CPT | Performed by: INTERNAL MEDICINE

## 2021-01-01 PROCEDURE — 0DB68ZX EXCISION OF STOMACH, VIA NATURAL OR ARTIFICIAL OPENING ENDOSCOPIC, DIAGNOSTIC: ICD-10-PCS | Performed by: INTERNAL MEDICINE

## 2021-01-01 PROCEDURE — 97535 SELF CARE MNGMENT TRAINING: CPT | Mod: GO | Performed by: OCCUPATIONAL THERAPIST

## 2021-01-01 PROCEDURE — U0005 INFEC AGEN DETEC AMPLI PROBE: HCPCS

## 2021-01-01 PROCEDURE — 99239 HOSP IP/OBS DSCHRG MGMT >30: CPT | Performed by: INTERNAL MEDICINE

## 2021-01-01 PROCEDURE — 36415 COLL VENOUS BLD VENIPUNCTURE: CPT | Performed by: PSYCHIATRY & NEUROLOGY

## 2021-01-01 PROCEDURE — 85014 HEMATOCRIT: CPT | Performed by: INTERNAL MEDICINE

## 2021-01-01 PROCEDURE — 250N000009 HC RX 250: Performed by: PODIATRIST

## 2021-01-01 PROCEDURE — 82310 ASSAY OF CALCIUM: CPT | Performed by: FAMILY MEDICINE

## 2021-01-01 PROCEDURE — 97129 THER IVNTJ 1ST 15 MIN: CPT | Mod: GO | Performed by: OCCUPATIONAL THERAPIST

## 2021-01-01 PROCEDURE — 97162 PT EVAL MOD COMPLEX 30 MIN: CPT | Mod: GP | Performed by: PHYSICAL THERAPIST

## 2021-01-01 PROCEDURE — 99233 SBSQ HOSP IP/OBS HIGH 50: CPT | Performed by: PSYCHIATRY & NEUROLOGY

## 2021-01-01 PROCEDURE — 82805 BLOOD GASES W/O2 SATURATION: CPT | Performed by: PSYCHIATRY & NEUROLOGY

## 2021-01-01 PROCEDURE — 250N000011 HC RX IP 250 OP 636: Performed by: NURSE ANESTHETIST, CERTIFIED REGISTERED

## 2021-01-01 PROCEDURE — 999N000054 HC STATISTIC EKG NON-CHARGEABLE

## 2021-01-01 PROCEDURE — 87070 CULTURE OTHR SPECIMN AEROBIC: CPT | Performed by: PODIATRIST

## 2021-01-01 PROCEDURE — 85018 HEMOGLOBIN: CPT | Performed by: INTERNAL MEDICINE

## 2021-01-01 PROCEDURE — 88305 TISSUE EXAM BY PATHOLOGIST: CPT | Mod: 26 | Performed by: PODIATRIST

## 2021-01-01 PROCEDURE — 250N000011 HC RX IP 250 OP 636: Performed by: PODIATRIST

## 2021-01-01 PROCEDURE — 0Y6N0Z5 DETACHMENT AT LEFT FOOT, COMPLETE 2ND RAY, OPEN APPROACH: ICD-10-PCS | Performed by: PODIATRIST

## 2021-01-01 PROCEDURE — 86920 COMPATIBILITY TEST SPIN: CPT | Performed by: INTERNAL MEDICINE

## 2021-01-01 PROCEDURE — 97110 THERAPEUTIC EXERCISES: CPT | Mod: GP

## 2021-01-01 PROCEDURE — 84443 ASSAY THYROID STIM HORMONE: CPT | Performed by: INTERNAL MEDICINE

## 2021-01-01 PROCEDURE — 93925 LOWER EXTREMITY STUDY: CPT

## 2021-01-01 PROCEDURE — 250N000012 HC RX MED GY IP 250 OP 636 PS 637: Performed by: FAMILY MEDICINE

## 2021-01-01 PROCEDURE — 87635 SARS-COV-2 COVID-19 AMP PRB: CPT | Performed by: EMERGENCY MEDICINE

## 2021-01-01 PROCEDURE — 99213 OFFICE O/P EST LOW 20 MIN: CPT | Mod: 25 | Performed by: PODIATRIST

## 2021-01-01 PROCEDURE — 250N000011 HC RX IP 250 OP 636: Performed by: FAMILY MEDICINE

## 2021-01-01 PROCEDURE — 99356 PR PROLONGED SERV,INPATIENT,1ST HR: CPT | Performed by: INTERNAL MEDICINE

## 2021-01-01 PROCEDURE — 84145 PROCALCITONIN (PCT): CPT | Performed by: FAMILY MEDICINE

## 2021-01-01 PROCEDURE — 360N000075 HC SURGERY LEVEL 2, PER MIN: Performed by: PODIATRIST

## 2021-01-01 PROCEDURE — 258N000003 HC RX IP 258 OP 636: Performed by: STUDENT IN AN ORGANIZED HEALTH CARE EDUCATION/TRAINING PROGRAM

## 2021-01-01 PROCEDURE — G0463 HOSPITAL OUTPT CLINIC VISIT: HCPCS | Mod: 25

## 2021-01-01 PROCEDURE — 97112 NEUROMUSCULAR REEDUCATION: CPT | Mod: GP

## 2021-01-01 PROCEDURE — 250N000013 HC RX MED GY IP 250 OP 250 PS 637: Performed by: STUDENT IN AN ORGANIZED HEALTH CARE EDUCATION/TRAINING PROGRAM

## 2021-01-01 PROCEDURE — 70551 MRI BRAIN STEM W/O DYE: CPT

## 2021-01-01 PROCEDURE — 96375 TX/PRO/DX INJ NEW DRUG ADDON: CPT

## 2021-01-01 PROCEDURE — 99215 OFFICE O/P EST HI 40 MIN: CPT | Mod: 25 | Performed by: INTERNAL MEDICINE

## 2021-01-01 PROCEDURE — 258N000001 HC RX 258: Performed by: FAMILY MEDICINE

## 2021-01-01 PROCEDURE — 28002 TREATMENT OF FOOT INFECTION: CPT | Mod: XS | Performed by: PODIATRIST

## 2021-01-01 PROCEDURE — 85610 PROTHROMBIN TIME: CPT | Performed by: EMERGENCY MEDICINE

## 2021-01-01 PROCEDURE — 86901 BLOOD TYPING SEROLOGIC RH(D): CPT | Performed by: EMERGENCY MEDICINE

## 2021-01-01 PROCEDURE — 999N000127 HC STATISTIC PERIPHERAL IV START W US GUIDANCE

## 2021-01-01 PROCEDURE — 96365 THER/PROPH/DIAG IV INF INIT: CPT | Mod: 59

## 2021-01-01 PROCEDURE — 82607 VITAMIN B-12: CPT | Performed by: PSYCHIATRY & NEUROLOGY

## 2021-01-01 PROCEDURE — 99233 SBSQ HOSP IP/OBS HIGH 50: CPT | Performed by: FAMILY MEDICINE

## 2021-01-01 PROCEDURE — 86920 COMPATIBILITY TEST SPIN: CPT | Performed by: EMERGENCY MEDICINE

## 2021-01-01 PROCEDURE — U0003 INFECTIOUS AGENT DETECTION BY NUCLEIC ACID (DNA OR RNA); SEVERE ACUTE RESPIRATORY SYNDROME CORONAVIRUS 2 (SARS-COV-2) (CORONAVIRUS DISEASE [COVID-19]), AMPLIFIED PROBE TECHNIQUE, MAKING USE OF HIGH THROUGHPUT TECHNOLOGIES AS DESCRIBED BY CMS-2020-01-R: HCPCS

## 2021-01-01 PROCEDURE — 85652 RBC SED RATE AUTOMATED: CPT | Performed by: INTERNAL MEDICINE

## 2021-01-01 PROCEDURE — 10140 I&D HMTMA SEROMA/FLUID COLLJ: CPT | Mod: LT | Performed by: PODIATRIST

## 2021-01-01 PROCEDURE — 93922 UPR/L XTREMITY ART 2 LEVELS: CPT

## 2021-01-01 PROCEDURE — 250N000013 HC RX MED GY IP 250 OP 250 PS 637: Performed by: RADIOLOGY

## 2021-01-01 PROCEDURE — 84425 ASSAY OF VITAMIN B-1: CPT | Performed by: PSYCHIATRY & NEUROLOGY

## 2021-01-01 PROCEDURE — 84450 TRANSFERASE (AST) (SGOT): CPT | Performed by: STUDENT IN AN ORGANIZED HEALTH CARE EDUCATION/TRAINING PROGRAM

## 2021-01-01 PROCEDURE — 93306 TTE W/DOPPLER COMPLETE: CPT

## 2021-01-01 PROCEDURE — 93005 ELECTROCARDIOGRAM TRACING: CPT | Performed by: STUDENT IN AN ORGANIZED HEALTH CARE EDUCATION/TRAINING PROGRAM

## 2021-01-01 PROCEDURE — 250N000009 HC RX 250: Performed by: STUDENT IN AN ORGANIZED HEALTH CARE EDUCATION/TRAINING PROGRAM

## 2021-01-01 PROCEDURE — 97110 THERAPEUTIC EXERCISES: CPT | Mod: GO

## 2021-01-01 PROCEDURE — 250N000009 HC RX 250: Performed by: ANESTHESIOLOGY

## 2021-01-01 PROCEDURE — 80048 BASIC METABOLIC PNL TOTAL CA: CPT | Performed by: PSYCHIATRY & NEUROLOGY

## 2021-01-01 PROCEDURE — 99356 PR PROLONGED SERV,INPATIENT,1ST HR: CPT | Performed by: NURSE PRACTITIONER

## 2021-01-01 PROCEDURE — 710N000009 HC RECOVERY PHASE 1, LEVEL 1, PER MIN: Performed by: PODIATRIST

## 2021-01-01 PROCEDURE — 255N000002 HC RX 255 OP 636: Performed by: INTERNAL MEDICINE

## 2021-01-01 PROCEDURE — 74174 CTA ABD&PLVS W/CONTRAST: CPT

## 2021-01-01 PROCEDURE — G0463 HOSPITAL OUTPT CLINIC VISIT: HCPCS

## 2021-01-01 PROCEDURE — 272N000001 HC OR GENERAL SUPPLY STERILE: Performed by: PODIATRIST

## 2021-01-01 PROCEDURE — 28820 AMPUTATION OF TOE: CPT | Mod: 51 | Performed by: PODIATRIST

## 2021-01-01 PROCEDURE — U0003 INFECTIOUS AGENT DETECTION BY NUCLEIC ACID (DNA OR RNA); SEVERE ACUTE RESPIRATORY SYNDROME CORONAVIRUS 2 (SARS-COV-2) (CORONAVIRUS DISEASE [COVID-19]), AMPLIFIED PROBE TECHNIQUE, MAKING USE OF HIGH THROUGHPUT TECHNOLOGIES AS DESCRIBED BY CMS-2020-01-R: HCPCS | Performed by: INTERNAL MEDICINE

## 2021-01-01 PROCEDURE — 99215 OFFICE O/P EST HI 40 MIN: CPT | Performed by: FAMILY MEDICINE

## 2021-01-01 PROCEDURE — 71275 CT ANGIOGRAPHY CHEST: CPT

## 2021-01-01 PROCEDURE — 80202 ASSAY OF VANCOMYCIN: CPT | Performed by: INTERNAL MEDICINE

## 2021-01-01 PROCEDURE — 92611 MOTION FLUOROSCOPY/SWALLOW: CPT | Mod: GN

## 2021-01-01 PROCEDURE — 80053 COMPREHEN METABOLIC PANEL: CPT | Performed by: INTERNAL MEDICINE

## 2021-01-01 PROCEDURE — 99285 EMERGENCY DEPT VISIT HI MDM: CPT | Mod: 25

## 2021-01-01 PROCEDURE — 999N000141 HC STATISTIC PRE-PROCEDURE NURSING ASSESSMENT: Performed by: PODIATRIST

## 2021-01-01 PROCEDURE — C9803 HOPD COVID-19 SPEC COLLECT: HCPCS

## 2021-01-01 PROCEDURE — 74230 X-RAY XM SWLNG FUNCJ C+: CPT

## 2021-01-01 PROCEDURE — 93010 ELECTROCARDIOGRAM REPORT: CPT | Mod: OFF | Performed by: GENERAL ACUTE CARE HOSPITAL

## 2021-01-01 PROCEDURE — 370N000017 HC ANESTHESIA TECHNICAL FEE, PER MIN: Performed by: PODIATRIST

## 2021-01-01 PROCEDURE — 93005 ELECTROCARDIOGRAM TRACING: CPT | Performed by: EMERGENCY MEDICINE

## 2021-01-01 PROCEDURE — 88311 DECALCIFY TISSUE: CPT | Mod: TC | Performed by: PODIATRIST

## 2021-01-01 PROCEDURE — 36569 INSJ PICC 5 YR+ W/O IMAGING: CPT

## 2021-01-01 PROCEDURE — 99223 1ST HOSP IP/OBS HIGH 75: CPT | Mod: AI | Performed by: FAMILY MEDICINE

## 2021-01-01 PROCEDURE — 99223 1ST HOSP IP/OBS HIGH 75: CPT | Performed by: INTERNAL MEDICINE

## 2021-01-01 PROCEDURE — 86141 C-REACTIVE PROTEIN HS: CPT | Performed by: INTERNAL MEDICINE

## 2021-01-01 PROCEDURE — 99215 OFFICE O/P EST HI 40 MIN: CPT | Performed by: INTERNAL MEDICINE

## 2021-01-01 RX ORDER — DILTIAZEM HYDROCHLORIDE 120 MG/1
240 CAPSULE, COATED, EXTENDED RELEASE ORAL DAILY
Status: DISCONTINUED | OUTPATIENT
Start: 2021-01-01 | End: 2021-01-01

## 2021-01-01 RX ORDER — ACETAMINOPHEN 500 MG
1000 TABLET ORAL 3 TIMES DAILY
COMMUNITY
Start: 2021-01-01

## 2021-01-01 RX ORDER — OXYCODONE HYDROCHLORIDE 5 MG/1
5 TABLET ORAL EVERY 4 HOURS PRN
Status: DISCONTINUED | OUTPATIENT
Start: 2021-01-01 | End: 2021-01-01

## 2021-01-01 RX ORDER — NALOXONE HYDROCHLORIDE 0.4 MG/ML
0.4 INJECTION, SOLUTION INTRAMUSCULAR; INTRAVENOUS; SUBCUTANEOUS
Status: DISCONTINUED | OUTPATIENT
Start: 2021-01-01 | End: 2021-01-01 | Stop reason: HOSPADM

## 2021-01-01 RX ORDER — BISACODYL 10 MG
10 SUPPOSITORY, RECTAL RECTAL DAILY PRN
Status: DISCONTINUED | OUTPATIENT
Start: 2021-01-01 | End: 2021-01-01

## 2021-01-01 RX ORDER — WARFARIN SODIUM 5 MG/1
5 TABLET ORAL
Status: COMPLETED | OUTPATIENT
Start: 2021-01-01 | End: 2021-01-01

## 2021-01-01 RX ORDER — NALOXONE HYDROCHLORIDE 0.4 MG/ML
0.2 INJECTION, SOLUTION INTRAMUSCULAR; INTRAVENOUS; SUBCUTANEOUS
Status: DISCONTINUED | OUTPATIENT
Start: 2021-01-01 | End: 2021-01-01

## 2021-01-01 RX ORDER — POTASSIUM CHLORIDE 1500 MG/1
40 TABLET, EXTENDED RELEASE ORAL ONCE
Status: DISCONTINUED | OUTPATIENT
Start: 2021-01-01 | End: 2021-01-01

## 2021-01-01 RX ORDER — SODIUM CHLORIDE 9 MG/ML
INJECTION, SOLUTION INTRAVENOUS CONTINUOUS
Status: CANCELLED | OUTPATIENT
Start: 2021-01-01

## 2021-01-01 RX ORDER — HALOPERIDOL 5 MG/ML
1 INJECTION INTRAMUSCULAR
Status: DISCONTINUED | OUTPATIENT
Start: 2021-01-01 | End: 2021-01-01 | Stop reason: HOSPADM

## 2021-01-01 RX ORDER — PROPOFOL 10 MG/ML
INJECTION, EMULSION INTRAVENOUS CONTINUOUS PRN
Status: DISCONTINUED | OUTPATIENT
Start: 2021-01-01 | End: 2021-01-01

## 2021-01-01 RX ORDER — DILTIAZEM HYDROCHLORIDE 180 MG/1
180 CAPSULE, COATED, EXTENDED RELEASE ORAL DAILY
Qty: 90 CAPSULE | Refills: 3 | Status: ON HOLD | OUTPATIENT
Start: 2021-01-01 | End: 2021-01-01

## 2021-01-01 RX ORDER — ONDANSETRON 4 MG/1
4 TABLET, ORALLY DISINTEGRATING ORAL EVERY 6 HOURS PRN
Status: DISCONTINUED | OUTPATIENT
Start: 2021-01-01 | End: 2021-01-01 | Stop reason: HOSPADM

## 2021-01-01 RX ORDER — HYDROMORPHONE HYDROCHLORIDE 4 MG/1
2-4 TABLET ORAL 4 TIMES DAILY PRN
Qty: 120 TABLET | Refills: 0 | Status: ON HOLD | OUTPATIENT
Start: 2021-01-01 | End: 2021-01-01

## 2021-01-01 RX ORDER — METOPROLOL TARTRATE 25 MG/1
100 TABLET, FILM COATED ORAL 2 TIMES DAILY
Status: DISCONTINUED | OUTPATIENT
Start: 2021-01-01 | End: 2021-01-01

## 2021-01-01 RX ORDER — HALOPERIDOL 1 MG/1
2 TABLET ORAL ONCE
Status: COMPLETED | OUTPATIENT
Start: 2021-01-01 | End: 2021-01-01

## 2021-01-01 RX ORDER — CEFAZOLIN SODIUM 2 G/100ML
2 INJECTION, SOLUTION INTRAVENOUS SEE ADMIN INSTRUCTIONS
Status: DISCONTINUED | OUTPATIENT
Start: 2021-01-01 | End: 2021-01-01

## 2021-01-01 RX ORDER — BISACODYL 10 MG
10 SUPPOSITORY, RECTAL RECTAL DAILY PRN
Qty: 2 SUPPOSITORY | Refills: 11 | Status: SHIPPED | OUTPATIENT
Start: 2021-01-01

## 2021-01-01 RX ORDER — QUETIAPINE FUMARATE 25 MG/1
25 TABLET, FILM COATED ORAL 4 TIMES DAILY
Status: DISCONTINUED | OUTPATIENT
Start: 2021-01-01 | End: 2021-01-01

## 2021-01-01 RX ORDER — HYDROMORPHONE HYDROCHLORIDE 4 MG/1
2-4 TABLET ORAL 4 TIMES DAILY PRN
Qty: 120 TABLET | Refills: 0 | Status: SHIPPED | OUTPATIENT
Start: 2021-01-01 | End: 2021-01-01

## 2021-01-01 RX ORDER — DOXYCYCLINE 100 MG/1
100 CAPSULE ORAL 2 TIMES DAILY
Qty: 4 CAPSULE | Refills: 0 | Status: SHIPPED | OUTPATIENT
Start: 2021-01-01 | End: 2021-01-01

## 2021-01-01 RX ORDER — ACETAMINOPHEN 325 MG/1
325 TABLET ORAL EVERY 6 HOURS PRN
Status: DISCONTINUED | OUTPATIENT
Start: 2021-01-01 | End: 2021-01-01

## 2021-01-01 RX ORDER — ACETAMINOPHEN 325 MG/1
975 TABLET ORAL EVERY 8 HOURS
Status: DISPENSED | OUTPATIENT
Start: 2021-01-01 | End: 2021-01-01

## 2021-01-01 RX ORDER — FUROSEMIDE 10 MG/ML
20 INJECTION INTRAMUSCULAR; INTRAVENOUS ONCE
Status: DISCONTINUED | OUTPATIENT
Start: 2021-01-01 | End: 2021-01-01

## 2021-01-01 RX ORDER — SODIUM CHLORIDE, SODIUM LACTATE, POTASSIUM CHLORIDE, CALCIUM CHLORIDE 600; 310; 30; 20 MG/100ML; MG/100ML; MG/100ML; MG/100ML
INJECTION, SOLUTION INTRAVENOUS CONTINUOUS PRN
Status: DISCONTINUED | OUTPATIENT
Start: 2021-01-01 | End: 2021-01-01

## 2021-01-01 RX ORDER — CEPHALEXIN 500 MG/1
500 CAPSULE ORAL 2 TIMES DAILY
Qty: 6 CAPSULE | Refills: 0 | Status: SHIPPED | OUTPATIENT
Start: 2021-01-01 | End: 2021-01-01

## 2021-01-01 RX ORDER — ONDANSETRON 2 MG/ML
4 INJECTION INTRAMUSCULAR; INTRAVENOUS EVERY 6 HOURS PRN
Status: DISCONTINUED | OUTPATIENT
Start: 2021-01-01 | End: 2021-01-01 | Stop reason: HOSPADM

## 2021-01-01 RX ORDER — DILTIAZEM HYDROCHLORIDE 300 MG/1
300 CAPSULE, COATED, EXTENDED RELEASE ORAL DAILY
Status: DISCONTINUED | OUTPATIENT
Start: 2021-01-01 | End: 2021-01-01

## 2021-01-01 RX ORDER — HYDROMORPHONE HCL IN WATER/PF 6 MG/30 ML
.1-.2 PATIENT CONTROLLED ANALGESIA SYRINGE INTRAVENOUS
Status: DISCONTINUED | OUTPATIENT
Start: 2021-01-01 | End: 2021-01-01

## 2021-01-01 RX ORDER — PROCHLORPERAZINE 25 MG
12.5 SUPPOSITORY, RECTAL RECTAL EVERY 12 HOURS PRN
Status: DISCONTINUED | OUTPATIENT
Start: 2021-01-01 | End: 2021-01-01

## 2021-01-01 RX ORDER — POTASSIUM CHLORIDE 1500 MG/1
40 TABLET, EXTENDED RELEASE ORAL ONCE
Status: COMPLETED | OUTPATIENT
Start: 2021-01-01 | End: 2021-01-01

## 2021-01-01 RX ORDER — METOPROLOL TARTRATE 1 MG/ML
2.5 INJECTION, SOLUTION INTRAVENOUS EVERY 6 HOURS PRN
Status: DISCONTINUED | OUTPATIENT
Start: 2021-01-01 | End: 2021-01-01

## 2021-01-01 RX ORDER — GABAPENTIN 300 MG/1
300 CAPSULE ORAL AT BEDTIME
Qty: 90 CAPSULE | Refills: 3 | Status: SHIPPED | OUTPATIENT
Start: 2021-01-01 | End: 2021-01-01

## 2021-01-01 RX ORDER — METOPROLOL TARTRATE 25 MG/1
50 TABLET, FILM COATED ORAL 2 TIMES DAILY
Status: DISCONTINUED | OUTPATIENT
Start: 2021-01-01 | End: 2021-01-01 | Stop reason: HOSPADM

## 2021-01-01 RX ORDER — DILTIAZEM HCL 60 MG
60 TABLET ORAL EVERY 6 HOURS SCHEDULED
Status: DISCONTINUED | OUTPATIENT
Start: 2021-01-01 | End: 2021-01-01

## 2021-01-01 RX ORDER — HYDROMORPHONE HCL IN WATER/PF 6 MG/30 ML
0.2 PATIENT CONTROLLED ANALGESIA SYRINGE INTRAVENOUS EVERY 5 MIN PRN
Status: DISCONTINUED | OUTPATIENT
Start: 2021-01-01 | End: 2021-01-01 | Stop reason: HOSPADM

## 2021-01-01 RX ORDER — METOPROLOL TARTRATE 50 MG
50 TABLET ORAL 2 TIMES DAILY
Status: ON HOLD
Start: 2021-01-01 | End: 2021-01-01

## 2021-01-01 RX ORDER — PIPERACILLIN SODIUM, TAZOBACTAM SODIUM 3; .375 G/15ML; G/15ML
3.38 INJECTION, POWDER, LYOPHILIZED, FOR SOLUTION INTRAVENOUS ONCE
Status: COMPLETED | OUTPATIENT
Start: 2021-01-01 | End: 2021-01-01

## 2021-01-01 RX ORDER — CEFAZOLIN SODIUM 1 G/50ML
1250 SOLUTION INTRAVENOUS EVERY 24 HOURS
Status: DISCONTINUED | OUTPATIENT
Start: 2021-01-01 | End: 2021-01-01

## 2021-01-01 RX ORDER — LIDOCAINE 40 MG/G
CREAM TOPICAL
Status: ACTIVE | OUTPATIENT
Start: 2021-01-01 | End: 2021-01-01

## 2021-01-01 RX ORDER — HYDROMORPHONE HYDROCHLORIDE 2 MG/1
2-4 TABLET ORAL 4 TIMES DAILY PRN
Status: DISCONTINUED | OUTPATIENT
Start: 2021-01-01 | End: 2021-01-01 | Stop reason: HOSPADM

## 2021-01-01 RX ORDER — SODIUM CHLORIDE, SODIUM LACTATE, POTASSIUM CHLORIDE, CALCIUM CHLORIDE 600; 310; 30; 20 MG/100ML; MG/100ML; MG/100ML; MG/100ML
INJECTION, SOLUTION INTRAVENOUS CONTINUOUS
Status: DISCONTINUED | OUTPATIENT
Start: 2021-01-01 | End: 2021-01-01

## 2021-01-01 RX ORDER — WARFARIN SODIUM 2 MG/1
4 TABLET ORAL
Status: COMPLETED | OUTPATIENT
Start: 2021-01-01 | End: 2021-01-01

## 2021-01-01 RX ORDER — BISACODYL 10 MG
10 SUPPOSITORY, RECTAL RECTAL DAILY
Status: DISCONTINUED | OUTPATIENT
Start: 2021-01-01 | End: 2021-01-01 | Stop reason: HOSPADM

## 2021-01-01 RX ORDER — NALOXONE HYDROCHLORIDE 0.4 MG/ML
0.4 INJECTION, SOLUTION INTRAMUSCULAR; INTRAVENOUS; SUBCUTANEOUS
Status: CANCELLED | OUTPATIENT
Start: 2021-01-01

## 2021-01-01 RX ORDER — LIDOCAINE HYDROCHLORIDE 20 MG/ML
JELLY TOPICAL DAILY PRN
Status: DISCONTINUED | OUTPATIENT
Start: 2021-01-01 | End: 2021-01-01

## 2021-01-01 RX ORDER — DILTIAZEM HYDROCHLORIDE 180 MG/1
180 CAPSULE, COATED, EXTENDED RELEASE ORAL DAILY
Status: DISCONTINUED | OUTPATIENT
Start: 2021-01-01 | End: 2021-01-01

## 2021-01-01 RX ORDER — QUETIAPINE FUMARATE 50 MG/1
50 TABLET, FILM COATED ORAL 3 TIMES DAILY
Status: ON HOLD | COMMUNITY
Start: 2021-01-01 | End: 2021-01-01

## 2021-01-01 RX ORDER — CEFAZOLIN SODIUM 2 G/100ML
2 INJECTION, SOLUTION INTRAVENOUS SEE ADMIN INSTRUCTIONS
Status: CANCELLED | OUTPATIENT
Start: 2021-01-01

## 2021-01-01 RX ORDER — NALOXONE HYDROCHLORIDE 0.4 MG/ML
0.2 INJECTION, SOLUTION INTRAMUSCULAR; INTRAVENOUS; SUBCUTANEOUS
Status: DISCONTINUED | OUTPATIENT
Start: 2021-01-01 | End: 2021-01-01 | Stop reason: HOSPADM

## 2021-01-01 RX ORDER — NALOXONE HYDROCHLORIDE 0.4 MG/ML
0.2 INJECTION, SOLUTION INTRAMUSCULAR; INTRAVENOUS; SUBCUTANEOUS
Status: CANCELLED | OUTPATIENT
Start: 2021-01-01

## 2021-01-01 RX ORDER — POTASSIUM CHLORIDE 750 MG/1
10 TABLET, EXTENDED RELEASE ORAL ONCE
Status: COMPLETED | OUTPATIENT
Start: 2021-01-01 | End: 2021-01-01

## 2021-01-01 RX ORDER — POLYETHYLENE GLYCOL 3350 17 G/17G
17 POWDER, FOR SOLUTION ORAL DAILY
Status: DISCONTINUED | OUTPATIENT
Start: 2021-01-01 | End: 2021-01-01

## 2021-01-01 RX ORDER — OLANZAPINE 2.5 MG/1
2.5 TABLET, FILM COATED ORAL AT BEDTIME
Status: DISCONTINUED | OUTPATIENT
Start: 2021-01-01 | End: 2021-01-01

## 2021-01-01 RX ORDER — LIDOCAINE 50 MG/G
OINTMENT TOPICAL EVERY 4 HOURS PRN
Status: DISCONTINUED | OUTPATIENT
Start: 2021-01-01 | End: 2021-01-01 | Stop reason: HOSPADM

## 2021-01-01 RX ORDER — HALOPERIDOL 5 MG/ML
2 INJECTION INTRAMUSCULAR EVERY 6 HOURS PRN
Status: DISCONTINUED | OUTPATIENT
Start: 2021-01-01 | End: 2021-01-01

## 2021-01-01 RX ORDER — TEMAZEPAM 7.5 MG/1
7.5 CAPSULE ORAL AT BEDTIME
Status: DISCONTINUED | OUTPATIENT
Start: 2021-01-01 | End: 2021-01-01

## 2021-01-01 RX ORDER — WARFARIN SODIUM 3 MG/1
6 TABLET ORAL
Status: COMPLETED | OUTPATIENT
Start: 2021-01-01 | End: 2021-01-01

## 2021-01-01 RX ORDER — MAGNESIUM OXIDE 400 MG/1
400 TABLET ORAL 2 TIMES DAILY
Status: DISPENSED | OUTPATIENT
Start: 2021-01-01 | End: 2021-01-01

## 2021-01-01 RX ORDER — LIDOCAINE 40 MG/G
CREAM TOPICAL
Status: DISCONTINUED | OUTPATIENT
Start: 2021-01-01 | End: 2021-01-01 | Stop reason: HOSPADM

## 2021-01-01 RX ORDER — CEPHALEXIN 500 MG/1
500 CAPSULE ORAL 2 TIMES DAILY
Qty: 20 CAPSULE | Refills: 0 | Status: SHIPPED | OUTPATIENT
Start: 2021-01-01 | End: 2021-01-01

## 2021-01-01 RX ORDER — AMOXICILLIN 250 MG
1 CAPSULE ORAL 2 TIMES DAILY
Status: DISCONTINUED | OUTPATIENT
Start: 2021-01-01 | End: 2021-01-01 | Stop reason: HOSPADM

## 2021-01-01 RX ORDER — OXYCODONE HYDROCHLORIDE 5 MG/1
5 TABLET ORAL EVERY 4 HOURS PRN
Status: DISCONTINUED | OUTPATIENT
Start: 2021-01-01 | End: 2021-01-01 | Stop reason: HOSPADM

## 2021-01-01 RX ORDER — METOPROLOL TARTRATE 1 MG/ML
2.5 INJECTION, SOLUTION INTRAVENOUS EVERY 6 HOURS
Status: DISCONTINUED | OUTPATIENT
Start: 2021-01-01 | End: 2021-01-01

## 2021-01-01 RX ORDER — FENTANYL CITRATE 50 UG/ML
50 INJECTION, SOLUTION INTRAMUSCULAR; INTRAVENOUS
Status: DISCONTINUED | OUTPATIENT
Start: 2021-01-01 | End: 2021-01-01

## 2021-01-01 RX ORDER — NICOTINE POLACRILEX 4 MG
15-30 LOZENGE BUCCAL
Status: DISCONTINUED | OUTPATIENT
Start: 2021-01-01 | End: 2021-01-01

## 2021-01-01 RX ORDER — FERROUS SULFATE 325(65) MG
325 TABLET ORAL
Status: DISCONTINUED | OUTPATIENT
Start: 2021-01-01 | End: 2021-01-01 | Stop reason: HOSPADM

## 2021-01-01 RX ORDER — MORPHINE SULFATE 20 MG/ML
SOLUTION ORAL
Qty: 15 ML | Refills: 0 | Status: SHIPPED | OUTPATIENT
Start: 2021-01-01

## 2021-01-01 RX ORDER — LORAZEPAM 2 MG/ML
0.5 INJECTION INTRAMUSCULAR EVERY 6 HOURS PRN
Status: DISCONTINUED | OUTPATIENT
Start: 2021-01-01 | End: 2021-01-01

## 2021-01-01 RX ORDER — OLANZAPINE 5 MG/1
5 TABLET, ORALLY DISINTEGRATING ORAL AT BEDTIME
Status: DISCONTINUED | OUTPATIENT
Start: 2021-01-01 | End: 2021-01-01 | Stop reason: HOSPADM

## 2021-01-01 RX ORDER — POTASSIUM CHLORIDE 7.45 MG/ML
10 INJECTION INTRAVENOUS
Status: DISCONTINUED | OUTPATIENT
Start: 2021-01-01 | End: 2021-01-01 | Stop reason: CLARIF

## 2021-01-01 RX ORDER — QUETIAPINE FUMARATE 25 MG/1
25 TABLET, FILM COATED ORAL 4 TIMES DAILY
Status: DISPENSED | OUTPATIENT
Start: 2021-01-01 | End: 2021-01-01

## 2021-01-01 RX ORDER — METOPROLOL TARTRATE 25 MG/1
50 TABLET, FILM COATED ORAL 2 TIMES DAILY
Status: DISCONTINUED | OUTPATIENT
Start: 2021-01-01 | End: 2021-01-01

## 2021-01-01 RX ORDER — ONDANSETRON 4 MG/1
4 TABLET, ORALLY DISINTEGRATING ORAL EVERY 30 MIN PRN
Status: DISCONTINUED | OUTPATIENT
Start: 2021-01-01 | End: 2021-01-01 | Stop reason: HOSPADM

## 2021-01-01 RX ORDER — OLANZAPINE 5 MG/1
5 TABLET, ORALLY DISINTEGRATING ORAL ONCE
Status: COMPLETED | OUTPATIENT
Start: 2021-01-01 | End: 2021-01-01

## 2021-01-01 RX ORDER — LORAZEPAM 2 MG/ML
0.5 INJECTION INTRAMUSCULAR EVERY 30 MIN PRN
Status: COMPLETED | OUTPATIENT
Start: 2021-01-01 | End: 2021-01-01

## 2021-01-01 RX ORDER — TRIAMCINOLONE ACETONIDE 1 MG/G
CREAM TOPICAL DAILY
Qty: 453.6 G | Refills: 11 | Status: ON HOLD | OUTPATIENT
Start: 2021-01-01 | End: 2021-01-01

## 2021-01-01 RX ORDER — TRIAMCINOLONE ACETONIDE 1 MG/G
CREAM TOPICAL DAILY
Status: DISCONTINUED | OUTPATIENT
Start: 2021-01-01 | End: 2021-01-01 | Stop reason: HOSPADM

## 2021-01-01 RX ORDER — AMOXICILLIN 250 MG
1 CAPSULE ORAL 2 TIMES DAILY
Status: ON HOLD
Start: 2021-01-01 | End: 2021-01-01

## 2021-01-01 RX ORDER — OLANZAPINE 2.5 MG/1
2.5 TABLET, FILM COATED ORAL AT BEDTIME
Start: 2021-01-01 | End: 2021-01-01 | Stop reason: ALTCHOICE

## 2021-01-01 RX ORDER — FERROUS SULFATE 325(65) MG
1 TABLET ORAL
Qty: 50 TABLET | Refills: 3 | Status: ON HOLD | OUTPATIENT
Start: 2021-01-01 | End: 2021-01-01

## 2021-01-01 RX ORDER — QUETIAPINE FUMARATE 25 MG/1
25 TABLET, FILM COATED ORAL 3 TIMES DAILY
COMMUNITY
Start: 2021-01-01 | End: 2021-01-01 | Stop reason: DRUGHIGH

## 2021-01-01 RX ORDER — PROCHLORPERAZINE MALEATE 5 MG
5 TABLET ORAL EVERY 6 HOURS PRN
Status: DISCONTINUED | OUTPATIENT
Start: 2021-01-01 | End: 2021-01-01

## 2021-01-01 RX ORDER — IPRATROPIUM BROMIDE AND ALBUTEROL SULFATE 2.5; .5 MG/3ML; MG/3ML
3 SOLUTION RESPIRATORY (INHALATION) EVERY 4 HOURS PRN
Status: DISCONTINUED | OUTPATIENT
Start: 2021-01-01 | End: 2021-01-01

## 2021-01-01 RX ORDER — TEMAZEPAM 15 MG/1
15-30 CAPSULE ORAL AT BEDTIME
Status: ON HOLD | COMMUNITY
End: 2021-01-01

## 2021-01-01 RX ORDER — DEXTROSE MONOHYDRATE 25 G/50ML
25-50 INJECTION, SOLUTION INTRAVENOUS
Status: DISCONTINUED | OUTPATIENT
Start: 2021-01-01 | End: 2021-01-01

## 2021-01-01 RX ORDER — CEFAZOLIN SODIUM 2 G/100ML
2 INJECTION, SOLUTION INTRAVENOUS
Status: DISCONTINUED | OUTPATIENT
Start: 2021-01-01 | End: 2021-01-01

## 2021-01-01 RX ORDER — ACETAMINOPHEN 650 MG/1
650 SUPPOSITORY RECTAL EVERY 4 HOURS PRN
Status: DISCONTINUED | OUTPATIENT
Start: 2021-01-01 | End: 2021-01-01 | Stop reason: HOSPADM

## 2021-01-01 RX ORDER — BARIUM SULFATE 400 MG/ML
SUSPENSION ORAL ONCE
Status: COMPLETED | OUTPATIENT
Start: 2021-01-01 | End: 2021-01-01

## 2021-01-01 RX ORDER — MAGNESIUM OXIDE 400 MG/1
400 TABLET ORAL 2 TIMES DAILY
Status: COMPLETED | OUTPATIENT
Start: 2021-01-01 | End: 2021-01-01

## 2021-01-01 RX ORDER — IOPAMIDOL 755 MG/ML
75 INJECTION, SOLUTION INTRAVASCULAR ONCE
Status: COMPLETED | OUTPATIENT
Start: 2021-01-01 | End: 2021-01-01

## 2021-01-01 RX ORDER — PIPERACILLIN SODIUM, TAZOBACTAM SODIUM 3; .375 G/15ML; G/15ML
3.38 INJECTION, POWDER, LYOPHILIZED, FOR SOLUTION INTRAVENOUS EVERY 8 HOURS
Status: DISCONTINUED | OUTPATIENT
Start: 2021-01-01 | End: 2021-01-01

## 2021-01-01 RX ORDER — NICOTINE POLACRILEX 4 MG
15-30 LOZENGE BUCCAL
Status: DISCONTINUED | OUTPATIENT
Start: 2021-01-01 | End: 2021-01-01 | Stop reason: HOSPADM

## 2021-01-01 RX ORDER — MAGNESIUM HYDROXIDE 1200 MG/15ML
LIQUID ORAL PRN
Status: DISCONTINUED | OUTPATIENT
Start: 2021-01-01 | End: 2021-01-01 | Stop reason: HOSPADM

## 2021-01-01 RX ORDER — LIDOCAINE HYDROCHLORIDE 20 MG/ML
INJECTION, SOLUTION INFILTRATION; PERINEURAL PRN
Status: DISCONTINUED | OUTPATIENT
Start: 2021-01-01 | End: 2021-01-01

## 2021-01-01 RX ORDER — METHYLPREDNISOLONE SODIUM SUCCINATE 40 MG/ML
40 INJECTION, POWDER, LYOPHILIZED, FOR SOLUTION INTRAMUSCULAR; INTRAVENOUS EVERY 8 HOURS
Status: DISCONTINUED | OUTPATIENT
Start: 2021-01-01 | End: 2021-01-01

## 2021-01-01 RX ORDER — ATROPINE SULFATE 10 MG/ML
2 SOLUTION/ DROPS OPHTHALMIC
Status: DISCONTINUED | OUTPATIENT
Start: 2021-01-01 | End: 2021-01-01 | Stop reason: HOSPADM

## 2021-01-01 RX ORDER — BUPIVACAINE HYDROCHLORIDE 5 MG/ML
INJECTION, SOLUTION EPIDURAL; INTRACAUDAL
Status: COMPLETED | OUTPATIENT
Start: 2021-01-01 | End: 2021-01-01

## 2021-01-01 RX ORDER — NALOXONE HYDROCHLORIDE 0.4 MG/ML
0.4 INJECTION, SOLUTION INTRAMUSCULAR; INTRAVENOUS; SUBCUTANEOUS
Status: DISCONTINUED | OUTPATIENT
Start: 2021-01-01 | End: 2021-01-01

## 2021-01-01 RX ORDER — ACETAMINOPHEN 325 MG/1
975 TABLET ORAL 3 TIMES DAILY
Start: 2021-01-01 | End: 2021-01-01 | Stop reason: DRUGHIGH

## 2021-01-01 RX ORDER — SODIUM CHLORIDE, SODIUM LACTATE, POTASSIUM CHLORIDE, CALCIUM CHLORIDE 600; 310; 30; 20 MG/100ML; MG/100ML; MG/100ML; MG/100ML
INJECTION, SOLUTION INTRAVENOUS CONTINUOUS
Status: DISCONTINUED | OUTPATIENT
Start: 2021-01-01 | End: 2021-01-01 | Stop reason: HOSPADM

## 2021-01-01 RX ORDER — CEFAZOLIN SODIUM 2 G/100ML
2 INJECTION, SOLUTION INTRAVENOUS
Status: CANCELLED | OUTPATIENT
Start: 2021-01-01

## 2021-01-01 RX ORDER — ROSUVASTATIN CALCIUM 10 MG/1
20 TABLET, COATED ORAL EVERY EVENING
Status: DISCONTINUED | OUTPATIENT
Start: 2021-01-01 | End: 2021-01-01 | Stop reason: HOSPADM

## 2021-01-01 RX ORDER — GABAPENTIN 100 MG/1
100 CAPSULE ORAL AT BEDTIME
Status: DISCONTINUED | OUTPATIENT
Start: 2021-01-01 | End: 2021-01-01 | Stop reason: HOSPADM

## 2021-01-01 RX ORDER — WARFARIN SODIUM 5 MG/1
5 TABLET ORAL DAILY
Qty: 90 TABLET | Refills: 3 | Status: SHIPPED | OUTPATIENT
Start: 2021-01-01 | End: 2021-01-01 | Stop reason: DRUGHIGH

## 2021-01-01 RX ORDER — ATROPINE SULFATE 10 MG/ML
1 SOLUTION/ DROPS OPHTHALMIC EVERY 4 HOURS PRN
Qty: 5 ML | Refills: 11 | Status: SHIPPED | OUTPATIENT
Start: 2021-01-01

## 2021-01-01 RX ORDER — MAGNESIUM SULFATE HEPTAHYDRATE 40 MG/ML
2 INJECTION, SOLUTION INTRAVENOUS ONCE
Status: COMPLETED | OUTPATIENT
Start: 2021-01-01 | End: 2021-01-01

## 2021-01-01 RX ORDER — PROCHLORPERAZINE MALEATE 5 MG
5 TABLET ORAL EVERY 6 HOURS PRN
Status: DISCONTINUED | OUTPATIENT
Start: 2021-01-01 | End: 2021-01-01 | Stop reason: HOSPADM

## 2021-01-01 RX ORDER — ACETAMINOPHEN 325 MG/1
650 TABLET ORAL
Status: DISCONTINUED | OUTPATIENT
Start: 2021-01-01 | End: 2021-01-01

## 2021-01-01 RX ORDER — GABAPENTIN 100 MG/1
100 CAPSULE ORAL AT BEDTIME
Status: DISCONTINUED | OUTPATIENT
Start: 2021-01-01 | End: 2021-01-01

## 2021-01-01 RX ORDER — METOPROLOL TARTRATE 25 MG/1
75 TABLET, FILM COATED ORAL 2 TIMES DAILY
Status: DISCONTINUED | OUTPATIENT
Start: 2021-01-01 | End: 2021-01-01

## 2021-01-01 RX ORDER — FUROSEMIDE 10 MG/ML
40 INJECTION INTRAMUSCULAR; INTRAVENOUS ONCE
Status: COMPLETED | OUTPATIENT
Start: 2021-01-01 | End: 2021-01-01

## 2021-01-01 RX ORDER — PROPOFOL 10 MG/ML
INJECTION, EMULSION INTRAVENOUS PRN
Status: DISCONTINUED | OUTPATIENT
Start: 2021-01-01 | End: 2021-01-01

## 2021-01-01 RX ORDER — POTASSIUM CHLORIDE 1500 MG/1
20 TABLET, EXTENDED RELEASE ORAL ONCE
Status: COMPLETED | OUTPATIENT
Start: 2021-01-01 | End: 2021-01-01

## 2021-01-01 RX ORDER — HALOPERIDOL 5 MG/ML
1 INJECTION INTRAMUSCULAR EVERY 6 HOURS PRN
Status: DISCONTINUED | OUTPATIENT
Start: 2021-01-01 | End: 2021-01-01

## 2021-01-01 RX ORDER — GABAPENTIN 100 MG/1
100 CAPSULE ORAL AT BEDTIME
Status: ON HOLD
Start: 2021-01-01 | End: 2021-01-01

## 2021-01-01 RX ORDER — FUROSEMIDE 10 MG/ML
20 INJECTION INTRAMUSCULAR; INTRAVENOUS EVERY 12 HOURS
Status: CANCELLED | OUTPATIENT
Start: 2021-01-01

## 2021-01-01 RX ORDER — ADENOSINE 3 MG/ML
6 INJECTION, SOLUTION INTRAVENOUS ONCE
Status: COMPLETED | OUTPATIENT
Start: 2021-01-01 | End: 2021-01-01

## 2021-01-01 RX ORDER — ACETAMINOPHEN 325 MG/1
650 TABLET ORAL EVERY 4 HOURS PRN
Status: DISCONTINUED | OUTPATIENT
Start: 2021-01-01 | End: 2021-01-01 | Stop reason: HOSPADM

## 2021-01-01 RX ORDER — MORPHINE SULFATE 20 MG/ML
10 SOLUTION ORAL
Status: DISCONTINUED | OUTPATIENT
Start: 2021-01-01 | End: 2021-01-01 | Stop reason: HOSPADM

## 2021-01-01 RX ORDER — METOPROLOL TARTRATE 25 MG/1
25 TABLET, FILM COATED ORAL 2 TIMES DAILY
Status: DISCONTINUED | OUTPATIENT
Start: 2021-01-01 | End: 2021-01-01

## 2021-01-01 RX ORDER — BISACODYL 10 MG
10 SUPPOSITORY, RECTAL RECTAL DAILY PRN
Status: DISCONTINUED | OUTPATIENT
Start: 2021-01-01 | End: 2021-01-01 | Stop reason: HOSPADM

## 2021-01-01 RX ORDER — ACETAMINOPHEN 325 MG/1
975 TABLET ORAL EVERY 8 HOURS
Status: DISCONTINUED | OUTPATIENT
Start: 2021-01-01 | End: 2021-01-01

## 2021-01-01 RX ORDER — LIDOCAINE 40 MG/G
CREAM TOPICAL
Status: DISCONTINUED | OUTPATIENT
Start: 2021-01-01 | End: 2021-01-01

## 2021-01-01 RX ORDER — HYDROMORPHONE HYDROCHLORIDE 2 MG/1
2 TABLET ORAL EVERY 6 HOURS PRN
Qty: 120 TABLET | Refills: 0 | Status: ON HOLD | OUTPATIENT
Start: 2021-01-01 | End: 2021-01-01

## 2021-01-01 RX ORDER — LORAZEPAM 2 MG/ML
1 CONCENTRATE ORAL EVERY 4 HOURS PRN
Qty: 15 ML | Refills: 0 | Status: SHIPPED | OUTPATIENT
Start: 2021-01-01

## 2021-01-01 RX ORDER — DEXTROSE MONOHYDRATE 25 G/50ML
25-50 INJECTION, SOLUTION INTRAVENOUS
Status: DISCONTINUED | OUTPATIENT
Start: 2021-01-01 | End: 2021-01-01 | Stop reason: HOSPADM

## 2021-01-01 RX ORDER — MORPHINE SULFATE 20 MG/ML
10 SOLUTION ORAL EVERY 8 HOURS
Status: DISCONTINUED | OUTPATIENT
Start: 2021-01-01 | End: 2021-01-01 | Stop reason: HOSPADM

## 2021-01-01 RX ORDER — DILTIAZEM HYDROCHLORIDE 180 MG/1
180 CAPSULE, COATED, EXTENDED RELEASE ORAL DAILY
Status: DISCONTINUED | OUTPATIENT
Start: 2021-01-01 | End: 2021-01-01 | Stop reason: HOSPADM

## 2021-01-01 RX ORDER — ACETAMINOPHEN 500 MG
1000 TABLET ORAL 3 TIMES DAILY
Status: DISCONTINUED | OUTPATIENT
Start: 2021-01-01 | End: 2021-01-01

## 2021-01-01 RX ORDER — WARFARIN SODIUM 3 MG/1
3 TABLET ORAL
Status: COMPLETED | OUTPATIENT
Start: 2021-01-01 | End: 2021-01-01

## 2021-01-01 RX ORDER — POLYETHYLENE GLYCOL 3350 17 G/17G
17 POWDER, FOR SOLUTION ORAL DAILY
Status: DISCONTINUED | OUTPATIENT
Start: 2021-01-01 | End: 2021-01-01 | Stop reason: HOSPADM

## 2021-01-01 RX ORDER — OLANZAPINE 5 MG/1
5 TABLET, ORALLY DISINTEGRATING ORAL
Status: DISCONTINUED | OUTPATIENT
Start: 2021-01-01 | End: 2021-01-01 | Stop reason: HOSPADM

## 2021-01-01 RX ORDER — SODIUM CHLORIDE 9 MG/ML
INJECTION, SOLUTION INTRAVENOUS CONTINUOUS
Status: DISCONTINUED | OUTPATIENT
Start: 2021-01-01 | End: 2021-01-01 | Stop reason: CLARIF

## 2021-01-01 RX ORDER — LORAZEPAM 2 MG/ML
1 CONCENTRATE ORAL EVERY 4 HOURS PRN
Status: DISCONTINUED | OUTPATIENT
Start: 2021-01-01 | End: 2021-01-01 | Stop reason: HOSPADM

## 2021-01-01 RX ORDER — HALOPERIDOL 5 MG/ML
2 INJECTION INTRAMUSCULAR
Status: DISCONTINUED | OUTPATIENT
Start: 2021-01-01 | End: 2021-01-01 | Stop reason: HOSPADM

## 2021-01-01 RX ORDER — LANOLIN ALCOHOL/MO/W.PET/CERES
3 CREAM (GRAM) TOPICAL EVERY 24 HOURS
Status: DISCONTINUED | OUTPATIENT
Start: 2021-01-01 | End: 2021-01-01 | Stop reason: HOSPADM

## 2021-01-01 RX ORDER — PREDNISONE 20 MG/1
40 TABLET ORAL DAILY
Status: COMPLETED | OUTPATIENT
Start: 2021-01-01 | End: 2021-01-01

## 2021-01-01 RX ORDER — LORAZEPAM 2 MG/ML
0.25 INJECTION INTRAMUSCULAR 2 TIMES DAILY PRN
Status: DISCONTINUED | OUTPATIENT
Start: 2021-01-01 | End: 2021-01-01

## 2021-01-01 RX ORDER — GABAPENTIN 300 MG/1
300 CAPSULE ORAL AT BEDTIME
Status: DISCONTINUED | OUTPATIENT
Start: 2021-01-01 | End: 2021-01-01

## 2021-01-01 RX ORDER — CEPHALEXIN 500 MG/1
500 CAPSULE ORAL 2 TIMES DAILY
Qty: 6 CAPSULE | Refills: 0 | Status: ON HOLD | OUTPATIENT
Start: 2021-01-01 | End: 2021-01-01

## 2021-01-01 RX ORDER — HYDROMORPHONE HYDROCHLORIDE 1 MG/ML
0.3 INJECTION, SOLUTION INTRAMUSCULAR; INTRAVENOUS; SUBCUTANEOUS 2 TIMES DAILY
Status: DISCONTINUED | OUTPATIENT
Start: 2021-01-01 | End: 2021-01-01

## 2021-01-01 RX ORDER — FUROSEMIDE 20 MG
80 TABLET ORAL DAILY
Status: DISCONTINUED | OUTPATIENT
Start: 2021-01-01 | End: 2021-01-01 | Stop reason: HOSPADM

## 2021-01-01 RX ORDER — OLANZAPINE 2.5 MG/1
2.5 TABLET, FILM COATED ORAL 2 TIMES DAILY PRN
Status: DISCONTINUED | OUTPATIENT
Start: 2021-01-01 | End: 2021-01-01

## 2021-01-01 RX ORDER — FUROSEMIDE 40 MG
80 TABLET ORAL DAILY
Qty: 120 TABLET | Refills: 1 | Status: ON HOLD | OUTPATIENT
Start: 2021-01-01 | End: 2021-01-01

## 2021-01-01 RX ORDER — DILTIAZEM HYDROCHLORIDE 30 MG/1
30 TABLET, FILM COATED ORAL EVERY 6 HOURS PRN
Status: DISCONTINUED | OUTPATIENT
Start: 2021-01-01 | End: 2021-01-01

## 2021-01-01 RX ORDER — DIPHENHYDRAMINE HYDROCHLORIDE 50 MG/ML
12.5 INJECTION INTRAMUSCULAR; INTRAVENOUS EVERY 6 HOURS PRN
Status: DISCONTINUED | OUTPATIENT
Start: 2021-01-01 | End: 2021-01-01 | Stop reason: HOSPADM

## 2021-01-01 RX ORDER — TRIAMCINOLONE ACETONIDE 1 MG/G
CREAM TOPICAL DAILY
Qty: 453.6 G | Refills: 11 | Status: SHIPPED | OUTPATIENT
Start: 2021-01-01 | End: 2021-01-01

## 2021-01-01 RX ORDER — ONDANSETRON 2 MG/ML
4 INJECTION INTRAMUSCULAR; INTRAVENOUS EVERY 30 MIN PRN
Status: DISCONTINUED | OUTPATIENT
Start: 2021-01-01 | End: 2021-01-01 | Stop reason: HOSPADM

## 2021-01-01 RX ORDER — MUPIROCIN 20 MG/G
OINTMENT TOPICAL 2 TIMES DAILY
Qty: 22 G | Refills: 0 | Status: ON HOLD | OUTPATIENT
Start: 2021-01-01 | End: 2021-01-01

## 2021-01-01 RX ORDER — NALOXONE HYDROCHLORIDE 0.4 MG/ML
0.1 INJECTION, SOLUTION INTRAMUSCULAR; INTRAVENOUS; SUBCUTANEOUS
Status: DISCONTINUED | OUTPATIENT
Start: 2021-01-01 | End: 2021-01-01

## 2021-01-01 RX ORDER — HYDROMORPHONE HYDROCHLORIDE 2 MG/1
2 TABLET ORAL EVERY 4 HOURS PRN
Status: DISCONTINUED | OUTPATIENT
Start: 2021-01-01 | End: 2021-01-01

## 2021-01-01 RX ORDER — FENTANYL CITRATE 50 UG/ML
25 INJECTION, SOLUTION INTRAMUSCULAR; INTRAVENOUS EVERY 5 MIN PRN
Status: DISCONTINUED | OUTPATIENT
Start: 2021-01-01 | End: 2021-01-01 | Stop reason: HOSPADM

## 2021-01-01 RX ORDER — LANOLIN ALCOHOL/MO/W.PET/CERES
100 CREAM (GRAM) TOPICAL DAILY
Status: DISCONTINUED | OUTPATIENT
Start: 2021-01-01 | End: 2021-01-01

## 2021-01-01 RX ORDER — SENNOSIDES 8.6 MG
8.6 TABLET ORAL 2 TIMES DAILY
Status: DISCONTINUED | OUTPATIENT
Start: 2021-01-01 | End: 2021-01-01

## 2021-01-01 RX ORDER — IPRATROPIUM BROMIDE AND ALBUTEROL SULFATE 2.5; .5 MG/3ML; MG/3ML
3 SOLUTION RESPIRATORY (INHALATION)
Status: DISCONTINUED | OUTPATIENT
Start: 2021-01-01 | End: 2021-01-01

## 2021-01-01 RX ORDER — ALBUTEROL SULFATE 0.83 MG/ML
2.5 SOLUTION RESPIRATORY (INHALATION) EVERY 6 HOURS PRN
Status: DISCONTINUED | OUTPATIENT
Start: 2021-01-01 | End: 2021-01-01

## 2021-01-01 RX ORDER — DIPHENHYDRAMINE HCL 12.5MG/5ML
12.5 LIQUID (ML) ORAL EVERY 6 HOURS PRN
Status: DISCONTINUED | OUTPATIENT
Start: 2021-01-01 | End: 2021-01-01 | Stop reason: HOSPADM

## 2021-01-01 RX ORDER — HYDROMORPHONE HYDROCHLORIDE 2 MG/1
2 TABLET ORAL 2 TIMES DAILY
Status: DISCONTINUED | OUTPATIENT
Start: 2021-01-01 | End: 2021-01-01

## 2021-01-01 RX ORDER — FUROSEMIDE 10 MG/ML
40 INJECTION INTRAMUSCULAR; INTRAVENOUS EVERY 12 HOURS
Status: DISCONTINUED | OUTPATIENT
Start: 2021-01-01 | End: 2021-01-01

## 2021-01-01 RX ORDER — PEN NEEDLE, DIABETIC 32GX 5/32"
1 NEEDLE, DISPOSABLE MISCELLANEOUS 2 TIMES DAILY
Qty: 100 EACH | Refills: 11 | Status: SHIPPED | OUTPATIENT
Start: 2021-01-01

## 2021-01-01 RX ORDER — FUROSEMIDE 40 MG
40 TABLET ORAL DAILY
Qty: 60 TABLET | Refills: 1 | Status: SHIPPED | OUTPATIENT
Start: 2021-01-01 | End: 2021-01-01

## 2021-01-01 RX ORDER — FUROSEMIDE 10 MG/ML
20 INJECTION INTRAMUSCULAR; INTRAVENOUS EVERY 12 HOURS
Status: DISCONTINUED | OUTPATIENT
Start: 2021-01-01 | End: 2021-01-01

## 2021-01-01 RX ORDER — SODIUM CHLORIDE 9 MG/ML
50 INJECTION, SOLUTION INTRAVENOUS ONCE
Status: DISCONTINUED | OUTPATIENT
Start: 2021-01-01 | End: 2021-01-01

## 2021-01-01 RX ORDER — HYDROMORPHONE HCL IN WATER/PF 6 MG/30 ML
0.2 PATIENT CONTROLLED ANALGESIA SYRINGE INTRAVENOUS
Status: DISCONTINUED | OUTPATIENT
Start: 2021-01-01 | End: 2021-01-01 | Stop reason: HOSPADM

## 2021-01-01 RX ORDER — HYDROMORPHONE HYDROCHLORIDE 2 MG/1
2 TABLET ORAL EVERY 6 HOURS PRN
Qty: 10 TABLET | Refills: 0 | Status: SHIPPED | OUTPATIENT
Start: 2021-01-01 | End: 2021-01-01

## 2021-01-01 RX ADMIN — MORPHINE SULFATE 10 MG: 20 SOLUTION ORAL at 04:12

## 2021-01-01 RX ADMIN — Medication 1 MG: at 00:49

## 2021-01-01 RX ADMIN — ACETAMINOPHEN 650 MG: 325 TABLET ORAL at 12:45

## 2021-01-01 RX ADMIN — PIPERACILLIN AND TAZOBACTAM 3.38 G: 3; .375 INJECTION, POWDER, FOR SOLUTION INTRAVENOUS at 12:56

## 2021-01-01 RX ADMIN — PANTOPRAZOLE SODIUM 40 MG: 40 INJECTION, POWDER, FOR SOLUTION INTRAVENOUS at 18:09

## 2021-01-01 RX ADMIN — DOCUSATE SODIUM 50 MG AND SENNOSIDES 8.6 MG 1 TABLET: 8.6; 5 TABLET, FILM COATED ORAL at 21:04

## 2021-01-01 RX ADMIN — Medication 100 MG: at 08:01

## 2021-01-01 RX ADMIN — NALOXONE HYDROCHLORIDE 0.2 MG: 0.4 INJECTION, SOLUTION INTRAMUSCULAR; INTRAVENOUS; SUBCUTANEOUS at 07:04

## 2021-01-01 RX ADMIN — DILTIAZEM HYDROCHLORIDE 240 MG: 120 CAPSULE, COATED, EXTENDED RELEASE ORAL at 09:15

## 2021-01-01 RX ADMIN — LEVOTHYROXINE SODIUM 125 MCG: 0.03 TABLET ORAL at 08:52

## 2021-01-01 RX ADMIN — SODIUM CHLORIDE, POTASSIUM CHLORIDE, SODIUM LACTATE AND CALCIUM CHLORIDE: 600; 310; 30; 20 INJECTION, SOLUTION INTRAVENOUS at 14:10

## 2021-01-01 RX ADMIN — Medication 100 MG: at 09:05

## 2021-01-01 RX ADMIN — MAGNESIUM SULFATE HEPTAHYDRATE 2 G: 40 INJECTION, SOLUTION INTRAVENOUS at 15:41

## 2021-01-01 RX ADMIN — HYDROMORPHONE HYDROCHLORIDE 2 MG: 2 TABLET ORAL at 01:56

## 2021-01-01 RX ADMIN — Medication 100 MG: at 08:10

## 2021-01-01 RX ADMIN — INSULIN ASPART 2 UNITS: 100 INJECTION, SOLUTION INTRAVENOUS; SUBCUTANEOUS at 08:16

## 2021-01-01 RX ADMIN — INSULIN GLARGINE 15 UNITS: 100 INJECTION, SOLUTION SUBCUTANEOUS at 21:58

## 2021-01-01 RX ADMIN — STANDARDIZED SENNA CONCENTRATE 8.6 MG: 8.6 TABLET ORAL at 21:06

## 2021-01-01 RX ADMIN — QUETIAPINE 25 MG: 25 TABLET ORAL at 16:29

## 2021-01-01 RX ADMIN — SODIUM CHLORIDE 500 ML: 9 INJECTION, SOLUTION INTRAVENOUS at 18:07

## 2021-01-01 RX ADMIN — MAGNESIUM OXIDE TAB 400 MG (241.3 MG ELEMENTAL MG) 400 MG: 400 (241.3 MG) TAB at 09:17

## 2021-01-01 RX ADMIN — INSULIN ASPART 4 UNITS: 100 INJECTION, SOLUTION INTRAVENOUS; SUBCUTANEOUS at 08:52

## 2021-01-01 RX ADMIN — DILTIAZEM HYDROCHLORIDE 180 MG: 180 CAPSULE, COATED, EXTENDED RELEASE ORAL at 08:14

## 2021-01-01 RX ADMIN — HYDROMORPHONE HYDROCHLORIDE 2 MG: 2 TABLET ORAL at 11:39

## 2021-01-01 RX ADMIN — INSULIN GLARGINE 5 UNITS: 100 INJECTION, SOLUTION SUBCUTANEOUS at 07:45

## 2021-01-01 RX ADMIN — VANCOMYCIN HYDROCHLORIDE 1500 MG: 5 INJECTION, POWDER, LYOPHILIZED, FOR SOLUTION INTRAVENOUS at 18:06

## 2021-01-01 RX ADMIN — SODIUM CHLORIDE, POTASSIUM CHLORIDE, SODIUM LACTATE AND CALCIUM CHLORIDE: 600; 310; 30; 20 INJECTION, SOLUTION INTRAVENOUS at 16:28

## 2021-01-01 RX ADMIN — PIPERACILLIN AND TAZOBACTAM 3.38 G: 3; .375 INJECTION, POWDER, FOR SOLUTION INTRAVENOUS at 11:51

## 2021-01-01 RX ADMIN — METOPROLOL TARTRATE 25 MG: 25 TABLET, FILM COATED ORAL at 10:20

## 2021-01-01 RX ADMIN — HYDROMORPHONE HYDROCHLORIDE 0.2 MG: 0.2 INJECTION, SOLUTION INTRAMUSCULAR; INTRAVENOUS; SUBCUTANEOUS at 00:11

## 2021-01-01 RX ADMIN — SODIUM CHLORIDE, POTASSIUM CHLORIDE, SODIUM LACTATE AND CALCIUM CHLORIDE: 600; 310; 30; 20 INJECTION, SOLUTION INTRAVENOUS at 12:44

## 2021-01-01 RX ADMIN — LEVOTHYROXINE SODIUM 125 MCG: 0.03 TABLET ORAL at 08:10

## 2021-01-01 RX ADMIN — METHYLPREDNISOLONE SODIUM SUCCINATE 40 MG: 40 INJECTION, POWDER, FOR SOLUTION INTRAMUSCULAR; INTRAVENOUS at 21:12

## 2021-01-01 RX ADMIN — MORPHINE SULFATE 10 MG: 20 SOLUTION ORAL at 20:22

## 2021-01-01 RX ADMIN — INSULIN ASPART 6 UNITS: 100 INJECTION, SOLUTION INTRAVENOUS; SUBCUTANEOUS at 12:40

## 2021-01-01 RX ADMIN — FUROSEMIDE 20 MG: 10 INJECTION, SOLUTION INTRAMUSCULAR; INTRAVENOUS at 13:54

## 2021-01-01 RX ADMIN — PIPERACILLIN AND TAZOBACTAM 3.38 G: 3; .375 INJECTION, POWDER, FOR SOLUTION INTRAVENOUS at 19:35

## 2021-01-01 RX ADMIN — INSULIN ASPART 2 UNITS: 100 INJECTION, SOLUTION INTRAVENOUS; SUBCUTANEOUS at 12:21

## 2021-01-01 RX ADMIN — INSULIN DETEMIR 30 UNITS: 100 INJECTION, SOLUTION SUBCUTANEOUS at 08:52

## 2021-01-01 RX ADMIN — HYDROMORPHONE HYDROCHLORIDE 0.2 MG: 0.2 INJECTION, SOLUTION INTRAMUSCULAR; INTRAVENOUS; SUBCUTANEOUS at 22:17

## 2021-01-01 RX ADMIN — ACETAMINOPHEN 650 MG: 325 TABLET ORAL at 12:31

## 2021-01-01 RX ADMIN — MELATONIN TAB 3 MG 3 MG: 3 TAB at 20:37

## 2021-01-01 RX ADMIN — HUMAN INSULIN 5 UNITS: 100 INJECTION, SUSPENSION SUBCUTANEOUS at 09:21

## 2021-01-01 RX ADMIN — TEMAZEPAM 7.5 MG: 7.5 CAPSULE ORAL at 21:59

## 2021-01-01 RX ADMIN — INSULIN GLARGINE 10 UNITS: 100 INJECTION, SOLUTION SUBCUTANEOUS at 09:13

## 2021-01-01 RX ADMIN — ACETAMINOPHEN 325 MG: 325 TABLET ORAL at 08:24

## 2021-01-01 RX ADMIN — PANTOPRAZOLE SODIUM 40 MG: 40 INJECTION, POWDER, FOR SOLUTION INTRAVENOUS at 20:45

## 2021-01-01 RX ADMIN — INSULIN ASPART 3 UNITS: 100 INJECTION, SOLUTION INTRAVENOUS; SUBCUTANEOUS at 16:58

## 2021-01-01 RX ADMIN — PIPERACILLIN AND TAZOBACTAM 3.38 G: 3; .375 INJECTION, POWDER, FOR SOLUTION INTRAVENOUS at 12:38

## 2021-01-01 RX ADMIN — MORPHINE SULFATE 10 MG: 20 SOLUTION ORAL at 16:51

## 2021-01-01 RX ADMIN — POTASSIUM CHLORIDE 40 MEQ: 20 TABLET, EXTENDED RELEASE ORAL at 14:51

## 2021-01-01 RX ADMIN — INSULIN ASPART 7 UNITS: 100 INJECTION, SOLUTION INTRAVENOUS; SUBCUTANEOUS at 12:01

## 2021-01-01 RX ADMIN — HYDROMORPHONE HYDROCHLORIDE 2 MG: 2 TABLET ORAL at 03:46

## 2021-01-01 RX ADMIN — ACETAMINOPHEN 650 MG: 325 TABLET ORAL at 08:24

## 2021-01-01 RX ADMIN — INSULIN ASPART 4 UNITS: 100 INJECTION, SOLUTION INTRAVENOUS; SUBCUTANEOUS at 09:03

## 2021-01-01 RX ADMIN — INSULIN ASPART 7 UNITS: 100 INJECTION, SOLUTION INTRAVENOUS; SUBCUTANEOUS at 13:25

## 2021-01-01 RX ADMIN — MIDAZOLAM HYDROCHLORIDE 1 MG: 1 INJECTION, SOLUTION INTRAMUSCULAR; INTRAVENOUS at 15:35

## 2021-01-01 RX ADMIN — HYDROMORPHONE HYDROCHLORIDE 4 MG: 2 TABLET ORAL at 01:16

## 2021-01-01 RX ADMIN — BARIUM SULFATE: 400 SUSPENSION ORAL at 14:44

## 2021-01-01 RX ADMIN — PIPERACILLIN AND TAZOBACTAM 3.38 G: 3; .375 INJECTION, POWDER, FOR SOLUTION INTRAVENOUS at 20:46

## 2021-01-01 RX ADMIN — MORPHINE SULFATE 10 MG: 20 SOLUTION ORAL at 17:21

## 2021-01-01 RX ADMIN — HYDROMORPHONE HYDROCHLORIDE 0.2 MG: 0.2 INJECTION, SOLUTION INTRAMUSCULAR; INTRAVENOUS; SUBCUTANEOUS at 02:50

## 2021-01-01 RX ADMIN — INSULIN ASPART 1 UNITS: 100 INJECTION, SOLUTION INTRAVENOUS; SUBCUTANEOUS at 09:00

## 2021-01-01 RX ADMIN — PHYTONADIONE 2 MG: 10 INJECTION, EMULSION INTRAMUSCULAR; INTRAVENOUS; SUBCUTANEOUS at 20:07

## 2021-01-01 RX ADMIN — MELATONIN TAB 3 MG 3 MG: 3 TAB at 18:13

## 2021-01-01 RX ADMIN — ACETAMINOPHEN 650 MG: 325 TABLET ORAL at 02:43

## 2021-01-01 RX ADMIN — ACETAMINOPHEN 975 MG: 325 TABLET ORAL at 01:00

## 2021-01-01 RX ADMIN — INSULIN ASPART 10 UNITS: 100 INJECTION, SOLUTION INTRAVENOUS; SUBCUTANEOUS at 13:47

## 2021-01-01 RX ADMIN — ACETAMINOPHEN 975 MG: 325 TABLET ORAL at 12:40

## 2021-01-01 RX ADMIN — PERFLUTREN 2 ML: 6.52 INJECTION, SUSPENSION INTRAVENOUS at 14:00

## 2021-01-01 RX ADMIN — ACETAMINOPHEN 975 MG: 325 TABLET ORAL at 10:36

## 2021-01-01 RX ADMIN — ACETAMINOPHEN 650 MG: 325 TABLET ORAL at 17:37

## 2021-01-01 RX ADMIN — DILTIAZEM HYDROCHLORIDE 180 MG: 180 CAPSULE, COATED, EXTENDED RELEASE ORAL at 08:52

## 2021-01-01 RX ADMIN — MORPHINE SULFATE 10 MG: 20 SOLUTION ORAL at 20:47

## 2021-01-01 RX ADMIN — POLYETHYLENE GLYCOL 3350 17 G: 17 POWDER, FOR SOLUTION ORAL at 09:01

## 2021-01-01 RX ADMIN — LEVOTHYROXINE SODIUM 125 MCG: 0.03 TABLET ORAL at 10:25

## 2021-01-01 RX ADMIN — FUROSEMIDE 20 MG: 10 INJECTION, SOLUTION INTRAMUSCULAR; INTRAVENOUS at 01:56

## 2021-01-01 RX ADMIN — DOCUSATE SODIUM 50 MG AND SENNOSIDES 8.6 MG 1 TABLET: 8.6; 5 TABLET, FILM COATED ORAL at 08:12

## 2021-01-01 RX ADMIN — SODIUM CHLORIDE 500 ML: 9 INJECTION, SOLUTION INTRAVENOUS at 15:48

## 2021-01-01 RX ADMIN — POLYETHYLENE GLYCOL 3350 17 G: 17 POWDER, FOR SOLUTION ORAL at 09:05

## 2021-01-01 RX ADMIN — PIPERACILLIN AND TAZOBACTAM 3.38 G: 3; .375 INJECTION, POWDER, FOR SOLUTION INTRAVENOUS at 03:32

## 2021-01-01 RX ADMIN — MORPHINE SULFATE 10 MG: 20 SOLUTION ORAL at 01:01

## 2021-01-01 RX ADMIN — PROPOFOL 20 MG: 10 INJECTION, EMULSION INTRAVENOUS at 12:59

## 2021-01-01 RX ADMIN — MELATONIN TAB 3 MG 3 MG: 3 TAB at 20:29

## 2021-01-01 RX ADMIN — INSULIN ASPART 2 UNITS: 100 INJECTION, SOLUTION INTRAVENOUS; SUBCUTANEOUS at 12:26

## 2021-01-01 RX ADMIN — INSULIN DETEMIR 30 UNITS: 100 INJECTION, SOLUTION SUBCUTANEOUS at 09:36

## 2021-01-01 RX ADMIN — INSULIN ASPART 5 UNITS: 100 INJECTION, SOLUTION INTRAVENOUS; SUBCUTANEOUS at 12:01

## 2021-01-01 RX ADMIN — LEVOTHYROXINE SODIUM 125 MCG: 0.03 TABLET ORAL at 06:29

## 2021-01-01 RX ADMIN — INSULIN ASPART 2 UNITS: 100 INJECTION, SOLUTION INTRAVENOUS; SUBCUTANEOUS at 21:15

## 2021-01-01 RX ADMIN — MELATONIN TAB 3 MG 3 MG: 3 TAB at 18:33

## 2021-01-01 RX ADMIN — TEMAZEPAM 7.5 MG: 7.5 CAPSULE ORAL at 20:08

## 2021-01-01 RX ADMIN — POTASSIUM CHLORIDE 10 MEQ: 750 TABLET, EXTENDED RELEASE ORAL at 08:04

## 2021-01-01 RX ADMIN — Medication 2.5 MG: at 17:42

## 2021-01-01 RX ADMIN — METOPROLOL TARTRATE 25 MG: 25 TABLET, FILM COATED ORAL at 12:19

## 2021-01-01 RX ADMIN — METOPROLOL TARTRATE 2.5 MG: 5 INJECTION INTRAVENOUS at 06:01

## 2021-01-01 RX ADMIN — INSULIN ASPART 10 UNITS: 100 INJECTION, SOLUTION INTRAVENOUS; SUBCUTANEOUS at 12:27

## 2021-01-01 RX ADMIN — GABAPENTIN 300 MG: 300 CAPSULE ORAL at 21:04

## 2021-01-01 RX ADMIN — INSULIN ASPART 1 UNITS: 100 INJECTION, SOLUTION INTRAVENOUS; SUBCUTANEOUS at 17:30

## 2021-01-01 RX ADMIN — INSULIN ASPART 2 UNITS: 100 INJECTION, SOLUTION INTRAVENOUS; SUBCUTANEOUS at 09:05

## 2021-01-01 RX ADMIN — BUPIVACAINE HYDROCHLORIDE 20 ML: 5 INJECTION, SOLUTION EPIDURAL; INTRACAUDAL at 16:02

## 2021-01-01 RX ADMIN — INSULIN GLARGINE 20 UNITS: 100 INJECTION, SOLUTION SUBCUTANEOUS at 08:42

## 2021-01-01 RX ADMIN — INSULIN ASPART 12 UNITS: 100 INJECTION, SOLUTION INTRAVENOUS; SUBCUTANEOUS at 08:53

## 2021-01-01 RX ADMIN — METOPROLOL TARTRATE 50 MG: 25 TABLET, FILM COATED ORAL at 20:09

## 2021-01-01 RX ADMIN — INSULIN ASPART 4 UNITS: 100 INJECTION, SOLUTION INTRAVENOUS; SUBCUTANEOUS at 21:42

## 2021-01-01 RX ADMIN — ACETAMINOPHEN 650 MG: 325 TABLET ORAL at 18:07

## 2021-01-01 RX ADMIN — INSULIN ASPART 6 UNITS: 100 INJECTION, SOLUTION INTRAVENOUS; SUBCUTANEOUS at 18:09

## 2021-01-01 RX ADMIN — DILTIAZEM HYDROCHLORIDE 240 MG: 120 CAPSULE, COATED, EXTENDED RELEASE ORAL at 08:10

## 2021-01-01 RX ADMIN — TEMAZEPAM 7.5 MG: 7.5 CAPSULE ORAL at 20:29

## 2021-01-01 RX ADMIN — INFLUENZA A VIRUS A/VICTORIA/2570/2019 IVR-215 (H1N1) ANTIGEN (FORMALDEHYDE INACTIVATED), INFLUENZA A VIRUS A/TASMANIA/503/2020 IVR-221 (H3N2) ANTIGEN (FORMALDEHYDE INACTIVATED), INFLUENZA B VIRUS B/PHUKET/3073/2013 ANTIGEN (FORMALDEHYDE INACTIVATED), AND INFLUENZA B VIRUS B/WASHINGTON/02/2019 ANTIGEN (FORMALDEHYDE INACTIVATED) 0.7 ML: 60; 60; 60; 60 INJECTION, SUSPENSION INTRAMUSCULAR at 12:07

## 2021-01-01 RX ADMIN — PANTOPRAZOLE SODIUM 40 MG: 40 INJECTION, POWDER, FOR SOLUTION INTRAVENOUS at 08:00

## 2021-01-01 RX ADMIN — DOCUSATE SODIUM 50 MG AND SENNOSIDES 8.6 MG 1 TABLET: 8.6; 5 TABLET, FILM COATED ORAL at 10:19

## 2021-01-01 RX ADMIN — MAGNESIUM OXIDE TAB 400 MG (241.3 MG ELEMENTAL MG) 400 MG: 400 (241.3 MG) TAB at 21:01

## 2021-01-01 RX ADMIN — HYDROMORPHONE HYDROCHLORIDE 0.3 MG: 1 INJECTION, SOLUTION INTRAMUSCULAR; INTRAVENOUS; SUBCUTANEOUS at 20:37

## 2021-01-01 RX ADMIN — HYDROMORPHONE HYDROCHLORIDE 2 MG: 2 TABLET ORAL at 21:16

## 2021-01-01 RX ADMIN — Medication 100 MG: at 08:46

## 2021-01-01 RX ADMIN — PIPERACILLIN AND TAZOBACTAM 3.38 G: 3; .375 INJECTION, POWDER, FOR SOLUTION INTRAVENOUS at 21:03

## 2021-01-01 RX ADMIN — MORPHINE SULFATE 10 MG: 20 SOLUTION ORAL at 09:27

## 2021-01-01 RX ADMIN — INSULIN ASPART 2 UNITS: 100 INJECTION, SOLUTION INTRAVENOUS; SUBCUTANEOUS at 21:17

## 2021-01-01 RX ADMIN — MORPHINE SULFATE 10 MG: 20 SOLUTION ORAL at 01:48

## 2021-01-01 RX ADMIN — FUROSEMIDE 20 MG: 10 INJECTION, SOLUTION INTRAMUSCULAR; INTRAVENOUS at 02:09

## 2021-01-01 RX ADMIN — INSULIN GLARGINE 10 UNITS: 100 INJECTION, SOLUTION SUBCUTANEOUS at 08:26

## 2021-01-01 RX ADMIN — LEVOTHYROXINE SODIUM 125 MCG: 0.03 TABLET ORAL at 08:02

## 2021-01-01 RX ADMIN — ACETAMINOPHEN 650 MG: 325 TABLET ORAL at 09:04

## 2021-01-01 RX ADMIN — LIDOCAINE HYDROCHLORIDE 1.5 ML: 10 INJECTION, SOLUTION EPIDURAL; INFILTRATION; INTRACAUDAL; PERINEURAL at 21:35

## 2021-01-01 RX ADMIN — IPRATROPIUM BROMIDE AND ALBUTEROL SULFATE 3 ML: 2.5; .5 SOLUTION RESPIRATORY (INHALATION) at 08:22

## 2021-01-01 RX ADMIN — POLYETHYLENE GLYCOL 3350 17 G: 17 POWDER, FOR SOLUTION ORAL at 08:50

## 2021-01-01 RX ADMIN — PROPOFOL 100 MCG/KG/MIN: 10 INJECTION, EMULSION INTRAVENOUS at 12:46

## 2021-01-01 RX ADMIN — VANCOMYCIN HYDROCHLORIDE 1250 MG: 5 INJECTION, POWDER, LYOPHILIZED, FOR SOLUTION INTRAVENOUS at 00:29

## 2021-01-01 RX ADMIN — INSULIN ASPART 8 UNITS: 100 INJECTION, SOLUTION INTRAVENOUS; SUBCUTANEOUS at 13:13

## 2021-01-01 RX ADMIN — DILTIAZEM HYDROCHLORIDE 300 MG: 300 CAPSULE, COATED, EXTENDED RELEASE ORAL at 08:48

## 2021-01-01 RX ADMIN — PHENYLEPHRINE HYDROCHLORIDE 200 MCG: 10 INJECTION INTRAVENOUS at 13:02

## 2021-01-01 RX ADMIN — INSULIN ASPART 2 UNITS: 100 INJECTION, SOLUTION INTRAVENOUS; SUBCUTANEOUS at 17:55

## 2021-01-01 RX ADMIN — INSULIN ASPART 8 UNITS: 100 INJECTION, SOLUTION INTRAVENOUS; SUBCUTANEOUS at 12:55

## 2021-01-01 RX ADMIN — VANCOMYCIN HYDROCHLORIDE 1250 MG: 5 INJECTION, POWDER, LYOPHILIZED, FOR SOLUTION INTRAVENOUS at 22:04

## 2021-01-01 RX ADMIN — GABAPENTIN 300 MG: 300 CAPSULE ORAL at 20:35

## 2021-01-01 RX ADMIN — HYDROMORPHONE HYDROCHLORIDE 0.2 MG: 0.2 INJECTION, SOLUTION INTRAMUSCULAR; INTRAVENOUS; SUBCUTANEOUS at 02:23

## 2021-01-01 RX ADMIN — OLANZAPINE 5 MG: 5 TABLET, ORALLY DISINTEGRATING ORAL at 20:48

## 2021-01-01 RX ADMIN — PANTOPRAZOLE SODIUM 40 MG: 40 INJECTION, POWDER, FOR SOLUTION INTRAVENOUS at 08:01

## 2021-01-01 RX ADMIN — MAGNESIUM OXIDE TAB 400 MG (241.3 MG ELEMENTAL MG) 400 MG: 400 (241.3 MG) TAB at 21:51

## 2021-01-01 RX ADMIN — INSULIN ASPART 2 UNITS: 100 INJECTION, SOLUTION INTRAVENOUS; SUBCUTANEOUS at 08:26

## 2021-01-01 RX ADMIN — METOPROLOL TARTRATE 100 MG: 25 TABLET, FILM COATED ORAL at 08:47

## 2021-01-01 RX ADMIN — PROPOFOL 30 MG: 10 INJECTION, EMULSION INTRAVENOUS at 12:46

## 2021-01-01 RX ADMIN — INSULIN ASPART 4 UNITS: 100 INJECTION, SOLUTION INTRAVENOUS; SUBCUTANEOUS at 04:12

## 2021-01-01 RX ADMIN — POTASSIUM CHLORIDE 10 MEQ: 750 TABLET, EXTENDED RELEASE ORAL at 06:24

## 2021-01-01 RX ADMIN — PANTOPRAZOLE SODIUM 40 MG: 40 INJECTION, POWDER, FOR SOLUTION INTRAVENOUS at 21:51

## 2021-01-01 RX ADMIN — LEVOTHYROXINE SODIUM 125 MCG: 0.03 TABLET ORAL at 08:49

## 2021-01-01 RX ADMIN — WARFARIN SODIUM 4 MG: 2 TABLET ORAL at 17:10

## 2021-01-01 RX ADMIN — DOCUSATE SODIUM 50 MG AND SENNOSIDES 8.6 MG 1 TABLET: 8.6; 5 TABLET, FILM COATED ORAL at 20:48

## 2021-01-01 RX ADMIN — DILTIAZEM HYDROCHLORIDE 180 MG: 180 CAPSULE, COATED, EXTENDED RELEASE ORAL at 08:50

## 2021-01-01 RX ADMIN — INSULIN ASPART 9 UNITS: 100 INJECTION, SOLUTION INTRAVENOUS; SUBCUTANEOUS at 09:22

## 2021-01-01 RX ADMIN — MORPHINE SULFATE 10 MG: 20 SOLUTION ORAL at 17:45

## 2021-01-01 RX ADMIN — DOCUSATE SODIUM 50 MG AND SENNOSIDES 8.6 MG 1 TABLET: 8.6; 5 TABLET, FILM COATED ORAL at 10:38

## 2021-01-01 RX ADMIN — HYDROMORPHONE HYDROCHLORIDE 0.2 MG: 0.2 INJECTION, SOLUTION INTRAMUSCULAR; INTRAVENOUS; SUBCUTANEOUS at 21:47

## 2021-01-01 RX ADMIN — HYDROMORPHONE HYDROCHLORIDE 0.3 MG: 1 INJECTION, SOLUTION INTRAMUSCULAR; INTRAVENOUS; SUBCUTANEOUS at 21:51

## 2021-01-01 RX ADMIN — INSULIN DETEMIR 30 UNITS: 100 INJECTION, SOLUTION SUBCUTANEOUS at 08:25

## 2021-01-01 RX ADMIN — INSULIN ASPART 1 UNITS: 100 INJECTION, SOLUTION INTRAVENOUS; SUBCUTANEOUS at 08:03

## 2021-01-01 RX ADMIN — INSULIN DETEMIR 30 UNITS: 100 INJECTION, SOLUTION SUBCUTANEOUS at 09:12

## 2021-01-01 RX ADMIN — NALOXONE HYDROCHLORIDE 0.4 MG: 0.4 INJECTION, SOLUTION INTRAMUSCULAR; INTRAVENOUS; SUBCUTANEOUS at 20:27

## 2021-01-01 RX ADMIN — DOCUSATE SODIUM 50 MG AND SENNOSIDES 8.6 MG 1 TABLET: 8.6; 5 TABLET, FILM COATED ORAL at 21:00

## 2021-01-01 RX ADMIN — METOPROLOL TARTRATE 100 MG: 25 TABLET, FILM COATED ORAL at 20:40

## 2021-01-01 RX ADMIN — SODIUM CHLORIDE 250 ML: 9 INJECTION, SOLUTION INTRAVENOUS at 17:34

## 2021-01-01 RX ADMIN — HYDROMORPHONE HYDROCHLORIDE 0.2 MG: 0.2 INJECTION, SOLUTION INTRAMUSCULAR; INTRAVENOUS; SUBCUTANEOUS at 02:28

## 2021-01-01 RX ADMIN — FUROSEMIDE 40 MG: 10 INJECTION, SOLUTION INTRAMUSCULAR; INTRAVENOUS at 09:51

## 2021-01-01 RX ADMIN — INSULIN ASPART 1 UNITS: 100 INJECTION, SOLUTION INTRAVENOUS; SUBCUTANEOUS at 21:47

## 2021-01-01 RX ADMIN — INSULIN GLARGINE 7 UNITS: 100 INJECTION, SOLUTION SUBCUTANEOUS at 02:08

## 2021-01-01 RX ADMIN — INSULIN ASPART 1 UNITS: 100 INJECTION, SOLUTION INTRAVENOUS; SUBCUTANEOUS at 21:38

## 2021-01-01 RX ADMIN — DILTIAZEM HYDROCHLORIDE 240 MG: 120 CAPSULE, COATED, EXTENDED RELEASE ORAL at 09:05

## 2021-01-01 RX ADMIN — HYDROMORPHONE HYDROCHLORIDE 2 MG: 2 TABLET ORAL at 02:18

## 2021-01-01 RX ADMIN — LEVOTHYROXINE SODIUM 125 MCG: 0.03 TABLET ORAL at 08:59

## 2021-01-01 RX ADMIN — GABAPENTIN 100 MG: 100 CAPSULE ORAL at 21:00

## 2021-01-01 RX ADMIN — DOCUSATE SODIUM 50 MG AND SENNOSIDES 8.6 MG 1 TABLET: 8.6; 5 TABLET, FILM COATED ORAL at 08:52

## 2021-01-01 RX ADMIN — HUMAN INSULIN 5 UNITS: 100 INJECTION, SUSPENSION SUBCUTANEOUS at 08:12

## 2021-01-01 RX ADMIN — POTASSIUM CHLORIDE 40 MEQ: 20 TABLET, EXTENDED RELEASE ORAL at 22:26

## 2021-01-01 RX ADMIN — INSULIN ASPART 4 UNITS: 100 INJECTION, SOLUTION INTRAVENOUS; SUBCUTANEOUS at 17:44

## 2021-01-01 RX ADMIN — Medication 25 MG: at 09:22

## 2021-01-01 RX ADMIN — HYDROMORPHONE HYDROCHLORIDE 2 MG: 2 TABLET ORAL at 16:51

## 2021-01-01 RX ADMIN — ACETAMINOPHEN 325 MG: 325 TABLET ORAL at 00:19

## 2021-01-01 RX ADMIN — ROSUVASTATIN CALCIUM 20 MG: 10 TABLET, FILM COATED ORAL at 20:25

## 2021-01-01 RX ADMIN — WARFARIN SODIUM 3 MG: 3 TABLET ORAL at 17:06

## 2021-01-01 RX ADMIN — INSULIN ASPART 1 UNITS: 100 INJECTION, SOLUTION INTRAVENOUS; SUBCUTANEOUS at 17:10

## 2021-01-01 RX ADMIN — LORAZEPAM 1 MG: 2 LIQUID ORAL at 20:47

## 2021-01-01 RX ADMIN — TEMAZEPAM 7.5 MG: 7.5 CAPSULE ORAL at 20:37

## 2021-01-01 RX ADMIN — LEVOTHYROXINE SODIUM 125 MCG: 0.03 TABLET ORAL at 09:13

## 2021-01-01 RX ADMIN — INSULIN ASPART 1 UNITS: 100 INJECTION, SOLUTION INTRAVENOUS; SUBCUTANEOUS at 09:33

## 2021-01-01 RX ADMIN — QUETIAPINE 25 MG: 25 TABLET ORAL at 10:39

## 2021-01-01 RX ADMIN — IPRATROPIUM BROMIDE AND ALBUTEROL SULFATE 3 ML: .5; 2.5 SOLUTION RESPIRATORY (INHALATION) at 14:43

## 2021-01-01 RX ADMIN — OLANZAPINE 5 MG: 5 TABLET, ORALLY DISINTEGRATING ORAL at 21:00

## 2021-01-01 RX ADMIN — ACETAMINOPHEN 650 MG: 325 TABLET ORAL at 08:53

## 2021-01-01 RX ADMIN — MAGNESIUM OXIDE TAB 400 MG (241.3 MG ELEMENTAL MG) 400 MG: 400 (241.3 MG) TAB at 20:37

## 2021-01-01 RX ADMIN — POLYETHYLENE GLYCOL 3350 17 G: 17 POWDER, FOR SOLUTION ORAL at 08:52

## 2021-01-01 RX ADMIN — MORPHINE SULFATE 10 MG: 20 SOLUTION ORAL at 05:43

## 2021-01-01 RX ADMIN — LORAZEPAM 1 MG: 2 LIQUID ORAL at 02:48

## 2021-01-01 RX ADMIN — FUROSEMIDE 80 MG: 20 TABLET ORAL at 10:20

## 2021-01-01 RX ADMIN — FERROUS SULFATE TAB 325 MG (65 MG ELEMENTAL FE) 325 MG: 325 (65 FE) TAB at 08:26

## 2021-01-01 RX ADMIN — WARFARIN SODIUM 7.5 MG: 5 TABLET ORAL at 17:42

## 2021-01-01 RX ADMIN — PIPERACILLIN AND TAZOBACTAM 3.38 G: 3; .375 INJECTION, POWDER, FOR SOLUTION INTRAVENOUS at 20:47

## 2021-01-01 RX ADMIN — PIPERACILLIN AND TAZOBACTAM 3.38 G: 3; .375 INJECTION, POWDER, FOR SOLUTION INTRAVENOUS at 21:49

## 2021-01-01 RX ADMIN — Medication 100 MG: at 09:15

## 2021-01-01 RX ADMIN — HYDROMORPHONE HYDROCHLORIDE 4 MG: 2 TABLET ORAL at 03:06

## 2021-01-01 RX ADMIN — OLANZAPINE 5 MG: 5 TABLET, ORALLY DISINTEGRATING ORAL at 22:01

## 2021-01-01 RX ADMIN — DILTIAZEM HYDROCHLORIDE 180 MG: 180 CAPSULE, COATED, EXTENDED RELEASE ORAL at 09:04

## 2021-01-01 RX ADMIN — PREDNISONE 40 MG: 20 TABLET ORAL at 11:39

## 2021-01-01 RX ADMIN — MORPHINE SULFATE 10 MG: 20 SOLUTION ORAL at 01:28

## 2021-01-01 RX ADMIN — OLANZAPINE 5 MG: 5 TABLET, ORALLY DISINTEGRATING ORAL at 20:25

## 2021-01-01 RX ADMIN — HYDROMORPHONE HYDROCHLORIDE 0.2 MG: 0.2 INJECTION, SOLUTION INTRAMUSCULAR; INTRAVENOUS; SUBCUTANEOUS at 10:41

## 2021-01-01 RX ADMIN — FUROSEMIDE 20 MG: 10 INJECTION, SOLUTION INTRAMUSCULAR; INTRAVENOUS at 01:44

## 2021-01-01 RX ADMIN — QUETIAPINE 25 MG: 25 TABLET ORAL at 20:23

## 2021-01-01 RX ADMIN — METOPROLOL TARTRATE 75 MG: 25 TABLET, FILM COATED ORAL at 21:51

## 2021-01-01 RX ADMIN — PANTOPRAZOLE SODIUM 40 MG: 40 INJECTION, POWDER, FOR SOLUTION INTRAVENOUS at 08:27

## 2021-01-01 RX ADMIN — GABAPENTIN 100 MG: 100 CAPSULE ORAL at 20:25

## 2021-01-01 RX ADMIN — INSULIN ASPART 5 UNITS: 100 INJECTION, SOLUTION INTRAVENOUS; SUBCUTANEOUS at 16:58

## 2021-01-01 RX ADMIN — PIPERACILLIN AND TAZOBACTAM 3.38 G: 3; .375 INJECTION, POWDER, FOR SOLUTION INTRAVENOUS at 22:38

## 2021-01-01 RX ADMIN — LEVOTHYROXINE SODIUM 125 MCG: 0.03 TABLET ORAL at 09:16

## 2021-01-01 RX ADMIN — DILTIAZEM HYDROCHLORIDE 180 MG: 180 CAPSULE, COATED, EXTENDED RELEASE ORAL at 09:03

## 2021-01-01 RX ADMIN — PIPERACILLIN AND TAZOBACTAM 3.38 G: 3; .375 INJECTION, POWDER, FOR SOLUTION INTRAVENOUS at 11:38

## 2021-01-01 RX ADMIN — POLYETHYLENE GLYCOL 3350 17 G: 17 POWDER, FOR SOLUTION ORAL at 08:16

## 2021-01-01 RX ADMIN — DILTIAZEM HYDROCHLORIDE 30 MG: 30 TABLET, FILM COATED ORAL at 02:39

## 2021-01-01 RX ADMIN — HYDROMORPHONE HYDROCHLORIDE 2 MG: 2 TABLET ORAL at 16:11

## 2021-01-01 RX ADMIN — HYDROMORPHONE HYDROCHLORIDE 2 MG: 2 TABLET ORAL at 19:23

## 2021-01-01 RX ADMIN — DILTIAZEM HYDROCHLORIDE 180 MG: 180 CAPSULE, COATED, EXTENDED RELEASE ORAL at 09:35

## 2021-01-01 RX ADMIN — INSULIN ASPART 1 UNITS: 100 INJECTION, SOLUTION INTRAVENOUS; SUBCUTANEOUS at 12:31

## 2021-01-01 RX ADMIN — HYDROMORPHONE HYDROCHLORIDE 2 MG: 2 TABLET ORAL at 20:54

## 2021-01-01 RX ADMIN — HYDROMORPHONE HYDROCHLORIDE 0.3 MG: 1 INJECTION, SOLUTION INTRAMUSCULAR; INTRAVENOUS; SUBCUTANEOUS at 08:02

## 2021-01-01 RX ADMIN — GABAPENTIN 300 MG: 300 CAPSULE ORAL at 20:44

## 2021-01-01 RX ADMIN — ACETAMINOPHEN 975 MG: 325 TABLET ORAL at 02:38

## 2021-01-01 RX ADMIN — HYDROMORPHONE HYDROCHLORIDE 0.3 MG: 1 INJECTION, SOLUTION INTRAMUSCULAR; INTRAVENOUS; SUBCUTANEOUS at 08:12

## 2021-01-01 RX ADMIN — PIPERACILLIN AND TAZOBACTAM 3.38 G: 3; .375 INJECTION, POWDER, FOR SOLUTION INTRAVENOUS at 12:20

## 2021-01-01 RX ADMIN — MORPHINE SULFATE 10 MG: 20 SOLUTION ORAL at 15:09

## 2021-01-01 RX ADMIN — METOPROLOL TARTRATE 100 MG: 25 TABLET, FILM COATED ORAL at 09:05

## 2021-01-01 RX ADMIN — LEVOTHYROXINE SODIUM 125 MCG: 0.03 TABLET ORAL at 08:16

## 2021-01-01 RX ADMIN — LEVOTHYROXINE SODIUM 125 MCG: 0.03 TABLET ORAL at 06:18

## 2021-01-01 RX ADMIN — MORPHINE SULFATE 10 MG: 20 SOLUTION ORAL at 10:56

## 2021-01-01 RX ADMIN — ACETAMINOPHEN 650 MG: 325 TABLET ORAL at 20:48

## 2021-01-01 RX ADMIN — Medication 100 MG: at 13:23

## 2021-01-01 RX ADMIN — POTASSIUM CHLORIDE 40 MEQ: 20 TABLET, EXTENDED RELEASE ORAL at 19:35

## 2021-01-01 RX ADMIN — INSULIN GLARGINE 20 UNITS: 100 INJECTION, SOLUTION SUBCUTANEOUS at 20:55

## 2021-01-01 RX ADMIN — ACETAMINOPHEN 325 MG: 325 TABLET ORAL at 16:18

## 2021-01-01 RX ADMIN — INSULIN ASPART 3 UNITS: 100 INJECTION, SOLUTION INTRAVENOUS; SUBCUTANEOUS at 12:04

## 2021-01-01 RX ADMIN — MAGNESIUM OXIDE TAB 400 MG (241.3 MG ELEMENTAL MG) 400 MG: 400 (241.3 MG) TAB at 09:04

## 2021-01-01 RX ADMIN — INSULIN ASPART 2 UNITS: 100 INJECTION, SOLUTION INTRAVENOUS; SUBCUTANEOUS at 09:07

## 2021-01-01 RX ADMIN — PIPERACILLIN AND TAZOBACTAM 3.38 G: 3; .375 INJECTION, POWDER, FOR SOLUTION INTRAVENOUS at 04:27

## 2021-01-01 RX ADMIN — HYDROMORPHONE HYDROCHLORIDE 2 MG: 2 TABLET ORAL at 06:19

## 2021-01-01 RX ADMIN — TEMAZEPAM 7.5 MG: 7.5 CAPSULE ORAL at 22:06

## 2021-01-01 RX ADMIN — ROSUVASTATIN CALCIUM 20 MG: 10 TABLET, FILM COATED ORAL at 20:47

## 2021-01-01 RX ADMIN — INSULIN ASPART 4 UNITS: 100 INJECTION, SOLUTION INTRAVENOUS; SUBCUTANEOUS at 08:14

## 2021-01-01 RX ADMIN — OLANZAPINE 5 MG: 5 TABLET, ORALLY DISINTEGRATING ORAL at 20:44

## 2021-01-01 RX ADMIN — METOPROLOL TARTRATE 75 MG: 25 TABLET, FILM COATED ORAL at 20:08

## 2021-01-01 RX ADMIN — METOPROLOL TARTRATE 2.5 MG: 5 INJECTION INTRAVENOUS at 21:40

## 2021-01-01 RX ADMIN — POLYETHYLENE GLYCOL 3350 17 G: 17 POWDER, FOR SOLUTION ORAL at 10:41

## 2021-01-01 RX ADMIN — INSULIN ASPART 10 UNITS: 100 INJECTION, SOLUTION INTRAVENOUS; SUBCUTANEOUS at 19:27

## 2021-01-01 RX ADMIN — ACETAMINOPHEN 975 MG: 325 TABLET ORAL at 09:50

## 2021-01-01 RX ADMIN — DEXTROSE 50 % IN WATER (D50W) INTRAVENOUS SYRINGE 25 ML: at 20:26

## 2021-01-01 RX ADMIN — BISACODYL SUPPOSITORY 10 MG: 10 SUPPOSITORY RECTAL at 12:31

## 2021-01-01 RX ADMIN — TEMAZEPAM 7.5 MG: 7.5 CAPSULE ORAL at 22:29

## 2021-01-01 RX ADMIN — Medication 400 MG: at 08:48

## 2021-01-01 RX ADMIN — HYDROMORPHONE HYDROCHLORIDE 0.3 MG: 1 INJECTION, SOLUTION INTRAMUSCULAR; INTRAVENOUS; SUBCUTANEOUS at 09:06

## 2021-01-01 RX ADMIN — HYDROMORPHONE HYDROCHLORIDE 0.2 MG: 0.2 INJECTION, SOLUTION INTRAMUSCULAR; INTRAVENOUS; SUBCUTANEOUS at 06:10

## 2021-01-01 RX ADMIN — LORAZEPAM 1 MG: 2 LIQUID ORAL at 10:56

## 2021-01-01 RX ADMIN — LIDOCAINE HYDROCHLORIDE: 20 JELLY TOPICAL at 16:01

## 2021-01-01 RX ADMIN — POLYETHYLENE GLYCOL 3350 17 G: 17 POWDER, FOR SOLUTION ORAL at 09:04

## 2021-01-01 RX ADMIN — INSULIN DETEMIR 30 UNITS: 100 INJECTION, SOLUTION SUBCUTANEOUS at 08:14

## 2021-01-01 RX ADMIN — WARFARIN SODIUM 5 MG: 5 TABLET ORAL at 19:27

## 2021-01-01 RX ADMIN — Medication 12.5 MG: at 09:31

## 2021-01-01 RX ADMIN — PREDNISONE 40 MG: 20 TABLET ORAL at 09:04

## 2021-01-01 RX ADMIN — PANTOPRAZOLE SODIUM 40 MG: 40 INJECTION, POWDER, FOR SOLUTION INTRAVENOUS at 08:29

## 2021-01-01 RX ADMIN — POTASSIUM CHLORIDE 10 MEQ: 750 TABLET, EXTENDED RELEASE ORAL at 09:04

## 2021-01-01 RX ADMIN — ADENOSINE 6 MG: 3 INJECTION, SOLUTION INTRAVENOUS at 16:09

## 2021-01-01 RX ADMIN — PIPERACILLIN AND TAZOBACTAM 3.38 G: 3; .375 INJECTION, POWDER, FOR SOLUTION INTRAVENOUS at 13:12

## 2021-01-01 RX ADMIN — INSULIN ASPART 3 UNITS: 100 INJECTION, SOLUTION INTRAVENOUS; SUBCUTANEOUS at 17:39

## 2021-01-01 RX ADMIN — INSULIN ASPART 5 UNITS: 100 INJECTION, SOLUTION INTRAVENOUS; SUBCUTANEOUS at 13:45

## 2021-01-01 RX ADMIN — POLYETHYLENE GLYCOL 3350 17 G: 17 POWDER, FOR SOLUTION ORAL at 10:20

## 2021-01-01 RX ADMIN — Medication 400 MG: at 21:59

## 2021-01-01 RX ADMIN — HYDROMORPHONE HYDROCHLORIDE 2 MG: 2 TABLET ORAL at 01:09

## 2021-01-01 RX ADMIN — METOPROLOL TARTRATE 50 MG: 25 TABLET, FILM COATED ORAL at 08:25

## 2021-01-01 RX ADMIN — INSULIN ASPART 3 UNITS: 100 INJECTION, SOLUTION INTRAVENOUS; SUBCUTANEOUS at 08:50

## 2021-01-01 RX ADMIN — WARFARIN SODIUM 5 MG: 5 TABLET ORAL at 17:11

## 2021-01-01 RX ADMIN — MORPHINE SULFATE 10 MG: 20 SOLUTION ORAL at 09:33

## 2021-01-01 RX ADMIN — INSULIN GLARGINE 10 UNITS: 100 INJECTION, SOLUTION SUBCUTANEOUS at 09:00

## 2021-01-01 RX ADMIN — FUROSEMIDE 80 MG: 20 TABLET ORAL at 10:26

## 2021-01-01 RX ADMIN — ACETAMINOPHEN 650 MG: 325 TABLET ORAL at 00:58

## 2021-01-01 RX ADMIN — TEMAZEPAM 7.5 MG: 7.5 CAPSULE ORAL at 20:57

## 2021-01-01 RX ADMIN — PROPOFOL 75 MCG/KG/MIN: 10 INJECTION, EMULSION INTRAVENOUS at 16:44

## 2021-01-01 RX ADMIN — ROSUVASTATIN CALCIUM 20 MG: 10 TABLET, FILM COATED ORAL at 21:00

## 2021-01-01 RX ADMIN — HYDROMORPHONE HYDROCHLORIDE 0.2 MG: 0.2 INJECTION, SOLUTION INTRAMUSCULAR; INTRAVENOUS; SUBCUTANEOUS at 09:33

## 2021-01-01 RX ADMIN — POTASSIUM CHLORIDE 40 MEQ: 20 TABLET, EXTENDED RELEASE ORAL at 08:29

## 2021-01-01 RX ADMIN — MORPHINE SULFATE 10 MG: 20 SOLUTION ORAL at 09:59

## 2021-01-01 RX ADMIN — LIDOCAINE HYDROCHLORIDE 80 MG: 20 INJECTION, SOLUTION INFILTRATION; PERINEURAL at 12:58

## 2021-01-01 RX ADMIN — HYDROMORPHONE HYDROCHLORIDE 2 MG: 2 TABLET ORAL at 12:27

## 2021-01-01 RX ADMIN — MORPHINE SULFATE 10 MG: 20 SOLUTION ORAL at 18:02

## 2021-01-01 RX ADMIN — FENTANYL CITRATE 50 MCG: 50 INJECTION, SOLUTION INTRAMUSCULAR; INTRAVENOUS at 15:36

## 2021-01-01 RX ADMIN — MORPHINE SULFATE 10 MG: 20 SOLUTION ORAL at 02:10

## 2021-01-01 RX ADMIN — HYDROMORPHONE HYDROCHLORIDE 2 MG: 2 TABLET ORAL at 08:50

## 2021-01-01 RX ADMIN — VANCOMYCIN HYDROCHLORIDE 1250 MG: 5 INJECTION, POWDER, LYOPHILIZED, FOR SOLUTION INTRAVENOUS at 20:50

## 2021-01-01 RX ADMIN — PANTOPRAZOLE SODIUM 40 MG: 40 INJECTION, POWDER, FOR SOLUTION INTRAVENOUS at 20:56

## 2021-01-01 RX ADMIN — METOPROLOL TARTRATE 100 MG: 25 TABLET, FILM COATED ORAL at 08:02

## 2021-01-01 RX ADMIN — DEXTROSE AND SODIUM CHLORIDE 50 ML/HR: 5; 450 INJECTION, SOLUTION INTRAVENOUS at 10:40

## 2021-01-01 RX ADMIN — Medication 100 MG: at 08:42

## 2021-01-01 RX ADMIN — INSULIN ASPART 5 UNITS: 100 INJECTION, SOLUTION INTRAVENOUS; SUBCUTANEOUS at 17:12

## 2021-01-01 RX ADMIN — GABAPENTIN 300 MG: 300 CAPSULE ORAL at 20:47

## 2021-01-01 RX ADMIN — PIPERACILLIN AND TAZOBACTAM 3.38 G: 3; .375 INJECTION, POWDER, FOR SOLUTION INTRAVENOUS at 04:37

## 2021-01-01 RX ADMIN — SODIUM CHLORIDE, POTASSIUM CHLORIDE, SODIUM LACTATE AND CALCIUM CHLORIDE: 600; 310; 30; 20 INJECTION, SOLUTION INTRAVENOUS at 15:28

## 2021-01-01 RX ADMIN — LEVOTHYROXINE SODIUM 125 MCG: 0.03 TABLET ORAL at 08:00

## 2021-01-01 RX ADMIN — ACETAMINOPHEN 650 MG: 325 TABLET ORAL at 18:05

## 2021-01-01 RX ADMIN — METOPROLOL TARTRATE 2.5 MG: 5 INJECTION INTRAVENOUS at 10:06

## 2021-01-01 RX ADMIN — MELATONIN TAB 3 MG 3 MG: 3 TAB at 21:42

## 2021-01-01 RX ADMIN — ACETAMINOPHEN 975 MG: 325 TABLET ORAL at 20:35

## 2021-01-01 RX ADMIN — INSULIN ASPART 2 UNITS: 100 INJECTION, SOLUTION INTRAVENOUS; SUBCUTANEOUS at 13:37

## 2021-01-01 RX ADMIN — FERROUS SULFATE TAB 325 MG (65 MG ELEMENTAL FE) 325 MG: 325 (65 FE) TAB at 10:24

## 2021-01-01 RX ADMIN — PIPERACILLIN AND TAZOBACTAM 3.38 G: 3; .375 INJECTION, POWDER, FOR SOLUTION INTRAVENOUS at 20:50

## 2021-01-01 RX ADMIN — ACETAMINOPHEN 325 MG: 325 TABLET ORAL at 00:49

## 2021-01-01 RX ADMIN — HYDROMORPHONE HYDROCHLORIDE 2 MG: 2 TABLET ORAL at 11:22

## 2021-01-01 RX ADMIN — INSULIN ASPART 3 UNITS: 100 INJECTION, SOLUTION INTRAVENOUS; SUBCUTANEOUS at 13:12

## 2021-01-01 RX ADMIN — METOPROLOL TARTRATE 50 MG: 25 TABLET, FILM COATED ORAL at 20:24

## 2021-01-01 RX ADMIN — OXYCODONE HYDROCHLORIDE 5 MG: 5 TABLET ORAL at 05:51

## 2021-01-01 RX ADMIN — METOPROLOL TARTRATE 75 MG: 25 TABLET, FILM COATED ORAL at 08:59

## 2021-01-01 RX ADMIN — PIPERACILLIN AND TAZOBACTAM 3.38 G: 3; .375 INJECTION, POWDER, FOR SOLUTION INTRAVENOUS at 03:27

## 2021-01-01 RX ADMIN — INSULIN GLARGINE 20 UNITS: 100 INJECTION, SOLUTION SUBCUTANEOUS at 21:06

## 2021-01-01 RX ADMIN — PANTOPRAZOLE SODIUM 40 MG: 40 INJECTION, POWDER, FOR SOLUTION INTRAVENOUS at 09:51

## 2021-01-01 RX ADMIN — ACETAMINOPHEN 650 MG: 325 TABLET ORAL at 01:28

## 2021-01-01 RX ADMIN — WARFARIN SODIUM 5 MG: 5 TABLET ORAL at 17:37

## 2021-01-01 RX ADMIN — POLYETHYLENE GLYCOL 3350 17 G: 17 POWDER, FOR SOLUTION ORAL at 08:26

## 2021-01-01 RX ADMIN — GABAPENTIN 300 MG: 300 CAPSULE ORAL at 22:19

## 2021-01-01 RX ADMIN — DOCUSATE SODIUM 50 MG AND SENNOSIDES 8.6 MG 1 TABLET: 8.6; 5 TABLET, FILM COATED ORAL at 20:25

## 2021-01-01 RX ADMIN — FUROSEMIDE 80 MG: 20 TABLET ORAL at 09:04

## 2021-01-01 RX ADMIN — INSULIN ASPART 3 UNITS: 100 INJECTION, SOLUTION INTRAVENOUS; SUBCUTANEOUS at 12:40

## 2021-01-01 RX ADMIN — SODIUM CHLORIDE: 9 INJECTION, SOLUTION INTRAVENOUS at 08:44

## 2021-01-01 RX ADMIN — DEXTROSE AND SODIUM CHLORIDE: 5; 450 INJECTION, SOLUTION INTRAVENOUS at 05:54

## 2021-01-01 RX ADMIN — METOPROLOL TARTRATE 2.5 MG: 5 INJECTION INTRAVENOUS at 17:40

## 2021-01-01 RX ADMIN — HYDROMORPHONE HYDROCHLORIDE 0.3 MG: 1 INJECTION, SOLUTION INTRAMUSCULAR; INTRAVENOUS; SUBCUTANEOUS at 21:05

## 2021-01-01 RX ADMIN — METOPROLOL TARTRATE 2.5 MG: 5 INJECTION INTRAVENOUS at 17:35

## 2021-01-01 RX ADMIN — INSULIN ASPART 2 UNITS: 100 INJECTION, SOLUTION INTRAVENOUS; SUBCUTANEOUS at 09:12

## 2021-01-01 RX ADMIN — PREDNISONE 40 MG: 20 TABLET ORAL at 08:09

## 2021-01-01 RX ADMIN — LORAZEPAM 0.5 MG: 2 INJECTION INTRAMUSCULAR; INTRAVENOUS at 14:31

## 2021-01-01 RX ADMIN — PANTOPRAZOLE SODIUM 40 MG: 40 INJECTION, POWDER, FOR SOLUTION INTRAVENOUS at 20:10

## 2021-01-01 RX ADMIN — PANTOPRAZOLE SODIUM 40 MG: 40 INJECTION, POWDER, FOR SOLUTION INTRAVENOUS at 20:09

## 2021-01-01 RX ADMIN — LEVOTHYROXINE SODIUM 125 MCG: 0.03 TABLET ORAL at 10:05

## 2021-01-01 RX ADMIN — MELATONIN TAB 3 MG 3 MG: 3 TAB at 18:03

## 2021-01-01 RX ADMIN — FUROSEMIDE 20 MG: 10 INJECTION, SOLUTION INTRAMUSCULAR; INTRAVENOUS at 02:10

## 2021-01-01 RX ADMIN — PANTOPRAZOLE SODIUM 40 MG: 40 INJECTION, POWDER, FOR SOLUTION INTRAVENOUS at 08:11

## 2021-01-01 RX ADMIN — INSULIN GLARGINE 20 UNITS: 100 INJECTION, SOLUTION SUBCUTANEOUS at 21:16

## 2021-01-01 RX ADMIN — PANTOPRAZOLE SODIUM 40 MG: 40 INJECTION, POWDER, FOR SOLUTION INTRAVENOUS at 09:07

## 2021-01-01 RX ADMIN — MELATONIN TAB 3 MG 3 MG: 3 TAB at 19:27

## 2021-01-01 RX ADMIN — IOPAMIDOL 75 ML: 755 INJECTION, SOLUTION INTRAVENOUS at 21:38

## 2021-01-01 RX ADMIN — METHYLPREDNISOLONE SODIUM SUCCINATE 40 MG: 40 INJECTION, POWDER, FOR SOLUTION INTRAMUSCULAR; INTRAVENOUS at 05:24

## 2021-01-01 RX ADMIN — INSULIN ASPART 3 UNITS: 100 INJECTION, SOLUTION INTRAVENOUS; SUBCUTANEOUS at 17:38

## 2021-01-01 RX ADMIN — PANTOPRAZOLE SODIUM 40 MG: 40 INJECTION, POWDER, FOR SOLUTION INTRAVENOUS at 20:40

## 2021-01-01 RX ADMIN — OLANZAPINE 2.5 MG: 2.5 TABLET, FILM COATED ORAL at 21:06

## 2021-01-01 RX ADMIN — MORPHINE SULFATE 10 MG: 20 SOLUTION ORAL at 08:42

## 2021-01-01 RX ADMIN — HALOPERIDOL LACTATE 1 MG: 5 INJECTION, SOLUTION INTRAMUSCULAR at 00:20

## 2021-01-01 RX ADMIN — INSULIN ASPART 1 UNITS: 100 INJECTION, SOLUTION INTRAVENOUS; SUBCUTANEOUS at 21:58

## 2021-01-01 RX ADMIN — HYDROMORPHONE HYDROCHLORIDE 2 MG: 2 TABLET ORAL at 08:26

## 2021-01-01 RX ADMIN — DEXTROSE AND SODIUM CHLORIDE: 5; 450 INJECTION, SOLUTION INTRAVENOUS at 16:19

## 2021-01-01 RX ADMIN — LEVOTHYROXINE SODIUM 125 MCG: 0.03 TABLET ORAL at 06:53

## 2021-01-01 RX ADMIN — METOPROLOL TARTRATE 75 MG: 25 TABLET, FILM COATED ORAL at 09:14

## 2021-01-01 RX ADMIN — DOCUSATE SODIUM 50 MG AND SENNOSIDES 8.6 MG 1 TABLET: 8.6; 5 TABLET, FILM COATED ORAL at 08:50

## 2021-01-01 RX ADMIN — POTASSIUM CHLORIDE 20 MEQ: 20 TABLET, EXTENDED RELEASE ORAL at 21:38

## 2021-01-01 RX ADMIN — MAGNESIUM OXIDE TAB 400 MG (241.3 MG ELEMENTAL MG) 400 MG: 400 (241.3 MG) TAB at 08:23

## 2021-01-01 RX ADMIN — INSULIN ASPART 5 UNITS: 100 INJECTION, SOLUTION INTRAVENOUS; SUBCUTANEOUS at 00:36

## 2021-01-01 RX ADMIN — METOPROLOL TARTRATE 75 MG: 25 TABLET, FILM COATED ORAL at 08:10

## 2021-01-01 RX ADMIN — INSULIN DETEMIR 20 UNITS: 100 INJECTION, SOLUTION SUBCUTANEOUS at 08:50

## 2021-01-01 RX ADMIN — FUROSEMIDE 80 MG: 20 TABLET ORAL at 08:50

## 2021-01-01 RX ADMIN — METOPROLOL TARTRATE 50 MG: 25 TABLET, FILM COATED ORAL at 08:01

## 2021-01-01 RX ADMIN — INSULIN ASPART 2 UNITS: 100 INJECTION, SOLUTION INTRAVENOUS; SUBCUTANEOUS at 12:04

## 2021-01-01 RX ADMIN — DOCUSATE SODIUM 50 MG AND SENNOSIDES 8.6 MG 1 TABLET: 8.6; 5 TABLET, FILM COATED ORAL at 20:44

## 2021-01-01 RX ADMIN — HALOPERIDOL LACTATE 2 MG: 5 INJECTION, SOLUTION INTRAMUSCULAR at 14:49

## 2021-01-01 RX ADMIN — DOCUSATE SODIUM 50 MG AND SENNOSIDES 8.6 MG 1 TABLET: 8.6; 5 TABLET, FILM COATED ORAL at 09:04

## 2021-01-01 RX ADMIN — INSULIN ASPART 7 UNITS: 100 INJECTION, SOLUTION INTRAVENOUS; SUBCUTANEOUS at 17:07

## 2021-01-01 RX ADMIN — VANCOMYCIN HYDROCHLORIDE 1500 MG: 5 INJECTION, POWDER, LYOPHILIZED, FOR SOLUTION INTRAVENOUS at 18:45

## 2021-01-01 RX ADMIN — INSULIN ASPART 2 UNITS: 100 INJECTION, SOLUTION INTRAVENOUS; SUBCUTANEOUS at 08:25

## 2021-01-01 RX ADMIN — PIPERACILLIN AND TAZOBACTAM 3.38 G: 3; .375 INJECTION, POWDER, FOR SOLUTION INTRAVENOUS at 13:22

## 2021-01-01 RX ADMIN — TEMAZEPAM 7.5 MG: 7.5 CAPSULE ORAL at 21:07

## 2021-01-01 RX ADMIN — DILTIAZEM HYDROCHLORIDE 240 MG: 120 CAPSULE, COATED, EXTENDED RELEASE ORAL at 08:15

## 2021-01-01 RX ADMIN — LORAZEPAM 0.5 MG: 2 INJECTION INTRAMUSCULAR; INTRAVENOUS at 02:17

## 2021-01-01 RX ADMIN — INSULIN GLARGINE 20 UNITS: 100 INJECTION, SOLUTION SUBCUTANEOUS at 08:30

## 2021-01-01 RX ADMIN — INSULIN ASPART 2 UNITS: 100 INJECTION, SOLUTION INTRAVENOUS; SUBCUTANEOUS at 21:10

## 2021-01-01 RX ADMIN — VANCOMYCIN HYDROCHLORIDE 1500 MG: 5 INJECTION, POWDER, LYOPHILIZED, FOR SOLUTION INTRAVENOUS at 20:17

## 2021-01-01 RX ADMIN — WARFARIN SODIUM 6 MG: 3 TABLET ORAL at 18:08

## 2021-01-01 RX ADMIN — WARFARIN SODIUM 7.5 MG: 5 TABLET ORAL at 20:35

## 2021-01-01 RX ADMIN — MELATONIN TAB 3 MG 3 MG: 3 TAB at 20:08

## 2021-01-01 RX ADMIN — METOPROLOL TARTRATE 2.5 MG: 5 INJECTION INTRAVENOUS at 04:06

## 2021-01-01 RX ADMIN — PROCHLORPERAZINE EDISYLATE 5 MG: 5 INJECTION INTRAMUSCULAR; INTRAVENOUS at 09:33

## 2021-01-01 RX ADMIN — FUROSEMIDE 20 MG: 10 INJECTION, SOLUTION INTRAMUSCULAR; INTRAVENOUS at 13:39

## 2021-01-01 RX ADMIN — INSULIN ASPART 13 UNITS: 100 INJECTION, SOLUTION INTRAVENOUS; SUBCUTANEOUS at 09:07

## 2021-01-01 RX ADMIN — WARFARIN SODIUM 4 MG: 2 TABLET ORAL at 18:41

## 2021-01-01 RX ADMIN — LORAZEPAM 0.5 MG: 2 INJECTION INTRAMUSCULAR; INTRAVENOUS at 00:58

## 2021-01-01 RX ADMIN — METOPROLOL TARTRATE 50 MG: 25 TABLET, FILM COATED ORAL at 21:37

## 2021-01-01 RX ADMIN — OLANZAPINE 5 MG: 5 TABLET, ORALLY DISINTEGRATING ORAL at 20:46

## 2021-01-01 RX ADMIN — INSULIN ASPART 1 UNITS: 100 INJECTION, SOLUTION INTRAVENOUS; SUBCUTANEOUS at 17:28

## 2021-01-01 RX ADMIN — DOCUSATE SODIUM 50 MG AND SENNOSIDES 8.6 MG 1 TABLET: 8.6; 5 TABLET, FILM COATED ORAL at 20:46

## 2021-01-01 RX ADMIN — Medication 100 MG: at 08:59

## 2021-01-01 RX ADMIN — HYDROMORPHONE HYDROCHLORIDE 0.2 MG: 0.2 INJECTION, SOLUTION INTRAMUSCULAR; INTRAVENOUS; SUBCUTANEOUS at 17:52

## 2021-01-01 RX ADMIN — INSULIN DETEMIR 25 UNITS: 100 INJECTION, SOLUTION SUBCUTANEOUS at 09:03

## 2021-01-01 RX ADMIN — FUROSEMIDE 20 MG: 10 INJECTION, SOLUTION INTRAMUSCULAR; INTRAVENOUS at 13:09

## 2021-01-01 RX ADMIN — LEVOTHYROXINE SODIUM 125 MCG: 0.03 TABLET ORAL at 09:04

## 2021-01-01 RX ADMIN — INSULIN ASPART 9 UNITS: 100 INJECTION, SOLUTION INTRAVENOUS; SUBCUTANEOUS at 18:21

## 2021-01-01 RX ADMIN — INSULIN ASPART 2 UNITS: 100 INJECTION, SOLUTION INTRAVENOUS; SUBCUTANEOUS at 08:24

## 2021-01-01 RX ADMIN — PIPERACILLIN SODIUM AND TAZOBACTAM SODIUM 3.38 G: 3; .375 INJECTION, POWDER, LYOPHILIZED, FOR SOLUTION INTRAVENOUS at 22:26

## 2021-01-01 RX ADMIN — METOPROLOL TARTRATE 100 MG: 25 TABLET, FILM COATED ORAL at 20:37

## 2021-01-01 RX ADMIN — LIDOCAINE HYDROCHLORIDE 60 ML: 20 INJECTION, SOLUTION INFILTRATION; PERINEURAL at 16:46

## 2021-01-01 RX ADMIN — METOPROLOL TARTRATE 25 MG: 25 TABLET, FILM COATED ORAL at 21:00

## 2021-01-01 RX ADMIN — INSULIN ASPART 2 UNITS: 100 INJECTION, SOLUTION INTRAVENOUS; SUBCUTANEOUS at 12:36

## 2021-01-01 RX ADMIN — INSULIN ASPART 8 UNITS: 100 INJECTION, SOLUTION INTRAVENOUS; SUBCUTANEOUS at 17:35

## 2021-01-01 RX ADMIN — MORPHINE SULFATE 10 MG: 20 SOLUTION ORAL at 08:09

## 2021-01-01 RX ADMIN — DILTIAZEM HYDROCHLORIDE 180 MG: 180 CAPSULE, COATED, EXTENDED RELEASE ORAL at 08:29

## 2021-01-01 RX ADMIN — HALOPERIDOL 2 MG: 1 TABLET ORAL at 03:53

## 2021-01-01 RX ADMIN — DEXTROSE AND SODIUM CHLORIDE: 5; 450 INJECTION, SOLUTION INTRAVENOUS at 20:51

## 2021-01-01 RX ADMIN — MORPHINE SULFATE 10 MG: 20 SOLUTION ORAL at 19:12

## 2021-01-01 RX ADMIN — INSULIN ASPART 12 UNITS: 100 INJECTION, SOLUTION INTRAVENOUS; SUBCUTANEOUS at 13:48

## 2021-01-01 RX ADMIN — DOCUSATE SODIUM 50 MG AND SENNOSIDES 8.6 MG 1 TABLET: 8.6; 5 TABLET, FILM COATED ORAL at 20:35

## 2021-01-01 RX ADMIN — VANCOMYCIN HYDROCHLORIDE 1500 MG: 5 INJECTION, POWDER, LYOPHILIZED, FOR SOLUTION INTRAVENOUS at 18:20

## 2021-01-01 RX ADMIN — INSULIN ASPART 4 UNITS: 100 INJECTION, SOLUTION INTRAVENOUS; SUBCUTANEOUS at 09:12

## 2021-01-01 RX ADMIN — PROCHLORPERAZINE EDISYLATE 5 MG: 5 INJECTION INTRAMUSCULAR; INTRAVENOUS at 17:57

## 2021-01-01 RX ADMIN — ACETAMINOPHEN 650 MG: 325 TABLET ORAL at 13:12

## 2021-01-01 RX ADMIN — VANCOMYCIN HYDROCHLORIDE 1500 MG: 5 INJECTION, POWDER, LYOPHILIZED, FOR SOLUTION INTRAVENOUS at 18:42

## 2021-01-01 RX ADMIN — WARFARIN SODIUM 5 MG: 5 TABLET ORAL at 17:34

## 2021-01-01 RX ADMIN — OLANZAPINE 5 MG: 5 TABLET, ORALLY DISINTEGRATING ORAL at 21:04

## 2021-01-01 RX ADMIN — MELATONIN TAB 3 MG 3 MG: 3 TAB at 21:00

## 2021-01-01 RX ADMIN — INSULIN DETEMIR 10 UNITS: 100 INJECTION, SOLUTION SUBCUTANEOUS at 22:20

## 2021-01-01 RX ADMIN — FUROSEMIDE 80 MG: 20 TABLET ORAL at 08:31

## 2021-01-01 RX ADMIN — METOPROLOL TARTRATE 100 MG: 25 TABLET, FILM COATED ORAL at 20:55

## 2021-01-01 RX ADMIN — FUROSEMIDE 20 MG: 10 INJECTION, SOLUTION INTRAMUSCULAR; INTRAVENOUS at 14:11

## 2021-01-01 RX ADMIN — HYDROMORPHONE HYDROCHLORIDE 0.3 MG: 1 INJECTION, SOLUTION INTRAMUSCULAR; INTRAVENOUS; SUBCUTANEOUS at 09:05

## 2021-01-01 RX ADMIN — ACETAMINOPHEN 650 MG: 325 TABLET ORAL at 08:12

## 2021-01-01 RX ADMIN — MAGNESIUM HYDROXIDE 30 ML: 400 SUSPENSION ORAL at 19:29

## 2021-01-01 RX ADMIN — INSULIN ASPART 2 UNITS: 100 INJECTION, SOLUTION INTRAVENOUS; SUBCUTANEOUS at 12:50

## 2021-01-01 RX ADMIN — FUROSEMIDE 40 MG: 10 INJECTION, SOLUTION INTRAMUSCULAR; INTRAVENOUS at 01:12

## 2021-01-01 RX ADMIN — FUROSEMIDE 20 MG: 10 INJECTION, SOLUTION INTRAMUSCULAR; INTRAVENOUS at 01:17

## 2021-01-01 RX ADMIN — PIPERACILLIN AND TAZOBACTAM 3.38 G: 3; .375 INJECTION, POWDER, FOR SOLUTION INTRAVENOUS at 19:27

## 2021-01-01 RX ADMIN — VANCOMYCIN HYDROCHLORIDE 1500 MG: 5 INJECTION, POWDER, LYOPHILIZED, FOR SOLUTION INTRAVENOUS at 20:47

## 2021-01-01 RX ADMIN — ACETAMINOPHEN 650 MG: 325 TABLET ORAL at 08:46

## 2021-01-01 RX ADMIN — PANTOPRAZOLE SODIUM 40 MG: 40 INJECTION, POWDER, FOR SOLUTION INTRAVENOUS at 21:02

## 2021-01-01 RX ADMIN — INSULIN DETEMIR 30 UNITS: 100 INJECTION, SOLUTION SUBCUTANEOUS at 09:02

## 2021-01-01 RX ADMIN — MELATONIN TAB 3 MG 3 MG: 3 TAB at 19:05

## 2021-01-01 RX ADMIN — INSULIN ASPART 4 UNITS: 100 INJECTION, SOLUTION INTRAVENOUS; SUBCUTANEOUS at 07:45

## 2021-01-01 RX ADMIN — LORAZEPAM 1 MG: 2 LIQUID ORAL at 16:41

## 2021-01-01 RX ADMIN — METOPROLOL TARTRATE 2.5 MG: 5 INJECTION INTRAVENOUS at 10:20

## 2021-01-01 RX ADMIN — MORPHINE SULFATE 10 MG: 20 SOLUTION ORAL at 19:27

## 2021-01-01 RX ADMIN — FERROUS SULFATE TAB 325 MG (65 MG ELEMENTAL FE) 325 MG: 325 (65 FE) TAB at 09:04

## 2021-01-01 RX ADMIN — NALOXONE HYDROCHLORIDE 0.2 MG: 0.4 INJECTION, SOLUTION INTRAMUSCULAR; INTRAVENOUS; SUBCUTANEOUS at 07:06

## 2021-01-01 RX ADMIN — PANTOPRAZOLE SODIUM 40 MG: 40 INJECTION, POWDER, FOR SOLUTION INTRAVENOUS at 09:00

## 2021-01-01 RX ADMIN — INSULIN DETEMIR 30 UNITS: 100 INJECTION, SOLUTION SUBCUTANEOUS at 09:05

## 2021-01-01 RX ADMIN — BISACODYL SUPPOSITORY 10 MG: 10 SUPPOSITORY RECTAL at 14:21

## 2021-01-01 RX ADMIN — LEVOTHYROXINE SODIUM 125 MCG: 0.03 TABLET ORAL at 09:52

## 2021-01-01 RX ADMIN — PIPERACILLIN AND TAZOBACTAM 3.38 G: 3; .375 INJECTION, POWDER, FOR SOLUTION INTRAVENOUS at 05:07

## 2021-01-01 RX ADMIN — WARFARIN SODIUM 5 MG: 5 TABLET ORAL at 18:33

## 2021-01-01 RX ADMIN — NALOXONE HYDROCHLORIDE 0.4 MG: 0.4 INJECTION, SOLUTION INTRAMUSCULAR; INTRAVENOUS; SUBCUTANEOUS at 10:10

## 2021-01-01 RX ADMIN — PANTOPRAZOLE SODIUM 40 MG: 40 INJECTION, POWDER, FOR SOLUTION INTRAVENOUS at 21:59

## 2021-01-01 RX ADMIN — MAGNESIUM OXIDE TAB 400 MG (241.3 MG ELEMENTAL MG) 400 MG: 400 (241.3 MG) TAB at 08:05

## 2021-01-01 RX ADMIN — HYDROMORPHONE HYDROCHLORIDE 4 MG: 2 TABLET ORAL at 11:03

## 2021-01-01 RX ADMIN — PROCHLORPERAZINE EDISYLATE 5 MG: 5 INJECTION INTRAMUSCULAR; INTRAVENOUS at 14:11

## 2021-01-01 RX ADMIN — PIPERACILLIN AND TAZOBACTAM 3.38 G: 3; .375 INJECTION, POWDER, FOR SOLUTION INTRAVENOUS at 03:09

## 2021-01-01 RX ADMIN — INSULIN ASPART 2 UNITS: 100 INJECTION, SOLUTION INTRAVENOUS; SUBCUTANEOUS at 12:20

## 2021-01-01 RX ADMIN — POTASSIUM CHLORIDE 40 MEQ: 20 TABLET, EXTENDED RELEASE ORAL at 15:25

## 2021-01-01 RX ADMIN — INSULIN GLARGINE 20 UNITS: 100 INJECTION, SOLUTION SUBCUTANEOUS at 08:58

## 2021-01-01 RX ADMIN — STANDARDIZED SENNA CONCENTRATE 8.6 MG: 8.6 TABLET ORAL at 08:47

## 2021-01-01 RX ADMIN — METOPROLOL TARTRATE 75 MG: 25 TABLET, FILM COATED ORAL at 21:01

## 2021-01-01 RX ADMIN — TEMAZEPAM 7.5 MG: 7.5 CAPSULE ORAL at 21:02

## 2021-01-01 RX ADMIN — BUPIVACAINE HYDROCHLORIDE 10 ML: 5 INJECTION, SOLUTION EPIDURAL; INTRACAUDAL; PERINEURAL at 16:00

## 2021-01-01 RX ADMIN — HYDROMORPHONE HYDROCHLORIDE 0.2 MG: 0.2 INJECTION, SOLUTION INTRAMUSCULAR; INTRAVENOUS; SUBCUTANEOUS at 06:32

## 2021-01-01 RX ADMIN — DOCUSATE SODIUM 50 MG AND SENNOSIDES 8.6 MG 1 TABLET: 8.6; 5 TABLET, FILM COATED ORAL at 08:26

## 2021-01-01 RX ADMIN — INSULIN ASPART 1 UNITS: 100 INJECTION, SOLUTION INTRAVENOUS; SUBCUTANEOUS at 22:19

## 2021-01-01 RX ADMIN — ACETAMINOPHEN 325 MG: 325 TABLET ORAL at 17:50

## 2021-01-01 RX ADMIN — IPRATROPIUM BROMIDE AND ALBUTEROL SULFATE 3 ML: .5; 2.5 SOLUTION RESPIRATORY (INHALATION) at 00:53

## 2021-01-01 RX ADMIN — HYDROMORPHONE HYDROCHLORIDE 0.3 MG: 1 INJECTION, SOLUTION INTRAMUSCULAR; INTRAVENOUS; SUBCUTANEOUS at 23:47

## 2021-01-01 RX ADMIN — HYDROMORPHONE HYDROCHLORIDE 0.3 MG: 1 INJECTION, SOLUTION INTRAMUSCULAR; INTRAVENOUS; SUBCUTANEOUS at 20:41

## 2021-01-01 RX ADMIN — PIPERACILLIN AND TAZOBACTAM 3.38 G: 3; .375 INJECTION, POWDER, FOR SOLUTION INTRAVENOUS at 04:43

## 2021-01-01 RX ADMIN — MAGNESIUM OXIDE TAB 400 MG (241.3 MG ELEMENTAL MG) 400 MG: 400 (241.3 MG) TAB at 20:42

## 2021-01-01 RX ADMIN — MORPHINE SULFATE 10 MG: 20 SOLUTION ORAL at 02:11

## 2021-01-01 RX ADMIN — ACETAMINOPHEN 975 MG: 325 TABLET ORAL at 03:53

## 2021-01-01 RX ADMIN — MAGNESIUM SULFATE HEPTAHYDRATE 2 G: 40 INJECTION, SOLUTION INTRAVENOUS at 22:38

## 2021-01-01 RX ADMIN — ACETAMINOPHEN 975 MG: 325 TABLET ORAL at 18:09

## 2021-01-01 RX ADMIN — PANTOPRAZOLE SODIUM 40 MG: 40 INJECTION, POWDER, FOR SOLUTION INTRAVENOUS at 08:50

## 2021-01-01 RX ADMIN — PANTOPRAZOLE SODIUM 40 MG: 40 INJECTION, POWDER, FOR SOLUTION INTRAVENOUS at 09:06

## 2021-01-01 RX ADMIN — METOPROLOL TARTRATE 2.5 MG: 5 INJECTION INTRAVENOUS at 04:36

## 2021-01-01 RX ADMIN — METHYLPREDNISOLONE SODIUM SUCCINATE 40 MG: 40 INJECTION, POWDER, FOR SOLUTION INTRAMUSCULAR; INTRAVENOUS at 14:13

## 2021-01-01 RX ADMIN — DOCUSATE SODIUM 50 MG AND SENNOSIDES 8.6 MG 1 TABLET: 8.6; 5 TABLET, FILM COATED ORAL at 09:02

## 2021-01-01 RX ADMIN — INSULIN ASPART 5 UNITS: 100 INJECTION, SOLUTION INTRAVENOUS; SUBCUTANEOUS at 17:07

## 2021-01-01 RX ADMIN — DILTIAZEM HYDROCHLORIDE 60 MG: 60 TABLET, FILM COATED ORAL at 09:50

## 2021-01-01 RX ADMIN — ACETAMINOPHEN 975 MG: 325 TABLET ORAL at 12:27

## 2021-01-01 RX ADMIN — INSULIN GLARGINE 20 UNITS: 100 INJECTION, SOLUTION SUBCUTANEOUS at 08:15

## 2021-01-01 RX ADMIN — OXYCODONE HYDROCHLORIDE 5 MG: 5 TABLET ORAL at 00:26

## 2021-01-01 RX ADMIN — METOPROLOL TARTRATE 2.5 MG: 5 INJECTION INTRAVENOUS at 22:28

## 2021-01-01 RX ADMIN — WARFARIN SODIUM 5 MG: 5 TABLET ORAL at 18:20

## 2021-01-01 RX ADMIN — INSULIN ASPART 1 UNITS: 100 INJECTION, SOLUTION INTRAVENOUS; SUBCUTANEOUS at 10:45

## 2021-01-01 RX ADMIN — INSULIN ASPART 1 UNITS: 100 INJECTION, SOLUTION INTRAVENOUS; SUBCUTANEOUS at 07:54

## 2021-01-01 RX ADMIN — HYDROMORPHONE HYDROCHLORIDE 0.2 MG: 0.2 INJECTION, SOLUTION INTRAMUSCULAR; INTRAVENOUS; SUBCUTANEOUS at 06:47

## 2021-01-01 RX ADMIN — DILTIAZEM HYDROCHLORIDE 180 MG: 180 CAPSULE, COATED, EXTENDED RELEASE ORAL at 10:19

## 2021-01-01 RX ADMIN — Medication 12.5 MG: at 09:33

## 2021-01-01 RX ADMIN — HYDROMORPHONE HYDROCHLORIDE 0.2 MG: 0.2 INJECTION, SOLUTION INTRAMUSCULAR; INTRAVENOUS; SUBCUTANEOUS at 12:44

## 2021-01-01 RX ADMIN — GABAPENTIN 300 MG: 300 CAPSULE ORAL at 20:48

## 2021-01-01 RX ADMIN — INSULIN ASPART 4 UNITS: 100 INJECTION, SOLUTION INTRAVENOUS; SUBCUTANEOUS at 17:14

## 2021-01-01 RX ADMIN — INSULIN ASPART 2 UNITS: 100 INJECTION, SOLUTION INTRAVENOUS; SUBCUTANEOUS at 16:56

## 2021-01-01 RX ADMIN — Medication 12.5 MG: at 18:45

## 2021-01-01 RX ADMIN — MAGNESIUM HYDROXIDE 30 ML: 400 SUSPENSION ORAL at 19:35

## 2021-01-01 RX ADMIN — DILTIAZEM HYDROCHLORIDE 180 MG: 180 CAPSULE, COATED, EXTENDED RELEASE ORAL at 08:25

## 2021-01-01 RX ADMIN — METOPROLOL TARTRATE 2.5 MG: 5 INJECTION INTRAVENOUS at 04:17

## 2021-01-01 RX ADMIN — DILTIAZEM HYDROCHLORIDE 180 MG: 180 CAPSULE, COATED, EXTENDED RELEASE ORAL at 09:02

## 2021-01-01 RX ADMIN — OLANZAPINE 2.5 MG: 2.5 TABLET, FILM COATED ORAL at 21:59

## 2021-01-01 RX ADMIN — INSULIN ASPART 1 UNITS: 100 INJECTION, SOLUTION INTRAVENOUS; SUBCUTANEOUS at 21:57

## 2021-01-01 RX ADMIN — FUROSEMIDE 20 MG: 10 INJECTION, SOLUTION INTRAMUSCULAR; INTRAVENOUS at 16:09

## 2021-01-01 RX ADMIN — INSULIN ASPART 5 UNITS: 100 INJECTION, SOLUTION INTRAVENOUS; SUBCUTANEOUS at 08:24

## 2021-01-01 RX ADMIN — DILTIAZEM HYDROCHLORIDE 240 MG: 120 CAPSULE, COATED, EXTENDED RELEASE ORAL at 09:01

## 2021-01-01 RX ADMIN — OLANZAPINE 5 MG: 5 TABLET, ORALLY DISINTEGRATING ORAL at 01:09

## 2021-01-01 RX ADMIN — HALOPERIDOL LACTATE 2 MG: 5 INJECTION, SOLUTION INTRAMUSCULAR at 00:46

## 2021-01-01 RX ADMIN — DILTIAZEM HYDROCHLORIDE 180 MG: 180 CAPSULE, COATED, EXTENDED RELEASE ORAL at 20:42

## 2021-01-01 RX ADMIN — HYDROMORPHONE HYDROCHLORIDE 4 MG: 2 TABLET ORAL at 23:10

## 2021-01-01 RX ADMIN — NALOXONE HYDROCHLORIDE 0.4 MG: 0.4 INJECTION, SOLUTION INTRAMUSCULAR; INTRAVENOUS; SUBCUTANEOUS at 20:37

## 2021-01-01 RX ADMIN — INSULIN ASPART 1 UNITS: 100 INJECTION, SOLUTION INTRAVENOUS; SUBCUTANEOUS at 13:47

## 2021-01-01 RX ADMIN — PANTOPRAZOLE SODIUM 40 MG: 40 INJECTION, POWDER, FOR SOLUTION INTRAVENOUS at 20:37

## 2021-01-01 RX ADMIN — PIPERACILLIN AND TAZOBACTAM 3.38 G: 3; .375 INJECTION, POWDER, FOR SOLUTION INTRAVENOUS at 12:31

## 2021-01-01 RX ADMIN — INSULIN GLARGINE 20 UNITS: 100 INJECTION, SOLUTION SUBCUTANEOUS at 21:57

## 2021-01-01 RX ADMIN — PHENYLEPHRINE HYDROCHLORIDE 100 MCG: 10 INJECTION INTRAVENOUS at 17:06

## 2021-01-01 RX ADMIN — PIPERACILLIN AND TAZOBACTAM 3.38 G: 3; .375 INJECTION, POWDER, FOR SOLUTION INTRAVENOUS at 03:04

## 2021-01-01 RX ADMIN — INSULIN GLARGINE 20 UNITS: 100 INJECTION, SOLUTION SUBCUTANEOUS at 09:07

## 2021-01-01 RX ADMIN — HYDROMORPHONE HYDROCHLORIDE 0.2 MG: 0.2 INJECTION, SOLUTION INTRAMUSCULAR; INTRAVENOUS; SUBCUTANEOUS at 04:57

## 2021-01-01 RX ADMIN — INSULIN ASPART 10 UNITS: 100 INJECTION, SOLUTION INTRAVENOUS; SUBCUTANEOUS at 17:10

## 2021-01-01 RX ADMIN — OXYCODONE HYDROCHLORIDE 5 MG: 5 TABLET ORAL at 00:53

## 2021-01-01 RX ADMIN — INSULIN ASPART 1 UNITS: 100 INJECTION, SOLUTION INTRAVENOUS; SUBCUTANEOUS at 17:17

## 2021-01-01 RX ADMIN — ACETAMINOPHEN 650 MG: 325 TABLET ORAL at 02:27

## 2021-01-01 RX ADMIN — INSULIN ASPART 5 UNITS: 100 INJECTION, SOLUTION INTRAVENOUS; SUBCUTANEOUS at 12:02

## 2021-01-01 RX ADMIN — HYDROMORPHONE HYDROCHLORIDE 0.2 MG: 0.2 INJECTION, SOLUTION INTRAMUSCULAR; INTRAVENOUS; SUBCUTANEOUS at 13:48

## 2021-01-01 RX ADMIN — INSULIN ASPART 2 UNITS: 100 INJECTION, SOLUTION INTRAVENOUS; SUBCUTANEOUS at 12:23

## 2021-01-01 RX ADMIN — INSULIN ASPART 7 UNITS: 100 INJECTION, SOLUTION INTRAVENOUS; SUBCUTANEOUS at 08:08

## 2021-01-01 RX ADMIN — BISACODYL SUPPOSITORY 10 MG: 10 SUPPOSITORY RECTAL at 13:47

## 2021-01-01 RX ADMIN — LEVOTHYROXINE SODIUM 125 MCG: 0.03 TABLET ORAL at 06:19

## 2021-01-01 RX ADMIN — GABAPENTIN 300 MG: 300 CAPSULE ORAL at 20:46

## 2021-01-01 RX ADMIN — ROSUVASTATIN CALCIUM 20 MG: 10 TABLET, FILM COATED ORAL at 20:44

## 2021-01-01 RX ADMIN — WARFARIN SODIUM 4 MG: 2 TABLET ORAL at 17:57

## 2021-01-01 RX ADMIN — MORPHINE SULFATE 10 MG: 20 SOLUTION ORAL at 12:01

## 2021-01-01 RX ADMIN — HYDROMORPHONE HYDROCHLORIDE 0.2 MG: 0.2 INJECTION, SOLUTION INTRAMUSCULAR; INTRAVENOUS; SUBCUTANEOUS at 13:23

## 2021-01-01 RX ADMIN — PREDNISONE 40 MG: 20 TABLET ORAL at 08:02

## 2021-01-01 RX ADMIN — DOCUSATE SODIUM 50 MG AND SENNOSIDES 8.6 MG 1 TABLET: 8.6; 5 TABLET, FILM COATED ORAL at 22:19

## 2021-01-01 RX ADMIN — INSULIN ASPART 5 UNITS: 100 INJECTION, SOLUTION INTRAVENOUS; SUBCUTANEOUS at 20:26

## 2021-01-01 RX ADMIN — LORAZEPAM 0.5 MG: 2 INJECTION INTRAMUSCULAR; INTRAVENOUS at 16:42

## 2021-01-01 RX ADMIN — PANTOPRAZOLE SODIUM 40 MG: 40 INJECTION, POWDER, FOR SOLUTION INTRAVENOUS at 21:38

## 2021-01-01 RX ADMIN — DILTIAZEM HYDROCHLORIDE 180 MG: 180 CAPSULE, COATED, EXTENDED RELEASE ORAL at 08:40

## 2021-01-01 RX ADMIN — OXYCODONE HYDROCHLORIDE 5 MG: 5 TABLET ORAL at 04:40

## 2021-01-01 RX ADMIN — DILTIAZEM HYDROCHLORIDE 180 MG: 180 CAPSULE, COATED, EXTENDED RELEASE ORAL at 08:01

## 2021-01-01 RX ADMIN — LEVOTHYROXINE SODIUM 125 MCG: 0.03 TABLET ORAL at 08:29

## 2021-01-01 ASSESSMENT — ACTIVITIES OF DAILY LIVING (ADL)
ADLS_ACUITY_SCORE: 26
ADLS_ACUITY_SCORE: 26
ADLS_ACUITY_SCORE: 32
ADLS_ACUITY_SCORE: 26
WALKING_OR_CLIMBING_STAIRS: AMBULATION DIFFICULTY, ASSISTANCE 1 PERSON;STAIR CLIMBING DIFFICULTY, ASSISTANCE 1 PERSON;TRANSFERRING DIFFICULTY, ASSISTANCE 1 PERSON
ADLS_ACUITY_SCORE: 26
ADLS_ACUITY_SCORE: 32
ADLS_ACUITY_SCORE: 22
ADLS_ACUITY_SCORE: 32
ADLS_ACUITY_SCORE: 22
ADLS_ACUITY_SCORE: 26
CONCENTRATING,_REMEMBERING_OR_MAKING_DECISIONS_DIFFICULTY: YES
ADLS_ACUITY_SCORE: 26
ADLS_ACUITY_SCORE: 22
ADLS_ACUITY_SCORE: 26
ADLS_ACUITY_SCORE: 26
ADLS_ACUITY_SCORE: 24
ADLS_ACUITY_SCORE: 26
DIFFICULTY_EATING/SWALLOWING: YES
ADLS_ACUITY_SCORE: 26
ADLS_ACUITY_SCORE: 22
ADLS_ACUITY_SCORE: 26
ADLS_ACUITY_SCORE: 34
ADLS_ACUITY_SCORE: 26
ADLS_ACUITY_SCORE: 28
ADLS_ACUITY_SCORE: 26
ADLS_ACUITY_SCORE: 22
ADLS_ACUITY_SCORE: 26
ADLS_ACUITY_SCORE: 28
ADLS_ACUITY_SCORE: 26
DIFFICULTY_EATING/SWALLOWING: NO
ADLS_ACUITY_SCORE: 32
ADLS_ACUITY_SCORE: 24
ADLS_ACUITY_SCORE: 26
ADLS_ACUITY_SCORE: 24
WEAR_GLASSES_OR_BLIND: YES
ADLS_ACUITY_SCORE: 26
ADLS_ACUITY_SCORE: 26
DIFFICULTY_COMMUNICATING: NO
ADLS_ACUITY_SCORE: 26
ADLS_ACUITY_SCORE: 26
CONCENTRATING,_REMEMBERING_OR_MAKING_DECISIONS_DIFFICULTY: YES
ADLS_ACUITY_SCORE: 26
ADLS_ACUITY_SCORE: 24
ADLS_ACUITY_SCORE: 26
ADLS_ACUITY_SCORE: 22
ADLS_ACUITY_SCORE: 28
ADLS_ACUITY_SCORE: 34
ADLS_ACUITY_SCORE: 26
ADLS_ACUITY_SCORE: 28
ADLS_ACUITY_SCORE: 32
ADLS_ACUITY_SCORE: 22
WEAR_GLASSES_OR_BLIND: NO
ADLS_ACUITY_SCORE: 26
ADLS_ACUITY_SCORE: 28
ADLS_ACUITY_SCORE: 26
ADLS_ACUITY_SCORE: 26
ADLS_ACUITY_SCORE: 22
ADLS_ACUITY_SCORE: 26
ADLS_ACUITY_SCORE: 32
ADLS_ACUITY_SCORE: 28
ADLS_ACUITY_SCORE: 26
TOILETING_ISSUES: YES
EQUIPMENT_CURRENTLY_USED_AT_HOME: CANE, STRAIGHT
ADLS_ACUITY_SCORE: 26
ADLS_ACUITY_SCORE: 34
DRESSING/BATHING_DIFFICULTY: YES
ADLS_ACUITY_SCORE: 26
ADLS_ACUITY_SCORE: 26
ADLS_ACUITY_SCORE: 28
ADLS_ACUITY_SCORE: 26
ADLS_ACUITY_SCORE: 32
ADLS_ACUITY_SCORE: 26
TOILETING_ASSISTANCE: TOILETING DIFFICULTY, REQUIRES EQUIPMENT
ADLS_ACUITY_SCORE: 26
ADLS_ACUITY_SCORE: 28
ADLS_ACUITY_SCORE: 26
ADLS_ACUITY_SCORE: 22
ADLS_ACUITY_SCORE: 32
TOILETING_ASSISTANCE: TOILETING DIFFICULTY, ASSISTANCE 1 PERSON
ADLS_ACUITY_SCORE: 26
ADLS_ACUITY_SCORE: 32
ADLS_ACUITY_SCORE: 26
ADLS_ACUITY_SCORE: 26
VISION_MANAGEMENT: GLASSES
ADLS_ACUITY_SCORE: 26
ADLS_ACUITY_SCORE: 32
ADLS_ACUITY_SCORE: 32
ADLS_ACUITY_SCORE: 30
ADLS_ACUITY_SCORE: 26
ADLS_ACUITY_SCORE: 26
ADLS_ACUITY_SCORE: 24
ADLS_ACUITY_SCORE: 26
DOING_ERRANDS_INDEPENDENTLY_DIFFICULTY: YES
ADLS_ACUITY_SCORE: 26
ADLS_ACUITY_SCORE: 28
ADLS_ACUITY_SCORE: 30
ADLS_ACUITY_SCORE: 22
DRESSING/BATHING_DIFFICULTY: YES
ADLS_ACUITY_SCORE: 26
TOILETING_ISSUES: YES
ADLS_ACUITY_SCORE: 26
ADLS_ACUITY_SCORE: 26
ADLS_ACUITY_SCORE: 22
ADLS_ACUITY_SCORE: 26
ADLS_ACUITY_SCORE: 30
ADLS_ACUITY_SCORE: 26
ADLS_ACUITY_SCORE: 28
ADLS_ACUITY_SCORE: 26
ADLS_ACUITY_SCORE: 22
ADLS_ACUITY_SCORE: 26
ADLS_ACUITY_SCORE: 26
FALL_HISTORY_WITHIN_LAST_SIX_MONTHS: YES
ADLS_ACUITY_SCORE: 22
DEPENDENT_IADLS:: CLEANING;MEAL PREPARATION;MEDICATION MANAGEMENT
ADLS_ACUITY_SCORE: 26
ADLS_ACUITY_SCORE: 22
ADLS_ACUITY_SCORE: 26
DIFFICULTY_COMMUNICATING: NO
ADLS_ACUITY_SCORE: 26
WALKING_OR_CLIMBING_STAIRS_DIFFICULTY: YES
ADLS_ACUITY_SCORE: 26
ADLS_ACUITY_SCORE: 32
WALKING_OR_CLIMBING_STAIRS_DIFFICULTY: YES
ADLS_ACUITY_SCORE: 26

## 2021-01-01 ASSESSMENT — ANXIETY QUESTIONNAIRES
6. BECOMING EASILY ANNOYED OR IRRITABLE: SEVERAL DAYS
3. WORRYING TOO MUCH ABOUT DIFFERENT THINGS: SEVERAL DAYS
5. BEING SO RESTLESS THAT IT IS HARD TO SIT STILL: NOT AT ALL
GAD7 TOTAL SCORE: 4
6. BECOMING EASILY ANNOYED OR IRRITABLE: NOT AT ALL
4. TROUBLE RELAXING: NOT AT ALL
7. FEELING AFRAID AS IF SOMETHING AWFUL MIGHT HAPPEN: SEVERAL DAYS
1. FEELING NERVOUS, ANXIOUS, OR ON EDGE: MORE THAN HALF THE DAYS
7. FEELING AFRAID AS IF SOMETHING AWFUL MIGHT HAPPEN: MORE THAN HALF THE DAYS
IF YOU CHECKED OFF ANY PROBLEMS ON THIS QUESTIONNAIRE, HOW DIFFICULT HAVE THESE PROBLEMS MADE IT FOR YOU TO DO YOUR WORK, TAKE CARE OF THINGS AT HOME, OR GET ALONG WITH OTHER PEOPLE: NOT DIFFICULT AT ALL
1. FEELING NERVOUS, ANXIOUS, OR ON EDGE: SEVERAL DAYS
4. TROUBLE RELAXING: SEVERAL DAYS
3. WORRYING TOO MUCH ABOUT DIFFERENT THINGS: SEVERAL DAYS
7. FEELING AFRAID AS IF SOMETHING AWFUL MIGHT HAPPEN: MORE THAN HALF THE DAYS
GAD7 TOTAL SCORE: 10
GAD7 TOTAL SCORE: 10
2. NOT BEING ABLE TO STOP OR CONTROL WORRYING: MORE THAN HALF THE DAYS
GAD7 TOTAL SCORE: 4
2. NOT BEING ABLE TO STOP OR CONTROL WORRYING: SEVERAL DAYS
5. BEING SO RESTLESS THAT IT IS HARD TO SIT STILL: SEVERAL DAYS
GAD7 TOTAL SCORE: 10
GAD7 TOTAL SCORE: 10
8. IF YOU CHECKED OFF ANY PROBLEMS, HOW DIFFICULT HAVE THESE MADE IT FOR YOU TO DO YOUR WORK, TAKE CARE OF THINGS AT HOME, OR GET ALONG WITH OTHER PEOPLE?: SOMEWHAT DIFFICULT

## 2021-01-01 ASSESSMENT — ENCOUNTER SYMPTOMS
FATIGUE: 1
FLANK PAIN: 0
DYSRHYTHMIAS: 1
SHORTNESS OF BREATH: 0
APPETITE CHANGE: 1
FEVER: 0
CONFUSION: 1
AGITATION: 1
BACK PAIN: 0
BLOOD IN STOOL: 1
BRUISES/BLEEDS EASILY: 1
ACTIVITY CHANGE: 1
WEAKNESS: 1

## 2021-01-01 ASSESSMENT — PATIENT HEALTH QUESTIONNAIRE - PHQ9
10. IF YOU CHECKED OFF ANY PROBLEMS, HOW DIFFICULT HAVE THESE PROBLEMS MADE IT FOR YOU TO DO YOUR WORK, TAKE CARE OF THINGS AT HOME, OR GET ALONG WITH OTHER PEOPLE: NOT DIFFICULT AT ALL
SUM OF ALL RESPONSES TO PHQ QUESTIONS 1-9: 4
SUM OF ALL RESPONSES TO PHQ QUESTIONS 1-9: 12
SUM OF ALL RESPONSES TO PHQ QUESTIONS 1-9: 12
SUM OF ALL RESPONSES TO PHQ QUESTIONS 1-9: 4
SUM OF ALL RESPONSES TO PHQ QUESTIONS 1-9: 4

## 2021-01-01 ASSESSMENT — MIFFLIN-ST. JEOR
SCORE: 1054.17
SCORE: 1011.54
SCORE: 1016.54

## 2021-01-01 ASSESSMENT — PAIN SCALES - GENERAL
PAINLEVEL: SEVERE PAIN (7)
PAINLEVEL: NO PAIN (0)

## 2021-05-29 NOTE — TELEPHONE ENCOUNTER
Pharmacy noted on their fax that the patient will be out of medication in a couple of days. They are requesting that the clinic expedite this request. Thank you.        Controlled Substance Refill Request  Medication Name:   Requested Prescriptions     Pending Prescriptions Disp Refills     HYDROmorphone (DILAUDID) 4 MG tablet 120 tablet 0     Sig: Take 0.5-1 tablets (2-4 mg total) by mouth 4 (four) times a day as needed for pain. Rx 1/3. For use from 6/17/2019 to 7/17/2019.     HYDROmorphone (DILAUDID) 4 MG tablet 120 tablet 0     Sig: Take 0.5-1 tablets (2-4 mg total) by mouth 4 (four) times a day as needed for pain. Rx 2/3. For use from 7/17/2019 to 8/16/2019.     HYDROmorphone (DILAUDID) 4 MG tablet 120 tablet 0     Sig: Take 0.5-1 tablets (2-4 mg total) by mouth 4 (four) times a day as needed for pain. Rx 3/3. For use from 8/16/2019 to 9/15/2019.     Date Last Fill: 5/18/2019  Pharmacy: [facility] #[id number]      Submit electronically to pharmacy  Controlled Substance Agreement Date Scanned:   Encounter-Level CSA Scan Date - 01/30/2018:    Scan on 2/1/2018 12:13 PM: HE - (below)                 Encounter-Level CSA Scan Date - 02/09/2017:    Scan on 2/10/2017  3:16 PM (below)            Last office visit with prescriber/PCP: 5/30/2019 Gabino Bach MD OR same dept: 5/30/2019 Gabino Bach MD OR same specialty: 5/30/2019 Gabino Bach MD  Last physical: 2/26/2018 Last MTM visit: Visit date not found

## 2021-05-29 NOTE — PATIENT INSTRUCTIONS - HE
Please follow up if you have any further issues.    You may contact me by phone or Seratishart if you are worsening or if things are not improving.    Thank you for coming in today.

## 2021-05-29 NOTE — PROGRESS NOTES
Wt Readings from Last 20 Encounters:   05/30/19 156 lb 14.4 oz (71.2 kg)   03/19/19 156 lb 12.8 oz (71.1 kg)   01/15/19 156 lb (70.8 kg)   10/30/18 153 lb 4.8 oz (69.5 kg)   08/31/18 155 lb 8 oz (70.5 kg)   08/13/18 155 lb (70.3 kg)   08/01/18 155 lb (70.3 kg)   07/17/18 155 lb (70.3 kg)   05/29/18 153 lb 8 oz (69.6 kg)   05/23/18 153 lb (69.4 kg)   05/22/18 152 lb 6.4 oz (69.1 kg)   05/07/18 159 lb 11.2 oz (72.4 kg)   04/20/18 159 lb 8 oz (72.3 kg)   04/19/18 158 lb 14.4 oz (72.1 kg)   04/06/18 161 lb (73 kg)   02/26/18 158 lb 6.4 oz (71.8 kg)   01/30/18 157 lb 6.4 oz (71.4 kg)   01/02/18 160 lb 4.8 oz (72.7 kg)   12/22/17 155 lb (70.3 kg)   12/11/17 155 lb 9.6 oz (70.6 kg)

## 2021-05-30 NOTE — TELEPHONE ENCOUNTER
Fax received from [facility] pharmacy requesting Prior Authorization    Medication Name:   temazepam (RESTORIL) 15 mg capsule 90 capsule 1 4/15/2019     Sig: TAKE 1 CAPSULE(15 MG) BY MOUTH AT BEDTIME AS NEEDED FOR SLEEP. MAY REFILL EVERY 90 DAYS ONLY    Sent to pharmacy as: temazepam (RESTORIL) 15 mg capsule    E-Prescribing Status: Receipt confirmed by pharmacy (4/15/2019 12:11 PM CDT)          Insurance Plan: Medicare Part D   PBM: Roseanna   Patient ID Number: [id number]    Please start PA process

## 2021-05-30 NOTE — TELEPHONE ENCOUNTER
Patient is provided 20 tablets as her primary is not available for refill until 7.22.2019.  Last urine drug screen 4.2018 and would recommend repeat screening.

## 2021-05-30 NOTE — TELEPHONE ENCOUNTER
"Anticoagulation Annual Referral Renewal Review    Bernadette Yu's chart reviewed for annual renewal of referral to anticoagulation monitoring.        Criteria for anticoagulation nurse and/or pharmacist renewal met   Warfarin indication: Atrial Fibrillation and Mechanical MVR Yes, per indication   Current with INR monitoring/compliant Yes Yes   Date of last office visit 5/30/19 Yes, had office visit within last year   Time in Therapeutic Range (TTR) 44.05 % No, TTR < 60 %       Bernadette Yu did NOT meet all criteria for anticoagulation management program initiated renewal and requires provider review. Using dot phrase, \".acmrenewalprovider\", please advise if [facility] anticoagulation management referral should be renewed or if patient should be seen in office to review anticoagulation therapy      Mallory Grover RN  11:55 AM      "

## 2021-05-30 NOTE — TELEPHONE ENCOUNTER
Provider Review: Anticoagulation Annual Referral Renewal    ACM Renewal Decision:  Renew ACM warfarin management      INR Range:   Change INR goal range to 2.5-3.5   Anticipated Duration of Therapy (from today):  Long-term anticoagulation      Gabino Bach MD  12:10 PM

## 2021-05-30 NOTE — TELEPHONE ENCOUNTER
Central PA team  [phone]  Pool: HE PA MED ([id number])          PA has been initiated.       PA form completed and faxed insurance via Cover My Meds     Key:  [id number] - PA Case ID: [id number]     Medication:  Temazepam 15MG capsule    Insurance:  [employer]         Response will be received via fax and may take up to 5-10 business days depending on plan

## 2021-05-30 NOTE — TELEPHONE ENCOUNTER
Received call from pharmacy pt currently at the pharmacy to  RX for Dilaudid.    Pharmacy needs new RX due to new laws.  RX was future ordered.

## 2021-05-31 NOTE — TELEPHONE ENCOUNTER
Attempted to call pt, unable to leave message x 2.     If pt calls back please inform of Dr. Bach's message.

## 2021-05-31 NOTE — TELEPHONE ENCOUNTER
RN cannot approve Refill Request    RN can NOT refill this medication med is not covered by policy/route to provider. Last office visit: 8/12/2019 Gabino Bach MD Last Physical: 2/26/2018 Last MTM visit: Visit date not found Last visit same specialty: 6/10/2016 Ayanna Camacho MD.  Next visit within 3 mo: Visit date not found  Next physical within 3 mo: Visit date not found      Victorina Wills, [facility] Connection Triage/Med Refill 8/16/2019    Requested Prescriptions   Pending Prescriptions Disp Refills     furosemide (LASIX) 40 MG tablet [Pharmacy Med Name: FUROSEMIDE 40MG TABLETS] 90 tablet 0     Sig: TAKE 1 TABLET BY MOUTH DAILY       There is no refill protocol information for this order

## 2021-05-31 NOTE — TELEPHONE ENCOUNTER
Please call patient -    ______________________________________________________________________     Home phone:  [phone] (home)     Cell phone:   Telephone Information:   Mobile [phone]       Other contacts:  Name Home Phone Work Phone Mobile Phone Relationship Lgl Grd   ISHAN VALENTIN [phone]   Spouse    ANDREA VALENTIN [phone]   Child      ______________________________________________________________________     Her red blood cell count is down from previous.  It was 8.0 instead of 9.1.    She does not need a transfusion at this time.    She should follow up again in 10-14 days in clinic.  May help her to schedule an appointment.    Gabino Bach MD  [facility]  8/20/2019, 8:16 PM     ______________________________________________________________________    Recent Results (from the past 240 hour(s))   INR   Result Value Ref Range    INR 3.20 (H) 0.90 - 1.10   Hemoglobin   Result Value Ref Range    Hemoglobin 8.0 (L) 12.0 - 16.0 g/dL   INR   Result Value Ref Range    INR 3.70 (H) 0.90 - 1.10       ______________________________________________________________________

## 2021-05-31 NOTE — TELEPHONE ENCOUNTER
Who is calling:  Lakia with [facility], [phone]  Reason for Call:    Pharmacy is requesting an update on below medication.  Pharmacy is requesting a new prescription.  Date of last appointment with primary care: 8/12/19  Okay to leave a detailed message: Yes

## 2021-05-31 NOTE — TELEPHONE ENCOUNTER
Please double book the patient with her  on Monday August 12th to follow up on anemia and atrial flutter.    Patient already notified.    Gabino Bach MD  [facility]  8/9/2019, 3:38 PM

## 2021-05-31 NOTE — PROGRESS NOTES
"Patient arrived ambulatory and was assisted to chair 1 - accompanied by her son and spouse. Reviewed plan of care, and today's treatment. Signed blood consent is on the chart. Placed IV and the type & screen was drawn. Most recent hgb is 6.8. Patient was transfused with 2 units of RBCs, and given lasix 40 mg IVP between the units. Tolerated the transfusion well. Vital signs stable - with the exception of heart rate being 120-130 during the OP visit. Denies chest pain. No shortness of breath. Ate 100% of lunch. Assisted to the bathroom. \"My legs feel so much better.\"     Called Dr. Bach at 13:50 and received additional orders to instruct the patient to take a dose of digoxin when she gets home today, and again in the morning. Patient is scheduled to see Dr. Bach tomorrow afternoon.    Post transfusion discharge AVS was given and explained to the patient. At 14:30 this writer took the patient via w/c (in stable condition) to awaiting vehicle at the front entrance of [facility].    Bernadette Pimentel RN  "

## 2021-05-31 NOTE — PATIENT INSTRUCTIONS - HE
Please follow up if you have any further issues.    You may contact me by phone or Cupointhart if you are worsening or if things are not improving.    Thank you for coming in today.

## 2021-05-31 NOTE — TELEPHONE ENCOUNTER
Disp Refills Start End STEVE   HYDROmorphone (DILAUDID) 4 MG tablet 120 tablet 0 8/21/2019 9/20/2019 No   Sig - Route: Take 0.5-1 tablets (2-4 mg total) by mouth 4 (four) times a day as needed for pain. For use from 8/21/2019 to 9/20/2019. - Oral   Sent to pharmacy as: HYDROmorphone (DILAUDID) 4 MG tablet   Earliest Fill Date: 8/21/2019   Notes to Pharmacy: Please put the dates of use or prescription limits on the patient's bottle to ensure appropriate use.   E-Prescribing Status: Receipt confirmed by pharmacy (8/6/2019  7:55 AM CDT)     Last two fills were on the 17th of the month - this Rx is written for the 21st of the month.  Did you want pt to fill on the 21st?   Please send new Rx if changing the fill date -teed up

## 2021-05-31 NOTE — TELEPHONE ENCOUNTER
Please call patient -    ______________________________________________________________________     Home phone:  [phone] (home)     Cell phone:   Telephone Information:   Mobile [phone]       Other contacts:  Name Home Phone Work Phone Mobile Phone Relationship Lgl Grd   ISHAN VALENTIN [phone]   Spouse    ANDREA VALENTIN [phone]   Child      ______________________________________________________________________     Her red blood cell count has improved from 6.8 to 9.1 and this is as expected given her blood transfusion.  Her iron levels are doing well and she should continue on the iron.    Follow up Monday as scheduled.    Gabino Bach MD  [facility]  8/9/2019, 10:19 PM     ______________________________________________________________________    Component      Latest Ref Rng & Units 8/9/2019   Iron      42 - 175 ug/dL 336 (H)   Transferrin      212 - 360 mg/dL 292   Transferrin Saturation, Calculated      20 - 50 % 92 (H)   Transferrin IBC, Calculated      313 - 563 ug/dL 365   Hemoglobin      12.0 - 16.0 g/dL 9.1 (L)   Ferritin      10 - 130 ng/mL 25   Digoxin      0.5 - 2.0 ng/mL <0.3 (L)       ______________________________________________________________________

## 2021-05-31 NOTE — TELEPHONE ENCOUNTER
To set up your TRANSFUSION infusion, please contact the infusion therapy center at one of the following locations:    [facility] [phone]  Fax [phone]  Hours: Monday-Friday, 7:30 am to 4 pm    [facility] [phone]  Fax [phone]  Hours: Monday-Friday, 8 am to 4:30 pm

## 2021-05-31 NOTE — TELEPHONE ENCOUNTER
Can someone please address this?  It has gone without a call.    Thank you,    Gabino Bach MD  [facility]  8/22/2019, 5:57 PM

## 2021-05-31 NOTE — TELEPHONE ENCOUNTER
Please call patient -    ______________________________________________________________________     Home phone:  [phone] (home)     Cell phone:   Telephone Information:   Mobile [phone]       Other contacts:  Name Home Phone Work Phone Mobile Phone Relationship Lgl Grd   ISHAN VALENTIN [phone]   Spouse    ANDREA VALENTIN [phone]   Child      ______________________________________________________________________     The cardiologist felt that she had signs of atrial flutter and I would recommend that we add a medication to help keep her pulse lower.  I recommend adding a low dose of digoxin 0.125 mg Monday, Wednesday, and Friday at this time to help with this.    I will send this to   [email or web address][id number] [address] AT [facility] [address]  [address] [place] [id number]  Phone: [phone] Fax: [phone]      Her kidney tests and electrolytes are stable.      Her thyroid test is off but at this time please just make sure she is taking it regularly.    Please schedule the patient for follow up this Friday in relationship to this.  Reserved slot or 4:30 pm at end of day.    Please have the patient notify us if she cannot get in for a transfusion before Friday.    Gabino Bach MD  [facility]  8/5/2019, 10:45 PM     ______________________________________________________________________    Recent Results (from the past 240 hour(s))   Electrocardiogram Perform and Read   Result Value Ref Range    SYSTOLIC BLOOD PRESSURE  mmHg    DIASTOLIC BLOOD PRESSURE  mmHg    VENTRICULAR RATE 129 BPM    ATRIAL RATE 129 BPM    P-R INTERVAL  ms    QRS DURATION 74 ms    Q-T INTERVAL 332 ms    QTC CALCULATION (BEZET) 486 ms    P Axis  degrees    R AXIS -38 degrees    T AXIS -50 degrees    MUSE DIAGNOSIS       Atrial flutter (atypical) with 2: 1conduction  Left axis deviation  Nonspecific ST abnormality  Abnormal ECG  When compared with ECG of 28-NOV-2017  13:57,  Vent. rate has increased BY  50 BPM  Confirmed by TYE BONILLA MD LOC:JN ([id number]) on 8/5/2019 4:31:34 PM     HM2(CBC w/o Differential)   Result Value Ref Range    WBC 5.8 4.0 - 11.0 thou/uL    RBC 2.29 (L) 3.80 - 5.40 mill/uL    Hemoglobin 6.8 (LL) 12.0 - 16.0 g/dL    Hematocrit 21.0 (L) 35.0 - 47.0 %    MCV 92 80 - 100 fL    MCH 29.5 27.0 - 34.0 pg    MCHC 32.2 32.0 - 36.0 g/dL    RDW 14.2 11.0 - 14.5 %    Platelets 181 140 - 440 thou/uL    MPV 9.5 7.0 - 10.0 fL   Glycosylated Hemoglobin A1c   Result Value Ref Range    Hemoglobin A1c 6.7 (H) 3.5 - 6.0 %   Basic Metabolic Panel   Result Value Ref Range    Sodium 139 136 - 145 mmol/L    Potassium 3.8 3.5 - 5.0 mmol/L    Chloride 100 98 - 107 mmol/L    CO2 28 22 - 31 mmol/L    Anion Gap, Calculation 11 5 - 18 mmol/L    Glucose 207 (H) 70 - 125 mg/dL    Calcium 9.3 8.5 - 10.5 mg/dL    BUN 28 8 - 28 mg/dL    Creatinine 1.12 (H) 0.60 - 1.10 mg/dL    GFR MDRD Af Amer 57 (L) >60 mL/min/1.73m2    GFR MDRD Non Af Amer 47 (L) >60 mL/min/1.73m2   Thyroid Stimulating Hormone (TSH)   Result Value Ref Range    TSH 16.41 (H) 0.30 - 5.00 uIU/mL   INR   Result Value Ref Range    INR 3.70 (H) 0.90 - 1.10       ______________________________________________________________________

## 2021-05-31 NOTE — TELEPHONE ENCOUNTER
I called the patient back today related to these issues.  I reviewed the notes as below.  She will be scheduled at 240 on Friday for follow-up.  She will start the digoxin.    She should going to [facility] if she is worsening.  She is going to try to avoid this at this time.  She is not having lightheadedness or dizziness or progressive dyspnea at this time.    We did consent her verbally for transfusion and I will get the form over to transfusion/infusion for this.    Gabino Bach MD  [facility]  8/6/2019, 9:22 AM

## 2021-05-31 NOTE — PATIENT INSTRUCTIONS - HE
Please follow up if you have any further issues.    You may contact me by phone or [employer] if you are worsening or if things are not improving.    Thank you for coming in today.

## 2021-05-31 NOTE — TELEPHONE ENCOUNTER
Who is calling:  Pharmacy     Reason for Call:  Calling to state Medication for :     HYDROmorphone (DILAUDID) 4 MG tablet, Was previously started on 07/17/19. Does PCP in fact want this to start on 07/21/19?   Please advise and call Pharmacy ASAP .    Date of last appointment with primary care:  08/12/19  Okay to leave a detailed message: Yes

## 2021-05-31 NOTE — TELEPHONE ENCOUNTER
Attempted to call pt, unable to leave message.    If pt calls back please inform of Dr. Bach's message.

## 2021-05-31 NOTE — PATIENT INSTRUCTIONS - HE
Please follow up if you have any further issues.    You may contact me by phone or [employer] if you are worsening or if things are not improving.    Thank you for coming in today.

## 2021-05-31 NOTE — TELEPHONE ENCOUNTER
March 13, 2019       Patient: Bobbi Beltran   YOB: 1992   Date of Visit: 3/13/2019         To Whom It May Concern:    It is my medical opinion that Bobbi Beltran be excused from work today due to illness.    If you have any questions or concerns, please don't hesitate to call [phone]          Sincerely,          RIZWAN Murcia.  Electronically Signed      Prescription updated to release today.  Was an error made by me.

## 2021-05-31 NOTE — PROGRESS NOTES
Pulse Readings from Last 20 Encounters:   08/05/19 (!) 126   05/30/19 (!) 106   03/19/19 72   01/15/19 71   10/30/18 80   08/31/18 64   08/13/18 66   08/01/18 73   07/17/18 69   05/31/18 77   05/29/18 70   05/23/18 (!) 56   05/22/18 (!) 56   05/07/18 74   04/20/18 73   04/19/18 67   04/06/18 76   03/13/18 (!) 59   02/26/18 73   01/30/18 74

## 2021-06-01 NOTE — PATIENT INSTRUCTIONS - HE
Please follow up if you have any further issues.    You may contact me by phone or P[employer] if you are worsening or if things are not improving.    Thank you for coming in today.

## 2021-06-01 NOTE — TELEPHONE ENCOUNTER
Please call patient -    ______________________________________________________________________     Home phone:  [phone] (home)     Cell phone:   Telephone Information:   Mobile [phone]       Other contacts:  Name Home Phone Work Phone Mobile Phone Relationship Lgl Grd   ISHAN VALENTIN [phone]   Spouse    ANDREA VALENTIN [phone]   Child      ______________________________________________________________________     We missed her for her appointment today.    Please see how she is doing.      If she is doing okay, then please schedule her next week on Thursday at 10 am or a reserved slot after that.    Gabino Bach MD  [facility]  9/20/2019, 11:52 AM

## 2021-06-01 NOTE — TELEPHONE ENCOUNTER
Please call patient -    ______________________________________________________________________     Home phone:  [phone] (home)     Cell phone:   Telephone Information:   Mobile [phone]       Other contacts:  Name Home Phone Work Phone Mobile Phone Relationship Lgl Grd   ISHAN VALENTIN [phone]   Spouse    ANDREA VALENTIN [phone]   Child      ______________________________________________________________________     She left me a message about being concerned about being on the amiodarone for her heart rhythm.    I think it would be safe for her to use and I would encourage her to start it.  We are able to start it in the clinic without her being in the hospital.    However, if she would rather not take it and see how she does, this would be okay but we would need to review again at the next visit.  My recommendation is to go ahead and use it.    Gabino Bach MD  [facility]  9/29/2019, 11:01 PM

## 2021-06-01 NOTE — TELEPHONE ENCOUNTER
Please call patient -    ______________________________________________________________________     Home phone:  [phone] (home)     Cell phone:   Telephone Information:   Mobile [phone]       Other contacts:  Name Home Phone Work Phone Mobile Phone Relationship Lgl Grd   ISHAN VALENTIN [phone]   Spouse    ANDREA VALENTIN [phone]   Child      ______________________________________________________________________     Her blood level is finally coming around.  Her red blood cell count is up to 11.6 and this is almost back to the normal range.    Your electrolytes were normal.  Your kidney tests were normal.      Follow up in 3-4 weeks.  She and her  should get the flu shot done at that time.  Reserved slot.  Help them to schedule.      Gabino Bach MD  [facility]  9/26/2019, 9:13 PM     ______________________________________________________________________    Recent Results (from the past 240 hour(s))   HM2(CBC w/o Differential)   Result Value Ref Range    WBC 7.2 4.0 - 11.0 thou/uL    RBC 3.96 3.80 - 5.40 mill/uL    Hemoglobin 11.6 (L) 12.0 - 16.0 g/dL    Hematocrit 36.8 35.0 - 47.0 %    MCV 93 80 - 100 fL    MCH 29.3 27.0 - 34.0 pg    MCHC 31.5 (L) 32.0 - 36.0 g/dL    RDW 12.9 11.0 - 14.5 %    Platelets 168 140 - 440 thou/uL    MPV 12.6 (H) 8.5 - 12.5 fL   Basic Metabolic Panel   Result Value Ref Range    Sodium 138 136 - 145 mmol/L    Potassium 4.1 3.5 - 5.0 mmol/L    Chloride 99 98 - 107 mmol/L    CO2 28 22 - 31 mmol/L    Anion Gap, Calculation 11 5 - 18 mmol/L    Glucose 309 (H) 70 - 125 mg/dL    Calcium 9.4 8.5 - 10.5 mg/dL    BUN 19 8 - 28 mg/dL    Creatinine 1.08 0.60 - 1.10 mg/dL    GFR MDRD Af Amer 59 (L) >60 mL/min/1.73m2    GFR MDRD Non Af Amer 49 (L) >60 mL/min/1.73m2   INR   Result Value Ref Range    INR 4.72 (H) 0.90 - 1.10       ______________________________________________________________________

## 2021-06-01 NOTE — TELEPHONE ENCOUNTER
Called patient and informed her of provider's recommends.  Assisted with scheduling an office visit in 2 weeks as recommended.  Patient verbalized understanding and is agreeable with the plan of care.

## 2021-06-01 NOTE — TELEPHONE ENCOUNTER
Pt states she is doing terrible.  It is hard to move her hands especially her thumb, she can not lift or open anything.  Her back hurts a lot.    Pt apologized for missing appointment today.    Pt scheduled for 9/26/19 at 10:00.    She thanked for the call.

## 2021-06-01 NOTE — TELEPHONE ENCOUNTER
INITIAL INR > 5.5 NOTIFICATION- Anticoagulation Management Program    Bernadette Yu had an initial point of care INR of 6.2.     Venous confirmation of INR required per protocol. STAT venous sample drawn and being sent out for confirmation.    Anticoagulation template scanned into EHR. Patient phone call with Anticoagulation Management Program (ACM) being arranged for patient triage prior to discharge.     Federico Nichols MLT

## 2021-06-01 NOTE — PATIENT INSTRUCTIONS - HE
Future Appointments   Date Time Provider Department Center   10/7/2019 10:20 AM [facility]

## 2021-06-01 NOTE — PROGRESS NOTES
Wt Readings from Last 20 Encounters:   08/07/19 155 lb (70.3 kg)   05/30/19 156 lb 14.4 oz (71.2 kg)   03/19/19 156 lb 12.8 oz (71.1 kg)   01/15/19 156 lb (70.8 kg)   10/30/18 153 lb 4.8 oz (69.5 kg)   08/31/18 155 lb 8 oz (70.5 kg)   08/13/18 155 lb (70.3 kg)   08/01/18 155 lb (70.3 kg)   07/17/18 155 lb (70.3 kg)   05/29/18 153 lb 8 oz (69.6 kg)   05/23/18 153 lb (69.4 kg)   05/22/18 152 lb 6.4 oz (69.1 kg)   05/07/18 159 lb 11.2 oz (72.4 kg)   04/20/18 159 lb 8 oz (72.3 kg)   04/19/18 158 lb 14.4 oz (72.1 kg)   04/06/18 161 lb (73 kg)   02/26/18 158 lb 6.4 oz (71.8 kg)   01/30/18 157 lb 6.4 oz (71.4 kg)   01/02/18 160 lb 4.8 oz (72.7 kg)   12/22/17 155 lb (70.3 kg)

## 2021-06-01 NOTE — TELEPHONE ENCOUNTER
"ANTICOAGULATION  MANAGEMENT    Bernadette Yu is on warfarin and had a point of care INR result > 5.5 or an \"error message\" on point of care meter today.    Morning INR; STAT venous INR processing requested from lab    Spoke with Bernadette via phone while at the lab and reviewed triage questions for potential signs and symptoms of bleeding.      Patient Response     Have you had any bleeding in the last week?   No   Have you passed any red, black, or tarry stools in last week?   No   Have you vomited/spit up any red or coffee ground material in last week?   No   Have you had any new, severe abdominal pain or bloating develop in last week?   No   Have you fallen or had any injuries in last week?   No   Do you currently have a severe, sudden onset headache?   No   Do you currently have any severe sudden changes in your vision?   No   Do you currently have any new onset numbness, weakness/paralysis?   No     Assessment/Plan:     Bernadette's responses were negative for signs and symptoms of bleeding; may discharge from clinic    Bernadette instructed to:       Hold warfarin today until venous INR result returns and receives follow up call from [facility]    Seek medical attention for new signs and symptoms of bleeding or a fall with injury.       "

## 2021-06-01 NOTE — TELEPHONE ENCOUNTER
Please call patient -    ______________________________________________________________________     Home phone:  [phone] (home)     Cell phone:   Telephone Information:   Mobile [phone]       Other contacts:  Name Home Phone Work Phone Mobile Phone Relationship Lgl Grd   ISHAN VALENTIN [phone]   Spouse    ANDREA VALENTIN [phone]   Child      ______________________________________________________________________     Her heart tests look good on blood work.    Her kidney tests look good as well.    For her leg swelling, she could try increasing the furosemide (Lasix) to two times a day (am and afternoon) for 1 week, then return to 1 a day.    Her red blood cell count is okay at 8.3 and there are signs her bone marrow is making new cells.    I think it would be good to see her back in 2 weeks to see how this is going.  May use a reserved slot for this.    Gabino Bach MD  [facility]  9/4/2019, 7:01 AM     ______________________________________________________________________    Recent Results (from the past 240 hour(s))   BNP(B-type Natriuretic Peptide)   Result Value Ref Range     (H) 0 - 155 pg/mL   Basic Metabolic Panel   Result Value Ref Range    Sodium 141 136 - 145 mmol/L    Potassium 4.4 3.5 - 5.0 mmol/L    Chloride 103 98 - 107 mmol/L    CO2 24 22 - 31 mmol/L    Anion Gap, Calculation 14 5 - 18 mmol/L    Glucose 268 (H) 70 - 125 mg/dL    Calcium 9.0 8.5 - 10.5 mg/dL    BUN 21 8 - 28 mg/dL    Creatinine 1.16 (H) 0.60 - 1.10 mg/dL    GFR MDRD Af Amer 54 (L) >60 mL/min/1.73m2    GFR MDRD Non Af Amer 45 (L) >60 mL/min/1.73m2   INR   Result Value Ref Range    INR 4.50 (H) 0.90 - 1.10   HM2(CBC w/o Differential)   Result Value Ref Range    WBC 4.6 4.0 - 11.0 thou/uL    RBC 2.72 (L) 3.80 - 5.40 mill/uL    Hemoglobin 8.3 (L) 12.0 - 16.0 g/dL    Hematocrit 26.2 (L) 35.0 - 47.0 %    MCV 96 80 - 100 fL    MCH 30.6 27.0 - 34.0 pg    MCHC 31.8 (L) 32.0 - 36.0 g/dL    RDW 13.6  11.0 - 14.5 %    Platelets 172 140 - 440 thou/uL    MPV 10.0 7.0 - 10.0 fL   Digoxin (Lanoxin )   Result Value Ref Range    Digoxin 0.5 0.5 - 2.0 ng/mL   Reticulocytes   Result Value Ref Range    Retic Absolute Count 0.210 (H) 0.010 - 0.110 mill/uL    Retic Ct Pct 8.09 (H) 0.8 - 2.7 %       ______________________________________________________________________

## 2021-06-01 NOTE — TELEPHONE ENCOUNTER
Informed pt of Dr. Bach's message.    She states she will start the amiodarone today.    She thanked for the call.

## 2021-06-01 NOTE — TELEPHONE ENCOUNTER
Informed pt of Dr. Bach's message.    Pt scheduled for 10/18/19 at 11:20.    She states an understanding and thanked for the call.

## 2021-06-02 NOTE — TELEPHONE ENCOUNTER
Call patient: Dr. Bach is out of the office for much of this week. I reviewed her chart. It appears that she has a prescription for Dilaudid which was prescribed initially for back pain. If her knee pain is new/worsening and the OTC and prescribed pain medications are not working, I would advise an appointment with Dr. Bach to review diagnosis and treatment options.

## 2021-06-02 NOTE — TELEPHONE ENCOUNTER
Please call patient -    ______________________________________________________________________     Home phone:  [phone] (home)     Cell phone:   Telephone Information:   Mobile [phone]       Other contacts:  Name Home Phone Work Phone Mobile Phone Relationship Lgl Grd   ISHAN VALENTIN [phone]   Spouse    ANDREA VALENTIN [phone]   Child      ______________________________________________________________________     Her red blood cell count is now in the normal range.    Her electrolytes are also doing very well.  Kidney tests are stable.    Please help her to schedule a follow up in 6 weeks please.    Gabino Bach MD  [facility]  10/18/2019, 9:32 PM     ______________________________________________________________________    Recent Results (from the past 240 hour(s))   INR   Result Value Ref Range    INR 4.80 (H) 0.90 - 1.10   Electrocardiogram Perform and Read   Result Value Ref Range    SYSTOLIC BLOOD PRESSURE      DIASTOLIC BLOOD PRESSURE      VENTRICULAR RATE 85 BPM    ATRIAL RATE 85 BPM    P-R INTERVAL      QRS DURATION 82 ms    Q-T INTERVAL 366 ms    QTC CALCULATION (BEZET) 435 ms    P Axis      R AXIS -46 degrees    T AXIS -60 degrees    MUSE DIAGNOSIS       Sinus rhythm with 1st degree A-V block  Left anterior fascicular block  Nonspecific ST and T wave abnormality  Abnormal ECG  When compared with ECG of 05-AUG-2019 11:48,  Sinus rhythm has replaced Atrial flutter  Vent. rate has decreased BY  44 BPM  T wave inversion now evident in Anterior leads  Confirmed by TYE BONILLA MD LOC:WW ([id number]) on 10/18/2019 3:00:30 PM     Hemoglobin   Result Value Ref Range    Hemoglobin 12.8 12.0 - 16.0 g/dL   Basic Metabolic Panel   Result Value Ref Range    Sodium 137 136 - 145 mmol/L    Potassium 4.2 3.5 - 5.0 mmol/L    Chloride 93 (L) 98 - 107 mmol/L    CO2 30 22 - 31 mmol/L    Anion Gap, Calculation 14 5 - 18 mmol/L    Glucose 334 (H) 70 - 125 mg/dL    Calcium 10.2 8.5 -  10.5 mg/dL    BUN 23 8 - 28 mg/dL    Creatinine 1.22 (H) 0.60 - 1.10 mg/dL    GFR MDRD Af Amer 51 (L) >60 mL/min/1.73m2    GFR MDRD Non Af Amer 42 (L) >60 mL/min/1.73m2   INR   Result Value Ref Range    INR 4.70 (H) 0.90 - 1.10       ______________________________________________________________________

## 2021-06-02 NOTE — TELEPHONE ENCOUNTER
Called to pt and relayed message - pt also states that she is having a lot of knee pain with her back pain.  She has not had knee pain this bad before.  Wondering if something can be sent in for this pain.  Pt states that she has not tried OTC pain reliever creams -can not take Tylenol or Ibuprofen - uses heat and that seems to help.

## 2021-06-02 NOTE — TELEPHONE ENCOUNTER
Hi, just message to let you know that lab can not add on [id number] on Bernadette Yu b/c the specimen already refrigerated for 24 hrs.    Thanks!!

## 2021-06-02 NOTE — PROGRESS NOTES
TCM DISCHARGE FOLLOW UP CALL    Discharge Date:  10/31/2019  Reason for hospital stay (discharge diagnosis)::  Torsades de pointes, [place]win leg pain  Are you feeling better, the same or worse since your discharge?:  Patient is feeling better (Denies dyspnea, palpitations, lightheadedness, CP. States legs get cold. Pt goes off on tangents during conversation. She isn't happy about all the new meds.)  Do you feel like you have a plan in the event of a health emergency?: Yes ()    As part of your discharge plan, were  home care services ordered for you?: Yes    Have you seen them yet, or are they scheduled to visit?: No (Pt doesn't want home care.)    Did you receive any new medications, or was there a change to your medications?: Yes    Are you taking those medications, or do you have any established regiment?:  Pt doesn't know her meds. She asked her  what dose of NPH she took prior to hospitalization. She states she bought the new rxs and states she took the morning pills according to the AVS. RN reiterated how important taking meds correctly is but pt wanted to wait to review meds with Dr Bach on Monday (11/4). Spoke with pt's  and he reports Dr Bach told pt to take the meds the way she was prior to hospitalization and he would sort through the meds at her clinic appt.  Do you have any follow up visits scheduled with your PCP or Specialist?:  Yes, with PCP  (RN) Is PCP appt scheduled soon enough (within 14 days of discharge date)?: Yes (11/4)

## 2021-06-02 NOTE — TELEPHONE ENCOUNTER
ACM received supratherapeutic INR from 10/25 on 10/28 AM.  Pt was hospitalized over the weekend and is still currently hospitalized.  INR/warfarin will be managed by inpatient hospitalists.  ACM will manage again once pt is discharged.

## 2021-06-02 NOTE — PROGRESS NOTES
Scheduled Follow Up Call: Attempt 1 Community Health Worker called and left a message for the patient. If the patient is returning my call, please transfer the patient to [facility] at ext. [phone].

## 2021-06-02 NOTE — TELEPHONE ENCOUNTER
Will address at visit.    Gabino Bach MD  General Internal Medicine  [facility]  11/3/2019, 8:30 PM

## 2021-06-02 NOTE — TELEPHONE ENCOUNTER
Called pt and relayed message.  Pt states that the Hydromorphone is not really helping with her pain in her back - takes every 4 hours but feels the relief does not last long enough.  Pt informed that we can not change medication over the phone and that she would need appt for the knees and med changes.  Pt was scheduled for OV

## 2021-06-02 NOTE — TELEPHONE ENCOUNTER
Please call patient or   -    ______________________________________________________________________     Home phone:  [phone] (home)     Cell phone:   Telephone Information:   Mobile [phone]       Other contacts:  Name Home Phone Work Phone Mobile Phone Relationship Lgl Grd   ISHAN VALENTIN [phone]   Spouse    ANDREA VALENTIN [phone]   Child      ______________________________________________________________________     Please see if she can move up her Tuesday appointment to Monday November 4th at 1:30 pm.    This will get them in sooner and allow me more time to spend with them.  Thank you,    Gabino Bach MD  [facility]  11/1/2019, 9:17 AM

## 2021-06-02 NOTE — TELEPHONE ENCOUNTER
ANTICOAGULATION  MANAGEMENT: Discharge Review    Bernadette Yu chart reviewed for anticoagulation continuity of care    Hospital admission on  10/26 to 10/31 for Torsades de pointes.    Discharge disposition: Home    INR Results:       Recent labs: (last 7 days)     10/25/19  1802 10/26/19  1011 10/27/19  0434 10/28/19  0443 10/29/19  0504 10/30/19  0554 10/31/19  0553   INR 5.40* 3.78* 3.20* 3.16* 3.10* 3.77* 3.24*       Warfarin inpatient management: less warfarin administered than maintenance regimen    Warfarin discharge instructions: decrease dose to: 3 mg daily       Medication Changes Affecting Anticoagulation: Yes: amiodarone was stopped, lipitor was added    Additional Factors Affecting Anticoagulation: Yes: pt had a temporary pacemaker put in    Plan     Recommend to check INR on 11/4 due to multiple med and health changes    Left a detailed message for Bernadette    Anticoagulation calendar updated    Mallory Grover RN

## 2021-06-02 NOTE — PATIENT INSTRUCTIONS - HE
Please follow up if you have any further issues.    You may contact me by phone or [facility] if you are worsening or if things are not improving.    Thank you for coming in today.

## 2021-06-02 NOTE — PROGRESS NOTES
Wt Readings from Last 20 Encounters:   10/25/19 148 lb 3.2 oz (67.2 kg)   10/18/19 147 lb 6 oz (66.8 kg)   09/26/19 150 lb 11.2 oz (68.4 kg)   08/07/19 155 lb (70.3 kg)   05/30/19 156 lb 14.4 oz (71.2 kg)   03/19/19 156 lb 12.8 oz (71.1 kg)   01/15/19 156 lb (70.8 kg)   10/30/18 153 lb 4.8 oz (69.5 kg)   08/31/18 155 lb 8 oz (70.5 kg)   08/13/18 155 lb (70.3 kg)   08/01/18 155 lb (70.3 kg)   07/17/18 155 lb (70.3 kg)   05/29/18 153 lb 8 oz (69.6 kg)   05/23/18 153 lb (69.4 kg)   05/22/18 152 lb 6.4 oz (69.1 kg)   05/07/18 159 lb 11.2 oz (72.4 kg)   04/20/18 159 lb 8 oz (72.3 kg)   04/19/18 158 lb 14.4 oz (72.1 kg)   04/06/18 161 lb (73 kg)   02/26/18 158 lb 6.4 oz (71.8 kg)

## 2021-06-02 NOTE — TELEPHONE ENCOUNTER
Reviewed and noted.    Gabino Bach MD  General Internal Medicine  [facility]  10/26/2019, 9:58 PM

## 2021-06-02 NOTE — TELEPHONE ENCOUNTER
"Pt was rescheduled - pt states that while she was in the hospital she also lost her partial Denture and is wondering what she can do to locate it.  If this is impossible she needs a \"referral\" to another dentist to replace them  "

## 2021-06-02 NOTE — TELEPHONE ENCOUNTER
"    Call from pts grandson (Jose)       He was called this am by pt who had reported to him with significant leg pain      \"Hard to stand and walk\"      No other information available       He is not with the pt to clarify further         A/P:   > Would direct for medical care if significant pain            Bruce Guzman RN   Triage and Medication Refills               "

## 2021-06-02 NOTE — PATIENT INSTRUCTIONS - HE
Please follow up if you have any further issues.    You may contact me by phone or [employer] if you are worsening or if things are not improving.    Thank you for coming in today.

## 2021-06-02 NOTE — PROGRESS NOTES
Things to address at the visit if able:    1. Bilateral knee and hip x-rays.  2. Vascular study of the lower extremities.  3. Elevated blood sugars and consider urinalysis.

## 2021-06-03 NOTE — TELEPHONE ENCOUNTER
Please call patient -    ______________________________________________________________________     Home phone:  [phone] (home)     Cell phone:   Telephone Information:   Mobile [phone]       Other contacts:  Name Home Phone Work Phone Mobile Phone Relationship Lgl Grd   JOYCELYNISHAN WORLEY [phone]   Spouse    ANDREA VALENTIN [phone]   Child      ______________________________________________________________________     Or  Ishan.    They left me a voice mail.    The levothyroxine that they picked up yesterday is the correct medication that she should go on.  She should take this regularly daily as instructed.    Gabino Bach MD  [facility]  11/26/2019, 7:49 AM

## 2021-06-03 NOTE — PROGRESS NOTES
Wt Readings from Last 20 Encounters:   11/25/19 142 lb 6.4 oz (64.6 kg)   11/04/19 146 lb 3.2 oz (66.3 kg)   10/30/19 142 lb (64.4 kg)   10/26/19 148 lb (67.1 kg)   10/25/19 148 lb 3.2 oz (67.2 kg)   10/18/19 147 lb 6 oz (66.8 kg)   09/26/19 150 lb 11.2 oz (68.4 kg)   08/07/19 155 lb (70.3 kg)   05/30/19 156 lb 14.4 oz (71.2 kg)   03/19/19 156 lb 12.8 oz (71.1 kg)   01/15/19 156 lb (70.8 kg)   10/30/18 153 lb 4.8 oz (69.5 kg)   08/31/18 155 lb 8 oz (70.5 kg)   08/13/18 155 lb (70.3 kg)   08/01/18 155 lb (70.3 kg)   07/17/18 155 lb (70.3 kg)   05/29/18 153 lb 8 oz (69.6 kg)   05/23/18 153 lb (69.4 kg)   05/22/18 152 lb 6.4 oz (69.1 kg)   05/07/18 159 lb 11.2 oz (72.4 kg)

## 2021-06-03 NOTE — PATIENT INSTRUCTIONS - HE
Please follow up if you have any further issues.    You may contact me by phone or MyChart if you are worsening or if things are not improving.    Thank you for coming in today.      ______________________________________________________________________      Future Appointments   Date Time Provider Department Center   12/3/2019  1:25 PM Gabino Bach MD [facility]   12/18/2019  2:10 PM Sandeep Bray MD [facility]   12/30/2019 11:20 AM Gabino Bach MD [facility]

## 2021-06-03 NOTE — TELEPHONE ENCOUNTER
Anticoagulation Management    Unable to reach Bernadette today. Left message on spouse, Aneudy phone.    Today's INR result of 4.0 is Supratherapeutic (goal INR of 2.5-3.5).     Follow up required to confirm warfarin doses taken and discuss out of range INR    Left message to take reduced dose of warfarin, 2.5 mg tonight.       ACN to follow up    Mallory Grover RN

## 2021-06-03 NOTE — PROGRESS NOTES
Community Health Worker called and left a message for the patient.  If the patient is returning my call, please transfer the patient to [facility] at ext. [phone].   Patient has been mailed a unreachable letter and was provided with CHW contact information if they are interested in accessing Clinic Care Coordination.  Order for Care Management has been closed, no further outreach will be done at this time and patient can be re-referred.

## 2021-06-03 NOTE — TELEPHONE ENCOUNTER
INITIAL INR > 5.5 NOTIFICATION- Anticoagulation Management Program    Bernadette Yu had an initial point of care INR of 5.8.     Venous confirmation of INR required per protocol. STAT venous sample drawn and being sent out for confirmation.    Anticoagulation template scanned into EHR. Patient phone call with Anticoagulation Management Program (ACM) being arranged for patient triage prior to discharge.     Federico Nichols MLT

## 2021-06-03 NOTE — TELEPHONE ENCOUNTER
"ANTICOAGULATION  MANAGEMENT    Bernadette Yu is on warfarin and had a point of care INR result > 5.5 or an \"error message\" on point of care meter today.    Afternoon INR; STAT venous INR processing and STAT  requested from lab    Spoke with Bernadette via phone while at the lab and reviewed triage questions for potential signs and symptoms of bleeding.      Patient Response     Have you had any bleeding in the last week?   No   Have you passed any red, black, or tarry stools in last week?   No   Have you vomited/spit up any red or coffee ground material in last week?   No   Have you had any new, severe abdominal pain or bloating develop in last week?   No   Have you fallen or had any injuries in last week?   No   Do you currently have a severe, sudden onset headache?   No   Do you currently have any severe sudden changes in your vision?   No   Do you currently have any new onset numbness, weakness/paralysis?   No     Assessment/Plan:     Bernadette's responses were negative for signs and symptoms of bleeding; may discharge from clinic    Bernadette instructed to:       Hold warfarin today until venous INR result returns and receives follow up call from M    Seek medical attention for new signs and symptoms of bleeding or a fall with injury.         "

## 2021-06-03 NOTE — TELEPHONE ENCOUNTER
ANTICOAGULATION  MANAGEMENT PROGRAM    Bernadette Yu is overdue for INR check.     Spoke with Bernadette and scheduled INR appointment on 11/12.      Mallory Grover, RN

## 2021-06-03 NOTE — PROGRESS NOTES
Please call patient -    ______________________________________________________________________     Home phone:  [phone] (home)     Cell phone: Telephone Information:  Mobile          [phone]      ______________________________________________________________________     Her red blood cell count count has improved significantly and is back in the normal range.    This is probably at least some of the reason she is feeling better.    Please schedule a follow up with me in 5-6 weeks if this would work out well for her.    Thank you,    Gabino Bach MD  [facility]  11/25/2019, 9:45 PM     ______________________________________________________________________    Recent Results (from the past 240 hour(s))  -INR       Result                                            Value                         Ref Range                       INR                                               5.30 (H)                      0.90 - 1.10                -Hemoglobin       Result                                            Value                         Ref Range                       Hemoglobin                                        13.5                          12.0 - 16.0 g/dL           -INR       Result                                            Value                         Ref Range                       INR                                               4.00 (H)                      0.90 - 1.10                  ______________________________________________________________________

## 2021-06-03 NOTE — PATIENT INSTRUCTIONS - HE
Please follow up if you have any further issues.    You may contact me by phone or MyChart if you are worsening or if things are not improving.    Thank you for coming in today.      ______________________________________________________________________      Future Appointments   Date Time Provider Department Center   11/7/2019  9:50 AM Ana Still CNP [facility]   11/7/2019 11:30 AM [facility] LAB [facility] LAB [facility] Clinic   12/3/2019  1:25 PM Gabino Bach MD [facility] Clinic   12/18/2019  2:10 PM Sandeep Bray MD [facility]

## 2021-06-03 NOTE — TELEPHONE ENCOUNTER
Please inform pt   The levothyroxine that they picked up yesterday is the correct medication that she should go on.  She should take this regularly daily as instructed.    Her red blood cell count count has improved significantly and is back in the normal range.     This is probably at least some of the reason she is feeling better.     Please schedule a follow up with me in 5-6 weeks if this would work out well for her.  It is okay to use a reserved spot if needed.

## 2021-06-03 NOTE — TELEPHONE ENCOUNTER
Please schedule this patient for an appointment with me on:    ______________________________________________________________________     Monday, November 25th  Time of appointment:  1:30 pm    Reason for appointment:  Follow up health and hospital stay.    ______________________________________________________________________     Please verify with the patient by phone that this works out.    I discussed it with Jessica yesterday but this is a change in the plan.  Please make sure it was not initiated by the patient.    Please also cancel 11/7 visit with cardiology as the patient refuses to be seen at this time.  Please cancel 12/3 appointment with me.    Thank you.    Gabino Bach MD  [facility]  11/4/2019, 7:23 PM

## 2021-06-03 NOTE — TELEPHONE ENCOUNTER
Left message for pt to call back.  If pt calls back please inform of Dr. Bach's message.    Pt scheduled for 11/25/19 at 1:30.   Please verify with the patient by phone that this works out.    Please also cancel 11/7 visit with cardiology as the patient refuses to be seen at this time.

## 2021-06-04 NOTE — TELEPHONE ENCOUNTER
ANTICOAGULATION  MANAGEMENT PROGRAM    Bernadette RINALDI Aimee is overdue for INR check.     Left message to call and schedule INR appointment as soon as possible.      Mallory Grover, RN

## 2021-06-04 NOTE — PATIENT INSTRUCTIONS - HE
Please follow up if you have any further issues.    You may contact me by phone or [facility] if you are worsening or if things are not improving.    Thank you for coming in today.

## 2021-06-05 NOTE — TELEPHONE ENCOUNTER
Controlled Substance Refill Request  Medication Name:   Requested Prescriptions     Pending Prescriptions Disp Refills     HYDROmorphone (DILAUDID) 4 MG tablet 120 tablet 0     Sig: Take 0.5-1 tablets (2-4 mg total) by mouth 4 (four) times a day as needed for pain. For use from 12/14/2019 to 1/13/2020.     Date Last Fill: 12/03/19  [facility]ed Pharmacy: [facility]  Submit electronically to pharmacy  Controlled Substance Agreement on file:   Encounter-Level CSA Scan Date - 08/01/2018:    Scan on 8/6/2018  8:40 AM: CHRONIC PAIN MANAGEMENT           Encounter-Level CSA Scan Date - 01/30/2018:    Scan on 2/1/2018 12:13 PM: HE -           Encounter-Level CSA Scan Date - 02/09/2017:    Scan on 2/10/2017  3:16 PM        Last office visit:  12/03/19

## 2021-06-05 NOTE — TELEPHONE ENCOUNTER
ANTICOAGULATION  MANAGEMENT PROGRAM    Bernadette Yu is overdue for INR check.     Spoke with Bernadette and scheduled INR appointment on 1/10.      Mallory Grover, RN

## 2021-06-05 NOTE — TELEPHONE ENCOUNTER
.Anticoagulation Management    Unable to reach Bernadette today.    Today's INR result of 3.7 is Supratherapeutic (goal INR of 2.5-3.5).     Follow up required to discuss out of range INR .    Left message to take reduced dose of warfarin, 2.5 mg tonight. and continue 5 mg daily over the weekend       ACN to follow up    Mallory Grover, MYRTLE

## 2021-06-05 NOTE — TELEPHONE ENCOUNTER
Who is calling:  Patient   Reason for Call:  Patient returning phone call from ACN- patient states she apologizes for not returning phone call sooner- she had a house full of company recently. Patient wanted it noted that she will take her 1 tablet of coumadin as she has been taking previously if unable to connect with ACN for dosing, however, she will be available at home phone number today, 01/13.  Date of last appointment with primary care: na  Okay to leave a detailed message: Yes

## 2021-06-05 NOTE — TELEPHONE ENCOUNTER
Last Office Visit  12/3/2019 Gabino Bach MD  Notes:  We reviewed her constipation.  She is using MiraLAX 2 times a day.  Generally works but occasionally does not work so well.  Wondering if she can use Ex-Lax intermittently.     We reviewed her insomnia.  She has noted success with using 2 temazepam at night.  We told her this was okay as long as she tolerates it well.  She has had no issues with excessive sleepiness.  Her  attests to this.     Reviewed her diabetes and things are stable here.     Reviewed her mitral valve replacement.  She does have issues with INR variability.  She does need her INR done today.     Chronic back and leg pain was reviewed and she remains on Dilaudid at this point.       Last Filled:12/14/2019  Next OV:  2/10/2020 Gabino Bach MD        Medication teed up for provider signature

## 2021-06-06 NOTE — TELEPHONE ENCOUNTER
Reviewed and noted.    Gabino Bach MD  General Internal Medicine  [facility]  2/25/2020, 9:37 AM

## 2021-06-06 NOTE — PROGRESS NOTES
Internal Medicine Office Visit  [facility]   Patient Name: Bernadette Yu  Patient Age: 79 y.o.  YOB: 1940  MRN: [id number]    Date of Visit: 2020  Reason for Office Visit:   Chief Complaint   Patient presents with     Thumb Pain     Lt thumb, no known injury           Assessment / Plan / Medical Decision Makin. Tenosynovitis of finger  - XR Finger Left 2 or More VWS; Future. Images personally reviewed with patient. No acute findings  - She is advised that symptoms are most consistent with a tendinopathy. She is advised to use ice and brace with spica. I was initially going to prescribe prednisone but then saw her recent A1C done 2 days ago and told her this is not advised since it will cause glucose readings to increase further-- called the pharmacy to cancel the prescription. She will return to see her PCP if symptoms do not improve in the next 2 weeks or are worsening         Health Maintenance Review  Health Maintenance   Topic Date Due     MEDICARE ANNUAL WELLNESS VISIT  2003     ZOSTER VACCINES (2 of 3) 2006     LIPID  2016     DIABETIC EYE EXAM  2020     MICROALBUMIN  2020     DIABETIC FOOT EXAM  2020     A1C  08/10/2020     FALL RISK ASSESSMENT  10/18/2020     ADVANCE CARE PLANNING  2021     TSH  02/10/2021     BMP  02/10/2021     PNEUMOCOCCAL IMMUNIZATION 65+ LOW/MEDIUM RISK  Completed     DXA SCAN  Completed     INFLUENZA VACCINE RULE BASED  Completed     TD 18+ HE  Discontinued         I am having Bernadette Yu maintain her generic lancets, cyanocobalamin, BD Ultra-Fine Felicia Pen Needle, cholecalciferol (vitamin D3), Accu-Chek SmartView Test Strip, furosemide, ferrous sulfate, levothyroxine, HYDROmorphone, warfarin ANTICOAGULANT, capsaicin-menthol, insulin NPH, insulin lispro, temazepam, silver sulfADIAZINE, diclofenac sodium, pravastatin, and HYDROmorphone.      HPI:  Bernadette Yu is a 79 y.o. year old  "who presents to the office today with her spouse and grandson for acute left thumb pain.  Symptoms began this morning upon awakening.  She denies any known injury.  She does not do any repetitive tasks or hobby.  She has never experienced this type of pain before.  There is no bruising, redness, swelling.  She has chronic back pain which is at baseline for her.       Review of Systems- pertinent positive in bold:  Pertinent findings as in HPI       Current Scheduled Meds:  Outpatient Encounter Medications as of 2/12/2020   Medication Sig Dispense Refill     ACCU-CHEK SMARTVIEW TEST STRIP strips Use 1 each As Directed 3 (three) times a day. Dispense brand per patient's insurance at pharmacy discretion.  Dx E11.65 DM2, uncontrolled 300 strip 3     BD ULTRA-FINE DAVID PEN NEEDLES 32 gauge x 5/32\" Ndle USE WITH NOVOLOG PEN AND LANTUS PENS  0     capsaicin-menthol (CAPZASIN) 0.025-10 % Gel gel Apply to legs up to three times a day. 85 g 11     cholecalciferol, vitamin D3, 5,000 unit Tab Take 5,000 Units by mouth daily.       cyanocobalamin (VITAMIN B-12) 100 MCG tablet Take 100 mcg by mouth daily.       diclofenac sodium (VOLTAREN) 1 % Gel Apply 2 g topically 4 (four) times a day. 100 g 11     ferrous sulfate 325 (65 FE) MG tablet Take 1 tablet (325 mg total) by mouth daily with breakfast. 90 tablet 3     furosemide (LASIX) 40 MG tablet Take 1 tablet (40 mg total) by mouth every morning AND 1 tablet (40 mg total) daily with lunch. 180 tablet 3     generic lancets Use 1 each As Directed 3 (three) times a day. Dx E11.65 DM2, uncontrolled 300 each 3     HYDROmorphone (DILAUDID) 4 MG tablet Take 1 tablet (4 mg total) by mouth 4 (four) times a day as needed for pain.  0     [START ON 2/23/2020] HYDROmorphone (DILAUDID) 4 MG tablet Take 0.5-1 tablets (2-4 mg total) by mouth 4 (four) times a day as needed for pain. For use from 2/23/2020 to 3/24/2020. 120 tablet 0     insulin lispro (HUMALOG) 100 unit/mL injection Inject 15 " Units under the skin 2 (two) times a day.       insulin NPH (NOVOLIN) 100 unit/mL injection Inject 20 Units under the skin 2 (two) times a day.       levothyroxine (SYNTHROID, LEVOTHROID) 125 MCG tablet Take 1 tablet (125 mcg total) by mouth daily. 90 tablet 3     pravastatin (PRAVACHOL) 20 MG tablet Take 1 tablet (20 mg total) by mouth daily. 90 tablet 3     silver sulfADIAZINE (SILVADENE, SSD) 1 % cream Apply to affected area daily 50 g 3     temazepam (RESTORIL) 15 mg capsule Take 2 capsules (30 mg total) by mouth at bedtime as needed for sleep. 90 capsule 1     warfarin (COUMADIN/JANTOVEN) 3 MG tablet Take 1 tablet (3 mg total) by mouth daily.       [DISCONTINUED] predniSONE (DELTASONE) 20 MG tablet Take 20 mg by mouth daily for 7 days. 7 tablet 0     No facility-administered encounter medications on file as of 2/12/2020.        Past Medical History:   Diagnosis Date     Abdominal bloating, chronic 8/21/2016     Anticoagulation goal of INR 2.5 to 3.5 1/27/2016     Anxiety      Aortic stenosis 10/26/2019     ASCVD (arteriosclerotic cardiovascular disease)      Atherosclerosis of native coronary artery without angina pectoris 10/26/2019     Bleeding diathesis (H) - due to warfarin      Carotid artery stenosis, left      CHF (congestive heart failure) (H)      DM (diabetes mellitus), type 2 (H) 1990     Eczema      Former smoker      Full code status      HLD (hyperlipidemia)      HTN (hypertension)      Hypothyroidism      IBS (irritable bowel syndrome)      Insomnia      Liver disease      Long Q-T syndrome 10/26/2019     Meningococcal meningitis Age 14     Microalbuminuria due to type 2 diabetes mellitus (H)      Mild nonproliferative diabetic retinopathy of both eyes      Mitral stenosis      Moderate aortic stenosis     See transesophageal echocardiogram (ANTOINE) 2019.     MRSA (methicillin resistant staph aureus) culture positive 2/9/2017     Normocytic anemia      Paroxysmal atrial fibrillation - treated during  surgery in 2015    Bilateral pulmonary vein isolation with AtriCure Synergy (bipolar radiofrequency ablation) device, exclusion of the left atrial appendage with the AtriCure AtriClip device.       PN (peripheral neuropathy)      Psoriasis      PVD (peripheral vascular disease) (H) 2019    Bilateral lower extremities.  See ultrasound 2019.     Recurrent UTI (urinary tract infection)      RLS (restless legs syndrome)      S/P CABG (coronary artery bypass graft) 2016     S/P colonoscopy 07     S/P MVR (mitral valve replacement) - 23 mm [employer] mechanical valve - 2015     Subclavian artery stenosis, left - s/p stent 2015    Stented in .      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     Past Surgical History:   Procedure Laterality Date     APPENDECTOMY       CARDIAC CATHETERIZATION  11/12/15      SECTION       CHOLECYSTECTOMY       COLONOSCOPY N/A 10/12/2016    Procedure: COLONOSCOPY;  Surgeon: Santiago Duran MD;  Location: [facility];  Service:      COLONOSCOPY N/A 10/13/2016    Procedure: COLONOSCOPY with polypectomy & rectum biopsies;  Surgeon: Santiago Duran MD;  Location: [facility];  Service:      COLONOSCOPY N/A 12/3/2017    Procedure: COLONOSCOPY with multiple polyp removal using hot snare and mansfield net and biopsy forcep;  Surgeon: Marcelo Wade MD;  Location: [facility];  Service:      COLONOSCOPY N/A 3/13/2018    Procedure: COLONOSCOPY; POLYPECTOMY;  Surgeon: Marcelo Wade MD;  Location: [facility] OR;  Service:      CORONARY ARTERY BYPASS GRAFT       ESOPHAGOGASTRODUODENOSCOPY N/A 10/12/2016    Procedure: ESOPHAGOGASTRODUODENOSCOPY with biopsies;  Surgeon: Santiago Duran MD;  Location: [facility];  Service:      ESOPHAGOGASTRODUODENOSCOPY N/A 12/3/2017    Procedure: ESOPHAGOGASTRODUODENOSCOPY (EGD);  Surgeon: Marcelo Wade MD;  Location: [facility];  Service:      TONSILLECTOMY  AND ADENOIDECTOMY       UPPER GASTROINTESTINAL ENDOSCOPY       Social History     Tobacco Use     Smoking status: Former Smoker     Years: 23.00     Types: Cigarettes     Smokeless tobacco: Never Used     Tobacco comment: Quit 30yrs ago   Substance Use Topics     Alcohol use: No     Drug use: No       Objective / Physical Examination:    Study Result        EXAM DATE:         02/12/2020     EXAM: X-RAY EXAM OF FINGER(S),LEFT,MINIMUM TWO VIEWS  LOCATION: [facility]  DATE/TIME: 2/12/2020 3:00 PM     INDICATION: Thumb pain, immobility  COMPARISON: None.     IMPRESSION: No acute fracture or dislocation. Advanced thumb CMC joint  degenerative arthritis. Minimal degenerative arthritis of the thumb IP joint.  Tiny chronic bone fragment along the dorsal radial aspect of the joint. Arterial  calcification.          Vitals:    02/12/20 1415   BP: 106/66   Pulse: 76   Weight: 148 lb (67.1 kg)     Wt Readings from Last 3 Encounters:   02/12/20 148 lb (67.1 kg)   02/10/20 147 lb (66.7 kg)   12/03/19 147 lb (66.7 kg)     Body mass index is 26.22 kg/m .     Constitutional: In no apparent distress  MSK: pain with palpation of the radial styloid and thumb. Unwilling to attempt to flex the thumb due to pain. There is not swelling, warmth, or erythema over the thumb     Orders Placed This Encounter   Procedures     XR Finger Left 2 or More VWS   Followup: Return if symptoms worsen or fail to improve. earlier if needed.        Zunilda Estevez, CNP

## 2021-06-06 NOTE — TELEPHONE ENCOUNTER
Please cancel 3/23 appointment with me and KEEP 3/26 and 4/7 appointment with me.    Cancel appointment with Graham Kearney and Cristofer as the patient has asked me to manage her foot issues at this time.    Gabino Bach MD  General Internal Medicine  [facility]  3/16/2020, 10:22 PM

## 2021-06-06 NOTE — TELEPHONE ENCOUNTER
ANTICOAGULATION  MANAGEMENT PROGRAM    Bernadette R Aimee is overdue for INR check.     Left message to check INR at upcoming office visit on 3/2      Mallory Grover RN

## 2021-06-06 NOTE — TELEPHONE ENCOUNTER
Please call patient -    ______________________________________________________________________     Home phone:  [phone] (home)     Cell phone:   Telephone Information:   Mobile [phone]       Other contacts:  Name Home Phone Work Phone Mobile Phone Relationship Lgl Grd   ISHAN VALENTIN [phone]   Spouse    ANDREA VALENTIN [phone]   Child      ______________________________________________________________________     Her blood counts were excellent.  Her white count and red blood cell count were normal.    Her kidney function tests are excellent.  Her thyroid tests are doing well and she should continue on the same thyroid dose.    Her A1c is elevated at this time.  I would recommend considering restarting metformin 500 mg twice a day.  This might help her with her weight a bit which she has been concerned with.    I could send this into her pharmacy if she is okay with this.    Please let her know that her 's blood work was okay we will be mailing it to him.    Please help schedule the patient for follow-up in 2 months.  She should follow-up sooner if her toe worsens.    Gabino Bach MD  [facility]  2/10/2020, 9:04 PM     ______________________________________________________________________    Recent Results (from the past 240 hour(s))   INR   Result Value Ref Range    INR 2.70 (H) 0.90 - 1.10   HM2(CBC w/o Differential)   Result Value Ref Range    WBC 7.2 4.0 - 11.0 thou/uL    RBC 4.27 3.80 - 5.40 mill/uL    Hemoglobin 12.5 12.0 - 16.0 g/dL    Hematocrit 39.6 35.0 - 47.0 %    MCV 93 80 - 100 fL    MCH 29.3 27.0 - 34.0 pg    MCHC 31.6 (L) 32.0 - 36.0 g/dL    RDW 13.7 11.0 - 14.5 %    Platelets 163 140 - 440 thou/uL    MPV 13.2 (H) 8.5 - 12.5 fL   Basic Metabolic Panel   Result Value Ref Range    Sodium 138 136 - 145 mmol/L    Potassium 4.4 3.5 - 5.0 mmol/L    Chloride 102 98 - 107 mmol/L    CO2 27 22 - 31 mmol/L    Anion Gap, Calculation 9 5 - 18 mmol/L    Glucose 136  (H) 70 - 125 mg/dL    Calcium 9.6 8.5 - 10.5 mg/dL    BUN 27 8 - 28 mg/dL    Creatinine 1.04 0.60 - 1.10 mg/dL    GFR MDRD Af Amer >60 >60 mL/min/1.73m2    GFR MDRD Non Af Amer 51 (L) >60 mL/min/1.73m2   Glycosylated Hemoglobin A1c   Result Value Ref Range    Hemoglobin A1c 10.2 (H) 3.5 - 6.0 %   Thyroid Stimulating Hormone (TSH)   Result Value Ref Range    TSH 4.96 0.30 - 5.00 uIU/mL   INR   Result Value Ref Range    INR 3.20 (H) 0.90 - 1.10       ______________________________________________________________________

## 2021-06-06 NOTE — PROGRESS NOTES
ANTICOAGULATION  MANAGEMENT: Discharge Review    Bernadette Yu chart reviewed for anticoagulation continuity of care    Hospital admission on  2/19 to 2/22 for Drug Induced Constipation.    Discharge disposition: Home    INR Results:       Recent labs: (last 7 days)     02/19/20  0126 02/20/20  0415 02/21/20  0524 02/22/20  0527   INR 3.79* 4.10* 2.78* 1.73*       Warfarin inpatient management: held warfarin due to supratherapeutic INR and resumed on 2/21    Warfarin discharge instructions: home regimen continued     Medication Changes Affecting Anticoagulation: No    Additional Factors Affecting Anticoagulation: No    Plan     Agree with discharge plan for follow up on 2/25    Patient not contacted    Anticoagulation calendar updated    Mallory Grover, RN

## 2021-06-06 NOTE — TELEPHONE ENCOUNTER
Prior Authorization Request  Who s requesting:  Pharmacy  Pharmacy Name and Location: [facility] #[id number]  Medication Name: diclofenac sodium (VOLTAREN) 1 % Gel  Insurance Plan: [employer]    Insurance Member ID Number:  [id number]  CoverMyMeds Key: N/A  Informed patient that prior authorizations can take up to 10 business days for response:   NA  Okay to leave a detailed message: No

## 2021-06-06 NOTE — TELEPHONE ENCOUNTER
Benja is calling regarding his mother. No KIMBERLEY on file. Verbal permission to speak to Benja from Bernadette.   Her left thumb is hurting to move it.  States it brought her to tears. Can barely move it. Slightly blue/purple.   No pain going up the arm. Limited movement, lots of pain with movement.   Rates the pain as moderate/severe.     Reason for Disposition    SEVERE pain (e.g., excruciating, unable use hand at all)    Protocols used: FINGER PAIN-A-OH    Due to not being able to use the finger RN recommended office visit per protocol.  Care advice given per protocol.   Appointment scheduled today at 2:05.     Josi Garza, RN   [facility] RN Triage

## 2021-06-06 NOTE — TELEPHONE ENCOUNTER
Pt scheduled for hospital follow up 2/25/20 at 11:45.  Pt missed appointment.    Called pt to see if she was on her way.     states he was unaware of appointment she was scheduled for appointment today.  He states she is in a lot of pain today and would not have been able to make it in today.    Offered to reschedule appointment on Thursday or Friday, he declined asked to be scheduled early next week.    Pt scheduled 3/2/20 at 10:55.    He thanked for the call.    Dr. Bach informed.

## 2021-06-06 NOTE — TELEPHONE ENCOUNTER
FYI--    This patient was referred to [facility] for home care services after discharging from [facility]. We have been unable to reach patient to to start home care services. Our current orders are now  and we are canceling this referral for home care. If you have any questions, please contact us at [phone].     Thank you,   Ekaterina [facility]  [facility]

## 2021-06-06 NOTE — PROGRESS NOTES
Hospital discharge follow up call to pt, she was sleeping , per . Remind him pt has an appt today at 11:45 with Dr Bach.  RN unable to make second hospital discharge follow up call before this morning's appt. Encounter closed.

## 2021-06-06 NOTE — TELEPHONE ENCOUNTER
Patient notified of her MR results and she has an appt with MD Cleveland on 3/10/20 at [facility]    ----- Message from Emma Stevenson NP sent at 3/9/2020  7:48 AM CDT -----  Can you call the patient and let her know that the MRI was negative for bone infection in the heel; she does have bone infection in the toes and needs to be scheduled with Dr. Cleveland; I have already discussed her case with Dr. Barry and he did not feel that he needed to see her; her blood flow is adequate

## 2021-06-06 NOTE — TELEPHONE ENCOUNTER
Left message to call back.    If pt calls back please inform pt of Dr. Bach's message and assist with scheduling appointment.

## 2021-06-06 NOTE — PATIENT INSTRUCTIONS - HE
"    Important Instructions     1. Bilateral hallux: endoform    2. Left heel: Santyl     Offloading boot at all times.     * Stop downstairs at the lab to have your blood drawn    * We will go over your test results at your next office visit                      How to care for your wound    Cleanse wound with saline sent to you with your supplies or a wound wash found at the pharmacy.      Pat dry the wound with 4\"x4\" gauze      Cut to fit the endoform and place on both toe wounds. Apply santyl to left heel wound.      Cover the wound with 1u6mwdk dry gauze.      Wrap the affected area with 4 inch roll gauze.      Secure dressings in place with paper tape.      Change dressing: every other day      Do not get your ulcer wet when you shower.  You can cover it with a cast protector. Cast protectors are available for purchase at [facility]. You may also purchase a cast protector at your local pharmacy.      Good nutrition is important for wound healing.  I recommend increasing your protein.  You can do this through your diet, nutritional supplements, and/or protein powder.    It is ok to continue current wound care treatment/products for the next 2-3 days until new wound care supplies are ordered and arrive. If longer than this please contact our office.    Please call the [facility] before substituting any product    Call our clinic nurse at [phone] if there is an increase in drainage, pain, redness, or the wound size increases.  This maybe a sign of infection and require attention prior to the next appointment            "

## 2021-06-06 NOTE — TELEPHONE ENCOUNTER
Who is calling:  Pt's son  Reason for Call:  Pt states medication cream ordered was not at pharmacy on file.  Writer explained to Pt to contact a specific pharmacy see if medication is in stock, and than ask to transfer medication.  Agrees, and understands.  Date of last appointment with primary care: N/A  Okay to leave a detailed message: No

## 2021-06-06 NOTE — TELEPHONE ENCOUNTER
Informed pt of Dr. Bach's message.    She states an understanding.    Pt states she will restart metformin 500 mg twice a day.  Pharmacy verified.     Appointment scheduled for 4/7/20 at 11:20.    Pt thanked for the call.

## 2021-06-06 NOTE — TELEPHONE ENCOUNTER
Central PA team  [phone]  Pool: HE PA MED ([id number])          PA has been initiated.       PA form completed and faxed insurance via Cover My Meds     Key:  [id number] - PA Case ID: [id number]     Medication:  Diclofenac Sodium 1% gel    Insurance:  [facility]        Response will be received via fax and may take up to 5-10 business days depending on plan

## 2021-06-06 NOTE — TELEPHONE ENCOUNTER
Called pharmacy and relayed message to void prednisone prescription. Pharmacy stated that they would go ahead and void that prescription.

## 2021-06-06 NOTE — TELEPHONE ENCOUNTER
Received [facility] referral from Transition of Care     Patient was not reachable after several attempts, will route to [facility] Pharmacist/Provider as an FYI. Left [facility] scheduling information on patients voicemail.    Thank you for the referral,    See Parminder [facility] Pharmacy Coordinator

## 2021-06-06 NOTE — TELEPHONE ENCOUNTER
Called and spoke with pt and her  about the discussion I had with Dr. Barry and MRI results; Dr. Tuttle felt there was adequate blood flow for healing; he did not feel angio was warranted at this time. I showed Dr. Cleveland the MRI results he would like left ankle MRI; will get this ordered and scheduled; then she will see Dr. Cleveland for discussion of her toe ulcers with +osteomyelitis bilaterally. Pt would like to be called Monday to schedule mri and Dr. Cleveland appt.

## 2021-06-06 NOTE — PATIENT INSTRUCTIONS - HE
Please follow up if you have any further issues.    You may contact me by phone or IdeaOfferhart if you are worsening or if things are not improving.    Thank you for coming in today.

## 2021-06-06 NOTE — PROGRESS NOTES
Patient scheduled for MRI @ [facility] on 3/8/2020 @ 1:00pm no prep   DR Cleveland to see patient @ [facility] on 3/10/49373 @ 2:40pm  Confirmed with son and patient instructions given

## 2021-06-06 NOTE — TELEPHONE ENCOUNTER
Done.    Gabino Bach MD  General Internal Medicine  [facility]  2/13/2020, 9:09 PM

## 2021-06-06 NOTE — PROGRESS NOTES
Wt Readings from Last 20 Encounters:   02/10/20 147 lb (66.7 kg)   12/03/19 147 lb (66.7 kg)   11/25/19 142 lb 6.4 oz (64.6 kg)   11/04/19 146 lb 3.2 oz (66.3 kg)   10/30/19 142 lb (64.4 kg)   10/26/19 148 lb (67.1 kg)   10/25/19 148 lb 3.2 oz (67.2 kg)   10/18/19 147 lb 6 oz (66.8 kg)   09/26/19 150 lb 11.2 oz (68.4 kg)   08/07/19 155 lb (70.3 kg)   05/30/19 156 lb 14.4 oz (71.2 kg)   03/19/19 156 lb 12.8 oz (71.1 kg)   01/15/19 156 lb (70.8 kg)   10/30/18 153 lb 4.8 oz (69.5 kg)   08/31/18 155 lb 8 oz (70.5 kg)   08/13/18 155 lb (70.3 kg)   08/01/18 155 lb (70.3 kg)   07/17/18 155 lb (70.3 kg)   05/29/18 153 lb 8 oz (69.6 kg)   05/23/18 153 lb (69.4 kg)

## 2021-06-06 NOTE — PATIENT INSTRUCTIONS - HE
Please follow up if you have any further issues.    You may contact me by phone or Launch[employer] if you are worsening or if things are not improving.    Thank you for coming in today.

## 2021-06-07 NOTE — TELEPHONE ENCOUNTER
Pt states she can not figure out how to use her smart phone to send in pictures of her feet.    Pt states if there is any need to get a hold of her she is just staying at home.    Pt thanked for the call.   Home

## 2021-06-07 NOTE — TELEPHONE ENCOUNTER
Due to COVID 19 outbreak Dr. Bach's schedule has been changed so he will not be in clinic until the week of 4/20/20.    Pt has office visit 4/6/20 Dr. Bach would like to know if pt would like to have a telephone visit and come into clinic to have an INR done.    Pt states her foot is hurting bad but states she will wait until 4/20/20 to see Dr. Bach, they have been taking care of her foot just like Dr. Bach said so she should be fine to wait.  Informed pt Dr. Bach would like to do the telephone visit to see how she is doing.    Appointment for 4/20/20 not scheduled yet and visit 4/6/20 not changed.

## 2021-06-07 NOTE — TELEPHONE ENCOUNTER
ANTICOAGULATION  MANAGEMENT PROGRAM    Bernadette Yu is overdue for INR check.     Spoke with Bernadette and scheduled INR appointment on 5/7.      Mallory Grover, RN

## 2021-06-07 NOTE — TELEPHONE ENCOUNTER
ANTICOAGULATION  MANAGEMENT    Assessment     Today's INR result of 1.8 is Subtherapeutic (goal INR of 2.5-3.5)        Warfarin taken as previously instructed    No new diet changes affecting INR    No new medication/supplements affecting INR    Continues to tolerate warfarin with no reported s/s of bleeding or thromboembolism     Previous INR was Subtherapeutic    Plan:     Spoke with Bernadette regarding INR result and instructed:     Warfarin Dosing Instructions:  Change warfarin dose to 5 mg daily  (16.7 % change)    Instructed patient to follow up no later than: 1 week    Education provided: target INR goal and significance of current INR result, importance of following up for INR monitoring at instructed interval and importance of taking warfarin as instructed    Bernadette verbalizes understanding and agrees to warfarin dosing plan.    Instructed to call the [facility] for any changes, questions or concerns. (#[phone])   ?   Mallory Grover RN    Subjective/Objective:      Bernadette NIMCO Yu, a 79 y.o. female is on warfarin.     Bernadette reports:     Home warfarin dose: as updated on anticoagulation calendar per template     Missed doses: No     Medication changes:  No     S/S of bleeding or thromboembolism:  No     New Injury or illness:  No     Changes in diet or alcohol consumption:  No     Upcoming surgery, procedure or cardioversion:  No    Anticoagulation Episode Summary     Current INR goal:   2.5-3.5   TTR:   41.9 % (11.4 mo)   Next INR check:   5/4/2020   INR from last check:   1.80! (4/27/2020)   Weekly max warfarin dose:      Target end date:      INR check location:      Preferred lab:      Send INR reminders to:   PABLO MITCHELL    Indications    S/P mitral valve replacement (MVR) - mechanical mitral valve placed 2015 [Z95.2]           Comments:            Anticoagulation Care Providers     Provider Role Specialty Phone number    Gabino Bach MD Referring Internal Medicine [phone]

## 2021-06-07 NOTE — TELEPHONE ENCOUNTER
Please call patient -    ______________________________________________________________________     Home phone:  [phone] (home)     Cell phone:   Telephone Information:   Mobile [phone]       Other contacts:  Name Home Phone Work Phone Mobile Phone Relationship Lgl Grd   ISHAN VALENTIN [phone]   Spouse    ANDREA VALENTIN [phone]   Child      ______________________________________________________________________     Her lab work is stable and her red blood cell count is doing well.    Her potassium is doing okay at this time and she could take a lower dose capsule of the potassium which would be preferable.  I sent this new potassium into the pharmacy.    Please continue the same medications and follow up as scheduled on April 6th.    Gabino Bach MD  [facility]  3/26/2020, 10:35 PM     ______________________________________________________________________    Recent Results (from the past 240 hour(s))   INR   Result Value Ref Range    INR 5.40 (H) 0.90 - 1.10   Basic Metabolic Panel   Result Value Ref Range    Sodium 132 (L) 136 - 145 mmol/L    Potassium 4.1 3.5 - 5.0 mmol/L    Chloride 96 (L) 98 - 107 mmol/L    CO2 24 22 - 31 mmol/L    Anion Gap, Calculation 12 5 - 18 mmol/L    Glucose 353 (H) 70 - 125 mg/dL    Calcium 8.8 8.5 - 10.5 mg/dL    BUN 14 8 - 28 mg/dL    Creatinine 0.92 0.60 - 1.10 mg/dL    GFR MDRD Af Amer >60 >60 mL/min/1.73m2    GFR MDRD Non Af Amer 59 (L) >60 mL/min/1.73m2   Erythrocyte Sedimentation Rate   Result Value Ref Range    Sed Rate 53 (H) 0 - 20 mm/hr   C-Reactive Protein(CRP)   Result Value Ref Range    CRP 1.4 (H) 0.0 - 0.8 mg/dL   HM1 (CBC with Diff)   Result Value Ref Range    WBC 7.2 4.0 - 11.0 thou/uL    RBC 4.48 3.80 - 5.40 mill/uL    Hemoglobin 13.0 12.0 - 16.0 g/dL    Hematocrit 39.3 35.0 - 47.0 %    MCV 88 80 - 100 fL    MCH 29.0 27.0 - 34.0 pg    MCHC 33.1 32.0 - 36.0 g/dL    RDW 14.2 11.0 - 14.5 %    Platelets 168 140 - 440 thou/uL    MPV  12.9 (H) 8.5 - 12.5 fL    Neutrophils % 74 (H) 50 - 70 %    Lymphocytes % 15 (L) 20 - 40 %    Monocytes % 8 2 - 10 %    Eosinophils % 2 0 - 6 %    Basophils % 0 0 - 2 %    Neutrophils Absolute 5.3 2.0 - 7.7 thou/uL    Lymphocytes Absolute 1.1 0.8 - 4.4 thou/uL    Monocytes Absolute 0.5 0.0 - 0.9 thou/uL    Eosinophils Absolute 0.2 0.0 - 0.4 thou/uL    Basophils Absolute 0.0 0.0 - 0.2 thou/uL       ______________________________________________________________________

## 2021-06-07 NOTE — TELEPHONE ENCOUNTER
Please schedule this patient for an appointment with me on:    ______________________________________________________________________     Monday, April 20th   Time of appointment:  1:10 pm    Reason for appointment:  Debridement of foot.  Ulcer of foot.    NEEDS FACE TO FACE visit with me.    ______________________________________________________________________     Patient already notified.  No need to notify the patient.    Thank you.    Gabino Bach MD  [facility] [place]  4/6/2020, 2:31 PM

## 2021-06-07 NOTE — PATIENT INSTRUCTIONS - HE
Please follow up if you have any further issues.    You may contact me by phone or MyChart if you are worsening or if things are not improving.    Thank you for coming in today.      ______________________________________________________________________      Future Appointments   Date Time Provider Department Center   5/4/2020  2:10 PM [facility]

## 2021-06-07 NOTE — TELEPHONE ENCOUNTER
Who is calling:  Patient   Reason for Call:  Caller is not sure what medications she is suppose to take. Caller would like a call back to go over medications.   Date of last appointment with primary care:   Okay to leave a detailed message: Yes

## 2021-06-07 NOTE — TELEPHONE ENCOUNTER
Called and relayed message to patient. Patient stated understanding and plans to keep appointment

## 2021-06-07 NOTE — PATIENT INSTRUCTIONS - HE
Please follow up if you have any further issues.    You may contact me by phone or [employer] if you are worsening or if things are not improving.    Thank you for coming in today.

## 2021-06-07 NOTE — TELEPHONE ENCOUNTER
Reviewed and noted.    Gabino Bach MD  General Internal Medicine  [facility]  4/27/2020, 10:07 PM

## 2021-06-07 NOTE — TELEPHONE ENCOUNTER
Medication Question or Clarification  Who is calling: Pharmacy  What medication are you calling about (include dose and sig)?:   clindamycin (CLEOCIN) 300 MG capsule  30 capsule  0  4/20/2020 5/4/2020     Sig - Route: Take 1 capsule (300 mg total) by mouth 3 (three) times a day for 14 days. - Oral     Sent to pharmacy as: clindamycin  mg capsule (CLEOCIN)     E-Prescribing Status: Receipt confirmed by pharmacy (4/20/2020  4:11 PM CDT)         Who prescribed the medication?:   Gabino Bach MD  What is your question/concern?:   Pharmacy is questioning if Provider wants patient to take above medication for 10 Days or 14 Days.  If 14 Days, please send new Quantity.  Thank you.  [facility]ed [facility]acy: [id number]  Okay to leave a detailed message?: Yes

## 2021-06-07 NOTE — TELEPHONE ENCOUNTER
It would be preferable to do the INR on Monday due the fact that she is on antibiotics and her INR was so high.    3/30 INR should be done.    Gabino Bach MD  General Internal Medicine  [facility]  3/27/2020, 2:48 PM

## 2021-06-07 NOTE — TELEPHONE ENCOUNTER
Upcoming Appointment Question  When is the appointment: 5/8/20  What is your appointment for?: Video visit  Who is your appointment scheduled with?: PCP only  What is your question/concern?: Patient would like a call back to discuss this further.  Okay to leave a detailed message?: Yes

## 2021-06-07 NOTE — TELEPHONE ENCOUNTER
Please schedule this patient for an appointment with me on:    ______________________________________________________________________     Monday, April 6th  IN PERSON VISIT  Time of appointment:  1:30 pm    Reason for appointment:  Follow up wounds.    ______________________________________________________________________     Patient already notified.  No need to notify the patient.    Please cancel the April 7th visit with me.    Thank you.    Gabino Bach MD  [facility]  3/26/2020, 10:36 PM

## 2021-06-07 NOTE — TELEPHONE ENCOUNTER
COVID-19 Visitor Wellness Screening Tool:    Do you have a:    Fever?  no  Cough? no  Shortness of breath? no  Skin rash? no    Within the past 14 days, have you been in contact with someone who:    Is currently being ruled out for COVID- 19 (novel coronavirus)? no  Has tested positive for COVID-19? no  Has symptoms of respiratory illness (fever, cough, shortness of breath)?no    Have you recently traveled to an area with COVID-19 cases? no  Refer to CDC Coronavirus webpage for COVID-19 areas: (www.cdc.gov/coronavirus/2019-ncov)    Within the past 3 weeks, have you been exposed to the following?  Pertussis no  Chickenpox no  Measles no      If patient answers YES, they should be directed to [facility] for further evaluation and IN-CLINIC visit canceled.  If patient answers NO, they can remain scheduled for an IN-CLINIC visit.    Are you bringing a visitor with you? Yes, . He also answered no to all questions  If so there can only be one person with you if needed (if person is with them please ask the same questions).    Pt will be asked the same questions when they come into clinic for their appointment.

## 2021-06-07 NOTE — TELEPHONE ENCOUNTER
Call patient -    She does not need to take furosemide (Lasix).  Refills of the other medications were sent in.    Please have them bring in all their medications at the time of their next visit next week.  Thank you,    Gabino Bach MD  General Internal Medicine  [facility]  4/23/2020, 10:18 PM

## 2021-06-07 NOTE — PATIENT INSTRUCTIONS - HE
1. Follow up next Monday, April 27th, at 1:00 pm.  2. Take clindamycin antibiotics 300 mg three times a day for 14 days.  3. INR next week.  4. Soak left foot in warm water every even numbered day.  5. For left toe:   After soaking the foot, over very lightly with regular gauze or leave open to the air.  6. For left heel:    After soaking the foot, cover with vaseline gauze and then regular gauze and secure with a wrap like ACE bandage.

## 2021-06-07 NOTE — TELEPHONE ENCOUNTER
"Who is calling:  Patient   Reason for Call:  Patient states Gabino Lovell prescribed too many pills for her she is not able to take them every day . Patient is very confused and unable to give medication names to writer and she might not be taking  Medications as directed by provider . Per patient she took today clindamycin 3 tables this morning writer explained four times how to take clindamycin but she is unable to understand patient states \" I didn't got to school to learn all these \"  Patient requested a call from clinic to discuss directly .  Date of last appointment with primary care: 04/27/20  Okay to leave a detailed message: No      "

## 2021-06-07 NOTE — TELEPHONE ENCOUNTER
ANTICOAGULATION  MANAGEMENT PROGRAM    Bernadette Yu is overdue for INR check.     Spoke with Bernadette and patient will have INR checked at next office visit on 4/20.      Mallory Grover, RN

## 2021-06-07 NOTE — PATIENT INSTRUCTIONS - HE
1.  Antibiotic for the wounds.  Amoxicillin take 1 tablet twice a day for 14 days.  We might need to continue this in the future.  2.  Blood work today and we will call you with the results.  3.  Soak feet in warm water every other day for 10 minutes.  4.  Change dressing every other day.  5.  Toes:  Put the bacitracin ointment on it every 2 days and cover.  6.  Heel:  Put DUODERM on every 2 days and cover.  7.  INR today and come back next week to have done.  8.  Stop potassium supplement and we will advise when the blood work comes back.  9.  FOLLOW UP MONDAY, APRIL 6th, at 1:30 pm.

## 2021-06-07 NOTE — PATIENT INSTRUCTIONS - HE
Future Appointments   Date Time Provider Department Center   3/26/2020 11:00 AM Gabino Bach MD [facility]   4/7/2020 11:20 AM Gabino Bach MD [facility]

## 2021-06-07 NOTE — TELEPHONE ENCOUNTER
Pt states she will do a telephone visit on Monday 4/6/20.  Lab appointment scheduled for INR.     Pt thanked for the call.    Unable to schedule pt due to block, will attempt to schedule appointment 4/6/20.

## 2021-06-08 NOTE — PROGRESS NOTES
Spoke with PCP about potential enrollment into [facility]. Patient isn't a good candidate for [facility]. No further action is needed.

## 2021-06-08 NOTE — PROGRESS NOTES
"Assessment: Left 2nd toe ulcer with improving cellulitis in the setting of diabetic neuropathy    Plan: Erythema has improved. Wound looks relatively benign today. Continue with oral antibiotics as prescribed. Follow-up with Dr. Bach on Thursday. Discussed the possibility of osteomyelitis and partial toe amputation. She hopes to avoid that.      Subjective: New patient visit at the [facility] regarding her left 2nd toe. She has had a distal plantar wound for about a month. Redness and swelling flared up over the weekend. She was seen in the walk-in clinic. They marked erythema streaking up past her ankle. She started cephalexin. No side effects there. Erythema has receded to just the toe. She has diminished sensation from her diabetic neuropathy.    Physical Exam:  Visit Vitals     Pulse 76     Temp 97.8  F (36.6  C)     Resp 16     Ht 5' 3\" (1.6 m)     Wt 163 lb (73.9 kg)     BMI 28.87 kg/m2     General: Pleasant 76 y.o. female in no acute distress.  Vascular: DP pulses are diminished. PT pulses are diminished. Pedal hair is diminished. Feet are warm to the touch.  Neuro: Sensation in the feet is diminished per monofilament.  Derm: Erythema in the left 2nd toe. Previously marked area of erythema on the dorsal foot and ankle has resolved. Open wound plantarly on the 2nd toe; 1 cm in diameter with a granular base. No drainage or surrounding hyperkeratosis today. No exposed bone.  Musculoskeletal: Pravin    Medical History  Past Medical History:   Diagnosis Date     Abdominal bloating, chronic 8/21/2016     Anticoagulation goal of INR 2.5 to 3.5 1/27/2016     Anxiety      ASCVD (arteriosclerotic cardiovascular disease)      Carotid artery stenosis, left      DM (diabetes mellitus), type 2 1990     Eczema      Former smoker      Full code status      HLD (hyperlipidemia)      HTN (hypertension)      Hypothyroidism      IBS (irritable bowel syndrome)      Insomnia      Lumbosacral plexopathy      " Meningococcal meningitis Age 14     Mitral stenosis      Normocytic anemia      Paroxysmal atrial fibrillation - treated during surgery in 2015     PN (peripheral neuropathy)      Psoriasis      Recurrent UTI (urinary tract infection)      RLS (restless legs syndrome)      S/P CABG (coronary artery bypass graft) 2016     S/P colonoscopy 07     S/P MVR (mitral valve replacement) - 23 mm [employer] mechanical valve - 2015     Subclavian artery stenosis, left - s/p stent 2015    Surgical History   has a past surgical history that includes Tonsillectomy and adenoidectomy; Appendectomy; Cholecystectomy;  section; Cardiac catheterization (11/12/15); Coronary artery bypass graft; Colonoscopy (N/A, 10/12/2016); Esophagogastroduodenoscopy (N/A, 10/12/2016); Upper gastrointestinal endoscopy; and Colonoscopy (N/A, 10/13/2016).   Social History  Reviewed, and she  reports that she quit smoking about 38 years ago. She quit after 23.00 years of use. She has never used smokeless tobacco. She reports that she drinks about 2.0 oz of alcohol per week  She reports that she does not use illicit drugs.   Allergies  No Known Allergies Family History  family history includes Arthritis in her mother; Colon cancer in her father and son; Diabetes in her father and sister; Heart disease in her brother and mother; Hypertension in her mother; Rectal cancer in her son.      Medications  Current Outpatient Prescriptions   Medication Sig Dispense Refill     cephalexin (KEFLEX) 500 MG capsule Take 1 capsule (500 mg total) by mouth 4 (four) times a day for 10 days. 40 capsule 0     HYDROmorphone (DILAUDID) 4 MG tablet Take 0.5-1 tablets (2-4 mg total) by mouth 4 (four) times a day as needed for pain. For use from 17 to 17. 120 tablet 0     insulin aspart (NOVOLOG) 100 unit/mL injection Inject 40 Units under the skin 3 (three) times a day. 40 mL 11     insulin NPH (NOVOLIN N) 100 unit/mL injection  Inject 40 Units under the skin 3 (three) times a day. 40 mL 11     insulin syringe-needle U-100 1/2 mL 29 Syrg USE AS DIRECTED UP TO SIX TIMES DAILY 500 each 3     LORazepam (ATIVAN) 0.5 MG tablet Take 0.5 mg by mouth 2 (two) times a day as needed for anxiety.       NOVOLIN N 100 unit/mL injection Inject 10 Units under the skin 2 (two) times a day before meals. . 10 mL 0     omeprazole (PRILOSEC) 20 MG capsule Take 1 capsule (20 mg total) by mouth Daily before breakfast. 14 capsule 0     ONETOUCH ULTRA TEST strips TEST THREE TIMES DAILY 300 strip 3     polyethylene glycol (MIRALAX) 17 gram packet Take 17 g by mouth 2 (two) times a day.        warfarin (COUMADIN) 2.5 MG tablet Take 1 tablet PO daily. INR daily. Next INR check 12/23/2016, dose adjustment depending upon INR result. 30 tablet 0     furosemide (LASIX) 40 MG tablet Take 1 tablet (40 mg total) by mouth daily. 1 tablet PO daily x 5 days then increase to twice a day 60 tablet 0     levothyroxine (SYNTHROID, LEVOTHROID) 125 MCG tablet Take 1 tablet (125 mcg total) by mouth daily. 90 tablet 3     No current facility-administered medications for this visit.          Review of Systems:  A 12 point comprehensive review of systems was negative except as noted.

## 2021-06-08 NOTE — TELEPHONE ENCOUNTER
ANTICOAGULATION  MANAGEMENT PROGRAM    Bernadette Yu is overdue for INR check.     Spoke with Aneudy, spouse, and patient will have INR checked at next office visit on 6/18.      Mallory Grover, RN

## 2021-06-08 NOTE — TELEPHONE ENCOUNTER
Informed pt of Dr. Bach's message.    She states an understanding.  Pt questioning if she can drink a glass of wine for a wedding she is going to .    Pt thanked for the call.

## 2021-06-08 NOTE — TELEPHONE ENCOUNTER
Please call patient -    ______________________________________________________________________     Home phone:  [phone] (home)     Cell phone:   Telephone Information:   Mobile [phone]       Other contacts:  Name Home Phone Work Phone Mobile Phone Relationship Lgl Grd   ISHAN VALENTIN [phone]   Spouse    ANDREA VALENTIN [phone]   Child      ______________________________________________________________________     Her blood work shows that she is a little bit dehydrated.  I would recommend that she stop the Lasix at this time.  I just refilled it on May 22, so she should not take this.  She does not need to take the potassium supplement either.    We also discussed holding the metformin at the time of her visit because of the nausea issue.  She can go ahead and do this.    Her markers of inflammation are doing better, suggesting her toe in her foot may be doing better.    Her other blood work is good and there is no other cause for concern at this time.  Try to drink a little extra water (1 to 2 glasses a day more than usual) for the next few weeks.    Please follow up with me again on 6/18/2020 as you have already scheduled.     Gabino Bach MD  [facility]  5/25/2020, 11:02 PM     ______________________________________________________________________    Recent Results (from the past 240 hour(s))   INR   Result Value Ref Range    INR 2.50 (H) 0.90 - 1.10   Basic Metabolic Panel   Result Value Ref Range    Sodium 138 136 - 145 mmol/L    Potassium 4.5 3.5 - 5.0 mmol/L    Chloride 100 98 - 107 mmol/L    CO2 26 22 - 31 mmol/L    Anion Gap, Calculation 12 5 - 18 mmol/L    Glucose 252 (H) 70 - 125 mg/dL    Calcium 9.3 8.5 - 10.5 mg/dL    BUN 31 (H) 8 - 28 mg/dL    Creatinine 1.25 (H) 0.60 - 1.10 mg/dL    GFR MDRD Af Amer 50 (L) >60 mL/min/1.73m2    GFR MDRD Non Af Amer 41 (L) >60 mL/min/1.73m2   C-Reactive Protein(CRP)   Result Value Ref Range    CRP 0.4 0.0 - 0.8 mg/dL    Sedimentation Rate   Result Value Ref Range    Sed Rate 44 (H) 0 - 20 mm/hr   HM1 (CBC with Diff)   Result Value Ref Range    WBC 6.3 4.0 - 11.0 thou/uL    RBC 3.85 3.80 - 5.40 mill/uL    Hemoglobin 11.7 (L) 12.0 - 16.0 g/dL    Hematocrit 35.7 35.0 - 47.0 %    MCV 93 80 - 100 fL    MCH 30.4 27.0 - 34.0 pg    MCHC 32.8 32.0 - 36.0 g/dL    RDW 14.7 (H) 11.0 - 14.5 %    Platelets 148 140 - 440 thou/uL    MPV 13.4 (H) 8.5 - 12.5 fL    Neutrophils % 65 50 - 70 %    Lymphocytes % 20 20 - 40 %    Monocytes % 8 2 - 10 %    Eosinophils % 2 0 - 6 %    Basophils % 1 0 - 2 %    Neutrophils Absolute 4.1 2.0 - 7.7 thou/uL    Lymphocytes Absolute 1.3 0.8 - 4.4 thou/uL    Monocytes Absolute 0.5 0.0 - 0.9 thou/uL    Eosinophils Absolute 0.1 0.0 - 0.4 thou/uL    Basophils Absolute 0.0 0.0 - 0.2 thou/uL       ______________________________________________________________________

## 2021-06-08 NOTE — TELEPHONE ENCOUNTER
Who is calling:  [email or web address]  Reason for Call:  Caller stated they would like to know if Joan Mathews CNP meant to send in an Rx for only #16 because patient usually gets a 30 day supply of #120. Caller also stated the Rx has dates from last month of 5/8/20-6/7/20. Please call [facility] back to clarify this or send over an updated Rx.  Date of last appointment with primary care: 5/22/20  Okay to leave a detailed message: No

## 2021-06-08 NOTE — TELEPHONE ENCOUNTER
ANTICOAGULATION  MANAGEMENT    Assessment     Today's INR result of 2.2 is Subtherapeutic (goal INR of 2.5-3.5)        Pt has no idea what dose of warfarin she took since last week.  Reports some days of 1.5 tablets and some days of 1 tablet - we discussed at length the importance of taking warfarin as instructed, to call if she cannot find her paper where she wrote down her dosing.  Pt is taking her warfarin out of the bottle. She keeps it at her bedside. Did state that managing her medications as a whole has been difficult    No new diet changes affecting INR    No new medication/supplements affecting INR    Continues to tolerate warfarin with no reported s/s of bleeding or thromboembolism     Previous INR was Subtherapeutic    Plan:     Spoke with Bernadette regarding INR result and instructed:     Warfarin Dosing Instructions:  Take 7.5 mg today only then continue current warfarin dose 7.5 mg daily on Sun, Thu; and 5 mg daily rest of week  (0 % change)    Instructed patient to follow up no later than: at 5/22 OV    Education provided: target INR goal and significance of current INR result, importance of following up for INR monitoring at instructed interval, importance of taking warfarin as instructed and Strategies to improve compliance (pillbox, alarm, calendar, etc)    Bernadette verbalizes understanding and agrees to warfarin dosing plan.    Instructed to call the [facility] for any changes, questions or concerns. (#[phone])   ?   Mallory Grover RN    Subjective/Objective:      Bernadette RINALDI Amiee, a 79 y.o. female is on warfarin.     Bernadette reports:     Home warfarin dose: verbally confirmed home dose with Bernadette and updated on anticoagulation calendar     Missed doses: No     Medication changes:  No     S/S of bleeding or thromboembolism:  No     New Injury or illness:  No     Changes in diet or alcohol consumption:  No     Upcoming surgery, procedure or cardioversion:  No    Anticoagulation Episode Summary      Current INR goal:   2.5-3.5   TTR:   39.5 % (11.4 mo)   Next INR check:   5/22/2020   INR from last check:   2.20! (5/13/2020)   Weekly max warfarin dose:      Target end date:      INR check location:      Preferred lab:      Send INR reminders to:   [facility]    Indications    S/P mitral valve replacement (MVR) - mechanical mitral valve placed 2015 [Z95.2]           Comments:            Anticoagulation Care Providers     Provider Role Specialty Phone number    Gabino Bach MD Referring Internal Medicine [phone]

## 2021-06-08 NOTE — PATIENT INSTRUCTIONS - HE
Please follow up if you have any further issues.    You may contact me by phone or [email or web address] if you are worsening or if things are not improving.    Thank you for coming in today.

## 2021-06-08 NOTE — PROGRESS NOTES
Assessment:     1. Cellulitis of foot, left  Culture, Wound    HM2(CBC w/o Differential)    XR Toe Left 2 or More VWS    cephalexin (KEFLEX) 500 MG capsule    Ambulatory referral to Podiatry   2. Type 2 diabetes mellitus without complication, with long-term current use of insulin  Culture, Wound    HM2(CBC w/o Differential)    XR Toe Left 2 or More VWS    cephalexin (KEFLEX) 500 MG capsule    Ambulatory referral to Podiatry   3. Diabetic toe ulcer  cephalexin (KEFLEX) 500 MG capsule    Ambulatory referral to Podiatry     Results for orders placed or performed in visit on 02/04/17   HM2(CBC w/o Differential)   Result Value Ref Range    WBC 6.2 4.0 - 11.0 thou/uL    RBC 3.63 (L) 3.80 - 5.40 mill/uL    Hemoglobin 9.9 (L) 12.0 - 16.0 g/dL    Hematocrit 30.2 (L) 35.0 - 47.0 %    MCV 83 80 - 100 fL    MCH 27.3 27.0 - 34.0 pg    MCHC 32.8 32.0 - 36.0 g/dL    RDW 14.4 11.0 - 14.5 %    Platelets 166 140 - 440 thou/uL    MPV 9.6 7.0 - 10.0 fL     I personally reviewed the xray  Xr Toe Left 2 Or More Vws    Result Date: 2/4/2017  XR TOE LEFT 2 OR MORE VIEWS02/04/2017, 10:36 AMINDICATION: Cellulitis of 2nd toe with DM, open wound.COMPARISON: None.FINDINGS: Soft tissue swelling. Marked osteopenia of the distal phalanx of the toe. Underlying osteomyelitis cannot be excluded.This report was electronically interpreted by: Dr. Sushant Mcnamara MD ON 02/04/2017 at 11:06      Cellulitis with diabetic foot ulcer, but given 4 - 6 wks of open wound with consistently elevated blood glucose, I am concerned of the possibility of osteomyelitis. Will refer to podiatry for recheck on Monday. Spoke with Dr. Danielson about this pt.      Plan:   Diabetic foot ulcer with cellulitis  Cephalexin prescribed  Call INR nurse on Monday to schedule recheck  Soak foot in warm soapy water twice daily for 15 -20 min. Then pat dry gently and apply a topical antibiotic. Apply a clean dressing  Referral  Placed for podiatry, schedule an appt on Monday for  recheck  If you begin to have any fevers or worsening symptoms, go to the ER for further evalution      Subjective:       Bernadette Yu is a 76 y.o. female who presents for evaluation of left 2nd toe infection.  Onset of symptoms was 4-6 weeks ago and was doing well. She had it debrided by PMD on 1/27/19 and was seeming to improve until a couple of days ago. Since then her toe has gotten increasingly red and swollen with mild pain with walking today. She also started to have lower leg pain today and noticed redness extending up the foot. She denies drainage, fever, chills, decreased appetite or myalgia. She has been applying neosporin BID. Her blood glucose has been running 250 - 300, had insulin increased 1/27 since then they have come down to between 200 - 250.    Review of Systems  Pertinent items are noted in HPI.      Objective:        Visit Vitals     /82     Pulse 98     Temp 99.6  F (37.6  C) (Oral)     Resp 20     Wt 163 lb (73.9 kg)     SpO2 97%     BMI 28.87 kg/m2     General appearance: alert, appears stated age and cooperative  Skin: left 2nd toe is entirely erythematous with moderate edema. There is a erythema, warmth and mild edema going up the foot to the lower leg that has been outlined. She has a 1 cm open wound on the bottom of the toe that has scant pinkish, yellow discharge. There is a  scab formed on most of the wound but is soft around the edges.  Neurologic: Grossly normal  Musc: Mild pain with palpation of the toe, foot and lower leg. Ambulates without a limp. Full strength in BLE.

## 2021-06-08 NOTE — TELEPHONE ENCOUNTER
Yes I meant to send a lower number of tablets as I am only covering for Dr. Bach for today and he will be in next week if he is wanting to send additional tablets at that time

## 2021-06-08 NOTE — PATIENT INSTRUCTIONS - HE
Future Appointments   Date Time Provider Department Center   6/5/2020  1:30 PM [facility]   6/18/2020  1:40 PM Gabino Bach MD [facility]

## 2021-06-08 NOTE — TELEPHONE ENCOUNTER
ANTICOAGULATION  MANAGEMENT    Assessment     Today's INR result of 1.6 is Subtherapeutic (goal INR of 2.5-3.5)        More warfarin taken than instructed which may be affecting INR    Decreased greens/vitamin K intake may be affecting INR    No new medication/supplements affecting INR    Continues to tolerate warfarin with no reported s/s of bleeding or thromboembolism     Previous INR was Subtherapeutic    Plan:     Spoke with Bernadette regarding INR result and instructed:     Warfarin Dosing Instructions:  Take 10 mg today only then change warfarin dose to 7.5 mg daily on Sun, Thu; and 5 mg daily rest of week     Instructed patient to follow up no later than: 1 week    Education provided: target INR goal and significance of current INR result, importance of following up for INR monitoring at instructed interval and importance of taking warfarin as instructed    Bernadette verbalizes understanding and agrees to warfarin dosing plan.    Instructed to call the [facility] for any changes, questions or concerns. (#[phone])   ?   Mallory Grover RN    Subjective/Objective:      Bernadette Yu, a 79 y.o. female is on warfarin.     Bernadette reports:     Home warfarin dose: verbally confirmed home dose with Bernadette and updated on anticoagulation calendar     Missed doses: No     Medication changes:  No     S/S of bleeding or thromboembolism:  No     New Injury or illness:  No     Changes in diet or alcohol consumption:  No     Upcoming surgery, procedure or cardioversion:  No    Anticoagulation Episode Summary     Current INR goal:   2.5-3.5   TTR:   41.2 % (11.4 mo)   Next INR check:   5/14/2020   INR from last check:   1.60! (5/7/2020)   Weekly max warfarin dose:      Target end date:      INR check location:      Preferred lab:      Send INR reminders to:   [facility]    Indications    S/P mitral valve replacement (MVR) - mechanical mitral valve placed 2015 [Z95.2]           Comments:            Anticoagulation  Care Providers     Provider Role Specialty Phone number    Gabino Bach MD Referring Internal Medicine [phone]

## 2021-06-08 NOTE — TELEPHONE ENCOUNTER
ANTICOAGULATION  MANAGEMENT    Assessment     Today's INR result of 2.5 is Therapeutic (goal INR of 2.5-3.5)        Warfarin taken as previously instructed    Increased greens/vitamin K intake may be affecting INR    No new medication/supplements affecting INR    Continues to tolerate warfarin with no reported s/s of bleeding or thromboembolism     Previous INR was Subtherapeutic    Plan:     Spoke with Bernadette regarding INR result and instructed:     Warfarin Dosing Instructions:  Continue current warfarin dose 7.5 mg daily on Sun, Thu; and 5 mg daily rest of week  (0 % change)    Instructed patient to follow up no later than: 2 weeks    Education provided: importance of consistent vitamin K intake, target INR goal and significance of current INR result, importance of following up for INR monitoring at instructed interval and importance of taking warfarin as instructed    Bernadette verbalizes understanding and agrees to warfarin dosing plan.    Instructed to call the [facility] for any changes, questions or concerns. (#[phone])   ?   Mallory Grover RN    Subjective/Objective:      Bernadette Yu, a 79 y.o. female is on warfarin.     Bernadette reports:     Home warfarin dose: verbally confirmed home dose with Bernadette and updated on anticoagulation calendar     Missed doses: No     Medication changes:  No     S/S of bleeding or thromboembolism:  No     New Injury or illness:  No     Changes in diet or alcohol consumption:  Yes, ate more broccoli salad than usual     Upcoming surgery, procedure or cardioversion:  No    Anticoagulation Episode Summary     Current INR goal:   2.5-3.5   TTR:   36.9 % (11.4 mo)   Next INR check:   5/22/2020   INR from last check:   2.50 (5/22/2020)   Weekly max warfarin dose:      Target end date:      INR check location:      Preferred lab:      Send INR reminders to:   [facility]    Indications    S/P mitral valve replacement (MVR) - mechanical mitral valve placed 2015  [Z95.2]           Comments:            Anticoagulation Care Providers     Provider Role Specialty Phone number    Gabino Bach MD Referring Internal Medicine [phone]

## 2021-06-08 NOTE — TELEPHONE ENCOUNTER
Can have 1 glass of wine.    Gabino Bach MD  General Internal Medicine  [facility]  5/28/2020, 8:57 AM

## 2021-06-08 NOTE — TELEPHONE ENCOUNTER
Left message to call back.    If pt calls back please inform pt of Dr. Bach's message.    Can have 1 glass of wine.     Gabino Bach MD  General Internal Medicine  [facility]  5/28/2020, 8:57 AM

## 2021-06-08 NOTE — PROGRESS NOTES
Discussed results of wound culture with pt. Instructed to stop cephalexin and start bactrim. Keep scheduled f/u with Dr. Bach.

## 2021-06-08 NOTE — TELEPHONE ENCOUNTER
Last Office Visit  5/22/2020 Gabino Bach MD    Notes:    We first reviewed her heel ulcer.  This has gotten smaller.  Its not as tender.  She is avoiding putting a lot of direct pressure on it.  She is generally feeling better related to this.     We reviewed her left toe ulcer and likely osteomyelitis.  She continues on the clindamycin without any major issues.  She denies any diarrheal issues.     We reviewed her diabetes.  She is noticing feeling nauseated after taking her medications.  She would like to see if there is anything else she can do related to this.  We talked about maybe trying off the metformin in case this was causing the problem.     We reviewed her high blood pressure and her blood pressure is doing well.     She needs her INR done for her valve replacement.     Last Filled:  HYDROmorphone (DILAUDID) 4 MG tablet  120 tablet  0  5/8/2020 6/7/2020  No    Sig - Route: Take 0.5-1 tablets (2-4 mg total) by mouth 4 (four) times a day as needed for pain. For use from 5/8/2020 to 6/7/2020. - Oral    Sent to pharmacy as: HYDROmorphone 4 mg tablet (DILAUDID)    Earliest Fill Date: 5/8/2020    Notes to Pharmacy: Please put the dates of use or prescription limits on the patient's bottle to ensure appropriate use.    E-Prescribing Status: Receipt confirmed by pharmacy (5/8/2020  2:02 PM CDT)          Lab Results   Component Value Date    AMPHET Screen Negative 10/26/2019    HEBENZODIAZ Screen Negative 10/26/2019    OPIATES (!) 10/26/2019     Screen Positive (Confirmation available on request)    PCP Screen Negative 10/26/2019    THC Screen Negative 10/26/2019    BARBIT Screen Negative 10/26/2019    COCAINEMETAB Screen Negative 10/26/2019    METHADNE Screen Negative 04/06/2018    OXYCODONE Screen Negative 10/26/2019    CREAUR 69.5 10/26/2019         Next OV:  6/18/2020 Gabino Bach MD      Encounter-Level CSA Scan Date - 08/01/2018:    Scan on 8/6/2018  8:40 AM: CHRONIC PAIN MANAGEMENT            Encounter-Level CSA Scan Date - 01/30/2018:    Scan on 2/1/2018 12:13 PM: HE -           Encounter-Level CSA Scan Date - 02/09/2017:    Scan on 2/10/2017  3:16 PM  SCAN ON 2/14/2020        reviewed and results are as follows:    Not a delegate for PCP    Medication teed up for provider signature - please adjust as appropriate.

## 2021-06-08 NOTE — TELEPHONE ENCOUNTER
María Elena Shah, CMA       Pt is out of the medication      Controlled Substance Refill Request  Medication Name:   Requested Prescriptions     Pending Prescriptions Disp Refills     HYDROmorphone (DILAUDID) 4 MG tablet 120 tablet 0     Sig: Take 0.5-1 tablets (2-4 mg total) by mouth 4 (four) times a day as needed for pain. For use from 5/8/2020 to 6/7/2020.     Date Last Fill: 5/8/2020  Is patient out of medication?:  Yes  Patient notified refills processed within 3 business days:  Yes  [facility]ed Pharmacy: [facility]  Submit electronically to pharmacy  Controlled Substance Agreement on file:   Encounter-Level CSA Scan Date - 08/01/2018:    Scan on 8/6/2018  8:40 AM: CHRONIC PAIN MANAGEMENT           Encounter-Level CSA Scan Date - 01/30/2018:    Scan on 2/1/2018 12:13 PM: HE -           Encounter-Level CSA Scan Date - 02/09/2017:    Scan on 2/10/2017  3:16 PM        Last office visit:  5/22/2020

## 2021-06-09 NOTE — TELEPHONE ENCOUNTER
ANTICOAGULATION  MANAGEMENT    Assessment     7/17's INR result of 2.4 is Subtherapeutic (goal INR of 2.5-3.5)        Warfarin recently held as instructed which may be affecting INR    No new diet changes affecting INR    No new medication/supplements affecting INR    Continues to tolerate warfarin with no reported s/s of bleeding or thromboembolism     Previous INR was Supratherapeutic    Plan:     Spoke with Bernadette regarding INR result and instructed:     Warfarin Dosing Instructions:  Continue current warfarin dose 5 mg daily.    Instructed patient to follow up no later than: OV on 7/23.    Education provided: importance of taking warfarin as instructed    Bernadette verbalizes understanding and agrees to warfarin dosing plan.    Instructed to call the [facility] for any changes, questions or concerns. (#[phone])   ?   To Burgos RN    Subjective/Objective:      Bernadette NIMCO Yu, a 79 y.o. female is on warfarin.     Bernadette reports:     Home warfarin dose: verbally confirmed home dose with Bernadette and updated on anticoagulation calendar     Missed doses: No     Medication changes:  No     S/S of bleeding or thromboembolism:  No     New Injury or illness:  No     Changes in diet or alcohol consumption:  No     Upcoming surgery, procedure or cardioversion:  No    Anticoagulation Episode Summary     Current INR goal:   2.5-3.5   TTR:   29.5 % (11.3 mo)   Next INR check:   7/23/2020   INR from last check:   2.40! (7/17/2020)   Weekly max warfarin dose:      Target end date:      INR check location:      Preferred lab:      Send INR reminders to:   [facility]    Indications    S/P mitral valve replacement (MVR) - mechanical mitral valve placed 2015 [Z95.2]           Comments:            Anticoagulation Care Providers     Provider Role Specialty Phone number    Gabino Bach MD Referring Internal Medicine [phone]

## 2021-06-09 NOTE — TELEPHONE ENCOUNTER
ANTICOAGULATION  MANAGEMENT    Assessment     Today's INR result of 3.6 is Supratherapeutic (goal INR of 2.5-3.5)        Warfarin taken as previously instructed    No new diet changes affecting INR interested in eating more green    No new medication/supplements affecting INR    Continues to tolerate warfarin with no reported s/s of bleeding or thromboembolism     Previous INR was Subtherapeutic    Plan:     Spoke with Bernadette regarding INR result and instructed:     Warfarin Dosing Instructions:  Continue current warfarin dose 5 mg daily , add one vit k serving weekly    Instructed patient to follow up no later than: two weeks    Bernadette verbalizes understanding and agrees to warfarin dosing plan.    Instructed to call the [facility] for any changes, questions or concerns. (#[phone])   ?   Ninfa Cordon RN    Subjective/Objective:      Bernadette Yu, a 79 y.o. female is on warfarin.     Bernadette reports:     Home warfarin dose: as updated on anticoagulation calendar per template     Missed doses: No     Medication changes:  No     S/S of bleeding or thromboembolism:  No     New Injury or illness:  No     Changes in diet or alcohol consumption:  No     Upcoming surgery, procedure or cardioversion:  No    Anticoagulation Episode Summary     Current INR goal:   2.5-3.5   TTR:   30.7 % (11.4 mo)   Next INR check:   8/6/2020   INR from last check:   3.60! (7/23/2020)   Weekly max warfarin dose:      Target end date:      INR check location:      Preferred lab:      Send INR reminders to:   [facility]    Indications    S/P mitral valve replacement (MVR) - mechanical mitral valve placed 2015 [Z95.2]           Comments:            Anticoagulation Care Providers     Provider Role Specialty Phone number    Gabino Bach MD Referring Internal Medicine [phone]

## 2021-06-09 NOTE — PROGRESS NOTES
Received a call from [facility] lab requesting for INR standing order for the patient.      INR standing order entered.    Radha Akers RN

## 2021-06-09 NOTE — PATIENT INSTRUCTIONS - HE
Future Appointments   Date Time Provider Department Center   7/2/2020 11:30 AM [facility]   7/23/2020  2:15 PM Gabino Bach MD [facility]         Speech Pathology bedside swallow evaluation/discharge Patient: Day Valenzuela (8 y.o. female) Date: 3/25/2019 Primary Diagnosis: SOB (shortness of breath) [R06.02] Precautions:   Fall(fluctuating confusion) ASSESSMENT : 
Based on the objective data described below, the patient presents with [facility] [place] oropharyngeal swallow. Patient with prolonged mastication and delayed posterior propulsion, however this was overall WFL. No overt s/s aspiration observed, even with successive straw sips. Note MRI showed likely punctate acute ischemic focus, right cerebral white matter. Per neurologist, unlikely that new infarct accounts for her presentation wholly. AMS likely multifactorial metabolic in origin. Skilled acute therapy provided by a speech-language pathologist is not indicated at this time. PLAN : 
Recommendations: 
--Continue soft solid/thin liquid diet Discharge Recommendations: None SUBJECTIVE:  
Patient stated You're nice.  Patient alert, cooperative, confused. Oriented to person, place, situation, and month. Disoriented to year. OBJECTIVE:  
 
Past Medical History:  
Diagnosis Date  CAD (coronary artery disease)  Diabetes ([facility] [address]  Hypertension Past Surgical History:  
Procedure Laterality Date  HX KNEE REPLACEMENT Bilateral   
 HX ORTHOPAEDIC    
 IR INSERT TUNL CVC W/O PORT OVER 5 YR  3/5/2019 Prior Level of Function/Home Situation:  
Home Situation Home Environment: Private residence # Steps to Enter: 0 One/Two Story Residence: Two story Living Alone: Yes Support Systems: Child(jonn) Patient Expects to be Discharged to[de-identified] Private residence Current DME Used/Available at Home: Cane, straight, Walker, rolling Diet prior to admission:  
Current Diet:  Soft solid/thin Cognitive and Communication Status: 
Neurologic State: Alert, Confused Orientation Level: Oriented to person, Oriented to place, Oriented to situation, Other (Comment)(oriented to month, not year) Cognition: Decreased command following, Decreased attention/concentration Perception: Appears intact Perseveration: No perseveration noted Safety/Judgement: Awareness of environment Oral Assessment: 
Oral Assessment Labial: No impairment Dentition: Natural 
Oral Hygiene: moist oral mucosa Lingual: No impairment Velum: Unable to visualize Mandible: No impairment P.O. Trials: 
Patient Position: upright in bed Vocal quality prior to P.O.: No impairment Consistency Presented: Thin liquid; Solid; Other (comment)(breakfast tray) How Presented: Self-fed/presented;Cup/sip;Spoon;Straw;Successive swallows Bolus Acceptance: No impairment Bolus Formation/Control: Impaired Type of Impairment: Delayed;Mastication Propulsion: Delayed (# of seconds) Oral Residue: None Initiation of Swallow: No impairment Laryngeal Elevation: Functional 
Aspiration Signs/Symptoms: None Pharyngeal Phase Characteristics: No impairment, issues, or problems Effective Modifications: Alternate liquids/solids Cues for Modifications: None Oral Phase Severity: Mild Pharyngeal Phase Severity : No impairment NOMS:  
The NOMS functional outcome measure was used to quantify this patient's level of swallowing impairment. Based on the NOMS, the patient was determined to be at level 6 for swallow function [facility] Swallowing Levels: 
Level 1 (CN): NPO Level 2 (CM): NPO but takes consistency in therapy Level 3 (CL): Takes less than 50% of nutrition p.o. and continues with nonoral feedings; and/or safe with mod cues; and/or max diet restriction Level 4 (CK): Safe swallow but needs mod cues; and/or mod diet restriction; and/or still requires some nonoral feeding/supplements Level 5 (CJ): Safe swallow with min diet restriction; and/or needs min cues Level 6 (CI): Independent with p.o.; rare cues; usually self cues; may need to avoid some foods or needs extra time Level 7 (CH): Independent for all p.o. CHUCHO. (2003). National Outcomes Measurement System (NOMS): Adult Speech-Language Pathology User's Guide. Pain: 
Pain Scale 1: Numeric (0 - 10) Pain Intensity 1: 0 After treatment:  
[] Patient left in no apparent distress sitting up in chair 
[x] Patient left in no apparent distress in bed 
[x] Call bell left within reach [x] Nursing notified 
[] Caregiver present 
[] Bed alarm activated COMMUNICATION/EDUCATION:  
The patients plan of care including findings, recommendations, and recommended diet changes were discussed with: Registered Nurse. [x] Patient/family have participated as able and agree with findings and recommendations. [] Patient is unable to participate in plan of care at this time. Thank you for this referral. 
Carolyn Chew, SLP Time Calculation: 15 mins

## 2021-06-09 NOTE — TELEPHONE ENCOUNTER
Provider Review: Anticoagulation Annual Referral Renewal    ACM Renewal Decision:  Renew ACM warfarin management      INR Range:   Continue management at current INR goal   Anticipated Duration of Therapy (from today):  Long-term anticoagulation      Gabino Bach MD  9:32 AM

## 2021-06-09 NOTE — TELEPHONE ENCOUNTER
ANTICOAGULATION  MANAGEMENT    Assessment     Today's INR result of 5.2 is Supratherapeutic (goal INR of 2.5-3.5)        Missed dose(s) may be affecting INR - ran out and didn't take on Thur/Fri last week    No new diet changes affecting INR    No new medication/supplements affecting INR    Continues to tolerate warfarin with no reported s/s of bleeding or thromboembolism     Previous INR was Supratherapeutic    Plan:     Spoke with Bernadette regarding INR result and instructed:     Warfarin Dosing Instructions:  Hold warfarin today and tomorrow then change warfarin dose to 5 mg daily   (12.5 % change)    Instructed patient to follow up no later than: 1 week    Education provided: target INR goal and significance of current INR result, importance of following up for INR monitoring at instructed interval, importance of taking warfarin as instructed, importance of notifying clinic for changes in medications, monitoring for bleeding signs and symptoms and importance of notifying clinic for diarrhea, nausea/vomiting, reduced intake and/or illness    Bernadette verbalizes understanding and agrees to warfarin dosing plan.    Instructed to call the [facility] for any changes, questions or concerns. (#[phone])   ?   Mallory Grover RN    Subjective/Objective:      Bernadette Yu, a 79 y.o. female is on warfarin.     Bernadette reports:     Home warfarin dose: verbally confirmed home dose with Bernadette and updated on anticoagulation calendar     Missed doses: yes, last thur/fri     Medication changes:  No     S/S of bleeding or thromboembolism:  No     New Injury or illness:  No     Changes in diet or alcohol consumption:  No     Upcoming surgery, procedure or cardioversion:  No    Anticoagulation Episode Summary     Current INR goal:   2.5-3.5   TTR:   30.1 % (11.4 mo)   Next INR check:   7/20/2020   INR from last check:   5.20! (7/13/2020)   Weekly max warfarin dose:      Target end date:      INR check location:      Preferred  lab:      Send INR reminders to:   APBLO MITCHELL    Indications    S/P mitral valve replacement (MVR) - mechanical mitral valve placed 2015 [Z95.2]           Comments:            Anticoagulation Care Providers     Provider Role Specialty Phone number    Gabino Bach MD Referring Internal Medicine [phone]

## 2021-06-09 NOTE — TELEPHONE ENCOUNTER
Anticoagulation Management    Unable to reach Bernadette today. After several attempts her spouse answered the phone and states she is not home. Inquired if I could reach her on her cell #, spouse states no. Inquired if she will be home by 6 pm so I could try back later and he states no. Spouse offered to take a message for Bernadette but cannot confirm warfarin dose taken.    Today's INR result of 2.4 is Subtherapeutic (goal INR of 2.5-3.5).     Follow up required to confirm warfarin doses taken.    Left message with spouse, recommended for patient to keep taking whatever warfarin dose she is taking right now and please call ACN on Monday.       ACN to follow up    Cathy Spicer RN

## 2021-06-09 NOTE — TELEPHONE ENCOUNTER
Anticoagulation Management    Unable to reach Bernadette today.    Today's INR result of 3.9 is Supratherapeutic (goal INR of 2.5-3.5).     Follow up required to discuss dosing instructions and confirm understanding of instructions.    Left message to take reduced dose of warfarin, 5 mg tonight.       ACN to follow up    To Burgos RN

## 2021-06-09 NOTE — TELEPHONE ENCOUNTER
"Anticoagulation Annual Referral Renewal Review    Bernadette Yu's chart reviewed for annual renewal of referral to anticoagulation monitoring.        Criteria for anticoagulation nurse and/or pharmacist renewal met   Warfarin indication: Atrial Fibrillation, Atrial Flutter and Mechanical MVR Yes, per indication   Current with INR monitoring/compliant Yes Yes   Date of last office visit 6/18/2020 Yes, had office visit within last year   Time in Therapeutic Range (TTR) 29.5 % No, TTR < 60 %       Bernadette Yu did NOT meet all criteria for anticoagulation management program initiated renewal and requires provider review. Using dot phrase, \".acmrenewalprovider\", please advise if [facility] anticoagulation management referral should be renewed or if patient should be seen in office to review anticoagulation therapy      Mallory Grover RN  10:32 AM      "

## 2021-06-09 NOTE — PATIENT INSTRUCTIONS - HE
Future Appointments   Date Time Provider Department Center   8/6/2020  1:00 PM [facility]   9/1/2020  1:50 PM Gabino Bach MD [facility]

## 2021-06-09 NOTE — TELEPHONE ENCOUNTER
Medication Request  Medication name: Test strips and lancets for Accucheck Felicia  Requested [facility]acy: [facility]  Reason for request: Pt had meter replaced with generic replacement; now needing the supplies to be compatible.  When did you use medication last?:  N/A  Patient offered appointment:  N/A - electronic request  Okay to leave a detailed message: yes

## 2021-06-10 NOTE — PROGRESS NOTES
Wt Readings from Last 20 Encounters:   04/25/17 166 lb (75.3 kg)   03/14/17 162 lb (73.5 kg)   02/17/17 159 lb 6.4 oz (72.3 kg)   02/09/17 166 lb (75.3 kg)   02/06/17 163 lb (73.9 kg)   02/04/17 163 lb (73.9 kg)   01/27/17 159 lb 3.2 oz (72.2 kg)   01/03/17 163 lb 9.6 oz (74.2 kg)   12/27/16 166 lb 9.6 oz (75.6 kg)   12/23/16 171 lb 14.4 oz (78 kg)   12/06/16 168 lb 3.2 oz (76.3 kg)   11/28/16 168 lb 12.8 oz (76.6 kg)   11/22/16 171 lb 12.8 oz (77.9 kg)   11/07/16 167 lb 6.4 oz (75.9 kg)   10/24/16 162 lb (73.5 kg)   10/19/16 171 lb 8 oz (77.8 kg)   09/13/16 162 lb (73.5 kg)   08/19/16 163 lb 6.4 oz (74.1 kg)   06/29/16 162 lb (73.5 kg)   06/10/16 161 lb (73 kg)

## 2021-06-10 NOTE — TELEPHONE ENCOUNTER
ANTICOAGULATION  MANAGEMENT    Assessment     Today's INR result of 2.8 is Therapeutic (goal INR of 2.5-3.5)        Warfarin taken as previously instructed    No new diet changes affecting INR    No new medication/supplements affecting INR    Continues to tolerate warfarin with no reported s/s of bleeding or thromboembolism     Previous INR was Supratherapeutic    Plan:     Left detailed message for Bernadette regarding INR result and instructed:     Warfarin Dosing Instructions:  Continue current warfarin dose 2.5 mg daily on mon/thu; and 5 mg daily rest of week  (0 % change)    Instructed patient to follow up no later than: two weeks with abel Fleming verbalizes understanding and agrees to warfarin dosing plan.    Instructed to call the [facility] for any changes, questions or concerns. (#[phone])   ?   Ninfa Cordon RN    Subjective/Objective:      Bernadette NIMCO Yu, a 79 y.o. female is on warfarin.     Bernadette reports:     Home warfarin dose: as updated on anticoagulation calendar per template     Missed doses: No     Medication changes:  No     S/S of bleeding or thromboembolism:  No     New Injury or illness:  No     Changes in diet or alcohol consumption:  No     Upcoming surgery, procedure or cardioversion:  No    Anticoagulation Episode Summary     Current INR goal:   2.5-3.5   TTR:   28.0 % (11.5 mo)   Next INR check:   9/1/2020   INR from last check:   2.80 (8/18/2020)   Weekly max warfarin dose:      Target end date:      INR check location:      Preferred lab:      Send INR reminders to:   PABLO MITCHELL    Indications    S/P mitral valve replacement (MVR) - mechanical mitral valve placed 2015 [Z95.2]           Comments:            Anticoagulation Care Providers     Provider Role Specialty Phone number    Gabino Bach MD Referring Internal Medicine [phone]

## 2021-06-10 NOTE — TELEPHONE ENCOUNTER
ANTICOAGULATION  MANAGEMENT    Assessment     Today's INR result of 4.2 is Supratherapeutic (goal INR of 2.5-3.5)        Warfarin taken as previously instructed    No new diet changes affecting INR    No new medication/supplements affecting INR    Continues to tolerate warfarin with no reported s/s of bleeding or thromboembolism     Previous INR was Supratherapeutic    Plan:     Left detailed message for Bernadette regarding INR result and instructed:     Warfarin Dosing Instructions:  Change warfarin dose to 2.5 mg daily on Mon/Thur; and 5 mg daily rest of week  (14 % change)    Instructed patient to follow up no later than: 2 weeks    Education provided: target INR goal and significance of current INR result        Instructed to call the [facility] for any changes, questions or concerns. (#[phone])   ?   Cathy Spicer RN    Subjective/Objective:      Bernadette RINALDI Devoracristhian, a 79 y.o. female is on warfarin.     Bernadette reports:     Home warfarin dose: as updated on anticoagulation calendar per template     Missed doses: No     Medication changes:  No     S/S of bleeding or thromboembolism:  No     New Injury or illness:  No     Changes in diet or alcohol consumption:  No     Upcoming surgery, procedure or cardioversion:  No    Anticoagulation Episode Summary     Current INR goal:   2.5-3.5   TTR:   28.9 % (11.4 mo)   Next INR check:   8/20/2020   INR from last check:   4.20! (8/6/2020)   Weekly max warfarin dose:      Target end date:      INR check location:      Preferred lab:      Send INR reminders to:   PABLO MITCHELL    Indications    S/P mitral valve replacement (MVR) - mechanical mitral valve placed 2015 [Z95.2]           Comments:            Anticoagulation Care Providers     Provider Role Specialty Phone number    Gabino Bach MD Referring Internal Medicine [phone]

## 2021-06-11 NOTE — TELEPHONE ENCOUNTER
ANTICOAGULATION  MANAGEMENT    Assessment     Today's INR result of 2.5 is Therapeutic (goal INR of 2.5-3.5)        Warfarin taken as previously instructed    No new diet changes affecting INR    No new medication/supplements affecting INR    Continues to tolerate warfarin with no reported s/s of bleeding or thromboembolism     Previous INR was Subtherapeutic    Plan:     Spoke on phone with Bernadette regarding INR result and instructed:     Warfarin Dosing Instructions:  Continue current warfarin dose 5 mg daily (0 % change)    Instructed patient to follow up no later than: 2 weeks    Education provided: target INR goal and significance of current INR result, importance of taking warfarin as instructed, importance of notifying clinic for changes in medications and importance of notifying clinic for diarrhea, nausea/vomiting, reduced intake and/or illness    Bernadette verbalizes understanding and agrees to warfarin dosing plan.    Instructed to call the [facility] for any changes, questions or concerns. (#[phone])   ?   Mallory Grover RN    Subjective/Objective:      Bernadette Yu, a 80 y.o. female is on warfarin.     Bernadette reports:     Home warfarin dose: verbally confirmed home dose with Bernadette and updated on anticoagulation calendar     Missed doses: No     Medication changes:  No     S/S of bleeding or thromboembolism:  No     New Injury or illness:  No     Changes in diet or alcohol consumption:  No     Upcoming surgery, procedure or cardioversion:  No    Anticoagulation Episode Summary     Current INR goal:   2.5-3.5   TTR:   28.4 % (11.4 mo)   Next INR check:   10/13/2020   INR from last check:   2.50 (9/29/2020)   Weekly max warfarin dose:      Target end date:      INR check location:      Preferred lab:      Send INR reminders to:   PABLO MITCHELL    Indications    S/P mitral valve replacement (MVR) - mechanical mitral valve placed 2015 [Z95.2]           Comments:            Anticoagulation Care  Providers     Provider Role Specialty Phone number    Gabino Bach MD Referring Internal Medicine [phone]

## 2021-06-11 NOTE — TELEPHONE ENCOUNTER
Called and relayed message to patient. Patient stated understanding and they will call the pharmacy and thanked for the call

## 2021-06-11 NOTE — TELEPHONE ENCOUNTER
ANTICOAGULATION  MANAGEMENT    Assessment     Today's INR result of 2.0 is Subtherapeutic (goal INR of 2.5-3.5)        Less warfarin taken than instructed which may be affecting INR    Decreased greens/vitamin K intake may be affecting INR    No new medication/supplements affecting INR    Continues to tolerate warfarin with no reported s/s of bleeding or thromboembolism     Previous INR was Subtherapeutic    Plan:     Spoke on phone with Bernadette regarding INR result and instructed:     Warfarin Dosing Instructions:  Take 7.5 mg today only then change warfarin dose to 5 mg daily   (7.7 % change)    Instructed patient to follow up no later than: 2 weeks    Education provided: impact of vitamin K foods on INR, target INR goal and significance of current INR result, importance of notifying clinic for changes in medications and importance of notifying clinic for diarrhea, nausea/vomiting, reduced intake and/or illness    Bernadette verbalizes understanding and agrees to warfarin dosing plan.    Instructed to call the [facility] for any changes, questions or concerns. (#[phone])   ?   Mallory Grover RN    Subjective/Objective:      Bernadette Yu, a 80 y.o. female is on warfarin.     Bernadette reports:     Home warfarin dose: verbally confirmed home dose with Bernadette and updated on anticoagulation calendar     Missed doses: No     Medication changes:  No     S/S of bleeding or thromboembolism:  No     New Injury or illness:  No     Changes in diet or alcohol consumption:  Yes: less greens, hasn't been able to get to the grocery store     Upcoming surgery, procedure or cardioversion:  No    Anticoagulation Episode Summary     Current INR goal:   2.5-3.5   TTR:   28.4 % (11.4 mo)   Next INR check:   9/29/2020   INR from last check:   2.00! (9/15/2020)   Weekly max warfarin dose:      Target end date:      INR check location:      Preferred lab:      Send INR reminders to:   PABLO MITCHELL    Indications    S/P mitral  valve replacement (MVR) - mechanical mitral valve placed 2015 [Z95.2]           Comments:            Anticoagulation Care Providers     Provider Role Specialty Phone number    Gabino Bach MD Referring Internal Medicine [phone]

## 2021-06-11 NOTE — PATIENT INSTRUCTIONS - HE
Please follow up if you have any further issues.    You may contact me by phone or MyChart if you are worsening or if things are not improving.    ______________________________________________________________________     Please remember that you can call [phone] to schedule an appointment with me.     You can schedule appointments 24 hours a day, 7 days a week.  Sometimes the best time to schedule an appointment is after clinic hours when less people are calling in.  Weekends are another option for calling in to schedule appointments.    Things have been very chaotic with the coronovirus outbreak, but currently I am seeing patients over the phone, over a video chat, or in person.  I should be able to be see patients face to face if desired.    It was nice to talk with you today.  Thank you for your patience during these unusual times.

## 2021-06-11 NOTE — TELEPHONE ENCOUNTER
[facility] / Gabino Bach     Reason for call;  [facility] called , Pt need refill on both insulins and out today .   1)   NOVOLOG U-100 INSULIN ASPART 100 unit/mL injection    3/5/2020   Yes    Sig: INJECT 40 UNITS UNDER THE SKIN TID     2) and  Need change to NPH novolin for insurance coverage   U-100 INSULIN) 100 unit/mL injection  72 mL  3  4/23/2020 4/23/2021  No    Sig - Route: Inject 40 Units under the skin 2 (two) times a day. - Subcutaneous       Recommendation / teaching ; [facility] to assist with  Insulin refills.      Caller Verbalizes understanding and denies further questions and will call back if further symptoms to triage or questions  .  Azucena Temple RN  - [place] Nurse Advisor

## 2021-06-11 NOTE — TELEPHONE ENCOUNTER
ANTICOAGULATION  MANAGEMENT    Assessment     Yesterday's INR result of 2.3 is Subtherapeutic (goal INR of 2.5-3.5)        Warfarin taken as previously instructed    No new diet changes affecting INR    No new medication/supplements affecting INR    Continues to tolerate warfarin with no reported s/s of bleeding or thromboembolism     Previous INR was Therapeutic    Plan:     Spoke on phone with Bernadette regarding INR result and instructed:     Warfarin Dosing Instructions:  Change warfarin dose to 2.5 mg daily on Mon; and 5 mg daily rest of week  (8.3 % change)    Instructed patient to follow up no later than: 2 weeks    Education provided: target INR goal and significance of current INR result, importance of following up for INR monitoring at instructed interval, importance of taking warfarin as instructed, importance of notifying clinic for changes in medications and importance of notifying clinic for diarrhea, nausea/vomiting, reduced intake and/or illness    Bernadette verbalizes understanding and agrees to warfarin dosing plan.    Instructed to call the [facility] for any changes, questions or concerns. (#[phone])   ?   Mallory Grover RN    Subjective/Objective:      Bernadette Yu, a 79 y.o. female is on warfarin.     Bernadette reports:     Home warfarin dose: template incorrect; verbally confirmed home dose with Bernadette and updated on anticoagulation calendar     Missed doses: No     Medication changes:  No     S/S of bleeding or thromboembolism:  No     New Injury or illness:  No     Changes in diet or alcohol consumption:  No     Upcoming surgery, procedure or cardioversion:  No    Anticoagulation Episode Summary     Current INR goal:   2.5-3.5   TTR:   30.6 % (11.4 mo)   Next INR check:   9/15/2020   INR from last check:   2.30! (9/1/2020)   Weekly max warfarin dose:      Target end date:      INR check location:      Preferred lab:      Send INR reminders to:   PABLO MITCHELL    Indications    S/P  mitral valve replacement (MVR) - mechanical mitral valve placed 2015 [Z95.2]           Comments:            Anticoagulation Care Providers     Provider Role Specialty Phone number    Gabion Bach MD Referring Internal Medicine [phone]

## 2021-06-11 NOTE — TELEPHONE ENCOUNTER
Central PA team  [phone]  Pool: HE PA MED ([id number])          PA has been initiated.       PA form completed and faxed insurance via Cover My Meds     Key:  [id number] - PA Case ID: [id number]     Medication:  Temazepam 15MG capsules    Insurance:  [employer]        Response will be received via fax and may take up to 5-10 business days depending on plan

## 2021-06-11 NOTE — TELEPHONE ENCOUNTER
Prior Authorization Request  Who s requesting:  Pharmacy  Pharmacy Name and Location: [facility]  Medication Name: temazepam (RESTORIL) 15 mg capsule   Insurance Plan: n/a  Insurance Member ID Number:  [id number]   Plan phone # [phone]  CoverMyMeds Key: N/A  Informed patient that prior authorizations can take up to 10 business days for response:   NA  Okay to leave a detailed message: No

## 2021-06-11 NOTE — TELEPHONE ENCOUNTER
Done.    Gabino Bach MD  General Internal Medicine  [facility]  9/10/2020, 11:48 PM

## 2021-06-11 NOTE — TELEPHONE ENCOUNTER
Medication Request  Medication name: NOVOLOG U-100 INSULIN ASPART 100 unit/mL injection  Requested [facility]acy: [facility]  Reason for request: Historical  When did you use medication last?:  N/A  Patient offered appointment:  N/A - electronic request  Okay to leave a detailed message: yes

## 2021-06-11 NOTE — TELEPHONE ENCOUNTER
Please call patient -    ______________________________________________________________________     Home phone:  [phone] (home)     Cell phone:   Telephone Information:   Mobile [phone]       Other contacts:  Name Home Phone Work Phone Mobile Phone Relationship Lgl Grd   JOYCELYNISHAN WORLEY [phone]   Spouse    ANDREA VALENTIN [phone]   Child      ______________________________________________________________________     Her  had left me a message last Friday with a question about her medications.  Please see what he had questions about or what she had questions about.    Gabino Bach MD  [facility]  9/13/2020, 10:43 PM

## 2021-06-11 NOTE — PROGRESS NOTES
______________________________________________________________________    QUALITY REVIEW      Health Maintenance Due   Topic Date Due     DIABETES OPHTHALMOLOGY EXAM  03/02/2017     FALL RISK ASSESSMENT  06/29/2017       History   Smoking Status     Former Smoker     Years: 23.00     Quit date: 11/13/1978   Smokeless Tobacco     Never Used     Comment: Was a social smoker        PHQ-2 Total Score: 1 (7/6/2017  1:00 PM)  No Data Recorded     ______________________________________________________________________

## 2021-06-11 NOTE — TELEPHONE ENCOUNTER
Let them know I sent in a new prescription.  This is odd.    Anyway, I will try to resubmit it.  It is possible that the insurance will not cover this prescription and they may have to pay for this one out of pocket.    Check with the pharmacy first.    Gabino Bach MD  General Internal Medicine  [facility]  9/14/2020, 2:52 PM

## 2021-06-11 NOTE — TELEPHONE ENCOUNTER
Pharmacy calling, needs Novolin instead because of insurance. Has active recent script for Humulin.    Most recent script for fast acting is historical but pharmacy says previous scripts from Dr. Bach had similar directions of 40 units three times daily.     Needs new script for both as is out.       From 7/23/2020 note from Dr. Bach stating Diabetes is stable.   insulin NPH (HUMULIN N NPH U-100 INSULIN) 100 unit/mL injection Inject 40 Units under the skin 2 (two) times a day.         NOVOLOG U-100 INSULIN ASPART 100 unit/mL injection INJECT 40 UNITS UNDER THE SKIN TID       Spoke with jennifer Johnston to fill one vial each with above directions from July note Dr. Bach. Dr. Bach to verify no change needed and refill others.   Pharmacist is calling patient to verify understanding of insulins and answer any questions.

## 2021-06-11 NOTE — TELEPHONE ENCOUNTER
Anticoagulation Management    Unable to reach Bernadette today.    Today's INR result of 2.3 is Subtherapeutic (goal INR of 2.5-3.5).     Follow up required to confirm warfarin doses taken.    Left message to continue current dose of warfarin 5 mg tonight.       ACN to follow up    Mallory Grover, MYRTLE

## 2021-06-12 NOTE — TELEPHONE ENCOUNTER
Informed of Dr. Bach's message.    Appointment made for 3/4/21 at 12:35 per request.  Letter mailed per request.

## 2021-06-12 NOTE — PATIENT INSTRUCTIONS - HE
Please follow up if you have any further issues.    You may contact me by phone or MyChart if you are worsening or if things are not improving.    ______________________________________________________________________     Please remember that you can call [phone] to schedule an appointment with me.     You can schedule appointments 24 hours a day, 7 days a week.  Sometimes the best time to schedule an appointment is after clinic hours when less people are calling in.  Weekends are another option for calling in to schedule appointments.

## 2021-06-12 NOTE — TELEPHONE ENCOUNTER
Please call patient -    ______________________________________________________________________     Home phone:  [phone] (home)     Cell phone:   Telephone Information:   Mobile [phone]       Other contacts:  Name Home Phone Work Phone Mobile Phone Relationship Lgl Grd   ISHAN VALENTIN [phone]   Spouse    ANDREA VALENTIN [phone]   Child      ______________________________________________________________________     Her red blood cell count remains very good at this time.  There are no major issues related to this.    Her A1C is higher at this time.  She should be more attentive to getting her insulin in at this time.    Please help her to schedule a follow up in 2-3 months depending on preference.  Her  can also be scheduled at the same time or he can be seen in 4-6 months instead if he wishes.    Gabino Bach MD  [facility]  11/8/2020, 10:20 AM     ______________________________________________________________________    Recent Results (from the past 240 hour(s))   Glycosylated Hemoglobin A1c   Result Value Ref Range    Hemoglobin A1c 11.9 (H) <=5.6 %   HM2(CBC w/o Differential)   Result Value Ref Range    WBC 5.7 4.0 - 11.0 thou/uL    RBC 4.20 3.80 - 5.40 mill/uL    Hemoglobin 13.0 12.0 - 16.0 g/dL    Hematocrit 37.7 35.0 - 47.0 %    MCV 90 80 - 100 fL    MCH 30.9 27.0 - 34.0 pg    MCHC 34.5 32.0 - 36.0 g/dL    RDW 13.3 11.0 - 14.5 %    Platelets 104 (L) 140 - 440 thou/uL    MPV 9.5 7.0 - 10.0 fL   INR   Result Value Ref Range    INR 2.60 (H) 0.90 - 1.10       ______________________________________________________________________

## 2021-06-12 NOTE — TELEPHONE ENCOUNTER
ANTICOAGULATION  MANAGEMENT    Assessment     Today's INR result of 2.6 is Therapeutic (goal INR of 2.5-3.5)        Warfarin taken as previously instructed    No new diet changes affecting INR    No new medication/supplements affecting INR    Continues to tolerate warfarin with no reported s/s of bleeding or thromboembolism     Previous INR was Subtherapeutic    Plan:     Spoke on phone with Bernadette regarding INR result and instructed:     Warfarin Dosing Instructions:  Continue current warfarin dose 5 mg daily (0 % change)    Instructed patient to follow up no later than: 2 weeks       Instructed to call the [facility] for any changes, questions or concerns. (#[phone])   ?   Gavino Bird RN    Subjective/Objective:      Bernadette Yu, a 80 y.o. female is on warfarin.     Bernadette reports:     Home warfarin dose: verbally confirmed home dose with Bernadette  and updated on anticoagulation calendar     Missed doses: No     Medication changes:  No     S/S of bleeding or thromboembolism:  No     New Injury or illness:  No     Changes in diet or alcohol consumption:  No     Upcoming surgery, procedure or cardioversion:  No    Anticoagulation Episode Summary     Current INR goal:  2.5-3.5   TTR:  30.8 % (11.8 mo)   Next INR check:  11/20/2020   INR from last check:  2.60 (11/6/2020)   Weekly max warfarin dose:     Target end date:     INR check location:     Preferred lab:     Send INR reminders to:  PABLO MITCHELL    Indications    S/P mitral valve replacement (MVR) - mechanical mitral valve placed 2015 [Z95.2]           Comments:           Anticoagulation Care Providers     Provider Role Specialty Phone number    Gabino Bach MD Referring Internal Medicine [phone]

## 2021-06-12 NOTE — PROGRESS NOTES
Wt Readings from Last 20 Encounters:   09/11/17 159 lb (72.1 kg)   07/06/17 166 lb 3.2 oz (75.4 kg)   05/18/17 163 lb (73.9 kg)   04/25/17 166 lb (75.3 kg)   03/14/17 162 lb (73.5 kg)   02/17/17 159 lb 6.4 oz (72.3 kg)   02/09/17 166 lb (75.3 kg)   02/06/17 163 lb (73.9 kg)   02/04/17 163 lb (73.9 kg)   01/27/17 159 lb 3.2 oz (72.2 kg)   01/03/17 163 lb 9.6 oz (74.2 kg)   12/27/16 166 lb 9.6 oz (75.6 kg)   12/23/16 171 lb 14.4 oz (78 kg)   12/06/16 168 lb 3.2 oz (76.3 kg)   11/28/16 168 lb 12.8 oz (76.6 kg)   11/22/16 171 lb 12.8 oz (77.9 kg)   11/07/16 167 lb 6.4 oz (75.9 kg)   10/24/16 162 lb (73.5 kg)   10/19/16 171 lb 8 oz (77.8 kg)   09/13/16 162 lb (73.5 kg)

## 2021-06-13 NOTE — TELEPHONE ENCOUNTER
Patient scheduled for hospital f/u 12/11/2020.  Ansley Crawford CMA ............... 10:14 AM, 12/04/20

## 2021-06-13 NOTE — TELEPHONE ENCOUNTER
Call back.  We see the patient on Friday and we can address at that time.    Gabino Bach MD  General Internal Medicine  [facility]  12/9/2020, 4:56 PM

## 2021-06-13 NOTE — TELEPHONE ENCOUNTER
Call back -    Home care orders are okay.    Sent in the refill of the levothyroxine and the other medications are over the counter (OTC).    Gabino Bach MD  General Internal Medicine  [facility]  12/4/2020, 7:46 AM

## 2021-06-13 NOTE — TELEPHONE ENCOUNTER
Attempted to call Radha back with verbal order's and physician message but her mailbox was full. Please try back again.  Ansley Crawford CMA ............... 10:16 AM, 12/04/20

## 2021-06-13 NOTE — TELEPHONE ENCOUNTER
Attempted to call Dianne received message number is not in service.    If she calls back please inform her of Dr. Bach's message.

## 2021-06-13 NOTE — TELEPHONE ENCOUNTER
Patient returned phone call to nurse    Please call patient -    ______________________________________________________________________     Home phone:  [phone] (home)     Cell phone:   Telephone Information:   Mobile [phone]       Other contacts:  Name Home Phone Work Phone Mobile Phone Relationship Lgl Grd   ISHAN VALENTIN [phone]   Spouse    ANDREA VALENTIN [phone]   Child      ______________________________________________________________________     Or  Ishan.    Please help the patient to schedule an INF from her hospital stay at [facility].    Thank you,    Gabino Bach MD  [facility]  12/4/2020, 8:05 AM

## 2021-06-13 NOTE — TELEPHONE ENCOUNTER
Controlled Substance Refill Request  Medication Name: HYDROmorphone (DILAUDID) 4 MG tablet  Requested Prescriptions      No prescriptions requested or ordered in this encounter     Date Last Fill:  09/14/20  [facility]ed Pharmacy: [facility]  Submit electronically to pharmacy  Controlled Substance Agreement on file:   Encounter-Level CSA Scan Date - 08/01/2018:    Scan on 8/6/2018  8:40 AM: CHRONIC PAIN MANAGEMENT           Encounter-Level CSA Scan Date - 01/30/2018:    Scan on 2/1/2018 12:13 PM: HE -           Encounter-Level CSA Scan Date - 02/09/2017:    Scan on 2/10/2017  3:16 PM        Last office visit:  11/06/20    Patient is almost out of medication, has a few days left- Would like refill as soon as possible- Also, would like to notify PCP that patient has INF scheduled for tomorrow, however, due to patient still feeling weak, might change INF to video visit  Or reschedule at later time( daughter in law will discuss with patient if ok to do video). Daughter in law is not on Consent to communicate form, states ok to call patient directly.

## 2021-06-13 NOTE — TELEPHONE ENCOUNTER
Please schedule this patient for an appointment with me on:    ______________________________________________________________________     Tuesday, December 22nd  Time of appointment:  12 noon    Reason for appointment:  Follow up diabetes and other health issues.    ______________________________________________________________________     Patient already notified.  No need to notify the patient.    Thank you.    Gabino Bach MD  [facility]  12/11/2020, 10:46 PM

## 2021-06-13 NOTE — TELEPHONE ENCOUNTER
Please call patient -    ______________________________________________________________________     Home phone:  [phone] (home)     Cell phone:   Telephone Information:   Mobile [phone]       Other contacts:  Name Home Phone Work Phone Mobile Phone Relationship Lgl Grd   ISHAN VALENTIN [phone]   Spouse    SARBJIT VALENTIN [phone]   Child      ______________________________________________________________________     Please also call son Sarbjit as the patient has memory issues.    Her red blood cell count has dropped.  This was dropping during her hospital stay at [facility] and has dropped further.  Watch for blood in the stool and go to emergency room (ER) if worsening.    She should STOP aspirin at this time.      Her other tests were good.    We could have her move up her appointment to Friday at the same day visit if she would like to follow up sooner.  Or she could have blood work on Thursday for INR and hemoglobin and then follow up again on Tuesday as currently scheduled.    Please let me know if I can advise further.    Gabino Bach MD  [facility]  12/14/2020, 8:52 AM     ______________________________________________________________________    Recent Results (from the past 240 hour(s))   INR   Result Value Ref Range    INR 2.10 (H) 0.90 - 1.10   HM2(CBC w/o Differential)   Result Value Ref Range    WBC 6.0 4.0 - 11.0 thou/uL    RBC 2.32 (L) 3.80 - 5.40 mill/uL    Hemoglobin 7.4 (LL) 12.0 - 16.0 g/dL    Hematocrit 21.6 (L) 35.0 - 47.0 %    MCV 93 80 - 100 fL    MCH 31.8 27.0 - 34.0 pg    MCHC 34.2 32.0 - 36.0 g/dL    RDW 14.0 11.0 - 14.5 %    Platelets 179 140 - 440 thou/uL    MPV 9.7 7.0 - 10.0 fL   Basic Metabolic Panel   Result Value Ref Range    Sodium 138 136 - 145 mmol/L    Potassium 4.3 3.5 - 5.0 mmol/L    Chloride 105 98 - 107 mmol/L    CO2 23 22 - 31 mmol/L    Anion Gap, Calculation 10 5 - 18 mmol/L    Glucose 258 (H) 70 - 125 mg/dL    Calcium  8.5 8.5 - 10.5 mg/dL    BUN 33 (H) 8 - 28 mg/dL    Creatinine 1.14 (H) 0.60 - 1.10 mg/dL    GFR MDRD Af Amer 55 (L) >60 mL/min/1.73m2    GFR MDRD Non Af Amer 46 (L) >60 mL/min/1.73m2   BNP(B-type Natriuretic Peptide)   Result Value Ref Range     (H) 0 - 163 pg/mL       ______________________________________________________________________

## 2021-06-13 NOTE — TELEPHONE ENCOUNTER
"Humulin N is no longer covered by insurance, They will cover Novilin N, but will need an order to change to this medication. Store closing soon. Patient in store to  now.     Dr SHAZIA Mullen on call, paged @ 5:50pm: OK to substitute, keeping dosage the same. .   Pharmacist notified @ 5:56pm.     Lulú Barcenas RN Triage Nurse Advisor 5:56 PM 11/21/2020    Reason for Disposition    Pharmacy calling with prescription questions and triager unable to answer question    Additional Information    Negative: Drug overdose and nurse unable to answer question    Negative: Caller requesting information not related to medicine    Negative: Caller requesting a prescription for Strep throat and has a positive culture result    Negative: Rash while taking a medication or within 3 days of stopping it    Negative: Immunization reaction suspected    Negative: [1] Asthma and [2] having symptoms of asthma (cough, wheezing, etc)    Negative: MORE THAN A DOUBLE DOSE of a prescription or over-the-counter (OTC) drug    Negative: [1] DOUBLE DOSE (an extra dose or lesser amount) of over-the-counter (OTC) drug AND [2] any symptoms (e.g., dizziness, nausea, pain, sleepiness)    Negative: [1] DOUBLE DOSE (an extra dose or lesser amount) of prescription drug AND [2] any symptoms (e.g., dizziness, nausea, pain, sleepiness)    Negative: Took another person's prescription drug    Negative: [1] DOUBLE DOSE (an extra dose or lesser amount) of prescription drug AND [2] NO symptoms (Exception: a double dose of antibiotics)    Negative: Diabetes drug error or overdose (e.g., insulin or extra dose)    Negative: [1] Request for URGENT new prescription or refill of \"essential\" medication (i.e., likelihood of harm to patient if not taken) AND [2] triager unable to fill per unit policy    Negative: [1] Prescription not at pharmacy AND [2] was prescribed today by PCP    Protocols used: MEDICATION QUESTION CALL-A-      "

## 2021-06-13 NOTE — TELEPHONE ENCOUNTER
1st attempt to contact patient    Left message to call back for: Sarbjit, son  Unable to get through on patient home number     Information to relay to patient:  Please advise patient as below from Dr. Bach    Please also call son Sarbjit as the patient has memory issues.     Her red blood cell count has dropped.  This was dropping during her hospital stay at [facility] and has dropped further.  Watch for blood in the stool and go to emergency room (ER) if worsening.     She should STOP aspirin at this time.       Her other tests were good.     We could have her move up her appointment to Friday at the same day visit if she would like to follow up sooner.  Or she could have blood work on Thursday for INR and hemoglobin and then follow up again on Tuesday as currently scheduled.     Please let me know if I can advise further.     Gabino Bach MD  [facility]  12/14/2020, 8:52 AM

## 2021-06-13 NOTE — TELEPHONE ENCOUNTER
Who is calling:  Dianne - [facility]  Reason for Call:  Blood sugar have been high - 12/9/2020 289 am  12/8/2020 384 pm  12/6/2020 256 pm     Leg pain very high (20/10) and abdomin has high swelling also    Date of last appointment with primary care: 11/6/2020  Okay to leave a detailed message: Yes

## 2021-06-13 NOTE — TELEPHONE ENCOUNTER
Please try again on Wednesday.    Gabino Bach MD  General Internal Medicine  [facility]  12/15/2020, 11:35 PM

## 2021-06-13 NOTE — TELEPHONE ENCOUNTER
Orders being requested:   Skilled Nursing  1 time in 3 days    Physical Therapy  1 time a week for 1 week  2 times a week for 1 week  1 time a week for 4 weeks    Occupational Therapy  1 time in 10 days    Home Health Aid  2 times a week for 4 weeks starting 12/6/2020      1 time in 14 days      Reason service is needed/diagnosis:   Medication Management and Education  Disease Management    Gait   Balance   Transfers  Caregiver Education    Activities of Daily Living Safety  Durable Medical Equipment Safety  Cognition    Bathing Assistance and Safety    Community Resource Education    When are orders needed by:   As soon as possible.    Where to send Orders: Phone:  Verbal orders to: Radha watkins [facility], [phone]     Okay to leave detailed message?  Yes    Patient is out of:  Levothyroxine  Vitamin D3  Tylenol  Melatonin    Please fill above medications for patient if appropriate.  Thank you.

## 2021-06-13 NOTE — TELEPHONE ENCOUNTER
Called and LVM for pt offering Friday appt.  Scheduled and requested that pt call if unable to make that appt.

## 2021-06-13 NOTE — TELEPHONE ENCOUNTER
Could offer Friday at 2:00 pm for her and her .  Otherwise, keep appointment for Tuesday as scheduled.    Gabino Bach MD  General Internal Medicine  [facility]  12/16/2020, 9:36 AM

## 2021-06-13 NOTE — PATIENT INSTRUCTIONS - HE
Please follow up if you have any further issues.    You may contact me by phone or MyChart if you are worsening or if things are not improving.    ______________________________________________________________________     Please remember that you can call [phone] to schedule an appointment with me.     You can schedule appointments 24 hours a day, 7 days a week.  Sometimes the best time to schedule an appointment is after clinic hours when less people are calling in.  Weekends are another option for calling in to schedule appointments.

## 2021-06-13 NOTE — PROGRESS NOTES
ANTICOAGULATION  MANAGEMENT: Discharge Review    Bernadette Yu chart reviewed for anticoagulation continuity of care    Emergency room visit on  11/23 to 11/24 for Altered Mental Status/Falls.    Discharge disposition: Transferred hospitals to [facility]    INR Results:       Recent labs: (last 7 days)     11/23/20  2349   INR 2.07*       Warfarin inpatient management: not applicable    Warfarin discharge instructions:      Medication Changes Affecting Anticoagulation: No    Additional Factors Affecting Anticoagulation: No    Plan     Patient not contacted    Mallory Grover, RN

## 2021-06-14 NOTE — TELEPHONE ENCOUNTER
Who is calling:  Feroz  Reason for Call:  [facility] has tried to contact patients son, Riley, regarding private pay due to Medicare not covering. They have not received a response, they believe they will not be doing the INR's for the patient.  Date of last appointment with primary care: 1/18/2021  Okay to leave a detailed message: Yes

## 2021-06-14 NOTE — TELEPHONE ENCOUNTER
ANTICOAGULATION  MANAGEMENT PROGRAM    Bernadette RINALDI Devoracristhian is overdue for INR check.     Spoke with Riley who declined to schedule INR at this time. If calling back please schedule as soon as possible.      Mallory Grover, RN

## 2021-06-14 NOTE — PROGRESS NOTES
Clinic Care Coordination Contact  [facility]/Voicemail       Clinical Data: Care Coordinator Outreach  Outreach attempted x 2.  CHW unable to reach patient today   Care Coordinator will do no further outreaches at this time.  Patient does have PCP appt today 12/29 @ 330pm      Clinic Care Coordination Contact  [facility]/Voicemail       Clinical Data: Care Coordinator Outreach  Outreach attempted x 1.  Left message on patient's voicemail with call back information and requested return call.   Care Coordinator will try to reach patient again in 1-2 business days.  12/24

## 2021-06-14 NOTE — PATIENT INSTRUCTIONS - HE
If you want to follow the current status of the coronavirus vaccine, I would recommend that you follow online at [email or web address]    The news related to [place] will likely be updated on the [place] Department of Health website - [email or web address]    Your [facility] website also likely has information on other areas of vaccination in your county.    ______________________________________________________________________      Future Appointments   Date Time Provider Department [place]   3/4/2021 12:35 PM Gabino Bach MD [facility]

## 2021-06-14 NOTE — PROGRESS NOTES
Assessment:      Dizziness  Left shoulder pain - likely secondary to anemia given CBC showed hemoglobin of 5.9.     Plan:      1. Sent to ED for further work-up   2. Patient's vital signs were stable at the time of exam. Patient's  will drive patient over to ED for examination.    Spoke with provider from [facility] ED over the phone. They recommended the patient be transferred for further work-up. Confirmed plan with the patient, and they agreed with plan. Answered all questions.     Subjective:       Bernadette Yu is a 77 y.o. female with history of DM2, left subclavian artery stenosis s/p stent, and mitral valve replacement with chronic anticoagulation here for evaluation of dizziness and left shoulder pain. Onset of symptoms was 1 week ago. Patient states she develops the symptoms when she stands up too quickly. Notes fuzzy vision and lightheadedness along with left shoulder pain that then subsides after sitting or resting. Last INR check was today and normal. Denies numbness, tingling, loss of consciousness, arm weakness, fever, chest pain, jaw pain, and shortness of breath.    The following portions of the patient's history were reviewed and updated as appropriate: allergies, current medications and problem list.    Review of Systems  Pertinent items are noted in HPI.     Allergies  No Known Allergies     Objective:      /61 (Patient Site: Left Arm, Patient Position: Standing)  Pulse 70  Temp 98.2  F (36.8  C) (Oral)   Resp 16  Wt 159 lb (72.1 kg)  SpO2 100%  Breastfeeding? No  BMI 28.17 kg/m2  General appearance: alert, appears stated age, cooperative and no distress  Head: Normocephalic, without obvious abnormality, atraumatic  Lungs: clear to auscultation bilaterally and no rhonchi, rales, or wheezing  Heart: RRR, wholosystolic murmur  Extremities: extremities normal, atraumatic, no cyanosis or edema  Pulses: 2+ and symmetric  Neurologic: Sensory: normal  Motor:grossly normal      Orthostatic blood pressures were negative    Lab Results    Recent Results (from the past 24 hour(s))   INR   Result Value Ref Range    INR 2.60 (H) 0.90 - 1.10     I personally reviewed these results and discussed findings with the patient.

## 2021-06-14 NOTE — TELEPHONE ENCOUNTER
I called the patient and gave the messages from Dr. Bach.     Patient reports that the bi-lateral leg pain is really bad. Patient cannot sleep with the pain. The patient has been getting up and walking around at night trying to get it to go away. Patient would like to know if there is anything that can be done to help?

## 2021-06-14 NOTE — TELEPHONE ENCOUNTER
Who is calling:  [facility]  Reason for Call:  Maximo is calling [facility] as he received an INR order for today after 4:30 pm. Her is requesting to re-check patient on Wed. 1/20. Please confirm at the number provided.    Okay to leave a detailed message: Yes

## 2021-06-14 NOTE — TELEPHONE ENCOUNTER
ANTICOAGULATION  MANAGEMENT: Discharge Review    Bernadette Yu chart reviewed for anticoagulation continuity of care    Emergency room visit on 12/21 for consitpation.    Discharge disposition: Home    INR Results:     No results for input(s): INR in the last 168 hours.    Warfarin inpatient management: not applicable    Warfarin discharge instructions: home regimen continued     Medication Changes Affecting Anticoagulation: No    Additional Factors Affecting Anticoagulation: No    Plan     Pt is currently overdue for INR so recommend INR check asap.  Riley, son, reports he was going to check with home care, but will call back if he needs to schedule an appt    Spoke with emily Lin, RN

## 2021-06-14 NOTE — TELEPHONE ENCOUNTER
There is not much otherwise that can be done at this time other than the pain medication.    We could consider an electromyography (EMG) of her legs in the future to see how bad the nerves in her legs are.    Please follow up with me again on 1/18/2021 as you have already scheduled.     Gabino Bach MD  General Internal Medicine  [facility]  1/3/2021, 8:21 PM

## 2021-06-14 NOTE — TELEPHONE ENCOUNTER
Please call patient's son Sarbjit -    ______________________________________________________________________     Home phone:  [phone] (home)     Cell phone:   Telephone Information:   Mobile [phone]       Other contacts:  Name Home Phone Work Phone Mobile Phone Relationship Lgl Grd   ISHAN VALENTIN [phone]   Spouse    SARBJIT VALENITN [phone]   Child      ______________________________________________________________________     Her diabetes is doing a lot better and her A1C is in the goal range again with the current insulin doses.    Her red blood cell count is doing better than the last time but stay off the aspirin as we discussed at her last visit.    Her liver and kidney tests are stable at this time.    Gabino Bach MD  [facility]  12/31/2020, 9:15 AM     ______________________________________________________________________    Recent Results (from the past 240 hour(s))   Comprehensive Metabolic Panel   Result Value Ref Range    Sodium 140 136 - 145 mmol/L    Potassium 4.7 3.5 - 5.0 mmol/L    Chloride 107 98 - 107 mmol/L    CO2 24 22 - 31 mmol/L    Anion Gap, Calculation 9 5 - 18 mmol/L    Glucose 167 (H) 70 - 125 mg/dL    BUN 24 8 - 28 mg/dL    Creatinine 0.87 0.60 - 1.10 mg/dL    GFR MDRD Af Amer >60 >60 mL/min/1.73m2    GFR MDRD Non Af Amer >60 >60 mL/min/1.73m2    Bilirubin, Total 0.4 0.0 - 1.0 mg/dL    Calcium 8.8 8.5 - 10.5 mg/dL    Protein, Total 6.9 6.0 - 8.0 g/dL    Albumin 3.6 3.5 - 5.0 g/dL    Alkaline Phosphatase 76 45 - 120 U/L    AST 20 0 - 40 U/L    ALT 20 0 - 45 U/L   HM2(CBC w/o Differential)   Result Value Ref Range    WBC 5.7 4.0 - 11.0 thou/uL    RBC 3.26 (L) 3.80 - 5.40 mill/uL    Hemoglobin 10.1 (L) 12.0 - 16.0 g/dL    Hematocrit 30.8 (L) 35.0 - 47.0 %    MCV 94 80 - 100 fL    MCH 31.0 27.0 - 34.0 pg    MCHC 32.8 32.0 - 36.0 g/dL    RDW 13.7 11.0 - 14.5 %    Platelets 178 140 - 440 thou/uL    MPV 10.0 7.0 - 10.0 fL   Glycosylated Hemoglobin A1c    Result Value Ref Range    Hemoglobin A1c 7.0 (H) <=5.6 %   INR   Result Value Ref Range    INR 1.50 (H) 0.90 - 1.10       ______________________________________________________________________

## 2021-06-14 NOTE — PATIENT INSTRUCTIONS - HE
Future Appointments   Date Time Provider Department Center   1/6/2021 11:20 AM [facility]   1/18/2021  1:15 PM [facility]   3/4/2021 12:35 PM [facility]

## 2021-06-15 PROBLEM — Z22.322 MRSA (METHICILLIN RESISTANT STAPH AUREUS) CULTURE POSITIVE: Status: ACTIVE | Noted: 2017-02-09

## 2021-06-15 NOTE — TELEPHONE ENCOUNTER
FYI - Status Update  Who is Calling: Riley  Update: Returning phone call from Mallory. Tried to warm transfer. Please call back.  Okay to leave a detailed message?:  Yes

## 2021-06-15 NOTE — TELEPHONE ENCOUNTER
ANTICOAGULATION  MANAGEMENT    Assessment     Today's INR result of 2.8 is Therapeutic (goal INR of 2.5-3.5)        Warfarin taken as previously instructed    No new diet changes affecting INR    No new medication/supplements affecting INR    Continues to tolerate warfarin with no reported s/s of bleeding or thromboembolism     Previous INR was Therapeutic    Plan:     Spoke on phone with son Riley regarding INR result and instructed:      Warfarin Dosing Instructions:  Continue current warfarin dose    7.5 mg every Sun, Thu; 5 mg all other days         Instructed patient to follow up no later than: 3 weeks    Education provided: target INR goal and significance of current INR result    Riley verbalizes understanding and agrees to warfarin dosing plan.    Instructed to call the [facility] for any changes, questions or concerns. (#[phone])   ?   Cathy Spicer RN    Subjective/Objective:      Bernadette Yu, a 80 y.o. female is on warfarin. Bernadette Fleming reports:     Home warfarin dose: as updated on anticoagulation calendar per template     Missed doses: No     Medication changes:  No     S/S of bleeding or thromboembolism:  No     New Injury or illness:  No     Changes in diet or alcohol consumption:  No     Upcoming surgery, procedure or cardioversion:  No    Anticoagulation Episode Summary     Current INR goal:  2.5-3.5   TTR:  25.9 % (1 y)   Next INR check:  3/25/2021   INR from last check:  2.80 (3/4/2021)   Weekly max warfarin dose:     Target end date:     INR check location:     Preferred lab:     Send INR reminders to:  PABLO MITCHELL    Indications    S/P mitral valve replacement (MVR) - mechanical mitral valve placed 2015 [Z95.2]           Comments:           Anticoagulation Care Providers     Provider Role Specialty Phone number    Gabino Bach MD Referring Internal Medicine [phone]

## 2021-06-15 NOTE — PATIENT INSTRUCTIONS - HE
Future Appointments   Date Time Provider Department Center   3/16/2021 12:15 PM [facility] COVID VACCINE 21 DAY [facility]   3/25/2021 11:45 AM [facility]   5/6/2021  1:00 PM Gabino Bach MD [facility]

## 2021-06-15 NOTE — TELEPHONE ENCOUNTER
ANTICOAGULATION  MANAGEMENT    Assessment     2/12/21 INR result of 3.10 is Therapeutic (goal INR of 2.5-3.5)        Warfarin taken as previously instructed    No new diet changes affecting INR    No new medication/supplements affecting INR    Continues to tolerate warfarin with no reported s/s of bleeding or thromboembolism     Previous INR was Supratherapeutic    Plan:     Spoke on phone with Riley regarding INR result and instructed:      Warfarin Dosing Instructions:  Continue current warfarin dose 7.5 mg daily on Sun & Thu; and 5 mg daily rest of week  (0 % change)    Instructed patient to follow up no later than: 2 weeks; lab appointment scheduled.     Education provided: target INR goal and significance of current INR result    Riley verbalizes understanding and agrees to warfarin dosing plan.    Instructed to call the [facility] for any changes, questions or concerns. (#[phone])   ?   Brijesh Velez RN    Subjective/Objective:      Bernadette Yu, a 80 y.o. female is on warfarin. Bernadette Fleming reports:     Home warfarin dose: as updated on anticoagulation calendar per template     Missed doses: No     Medication changes:  No     S/S of bleeding or thromboembolism:  No     New Injury or illness:  No     Changes in diet or alcohol consumption:  No     Upcoming surgery, procedure or cardioversion:  No    Anticoagulation Episode Summary     Current INR goal:  2.5-3.5   TTR:  21.3 % (11.7 mo)   Next INR check:  2/26/2021   INR from last check:  3.10 (2/12/2021)   Weekly max warfarin dose:     Target end date:     INR check location:     Preferred lab:     Send INR reminders to:  PABLO MITCHELL    Indications    S/P mitral valve replacement (MVR) - mechanical mitral valve placed 2015 [Z95.2]           Comments:           Anticoagulation Care Providers     Provider Role Specialty Phone number    Gabino Bach MD Referring Internal Medicine [phone]

## 2021-06-15 NOTE — PROGRESS NOTES
[facility] Consult    HPI:    77 y.o. year old female who I have been consulted by Gabino Bach MD for evaluation of No chief complaint on file.    She is a 77-year-old lady who had a colonoscopy in the hospital with multiple problems and that they were unable to get into the right colon.  I been asked by Dr. Wade to take a look at her as far as looking at her for doing right hemicolectomy.  This to be under the presumption from the CT scan showing a possible polyps in the right colon and unable to get in there with a last colonoscopy.  Today she is here for consultation she has had no real symptomatology other than some constipation off and on all of her life.  She has had a colonoscopy sounds like in the past which they have been able to get to her whole: She states take this for the patient I do not have all her records in front of me.  He did have a polyp taken from the transverse colon this was benign her last colonoscopy she does have significant history of medical issues with valve replacement is on anticoagulation she has atherosclerotic coronary artery disease she also has carotid disease and subclavian artery stenosis with stenting.     Allergies:Review of patient's allergies indicates no known allergies.    Past Medical History:   Diagnosis Date     Abdominal bloating, chronic 8/21/2016     Anticoagulation goal of INR 2.5 to 3.5 1/27/2016     Anxiety      ASCVD (arteriosclerotic cardiovascular disease)      Carotid artery stenosis, left      DM (diabetes mellitus), type 2 1990     Eczema      Former smoker      Full code status      HLD (hyperlipidemia)      HTN (hypertension)      Hypothyroidism      IBS (irritable bowel syndrome)      Insomnia      Lumbosacral plexopathy      Meningococcal meningitis Age 14     Mitral stenosis      MRSA (methicillin resistant staph aureus) culture positive 2/9/2017     Normocytic anemia      Paroxysmal atrial fibrillation - treated during surgery in 2015  "2015    Bilateral pulmonary vein isolation with AtriCure Synergy (bipolar radiofrequency ablation) device, exclusion of the left atrial appendage with the AtriCure AtriClip device.       PN (peripheral neuropathy)      Psoriasis      Recurrent UTI (urinary tract infection)      RLS (restless legs syndrome)      S/P CABG (coronary artery bypass graft) 2016     S/P colonoscopy 07     S/P MVR (mitral valve replacement) - 23 mm [employer] mechanical valve - 2015     Subclavian artery stenosis, left - s/p stent 2015    Stented in .        Past Surgical History:   Procedure Laterality Date     APPENDECTOMY       CARDIAC CATHETERIZATION  11/12/15      SECTION       CHOLECYSTECTOMY       COLONOSCOPY N/A 10/12/2016    Procedure: COLONOSCOPY;  Surgeon: Santiago Duran MD;  Location: [facility];  Service:      COLONOSCOPY N/A 10/13/2016    Procedure: COLONOSCOPY with polypectomy & rectum biopsies;  Surgeon: Santiago Duran MD;  Location: [facility];  Service:      COLONOSCOPY N/A 12/3/2017    Procedure: COLONOSCOPY with multiple polyp removal using hot snare and mansfield net and biopsy forcep;  Surgeon: Marcelo Wade MD;  Location: [facility];  Service:      CORONARY ARTERY BYPASS GRAFT       ESOPHAGOGASTRODUODENOSCOPY N/A 10/12/2016    Procedure: ESOPHAGOGASTRODUODENOSCOPY with biopsies;  Surgeon: Santiago Duran MD;  Location: [facility];  Service:      ESOPHAGOGASTRODUODENOSCOPY N/A 12/3/2017    Procedure: ESOPHAGOGASTRODUODENOSCOPY (EGD);  Surgeon: Marcelo Wade MD;  Location: [facility];  Service:      TONSILLECTOMY AND ADENOIDECTOMY       UPPER GASTROINTESTINAL ENDOSCOPY         CURRENT MEDS:  Current Outpatient Prescriptions on File Prior to Visit   Medication Sig Dispense Refill     ACCU-CHEK SMARTVIEW TEST STRIP strips TEST TID  3     BD ULTRA-FINE DAVID PEN NEEDLES 32 gauge x 5/32\" Ndle USE WITH NOVOLOG PEN AND LANTUS PENS  0     " cholecalciferol, vitamin D3, 5,000 unit Tab Take 5,000 Units by mouth daily.        cyanocobalamin (VITAMIN B-12) 100 MCG tablet Take 100 mcg by mouth daily.       enoxaparin (LOVENOX) 80 mg/0.8 mL syringe        furosemide (LASIX) 40 MG tablet Take 40 mg by mouth daily.       generic lancets Use 1 each As Directed 3 (three) times a day. Dx E11.65 DM2, uncontrolled 300 each 3     HYDROmorphone (DILAUDID) 4 MG tablet Take 0.5-1 tablets (2-4 mg total) by mouth 4 (four) times a day as needed for pain. For use from 12/11/17 to 1/10/18.  RX 1/1. 120 tablet 0     [START ON 1/10/2018] HYDROmorphone (DILAUDID) 4 MG tablet Take 0.5-1 tablets (2-4 mg total) by mouth 4 (four) times a day as needed for pain. For use from 1/10/2018 to 2/9/2018.  RX 2/2. 120 tablet 0     insulin glargine (LANTUS; BASAGLAR) 100 unit/mL (3 mL) pen Inject 15 Units under the skin at bedtime. 5 adj dose pen 0     levothyroxine (SYNTHROID, LEVOTHROID) 125 MCG tablet Take 1 tablet (125 mcg total) by mouth daily. 90 tablet 3     magnesium 30 mg tablet Take 30 mg by mouth 2 (two) times a day.       NOVOLIN N 100 unit/mL injection ADM 40 UNITS SC TID  11     NOVOLOG 100 unit/mL injection INJECT 40 UNITS SUBCUTANEOUSLY TID UTD  11     NOVOLOG FLEXPEN 100 unit/mL injection pen Check blood sugar four (4) times daily. AC/HS 3 mL PRN     omeprazole (PRILOSEC) 20 MG capsule Take 1 capsule (20 mg total) by mouth daily before breakfast. 30 capsule 0     polyethylene glycol (MIRALAX) 17 gram packet Take 1 packet (17 g total) by mouth daily as needed.  0     temazepam (RESTORIL) 15 mg capsule Take 15 mg by mouth at bedtime as needed.   3     warfarin (COUMADIN) 5 MG tablet Take 1-1.5 tablets (5-7.5 mg total) by mouth See Admin Instructions. Takes 5mg on MWF, 7.5mg all other days.  Goal INR 2.5-3.5. 135 tablet 3     No current facility-administered medications on file prior to visit.        Family History   Problem Relation Age of Onset     Colon cancer Father       "Diabetes Father      Hypertension Mother      Heart disease Mother       congestive heart failure     Arthritis Mother      Diabetes Sister      Heart disease Brother      Rectal cancer Son      Colon cancer Son         reports that she quit smoking about 39 years ago. She quit after 23.00 years of use. She has never used smokeless tobacco. She reports that she drinks about 2.0 oz of alcohol per week  She reports that she does not use illicit drugs.    Review of Systems:  Negative except unable to get to right colon by colonoscopy, Otherwise twelve system of review is negative.  Otherwise her normal state of health    OBJECTIVE:  Vitals:    12/22/17 1239   BP: 122/60   Pulse: 80   Resp: 18   SpO2: 100%   Weight: 155 lb (70.3 kg)   Height: 5' 3\" (1.6 m)     Body mass index is 27.46 kg/(m^2).    EXAM:  GENERAL: This is a well-developed 77 y.o. female who appears her stated age  HEAD: normocephalic  HEENT: Pupils equal and reactive bilaterally  MOUTH: mucus membranes intact  CARDIAC: RRR without murmur  CHEST/LUNG:  Clear to auscultation  ABDOMEN: Soft, nontender, nondistended, no masses  EXTREMITIES: Grossly normal, warm,   NEUROLOGIC: Focally intact, nonfocal  INTEGUMENT: No open lesions or ulcers  VASCULAR: Pulses intact, symmetrical upper and lower extremities.      LABS:  Lab Results   Component Value Date    WBC 3.8 (L) 12/11/2017    HGB 10.7 (L) 12/11/2017    HCT 32.1 (L) 12/11/2017    MCV 86 12/11/2017     (L) 12/11/2017     INR/Prothrombin Time      Lab Results   Component Value Date    HGBA1C 7.3 (H) 11/29/2017     Lab Results   Component Value Date    ALT 32 11/28/2017    AST 31 11/28/2017    ALKPHOS 48 11/28/2017    BILITOT 0.3 11/28/2017        Images: Reviewed her CT scans and prior endoscopy reports    Assessment/Plan:   Incomplete colonoscopy questionable polyps the right colon.  I had a long discussion with her and her  today.  I think at the end of this discussion or decisions related to " probably try colonoscopy again.  I think surgery to remove her right colon is a bigger risk for her than the normal population.  She has significant coronary artery disease valve disease valve replacement carotid issues and other arterial issues.  So I think that there is some definite risk factors and her going to sleep coming off her anticoagulation to remove her right colon polyps which may be benign.  Therefore I would recommend we attempt to do colonoscopy and she wants to go that route to after we did discuss the risks involved with surgery.  If he were to do surgery do this laparoscopically do a right hemicolectomy right laparoscopically in the right side but this would need to be done under general anesthesia which is the biggest risk factor for her.  I explained that they cannot do colonoscopy after second try I would be willing to go ahead with her surgery if she is willing to take those risks.  Would need a complete cardiac evaluation and clearance before surgery    I discussed this with her answered all questions and I will call Dr. Mauro Franco MD  [facility] Department of Surgery

## 2021-06-15 NOTE — TELEPHONE ENCOUNTER
Anticoagulation Management    Unable to reach Riley, son today.    Today's INR result of 3.1 is Therapeutic (goal INR of 2.5-3.5).     Follow up required to confirm warfarin doses taken.    Left message to continue current warfarin dosing this weekend. 5 mg Fri/Sat; 7.5 mg Sun       ACN to follow up    Mallory Grover, MYRTLE

## 2021-06-15 NOTE — TELEPHONE ENCOUNTER
Controlled Substance Refill Request  Medication Name:   Requested Prescriptions     Pending Prescriptions Disp Refills     HYDROmorphone (DILAUDID) 4 MG tablet 120 tablet 0     Sig: Take 0.5-1 tablets (2-4 mg total) by mouth 4 (four) times a day as needed for pain. For use from 1/10/2021 to 2/9/21.     Date Last Fill: 1/10/21  [facility]ed [facility]acy: [facility]  Submit electronically to pharmacy  Controlled Substance Agreement on file:   Encounter-Level CSA Scan Date - 08/01/2018:    Scan on 8/6/2018  8:40 AM: CHRONIC PAIN MANAGEMENT           Encounter-Level CSA Scan Date - 01/30/2018:    Scan on 2/1/2018 12:13 PM: HE -           Encounter-Level CSA Scan Date - 02/09/2017:    Scan on 2/10/2017  3:16 PM        Last office visit:  1/18/21

## 2021-06-15 NOTE — TELEPHONE ENCOUNTER
"  Last Office Visit  1/18/2021 Gabino Bach MD    Notes:  1. Atrial fibrillation with rapid ventricular response (H)  Continue anticoagulation.  Some issues with compliance.  We will initiate home INR monitoring through [facility] ([facility]) and coordination with anticoagulation nurse.     2. S/P mitral valve replacement (MVR) - mechanical mitral valve placed 2015  Suboptimal INR control.      3. Anticoagulation goal of INR 2.5 to 3.5     4. Type 2 diabetes mellitus with microalbuminuria, with long-term current use of insulin (H)  Doing better based on recent blood work.     - BD ULTRA-FINE DAVID PEN NEEDLE 32 gauge x 5/32\" Ndle; 1 each by abdominal subcutaneous route 2 (two) times a day. USE WITH NOVOLOG PENS.   Please keep this Rx on file for the next RF.  Dispense: 100 each; Refill: 11     5. Bilateral lower extremity edema  Continue diuretic.     - furosemide (LASIX) 40 MG tablet; Take 1 tablet (40 mg total) by mouth daily.  Dispense: 60 tablet; Refill: 1     6. Venous stasis dermatitis of both lower extremities     - furosemide (LASIX) 40 MG tablet; Take 1 tablet (40 mg total) by mouth daily.  Dispense: 60 tablet; Refill: 1  - triamcinolone (KENALOG) 0.1 % cream; Apply topically daily.  Dispense: 453.6 g; Refill: 11     7. Pressure injury of left heel, unstageable (H)  Healed heel at this time.     8. Osteomyelitis of great toe (H)  No evidence of issues at this time.     9. PAD (peripheral artery disease) (H)  No change.     10. Bleeding diathesis (H)  No signs of bleeding.     11. Stage 3a chronic kidney disease  Stable at this time.         Last Filled:  HYDROmorphone (DILAUDID) 4 MG tablet 120 tablet 0 1/10/2021 2/9/2021 No   Sig - Route: Take 0.5-1 tablets (2-4 mg total) by mouth 4 (four) times a day as needed for pain. For use from 1/10/2021 to 2/9/21. - Oral   Sent to pharmacy as: HYDROmorphone 4 mg tablet (DILAUDID)   Earliest Fill Date: 1/10/2021   Notes to Pharmacy: Patient apparently was " only given a small amount of prescription or lost medication.  One time early refill.   E-Prescribing Status: Receipt confirmed by pharmacy (12/29/2020  5:00 PM CST)         Lab Results   Component Value Date    AMPHET Screen Negative 10/26/2019    HEBENZODIAZ Screen Negative 10/26/2019    OPIATES (!) 10/26/2019     Screen Positive (Confirmation available on request)    PCP Screen Negative 10/26/2019    THC Screen Negative 10/26/2019    BARBIT Screen Negative 10/26/2019    COCAINEMETAB Screen Negative 10/26/2019    METHADNE Screen Negative 04/06/2018    OXYCODONE Screen Negative 10/26/2019    CREAUR 69.5 10/26/2019         Next OV:  3/4/2021 Gabino Bach MD      Encounter-Level CSA Scan Date - 08/01/2018:    Scan on 8/6/2018  8:40 AM: CHRONIC PAIN MANAGEMENT     2/10/2020      Encounter-Level CSA Scan Date - 01/30/2018:    Scan on 2/1/2018 12:13 PM: HE -           Encounter-Level CSA Scan Date - 02/09/2017:    Scan on 2/10/2017  3:16 PM          reviewed and results are as follows:    Unable to pull  as website kept freezing     Medication teed up for provider signature - please adjust as appropriate.

## 2021-06-15 NOTE — TELEPHONE ENCOUNTER
ANTICOAGULATION  MANAGEMENT    Assessment     Today's INR result of 2.9 is Therapeutic (goal INR of 2.5-3.5)        Warfarin taken as previously instructed    No new diet changes affecting INR    No new medication/supplements affecting INR    Continues to tolerate warfarin with no reported s/s of bleeding or thromboembolism     Previous INR was Therapeutic    Plan:     Spoke on phone with Riley, son, regarding INR result and instructed:      Warfarin Dosing Instructions:  Continue current warfarin dose 7.5 mg daily on Sun, Thu; and 5 mg daily rest of week  (0 % change)    Instructed patient to follow up no later than: 2-3 weeks    Education provided: target INR goal and significance of current INR result, importance of following up for INR monitoring at instructed interval, importance of taking warfarin as instructed, importance of notifying clinic for changes in medications and importance of notifying clinic for diarrhea, nausea/vomiting, reduced intake and/or illness    Riley verbalizes understanding and agrees to warfarin dosing plan.    Instructed to call the [facility] for any changes, questions or concerns. (#[phone])   ?   Mallory Grover RN    Subjective/Objective:      Bernadette Yu, a 80 y.o. female is on warfarin. Bernadette Fleming reports:     Home warfarin dose: verbally confirmed home dose with Riley and updated on anticoagulation calendar     Missed doses: No     Medication changes:  No     S/S of bleeding or thromboembolism:  No     New Injury or illness:  No     Changes in diet or alcohol consumption:  No     Upcoming surgery, procedure or cardioversion:  No    Anticoagulation Episode Summary     Current INR goal:  2.5-3.5   TTR:  24.5 % (1 y)   Next INR check:  3/19/2021   INR from last check:  2.90 (2/26/2021)   Weekly max warfarin dose:     Target end date:     INR check location:     Preferred lab:     Send INR reminders to:  PABLO MITCHELL    Indications    S/P mitral valve  replacement (MVR) - mechanical mitral valve placed 2015 [Z95.2]           Comments:           Anticoagulation Care Providers     Provider Role Specialty Phone number    Gabino Bach MD Referring Internal Medicine [phone]

## 2021-06-16 PROBLEM — F11.90 CHRONIC, CONTINUOUS USE OF OPIOIDS: Chronic | Status: ACTIVE | Noted: 2021-01-01

## 2021-06-16 PROBLEM — I48.91 ATRIAL FIBRILLATION WITH RAPID VENTRICULAR RESPONSE (H): Status: ACTIVE | Noted: 2020-02-19

## 2021-06-16 PROBLEM — M48.00 MULTILEVEL NEURAL FORAMINAL STENOSIS: Status: ACTIVE | Noted: 2019-08-06

## 2021-06-16 PROBLEM — T46.2X5A: Status: ACTIVE | Noted: 2019-11-04

## 2021-06-16 PROBLEM — M48.062 SPINAL STENOSIS OF LUMBAR REGION WITH NEUROGENIC CLAUDICATION: Status: ACTIVE | Noted: 2019-08-06

## 2021-06-16 PROBLEM — I48.92 ATRIAL FLUTTER, UNSPECIFIED TYPE (H): Status: ACTIVE | Noted: 2019-10-18

## 2021-06-16 NOTE — TELEPHONE ENCOUNTER
Telephone Encounter by Mallory Grovre RN at 7/10/2019  1:43 PM     Author: Mallory Grover RN Service: -- Author Type: Registered Nurse    Filed: 7/10/2019  1:48 PM Encounter Date: 7/10/2019 Status: Attested    : Mallory Grover RN (Registered Nurse) Cosigner: Gabino Bach MD at 7/10/2019  2:35 PM    Attestation signed by Gabino Bach MD at 7/10/2019  2:35 PM    Anticoagulation care reviewed.  I agree with the plan of care.    Gabino Bach MD  [facility]  7/10/2019, 2:35 PM                   ANTICOAGULATION  MANAGEMENT    Assessment     Today's INR result of 2.9 is Therapeutic (goal INR of 2.5-3.5)        Warfarin taken as previously instructed    No new diet changes affecting INR    No new medication/supplements affecting INR    Continues to tolerate warfarin with no reported s/s of bleeding or thromboembolism     Previous INR was Subtherapeutic    Plan:     Spoke with Bernadette regarding INR result and instructed:     Warfarin Dosing Instructions:  Continue current warfarin dose 7.5 mg daily on Mon, Wed, Fri; and 5 mg daily rest of week  (0 % change)    Instructed patient to follow up no later than: 2 weeks    Education provided: target INR goal and significance of current INR result, importance of following up for INR monitoring at instructed interval, importance of taking warfarin as instructed and importance of notifying clinic for changes in medications    Bernadette verbalizes understanding and agrees to warfarin dosing plan.    Instructed to call the [facility] for any changes, questions or concerns. (#[phone])   ?   Mallory Grover RN    Subjective/Objective:      Bernadette NIMCO Yu, a 78 y.o. female is on warfarin.     Bernadette reports:     Home warfarin dose: verbally confirmed home dose with Bernadette and updated on anticoagulation calendar     Missed doses: No     Medication changes:  No     S/S of bleeding or thromboembolism:  No     New Injury or illness:   No     Changes in diet or alcohol consumption:  No     Upcoming surgery, procedure or cardioversion:  No    Anticoagulation Episode Summary     Current INR goal:   2.5-3.5   TTR:   61.2 % (3.4 y)   Next INR check:   7/24/2019   INR from last check:   2.90 (7/10/2019)   Weekly max warfarin dose:      Target end date:      INR check location:      Preferred lab:      Send INR reminders to:   [facility]    Indications    S/P mitral valve replacement (MVR) [Z95.2]           Comments:            Anticoagulation Care Providers     Provider Role Specialty Phone number    Gabino Bach MD Referring Internal Medicine [phone]

## 2021-06-16 NOTE — TELEPHONE ENCOUNTER
Telephone Encounter by Christine Cardenas at 7/18/2019  3:25 PM     Author: Christine Cardenas Service: -- Author Type: --    Filed: 7/18/2019  3:25 PM Encounter Date: 7/17/2019 Status: Signed    : Christine Cardenas APPROVED:    Approval start date:04/19/2019  Approval end date:12/31/2019    Pharmacy has been notified of approval and will contact patient when medication is ready for pickup.

## 2021-06-16 NOTE — TELEPHONE ENCOUNTER
Telephone Encounter by Mallory Grover RN at 12/3/2019  5:42 PM     Author: Mallory Grover RN Service: -- Author Type: Registered Nurse    Filed: 12/3/2019  5:44 PM Encounter Date: 12/3/2019 Status: Attested    : Mallory Grover RN (Registered Nurse) Cosigner: Gabino Bach MD at 12/3/2019 10:44 PM    Attestation signed by Gabino Bach MD at 12/3/2019 10:44 PM    Anticoagulation care reviewed.  I agree with the plan of care.    Gabino Bach MD  [facility]  12/3/2019, 10:44 PM                 ANTICOAGULATION  MANAGEMENT    Assessment     Today's venous INR result of 3.95 is Supratherapeutic (goal INR of 2.5-3.5)        More warfarin taken than instructed which may be affecting INR - did not hold as requested    No new diet changes affecting INR    No new medication/supplements affecting INR    Continues to tolerate warfarin with no reported s/s of bleeding or thromboembolism     Previous INR was Supratherapeutic    Plan:     Spoke with Bernadette regarding INR result and instructed:     Warfarin Dosing Instructions:  Hold warfarin today only then continue current warfarin dose 2.5 mg daily on Thu; and 5 mg daily rest of week  (0 % change)    Instructed patient to follow up no later than: Mon 12/9    Education provided: target INR goal and significance of current INR result, importance of following up for INR monitoring at instructed interval and importance of taking warfarin as instructed    Bernadette verbalizes understanding and agrees to warfarin dosing plan.    Instructed to call the [facility] for any changes, questions or concerns. (#[phone])   ?   Mallory Grover RN    Subjective/Objective:      Bernadette Yu, a 79 y.o. female is on warfarin.     Bernadette reports:     Home warfarin dose: verbally confirmed home dose with Bernadette and updated on anticoagulation calendar     Missed doses: No     Medication changes:  No     S/S of bleeding or thromboembolism:   No     New Injury or illness:  No     Changes in diet or alcohol consumption:  No     Upcoming surgery, procedure or cardioversion:  No    Anticoagulation Episode Summary     Current INR goal:   2.5-3.5   TTR:   45.6 % (11.9 mo)   Next INR check:   12/9/2019   INR from last check:   3.95! (12/3/2019)   Weekly max warfarin dose:      Target end date:      INR check location:      Preferred lab:      Send INR reminders to:   [facility]    Indications    S/P mitral valve replacement (MVR) [Z95.2]           Comments:            Anticoagulation Care Providers     Provider Role Specialty Phone number    Gabino Bach MD Referring Internal Medicine [phone]

## 2021-06-16 NOTE — TELEPHONE ENCOUNTER
Telephone Encounter by Mallory Grover RN at 1/15/2019  5:02 PM     Author: Mallory Grover RN Service: -- Author Type: Registered Nurse    Filed: 1/15/2019  5:06 PM Encounter Date: 1/15/2019 Status: Attested    : Mallory Grover RN (Registered Nurse) Cosigner: Gabino Bach MD at 1/15/2019  5:19 PM    Attestation signed by Gabino Bach MD at 1/15/2019  5:19 PM    Anticoagulation care reviewed.  I agree with the plan of care.    Gabino Bach MD  [facility]  1/15/2019, 5:19 PM                    ANTICOAGULATION  MANAGEMENT    Assessment     Today's INR result of 3.6 is Supratherapeutic (goal INR of 2.5-3.5)        Warfarin taken as previously instructed    Decreased greens/vitamin K intake may be affecting INR - eating more salad, but reports she has been using iceberg lettuce    No new medication/supplements affecting INR    Continues to tolerate warfarin with no reported s/s of bleeding or thromboembolism     Previous INR was Therapeutic    Plan:     Spoke with Bernadette regarding INR result and instructed:     Warfarin Dosing Instructions:  Continue current warfarin dose 5 mg daily on Sun; and 7.5 mg daily rest of week  (0 % change)  Pt agrees to increase vitamin K intake by a serving or 2 a week    Instructed patient to follow up no later than: 2 weeks    Education provided: importance of consistent vitamin K intake, impact of vitamin K foods on INR, vitamin K content of foods, target INR goal and significance of current INR result, importance of following up for INR monitoring at instructed interval, importance of taking warfarin as instructed and importance of notifying clinic for changes in medications    Bernadette verbalizes understanding and agrees to warfarin dosing plan.    Instructed to call the [facility] for any changes, questions or concerns. (#[phone])   ?   Mallory Grover RN    Subjective/Objective:      Bernadette Yu, a 78 y.o. female is on warfarin.      Bernadette reports:     Home warfarin dose: verbally confirmed home dose with Bernadette and updated on anticoagulation calendar     Missed doses: No     Medication changes:  No     S/S of bleeding or thromboembolism:  No     New Injury or illness:  No     Changes in diet or alcohol consumption:  Yes: less greens     Upcoming surgery, procedure or cardioversion:  No    Anticoagulation Episode Summary     Current INR goal:   2.5-3.5   TTR:   62.6 % (2.9 y)   Next INR check:   1/29/2019   INR from last check:   3.60! (1/15/2019)   Weekly max warfarin dose:      Target end date:      INR check location:      Preferred lab:      Send INR reminders to:   ANTICOAGULATION POOL B (MPW,HUG,STW,RVL,OAK,RLN)    Indications    S/P mitral valve replacement (MVR) [Z95.2]           Comments:            Anticoagulation Care Providers     Provider Role Specialty Phone number    Gabino Bach MD Referring Internal Medicine [phone]

## 2021-06-16 NOTE — TELEPHONE ENCOUNTER
Left message for mom Robina Ramey to call back   Telephone Encounter by Mallory Grover RN at 11/26/2019  4:40 PM     Author: Mallory Grover RN Service: -- Author Type: Registered Nurse    Filed: 11/26/2019  4:43 PM Encounter Date: 11/25/2019 Status: Attested    : Mallory Grover RN (Registered Nurse) Cosigner: Gabino Bach MD at 11/26/2019  4:46 PM    Attestation signed by Gabino Bach MD at 11/26/2019  4:46 PM    Anticoagulation care reviewed.  I agree with the plan of care.    Gabino Bach MD  [facility]  11/26/2019, 4:46 PM                 ANTICOAGULATION  MANAGEMENT    Assessment     Yesterday's INR result of 4.0 is Supratherapeutic (goal INR of 2.5-3.5)        More warfarin taken than instructed which may be affecting INR - unsure it pt held warfarin as instructed, pt reverted back to her dose on her pill bottle again    No new diet changes affecting INR    No new medication/supplements affecting INR    Continues to tolerate warfarin with no reported s/s of bleeding or thromboembolism     Pt is more forgetful lately, having a hard time with her warfarin dosing, reports taking her pills out of the bottle and not a pill box.  SHANTE was in background and I did speak with her, encouraged to help pt write down dosing and to try using a pill box for her medications.     Previous INR was Supratherapeutic    Plan:     Spoke with Bernadette and with SHANTE regarding INR result and instructed:     Warfarin Dosing Instructions:  Hold warfarin today only then continue current warfarin dose 2.5 mg daily on Thu; and 5 mg daily rest of week  (0 % change)    Instructed patient to follow up no later than: at 12/3 OV    Education provided: target INR goal and significance of current INR result, importance of following up for INR monitoring at instructed interval, importance of taking warfarin as instructed, Strategies to improve compliance (pillbox, alarm, calendar, etc), importance of notifying clinic for changes in medications  and importance of notifying clinic for diarrhea, nausea/vomiting, reduced intake and/or illness    Bernadette verbalizes understanding and agrees to warfarin dosing plan.    Instructed to call the [facility] for any changes, questions or concerns. (#[phone])   ?   Mallory Grover RN    Subjective/Objective:      Bernadette Yu, a 79 y.o. female is on warfarin.     Bernadette reports:     Home warfarin dose: verbally confirmed home dose with Bernadette and updated on anticoagulation calendar     Missed doses: No     Medication changes:  No     S/S of bleeding or thromboembolism:  No     New Injury or illness:  No     Changes in diet or alcohol consumption:  No     Upcoming surgery, procedure or cardioversion:  No    Anticoagulation Episode Summary     Current INR goal:   2.5-3.5   TTR:   47.0 % (11.8 mo)   Next INR check:   12/3/2019   INR from last check:   4.00! (11/25/2019)   Weekly max warfarin dose:      Target end date:      INR check location:      Preferred lab:      Send INR reminders to:   [facility]    Indications    S/P mitral valve replacement (MVR) [Z95.2]           Comments:            Anticoagulation Care Providers     Provider Role Specialty Phone number    Gabino Bach MD Referring Internal Medicine [phone]

## 2021-06-16 NOTE — TELEPHONE ENCOUNTER
Telephone Encounter by Mallory Grover RN at 11/18/2019 11:44 AM     Author: Mallory Grover RN Service: -- Author Type: Registered Nurse    Filed: 11/18/2019 11:57 AM Encounter Date: 11/18/2019 Status: Attested    : Mallory Grover RN (Registered Nurse) Cosigner: Gabino Bach MD at 11/18/2019 12:09 PM    Attestation signed by Gabino Bach MD at 11/18/2019 12:09 PM    Anticoagulation care reviewed.  I agree with the plan of care.    Gabino Bach MD  [facility]  11/18/2019, 12:09 PM                 ANTICOAGULATION  MANAGEMENT    Assessment     Today's INR result of 5.3 is Supratherapeutic (goal INR of 2.5-3.5)        More warfarin taken than instructed which may be affecting INR - pt started following the directions on her warfarin bottle (1 tablet MWF; 1.5 tablets all other days) did verify that she does have 5 mg tablets    No new diet changes affecting INR    No new medication/supplements affecting INR    Continues to tolerate warfarin with no reported s/s of bleeding or thromboembolism     Previous INR was Therapeutic    Plan:     Met with Bernadette in clinic regarding INR result and instructed:     Warfarin Dosing Instructions:  Hold warfarin today and tomorrow then change warfarin dose to 2.5 mg daily on Thu; and 5 mg daily rest of week  (7.1 % change)    Instructed patient to follow up no later than: 1 week after OV, will see ACN in clinic    Education provided: target INR goal and significance of current INR result, importance of taking warfarin as instructed, Strategies to improve compliance (pillbox, alarm, calendar, etc) and written instructions provided    Bernadette verbalizes understanding and agrees to warfarin dosing plan.    Instructed to call the [facility] for any changes, questions or concerns. (#[phone])   ?   Mallory Grover RN    Subjective/Objective:      Bernadette NIMCO Yu, a 79 y.o. female is on warfarin.     Bernadette reports:     Home  warfarin dose: verbally confirmed home dose with Bernadette and updated on anticoagulation calendar     Missed doses: No     Medication changes:  No     S/S of bleeding or thromboembolism:  No     New Injury or illness:  No     Changes in diet or alcohol consumption:  No     Upcoming surgery, procedure or cardioversion:  No    Anticoagulation Episode Summary     Current INR goal:   2.5-3.5   TTR:   46.9 %   Next INR check:   11/25/2019   INR from last check:   5.30! (11/18/2019)   Weekly max warfarin dose:      Target end date:      INR check location:      Preferred lab:      [facility]    Indications    S/P mitral valve replacement (MVR) [Z95.2]           Comments:            Anticoagulation Care Providers     Provider Role Specialty Phone number    Gabino Bach MD Referring Internal Medicine [phone]

## 2021-06-16 NOTE — TELEPHONE ENCOUNTER
Telephone Encounter by Mallory Grover RN at 9/10/2019  3:15 PM     Author: Mallory Grover RN Service: -- Author Type: Registered Nurse    Filed: 9/10/2019  3:17 PM Encounter Date: 9/10/2019 Status: Attested    : Mallory Grover RN (Registered Nurse) Cosigner: Gabino Bach MD at 9/10/2019  3:19 PM    Attestation signed by Gabino Bach MD at 9/10/2019  3:19 PM    Anticoagulation care reviewed.  I agree with the plan of care.    Gabino Bach MD  [facility]  9/10/2019, 3:19 PM                   ANTICOAGULATION  MANAGEMENT    Assessment     Today's INR result of 3.0 is Therapeutic (goal INR of 2.5-3.5)        Warfarin taken as previously instructed    No new diet changes affecting INR    No new medication/supplements affecting INR    Continues to tolerate warfarin with no reported s/s of bleeding or thromboembolism     Previous INR was Supratherapeutic    Plan:     Left a detailed message for Bernadette regarding INR result and instructed:     Warfarin Dosing Instructions:  Continue current warfarin dose 7.5 mg daily on Mon, Fri; and 5 mg daily rest of week  (0 % change)    Instructed patient to follow up no later than: at 9/20 OV    Education provided: target INR goal and significance of current INR result and importance of notifying clinic for changes in medications    Instructed to call the [facility] for any changes, questions or concerns. (#[phone])   ?   Mallory Grover RN    Subjective/Objective:      Bernadette Yu, a 79 y.o. female is on warfarin.     Bernadette reports:     Home warfarin dose: as updated on anticoagulation calendar per template     Missed doses: No     Medication changes:  No     S/S of bleeding or thromboembolism:  No     New Injury or illness:  No     Changes in diet or alcohol consumption:  No     Upcoming surgery, procedure or cardioversion:  No    Anticoagulation Episode Summary     Current INR goal:   2.5-3.5   TTR:   50.5 %   Next INR  check:   9/20/2019   INR from last check:   3.00 (9/10/2019)   Weekly max warfarin dose:      Target end date:      INR check location:      Preferred lab:      Send INR reminders to:   [facility]    Indications    S/P mitral valve replacement (MVR) [Z95.2]           Comments:            Anticoagulation Care Providers     Provider Role Specialty Phone number    Gabino Bach MD Referring Internal Medicine [phone]

## 2021-06-16 NOTE — TELEPHONE ENCOUNTER
Telephone Encounter by Sarah Marin RN at 7/26/2019 12:07 PM     Author: Sarah Marin RN Service: -- Author Type: Registered Nurse    Filed: 7/26/2019 12:48 PM Encounter Date: 7/26/2019 Status: Attested    : Sarah Marin RN (Registered Nurse) Cosigner: Gabino Bach MD at 7/26/2019 12:50 PM    Attestation signed by Gabino Bach MD at 7/26/2019 12:50 PM    Anticoagulation care reviewed.  I agree with the plan of care.    Gabino Bach MD  [facility]  7/26/2019, 12:50 PM                    ANTICOAGULATION  MANAGEMENT    Assessment     Today's INR result of 2.1 is Subtherapeutic (goal INR of 2.5-3.5)        Warfarin taken as previously instructed    No new diet changes affecting INR    No new medication/supplements affecting INR    Continues to tolerate warfarin with no reported s/s of bleeding or thromboembolism     Previous INR was Therapeutic    Plan:     Spoke with Bernadette regarding INR result and instructed:     Warfarin Dosing Instructions:  Change warfarin dose to 5 mg daily on Sun/Thurs; and 7.5 mg daily rest of week  (11 % change)    Instructed patient to follow up no later than: two weeks    Education provided: importance of consistent vitamin K intake, impact of vitamin K foods on INR and importance of therapeutic range    Bernadette verbalizes understanding and agrees to warfarin dosing plan.    Instructed to call the [facility] for any changes, questions or concerns. (#[phone])   ?   Sarah Marin RN    Subjective/Objective:      Bernadette Yu, a 78 y.o. female is on warfarin.     Bernadette reports:     Home warfarin dose: as updated on anticoagulation calendar per template     Missed doses: No     Medication changes:  No     S/S of bleeding or thromboembolism:  No     New Injury or illness:  No     Changes in diet or alcohol consumption:  No     Upcoming surgery, procedure or cardioversion:  No    Anticoagulation Episode Summary      Current INR goal:   2.5-3.5   TTR:   61.1 % (3.4 y)   Next INR check:   8/9/2019   INR from last check:   2.10! (7/26/2019)   Weekly max warfarin dose:      Target end date:      INR check location:      Preferred lab:      Send INR reminders to:   [facility]    Indications    S/P mitral valve replacement (MVR) [Z95.2]           Comments:            Anticoagulation Care Providers     Provider Role Specialty Phone number    Gabino Bach MD Referring Internal Medicine [phone]

## 2021-06-16 NOTE — TELEPHONE ENCOUNTER
Telephone Encounter by Mallory Grover RN at 10/18/2019  4:55 PM     Author: Mallory Grover RN Service: -- Author Type: Registered Nurse    Filed: 10/18/2019  5:01 PM Encounter Date: 10/18/2019 Status: Attested    : Mallory Grover RN (Registered Nurse) Cosigner: Gabino Bach MD at 10/18/2019  5:01 PM    Attestation signed by Gabino Bach MD at 10/18/2019  5:01 PM    Anticoagulation care reviewed.  I agree with the plan of care.    Gabino Bach MD  [facility]  10/18/2019, 5:01 PM                 ANTICOAGULATION  MANAGEMENT    Assessment     Today's INR result of 4.7 is Supratherapeutic (goal INR of 2.5-3.5)        Warfarin taken as previously instructed    dental work may be affecting diet and INR - reports 1 day of a decreased diet due to dental work    Interaction between amiodarone and warfarin may be affecting INR - dose was decreased today from 1 two times a day to 1 daily    Continues to tolerate warfarin with no reported s/s of bleeding or thromboembolism     Previous INR was Supratherapeutic    Plan:     Spoke with Bernadette regarding INR result and instructed:     Warfarin Dosing Instructions:  Hold warfarin today only, take 2.5 mg Sat then change warfarin dose to 2.5 mg daily on Tue; and 5 mg daily rest of week  (13.3 % change)    Instructed patient to follow up no later than: 1 week    Education provided: target INR goal and significance of current INR result, importance of following up for INR monitoring at instructed interval, importance of taking warfarin as instructed, potential interaction between warfarin and amiodarone and importance of notifying clinic for changes in medications    Bernadette verbalizes understanding and agrees to warfarin dosing plan.    Instructed to call the [facility] for any changes, questions or concerns. (#[phone])   ?   Mallory Grover RN    Subjective/Objective:      Bernadette NIMCO Yu, a 79 y.o. female is on warfarin.      Bernadette reports:     Home warfarin dose: verbally confirmed home dose with Bernadette and updated on anticoagulation calendar     Missed doses: No     Medication changes:  Yes: amiodarone dose decreased today     S/S of bleeding or thromboembolism:  No     New Injury or illness:  No     Changes in diet or alcohol consumption:  No     Upcoming surgery, procedure or cardioversion:  No    Anticoagulation Episode Summary     Current INR goal:   2.5-3.5   TTR:   46.2 %   Next INR check:   10/25/2019   INR from last check:   4.70! (10/18/2019)   Weekly max warfarin dose:      Target end date:      INR check location:      Preferred lab:      [facility]    Indications    S/P mitral valve replacement (MVR) [Z95.2]           Comments:            Anticoagulation Care Providers     Provider Role Specialty Phone number    Gabino Bach MD Referring Internal Medicine [phone]

## 2021-06-16 NOTE — TELEPHONE ENCOUNTER
Telephone Encounter by Christine Cardenas at 2/12/2020  2:07 PM     Author: Christine Cardenas Service: -- Author Type: --    Filed: 2/12/2020  2:07 PM Encounter Date: 2/12/2020 Status: Signed    : Christine Carednas APPROVED:    Approval start date: 11/14/2019  Approval end date:  02/11/2021    Pharmacy has been notified of approval and will contact patient when medication is ready for pickup.

## 2021-06-16 NOTE — TELEPHONE ENCOUNTER
Telephone Encounter by To Burgos RN at 3/26/2020  1:58 PM     Author: To Burgos RN Service: -- Author Type: RN, Care Manager    Filed: 3/26/2020  2:15 PM Encounter Date: 3/26/2020 Status: Attested    : To Burgos RN (RN, Care Manager) Cosigner: Gabino Bach MD at 3/26/2020  2:47 PM    Attestation signed by Gabino Bach MD at 3/26/2020  2:47 PM    Anticoagulation care reviewed.  I agree with the plan of care.    Gabino Bach MD  [facility]  3/26/2020, 2:47 PM                 ANTICOAGULATION  MANAGEMENT    Assessment     Today's INR result of 5.4 is Supratherapeutic (goal INR of 2.5-3.5)        Warfarin taken as previously instructed    No new diet changes affecting INR    Potential interaction between Augmentin and warfarin which may affect subsequent INRs- starting today x14 days for osteomyelitis of great toe.    Continues to tolerate warfarin with no reported s/s of bleeding or thromboembolism     Previous INR was Therapeutic    Plan:     Spoke with Bernadette regarding INR result and instructed:     Warfarin Dosing Instructions:  Hold today and tmr then change warfarin dose to 2.5 mg daily on Sun, Wed; and 5 mg daily rest of week  (14 % change)    Instructed patient to follow up no later than: Mon 3/30.     Education provided: importance of taking warfarin as instructed, potential interaction between warfarin and Augmentin, monitoring for bleeding signs and symptoms and when to seek medical attention/emergency care    Bernadette verbalizes understanding and agrees to warfarin dosing plan.    Instructed to call the [facility] for any changes, questions or concerns. (#[phone])   ?   To Burgos RN    Subjective/Objective:      Bernadette NIMCO Yu, a 79 y.o. female is on warfarin.     Bernadette reports:     Home warfarin dose: verbally confirmed home dose with pt and updated on anticoagulation calendar     Missed doses:  No     Medication changes:  Yes: starting Augmentin x14 days.      S/S of bleeding or thromboembolism:  No     New Injury or illness:  Yes: osteomyelitis of great toe     Changes in diet or alcohol consumption:  No     Upcoming surgery, procedure or cardioversion:  No    Anticoagulation Episode Summary     Current INR goal:   2.5-3.5   TTR:   41.5 % (11.4 mo)   Next INR check:   3/30/2020   INR from last check:   5.40! (3/26/2020)   Weekly max warfarin dose:      Target end date:      INR check location:      Preferred lab:      Send INR reminders to:   [facility]    Indications    S/P mitral valve replacement (MVR) - mechanical mitral valve placed 2015 [Z95.2]           Comments:            Anticoagulation Care Providers     Provider Role Specialty Phone number    Gabino Bach MD Referring Internal Medicine [phone]

## 2021-06-16 NOTE — TELEPHONE ENCOUNTER
Telephone Encounter by Mallory Grover RN at 6/26/2019  1:21 PM     Author: Mallory Grover RN Service: -- Author Type: Registered Nurse    Filed: 6/26/2019  1:34 PM Encounter Date: 6/26/2019 Status: Attested    : Mallory Grover RN (Registered Nurse) Cosigner: Gabino Bach MD at 6/26/2019  1:42 PM    Attestation signed by Gabino Bach MD at 6/26/2019  1:42 PM    Anticoagulation care reviewed.  I agree with the plan of care.    Gabino Bach MD  [facility]  6/26/2019, 1:42 PM                    ANTICOAGULATION  MANAGEMENT    Assessment     Today's INR result of 2.3 is Subtherapeutic (goal INR of 2.5-3.5)        Warfarin taken as previously instructed    No new diet changes affecting INR    No new medication/supplements affecting INR    Continues to tolerate warfarin with no reported s/s of bleeding or thromboembolism     Previous INR was Therapeutic    Plan:     Spoke with Bernadette regarding INR result and instructed:     Warfarin Dosing Instructions:  Change warfarin dose to 7.5 mg daily on Mon, Wed, Fri; and 5 mg daily rest of week  (6.3 % change)    Instructed patient to follow up no later than: 2 weeks    Education provided: importance of consistent vitamin K intake, target INR goal and significance of current INR result, importance of following up for INR monitoring at instructed interval, importance of taking warfarin as instructed and importance of notifying clinic for changes in medications    Bernadette verbalizes understanding and agrees to warfarin dosing plan.    Instructed to call the [facility] for any changes, questions or concerns. (#[phone])   ?   Mallory Grover RN    Subjective/Objective:      Bernadette Yu, a 78 y.o. female is on warfarin.     Bernadette reports:     Home warfarin dose: verbally confirmed home dose with Bernadette and updated on anticoagulation calendar     Missed doses: No     Medication changes:  No     S/S of bleeding or thromboembolism:   No     New Injury or illness:  No     Changes in diet or alcohol consumption:  No     Upcoming surgery, procedure or cardioversion:  No    Anticoagulation Episode Summary     Current INR goal:   2.5-3.5   TTR:   61.2 % (3.3 y)   Next INR check:   7/10/2019   INR from last check:   2.30! (6/26/2019)   Weekly max warfarin dose:      Target end date:      INR check location:      Preferred lab:      Send INR reminders to:   [facility]    Indications    S/P mitral valve replacement (MVR) [Z95.2]           Comments:            Anticoagulation Care Providers     Provider Role Specialty Phone number    Gabino Bach MD Referring Internal Medicine [phone]

## 2021-06-16 NOTE — TELEPHONE ENCOUNTER
Telephone Encounter by To Burgos RN at 10/10/2019  3:16 PM     Author: To Burgos RN Service: -- Author Type: RN, Care Manager    Filed: 10/10/2019  3:37 PM Encounter Date: 10/10/2019 Status: Attested    : To Burgos RN (RN, Care Manager) Cosigner: Gabino Bach MD at 10/10/2019  4:10 PM    Attestation signed by Gabino Bach MD at 10/10/2019  4:10 PM    Anticoagulation care reviewed.  I agree with the plan of care.    Gabino Bach MD  [facility]  10/10/2019, 4:10 PM                 ANTICOAGULATION  MANAGEMENT    Assessment     Today's INR result of 4.8 is Supratherapeutic (goal INR of 2.5-3.5)        More warfarin taken than instructed which may be affecting INR    No new diet changes affecting INR    Potential interaction between Amiodarone and warfarin which may affect subsequent INRs- started around 9/28/19.     Continues to tolerate warfarin with no reported s/s of bleeding or thromboembolism     Previous INR was Supratherapeutic    Plan:     Spoke with Bernadette regarding INR result and instructed:     Warfarin Dosing Instructions:  Hold today then change warfarin dose to 7.5 mg daily on Wed; and 5 mg daily rest of week  (16 % change)    Instructed patient to follow up no later than: OV on 10/18.     Education provided: importance of taking warfarin as instructed    Bernadette verbalizes understanding and agrees to warfarin dosing plan.    Instructed to call the [facility] for any changes, questions or concerns. (#[phone])   ?   To Burgos RN    Subjective/Objective:      Bernadette Yu, a 79 y.o. female is on warfarin.     Bernadette reports:     Home warfarin dose: verbally confirmed home dose with Bernadette and updated on anticoagulation calendar     Missed doses: No     Medication changes:  Yes, started Amiodarone around 9/28.      S/S of bleeding or thromboembolism:  No     New Injury or illness:  No     Changes in diet or  alcohol consumption:  No     Upcoming surgery, procedure or cardioversion:  No    Anticoagulation Episode Summary     Current INR goal:   2.5-3.5   TTR:   47.5 %   Next INR check:   10/18/2019   INR from last check:   4.80! (10/10/2019)   Weekly max warfarin dose:      Target end date:      INR check location:      Preferred lab:      Send INR reminders to:   [facility]    Indications    S/P mitral valve replacement (MVR) [Z95.2]           Comments:            Anticoagulation Care Providers     Provider Role Specialty Phone number    Gabino Bach MD Referring Internal Medicine [phone]

## 2021-06-16 NOTE — TELEPHONE ENCOUNTER
Telephone Encounter by Mallory Grover RN at 3/19/2019  4:21 PM     Author: Mallory Grover RN Service: -- Author Type: Registered Nurse    Filed: 3/19/2019  4:25 PM Encounter Date: 3/19/2019 Status: Attested    : Mallory Grover RN (Registered Nurse) Cosigner: Gabino Bach MD at 3/19/2019  5:17 PM    Attestation signed by Gabino Bach MD at 3/19/2019  5:17 PM    Anticoagulation care reviewed.  I agree with the plan of care.    Gabino Bach MD  [facility]  3/19/2019, 5:17 PM                    ANTICOAGULATION  MANAGEMENT    Assessment     Today's INR result of 3.5 is Therapeutic (goal INR of 2.5-3.5)        Warfarin taken as previously instructed    No new diet changes affecting INR    No new medication/supplements affecting INR    Continues to tolerate warfarin with no reported s/s of bleeding or thromboembolism     Previous INR was Therapeutic    Plan:     Spoke with Bernadette regarding INR result and instructed:     Warfarin Dosing Instructions:  Continue current warfarin dose 5 mg daily on Sun, Tue, Thu; and 7.5 mg daily rest of week  (0 % change)    Instructed patient to follow up no later than: 4 weeks    Education provided: impact of vitamin K foods on INR, target INR goal and significance of current INR result, importance of following up for INR monitoring at instructed interval, importance of taking warfarin as instructed and importance of notifying clinic for changes in medications    Bernadette verbalizes understanding and agrees to warfarin dosing plan.    Instructed to call the [facility] for any changes, questions or concerns. (#[phone])   ?   Mallory Grover RN    Subjective/Objective:      Bernadette Yu, a 78 y.o. female is on warfarin.     Bernadette reports:     Home warfarin dose: verbally confirmed home dose with Bernadette and updated on anticoagulation calendar     Missed doses: No     Medication changes:  No     S/S of bleeding or thromboembolism:   No     New Injury or illness:  No     Changes in diet or alcohol consumption:  No     Upcoming surgery, procedure or cardioversion:  No    Anticoagulation Episode Summary     Current INR goal:   2.5-3.5   TTR:   62.4 % (3 y)   Next INR check:   4/16/2019   INR from last check:   3.50 (3/19/2019)   Weekly max warfarin dose:      Target end date:      INR check location:      Preferred lab:      Send INR reminders to:   ANTICOAGULATION POOL B (MPW,HUG,STW,RVL,OAK,RLN)    Indications    S/P mitral valve replacement (MVR) [Z95.2]           Comments:            Anticoagulation Care Providers     Provider Role Specialty Phone number    Gabino Bach MD Referring Internal Medicine [phone]

## 2021-06-16 NOTE — PROGRESS NOTES
ANTICOAGULATION  MANAGEMENT    Reviewed records from recent Hospital Admission for colonsocpy.    No adjustment to anticoagulation plan needed. Pt will f/u on Friday with an INR check.    Mallory Grover RN

## 2021-06-16 NOTE — TELEPHONE ENCOUNTER
Controlled Substance Refill Request  Medication Name:   Requested Prescriptions     Pending Prescriptions Disp Refills     HYDROmorphone (DILAUDID) 4 MG tablet 120 tablet 0     Sig: Take 0.5-1 tablets (2-4 mg total) by mouth 4 (four) times a day as needed for pain. May refill every 30 days only.     Date Last Fill: 2/18/21  [facility]ed Pharmacy: [facility]  Submit electronically to pharmacy  Controlled Substance Agreement on file:   Encounter-Level CSA Scan Date - 08/01/2018:    Scan on 8/6/2018  8:40 AM: CHRONIC PAIN MANAGEMENT           Encounter-Level CSA Scan Date - 01/30/2018:    Scan on 2/1/2018 12:13 PM: HE -           Encounter-Level CSA Scan Date - 02/09/2017:    Scan on 2/10/2017  3:16 PM        Last office visit:  3/4/21

## 2021-06-16 NOTE — TELEPHONE ENCOUNTER
Telephone Encounter by Kandy Gonzales RN at 4/16/2019  1:36 PM     Author: Kandy Gonzales RN Service: -- Author Type: Registered Nurse    Filed: 4/16/2019  1:45 PM Encounter Date: 4/16/2019 Status: Attested    : Kandy Gonzales RN (Registered Nurse) Cosigner: Gabino Bach MD at 4/16/2019  1:46 PM    Attestation signed by Gabino Bach MD at 4/16/2019  1:46 PM    Anticoagulation care reviewed.  I agree with the plan of care.    Gabino Bach MD  [facility]  4/16/2019, 1:46 PM                   ANTICOAGULATION  MANAGEMENT    Assessment     Today's INR result of 3.9 is Supratherapeutic (goal INR of 2.5-3.5)       Warfarin taken as previously instructed    No new diet changes affecting INR    No new medication/supplements affecting INR    Continues to tolerate warfarin with no reported s/s of bleeding or thromboembolism     Previous INR was Therapeutic x2 on current dose, but on higher end of normal.  Patient preferred to not change dose yet.  Will do one time decreased dose then resume current dose and recheck in two weeks and if next INR elevated will decrease dose.     Plan:     Spoke with Bernadette regarding INR result and instructed:     Warfarin Dosing Instructions:  2.5mg dose today only 4/16  then continue current warfarin dose 5 mg daily on Sun, Tues, Thurs; and 7.5 mg daily rest of week  (0 % change)    Instructed patient to follow up no later than: 2 weeks     Education provided: importance of therapeutic range, target INR goal and significance of current INR result, monitoring for bleeding signs and symptoms and when to seek medical attention/emergency care    Bernadette verbalizes understanding and agrees to warfarin dosing plan.    Instructed to call the [facility] for any changes, questions or concerns. (#[phone])   ?   Kandy Gonzales RN    Subjective/Objective:      Bernadette NIMCO Yu, a 78 y.o. female is on warfarin.     Bernadette reports:     Home warfarin dose: verbally  confirmed home dose with Bernadette and updated on anticoagulation calendar     Missed doses: No     Medication changes:  No     S/S of bleeding or thromboembolism:  No     New Injury or illness:  No     Changes in diet or alcohol consumption:  No     Upcoming surgery, procedure or cardioversion:  No    Anticoagulation Episode Summary     Current INR goal:   2.5-3.5   TTR:   60.9 % (3.1 y)   Next INR check:   4/30/2019   INR from last check:   3.90! (4/16/2019)   Weekly max warfarin dose:      Target end date:      INR check location:      Preferred lab:      Send INR reminders to:   ANTICOAGULATION POOL B (MPW,HUG,STW,RVL,OAK,RLN)    Indications    S/P mitral valve replacement (MVR) [Z95.2]           Comments:            Anticoagulation Care Providers     Provider Role Specialty Phone number    Gabino Bach MD Referring Internal Medicine [phone]

## 2021-06-16 NOTE — TELEPHONE ENCOUNTER
Telephone Encounter by Mallory Grover RN at 9/26/2019  1:31 PM     Author: Mallory Grover RN Service: -- Author Type: Registered Nurse    Filed: 9/26/2019  1:35 PM Encounter Date: 9/26/2019 Status: Attested    : Mallory Grover RN (Registered Nurse) Cosigner: Gabino Bach MD at 9/26/2019  1:41 PM    Attestation signed by Gabino Bach MD at 9/26/2019  1:41 PM    Anticoagulation care reviewed.  I agree with the plan of care.    Gabino Bach MD  [facility]  9/26/2019, 1:40 PM                 ANTICOAGULATION  MANAGEMENT    Assessment     Today's INR result of 4.72 is Supratherapeutic (goal INR of 2.5-3.5)        Warfarin taken as previously instructed    No new diet changes affecting INR    Potential interaction between amiodarone and warfarin which may affect subsequent INRs - prescribed at today's OV, will likely start tomorrow    Continues to tolerate warfarin with no reported s/s of bleeding or thromboembolism     Previous INR was Therapeutic    Plan:     Spoke with Bernadette regarding INR result and instructed:     Warfarin Dosing Instructions:  Hold warfarin today only then change warfarin dose to 5 mg daily   (12.5 % change)    Instructed patient to follow up no later than: 10 days    Education provided: target INR goal and significance of current INR result, importance of following up for INR monitoring at instructed interval, importance of taking warfarin as instructed, potential interaction between warfarin and amiodarone, importance of notifying clinic for changes in medications, monitoring for bleeding signs and symptoms and when to seek medical attention/emergency care    Bernadette verbalizes understanding and agrees to warfarin dosing plan.    Instructed to call the [facility] for any changes, questions or concerns. (#[phone])   ?   Mallory Grover RN    Subjective/Objective:      Bernadette Yu, a 79 y.o. female is on warfarin.     Bernadette reports:     Home  warfarin dose: verbally confirmed home dose with Bernadette and updated on anticoagulation calendar     Missed doses: No     Medication changes:  Yes: amiodarone prescribed at today's OV, will start tomorrow     S/S of bleeding or thromboembolism:  No     New Injury or illness:  No     Changes in diet or alcohol consumption:  No     Upcoming surgery, procedure or cardioversion:  No    Anticoagulation Episode Summary     Current INR goal:   2.5-3.5   TTR:   48.9 %   Next INR check:   10/7/2019   INR from last check:   4.72! (9/26/2019)   Weekly max warfarin dose:      Target end date:      INR check location:      Preferred lab:      [facility]   [facility] [place]    Indications    S/P mitral valve replacement (MVR) [Z95.2]           Comments:            Anticoagulation Care Providers     Provider Role Specialty Phone number    Gabino Bach MD Referring Internal Medicine [phone]

## 2021-06-16 NOTE — TELEPHONE ENCOUNTER
Telephone Encounter by Mallory Grover RN at 4/30/2019  4:37 PM     Author: Mallory Grover RN Service: -- Author Type: Registered Nurse    Filed: 4/30/2019  4:49 PM Encounter Date: 4/30/2019 Status: Attested    : Mallory Grover RN (Registered Nurse) Cosigner: Gabino Bach MD at 4/30/2019  5:08 PM    Attestation signed by Gabino Bach MD at 4/30/2019  5:08 PM    Anticoagulation care reviewed.  I agree with the plan of care.    Gabino Bach MD  [facility]  4/30/2019, 5:08 PM                 s                ANTICOAGULATION  MANAGEMENT    Assessment     Today's INR result of 4.7 is Supratherapeutic (goal INR of 2.0-3.0)        Warfarin taken as previously instructed    Increased greens/vitamin K intake may be affecting INR    No new medication/supplements affecting INR    Continues to tolerate warfarin with no reported s/s of bleeding or thromboembolism     Previous INR was Supratherapeutic    Plan:     Spoke with Bernadette regarding INR result and instructed:     Warfarin Dosing Instructions:  Hold warfarin today only then change warfarin dose to 7.5 mg daily on Mon, Fri; and 5 mg daily rest of week  (11.1 % change)    Instructed patient to follow up no later than: 7-10 days    Education provided: target INR goal and significance of current INR result, importance of following up for INR monitoring at instructed interval, importance of taking warfarin as instructed, importance of notifying clinic for changes in medications, monitoring for bleeding signs and symptoms and importance of notifying clinic for diarrhea, nausea/vomiting, reduced intake and/or illness    Bernadette verbalizes understanding and agrees to warfarin dosing plan.    Instructed to call the [facility] for any changes, questions or concerns. (#[phone])   ?   Mallory Grover RN    Subjective/Objective:      Bernadette NIMCO Yu, a 78 y.o. female is on warfarin.     Bernadette reports:     Home warfarin dose:  verbally confirmed home dose with Bernadette and updated on anticoagulation calendar     Missed doses: No     Medication changes:  No     S/S of bleeding or thromboembolism:  No     New Injury or illness:  No     Changes in diet or alcohol consumption:  Yes: reports she switched from eating iceberg lettuce to more kale/spinach/darker greens in her salads     Upcoming surgery, procedure or cardioversion:  No    Anticoagulation Episode Summary     Current INR goal:   2.5-3.5   TTR:   60.1 % (3.2 y)   Next INR check:   5/10/2019   INR from last check:   4.70! (4/30/2019)   Weekly max warfarin dose:      Target end date:      INR check location:      Preferred lab:      Send INR reminders to:   ANTICOAGULATION POOL B (MPW,HUG,STW,RVL,OAK,RLN)    Indications    S/P mitral valve replacement (MVR) [Z95.2]           Comments:            Anticoagulation Care Providers     Provider Role Specialty Phone number    Gabino Bach MD Referring Internal Medicine [phone]

## 2021-06-16 NOTE — TELEPHONE ENCOUNTER
Telephone Encounter by Mallory Grover RN at 3/16/2020  5:45 PM     Author: Mallory Grover RN Service: -- Author Type: Registered Nurse    Filed: 3/16/2020  5:47 PM Encounter Date: 3/16/2020 Status: Attested    : Mallory Grover RN (Registered Nurse) Cosigner: Gabino Bach MD at 3/16/2020  8:14 PM    Attestation signed by Gabino Bach MD at 3/16/2020  8:14 PM    Anticoagulation care reviewed.  I agree with the plan of care.    Gabino Bach MD  [facility]  3/16/2020, 8:14 PM                 ANTICOAGULATION  MANAGEMENT    Assessment     Today's INR result of 3.3 is Therapeutic (goal INR of 2.5-3.5)        Warfarin taken as previously instructed    No new diet changes affecting INR    No new medication/supplements affecting INR    Continues to tolerate warfarin with no reported s/s of bleeding or thromboembolism     Previous INR was Subtherapeutic    Plan:     Left a detailed message for Bernadette regarding INR result and instructed:     Warfarin Dosing Instructions:  Continue current warfarin dose 5 mg daily  (0 % change)    Instructed patient to follow up no later than: 2-3 weeks    Education provided: target INR goal and significance of current INR result, importance of notifying clinic for changes in medications and importance of notifying clinic for diarrhea, nausea/vomiting, reduced intake and/or illness    Instructed to call the [facility] for any changes, questions or concerns. (#[phone])   ?   Mallory Grover RN    Subjective/Objective:      Bernadettejeromy Yu, a 79 y.o. female is on warfarin.     Bernadette reports:     Home warfarin dose: as updated on anticoagulation calendar per template     Missed doses: No     Medication changes:  No     S/S of bleeding or thromboembolism:  No     New Injury or illness:  No, but has a wound on heel     Changes in diet or alcohol consumption:  No     Upcoming surgery, procedure or cardioversion:  No    Anticoagulation  Episode Summary     Current INR goal:   2.5-3.5   TTR:   41.9 % (11.4 mo)   Next INR check:   4/6/2020   INR from last check:   3.30 (3/16/2020)   Weekly max warfarin dose:      Target end date:      INR check location:      Preferred lab:      Send INR reminders to:   [facility]    Indications    S/P mitral valve replacement (MVR) - mechanical mitral valve placed 2015 [Z95.2]           Comments:            Anticoagulation Care Providers     Provider Role Specialty Phone number    Gabino Bach MD Referring Internal Medicine [phone]

## 2021-06-16 NOTE — TELEPHONE ENCOUNTER
Telephone Encounter by Mallory Grover RN at 5/8/2019  3:04 PM     Author: Mallory Grover RN Service: -- Author Type: Registered Nurse    Filed: 5/8/2019  3:08 PM Encounter Date: 5/8/2019 Status: Attested    : Mallory Grover RN (Registered Nurse) Cosigner: Gabino Bach MD at 5/8/2019  6:27 PM    Attestation signed by Gabino Bach MD at 5/8/2019  6:27 PM    Anticoagulation care reviewed.  I agree with the plan of care.    Gabino Bach MD  [facility]  5/8/2019, 6:27 PM                    ANTICOAGULATION  MANAGEMENT    Assessment     Today's INR result of 2.9 is Therapeutic (goal INR of 2.5-3.5)        Warfarin taken as previously instructed    No new diet changes affecting INR    No new medication/supplements affecting INR    Continues to tolerate warfarin with no reported s/s of bleeding or thromboembolism     Previous INR was Supratherapeutic    Plan:     Spoke with Bernadette regarding INR result and instructed:     Warfarin Dosing Instructions:  Continue current warfarin dose 7.5 mg daily on Mon, Fri; and 5 mg daily rest of week  (0 % change)    Instructed patient to follow up no later than: 2 weeks    Education provided: target INR goal and significance of current INR result, importance of following up for INR monitoring at instructed interval, importance of taking warfarin as instructed and importance of notifying clinic for changes in medications    Bernadette verbalizes understanding and agrees to warfarin dosing plan.    Instructed to call the [facility] for any changes, questions or concerns. (#[phone])   ?   Mallory Grover RN    Subjective/Objective:      Bernadette Yu, a 78 y.o. female is on warfarin.     Bernadette reports:     Home warfarin dose: verbally confirmed home dose with Bernadette and updated on anticoagulation calendar     Missed doses: No     Medication changes:  No     S/S of bleeding or thromboembolism:  No     New Injury or illness:  No     Changes  in diet or alcohol consumption:  No     Upcoming surgery, procedure or cardioversion:  No    Anticoagulation Episode Summary     Current INR goal:   2.5-3.5   TTR:   60.0 % (3.2 y)   Next INR check:   5/22/2019   INR from last check:   2.90 (5/8/2019)   Weekly max warfarin dose:      Target end date:      INR check location:      Preferred lab:      Send INR reminders to:   ANTICOAGULATION POOL B (MPW,HUG,STW,RVL,OAK,RLN)    Indications    S/P mitral valve replacement (MVR) [Z95.2]           Comments:            Anticoagulation Care Providers     Provider Role Specialty Phone number    Gabino Bach MD Referring Internal Medicine [phone]

## 2021-06-16 NOTE — TELEPHONE ENCOUNTER
Telephone Encounter by Mallory Grover RN at 3/7/2019  1:48 PM     Author: Mallory Grover RN Service: -- Author Type: Registered Nurse    Filed: 3/7/2019  1:50 PM Encounter Date: 3/7/2019 Status: Attested    : Mallory Grover RN (Registered Nurse) Cosigner: Gabino Bach MD at 3/7/2019  1:52 PM    Attestation signed by Gabino Bach MD at 3/7/2019  1:52 PM    Anticoagulation care reviewed.  I agree with the plan of care.    Gabino Bach MD  [facility]  3/7/2019, 1:52 PM                    ANTICOAGULATION  MANAGEMENT    Assessment     Today's INR result of 3.5 is Therapeutic (goal INR of 2.5-3.5)        Warfarin taken as previously instructed    No new diet changes affecting INR    No new medication/supplements affecting INR    Continues to tolerate warfarin with no reported s/s of bleeding or thromboembolism     Previous INR was Therapeutic    Plan:     Left a detailed message for Bernadette regarding INR result and instructed:     Warfarin Dosing Instructions:  Continue current warfarin dose 5 mg daily on Sun, Tue, Thu; and 7.5 mg daily rest of week  (0 % change)    Instructed patient to follow up no later than: 4 weeks    Education provided: target INR goal and significance of current INR result and importance of notifying clinic for changes in medications     Instructed to call the [facility] for any changes, questions or concerns. (#[phone])   ?   Mallory Grover RN    Subjective/Objective:      Bernadette NIMCO Yu, a 78 y.o. female is on warfarin.     Bernadette reports:     Home warfarin dose: as updated on anticoagulation calendar per template     Missed doses: No     Medication changes:  No     S/S of bleeding or thromboembolism:  No     New Injury or illness:  No     Changes in diet or alcohol consumption:  No     Upcoming surgery, procedure or cardioversion:  No    Anticoagulation Episode Summary     Current INR goal:   2.5-3.5   TTR:   62.0 % (3 y)   Next INR check:    4/4/2019   INR from last check:   3.50 (3/7/2019)   Weekly max warfarin dose:      Target end date:      INR check location:      Preferred lab:      Send INR reminders to:   ANTICOAGULATION POOL B (MPW,AURELIO,STW,RVL,OAK,RLN)    Indications    S/P mitral valve replacement (MVR) [Z95.2]           Comments:            Anticoagulation Care Providers     Provider Role Specialty Phone number    Gabino Bach MD Referring Internal Medicine [phone]

## 2021-06-16 NOTE — TELEPHONE ENCOUNTER
Telephone Encounter by Mallory Grover RN at 8/5/2019  3:01 PM     Author: Mallory Grover RN Service: -- Author Type: Registered Nurse    Filed: 8/5/2019  3:06 PM Encounter Date: 8/5/2019 Status: Attested    : Mallory Grover RN (Registered Nurse) Cosigner: Gabino Bach MD at 8/5/2019  3:14 PM    Attestation signed by Gabino Bach MD at 8/5/2019  3:14 PM    Anticoagulation care reviewed.  I agree with the plan of care.    Gabino Bach MD  [facility]  8/5/2019, 3:13 PM                   ANTICOAGULATION  MANAGEMENT    Assessment     Today's INR result of 3.7 is Supratherapeutic (goal INR of 2.5-3.5)        Warfarin taken as previously instructed    No new diet changes affecting INR    No new medication/supplements affecting INR    Continues to tolerate warfarin with no reported s/s of bleeding or thromboembolism     Previous INR was Subtherapeutic    Plan:     Spoke with Bernadette regarding INR result and instructed:     Warfarin Dosing Instructions:  Continue current warfarin dose 5 mg daily on Sun, Thu; and 7.5 mg daily rest of week  (0 % change)    Instructed patient to follow up no later than: 2 weeks    Education provided: target INR goal and significance of current INR result, importance of following up for INR monitoring at instructed interval, importance of taking warfarin as instructed and importance of notifying clinic for changes in medications    Bernadette verbalizes understanding and agrees to warfarin dosing plan.    Instructed to call the [facility] for any changes, questions or concerns. (#[phone])   ?   Mallory Grover RN    Subjective/Objective:      Bernadette Yu, a 78 y.o. female is on warfarin.     eBrnadette reports:     Home warfarin dose: template incorrect; verbally confirmed home dose with Bernadette and updated on anticoagulation calendar     Missed doses: No     Medication changes:  No     S/S of bleeding or thromboembolism:  No     New Injury or  illness:  No     Changes in diet or alcohol consumption:  No     Upcoming surgery, procedure or cardioversion:  No    Anticoagulation Episode Summary     Current INR goal:   2.5-3.5   TTR:   61.1 % (3.4 y)   Next INR check:   8/19/2019   INR from last check:   3.70! (8/5/2019)   Weekly max warfarin dose:      Target end date:      INR check location:      Preferred lab:      [facility] reminders [facility]:   [facility] [place]    Indications    S/P mitral valve replacement (MVR) [Z95.2]           Comments:            Anticoagulation Care Providers     Provider Role Specialty Phone number    Gabino Bach MD Referring Internal Medicine [phone]

## 2021-06-16 NOTE — TELEPHONE ENCOUNTER
Telephone Encounter by Mallory Grover RN at 9/3/2019  4:57 PM     Author: Mallory Grover RN Service: -- Author Type: Registered Nurse    Filed: 9/3/2019  5:05 PM Encounter Date: 9/3/2019 Status: Attested    : Mallory Grover RN (Registered Nurse) Cosigner: Gabino Bach MD at 9/3/2019  5:44 PM    Attestation signed by Gabino Bach MD at 9/3/2019  5:44 PM    Anticoagulation care reviewed.  I agree with the plan of care.    Gabino Bach MD  [facility]  9/3/2019, 5:44 PM                   ANTICOAGULATION  MANAGEMENT    Assessment     Today's INR result of 4.5 is Supratherapeutic (goal INR of 2.0-3.0)        Warfarin taken as previously instructed    No new diet changes affecting INR    No new medication/supplements affecting INR    Continues to tolerate warfarin with no reported s/s of bleeding or thromboembolism     Previous INR was Supratherapeutic    Plan:     Spoke with Bernadette regarding INR result and instructed:     Warfarin Dosing Instructions:  Hold warfarin today only then change warfarin dose to 7.5 mg daily on Mon, Fri; and 5 mg daily rest of week  (11.1 % change)    Instructed patient to follow up no later than: 7-10 days    Education provided: importance of consistent vitamin K intake, target INR goal and significance of current INR result, importance of following up for INR monitoring at instructed interval, importance of taking warfarin as instructed and importance of notifying clinic for changes in medications    Bernadette verbalizes understanding and agrees to warfarin dosing plan.    Instructed to call the [facility] for any changes, questions or concerns. (#[phone])   ?   Mallory Grover RN    Subjective/Objective:      Bernadette Yu, a 78 y.o. female is on warfarin.     Bernadette reports:     Home warfarin dose: verbally confirmed home dose with Bernadette and updated on anticoagulation calendar     Missed doses: No     Medication changes:  No     S/S of  bleeding or thromboembolism:  No     New Injury or illness:  No     Changes in diet or alcohol consumption:  No     Upcoming surgery, procedure or cardioversion:  No    Anticoagulation Episode Summary     Current INR goal:   2.5-3.5   TTR:   51.2 %   Next INR check:   9/13/2019   INR from last check:   4.50! (9/3/2019)   Weekly max warfarin dose:      Target end date:      INR check location:      Preferred lab:      Send INR reminders to:   [facility]    Indications    S/P mitral valve replacement (MVR) [Z95.2]           Comments:            Anticoagulation Care Providers     Provider Role Specialty Phone number    Gabino Bach MD Referring Internal Medicine [phone]

## 2021-06-16 NOTE — TELEPHONE ENCOUNTER
Telephone Encounter by Mallory Grover RN at 6/19/2019  1:37 PM     Author: Mallory Grover RN Service: -- Author Type: Registered Nurse    Filed: 6/19/2019  1:41 PM Encounter Date: 6/19/2019 Status: Attested    : Mallory Grover RN (Registered Nurse) Cosigner: Gabino Bach MD at 6/19/2019  9:33 PM    Attestation signed by Gabino Bach MD at 6/19/2019  9:33 PM    Anticoagulation care reviewed.  I agree with the plan of care.    Gabino Bach MD  [facility]  6/19/2019, 9:33 PM                   ANTICOAGULATION  MANAGEMENT    Assessment     Today's INR result of 2.6 is Therapeutic (goal INR of 2.0-3.0)        Warfarin taken as previously instructed    No new diet changes affecting INR    No new medication/supplements affecting INR    Continues to tolerate warfarin with no reported s/s of bleeding or thromboembolism     Previous INR was Therapeutic    Plan:     Spoke with Bernadette regarding INR result and instructed:     Warfarin Dosing Instructions:  Continue current warfarin dose 7.5 mg daily on Mon, Fri; and 5 mg daily rest of week  (0 % change)    Instructed patient to follow up no later than: 4-6 weeks (will check at 8/5 OV)    Education provided: target INR goal and significance of current INR result, importance of following up for INR monitoring at instructed interval, importance of taking warfarin as instructed, importance of notifying clinic for changes in medications and importance of notifying clinic for diarrhea, nausea/vomiting, reduced intake and/or illness    Bernadette verbalizes understanding and agrees to warfarin dosing plan.    Instructed to call the [facility] for any changes, questions or concerns. (#[phone])   ?   Mallory Grover RN    Subjective/Objective:      Bernadette RINALDI Aimee, a 78 y.o. female is on warfarin.     Bernadette reports:     Home warfarin dose: verbally confirmed home dose with Bernadette and updated on anticoagulation calendar     Missed doses:  No     Medication changes:  No     S/S of bleeding or thromboembolism:  No     New Injury or illness:  No     Changes in diet or alcohol consumption:  No     Upcoming surgery, procedure or cardioversion:  No    Anticoagulation Episode Summary     Current INR goal:   2.5-3.5   TTR:   61.3 % (3.3 y)   Next INR check:   7/31/2019   INR from last check:   2.60 (6/19/2019)   Weekly max warfarin dose:      Target end date:      INR check location:      Preferred lab:      Send INR reminders to:   [facility] [place]    Indications    S/P mitral valve replacement (MVR) [Z95.2]           Comments:            Anticoagulation Care Providers     Provider Role Specialty Phone number    Gabino Bach MD Referring Internal Medicine [phone]

## 2021-06-16 NOTE — TELEPHONE ENCOUNTER
Telephone Encounter by Mallory Grover RN at 2/5/2019  3:45 PM     Author: Mallory Grover RN Service: -- Author Type: Registered Nurse    Filed: 2/5/2019  4:07 PM Encounter Date: 2/5/2019 Status: Attested    : Mallory Grover RN (Registered Nurse) Cosigner: Gabino Bach MD at 2/5/2019  4:23 PM    Attestation signed by Gabino Bach MD at 2/5/2019  4:23 PM    Anticoagulation care reviewed.  I agree with the plan of care.    Gabino Bach MD  [facility]  2/5/2019, 4:23 PM                    ANTICOAGULATION  MANAGEMENT    Assessment     Today's INR result of 4.3 is Supratherapeutic (goal INR of 2.5-3.5)        Warfarin taken as previously instructed    No new diet changes affecting INR    No new medication/supplements affecting INR    Continues to tolerate warfarin with no reported s/s of bleeding or thromboembolism     Previous INR was Supratherapeutic    Plan:     Spoke with Bernadette regarding INR result and instructed:     Warfarin Dosing Instructions:  Change warfarin dose to 5 mg daily on Sun, Tue, Thu; and 7.5 mg daily rest of week  (10 % change)    Instructed patient to follow up no later than: 1-2 weeks    Education provided: importance of consistent vitamin K intake, impact of vitamin K foods on INR, vitamin K content of foods, target INR goal and significance of current INR result, importance of following up for INR monitoring at instructed interval and importance of taking warfarin as instructed    Bernadette verbalizes understanding and agrees to warfarin dosing plan.    Instructed to call the [facility] for any changes, questions or concerns. (#[phone])   ?   Mallory Grover RN    Subjective/Objective:      Bernadette Yu, a 78 y.o. female is on warfarin.     Bernadette reports:     Home warfarin dose: verbally confirmed home dose with Bernadette and updated on anticoagulation calendar     Missed doses: No     Medication changes:  No     S/S of bleeding or  thromboembolism:  No     New Injury or illness:  No     Changes in diet or alcohol consumption:  No     Upcoming surgery, procedure or cardioversion:  No    Anticoagulation Episode Summary     Current INR goal:   2.5-3.5   TTR:   61.3 % (2.9 y)   Next INR check:   2/19/2019   INR from last check:   4.30! (2/5/2019)   Weekly max warfarin dose:      Target end date:      INR check location:      Preferred lab:      Send INR reminders to:   ANTICOAGULATION POOL B (MPW,HUG,STW,RVL,OAK,RLN)    Indications    S/P mitral valve replacement (MVR) [Z95.2]           Comments:            Anticoagulation Care Providers     Provider Role Specialty Phone number    Gabino Bach MD Referring Internal Medicine [phone]

## 2021-06-16 NOTE — ANESTHESIA PREPROCEDURE EVALUATION
Anesthesia Evaluation      Patient summary reviewed     Airway   Mallampati: IV  Neck ROM: full   Pulmonary     breath sounds clear to auscultation  (+) a smoker (former smoker)                         Cardiovascular   (+) hypertension, valvular problems/murmurs (s/p MV replacement - 2015), CABG/stent, dysrhythmias, ,     Rhythm: regular   murmur   PE comment: Mechanical valve click,     Neuro/Psych    (+) neuromuscular disease,  anxiety/panic attacks, chronic pain (chronic opioid use)    Endo/Other    (+) diabetes mellitus type 2 using insulin, hypothyroidism,      Comments: Anemia    GI/Hepatic/Renal    (+) GERD,   chronic renal disease CRI,      Other findings:       NPO solid 7 days  Last lovenox yesterday - off of coumadin with lovenox bridge                    Hypothyroidism     HLD (hyperlipidemia)    Anxiety    Chronic pain - Dr. Bach manages the pain    CN (constipation)    Insomnia    Restless legs syndrome (RLS)    Lumbosacral plexopathy - chronic pain related to this    Full code status - POLST form 1/2/16    IBS (irritable bowel syndrome)    ASCVD (arteriosclerotic cardiovascular disease) - CABG 2015    Subclavian artery stenosis, left - s/p stent    Atrial fibrillation    HTN (hypertension)    Psoriasis    Former smoker    S/P mitral valve replacement    DM type 2 (diabetes mellitus, type 2)    Anticoagulation goal of INR 2.5 to 3.5    Abdominal bloating, chronic    Recurrent UTI (urinary tract infection)    MRSA (methicillin resistant staph aureus) culture positive    Proteinuria    Joint pain    Controlled substance agreement signed - 1/18 - temazepam, lorazepam, hydromorphone - UDS 4/17    Blood loss anemia    CKD (chronic kidney disease), stage III             Results for ANNA MARIE VALENTIN (MRN [id number]) as of 3/13/2018    2/26/2018 12:24  WBC: 4.7  RBC: 4.34  Hemoglobin: 12.1  Hematocrit: 36.3  MCV: 84  MCH: 27.9  MCHC: 33.4  RDW: 14.3  Platelets: 135 (L)  MPV: 9.5  Sed Rate: 43  (H)    2/26/2018 12:28  INR: 3.70 (H)            Dental    (+) bridge                       Anesthesia Plan  Planned anesthetic: MAC      Light MAC        ASA 4   Induction: intravenous   Anesthetic plan and risks discussed with: patient and spouse  Anesthesia plan special considerations: antiemetics,   Post-op plan: routine recovery        Aminata Mancera MD  Staff Anesthesiologist  [facility]  3/13/18

## 2021-06-16 NOTE — ANESTHESIA CARE TRANSFER NOTE
Last vitals:   Vitals:    03/13/18 1104   BP: 124/56   Pulse: 61   Resp: 16   Temp: 36.7  C (98.1  F)   SpO2: 96%     Patient's level of consciousness is drowsy  Spontaneous respirations: yes  Maintains airway independently: yes  Dentition unchanged: yes  Oropharynx: oropharynx clear of all foreign objects    QCDR Measures:  ASA# 20 - Surgical Safety Checklist: WHO surgical safety checklist completed prior to induction  PQRS# 430 - Adult PONV Prevention: 4558F - Pt received => 2 anti-emetic agents (different classes) preop & intraop  ASA# 8 - Peds PONV Prevention: NA - Not pediatric patient, not GA or 2 or more risk factors NOT present  PQRS# 424 - Jacquelin-op Temp Management: NA - MAC anesthesia or case < 60 minutes  PQRS# 426 - PACU Transfer Protocol: - Transfer of care checklist NOT used  ASA# 14 - Acute Post-op Pain: ASA14B - Patient did NOT experience pain >= 7 out of 10

## 2021-06-16 NOTE — TELEPHONE ENCOUNTER
Telephone Encounter by To Burgos RN at 11/4/2019  3:53 PM     Author: To Burgos RN Service: -- Author Type: RN, Care Manager    Filed: 11/4/2019  4:49 PM Encounter Date: 11/4/2019 Status: Attested    : To Burgos RN (RN, Care Manager) Cosigner: Gabino Bach MD at 11/4/2019  4:56 PM    Attestation signed by Gabino Bach MD at 11/4/2019  4:56 PM    Anticoagulation care reviewed.  I agree with the plan of care.    Gabino Bach MD  [facility]  11/4/2019, 4:55 PM                 ANTICOAGULATION  MANAGEMENT    Assessment     Today's INR result of 2.2 is Subtherapeutic (goal INR of 2.5-3.5)        More warfarin taken than instructed which may be affecting INR. Pt never picked up the 3 mg warfarin tablets. Has been taking 5 mg daily.     No new diet changes affecting INR    Discontinued Amiodarone on 10/26/19.    Continues to tolerate warfarin with no reported s/s of bleeding or thromboembolism     Previous INR was Therapeutic    Plan:     Spoke with Bernadette regarding INR result and instructed:     Warfarin Dosing Instructions:  Continue current warfarin dose 5 mg daily.    Instructed patient to follow up no later than: Thurs 11/7. Appt made.     Education provided: importance of taking warfarin as instructed    Bernadette verbalizes understanding and agrees to warfarin dosing plan.    Instructed to call the [facility] for any changes, questions or concerns. (#[phone])   ?   To Burgos RN    Subjective/Objective:      Bernadette Yu, a 79 y.o. female is on warfarin.     Bernadette reports:     Home warfarin dose: verbally confirmed home dose with pt and updated on anticoagulation calendar     Missed doses: No     Medication changes:  Yes: discontinue Amiodarone     S/S of bleeding or thromboembolism:  No     New Injury or illness:  Yes: hospitalized for Torsades de pointes     Changes in diet or alcohol consumption:  No     Upcoming  surgery, procedure or cardioversion:  No    Anticoagulation Episode Summary     Current INR goal:   2.5-3.5   TTR:   45.2 %   Next INR check:   11/7/2019   INR from last check:   2.20! (11/4/2019)   Weekly max warfarin dose:      Target end date:      INR check location:      Preferred lab:      Send INR reminders to:   [facility]    Indications    S/P mitral valve replacement (MVR) [Z95.2]           Comments:            Anticoagulation Care Providers     Provider Role Specialty Phone number    Gabino Bach MD Referring Internal Medicine [phone]

## 2021-06-16 NOTE — TELEPHONE ENCOUNTER
Bernadette Daughter-in-law requesting refill of medicaiton(s) for patient    Refill Request  Did you contact pharmacy: No  Medication name:     Warfarin  Morphine/Dilantin      Who prescribed the medication: PCP  Requested Pharmacy: [facility]  Is patient out of medication: No.  1 days left  Patient notified refills processed in 3 business days:  yes  Okay to leave a detailed message: yes    Last Office Visit 03/04/2021 with PCP

## 2021-06-16 NOTE — TELEPHONE ENCOUNTER
Telephone Encounter by Mallory Grover RN at 1/13/2020  3:40 PM     Author: Mallory Grover RN Service: -- Author Type: Registered Nurse    Filed: 1/13/2020  3:41 PM Encounter Date: 1/10/2020 Status: Attested    : Mallory Grover RN (Registered Nurse) Cosigner: Gabino Bach MD at 1/13/2020  3:47 PM    Attestation signed by Gabino Bach MD at 1/13/2020  3:47 PM    Anticoagulation care reviewed.  I agree with the plan of care.    Gabino Bach MD  [facility]  1/13/2020, 3:46 PM                 ANTICOAGULATION  MANAGEMENT    Assessment     Friday's INR result of 3.7 is Supratherapeutic (goal INR of 2.5-3.5)        Warfarin taken as previously instructed    No new diet changes affecting INR    No new medication/supplements affecting INR    Continues to tolerate warfarin with no reported s/s of bleeding or thromboembolism     Previous INR was Subtherapeutic    Plan:     Spoke with Bernadette regarding INR result and instructed:     Warfarin Dosing Instructions:  Take 2.5 mg today  then continue current warfarin dose 5 mg daily  (0 % change)    Instructed patient to follow up no later than: 2 weeks    Education provided: target INR goal and significance of current INR result, importance of following up for INR monitoring at instructed interval, importance of taking warfarin as instructed and importance of notifying clinic for changes in medications    Bernadette verbalizes understanding and agrees to warfarin dosing plan.    Instructed to call the [facility] for any changes, questions or concerns. (#[phone])   ?   Mallory Grover RN    Subjective/Objective:      Bernadette Yu, a 79 y.o. female is on warfarin.     Bernadette reports:     Home warfarin dose: verbally confirmed home dose with Bernadette and updated on anticoagulation calendar     Missed doses: No     Medication changes:  No     S/S of bleeding or thromboembolism:  No     New Injury or illness:  No     Changes in  diet or alcohol consumption:  No     Upcoming surgery, procedure or cardioversion:  No    Anticoagulation Episode Summary     Current INR goal:   2.5-3.5   TTR:   43.4 % (11.8 mo)   Next INR check:   1/24/2020   INR from last check:   3.70! (1/10/2020)   Weekly max warfarin dose:      Target end date:      INR check location:      Preferred lab:      Send INR reminders to:   [facility]    Indications    S/P mitral valve replacement (MVR) [Z95.2]           Comments:            Anticoagulation Care Providers     Provider Role Specialty Phone number    Gabino Bach MD Referring Internal Medicine [phone]

## 2021-06-16 NOTE — TELEPHONE ENCOUNTER
Last Office Visit 3/4/2021-Dr Gabino Bach      Notes:  Comes in today for follow up.     Back pain and leg pain continues to bother patient, but maybe not as much.  At night can be bad and she would like to use the hydromorphone for this.  She is not using as much as in the past and it does tend to make her groggy during the day.     Anticoagulation and options for this reviewed today.     Lower extremity edema doing better and redness doing better with triamcinolone (Kenalog) cream.     No new issues with her bowels.     We reviewed her other issues noted in the assessment but not specifically addressed in the HPI above.     Last Filled:  warfarin ANTICOAGULANT (COUMADIN/JANTOVEN) 5 MG tablet 90 tablet 3 12/29/2020 12/29/2021 No   Sig - Route: Take 1 tablet (5 mg total) by mouth daily. Please adjust as recommended by anticoagulation nurse. - Oral   Sent to pharmacy as: warfarin 5 mg tablet (COUMADIN/JANTOVEN)   E-Prescribing Status: Receipt confirmed by pharmacy (12/29/2020  5:00 PM CST)     HYDROmorphone (DILAUDID) 4 MG tablet 120 tablet 0 3/22/2021 4/21/2021 No   Sig - Route: Take 0.5-1 tablets (2-4 mg total) by mouth 4 (four) times a day as needed for pain. May refill every 30 days only. - Oral   Sent to pharmacy as: HYDROmorphone 4 mg tablet (DILAUDID)   Earliest Fill Date: 3/22/2021   Notes to Pharmacy:  reviewed 2/18/2021.   E-Prescribing Status: Receipt confirmed by pharmacy (3/22/2021  2:06 PM CDT)         Lab Results   Component Value Date    AMPHET Screen Negative 03/04/2021    HEBENZODIAZ Screen Negative 03/04/2021    OPIATES (!) 03/04/2021     Screen Positive (Confirmation available on request)    PCP Screen Negative 03/04/2021    THC Screen Negative 03/04/2021    BARBIT Screen Negative 03/04/2021    COCAINEMETAB Screen Negative 03/04/2021    METHADNE Screen Negative 03/04/2021    OXYCODONE Screen Negative 03/04/2021    CREAUR 51.1 03/04/2021         Next OV:  5/6/2021      Encounter-Level CSA  Scan Date - 08/01/2018:    Scan on 8/6/2018  8:40 AM: CHRONIC PAIN MANAGEMENT           Encounter-Level CSA Scan Date - 01/30/2018:    Scan on 2/1/2018 12:13 PM: HE -     3/5/2021      Encounter-Level CSA Scan Date - 02/09/2017:    Scan on 2/10/2017  3:16 PM         Medication teed up for provider signature - please adjust as appropriate.

## 2021-06-16 NOTE — TELEPHONE ENCOUNTER
Telephone Encounter by Kandy Gonzales RN at 11/12/2019  4:08 PM     Author: Kandy Gonzales RN Service: -- Author Type: Registered Nurse    Filed: 11/12/2019  4:50 PM Encounter Date: 11/12/2019 Status: Attested    : Kandy Gonzales RN (Registered Nurse) Cosigner: Gabino Bach MD at 11/12/2019  4:58 PM    Attestation signed by Gabino Bach MD at 11/12/2019  4:58 PM    Anticoagulation care reviewed.  I agree with the plan of care.    Gabino Bach MD  [facility]  11/12/2019, 4:58 PM                 ANTICOAGULATION  MANAGEMENT    Assessment     Today's INR result of 2.9 is Therapeutic (goal INR of 2.5-3.5)        Patient unsure what warfarin dose she is taking.  Had old warfarin doses written down and states grandchildren spilt on recent paper stating what does she is taking.  Previously told to take 5mg (1tablet) daily the last week.  Patient does not know what she took this last week.  Had patient describe tablets and sounds like she has 5mg tablets and does not have the 3mg tablets.  States she does her own medications.  States she does have a pillbox, but does not have it set up this week.  Discussed meeting with [facility] in person next week and bringing pills and pillbox.      No new diet changes affecting INR    Amiodarone discontinued on 10/26 after hospitalized on 10/26 for torsades.     Continues to tolerate warfarin with no reported s/s of bleeding or thromboembolism     Previous INR was Subtherapeutic    Plan:     Spoke with Bernadette regarding INR result and instructed:     Warfarin Dosing Instructions:  Continue current warfarin dose 5mg daily warfarin dose     Instructed patient to follow up no later than: 1 week and to meet with [facility]    Education provided: importance of taking warfarin as instructed, Strategies to improve compliance (pillbox, alarm, calendar, etc) and no interaction anticipated between warfarin and amiodarone    Bernadette verbalizes understanding and agrees  to warfarin dosing plan.    Instructed to call the [facility] for any changes, questions or concerns. (#[phone])   ?   Kandy Gonzales RN    Subjective/Objective:      Bernadette Yu, a 79 y.o. female is on warfarin.     Bernadette reports:     Home warfarin dose: Unknown warfarin dose the last 7 days      Missed doses: Unknown what dose patient took the last 7 days     Medication changes:  No     S/S of bleeding or thromboembolism:  No     New Injury or illness:  No     Changes in diet or alcohol consumption:  No     Upcoming surgery, procedure or cardioversion:  No    Anticoagulation Episode Summary     Current INR goal:   2.5-3.5   TTR:   46.4 %   Next INR check:   11/18/2019   INR from last check:   2.90 (11/12/2019)   Weekly max warfarin dose:      Target end date:      INR check location:      Preferred lab:      Send INR reminders to:   [facility]    Indications    S/P mitral valve replacement (MVR) [Z95.2]           Comments:            Anticoagulation Care Providers     Provider Role Specialty Phone number    Gabino Bach MD Referring Internal Medicine [phone]

## 2021-06-16 NOTE — ANESTHESIA POSTPROCEDURE EVALUATION
Patient: Bernadette Yu  COLONOSCOPY; POLYPECTOMY  Anesthesia type: MAC    Patient location: PACU  Last vitals:   Vitals:    03/13/18 1136   BP: 140/56   Pulse: (!) 59   Resp: 16   Temp:    SpO2: 96%     Post vital signs: stable  Level of consciousness: awake and responds to simple questions  Post-anesthesia pain: pain controlled  Post-anesthesia nausea and vomiting: no  Pulmonary: unassisted, return to baseline  Cardiovascular: stable and blood pressure at baseline  Hydration: adequate  Anesthetic events: no    QCDR Measures:  ASA# 11 - Jacquelin-op Cardiac Arrest: ASA11B - Patient did NOT experience unanticipated cardiac arrest  ASA# 12 - Jacquelin-op Mortality Rate: ASA12B - Patient did NOT die  ASA# 13 - PACU Re-Intubation Rate: NA - No ETT / LMA used for case  ASA# 10 - Composite Anes Safety: ASA10A - No serious adverse event    Additional Notes:

## 2021-06-16 NOTE — TELEPHONE ENCOUNTER
Refill Request  Did you contact pharmacy: No  Medication name:   Requested Prescriptions     Pending Prescriptions Disp Refills     HYDROmorphone (DILAUDID) 4 MG tablet 120 tablet 0     Sig: Take 0.5-1 tablets (2-4 mg total) by mouth 4 (four) times a day as needed for pain. May refill every 30 days only.     Who prescribed the medication: PCP  Requested Pharmacy: [facility]  Is patient out of medication: No.  1 days left  Patient notified refills processed in 3 business days:  yes  Okay to leave a detailed message: yes

## 2021-06-16 NOTE — TELEPHONE ENCOUNTER
Telephone Encounter by Mallory Grover RN at 5/30/2019  3:37 PM     Author: Mallory Grover RN Service: -- Author Type: Registered Nurse    Filed: 5/30/2019  3:40 PM Encounter Date: 5/30/2019 Status: Attested    : Mallory Grover RN (Registered Nurse) Cosigner: Gabino Bach MD at 5/30/2019  4:25 PM    Attestation signed by Gabino Bach MD at 5/30/2019  4:25 PM    Anticoagulation care reviewed.  I agree with the plan of care.    Gabino Bach MD  [facility]  5/30/2019, 4:25 PM                   ANTICOAGULATION  MANAGEMENT    Assessment     Today's INR result of 2.7 is Therapeutic (goal INR of 2.5-3.5)        Warfarin taken as previously instructed    No new diet changes affecting INR    No new medication/supplements affecting INR    Continues to tolerate warfarin with no reported s/s of bleeding or thromboembolism     Previous INR was Therapeutic    Plan:     Spoke with Bernadette regarding INR result and instructed:     Warfarin Dosing Instructions:  Continue current warfarin dose 7.5 mg daily on Mon, Fri; and 5 mg daily rest of week  (0 % change)    Instructed patient to follow up no later than: 4 weeks    Education provided: target INR goal and significance of current INR result and importance of notifying clinic for changes in medications    Instructed to call the [facility] for any changes, questions or concerns. (#[phone])   ?   Mallory Grover RN    Subjective/Objective:      Bernadette Yu, a 78 y.o. female is on warfarin.     Bernadette reports:     Home warfarin dose: as updated on anticoagulation calendar per template     Missed doses: No     Medication changes:  No     S/S of bleeding or thromboembolism:  No     New Injury or illness:  No     Changes in diet or alcohol consumption:  No     Upcoming surgery, procedure or cardioversion:  No    Anticoagulation Episode Summary     Current INR goal:   2.5-3.5   TTR:   60.7 % (3.2 y)   Next INR check:   6/27/2019    INR from last check:   2.70 (5/30/2019)   Weekly max warfarin dose:      Target end date:      INR check location:      Preferred lab:      Send INR reminders to:   [facility] STE[facility]EN    Indications    S/P mitral valve replacement (MVR) [Z95.2]           Comments:            Anticoagulation Care Providers     Provider Role Specialty Phone number    Gabino Bach MD Referring Internal Medicine [phone]

## 2021-06-16 NOTE — TELEPHONE ENCOUNTER
Telephone Encounter by Mallory Grover RN at 5/22/2019  4:17 PM     Author: Mallory Grover RN Service: -- Author Type: Registered Nurse    Filed: 5/22/2019  4:21 PM Encounter Date: 5/22/2019 Status: Attested    : Mallory Grover RN (Registered Nurse) Cosigner: Gabino Bach MD at 5/22/2019  4:59 PM    Attestation signed by Gabino Bach MD at 5/22/2019  4:59 PM    Anticoagulation care reviewed.  I agree with the plan of care.    Gabino Bach MD  [facility]  5/22/2019, 4:59 PM                    ANTICOAGULATION  MANAGEMENT    Assessment     Today's INR result of 2.7 is Therapeutic (goal INR of 2.5-3.5)        Warfarin taken as previously instructed    No new diet changes affecting INR    No new medication/supplements affecting INR    Continues to tolerate warfarin with no reported s/s of bleeding or thromboembolism     Previous INR was Therapeutic    Plan:     Spoke with Bernadette regarding INR result and instructed:     Warfarin Dosing Instructions:  Continue current warfarin dose 7.5 mg daily on Mon, Fri; and 5 mg daily rest of week  (0 % change)    Instructed patient to follow up no later than: 4 weeks    Education provided: target INR goal and significance of current INR result, importance of following up for INR monitoring at instructed interval, importance of taking warfarin as instructed, importance of notifying clinic for changes in medications and importance of notifying clinic for diarrhea, nausea/vomiting, reduced intake and/or illness    Bernadette verbalizes understanding and agrees to warfarin dosing plan.    Instructed to call the [facility] for any changes, questions or concerns. (#[phone])   ?   Mallory Grover RN    Subjective/Objective:      Bernadette Yu, a 78 y.o. female is on warfarin.     Bernadette reports:     Home warfarin dose: verbally confirmed home dose with Bernadette and updated on anticoagulation calendar     Missed doses: No     Medication  changes:  No     S/S of bleeding or thromboembolism:  No     New Injury or illness:  No     Changes in diet or alcohol consumption:  No     Upcoming surgery, procedure or cardioversion:  No    Anticoagulation Episode Summary     Current INR goal:   2.5-3.5   TTR:   60.4 % (3.2 y)   Next INR check:   6/19/2019   INR from last check:   2.70 (5/22/2019)   Weekly max warfarin dose:      Target end date:      INR check location:      Preferred lab:      [facility]    Indications    S/P mitral valve replacement (MVR) [Z95.2]           Comments:            Anticoagulation Care Providers     Provider Role Specialty Phone number    Gabino Bach MD Referring Internal Medicine [phone]

## 2021-06-16 NOTE — TELEPHONE ENCOUNTER
Telephone Encounter by Mallory Grover RN at 8/12/2019  3:09 PM     Author: Mallory Grover RN Service: -- Author Type: Registered Nurse    Filed: 8/12/2019  3:12 PM Encounter Date: 8/12/2019 Status: Attested    : Mallory Grover RN (Registered Nurse) Cosigner: Gabino Bach MD at 8/12/2019  3:16 PM    Attestation signed by Gabino Bach MD at 8/12/2019  3:16 PM    Anticoagulation care reviewed.  I agree with the plan of care.    Gabino Bach MD  [facility]  8/12/2019, 3:16 PM                   ANTICOAGULATION  MANAGEMENT    Assessment     Today's INR result of 3.2 is Therapeutic (goal INR of 2.5-3.5)        Warfarin taken as previously instructed    No new diet changes affecting INR    No new medication/supplements affecting INR    Continues to tolerate warfarin with no reported s/s of bleeding or thromboembolism     Pt had 2 units of blood transfused last week after a gradual Hgb drop over last few months    Previous INR was Supratherapeutic    Plan:     Spoke with Bernadette regarding INR result and instructed:     Warfarin Dosing Instructions:  Continue current warfarin dose 5 mg daily on Sun, Thu; and 7.5 mg daily rest of week  (0 % change)    Instructed patient to follow up no later than: at 8/20 OV    Education provided: target INR goal and significance of current INR result, importance of following up for INR monitoring at instructed interval and importance of taking warfarin as instructed    Bernadette verbalizes understanding and agrees to warfarin dosing plan.    Instructed to call the [facility] for any changes, questions or concerns. (#[phone])   ?   Mallory Grover RN    Subjective/Objective:      Bernadette Yu, a 78 y.o. female is on warfarin.     Bernadette reports:     Home warfarin dose: verbally confirmed home dose with Bernadette and updated on anticoagulation calendar     Missed doses: No     Medication changes:  No     S/S of bleeding or thromboembolism:   No     New Injury or illness:  No     Changes in diet or alcohol consumption:  No     Upcoming surgery, procedure or cardioversion:  No    Anticoagulation Episode Summary     Current INR goal:   2.5-3.5   TTR:   61.1 % (3.4 y)   Next INR check:   8/20/2019   INR from last check:   3.20 (8/12/2019)   Weekly max warfarin dose:      Target end date:      INR check location:      Preferred lab:      Send INR reminders to:   [facility]    Indications    S/P mitral valve replacement (MVR) [Z95.2]           Comments:            Anticoagulation Care Providers     Provider Role Specialty Phone number    Gabino Bach MD Referring Internal Medicine [phone]

## 2021-06-16 NOTE — TELEPHONE ENCOUNTER
"Anticoagulation Annual Referral Renewal Review    Bernadette Yu's chart reviewed for annual renewal of referral to anticoagulation monitoring.        Criteria for anticoagulation nurse and/or pharmacist renewal met   Warfarin indication: Mechanical MVR Yes, per indication   Current with INR monitoring/compliant Yes Yes   Date of last office visit 3/4/21 Yes, had office visit within last year   Time in Therapeutic Range (TTR) 30.8 % No, TTR < 60 %       Bernadette Yu did NOT meet all criteria for anticoagulation management program initiated renewal and requires provider review. Using dot phrase, \".acmrenewalprovider\", please advise if [facility] anticoagulation management referral should be renewed or if patient should be seen in office to review anticoagulation therapy      Sujye Cisneros RN  12:12 PM      "

## 2021-06-16 NOTE — TELEPHONE ENCOUNTER
Telephone Encounter by Mallory Grover RN at 2/12/2019  1:17 PM     Author: Mallory Grover RN Service: -- Author Type: Registered Nurse    Filed: 2/12/2019  1:22 PM Encounter Date: 2/12/2019 Status: Attested    : Mallory Grover RN (Registered Nurse) Cosigner: Gabino Bach MD at 2/12/2019  1:54 PM    Attestation signed by Gabino Bach MD at 2/12/2019  1:54 PM    Anticoagulation care reviewed.  I agree with the plan of care.    Gabino Bach MD  [facility]  2/12/2019, 1:54 PM                    ANTICOAGULATION  MANAGEMENT    Assessment     Today's INR result of 3.0 is Therapeutic (goal INR of 2.0-3.0)        Warfarin taken as previously instructed    No new diet changes affecting INR    No new medication/supplements affecting INR    Continues to tolerate warfarin with no reported s/s of bleeding or thromboembolism     Previous INR was Supratherapeutic    Plan:     Left a detailed message for Bernadette regarding INR result and instructed:     Warfarin Dosing Instructions:  Continue current warfarin dose 5 mg daily on Sun, Tue, Thu; and 7.5 mg daily rest of week  (0 % change)    Instructed patient to follow up no later than: 1-2 weeks    Education provided: target INR goal and significance of current INR result and importance of notifying clinic for changes in medications    Instructed to call the [facility] for any changes, questions or concerns. (#[phone])   ?   Mallory Grover RN    Subjective/Objective:      Bernadette Yu, a 78 y.o. female is on warfarin.     Bernadette reports:     Home warfarin dose: as updated on anticoagulation calendar per template     Missed doses: No     Medication changes:  No     S/S of bleeding or thromboembolism:  No     New Injury or illness:  No     Changes in diet or alcohol consumption:  No     Upcoming surgery, procedure or cardioversion:  No    Anticoagulation Episode Summary     Current INR goal:   2.5-3.5   TTR:   61.2 % (2.9 y)    Next INR check:   2/26/2019   INR from last check:   3.00 (2/12/2019)   Weekly max warfarin dose:      Target end date:      INR check location:      Preferred lab:      Send INR reminders to:   ANTICOAGULATION POOL B (MPW,HUG,STW,RVL,OAK,RLN)    Indications    S/P mitral valve replacement (MVR) [Z95.2]           Comments:            Anticoagulation Care Providers     Provider Role Specialty Phone number    Gabino Bach MD Referring Internal Medicine [phone]

## 2021-06-16 NOTE — TELEPHONE ENCOUNTER
Telephone Encounter by Mallory Grover RN at 8/20/2019  3:24 PM     Author: Mallory Grover RN Service: -- Author Type: Registered Nurse    Filed: 8/20/2019  3:28 PM Encounter Date: 8/20/2019 Status: Attested    : Mallory Grover RN (Registered Nurse) Cosigner: Gabino Bach MD at 8/20/2019  3:40 PM    Attestation signed by Gabino Bach MD at 8/20/2019  3:40 PM    Anticoagulation care reviewed.  I agree with the plan of care.    Gabino Bach MD  [facility]  8/20/2019, 3:40 PM                   ANTICOAGULATION  MANAGEMENT    Assessment     Today's INR result of 3.7 is Supratherapeutic (goal INR of 2.5-3.5)        Warfarin taken as previously instructed    No new diet changes affecting INR    No new medication/supplements affecting INR    Continues to tolerate warfarin with no reported s/s of bleeding or thromboembolism     Previous INR was Therapeutic    Plan:     Spoke with Bernadette regarding INR result and instructed:     Warfarin Dosing Instructions:  Change warfarin dose to 5 mg daily on Sun, Tue, Thu; and 7.5 mg daily rest of week  (5.3 % change)    Instructed patient to follow up no later than: 1-2 weeks    Education provided: target INR goal and significance of current INR result, importance of following up for INR monitoring at instructed interval, importance of taking warfarin as instructed and importance of notifying clinic for changes in medications    Bernadette verbalizes understanding and agrees to warfarin dosing plan.    Instructed to call the [facility] for any changes, questions or concerns. (#[phone])   ?   Mallory Grover RN    Subjective/Objective:      Bernadette Yu, a 78 y.o. female is on warfarin.     Bernadette reports:     Home warfarin dose: verbally confirmed home dose with Bernadette and updated on anticoagulation calendar     Missed doses: No     Medication changes:  No     S/S of bleeding or thromboembolism:  No     New Injury or illness:   No     Changes in diet or alcohol consumption:  No     Upcoming surgery, procedure or cardioversion:  No    Anticoagulation Episode Summary     Current INR goal:   2.5-3.5   TTR:   61.1 % (3.5 y)   Next INR check:   9/3/2019   INR from last check:   3.70! (8/20/2019)   Weekly max warfarin dose:      Target end date:      INR check location:      Preferred lab:      Send INR reminders to:   [facility]    Indications    S/P mitral valve replacement (MVR) [Z95.2]           Comments:            Anticoagulation Care Providers     Provider Role Specialty Phone number    Gabino Bach MD Referring Internal Medicine [phone]

## 2021-06-16 NOTE — TELEPHONE ENCOUNTER
ANTICOAGULATION  MANAGEMENT    Assessment     Today's INR result of 3.2 is Therapeutic (goal INR of 2.5-3.5)        Warfarin taken as previously instructed    No new diet changes affecting INR    No new medication/supplements affecting INR    Continues to tolerate warfarin with no reported s/s of bleeding or thromboembolism     Previous INR was Therapeutic    Plan:     Spoke on phone with Bernadette regarding INR result and instructed:      Warfarin Dosing Instructions:  Continue current warfarin dose 7.5 mg daily on Sun, Thu; and 5 mg daily rest of week  (0 % change)    Instructed patient to follow up no later than: 4 weeks    Education provided: target INR goal and significance of current INR result, importance of following up for INR monitoring at instructed interval, importance of taking warfarin as instructed, importance of notifying clinic for changes in medications and importance of notifying clinic for diarrhea, nausea/vomiting, reduced intake and/or illness    Riley verbalizes understanding and agrees to warfarin dosing plan.    Instructed to call the [facility] for any changes, questions or concerns. (#[phone])   ?   Mallory Grover RN    Subjective/Objective:      Bernadette Yu, a 80 y.o. female is on warfarin. Bernadette Fleming reports:     Home warfarin dose: verbally confirmed home dose with Riley and updated on anticoagulation calendar     Missed doses: No     Medication changes:  No     S/S of bleeding or thromboembolism:  No     New Injury or illness:  No     Changes in diet or alcohol consumption:  No     Upcoming surgery, procedure or cardioversion:  No    Anticoagulation Episode Summary     Current INR goal:  2.5-3.5   TTR:  28.3 % (1 y)   Next INR check:  4/22/2021   INR from last check:  3.20 (3/25/2021)   Weekly max warfarin dose:     Target end date:     INR check location:     Preferred lab:     Send INR reminders to:  [facility]    Indications    S/P mitral valve replacement  (MVR) - mechanical mitral valve placed 2015 [Z95.2]           Comments:           Anticoagulation Care Providers     Provider Role Specialty Phone number    Gabino Bach MD Referring Internal Medicine [phone]

## 2021-06-16 NOTE — TELEPHONE ENCOUNTER
Provider Review: Anticoagulation Annual Referral Renewal    ACM Renewal Decision:  Renew [facility] warfarin management      INR Range:   Continue management at current INR goal   Anticipated Duration of Therapy (from today):  Long-term anticoagulation      Gabino Bach MD  2:01 PM

## 2021-06-17 NOTE — TELEPHONE ENCOUNTER
Telephone Encounter by Mallory Grover RN at 3/31/2020  4:57 PM     Author: Mallory Grover RN Service: -- Author Type: Registered Nurse    Filed: 3/31/2020  5:07 PM Encounter Date: 3/30/2020 Status: Attested    : Mallory Grover RN (Registered Nurse) Cosigner: Gabino Bach MD at 3/31/2020 11:01 PM    Attestation signed by Gabino Bach MD at 3/31/2020 11:01 PM    Anticoagulation care reviewed.  I agree with the plan of care.    Gabino Bach MD  [facility]  3/31/2020, 11:01 PM                 ANTICOAGULATION  MANAGEMENT    Assessment     Yesterday's INR result of 2.3 is Subtherapeutic (goal INR of 2.5-3.5)        More warfarin taken than instructed which may be affecting INR - reports she only held one day and took more warfarin than instructed    No new diet changes affecting INR    No new medication/supplements affecting INR    Continues to tolerate warfarin with no reported s/s of bleeding or thromboembolism     Previous INR was Supratherapeutic    Plan:     Spoke with Bernadette regarding INR result and instructed:     Warfarin Dosing Instructions:  Continue current warfarin dose 2.5 mg daily on Sun, Wed; and 5 mg daily rest of week  (0 % change)    Instructed patient to follow up no later than: 1 week at     Education provided: target INR goal and significance of current INR result, importance of following up for INR monitoring at instructed interval and importance of taking warfarin as instructed    Bernadette verbalizes understanding and agrees to warfarin dosing plan.    Instructed to call the [facility] for any changes, questions or concerns. (#[phone])   ?   Mallory Grover RN    Subjective/Objective:      Bernadette Yu, a 79 y.o. female is on warfarin.     Bernadette reports:     Home warfarin dose: template incorrect; verbally confirmed home dose with Bernadette and updated on anticoagulation calendar     Missed doses: No     Medication changes:   No     S/S of bleeding or thromboembolism:  No     New Injury or illness:  No     Changes in diet or alcohol consumption:  No     Upcoming surgery, procedure or cardioversion:  No    Anticoagulation Episode Summary     Current INR goal:   2.5-3.5   TTR:   42.0 % (11.4 mo)   Next INR check:   4/6/2020   INR from last check:   2.30! (3/30/2020)   Weekly max warfarin dose:      Target end date:      INR check location:      Preferred lab:      Send INR reminders to:   [facility]    Indications    S/P mitral valve replacement (MVR) - mechanical mitral valve placed 2015 [Z95.2]           Comments:            Anticoagulation Care Providers     Provider Role Specialty Phone number    Gabino Bach MD Referring Internal Medicine [phone]

## 2021-06-17 NOTE — TELEPHONE ENCOUNTER
Anticoagulation Management    Unable to reach Riley, son today.    Friday's INR result of 3.6 is Supratherapeutic (goal INR of 2.5-3.5).     Follow up required to Second callback attempt.  will send my chart message to continue with same dose and recheck in two weeks.    will send my chart message       ACN to follow up    Deena Kang RN

## 2021-06-17 NOTE — TELEPHONE ENCOUNTER
Telephone Encounter by Mallory Grover RN at 12/11/2020  5:27 PM     Author: Mallory Grover RN Service: -- Author Type: Registered Nurse    Filed: 12/11/2020  5:37 PM Encounter Date: 12/11/2020 Status: Signed    : Mallory Grover RN (Registered Nurse)       ANTICOAGULATION  MANAGEMENT    Assessment     Today's INR result of 2.1 is Subtherapeutic (goal INR of 2.5-3.5)        Warfarin taken as previously instructed - has been home from TCU for 1 week now    No new diet changes affecting INR    No new medication/supplements affecting INR    Continues to tolerate warfarin with no reported s/s of bleeding or thromboembolism     Previous INR was Subtherapeutic    Plan:     Spoke on phone with Riley, son, regarding INR result and instructed:     Warfarin Dosing Instructions:  Change warfarin dose to 6 mg daily on Sat; and 4 mg daily rest of week      Instructed patient to follow up no later than: 7-10 days    Education provided: target INR goal and significance of current INR result, importance of following up for INR monitoring at instructed interval, importance of taking warfarin as instructed, importance of notifying clinic for changes in medications and importance of notifying clinic for diarrhea, nausea/vomiting, reduced intake and/or illness    Riley verbalizes understanding and agrees to warfarin dosing plan.    Instructed to call the [facility] for any changes, questions or concerns. (#[phone])   ?   Mallory Grover RN    Subjective/Objective:      Bernadette Yu, a 80 y.o. female is on warfarin.     Bernadette reports:     Home warfarin dose: verbally confirmed home dose with Riley and updated on anticoagulation calendar     Missed doses: No     Medication changes:  No     S/S of bleeding or thromboembolism:  No     New Injury or illness:  No     Changes in diet or alcohol consumption:  No     Upcoming surgery, procedure or cardioversion:  No    Anticoagulation Episode Summary     Current INR goal:   2.5-3.5   TTR:  28.3 % (11.8 mo)   Next INR check:  12/21/2020   INR from last check:  2.10 (12/11/2020)   Weekly max warfarin dose:     Target end date:     INR check location:     Preferred lab:     Send INR reminders to:  [facility]    Indications    S/P mitral valve replacement (MVR) - mechanical mitral valve placed 2015 [Z95.2]           Comments:           Anticoagulation Care Providers     Provider Role Specialty Phone number    Gabino Bach MD Referring Internal Medicine [phone]

## 2021-06-17 NOTE — TELEPHONE ENCOUNTER
Telephone Encounter by Priscilla Schmidt LPN at 9/14/2020 10:09 AM     Author: Priscilla Schmidt LPN Service: -- Author Type: Certified Medical Assistant    Filed: 9/14/2020 10:30 AM Encounter Date: 9/13/2020 Status: Signed    : Priscilla Schmidt LPN (Licensed Nurse)       Called to patient- usually gets how 120 tablets of the medication- states that patient only got about 14-20 tablets and did not get the full prescription.     Called to pharmacy- they dispensed 120 tablet and this was picked up on 9/4.     shows 120 tablet:                Called back to patient and let them know pharmacy confirmed they dispensed 120. Patient stated that this is incorrect and she only has a few days left of medication.       Please advise on next steps

## 2021-06-17 NOTE — PATIENT INSTRUCTIONS - HE
Please follow up if you have any further issues.    You may contact me by phone or MyChart if you are worsening or if things are not improving.    ______________________________________________________________________     Please remember that you can call [phone] to schedule an appointment.     You can schedule appointments 24 hours a day, 7 days a week.  Sometimes the best time to schedule an appointment is after clinic hours when less people are calling in.  Weekends are another option for calling in to schedule appointments.

## 2021-06-17 NOTE — TELEPHONE ENCOUNTER
Anticoagulation Management    Unable to reach Riley today.    Today's INR result of 3.6 is Supratherapeutic (goal INR of 2.5-3.5).     Follow up required to confirm doses taken and assess for changes (no template).    Left message to continue current dose of warfarin 7.5 mg tonight.       ACN to follow up    Deena Kang RN

## 2021-06-17 NOTE — TELEPHONE ENCOUNTER
ACM renewal referral placed.   Standing INR ordered.    Sujey Cisneros, RN  Anticoagulation Clinic

## 2021-06-17 NOTE — TELEPHONE ENCOUNTER
FYI - Status Update  Who is Calling: Patient's child  Update: Patients child would like a call back, thank you.  Okay to leave a detailed message?:  No

## 2021-06-17 NOTE — TELEPHONE ENCOUNTER
Last Office Visit  5/6/2021 Gabino Bach MD    Notes:  Assessment:      1. Skin nodule    2. Type 2 diabetes mellitus with microalbuminuria, with long-term current use of insulin (H)    3. Acquired hypothyroidism    4. Moderate aortic stenosis    5. S/P mitral valve replacement (MVR) - mechanical mitral valve placed 2015    6. Paroxysmal atrial fibrillation (H) - VALVULAR    7. Neoplasm of uncertain behavior of skin           Last Filled:  HYDROmorphone (DILAUDID) 4 MG tablet 120 tablet 0 4/21/2021 5/21/2021 No   Sig - Route: Take 0.5-1 tablets (2-4 mg total) by mouth 4 (four) times a day as needed for pain. May refill every 30 days only. - Oral   Sent to pharmacy as: HYDROmorphone 4 mg tablet (DILAUDID)   Earliest Fill Date: 4/21/2021   Notes to Pharmacy:  reviewed 4/19/2021.   E-Prescribing Status: Receipt confirmed by pharmacy (4/19/2021 12:14 PM CDT)         Lab Results   Component Value Date    AMPHET Screen Negative 03/04/2021    HEBENZODIAZ Screen Negative 03/04/2021    OPIATES (!) 03/04/2021     Screen Positive (Confirmation available on request)    PCP Screen Negative 03/04/2021    THC Screen Negative 03/04/2021    BARBIT Screen Negative 03/04/2021    COCAINEMETAB Screen Negative 03/04/2021    METHADNE Screen Negative 03/04/2021    OXYCODONE Screen Negative 03/04/2021    CREAUR 51.1 03/04/2021         Next OV:  7/5/2021 Gabino Bach MD      Encounter-Level CSA Scan Date - 08/01/2018:    Scan on 8/6/2018  8:40 AM: CHRON[facility] PAIN MANAGEMENT     3/5/2021      Encounter-Level CSA Scan Date - 01/30/2018:    Scan on 2/1/2018 12:13 PM: HE -           Encounter-Level CSA Scan Date - 02/09/2017:    Scan on 2/10/2017  3:16 PM          reviewed and results are as follows:     reviewed 4/19/2021     Medication teed up for provider signature - please adjust as appropriate.

## 2021-06-17 NOTE — TELEPHONE ENCOUNTER
Telephone Encounter by Mallory Grover RN at 1/12/2021  3:13 PM     Author: Mallory Grover RN Service: -- Author Type: Registered Nurse    Filed: 1/12/2021  3:23 PM Encounter Date: 1/12/2021 Status: Signed    : Mallory Grover RN (Registered Nurse)       ANTICOAGULATION  MANAGEMENT    Assessment     Today's INR result of 1.9 is Subtherapeutic (goal INR of 2.5-3.5)        more, but less warfarin taken than instructed. New RX is 5mg tablet    No new diet changes affecting INR    No new medication/supplements affecting INR    Continues to tolerate warfarin with no reported s/s of bleeding or thromboembolism     Previous INR was Subtherapeutic    Plan:     Spoke on phone with Riley, son regarding INR result and instructed:     Warfarin Dosing Instructions:  Change warfarin dose to 5 mg daily on Sun; and 7.5 mg daily rest of week  (17.6 % change)    Instructed patient to follow up no later than: at 1/18 OV    Education provided: target INR goal and significance of current INR result, importance of following up for INR monitoring at instructed interval, importance of taking warfarin as instructed, importance of notifying clinic for changes in medications and importance of notifying clinic for diarrhea, nausea/vomiting, reduced intake and/or illness    Riley verbalizes understanding and agrees to warfarin dosing plan.    Instructed to call the [facility] for any changes, questions or concerns. (#[phone])   ?   Mallory Grover RN    Subjective/Objective:      Bernadette Yu, a 80 y.o. female is on warfarin.     Bernadette reports:     Home warfarin dose: verbally confirmed home dose with Riley and updated on anticoagulation calendar     Missed doses: No     Medication changes:  No     S/S of bleeding or thromboembolism:  No     New Injury or illness:  No     Changes in diet or alcohol consumption:  No     Upcoming surgery, procedure or cardioversion:  No    Anticoagulation Episode Summary     Current INR goal:   2.5-3.5   TTR:  23.5 % (11.8 mo)   Next INR check:  1/18/2021   INR from last check:  1.90 (1/12/2021)   Weekly max warfarin dose:     Target end date:     INR check location:     Preferred lab:     Send INR reminders to:  [facility]    Indications    S/P mitral valve replacement (MVR) - mechanical mitral valve placed 2015 [Z95.2]           Comments:           Anticoagulation Care Providers     Provider Role Specialty Phone number    Gabino Bach MD Referring Internal Medicine [phone]

## 2021-06-17 NOTE — TELEPHONE ENCOUNTER
Telephone Encounter by Ctahy Spicer RN at 4/22/2021  4:07 PM     Author: Cathy Spicer RN Service: -- Author Type: Registered Nurse    Filed: 4/22/2021  4:19 PM Encounter Date: 4/22/2021 Status: Signed    : Cathy Spicer RN (Registered Nurse)       ANTICOAGULATION  MANAGEMENT    Assessment     Today's INR result of 4.0 is Supratherapeutic (goal INR of 2.5-3.5)        Warfarin taken as previously instructed    patient has been skipping breakfast some days    No new medication/supplements affecting INR    Continues to tolerate warfarin with no reported s/s of bleeding or thromboembolism     Previous INR was Therapeutic x 4    Plan:     Spoke on phone with son Riley regarding INR result and instructed:      Warfarin Dosing Instructions:  partial hold today Thur 4/22 take 5mg then continue current warfarin dose 7.5 mg daily on Sun/Thur; and 5 mg daily rest of week      Instructed patient to follow up no later than: 2 weeks at OV    Education provided: importance of consistent vitamin K intake, impact of vitamin K foods on INR, importance of therapeutic range, target INR goal and significance of current INR result and monitoring for bleeding signs and symptoms    Riley verbalizes understanding and agrees to warfarin dosing plan.    Instructed to call the [facility] for any changes, questions or concerns. (#[phone])   ?   Cathy Spicer RN    Subjective/Objective:      Bernadette Yu, a 80 y.o. female is on warfarin. Bernadette Fleming reports:     Home warfarin dose: verbally confirmed home dose with Riley and updated on anticoagulation calendar     Missed doses: No     Medication changes:  No     S/S of bleeding or thromboembolism:  No     New Injury or illness:  No     Changes in diet or alcohol consumption:  Yes: Bernadette has been skipping breakfast some days, she is trying to lose some weight. Her sons keep telling her not to do this and will reinforce.      Upcoming surgery,  procedure or cardioversion:  No    Anticoagulation Episode Summary     Current INR goal:  2.5-3.5   TTR:  30.8 % (1 y)   Next INR check:  5/6/2021   INR from last check:  4.00 (4/22/2021)   Weekly max warfarin dose:     Target end date:     INR check location:     Preferred lab:     Send INR reminders to:  [facility]    Indications    S/P mitral valve replacement (MVR) - mechanical mitral valve placed 2015 [Z95.2]           Comments:           Anticoagulation Care Providers     Provider Role Specialty Phone number    Gabino Bach MD Referring Internal Medicine [phone]

## 2021-06-17 NOTE — TELEPHONE ENCOUNTER
Telephone Encounter by Mallory Grover RN at 12/30/2020 12:05 PM     Author: Mallory Grover RN Service: -- Author Type: Registered Nurse    Filed: 12/30/2020 12:12 PM Encounter Date: 12/30/2020 Status: Signed    : Mallory Grover RN (Registered Nurse)       ANTICOAGULATION  MANAGEMENT    Assessment     Today's INR result of 1.5 is Subtherapeutic (goal INR of 2.5-3.5)        Warfarin taken as previously instructed    No new diet changes affecting INR    No new medication/supplements affecting INR    Continues to tolerate warfarin with no reported s/s of bleeding or thromboembolism     Previous INR was Subtherapeutic    Plan:     Spoke on phone with Riley, son, regarding INR result and instructed:     Warfarin Dosing Instructions:  Change warfarin dose to 4 mg daily on Sun, Tue, Thu; and 6 mg daily rest of week  (20 % change)    Instructed patient to follow up no later than: 1 week    Education provided: target INR goal and significance of current INR result, importance of following up for INR monitoring at instructed interval and importance of taking warfarin as instructed    Riley verbalizes understanding and agrees to warfarin dosing plan.    Instructed to call the [facility] for any changes, questions or concerns. (#[phone])   ?   Mallory Grover RN    Subjective/Objective:      Bernadette Yu, a 80 y.o. female is on warfarin.     Bernadette reports:     Home warfarin dose: verbally confirmed home dose with Riley and updated on anticoagulation calendar     Missed doses: No     Medication changes:  No     S/S of bleeding or thromboembolism:  No     New Injury or illness:  No     Changes in diet or alcohol consumption:  No     Upcoming surgery, procedure or cardioversion:  No    Anticoagulation Episode Summary     Current INR goal:  2.5-3.5   TTR:  25.6 % (11.7 mo)   Next INR check:  12/21/2020   INR from last check:  1.50 (12/29/2020)   Weekly max warfarin dose:     Target end date:     INR check  location:     Preferred lab:     Send INR reminders to:  [facility]    Indications    S/P mitral valve replacement (MVR) - mechanical mitral valve placed 2015 [Z95.2]           Comments:           Anticoagulation Care Providers     Provider Role Specialty Phone number    Gabino Bach MD Referring Internal Medicine [phone]

## 2021-06-17 NOTE — TELEPHONE ENCOUNTER
Telephone Encounter by Alex Salinas CMA at 7/10/2020  1:21 PM     Author: Alex Salinas CMA Service: -- Author Type: Certified Medical Assistant    Filed: 7/10/2020  1:22 PM Encounter Date: 7/10/2020 Status: Signed    : Alex Salinas CMA (Certified Medical Assistant)       Pharmacy requesting refill of Warfarin.    Pt last seen 6/18/20  Next appointment 7/23/20    Plan:      1. Continue wound care.  2. Monitor blood work closely.  3. Follow-up again as noted.  4. Consider PRAFO in the future if needed, but doubt.  5. Follow up sooner if issues.           Gabino Bach MD  General Internal Medicine  [facility]    Personal office fax - [phone]   Voice mail - [phone]  E-mail - [email or web address]      Return in about 4 weeks (around 7/16/2020) for follow up visit.

## 2021-06-17 NOTE — TELEPHONE ENCOUNTER
Telephone Encounter by Mallory Grover RN at 1/18/2021  4:54 PM     Author: Mallory Grover RN Service: -- Author Type: Registered Nurse    Filed: 1/18/2021  5:04 PM Encounter Date: 1/18/2021 Status: Signed    : Mallory Grover RN (Registered Nurse)       ANTICOAGULATION  MANAGEMENT    Assessment     Today's INR result of 3.9 is Supratherapeutic (goal INR of 2.5-3.5)        Warfarin taken as previously instructed    No new diet changes affecting INR    No new medication/supplements affecting INR    Continues to tolerate warfarin with no reported s/s of bleeding or thromboembolism     Previous INR was Subtherapeutic    Plan:     Spoke on phone with Riley, Son regarding INR result and instructed:     Warfarin Dosing Instructions:  Change warfarin dose to 5 mg daily on Mon, Wed, Fri; and 7.5 mg daily rest of week     Instructed patient to follow up no later than: 1 week (wants to try with [facility]) orders and facesheet faxed    Education provided: target INR goal and significance of current INR result, importance of following up for INR monitoring at instructed interval, importance of taking warfarin as instructed, importance of notifying clinic for changes in medications and importance of notifying clinic for diarrhea, nausea/vomiting, reduced intake and/or illness    Riley verbalizes understanding and agrees to warfarin dosing plan.    Instructed to call the [facility] for any changes, questions or concerns. (#[phone])   ?   Mallory Grover RN    Subjective/Objective:      Bernadette Yu, a 80 y.o. female is on warfarin.     Bernadette reports:     Home warfarin dose: verbally confirmed home dose with Riley and updated on anticoagulation calendar     Missed doses: No     Medication changes:  No     S/S of bleeding or thromboembolism:  No     New Injury or illness:  No     Changes in diet or alcohol consumption:  No     Upcoming surgery, procedure or cardioversion:  No    Anticoagulation Episode Summary      Current INR goal:  2.5-3.5   TTR:  24.3 % (11.8 mo)   Next INR check:  1/25/2021   INR from last check:  3.90 (1/18/2021)   Weekly max warfarin dose:     Target end date:     INR check location:     Preferred lab:     Send INR reminders to:  [facility]    Indications    S/P mitral valve replacement (MVR) - mechanical mitral valve placed 2015 [Z95.2]           Comments:           Anticoagulation Care Providers     Provider Role Specialty Phone number    Gabino Bach MD Referring Internal Medicine [phone]

## 2021-06-17 NOTE — PATIENT INSTRUCTIONS - HE
Patient Instructions by Bernadette Pimentel RN at 8/8/2019  8:30 AM     Author: Bernadette Pimentel RN Service: -- Author Type: Registered Nurse    Filed: 8/8/2019  2:13 PM Encounter Date: 8/8/2019 Status: Addendum    : Bernadette Pimentel RN (Registered Nurse)    Related Notes: Original Note by Bernadette Pimentel RN (Registered Nurse) filed at 8/8/2019  2:05 PM       Dr. Bach -- [phone]    1st floor Op Infusion at [facility] -- [phone] - Option 1.    Instruct the patient to take a dose of digoxin this afternoon when she gets home from Infusion OP; and take another dose of digoxin tomorrow morning - and follow up with Dr. Bach as scheduled on 08/09/19.         When You Need a Blood Transfusion (Adult)  A blood transfusion may be done when you have lost blood because of an injury or during surgery. It can also be done because of diseases or conditions that affect the blood. Blood is made up of several different parts (blood products). You may receive some or all of these blood products during a transfusion. Blood for transfusion is usually donated from another person (donor). Strict measures are taken to make sure that donated blood is safe before it's given to you. This sheet helps you understand how a blood transfusion is done. Your healthcare provider will discuss your condition with you and answer your questions.    The parts of blood  Blood can be broken down into different parts that perform special roles in the body. These parts include:    Red blood cells, which carry oxygen throughout the body.    Platelets, which help stop bleeding.    Plasma (the liquid part of blood), which carries red blood cells and platelets throughout the body. Plasma also helps platelets in stopping bleeding.  Where does donated blood come from?    Volunteer donors. These are people who donate their blood to help others in need of blood. Blood donation can take place at several places, including a hospital, blood bank, or  during a blood drive.    Directed donation. If you need a blood transfusion during a planned surgery, family and friends can have their blood tested for compatibility and donate blood for you before the surgery. This needs to be done at least 7 day(s) in advance. This is because the blood must be tested for safety.    Autologous donation. This is also called self-donation. For planned surgery, you can donate your own blood starting up to 6 weeks before surgery.  Are blood transfusions safe?  Donated blood is tested and processed to make sure that the blood is safe:    The health and medical history of each donor is carefully screened. If a person is considered high-risk for infection or problems, he or she isn't accepted as a blood donor.    Donated blood is tested for infections such as hepatitis, syphilis, and HIV (the virus that causes AIDS). If the tested blood is found to be unsafe, it's destroyed.    Blood is divided into four types: A, B, AB, and O. Blood also has Rh types: positive (+) and negative (-). You can only receive blood products that are compatible with (match) your blood type. A sample of your blood is tested for compatibility with donated blood. This is done before blood products are prepared for a transfusion.  How is a blood transfusion done?  A blood transfusion takes place in a blood center, infusion center, hospital room, or operating room. Your healthcare provider will discuss the blood transfusion with you before it's done. You'll need to give permission for the blood transfusion by signing a consent form.    Two healthcare providers confirm your identity. They also confirm that they have the correct blood product(s) for you.    An intravenous (IV) line is placed in a vein if you do not already have an IV.    The blood product comes in a plastic bag that is hung on an IV pole. The blood product flows from the bag into your IV line. The IV line may be connected to a pump, which controls the  transfusion rate. You may receive more than one kind of blood product through the IV.    Your vital signs (blood pressure, heart rate, respiratory rate, and temperature) are checked throughout the transfusion. This is to make sure you are not having a reaction to the blood product.    The IV line may be removed once the transfusion is complete.  Possible risks and complications of blood transfusions  Most transfusions are problem free. In some cases, reactions occur. These can happen within seconds to minutes during the transfusion or a week to a few months after the transfusion. Call your doctor or nurse right away if you have any of the signs or symptoms in the table below during or after a transfusion:  Reaction Timing Symptoms   Allergic reaction (mild)   Within seconds to minutes during the transfusion    Up to 24 hours after the transfusion Hives or red welts on the skin, mild itching, rash, localized swelling, flushing (red face), wheezing, shortness of breath, or stridor (high-pitched noise or sound)   Anaphylactic reaction   Within seconds to minutes during the transfusion    Up to 24 hours after the transfusion Shortness of breath, flushing (red face), wheezing, labored (working hard) breathing, low blood pressure, localized swelling, chest tightness, or cramps   Febrile nonhemolytic reaction   Within minutes to hours during the transfusion    Within a few hours to 24 hours after the transfusion Fever (increase of 1  C or higher), chills, flushing (red face), nausea, headache, minor discomfort, or mild shortness of breath   Acute immune hemolytic reaction   Within minutes during the transfusion    Up to 24 hours after the transfusion Fever, red or brown urine, back pain, fast heart rate (tachycardia), abdominal pain, low blood pressure, feeling anxious, chills, chest pain, nausea, or fainting spells   Transfusion-related acute lung injury (TRALI)   Within 1 to 2 hours during the transfusion    Up to 6  "hours after the transfusion Shortness of breath, trouble breathing, low blood pressure, fever, pulmonary edema   Transfusion-associated circulatory overload   Near the end of the transfusion    Within 6 hours after the transfusion Shortness of breath, fast heart rate (tachycardia), problems breathing when lying on back, abnormal blood pressure   Post-transfusion purpura (PUP)   Within 1 week    Up to 48 days after the transfusion Purple spots on skin; nose bleed; bleeding from the urinary tract, abdomen, colon, or rectum; fever; or chills         \"Delayed\" transfusion-related acute lung injury (TRALI)   Within 72 hours (3 days) after the transfusion \"Sudden\" onset of respiratory distress or trouble breathing   \"Delayed\" hemolytic reaction   Within 3 to 7 days    Up to weeks after the transfusion Low-grade fever, mild jaundice (yellowing of the skin and whites of the eyes), decrease in hematocrit, chills, chest pain, back pain, nausea   Date Last Reviewed: 12/1/2016 2000-2017 [employer]. [address] [place] [address]. All rights reserved. This information is not intended as a substitute for professional medical care. Always follow your healthcare professional's instructions.                "

## 2021-06-17 NOTE — TELEPHONE ENCOUNTER
Telephone Encounter by Christine Cardenas at 9/16/2020 10:27 AM     Author: Christine Cardenas Service: -- Author Type: --    Filed: 9/16/2020 10:28 AM Encounter Date: 9/12/2020 Status: Signed    : Chirstine Cardenas APPROVED:    Approval start date: 06/18/2020  Approval end date:  12/31/2020    Pharmacy has been notified of approval and will contact patient when medication is ready for pickup.

## 2021-06-17 NOTE — PROGRESS NOTES
Skin / nail biopsy    Date/Time: 5/6/2021 10:41 PM  Performed by: Gabino Bach MD  Authorized by: Gabino Bach MD   Consent: Verbal consent obtained.  Risks and benefits: risks, benefits and alternatives were discussed  Consent given by: patient  Patient understanding: patient states understanding of the procedure being performed  Site marked: the operative site was marked  Preparation: Patient was prepped and draped in the usual sterile fashion.  Local anesthesia used: yes    Anesthesia:  Local anesthesia used: yes  Patient tolerance: Patient tolerated the procedure well with no immediate complications  Comments: The patient had a skin nodule to be addressed.  The widest area of excision was 5 mm.    Patient's nodule and skin of index finger was cleaned with alcohol.  1 cc of lidocaine without epinephrine was used for anesthesia with a #27 needle.    Good anesthesia was obtained.  Using a pickups and a derma blade, the lesion was removed in its entirety.  The lesion was sent for pathology in formalin.    Silver nitrate was used for hemostasis.  Bacitracin ointment applied and then a dressing after this with aftercare instructions given.

## 2021-06-17 NOTE — TELEPHONE ENCOUNTER
Refill Request  Did you contact pharmacy: No  Medication name:   Hydromorphone    Who prescribed the medication: PCP  Requested [facility]acy: [facility]  Is patient out of medication: No.  2 days left  Patient notified refills processed in 3 business days:  yes  Okay to leave a detailed message: yes

## 2021-06-17 NOTE — TELEPHONE ENCOUNTER
Telephone Encounter by Ninfa Cordon RN at 10/15/2020 12:38 PM     Author: Ninfa Cordon RN Service: -- Author Type: Registered Nurse    Filed: 10/15/2020  2:00 PM Encounter Date: 10/15/2020 Status: Signed    : Ninfa Cordon RN (Registered Nurse)       ANTICOAGULATION  MANAGEMENT    Assessment     Today's INR result of 2.4 is Subtherapeutic (goal INR of 2.5-3.5)        Less warfarin taken than instructed which may be affecting INR ran out of pills, took 1/2 dose for 2 nights    No new diet changes affecting INR    No new medication/supplements affecting INR    Continues to tolerate warfarin with no reported s/s of bleeding or thromboembolism     Previous INR was Therapeutic    Plan:     Spoke on phone with Bernadette regarding INR result and instructed:     Warfarin Dosing Instructions:  Continue current warfarin dose 5 mg daily     Instructed patient to follow up no later than: two weeks    Bernadette verbalizes understanding and agrees to warfarin dosing plan.    Instructed to call the [facility] for any changes, questions or concerns. (#[phone])   ?   Ninfa Cordon RN    Subjective/Objective:      Bernadette Yu, a 80 y.o. female is on warfarin.     Bernadette reports:     Home warfarin dose: verbally confirmed home dose with Bernadette and updated on anticoagulation calendar     Missed doses: No     Medication changes:  No     S/S of bleeding or thromboembolism:  No     New Injury or illness:  No     Changes in diet or alcohol consumption:  No     Upcoming surgery, procedure or cardioversion:  No    Anticoagulation Episode Summary     Current INR goal:  2.5-3.5   TTR:  28.4 % (11.4 mo)   Next INR check:  10/29/2020   INR from last check:  2.40 (10/15/2020)   Weekly max warfarin dose:     Target end date:     INR check location:     Preferred lab:     Send INR reminders to:  [facility]    Indications    S/P mitral valve replacement (MVR) - mechanical mitral valve placed 2015 [Z95.2]            Comments:           Anticoagulation Care Providers     Provider Role Specialty Phone number    Gabino Bach MD Referring Internal Medicine [phone]

## 2021-06-17 NOTE — TELEPHONE ENCOUNTER
Telephone Encounter by Mallory Grover RN at 1/29/2021  3:56 PM     Author: Mallory Grover RN Service: -- Author Type: Registered Nurse    Filed: 1/29/2021  4:10 PM Encounter Date: 1/29/2021 Status: Signed    : Mallory Grover RN (Registered Nurse)       ANTICOAGULATION  MANAGEMENT    Assessment     Today's INR result of 4.8 is Supratherapeutic (goal INR of 2.5-3.5)        Warfarin taken as previously instructed    No new diet changes affecting INR    No new medication/supplements affecting INR    Continues to tolerate warfarin with no reported s/s of bleeding or thromboembolism     Previous INR was Supratherapeutic    Plan:     Spoke on phone with Riley, son regarding INR result and instructed:     Warfarin Dosing Instructions:  Hold warfarin tomorrow (pt already took today's dose) then change warfarin dose to 7.5 mg daily on Sun, thu; and 5 mg daily rest of week  (11.1 % change)    Instructed patient to follow up no later than: 7-10 days    Education provided: target INR goal and significance of current INR result, importance of following up for INR monitoring at instructed interval and importance of taking warfarin as instructed    Riley verbalizes understanding and agrees to warfarin dosing plan.    Instructed to call the [facility] for any changes, questions or concerns. (#[phone])   ?   Mallory Grover RN    Subjective/Objective:      Bernadette Yu, a 80 y.o. female is on warfarin.     Bernadette reports:     Home warfarin dose: template incorrect; verbally confirmed home dose with Riley and updated on anticoagulation calendar     Missed doses: No     Medication changes:  No     S/S of bleeding or thromboembolism:  No     New Injury or illness:  No     Changes in diet or alcohol consumption:  No     Upcoming surgery, procedure or cardioversion:  No    Anticoagulation Episode Summary     Current INR goal:  2.5-3.5   TTR:  24.4 % (11.8 mo)   Next INR check:  2/8/2021   INR from last check:  4.80  (1/29/2021)   Weekly max warfarin dose:     Target end date:     INR check location:     Preferred lab:     Send INR reminders to:  [facility]    Indications    S/P mitral valve replacement (MVR) - mechanical mitral valve placed 2015 [Z95.2]           Comments:           Anticoagulation Care Providers     Provider Role Specialty Phone number    Gabino Bach MD Referring Internal Medicine [phone]

## 2021-06-18 NOTE — PATIENT INSTRUCTIONS - HE
Patient Instructions by Zunilda Estevez FNP at 2/12/2020  2:05 PM     Author: Zunilda Estevez FNP Service: -- Author Type: Nurse Practitioner    Filed: 2/12/2020  2:31 PM Encounter Date: 2/12/2020 Status: Signed    : Zunilda Estevez FNP (Nurse Practitioner)       Patient Education     De Quervain Tenosynovitis    De Quervain tenosynovitis is inflammation of tendons and synovium on the thumb side of the wrist. Tendons are fibers that attach muscle to bone. Synovium is a slick membrane that helps tendons move. Movements done over and over can irritate and inflame these tissues. This can cause pain when you touch or grasp objects, turn or twist your wrist, or make a fist. You may also have pain and swelling near the base of the thumb or numbness along the back of your thumb and index finger. You may also feel the thumb catch or snap when you move it.  Treatment will depend on how bad the pain is. It can often be treated with medicines, injections, splinting, and home care. If your case is severe, you may be referred to a specialist to talk about surgery.  Home care  Your healthcare provider may prescribe medicines to relieve pain and reduce inflammation. A steroid medicine may be injected near the tendons. This reduces swelling. The healthcare provider may also suggest taking over-the-counter medicines like ibuprofen or naproxen. These help reduce inflammation. Take all medicines only as directed.  The following are general care guidelines:    Avoid repetitive movements of your wrist and thumb.    Note any activity that causes pain or swelling. If possible, avoid or limit that activity.    Put a cold pack on your thumb. You can make your own cold pack by wrapping a plastic bag of ice or bag of frozen vegetables in a thin towel. Hold this to your thumb for up to 20 minutes at a time. Don't put ice directly on the skin.    Your healthcare provider may put a splint on the thumb to hold it still. Use  the splint as you have been instructed. In some cases, you may need to use a splint 24 hours a day for 4 to 6 weeks. This will allow the wrist and thumb to heal.  Follow-up care  Follow up with your healthcare provider, or as advised. You may need more treatment if your injury is severe or if your symptoms don't get better. This additional treatment may include local injections, physical therapy, and surgery.  When to seek medical advice  Call your healthcare provider right away if any of these occur:    Increase in pain or swelling    If you have fever, chills, redness, warmth, or drainage    Symptoms get worse after taking medicine    Pain spreads farther down the thumb or into the forearm    Pain continues to get in the way of daily life  Date Last Reviewed: 1/18/2016 2000-2017 [employer]. [address] [place] [address], [place] [address]. All rights reserved. This information is not intended as a substitute for professional medical care. Always follow your healthcare professional's instructions.

## 2021-06-18 NOTE — PATIENT INSTRUCTIONS - HE
"Patient Instructions by Emma Stevenson NP at 3/3/2020  9:20 AM     Author: Emma Stevenson NP Service: -- Author Type: Nurse Practitioner    Filed: 3/3/2020 10:44 AM Encounter Date: 3/3/2020 Status: Addendum    : Emma Stevenson NP (Nurse Practitioner)    Related Notes: Original Note by Emma Stevenson NP (Nurse Practitioner) filed at 3/3/2020 10:21 AM       Will get Arterial studies done on the legs to check for adequate blood flow    We will get you scheduled for MRI of the feet to look for bone infection    You need to order a rooke boot for the left foot to keep the pressure off the heel    Create a \"tent\" for your blankets and sheets to keep the pressure off the toes    [email or web address]-[email or web address]  To order rooke boot                 Daily  Paint bilateral great toes and left heel with betadine  Rolled gauze  Gripper socks  Avoid pressure on the wounds at all times                      High Protein Foods  Chicken  -Chicken breast, 3.5oz.-30 grams protein  -Chicken thigh-10 grams(average size)  -Drumstick-11 grams  -Wing- 6 grams  -Chicken meat, cooked, 4 oz.  Beef  -Hamburger gildardo, 4 oz-28 grams protein  -Steak, 6 oz-42 grams  -Most cuts of beef- 7 grams of protein per ounce  Fish  -Most fish fillets or steaks are about 22 grams of protein for 3 1/2 oz(100 grams) of cooked fish, or 6 grams per ounce  -Tuna, 6 oz can-40 grams of protein  Pork  -Pork chop, average-22 grams protein  -Pork loin or tenderloin, 4 oz.-29 grams  -Ham, 3oz serving- 19 grams  -Ground pork 3oz cooked-22 grams  -Murdock, 1 slice-3 grams  -Latvian-style murdock(black murdock), slice-5-6 grams  Eggs and Dairy  -Egg, large-7 grams  -Milk, 1 cup-8 grams  -Cottage cheese, 1/2 cup-15 grams  -Greek yogurt, 1 cup-usually 8-12 grams, check label  -Soft cheeses (Mozzarella, Brie, Camembert)- 6 grams  -Medium cheeses(cheddar, swiss)- 7 or 8 grams per oz  -Hard cheeses(parmesan)- 10 grams per " oz  Beans  -Tofu, 1/2 cup 20 grams  -Tofu, 1 oz., 2.3 grams  -Soy milk, 1 cup-6-10 grams  -Most beans(black, rene, lentils, etc.) about 7-10 grams protein per half cup of cooked beans  -soy beans, 1/2 cup cooked-14 grams  -Split peas, 1/2 cup cooked- 8 grams  Nuts and Seeds  -Peanut butter, 2 Tablespoons- 8 grams protein  -Almonds, 1/4 cup- 8 grams  -Peanuts, 1/4 cup-9 grams  -Cashews, 1/4 cup- 5 grams  -Pecans, 1/4 cup- 2.5 grams  -Sunflower seeds, 1/4 cup- 6 grams  -Pumpkin seeds, 1/4 cup-8 grams  -Flax seeds- 1/4 cup- 8 grams  Protein Supplements  -Ensure  -Boost  -Glucerna, if diabetic  When you have an open ulcer, your bodies protein needs are much higher, so it is recommended eat good sources of protein

## 2021-06-18 NOTE — LETTER
Letter by Gabino Bach MD at      Author: Gabino Bach MD Service: -- Author Type: --    Filed:  Encounter Date: 1/15/2019 Status: (Other)         [facility]  01/15/19    Patient: Bernadette Yu  YOB: 1940  Medical Record Number: [id number]                                                                MEDICATIONS: TEMAZEPAM, LORAZEPAM, HYDROMORPHONE      Opioid / Opioid Plus Controlled Substance Agreement    I understand that my care provider has prescribed an opioid (narcotic) controlled substance to help manage my condition(s). I am taking this medicine to help me function or work. I know this is strong medicine, and that it can cause serious side effects. Opioid medicine can be sedating, addicting and may cause a dependency on the drug. They can affect my ability to drive or think, and cause depression. They need to be taken exactly as prescribed. Combining opioids with certain medicines or chemicals (such as cocaine, sedatives and tranquilizers, sleeping pills, meth) can be dangerous or even fatal. Also, if I stop opioids suddenly, I may have severe withdrawal symptoms. Last, I understand that opioids do not work for all types of pain nor for all patients. If not helpful, I may be asked to stop them.    I am also being prescribed a benzodiazepine (tranquilizer) controlled substance.   I understand this type of medication is sedating, and can increase the risk of death when taken together with opioids.  I have talked to my care team about the option of having a prescription for Narcan to use to reverse the opioid medicine, in case I get too sleepy. I will be very careful to take my medicines only as directed.      The risks, benefits, and side effects of these medicine(s) were explained to me. I agree that:    1. I will take part in other treatments as advised by my care team. This may be psychiatry or counseling, physical therapy, behavioral therapy, group treatment or a  referral to a pain clinic. I will reduce or stop my medicine when my care team tells me to do so.  2. I will take my medicines as prescribed. I will not change the dose or schedule unless my care team tells me to. There will be no refills if I run out early.  I may be contactedwithout warning and asked to complete a urine drug test or pill count at any time.   3. I will keep all my appointments, and understand this is part of the monitoring of opioids. My care team may require an office visit for EVERY opioid/controlled substance refill. If I miss appointments or dont follow instructions, my care team may stop my medicine.  4. I will not ask other providers to prescribe controlled substances, and I will not accept controlled substances from other people. If I need another prescribed controlled substance for a new reason, I will tell my care team within 1 business day.  5. I will use one pharmacy to fill all of my controlled substance prescriptions, and it is up to me to make sure that I do not run out of my medicines on weekends or holidays. If my care team is willing to refill my opioid prescription without a visit, I must request refills only during office hours, refills may take up to 3 days to process, and it may take up to 5 to 7 days for my medicine to be mailed and ready at my pharmacy. Prescriptions will not be mailed anywhere except my pharmacy.        [id number]  Rev 12/18         Registration to scan to EHR                             Page 1 of 2               Controlled Substance Agreement Opioid        [facility]  01/15/19  Patient: Bernadette Yu  YOB: 1940  Medical Record Number: [id number]                                                                  6. I am responsible for my prescriptions. If the medicine/prescription is lost or stolen, it will not be replaced. I also agree not to share controlled substance medicines with anyone.  7. I agree to not use ANY illegal or  recreational drugs. This includes marijuana, cocaine, bath salts or other drugs. I agree not to use alcohol unless my care team says I may.          I agree to give urine samples whenever asked. If I dont give a urine sample, the care team may stop my medicine.    8. If I enroll in the [place] Medical Marijuana program, I will tell my care team. I will also sign an agreement to share my medical records with my care team.   9. I will bring in my list of medicines (or my medicine bottles) each time I come to the clinic.   10. I will tell my care team right away if I become pregnant or have a new medical problem treated outside of my regular clinic.  11. I understand that this medicine can affect my thinking and judgment. It may be unsafe for me to drive, use machinery and do dangerous tasks. I will not do any of these things until I know how the medicine affects me. If my dose changes, I will wait to see how it affects me. I will contact my care team if I have concerns about medicine side effects.    I understand that if I do not follow any of the conditions above, my prescriptions or treatment may be stopped.      I agree that my provider, clinic care team, and pharmacy may work with any city, state or federal law enforcement agency that investigates the misuse, sale, or other diversion of my controlled medicine. I will allow my provider to discuss my care with or share a copy of this agreement with any other treating provider, pharmacy or emergency room where I receive care. I agree to give up (waive) any right of privacy or confidentiality with respect to these consents.     I have read this agreement and have asked questions about anything I did not understand.      ________________________________________________________________________  Patient signature - Date/Time -  Bernadette Yu                                      ________________________________________________________________________  Witness  signature                                                            ________________________________________________________________________  Provider signature - Gabino Bach MD      [id number]  Rev 12/18         Registration to scan to EHR                         Page 2 of 2                   Controlled Substance Agreement Opioid           Page 1 of 2  Opioid Pain Medicines (also known as Narcotics)  What You Need to Know    What are opioids?   Opioids are pain medicines that must be prescribed by a doctor.  They are also known as narcotics.    Examples are:     morphine (MS Contin, Nimisha)    oxycodone (Oxycontin)    oxycodone and acetaminophen (Percocet)    hydrocodone and acetaminophen (Vicodin, Norco)     fentanyl patch (Duragesic)     hydromorphone (Dilaudid)     methadone     What do opioids do well?   Opioids are best for short-term pain after a surgery or injury. They also work well for cancer pain. Unlike other pain medicines, they do not cause liver or kidney failure or ulcers. They may help some people with long-lasting (chronic) pain.     What do opioids NOT do well?   Opioids never get rid of pain entirely, and they do not work well for most patients with chronic pain. Opioids do not reduce swelling, one of the causes of pain. They also dont work well for nerve pain.                           For informational purposes only.  Not to replace the advice of your care provider.  Copyright 201 [facility]. All right reserved. [employer] 507005-Ksi 02/18.      Page 2 of 2    Risks and side effects   Talk to your doctor before you start or decide to keep taking one of these medicines. Side effects include:    Lowering your breathing rate enough to cause death    Overdose, including death, especially if taking higher than prescribed doses    Long-term opioid use    Worse depression symptoms; less pleasure in things you usually enjoy    Feeling tired or sluggish    Slower thoughts or cloudy  thinking    Being more sensitive to pain over time; pain is harder to control    Trouble sleeping or restless sleep    Changes in hormone levels (for example, less testosterone)    Changes in sex drive or ability to have sex    Constipation    Unsafe driving    Itching and sweating    Feeling dizzy    Nausea, vomiting and dry mouth    What else should I know about opioids?  When someone takes opioids for too long or too often, they become dependent. This means that if you stop or reduce the medicine too quickly, you will have withdrawal symptoms.    Dependence is not the same as addiction. Addiction is when people keep using a substance that harms their body, their mind or their relations with others. If you have a history of drug or alcohol abuse, taking opioids can cause a relapse.    Over time, opioids dont work as well. Most people will need higher and higher doses. The higher the dose, the more serious the side effects. We dont know the long-term effects of opioids.      Prescribed opioids aren't the best way to manage chronic pain    Other ways to manage pain include:      Ibuprofen or acetaminophen.  You should always try this first.      Treat health problems that may be causing pain.      acupuncture or massage, deep breathing, meditation, visual imagery, aromatherapy.      Use heat or ice at the pain site      Physical therapy and exercise      Stop smoking      See a counselor or therapist                                                  People who have used opioids for a long time may have a lower quality of life, worse depression, higher levels of pain and more visits to doctors.    Never share your opioids with others. Be sure to store opioids in a secure place, locked if possible.Young children can easily swallow them and overdose.     You can overdose on opioids.  Signs of overdose include decrease or loss of consciousness, slowed breathing, trouble waking and blue lips.  If someone is worried about  overdose, they should call 911.    If you are at risk for overdose, you may get naloxone (Narcan, a medicine that reverses the effects of opioids.  If you overdose, a friend or family member can give you Narcan while waiting for the ambulance.  They need to know the signs of overdose and how to give Narcan.    While you're taking opioids:    Don't use alcohol or street drugs. Taking them together can cause death.    Don't take any of these medicines unless your doctor says its okay.  Taking these with opioids can cause death.    Benzodiazepines (such as lorazepam         or diazepam)    Muscle relaxers (such as cyclobenzaprine)    sleeping pills    other opioids    Safe disposal of opioids  Find your area drug take-back program, your pharmacy mail-back program, buy a special disposal bag (such as Deterra) from your pharmacy or flush them down the toilet.  Use the guidelines at:  www.fda.gov/drugs/resourcesforyou

## 2021-06-18 NOTE — LETTER
Letter by Gabino Bach MD at      Author: Gabino Bach MD Service: -- Author Type: --    Filed:  Encounter Date: 1/15/2019 Status: (Other)       January 15, 2019     Patient: Bernadette Yu   YOB: 1940   Date of Visit: 1/15/2019       To Whom it May Concern:    Bernadette Yu was seen in my clinic on 1/15/2019.    If you have any questions or concerns, please don't hesitate to call.    Sincerely,         Electronically signed by Gabino Bach MD

## 2021-06-18 NOTE — LETTER
Letter by Gabino Bach MD at      Author: Gabino Bach MD Service: -- Author Type: --    Filed:  Encounter Date: 1/15/2019 Status: (Other)         [facility]  01/15/19    Patient: Bernadette Yu  YOB: 1940  Medical Record Number: [id number]                                                                MEDICATION: TEMAZEPAM, LORAZEPAM, AND HYDROMORPHONE        Opioid / Opioid Plus Controlled Substance Agreement    I understand that my care provider has prescribed an opioid (narcotic) controlled substance to help manage my condition(s). I am taking this medicine to help me function or work. I know this is strong medicine, and that it can cause serious side effects. Opioid medicine can be sedating, addicting and may cause a dependency on the drug. They can affect my ability to drive or think, and cause depression. They need to be taken exactly as prescribed. Combining opioids with certain medicines or chemicals (such as cocaine, sedatives and tranquilizers, sleeping pills, meth) can be dangerous or even fatal. Also, if I stop opioids suddenly, I may have severe withdrawal symptoms. Last, I understand that opioids do not work for all types of pain nor for all patients. If not helpful, I may be asked to stop them.    I am also being prescribed a benzodiazepine (tranquilizer) controlled substance.   I understand this type of medication is sedating, and can increase the risk of death when taken together with opioids.  I have talked to my care team about the option of having a prescription for Narcan to use to reverse the opioid medicine, in case I get too sleepy. I will be very careful to take my medicines only as directed.      The risks, benefits, and side effects of these medicine(s) were explained to me. I agree that:    1. I will take part in other treatments as advised by my care team. This may be psychiatry or counseling, physical therapy, behavioral therapy, group treatment  or a referral to a pain clinic. I will reduce or stop my medicine when my care team tells me to do so.  2. I will take my medicines as prescribed. I will not change the dose or schedule unless my care team tells me to. There will be no refills if I run out early.  I may be contactedwithout warning and asked to complete a urine drug test or pill count at any time.   3. I will keep all my appointments, and understand this is part of the monitoring of opioids. My care team may require an office visit for EVERY opioid/controlled substance refill. If I miss appointments or dont follow instructions, my care team may stop my medicine.  4. I will not ask other providers to prescribe controlled substances, and I will not accept controlled substances from other people. If I need another prescribed controlled substance for a new reason, I will tell my care team within 1 business day.  5. I will use one pharmacy to fill all of my controlled substance prescriptions, and it is up to me to make sure that I do not run out of my medicines on weekends or holidays. If my care team is willing to refill my opioid prescription without a visit, I must request refills only during office hours, refills may take up to 3 days to process, and it may take up to 5 to 7 days for my medicine to be mailed and ready at my pharmacy. Prescriptions will not be mailed anywhere except my pharmacy.        [id number]  Rev 12/18         Registration to scan to EHR                             Page 1 of 2               Controlled Substance Agreement Opioid        [facility]  01/15/19  Patient: Bernadette Yu  YOB: 1940  Medical Record Number: [id number]                                                                  6. I am responsible for my prescriptions. If the medicine/prescription is lost or stolen, it will not be replaced. I also agree not to share controlled substance medicines with anyone.  7. I agree to not use ANY illegal  or recreational drugs. This includes marijuana, cocaine, bath salts or other drugs. I agree not to use alcohol unless my care team says I may.          I agree to give urine samples whenever asked. If I dont give a urine sample, the care team may stop my medicine.    8. If I enroll in the [place] Medical Marijuana program, I will tell my care team. I will also sign an agreement to share my medical records with my care team.   9. I will bring in my list of medicines (or my medicine bottles) each time I come to the clinic.   10. I will tell my care team right away if I become pregnant or have a new medical problem treated outside of my regular clinic.  11. I understand that this medicine can affect my thinking and judgment. It may be unsafe for me to drive, use machinery and do dangerous tasks. I will not do any of these things until I know how the medicine affects me. If my dose changes, I will wait to see how it affects me. I will contact my care team if I have concerns about medicine side effects.    I understand that if I do not follow any of the conditions above, my prescriptions or treatment may be stopped.      I agree that my provider, clinic care team, and pharmacy may work with any city, state or federal law enforcement agency that investigates the misuse, sale, or other diversion of my controlled medicine. I will allow my provider to discuss my care with or share a copy of this agreement with any other treating provider, pharmacy or emergency room where I receive care. I agree to give up (waive) any right of privacy or confidentiality with respect to these consents.     I have read this agreement and have asked questions about anything I did not understand.      ________________________________________________________________________  Patient signature - Date/Time -  Bernadette Yu                                      ________________________________________________________________________  Witness  signature                                                            ________________________________________________________________________  Provider signature - Gabino Bach MD      [id number]  Rev 12/18         Registration to scan to EHR                         Page 2 of 2                   Controlled Substance Agreement Opioid           Page 1 of 2  Opioid Pain Medicines (also known as Narcotics)  What You Need to Know    What are opioids?   Opioids are pain medicines that must be prescribed by a doctor.  They are also known as narcotics.    Examples are:     morphine (MS Contin, Nimisha)    oxycodone (Oxycontin)    oxycodone and acetaminophen (Percocet)    hydrocodone and acetaminophen (Vicodin, Norco)     fentanyl patch (Duragesic)     hydromorphone (Dilaudid)     methadone     What do opioids do well?   Opioids are best for short-term pain after a surgery or injury. They also work well for cancer pain. Unlike other pain medicines, they do not cause liver or kidney failure or ulcers. They may help some people with long-lasting (chronic) pain.     What do opioids NOT do well?   Opioids never get rid of pain entirely, and they do not work well for most patients with chronic pain. Opioids do not reduce swelling, one of the causes of pain. They also dont work well for nerve pain.                           For informational purposes only.  Not to replace the advice of your care provider.  Copyright [address] [facility]. All right reserved. [employer] 844298-Fch 02/18.      Page 2 of 2    Risks and side effects   Talk to your doctor before you start or decide to keep taking one of these medicines. Side effects include:    Lowering your breathing rate enough to cause death    Overdose, including death, especially if taking higher than prescribed doses    Long-term opioid use    Worse depression symptoms; less pleasure in things you usually enjoy    Feeling tired or sluggish    Slower thoughts or cloudy  thinking    Being more sensitive to pain over time; pain is harder to control    Trouble sleeping or restless sleep    Changes in hormone levels (for example, less testosterone)    Changes in sex drive or ability to have sex    Constipation    Unsafe driving    Itching and sweating    Feeling dizzy    Nausea, vomiting and dry mouth    What else should I know about opioids?  When someone takes opioids for too long or too often, they become dependent. This means that if you stop or reduce the medicine too quickly, you will have withdrawal symptoms.    Dependence is not the same as addiction. Addiction is when people keep using a substance that harms their body, their mind or their relations with others. If you have a history of drug or alcohol abuse, taking opioids can cause a relapse.    Over time, opioids dont work as well. Most people will need higher and higher doses. The higher the dose, the more serious the side effects. We dont know the long-term effects of opioids.      Prescribed opioids aren't the best way to manage chronic pain    Other ways to manage pain include:      Ibuprofen or acetaminophen.  You should always try this first.      Treat health problems that may be causing pain.      acupuncture or massage, deep breathing, meditation, visual imagery, aromatherapy.      Use heat or ice at the pain site      Physical therapy and exercise      Stop smoking      See a counselor or therapist                                                  People who have used opioids for a long time may have a lower quality of life, worse depression, higher levels of pain and more visits to doctors.    Never share your opioids with others. Be sure to store opioids in a secure place, locked if possible.Young children can easily swallow them and overdose.     You can overdose on opioids.  Signs of overdose include decrease or loss of consciousness, slowed breathing, trouble waking and blue lips.  If someone is worried about  overdose, they should call 911.    If you are at risk for overdose, you may get naloxone (Narcan, a medicine that reverses the effects of opioids.  If you overdose, a friend or family member can give you Narcan while waiting for the ambulance.  They need to know the signs of overdose and how to give Narcan.    While you're taking opioids:    Don't use alcohol or street drugs. Taking them together can cause death.    Don't take any of these medicines unless your doctor says its okay.  Taking these with opioids can cause death.    Benzodiazepines (such as lorazepam         or diazepam)    Muscle relaxers (such as cyclobenzaprine)    sleeping pills    other opioids    Safe disposal of opioids  Find your area drug take-back program, your pharmacy mail-back program, buy a special disposal bag (such as Deterra) from your pharmacy or flush them down the toilet.  Use the guidelines at:  www.fda.gov/drugs/resourcesforyou

## 2021-06-18 NOTE — PROGRESS NOTES
Patient is discharging home. During hospital stay patient blood pressure is elevated at times. See flow sheet. Per patient she does not take high blood pressure medications or been diagnose with hypertension. Patient denies any pain or discomforts. Patient states she has a blood pressure cuff at home and she will monitor. Informed Dr. Alvarenga and he stated for patient to follow up with primary MD. Patient states she will call her MD and  is present. Patient also stated she has a Md appointment coming up soon. Informed patient to call MD today to see if they want to evaluate.

## 2021-06-19 NOTE — PROGRESS NOTES
Pain Clinic Consultation  ENCOUNTER DATE: 2018    Bernadette Lozoyacristhian    1940  MRN # [id number]  PCP: Gabino Bach MD    CC: Bernadette Yu 77 y.o. is here today, sent to me by  to discuss   Chief Complaint   Patient presents with     Consult     back         HPI:     Pain started:  Many years ago with no specific injury  Pain level: On a scale of 1-10, the patient rates their pain on average at a 7  Pain is described: Sharp, aching, deep and low back and legs  Pain interferes with: Daily activities, work, recreational activities  Aggravating factors: Walking, reaching, twisting, lifting, bending, going upstairs and downstairs, getting out of car and out of bed  Alleviating factors: Medications to some extent, heat, sitting  Associated Symptoms: None      Past Medical History:   Diagnosis Date     Abdominal bloating, chronic 2016     Anticoagulation goal of INR 2.5 to 3.5 2016     Anxiety      ASCVD (arteriosclerotic cardiovascular disease)      Carotid artery stenosis, left      CHF (congestive heart failure) (H)      DM (diabetes mellitus), type 2 (H) 1990     Eczema      Former smoker      Full code status      HLD (hyperlipidemia)      HTN (hypertension)      Hypothyroidism      IBS (irritable bowel syndrome)      Insomnia      Liver disease      Meningococcal meningitis Age 14     Mitral stenosis      MRSA (methicillin resistant staph aureus) culture positive 2017     Normocytic anemia      Paroxysmal atrial fibrillation - treated during surgery in 2015    Bilateral pulmonary vein isolation with AtriCure Synergy (bipolar radiofrequency ablation) device, exclusion of the left atrial appendage with the AtriCure AtriClip device.       PN (peripheral neuropathy) (H)      Psoriasis      Recurrent UTI (urinary tract infection)      RLS (restless legs syndrome)      S/P CABG (coronary artery bypass graft) 2016     S/P colonoscopy 07     S/P MVR (mitral valve  replacement) - 23 mm [employer] mechanical valve - 2015     Subclavian artery stenosis, left - s/p stent 2015    Stented in .        Past Surgical History:   Procedure Laterality Date     APPENDECTOMY       CARDIAC CATHETERIZATION  11/12/15      SECTION       CHOLECYSTECTOMY       COLONOSCOPY N/A 10/12/2016    Procedure: COLONOSCOPY;  Surgeon: Santiago Duran MD;  Location: [facility];  Service:      COLONOSCOPY N/A 10/13/2016    Procedure: COLONOSCOPY with polypectomy & rectum biopsies;  Surgeon: Santiago Duran MD;  Location: [facility];  Service:      COLONOSCOPY N/A 12/3/2017    Procedure: COLONOSCOPY with multiple polyp removal using hot snare and mansfield net and biopsy forcep;  Surgeon: Marcelo Wade MD;  Location: [facility];  Service:      COLONOSCOPY N/A 3/13/2018    Procedure: COLONOSCOPY; POLYPECTOMY;  Surgeon: Marcelo Wade MD;  Location: [facility] OR;  Service:      CORONARY ARTERY BYPASS GRAFT       ESOPHAGOGASTRODUODENOSCOPY N/A 10/12/2016    Procedure: ESOPHAGOGASTRODUODENOSCOPY with biopsies;  Surgeon: Santiago Duran MD;  Location: [facility];  Service:      ESOPHAGOGASTRODUODENOSCOPY N/A 12/3/2017    Procedure: ESOPHAGOGASTRODUODENOSCOPY (EGD);  Surgeon: Marcelo Wade MD;  Location: [facility];  Service:      TONSILLECTOMY AND ADENOIDECTOMY       UPPER GASTROINTESTINAL ENDOSCOPY         SOCIAL HISTORY:   reports that she has quit smoking. Her smoking use included Cigarettes. She quit after 23.00 years of use. She has never used smokeless tobacco. She reports that she does not drink alcohol or use illicit drugs.    FAMILY HISTORY  family history includes Arthritis in her mother; Colon cancer in her father and son; Diabetes in her father and sister; Heart disease in her brother and mother; Hypertension in her mother; Rectal cancer in her son.    No Known Allergies    Current Outpatient Prescriptions   Medication Sig Dispense  "Refill     ACCU-CHEK SMARTVIEW TEST STRIP strips TEST TID  3     ascorbic acid, vitamin C, (VITAMIN C) 100 MG tablet Take 100 mg by mouth daily.       BD ULTRA-FINE DAVID PEN NEEDLES 32 gauge x 5/32\" Ndle USE WITH NOVOLOG PEN AND LANTUS PENS  0     calcium carbonate (OS-DI) 600 mg calcium (1,500 mg) tablet Take 600 mg by mouth 2 (two) times a day with meals.       cholecalciferol, vitamin D3, 5,000 unit Tab Take 5,000 Units by mouth daily.       cyanocobalamin (VITAMIN B-12) 100 MCG tablet Take 100 mcg by mouth daily.       ferrous sulfate 325 (65 FE) MG tablet Take 1 tablet (325 mg total) by mouth every other day. 30 tablet 3     furosemide (LASIX) 40 MG tablet Take 40 mg by mouth daily.       generic lancets Use 1 each As Directed 3 (three) times a day. Dx E11.65 DM2, uncontrolled 300 each 3     HYDROmorphone (DILAUDID) 4 MG tablet Take 0.5-1 tablets (2-4 mg total) by mouth 4 (four) times a day as needed for pain. For use from 7/9/2018 to 8/8/2018.  RX 2/2. 120 tablet 0     insulin aspart U-100 (NOVOLOG) 100 unit/mL injection Inject 10 Units under the skin 3 (three) times a day as needed.       insulin NPH (NOVOLIN) 100 unit/mL injection Inject 15 Units under the skin 3 (three) times a day as needed.       levothyroxine (SYNTHROID, LEVOTHROID) 125 MCG tablet Take 1 tablet (125 mcg total) by mouth daily. 90 tablet 3     magnesium 30 mg tablet Take 30 mg by mouth 2 (two) times a day.       NOVOLIN N NPH U-100 INSULIN 100 unit/mL injection ADMINISTER 40 UNITS UNDER THE SKIN THREE TIMES DAILY 40 mL 11     omeprazole (PRILOSEC) 20 MG capsule Take 1 capsule (20 mg total) by mouth daily. 90 capsule 3     polyethylene glycol (MIRALAX) 17 gram packet Take 1 packet (17 g total) by mouth daily as needed.  0     warfarin (COUMADIN) 5 MG tablet Take 5-7.5 mg by mouth See Admin Instructions. Take 7.5mg on Wednesdays and Saturdays. Take 5mg all other days of the week.       gabapentin (NEURONTIN) 100 MG capsule Take 100 mg by " mouth 3 (three) times a day. 90 capsule 0     HYDROmorphone (DILAUDID) 4 MG tablet Take 0.5-1 tablets (2-4 mg total) by mouth 4 (four) times a day as needed for pain. For use from 6/9/2018 to 7/9/2018.  RX 1/2. 120 tablet 0     temazepam (RESTORIL) 15 mg capsule Take 1 capsule (15 mg total) by mouth at bedtime as needed. 90 capsule 1     No current facility-administered medications for this encounter.        Chemical Dependency History: None      Mental Health History: None      REVIEW OF SYSTEMS:  12 point systems were reviewed with pt as documented on pt health form of 8/1/2018. ROS was positive for glasses, denture, constipation, back pain, muscle pain, easy bleeding patient is on Coumadin  The rest of systems were pertinent negative.       PHYSICAL EXAM:    Constitutional: 77 y.o. White or  female in NAD; alert and oriented x4/4; appears stated age.  Normal body habitus.  Patient is cooperative, polite, communicates well, makes eye contact, and expresses appropriate concern throughout history. Inspection of the neck demonstrates no skin abnormalities or deformities.  Posture is appropriate with no obvious listing, scoliosis, or rigidity.  HEENT:   Head: Normocephalic and atraumatic.   Right Ear: External ear normal.   Left Ear: External ear normal.   Nose: Nose normal.   Mouth/Throat: Oropharynx is clear and moist.   Eyes: Conjunctivae and EOM are normal. Right eye exhibits no discharge. Left eye exhibits no discharge.   Neck: Normal range of motion. Neck supple.   Cardiovascular: Normal rate, regular rhythm and normal heart sounds.    Pulmonary/Chest: Effort normal and breath sounds normal. No respiratory distress. No wheezes. Noo rales.  Integumentary: no rashes or breaks in the skin, no open wounds.   Psychiatry:  The patient described normal mood. Affect is normal. No abnormal speech. The patient denies any suicidal ideation. No hallucination. Judgement and insight are normal.     Musculoskeletal  exam:      Spine:   Reduced range of motion of  spine with pain extension and flexion. Tenderness over lumbar facets. Sacroiliac joints test was normal.   Gait: Patient walks with a off-balanced gait. Is able to toe and heel walk normally.  Patient rises from a seated position without difficulty.        Neurological exam:     Motor Examination:  Muscles are symmetrical, no deformities or atrophy.  Motor examination in the lower extremities demonstrates grade 5/5 strength in all major motor groups.   Sensory Examination:  Light touch sensation in the lower extremities is subjectively normal throughout all major dermatomes.   Deep Tendon Reflexes:  Reflexes at the biceps, brachioradialis, and triceps are symmetric and grade 2 bilaterally. Reflexes at the Patellar and Achilles are symmetric and grade 2 bilaterally.     Provocative tests:     SLR test was negative bilaterally.          Images: MRI of lumbar 5/2018  1.  Mild dextroscoliosis with advanced lumbar spondylosis.  2.  Advanced chronic degenerative endplate change L1-L4 and L5-S1. Superimposed endplate edema at L1-L2 and L2-L3, most consistent with degenerative endplate change (Modic type I). No disc space T2 hyperintensity or endplate erosion to suggest infection,   however there is mild paraspinal edema on the left adjacent to the L2-L3 spur. Correlation with clinical and laboratory findings recommended regarding less likely possibility of early infection.  3.  Moderate spinal canal stenosis at L3-L4 with severe left and moderate right foraminal stenoses. Moderate to severe left and moderate right foraminal stenoses.  4.  Moderate left lateral recess stenosis and severe left foraminal stenosis at L2-L3.  5.  Mild lateral recess stenoses and left foraminal stenoses at L4-L5.  6.  Moderate to severe foraminal stenoses at L5-S1.       Diagnosis  1. Chronic pain syndrome  OPS Interlaminar Epidural Steroid Injection Lumbar    gabapentin (NEURONTIN) 100 MG capsule    2. Spinal stenosis of lumbar region with neurogenic claudication  OPS Interlaminar Epidural Steroid Injection Lumbar    gabapentin (NEURONTIN) 100 MG capsule   3. Lumbar radiculopathy  OPS Interlaminar Epidural Steroid Injection Lumbar    gabapentin (NEURONTIN) 100 MG capsule   4. DDD (degenerative disc disease), lumbar  OPS Interlaminar Epidural Steroid Injection Lumbar    gabapentin (NEURONTIN) 100 MG capsule         Assessment:    This is a 77-year-old female patient presented today accompanied her  regarding chronic low back and likes pain for many years.  She failed physical therapy and bilateral transforaminal epidurals injection at L5.  He is on Dilaudid 4 mg up to 4 times a day.  She states that Dilaudid lasts for 2 hours.  Patient has lumbar radiculopathy and stenosis with claudication.  Discussed with patient caudal epidural steroid injection to help with her pain and improvement of her function.  Discussed physical therapy sessions but she declined.  Discussed the opioid management protocol.  Will try different options from Dilaudid which could provide her with better pain relief and less risk than Dilaudid.           PLAN:    Available medical records including diagnostic studies were reviewed today with the patient. Plan of care was discussed with the patient. Education about patient pain condition, pathology, and strategies to manage the pain was provided.     Diagnotic Studies/Lab orders: Urine drug screen today     Interventions: I discussed risks versus benefits of caudal epidural steroid injection.  Patient is interested to proceed    Physical Medicine and rehabilitations: I am referring the patient for a physical therapy evaluation and treatment for pain control, improvement of function, and assisting the patient to develop a daily routine to support achievement and, where necessary, readjustment of habits and roles according to the patient capacity and life situation.  Patient  declined    Non Opioid Management: We discussed few options to manage the patient. After discussion of potential adverse effects versus benefits. The patient agreed to proceed with trial of gabapentin 100 mg 3 times daily.  Start at bedtime for 3 days then twice a day for 3 days then 3 times a day if it is tolerable    :  Dated 8/1/2018  Reviewed and consistent.    Opioid Management:    Consider switching from Dilaudid to Percocet upon next visit.  Continue Dilaudid as instructed by primary care physician until your next visit at the pain center.    The patient understands that pain medication is not prescribed during the initial patient consultation at the pain center. If the patient is on pain medications for chronic pain, the PCP or prescribing provider will continue to prescribe until the patient presents for follow up at the pain center. Medication prescriptions provided by the pain center, will depend on the UA results, safe prescribing practices established by the CDC and patient response to their current treatment regimen.     Patient required a random Urine Drug Testing, due to the need to comply with [place] Model Policy Guidelines and CDC Guideline for the use of any controlled substances. This is to ensure that patient is compliant with treatment, and monitor for risks such as diversion, abuse, or any other aberrant behaviors. Patient is either being considered for or taking a controlled substance. Unexpected findings will be discussed and treatment decision may be adjusted. Testing is being implemented across the board randomly w/o bias related to age, race, gender, socioeconomic status or Quaker affiliation. Risks versus benefits of short and long term opioid management were discussed. Compliance, potential adverse effects Opioid agreement was provided to the patient for review.  report was reviewed and discussed with the patient today.     SAFETY REMINDERS  No alcohol while taking  controlled substances. Alcohol is not an illegal substance, it is unsafe to use in combination. It is a build up of substances in the body that can be extremely hazardous and may cause respirations to slow to a dangerous rate resulting in hospitalization, brain damage, or death.    Opioid medications have been associated with sharp rise in unintentional overdose and death.  Overdose is a condition characterized by the consumption in excess of a particular drug causing adverse effects. This can happen b/c you are sick, accidentally or intentionally took an extra dose, are on multiple medication that can interact. Someone took your medication and they are not use to the medication.  Symptoms of overdose include:   !breathing slow and shallow, erratic or not at all  !pinpoint pupils, hallucinations  !confusion  !muscle jerks, slack muscles   !extreme sleepiness or loss of alertness   !awake but not able to talk   !face pale or clammy, vomiting, for lighter skinned people, the skin tone turns bluish purple, for darker skinned people, it turns grayish or ashen   If in a situation where overdose is a concern engage the emergency response system (dial 911).    In one study it was noted that 80% of unintentional overdoses occurred in people who were taking a combination of opioids and benzodiazepines.    Do not sell, loan, borrow or share your opioid medication with anyone. Deaths have occurred as a result of this practice. It is illegal and patients are being prosecuted.     Prevent unexpected access/loss of medication: Keep medication locked. Only carry what you need with you.    The patient agrees to the plan and has no further questions, if questions arise the patient knows to call [phone].       Please see your current provider for any continued prescription. They will continue to manage your general health and have requested you see the pain center for pain management. Please discuss any health concerns with your  PCP     Follow up: 4 weeks.       Medications:     Requested Prescriptions     Signed Prescriptions Disp Refills     gabapentin (NEURONTIN) 100 MG capsule 90 capsule 0     Sig: Take 100 mg by mouth 3 (three) times a day.               Kevin Pierson MD  [employer] Board Certified [facility]  [address] [place] [address]. [address]  [place] [address]  Ph: [phone]  Fax: [phone]

## 2021-06-19 NOTE — LETTER
Letter by Gabino Bach MD at      Author: Gabino Bach MD Service: -- Author Type: --    Filed:  Encounter Date: 3/21/2019 Status: (Other)         3/21/2019    Bernadette Yu   [address] [place] [address]         Dear Bernadette,    It was good to see you in clinic.  I hope your questions were answered at the time of your visit.    The results of your tests from your visit were as follows:    Resulted Orders   HM2(CBC w/o Differential)   Result Value Ref Range    WBC 4.9 4.0 - 11.0 thou/uL    RBC 4.33 3.80 - 5.40 mill/uL    Hemoglobin 12.7 12.0 - 16.0 g/dL    Hematocrit 37.6 35.0 - 47.0 %    MCV 87 80 - 100 fL    MCH 29.2 27.0 - 34.0 pg    MCHC 33.7 32.0 - 36.0 g/dL    RDW 13.0 11.0 - 14.5 %    Platelets 140 140 - 440 thou/uL    MPV 9.6 7.0 - 10.0 fL   Ferritin   Result Value Ref Range    Ferritin 57 10 - 130 ng/mL   Iron and Transferrin Iron Binding Capacity   Result Value Ref Range    Iron 85 42 - 175 ug/dL    Transferrin 260 212 - 360 mg/dL    Transferrin Saturation, Calculated 26 20 - 50 %    Transferrin IBC, Calculated 326 313 - 563 ug/dL   Basic Metabolic Panel   Result Value Ref Range    Sodium 137 136 - 145 mmol/L    Potassium 4.2 3.5 - 5.0 mmol/L    Chloride 97 (L) 98 - 107 mmol/L    CO2 29 22 - 31 mmol/L    Anion Gap, Calculation 11 5 - 18 mmol/L    Glucose 215 (H) 70 - 125 mg/dL    Calcium 9.7 8.5 - 10.5 mg/dL    BUN 23 8 - 28 mg/dL    Creatinine 1.01 0.60 - 1.10 mg/dL    GFR MDRD Af Amer >60 >60 mL/min/1.73m2    GFR MDRD Non Af Amer 53 (L) >60 mL/min/1.73m2    Narrative    Fasting Glucose reference range is 70-99 mg/dL per  American Diabetes Association (ADA) guidelines.   Glycosylated Hemoglobin A1c   Result Value Ref Range    Hemoglobin A1c 8.2 (H) 3.5 - 6.0 %   Thyroid Stimulating Hormone (TSH)   Result Value Ref Range    TSH 1.46 0.30 - 5.00 uIU/mL     Your blood counts are normal and you are not anemic.  Your iron levels were normal.     Your thyroid tests are excellent.  Your A1c  is up slightly but is not concerning at this time.    Your electrolytes were normal.  Your kidney tests were normal.      Please follow up with me again on 5/30/2019 as you have already scheduled.  Please let me know if your toe worsens in the meantime.    If you have any questions regarding these results, please feel free to contact me at [phone].  I wish you the best of health!        Sincerely,      Gabino Bach MD  General Internal Medicine  [facility]

## 2021-06-19 NOTE — LETTER
Letter by Lorrie Will CHW at      Author: Lorrie Will CHW Service: -- Author Type: --    Filed:  Encounter Date: 11/4/2019 Status: Signed         Dear Bernadette,                                                                         You were recently referred to the [facility]'s Clinic Care Coordination service.  This is a service offered through your Primary Care Clinic which can help you access resources, services in regard to your health and well-being goals. The clinic Community Health Worker has placed two calls to you to discuss the nature of this service and to offer enrollment.  If you are interested in learning more about Clinic Care Coordination, please call your Primary Care Clinic's Community Health Worker, Naima, at [phone].                                                    Sincerely,                                                                              EDWARD Mcnamara                                                                                          Clinic Care Coordination                                                  [facility]

## 2021-06-19 NOTE — LETTER
Letter by Gabino Bach MD at      Author: Gabino Bach MD Service: -- Author Type: --    Filed:  Encounter Date: 5/30/2019 Status: (Other)         5/30/2019    Bernadette Yu   [address] [place] [address]         Dear Bernadette,    It was good to see you in clinic.  I hope your questions were answered at the time of your visit.    The results of your tests from your visit were as follows:    Resulted Orders   HM2(CBC w/o Differential)   Result Value Ref Range    WBC 7.8 4.0 - 11.0 thou/uL    RBC 4.34 3.80 - 5.40 mill/uL    Hemoglobin 12.8 12.0 - 16.0 g/dL    Hematocrit 38.3 35.0 - 47.0 %    MCV 88 80 - 100 fL    MCH 29.6 27.0 - 34.0 pg    MCHC 33.6 32.0 - 36.0 g/dL    RDW 12.4 11.0 - 14.5 %    Platelets 145 140 - 440 thou/uL    MPV 10.1 (H) 7.0 - 10.0 fL     Your blood counts are normal and you are not anemic.  Your red blood cell count was excellent.    Please follow up again in 2 months.    If you have any questions regarding these results, please feel free to contact me at [phone].  I wish you the best of health!        Sincerely,      Gabino Bach MD  General Internal Medicine  [facility]

## 2021-06-20 NOTE — LETTER
Letter by Nba Cleveland DPM at      Author: Nba Cleveland DPM Service: -- Author Type: --    Filed:  Encounter Date: 3/10/2020 Status: (Other)       3/10/2020    [facility]  Fax: [phone] Wound Dressing Rx and Order Form  Customer Service: [phone] Order Status: New Order   Verbal: Arielle            Patient Info:  Name: Bernadette Yu  : 1940  Address:   [address] [place] [address]  Phone: [phone]      Insurance Info:  Primary: Payor: MEDICARE / Plan: MEDICARE A AND B / Product Type: Medicare /    Secondary: MEDICA MEDICA  [id number] - (Medicare)      Physician Info:   Name:  Nba Cleveland JR., DPM   Dept Address/Phones:   [facility], [facility] [address]  [facility] [address] [phone]  Fax: [phone]    Wound info:  Encounter Diagnoses   Name Primary?   ? Ulcer of heel and midfoot, left, with unspecified severity (H) Yes   ? Osteomyelitis of ankle and foot (H)      VASC Wound 20 Lt great toe (Active)   Pre Size Length 1 03/10/20 1400   Pre Size Width 1.5 03/10/20 1400   Pre Size Depth 0.1 03/10/20 1400   Pre Total Sq cm 1.5 03/10/20 1400   Description callus 20 0900       VASC Wound 20 Rt great toe (Active)   Pre Size Length 0.8 03/10/20 1400   Pre Size Width 1 03/10/20 1400   Pre Size Depth 0.1 03/10/20 1400   Pre Total Sq cm 0.8 03/10/20 1400   Description callus 20 0900       VASC Wound 20 Lt heel (Active)   Pre Size Length 3.7 03/10/20 1400   Pre Size Width 2.6 03/10/20 1400   Pre Size Depth 0.2 03/10/20 1400   Pre Total Sq cm 9.99 03/10/20 1400       Wound 20 Other wound type (comment) Toe Left (Active)       Wound 20 Other wound type (comment) Toe Right (Active)       Wound 20 Other wound type (comment) Heel Left (Active)     Drainage: Moderate  Thickness:  Partial  Duration of Need: 30  Days Supply: 30  Start Date: 3/10/2020  Starter Kit: Ancillary Kit  "(saline, gloves, gauze) and Endoform  Qualifying wound/Debridement Yes      Dressing Type Brand Size Number of pieces Frequency of change   Primary Endoform  2x2 One box Weekly   Secondary gauze  4x4 One box Every other day    Roll gauze  4\" One box Every other day   Tape Paper tape   One roll Every other day     Note: If total out of pocket is more than $50.00 please contact the patient before processing order.     OK to forward to covered supplier.     Electronically Signed Physician: Nba Cleveland JR., DPM Date: 3/10/2020       "

## 2021-06-20 NOTE — LETTER
Letter by Gabino Bach MD at      Author: Gabino Bach MD Service: -- Author Type: --    Filed:  Encounter Date: 2/10/2020 Status: (Other)         [facility]  02/10/20    Patient: Bernadette Yu  YOB: 1940  Medical Record Number: [id number]                                                                MEDICATION: TEMAZEPAM, LORAZEPAM, HYDROMORPHONE      Opioid / Opioid Plus Controlled Substance Agreement    I understand that my care provider has prescribed an opioid (narcotic) controlled substance to help manage my condition(s). I am taking this medicine to help me function or work. I know this is strong medicine, and that it can cause serious side effects. Opioid medicine can be sedating, addicting and may cause a dependency on the drug. They can affect my ability to drive or think, and cause depression. They need to be taken exactly as prescribed. Combining opioids with certain medicines or chemicals (such as cocaine, sedatives and tranquilizers, sleeping pills, meth) can be dangerous or even fatal. Also, if I stop opioids suddenly, I may have severe withdrawal symptoms. Last, I understand that opioids do not work for all types of pain nor for all patients. If not helpful, I may be asked to stop them.    I am also being prescribed a benzodiazepine (tranquilizer) controlled substance.   I understand this type of medication is sedating, and can increase the risk of death when taken together with opioids.  I have talked to my care team about the option of having a prescription for Narcan to use to reverse the opioid medicine, in case I get too sleepy. I will be very careful to take my medicines only as directed.      The risks, benefits, and side effects of these medicine(s) were explained to me. I agree that:    1. I will take part in other treatments as advised by my care team. This may be psychiatry or counseling, physical therapy, behavioral therapy, group treatment or a  referral to a pain clinic. I will reduce or stop my medicine when my care team tells me to do so.  2. I will take my medicines as prescribed. I will not change the dose or schedule unless my care team tells me to. There will be no refills if I run out early.  I may be contactedwithout warning and asked to complete a urine drug test or pill count at any time.   3. I will keep all my appointments, and understand this is part of the monitoring of opioids. My care team may require an office visit for EVERY opioid/controlled substance refill. If I miss appointments or dont follow instructions, my care team may stop my medicine.  4. I will not ask other providers to prescribe controlled substances, and I will not accept controlled substances from other people. If I need another prescribed controlled substance for a new reason, I will tell my care team within 1 business day.  5. I will use one pharmacy to fill all of my controlled substance prescriptions, and it is up to me to make sure that I do not run out of my medicines on weekends or holidays. If my care team is willing to refill my opioid prescription without a visit, I must request refills only during office hours, refills may take up to 3 days to process, and it may take up to 5 to 7 days for my medicine to be mailed and ready at my pharmacy. Prescriptions will not be mailed anywhere except my pharmacy.        [id number]  Rev 12/18         Registration to scan to EHR                             Page 1 of 2               Controlled Substance Agreement Opioid        [facility]  02/10/20  Patient: Bernadette Yu  YOB: 1940  Medical Record Number: [id number]                                                                  6. I am responsible for my prescriptions. If the medicine/prescription is lost or stolen, it will not be replaced. I also agree not to share controlled substance medicines with anyone.  7. I agree to not use ANY illegal or  recreational drugs. This includes marijuana, cocaine, bath salts or other drugs. I agree not to use alcohol unless my care team says I may.          I agree to give urine samples whenever asked. If I dont give a urine sample, the care team may stop my medicine.    8. If I enroll in the [place] Medical Marijuana program, I will tell my care team. I will also sign an agreement to share my medical records with my care team.   9. I will bring in my list of medicines (or my medicine bottles) each time I come to the clinic.   10. I will tell my care team right away if I become pregnant or have a new medical problem treated outside of my regular clinic.  11. I understand that this medicine can affect my thinking and judgment. It may be unsafe for me to drive, use machinery and do dangerous tasks. I will not do any of these things until I know how the medicine affects me. If my dose changes, I will wait to see how it affects me. I will contact my care team if I have concerns about medicine side effects.    I understand that if I do not follow any of the conditions above, my prescriptions or treatment may be stopped.      I agree that my provider, clinic care team, and pharmacy may work with any city, state or federal law enforcement agency that investigates the misuse, sale, or other diversion of my controlled medicine. I will allow my provider to discuss my care with or share a copy of this agreement with any other treating provider, pharmacy or emergency room where I receive care. I agree to give up (waive) any right of privacy or confidentiality with respect to these consents.     I have read this agreement and have asked questions about anything I did not understand.      ________________________________________________________________________  Patient signature - Date/Time -  Bernadette Yu                                      ________________________________________________________________________  Witness  signature                                                            ________________________________________________________________________  Provider signature - Gabino Bach MD      [id number]  Rev 12/18         Registration to scan to EHR                         Page 2 of 2                   Controlled Substance Agreement Opioid           Page 1 of 2  Opioid Pain Medicines (also known as Narcotics)  What You Need to Know    What are opioids?   Opioids are pain medicines that must be prescribed by a doctor.  They are also known as narcotics.    Examples are:     morphine (MS Contin, Nimisha)    oxycodone (Oxycontin)    oxycodone and acetaminophen (Percocet)    hydrocodone and acetaminophen (Vicodin, Norco)     fentanyl patch (Duragesic)     hydromorphone (Dilaudid)     methadone     What do opioids do well?   Opioids are best for short-term pain after a surgery or injury. They also work well for cancer pain. Unlike other pain medicines, they do not cause liver or kidney failure or ulcers. They may help some people with long-lasting (chronic) pain.     What do opioids NOT do well?   Opioids never get rid of pain entirely, and they do not work well for most patients with chronic pain. Opioids do not reduce swelling, one of the causes of pain. They also dont work well for nerve pain.                           For informational purposes only.  Not to replace the advice of your care provider.  Copyright 201 [facility]. All right reserved. [employer] 208885-Rcn 02/18.      Page 2 of 2    Risks and side effects   Talk to your doctor before you start or decide to keep taking one of these medicines. Side effects include:    Lowering your breathing rate enough to cause death    Overdose, including death, especially if taking higher than prescribed doses    Long-term opioid use    Worse depression symptoms; less pleasure in things you usually enjoy    Feeling tired or sluggish    Slower thoughts or cloudy  thinking    Being more sensitive to pain over time; pain is harder to control    Trouble sleeping or restless sleep    Changes in hormone levels (for example, less testosterone)    Changes in sex drive or ability to have sex    Constipation    Unsafe driving    Itching and sweating    Feeling dizzy    Nausea, vomiting and dry mouth    What else should I know about opioids?  When someone takes opioids for too long or too often, they become dependent. This means that if you stop or reduce the medicine too quickly, you will have withdrawal symptoms.    Dependence is not the same as addiction. Addiction is when people keep using a substance that harms their body, their mind or their relations with others. If you have a history of drug or alcohol abuse, taking opioids can cause a relapse.    Over time, opioids dont work as well. Most people will need higher and higher doses. The higher the dose, the more serious the side effects. We dont know the long-term effects of opioids.      Prescribed opioids aren't the best way to manage chronic pain    Other ways to manage pain include:      Ibuprofen or acetaminophen.  You should always try this first.      Treat health problems that may be causing pain.      acupuncture or massage, deep breathing, meditation, visual imagery, aromatherapy.      Use heat or ice at the pain site      Physical therapy and exercise      Stop smoking      See a counselor or therapist                                                  People who have used opioids for a long time may have a lower quality of life, worse depression, higher levels of pain and more visits to doctors.    Never share your opioids with others. Be sure to store opioids in a secure place, locked if possible.Young children can easily swallow them and overdose.     You can overdose on opioids.  Signs of overdose include decrease or loss of consciousness, slowed breathing, trouble waking and blue lips.  If someone is worried about  overdose, they should call 911.    If you are at risk for overdose, you may get naloxone (Narcan, a medicine that reverses the effects of opioids.  If you overdose, a friend or family member can give you Narcan while waiting for the ambulance.  They need to know the signs of overdose and how to give Narcan.    While you're taking opioids:    Don't use alcohol or street drugs. Taking them together can cause death.    Don't take any of these medicines unless your doctor says its okay.  Taking these with opioids can cause death.    Benzodiazepines (such as lorazepam         or diazepam)    Muscle relaxers (such as cyclobenzaprine)    sleeping pills    other opioids    Safe disposal of opioids  Find your area drug take-back program, your pharmacy mail-back program, buy a special disposal bag (such as Deterra) from your pharmacy or flush them down the toilet.  Use the guidelines at:  www.fda.gov/drugs/resourcesforyou

## 2021-06-20 NOTE — LETTER
Letter by Gabino Bach MD at      Author: Gabino Bach MD Service: -- Author Type: --    Filed:  Encounter Date: 7/29/2020 Status: (Other)       7/29/2020    Bernadette Yu   [address] [place] [address]         Dear Bernadette,    It was good to see you in clinic.  I hope your questions were answered at the time of your visit.    The results of your tests from your visit were as follows:    Resulted Orders   Lipid Cascade RANDOM   Result Value Ref Range    Cholesterol 228 (H) <=199 mg/dL    Triglycerides 690 (H) <=149 mg/dL    HDL Cholesterol 31 (L) >=50 mg/dL    LDL Calculated        Comment:      Invalid, Triglycerides >400    Patient Fasting > 8hrs? No    Glycosylated Hemoglobin A1c   Result Value Ref Range    Hemoglobin A1c 9.0 (H) 3.5 - 6.0 %   Basic Metabolic Panel   Result Value Ref Range    Sodium 139 136 - 145 mmol/L    Potassium 4.4 3.5 - 5.0 mmol/L    Chloride 99 98 - 107 mmol/L    CO2 29 22 - 31 mmol/L    Anion Gap, Calculation 11 5 - 18 mmol/L    Glucose 213 (H) 70 - 125 mg/dL    Calcium 9.7 8.5 - 10.5 mg/dL    BUN 23 8 - 28 mg/dL    Creatinine 1.04 0.60 - 1.10 mg/dL    GFR MDRD Af Amer >60 >60 mL/min/1.73m2    GFR MDRD Non Af Amer 51 (L) >60 mL/min/1.73m2    Narrative    Fasting Glucose reference range is 70-99 mg/dL per  American Diabetes Association (ADA) guidelines.   HM2(CBC w/o Differential)   Result Value Ref Range    WBC 6.0 4.0 - 11.0 thou/uL    RBC 3.81 3.80 - 5.40 mill/uL    Hemoglobin 11.9 (L) 12.0 - 16.0 g/dL    Hematocrit 34.3 (L) 35.0 - 47.0 %    MCV 90 80 - 100 fL    MCH 31.2 27.0 - 34.0 pg    MCHC 34.7 32.0 - 36.0 g/dL    RDW 11.9 11.0 - 14.5 %    Platelets 141 140 - 440 thou/uL    MPV 9.5 7.0 - 10.0 fL   Custom LDL Cholesterol, Direct   Result Value Ref Range    Direct LDL 86 <=129 mg/dl     Your cholesterol is doing well. Your triglycerides are up but this is not of concern at this time.    Your blood counts are normal and you are not anemic.     Your electrolytes  were normal.  Your kidney tests were normal.      Your diabetes control is doing better.    Please follow up with me again on 9/1/2020 as you have already scheduled.     If you have any questions regarding these results, please feel free to contact me at [phone].  I wish you the best of health!        Sincerely,      Gabino Bach MD  General Internal Medicine  [facility]

## 2021-06-21 NOTE — LETTER
Letter by Gabino Bach MD at      Author: Gabino Bach MD Service: -- Author Type: --    Filed:  Encounter Date: 5/11/2021 Status: (Other)         5/11/2021    Bernadette NIMCO Yu   [address] [place] [address]         Dear Bernadette,    It was good to see you in clinic.  I hope your questions were answered at the time of your visit.    I am sending you this letter about the results from the pathology from your finger nodule.  This did not show any signs whatsoever of skin cancer.  It was a benign papilloma which is a common skin growth.    I hope that you are healing well from the procedure.    Sincerely,       Gabino Bach MD  General Internal Medicine  [facility]

## 2021-06-21 NOTE — LETTER
Letter by Gabino Bach MD at      Author: Gabino Bach MD Service: -- Author Type: --    Filed:  Encounter Date: 3/4/2021 Status: (Other)         [facility]  03/04/21    Patient: Bernadette Yu  YOB: 1940  Medical Record Number: [id number]                                                                  Opioid / Opioid Plus Controlled Substance Agreement  FOR DILAUDID AND TEMAZEPAM    I understand that my care provider has prescribed an opioid (narcotic) controlled substance to help manage my condition(s). I am taking this medicine to help me function or work. I know this is strong medicine, and that it can cause serious side effects. Opioid medicine can be sedating, addicting and may cause a dependency on the drug. They can affect my ability to drive or think, and cause depression. They need to be taken exactly as prescribed. Combining opioids with certain medicines or chemicals (such as cocaine, sedatives and tranquilizers, sleeping pills, meth) can be dangerous or even fatal. Also, if I stop opioids suddenly, I may have severe withdrawal symptoms. Last, I understand that opioids do not work for all types of pain nor for all patients. If not helpful, I may be asked to stop them.    I am also being prescribed a benzodiazepine, a controlled substance: I understand this type of medicine is sedating and can increase the risk of death when taken together with opioids. I have talked to my care team about the option of having a prescription for Narcan (naloxone) to reverse the opioid medicine, in case I get too sleepy. I will be very careful to take my medicines only as directed    The risks, benefits, and side effects of these medicine(s) were explained to me. I agree that:    1. I will take part in other treatments as advised by my care team. This may be psychiatry or counseling, physical therapy, behavioral therapy, group treatment or a referral to a pain clinic. I will  reduce or stop my medicine when my care team tells me to do so.  2. I will take my medicines as prescribed. I will not change the dose or schedule unless my care team tells me to. There will be no refills if I run out early.  I may be contactedwithout warning and asked to complete a urine drug test or pill count at any time.   3. I will keep all my appointments, and understand this is part of the monitoring of opioids. My care team may require an office visit for EVERY opioid/controlled substance refill. If I miss appointments or dont follow instructions, my care team may stop my medicine.  4. I will not ask other providers to prescribe controlled substances, and I will not accept controlled substances from other people. If I need another prescribed controlled substance for a new reason, I will tell my care team within 1 business day.  5. I will use one pharmacy to fill all of my controlled substance prescriptions, and it is up to me to make sure that I do not run out of my medicines on weekends or holidays. If my care team is willing to refill my opioid prescription without a visit, I must request refills only during office hours, refills may take up to 3 days to process, and it may take up to 5 to 7 days for my medicine to be mailed and ready at my pharmacy. Prescriptions will not be mailed anywhere except my pharmacy.        [id number]  Rev 12/18         Registration to scan to EHR                             Page 1 of 2               Controlled Substance Agreement Opioid        [facility]  03/04/21  Patient: Bernadette Yu  YOB: 1940  Medical Record Number: [id number]                                                                  6. I am responsible for my prescriptions. If the medicine/prescription is lost or stolen, it will not be replaced. I also agree not to share controlled substance medicines with anyone.  7. I agree to not use ANY illegal or recreational drugs. This  includes marijuana, cocaine, bath salts or other drugs. I agree not to use alcohol unless my care team says I may.          I agree to give urine samples whenever asked. If I dont give a urine sample, the care team may stop my medicine.    8. If I enroll in the [place] Medical Marijuana program, I will tell my care team. I will also sign an agreement to share my medical records with my care team.   9. I will bring in my list of medicines (or my medicine bottles) each time I come to the clinic.   10. I will tell my care team right away if I become pregnant or have a new medical problem treated outside of my regular clinic.  11. I understand that this medicine can affect my thinking and judgment. It may be unsafe for me to drive, use machinery and do dangerous tasks. I will not do any of these things until I know how the medicine affects me. If my dose changes, I will wait to see how it affects me. I will contact my care team if I have concerns about medicine side effects.    I understand that if I do not follow any of the conditions above, my prescriptions or treatment may be stopped.      I agree that my provider, clinic care team, and pharmacy may work with any city, state or federal law enforcement agency that investigates the misuse, sale, or other diversion of my controlled medicine. I will allow my provider to discuss my care with or share a copy of this agreement with any other treating provider, pharmacy or emergency room where I receive care. I agree to give up (waive) any right of privacy or confidentiality with respect to these consents.     I have read this agreement and have asked questions about anything I did not understand.      ________________________________________________________________________  Patient signature - Date/Time -  Bernadette Yu                                      ________________________________________________________________________  Witness signature                                                             ________________________________________________________________________  Provider signature - Gabino Bach MD      [id number]  Rev 12/18         Registration to scan to EHR                         Page 2 of 2                   Controlled Substance Agreement Opioid           Page 1 of 2  Opioid Pain Medicines (also known as Narcotics)  What You Need to Know    What are opioids?   Opioids are pain medicines that must be prescribed by a doctor.  They are also known as narcotics.    Examples are:     morphine (MS Contin, Nimisha)    oxycodone (Oxycontin)    oxycodone and acetaminophen (Percocet)    hydrocodone and acetaminophen (Vicodin, Norco)     fentanyl patch (Duragesic)     hydromorphone (Dilaudid)     methadone     What do opioids do well?   Opioids are best for short-term pain after a surgery or injury. They also work well for cancer pain. Unlike other pain medicines, they do not cause liver or kidney failure or ulcers. They may help some people with long-lasting (chronic) pain.     What do opioids NOT do well?   Opioids never get rid of pain entirely, and they do not work well for most patients with chronic pain. Opioids do not reduce swelling, one of the causes of pain. They also dont work well for nerve pain.                           For informational purposes only.  Not to replace the advice of your care provider.  Copyright [address] [facility] [place]. All right reserved. [employer] 034089-Rmo 02/18.      Page 2 of 2    Risks and side effects   Talk to your doctor before you start or decide to keep taking one of these medicines. Side effects include:    Lowering your breathing rate enough to cause death    Overdose, including death, especially if taking higher than prescribed doses    Long-term opioid use    Worse depression symptoms; less pleasure in things you usually enjoy    Feeling tired or sluggish    Slower thoughts or cloudy thinking    Being more sensitive to  pain over time; pain is harder to control    Trouble sleeping or restless sleep    Changes in hormone levels (for example, less testosterone)    Changes in sex drive or ability to have sex    Constipation    Unsafe driving    Itching and sweating    Feeling dizzy    Nausea, vomiting and dry mouth    What else should I know about opioids?  When someone takes opioids for too long or too often, they become dependent. This means that if you stop or reduce the medicine too quickly, you will have withdrawal symptoms.    Dependence is not the same as addiction. Addiction is when people keep using a substance that harms their body, their mind or their relations with others. If you have a history of drug or alcohol abuse, taking opioids can cause a relapse.    Over time, opioids dont work as well. Most people will need higher and higher doses. The higher the dose, the more serious the side effects. We dont know the long-term effects of opioids.      Prescribed opioids aren't the best way to manage chronic pain    Other ways to manage pain include:      Ibuprofen or acetaminophen.  You should always try this first.      Treat health problems that may be causing pain.      acupuncture or massage, deep breathing, meditation, visual imagery, aromatherapy.      Use heat or ice at the pain site      Physical therapy and exercise      Stop smoking      See a counselor or therapist                                                  People who have used opioids for a long time may have a lower quality of life, worse depression, higher levels of pain and more visits to doctors.    Never share your opioids with others. Be sure to store opioids in a secure place, locked if possible.Young children can easily swallow them and overdose.     You can overdose on opioids.  Signs of overdose include decrease or loss of consciousness, slowed breathing, trouble waking and blue lips.  If someone is worried about overdose, they should call  911.    If you are at risk for overdose, you may get naloxone (Narcan, a medicine that reverses the effects of opioids.  If you overdose, a friend or family member can give you Narcan while waiting for the ambulance.  They need to know the signs of overdose and how to give Narcan.    While you're taking opioids:    Don't use alcohol or street drugs. Taking them together can cause death.    Don't take any of these medicines unless your doctor says its okay.  Taking these with opioids can cause death.    Benzodiazepines (such as lorazepam         or diazepam)    Muscle relaxers (such as cyclobenzaprine)    sleeping pills    other opioids    Safe disposal of opioids  Find your area drug take-back program, your pharmacy mail-back program, buy a special disposal bag (such as Deterra) from your pharmacy or flush them down the toilet.  Use the guidelines at:  www.fda.gov/drugs/resourcesforyou

## 2021-06-21 NOTE — LETTER
"Letter by Gabino Bach MD at      Author: Gabino Bach MD Service: -- Author Type: --    Filed:  Encounter Date: 2021 Status: (Other)       2021    Bernadette Yu   [address] [place] [address]   : 1940     Patient prescription:  Please fax this to [facility] at [phone].     RX:  Please draw international normalized ratio (INR) up to twice a week as directed through the anticoagulation clinic at our clinic.  Diagnoses #1 through #3    DX:      ICD-10-CM    1. Atrial fibrillation with rapid ventricular response (H)  I48.91    2. S/P mitral valve replacement (MVR) - mechanical mitral valve placed   Z95.2    3. Anticoagulation goal of INR 2.5 to 3.5  Z51.81     Z79.01    4. Type 2 diabetes mellitus with microalbuminuria, with long-term current use of insulin (H)  E11.29 BD ULTRA-FINE DAVID PEN NEEDLE 32 gauge x \" Ndle    R80.9     Z79.4    5. Bilateral lower extremity edema  R60.0 furosemide (LASIX) 40 MG tablet   6. Venous stasis dermatitis of both lower extremities  I87.2 furosemide (LASIX) 40 MG tablet     triamcinolone (KENALOG) 0.1 % cream   Anticoagulation nurse contact numbers:    Phone [phone]  Fax [phone]      LENGTH OF NEED (if appropriate):  99       Gabino Bach MD  General Internal Medicine  [facility] [id number], [place] License [id number]        "

## 2021-06-21 NOTE — LETTER
Letter by Gabino Bach MD at      Author: Gabino Bach MD Service: -- Author Type: --    Filed:  Encounter Date: 3/4/2021 Status: (Other)         3/4/2021    Bernadette Yu   [address] [place] [address]         Dear Bernadette,    It was good to see you in clinic.  I hope your questions were answered at the time of your visit.    The results of your tests from your visit were as follows:    Resulted Orders   Drug Abuse 1+, Urine   Result Value Ref Range    Amphetamines Screen Negative Screen Negative    Benzodiazepines Screen Negative Screen Negative    Opiates (!) Screen Negative     Screen Positive (Confirmation available on request)    Phencyclidine Screen Negative Screen Negative    THC Screen Negative Screen Negative    Barbiturates Screen Negative Screen Negative    Cocaine Metabolite Screen Negative Screen Negative    Methadone Screen Negative Screen Negative    Oxycodone Screen Negative Screen Negative    Creatinine, Urine 51.1 mg/dL    Narrative    Drug                           Screening Threshold    Amphetamines                    1000 ng/mL  Benzodiazepine                   200 ng/mL  Opiates                          300 ng/mL  Phencyclidine                     25 ng/mL  THC Metabolite                    50 ng/mL  Barbiturates                     200 ng/mL  Cocaine Metabolite               150 ng/mL  Methadone                        300 ng/mL  Oxycodone                        100 ng/mL    Screening results are to be used only for medical purposes.  Unconfirmed screening results are not to be used for non-  medical purposes.   Thyroid Stimulating Hormone (TSH)   Result Value Ref Range    TSH 0.21 (L) 0.30 - 5.00 uIU/mL   HM2(CBC w/o Differential)   Result Value Ref Range    WBC 8.4 4.0 - 11.0 thou/uL    RBC 3.82 3.80 - 5.40 mill/uL    Hemoglobin 10.1 (L) 12.0 - 16.0 g/dL    Hematocrit 31.6 (L) 35.0 - 47.0 %    MCV 83 80 - 100 fL    MCH 26.4 (L) 27.0 - 34.0 pg    MCHC 32.0 32.0 - 36.0  g/dL    RDW 15.0 (H) 11.0 - 14.5 %    Platelets 207 140 - 440 thou/uL    MPV 12.1 (H) 7.0 - 10.0 fL   Basic Metabolic Panel   Result Value Ref Range    Sodium 138 136 - 145 mmol/L    Potassium 3.9 3.5 - 5.0 mmol/L    Chloride 104 98 - 107 mmol/L    CO2 21 (L) 22 - 31 mmol/L    Anion Gap, Calculation 13 5 - 18 mmol/L    Glucose 280 (H) 70 - 125 mg/dL    Calcium 8.6 8.5 - 10.5 mg/dL    BUN 18 8 - 28 mg/dL    Creatinine 0.92 0.60 - 1.10 mg/dL    GFR MDRD Af Amer >60 >60 mL/min/1.73m2    GFR MDRD Non Af Amer 58 (L) >60 mL/min/1.73m2    Narrative    Fasting Glucose reference range is 70-99 mg/dL per  American Diabetes Association (ADA) guidelines.     Your electrolytes were normal.  Your kidney tests were normal.      Your blood level is stable at this time.    Your thyroid test is okay.    Your urine test is as expected.    See you again on:  Future Appointments   Date Time Provider Department Center   3/16/2021 12:15 PM MID COVID VACCINE 21 DAY PFIZER [facility] Clinic   3/25/2021 11:45 AM [facility] LAB [facility] LAB W Clinic   5[facility]2021  [facility]00 [facility]        If you have any questions regarding these results, please feel free to contact me at [phone].  I wish you the best of health!      Sincerely,       Gabino Bach MD  General Internal Medicine  [facility]

## 2021-06-21 NOTE — LETTER
Letter by Gabino Bach MD at      Author: Gabino Bach MD Service: -- Author Type: --    Filed:  Encounter Date: 1/18/2021 Status: (Other)         [facility]  01/18/21    Patient: Bernadette Yu  YOB: 1940  Medical Record Number: [id number]                                                                MEDICATION: TEMAZEPAM, HYDROMORPHONE        Opioid / Opioid Plus Controlled Substance Agreement    I understand that my care provider has prescribed an opioid (narcotic) controlled substance to help manage my condition(s). I am taking this medicine to help me function or work. I know this is strong medicine, and that it can cause serious side effects. Opioid medicine can be sedating, addicting and may cause a dependency on the drug. They can affect my ability to drive or think, and cause depression. They need to be taken exactly as prescribed. Combining opioids with certain medicines or chemicals (such as cocaine, sedatives and tranquilizers, sleeping pills, meth) can be dangerous or even fatal. Also, if I stop opioids suddenly, I may have severe withdrawal symptoms. Last, I understand that opioids do not work for all types of pain nor for all patients. If not helpful, I may be asked to stop them.    I am also being prescribed a benzodiazepine (tranquilizer) controlled substance.   I understand this type of medication is sedating, and can increase the risk of death when taken together with opioids.  I have talked to my care team about the option of having a prescription for Narcan to use to reverse the opioid medicine, in case I get too sleepy. I will be very careful to take my medicines only as directed.      The risks, benefits, and side effects of these medicine(s) were explained to me. I agree that:    1. I will take part in other treatments as advised by my care team. This may be psychiatry or counseling, physical therapy, behavioral therapy, group treatment or a  referral to a pain clinic. I will reduce or stop my medicine when my care team tells me to do so.  2. I will take my medicines as prescribed. I will not change the dose or schedule unless my care team tells me to. There will be no refills if I run out early.  I may be contactedwithout warning and asked to complete a urine drug test or pill count at any time.   3. I will keep all my appointments, and understand this is part of the monitoring of opioids. My care team may require an office visit for EVERY opioid/controlled substance refill. If I miss appointments or dont follow instructions, my care team may stop my medicine.  4. I will not ask other providers to prescribe controlled substances, and I will not accept controlled substances from other people. If I need another prescribed controlled substance for a new reason, I will tell my care team within 1 business day.  5. I will use one pharmacy to fill all of my controlled substance prescriptions, and it is up to me to make sure that I do not run out of my medicines on weekends or holidays. If my care team is willing to refill my opioid prescription without a visit, I must request refills only during office hours, refills may take up to 3 days to process, and it may take up to 5 to 7 days for my medicine to be mailed and ready at my pharmacy. Prescriptions will not be mailed anywhere except my pharmacy.        [id number]  Rev 12/18         Registration to scan to EHR                             Page 1 of 2               Controlled Substance Agreement [facility]  01/18/21  Patient: Bernadette Yu  YOB: 1940  Medical Record Number: [id number]                                                                  6. I am responsible for my prescriptions. If the medicine/prescription is lost or stolen, it will not be replaced. I also agree not to share controlled substance medicines with anyone.  7. I agree to not use ANY illegal  or recreational drugs. This includes marijuana, cocaine, bath salts or other drugs. I agree not to use alcohol unless my care team says I may.          I agree to give urine samples whenever asked. If I dont give a urine sample, the care team may stop my medicine.    8. If I enroll in the [place] Medical Marijuana program, I will tell my care team. I will also sign an agreement to share my medical records with my care team.   9. I will bring in my list of medicines (or my medicine bottles) each time I come to the clinic.   10. I will tell my care team right away if I become pregnant or have a new medical problem treated outside of my regular clinic.  11. I understand that this medicine can affect my thinking and judgment. It may be unsafe for me to drive, use machinery and do dangerous tasks. I will not do any of these things until I know how the medicine affects me. If my dose changes, I will wait to see how it affects me. I will contact my care team if I have concerns about medicine side effects.    I understand that if I do not follow any of the conditions above, my prescriptions or treatment may be stopped.      I agree that my provider, clinic care team, and pharmacy may work with any city, state or federal law enforcement agency that investigates the misuse, sale, or other diversion of my controlled medicine. I will allow my provider to discuss my care with or share a copy of this agreement with any other treating provider, pharmacy or emergency room where I receive care. I agree to give up (waive) any right of privacy or confidentiality with respect to these consents.     I have read this agreement and have asked questions about anything I did not understand.      ________________________________________________________________________  Patient signature - Date/Time -  Bernadette Yu                                      ________________________________________________________________________  Witness  signature                                                            ________________________________________________________________________  Provider signature - Gabino Bach MD      [id number]  Rev 12/18         Registration to scan to EHR                         Page 2 of 2                   Controlled Substance Agreement Opioid           Page 1 of 2  Opioid Pain Medicines (also known as Narcotics)  What You Need to Know    What are opioids?   Opioids are pain medicines that must be prescribed by a doctor.  They are also known as narcotics.    Examples are:     morphine (MS Contin, Nimisha)    oxycodone (Oxycontin)    oxycodone and acetaminophen (Percocet)    hydrocodone and acetaminophen (Vicodin, Norco)     fentanyl patch (Duragesic)     hydromorphone (Dilaudid)     methadone     What do opioids do well?   Opioids are best for short-term pain after a surgery or injury. They also work well for cancer pain. Unlike other pain medicines, they do not cause liver or kidney failure or ulcers. They may help some people with long-lasting (chronic) pain.     What do opioids NOT do well?   Opioids never get rid of pain entirely, and they do not work well for most patients with chronic pain. Opioids do not reduce swelling, one of the causes of pain. They also dont work well for nerve pain.                           For informational purposes only.  Not to replace the advice of your care provider.  Copyright [facility]. All right reserved. [employer] 444902-Nnr 02/18.      Page 2 of 2    Risks and side effects   Talk to your doctor before you start or decide to keep taking one of these medicines. Side effects include:    Lowering your breathing rate enough to cause death    Overdose, including death, especially if taking higher than prescribed doses    Long-term opioid use    Worse depression symptoms; less pleasure in things you usually enjoy    Feeling tired or sluggish    Slower thoughts or cloudy  thinking    Being more sensitive to pain over time; pain is harder to control    Trouble sleeping or restless sleep    Changes in hormone levels (for example, less testosterone)    Changes in sex drive or ability to have sex    Constipation    Unsafe driving    Itching and sweating    Feeling dizzy    Nausea, vomiting and dry mouth    What else should I know about opioids?  When someone takes opioids for too long or too often, they become dependent. This means that if you stop or reduce the medicine too quickly, you will have withdrawal symptoms.    Dependence is not the same as addiction. Addiction is when people keep using a substance that harms their body, their mind or their relations with others. If you have a history of drug or alcohol abuse, taking opioids can cause a relapse.    Over time, opioids dont work as well. Most people will need higher and higher doses. The higher the dose, the more serious the side effects. We dont know the long-term effects of opioids.      Prescribed opioids aren't the best way to manage chronic pain    Other ways to manage pain include:      Ibuprofen or acetaminophen.  You should always try this first.      Treat health problems that may be causing pain.      acupuncture or massage, deep breathing, meditation, visual imagery, aromatherapy.      Use heat or ice at the pain site      Physical therapy and exercise      Stop smoking      See a counselor or therapist                                                  People who have used opioids for a long time may have a lower quality of life, worse depression, higher levels of pain and more visits to doctors.    Never share your opioids with others. Be sure to store opioids in a secure place, locked if possible.Young children can easily swallow them and overdose.     You can overdose on opioids.  Signs of overdose include decrease or loss of consciousness, slowed breathing, trouble waking and blue lips.  If someone is worried about  overdose, they should call 911.    If you are at risk for overdose, you may get naloxone (Narcan, a medicine that reverses the effects of opioids.  If you overdose, a friend or family member can give you Narcan while waiting for the ambulance.  They need to know the signs of overdose and how to give Narcan.    While you're taking opioids:    Don't use alcohol or street drugs. Taking them together can cause death.    Don't take any of these medicines unless your doctor says its okay.  Taking these with opioids can cause death.    Benzodiazepines (such as lorazepam         or diazepam)    Muscle relaxers (such as cyclobenzaprine)    sleeping pills    other opioids    Safe disposal of opioids  Find your area drug take-back program, your pharmacy mail-back program, buy a special disposal bag (such as Deterra) from your pharmacy or flush them down the toilet.  Use the guidelines at:  www.fda.gov/drugs/resourcesforyou

## 2021-06-23 NOTE — PATIENT INSTRUCTIONS - HE
The new shingles shot (Shingrix) is 2 separate shots  by 2-6 months.    The cost out of pocket is around $390 for the 2 shots, so you might want to see if your insurance covers it or a portion of it prior to having it done.  Often by having it done at a pharmacy rather than a clinic, it can be cheaper for you.    It is estimated that any person's risk of shingles over their lifetime is around 10-20%.    The old shingles vaccine (Zostavax) is about 50% effective, reducing your risk of shingles to about 5-10% over your lifetime.    The new shot is 90% effective, reducing your risk of shingles to about 1-2% over your lifetime.    Because the shot is strong, it is very common to have flu like symptoms for 2-3 days after the shot.  25-50% of patients will have fever, muscle aches or headache.    I believe that whether or not you have this vaccine is your own decision depending on your values and preferences.  The information above I give you to make an informed decision.    Gabino Bach MD  [facility]

## 2021-06-23 NOTE — TELEPHONE ENCOUNTER
Prescriptions changed to reflect this - sent to the pharmacy.    Gabino Bach MD  [facility]  1/11/2019, 1:05 PM

## 2021-06-23 NOTE — TELEPHONE ENCOUNTER
Please call patient -    ______________________________________________________________________     Home phone:  [phone] (home)     Cell phone:   Telephone Information:   Mobile [phone]       Other contacts:  Name Home Phone Work Phone Mobile Phone Relationship Lgl Grd   ISHAN VALENTIN [phone]   Spouse    ANDREA VALENTIN [phone]   Child      ______________________________________________________________________     Your blood counts are normal and you are not anemic.     Your electrolytes were normal.  Your kidney tests were normal.      I would recommend follow up again in 2-3 months.    Please help the patient to schedule a follow up.      Gabino Bach MD  [facility]  1/15/2019, 9:31 PM     ______________________________________________________________________    Recent Results (from the past 240 hour(s))   HM2(CBC w/o Differential)   Result Value Ref Range    WBC 4.9 4.0 - 11.0 thou/uL    RBC 4.27 3.80 - 5.40 mill/uL    Hemoglobin 12.4 12.0 - 16.0 g/dL    Hematocrit 36.9 35.0 - 47.0 %    MCV 87 80 - 100 fL    MCH 29.0 27.0 - 34.0 pg    MCHC 33.5 32.0 - 36.0 g/dL    RDW 12.0 11.0 - 14.5 %    Platelets 146 140 - 440 thou/uL    MPV 9.5 7.0 - 10.0 fL   Basic Metabolic Panel   Result Value Ref Range    Sodium 137 136 - 145 mmol/L    Potassium 4.2 3.5 - 5.0 mmol/L    Chloride 102 98 - 107 mmol/L    CO2 26 22 - 31 mmol/L    Anion Gap, Calculation 9 5 - 18 mmol/L    Glucose 171 (H) 70 - 125 mg/dL    Calcium 9.2 8.5 - 10.5 mg/dL    BUN 22 8 - 28 mg/dL    Creatinine 1.00 0.60 - 1.10 mg/dL    GFR MDRD Af Amer >60 >60 mL/min/1.73m2    GFR MDRD Non Af Amer 53 (L) >60 mL/min/1.73m2   INR   Result Value Ref Range    INR 3.60 (H) 0.90 - 1.10       ______________________________________________________________________

## 2021-06-23 NOTE — TELEPHONE ENCOUNTER
Informed pt of Dr. Bach's message.    She states an understanding and thanked for the call.    Pt scheduled for 3/19/19 at 1:00.

## 2021-06-23 NOTE — TELEPHONE ENCOUNTER
Medicare will only allow a 7 day supply with the initial fill of a patient's narcotic. The patient has new insurance this is why this is happening.    Moving forward hoping this will no longer be an issue.    Could a send a new prescription for the Dilaudid for next week?    The 7 day limit is for all medicare plans. For some plans the pharmacy will be able to do an override but other plans they are not able to do the override each plan is different.    Deena Rueda RN  [facility] Refill and Triage Nurse  1/11/2019  10:37 AM

## 2021-06-24 NOTE — TELEPHONE ENCOUNTER
ANTICOAGULATION  MANAGEMENT PROGRAM    Bernadette Yu is overdue for INR check.  Reminder call made.    Spoke with Bernadette and scheduled INR appointment on 3/7 .    Sarah Marin RN

## 2021-06-25 NOTE — PROGRESS NOTES
Progress Notes by Gabino Bach MD at 1/3/2017  1:25 PM     Author: Gabino Bach MD Service: -- Author Type: Physician    Filed: 1/5/2017 10:27 AM Encounter Date: 1/3/2017 Status: Signed    : Gabino Bach MD (Physician)              [facility]    Comprehensive and complex medical care - Chronic disease management - Shared decision making - Care coordination - Compassionate care    Date of Service: 1/3/2017  Primary Provider: Gabino Bach MD    Patient Care Team:  Gabino Bach MD as PCP - General (Internal Medicine)  Ayanna Camacho MD as Physician (Cardiology)  Al García MD as Physician (Ophthalmology)  Bria Nicole RN as Registered Nurse     ______________________________________________________________________     Patient's Pharmacy:    [facility] [address] [place] [address] [address]  [address] [place] [id number]  Phone: [phone] Fax: [phone]     Patient's Insurance:    Payor: MEDICA / Plan: MEDICA [employer] / Product Type: COST PLAN /     ________________________________________________________________    Health Maintenance   Topic Date Due   ? ADVANCE DIRECTIVES DISCUSSED WITH PATIENT  09/08/1956   ? PNEUMOCOCCAL CONJUGATE VACCINE FOR ADULTS (PCV13 OR PREVNAR)  09/08/2003   ? FALL RISK ASSESSMENT  06/29/2017   ? TD 18+ HE  05/12/2019   ? PNEUMOCOCCAL POLYSACCHARIDE VACCINE AGE 65 AND OVER  Completed   ? INFLUENZA VACCINE RULE BASED  Completed   ? ZOSTER VACCINE  Completed        ______________________________________________________________________     Bernadette Yu is 76 y.o. female who comes in today for:    Chief Complaint   Patient presents with   ? Follow-up     Needs INR checked       Patient Active Problem List   Diagnosis   ? Hypothyroidism   ? HLD (hyperlipidemia)   ? Anxiety   ? Chronic pain - Dr. Bach manages the pain   ? CN (constipation)   ? Insomnia   ?  Restless legs syndrome (RLS)   ? Lumbosacral plexopathy - chronic pain related to this   ? Full code status - POLST form 1/2/16   ? IBS (irritable bowel syndrome)   ? ASCVD (arteriosclerotic cardiovascular disease) - CABG 2015   ? Subclavian artery stenosis, left - s/p stent   ? Paroxysmal atrial fibrillation - treated during surgery in 2015   ? HTN (hypertension)   ? Psoriasis   ? Former smoker   ? S/P MVR (mitral valve replacement) - 23 mm [employer] Sylvester mechanical valve - 2015   ? DM type 2 (diabetes mellitus, type 2)   ? Anticoagulation goal of INR 2.5 to 3.5   ? Abdominal bloating, chronic   ? Recurrent UTI (urinary tract infection)     Current Outpatient Prescriptions   Medication Sig Note   ? codeine-guaiFENesin (CODEINE-GUAIFENESIN)  mg/5 mL liquid Take 5-10 mL by mouth every 4 (four) hours as needed for cough.    ? furosemide (LASIX) 40 MG tablet Take 1 tablet (40 mg total) by mouth daily. 1 tablet PO daily x 5 days then increase to twice a day    ? gabapentin (NEURONTIN) 100 MG capsule Take 100-300 mg by mouth bedtime as needed (sleep or leg discomfort).    ? HUMALOG 100 unit/mL injection Inject 5 Units under the skin 3 (three) times a day before meals.    ? [START ON 1/23/2017] HYDROmorphone (DILAUDID) 4 MG tablet Take 0.5-1 tablets (2-4 mg total) by mouth 4 (four) times a day as needed for pain. For use from 1/23/17 to 2/22/17.    ? insulin syringe-needle U-100 1/2 mL 29 Syrg USE AS DIRECTED UP TO SIX TIMES DAILY    ? levothyroxine (SYNTHROID, LEVOTHROID) 125 MCG tablet Take 1 tablet (125 mcg total) by mouth daily. 12/21/2016: Last filled 8/29/16 for a 90 day supply. Patient reports still taking.   ? LORazepam (ATIVAN) 0.5 MG tablet Take 0.5 mg by mouth 2 (two) times a day as needed for anxiety.    ? NOVOLIN N 100 unit/mL injection Inject 10 Units under the skin 2 (two) times a day before meals. .    ? omeprazole (PRILOSEC) 20 MG capsule Take 20 mg by mouth Daily before breakfast.    ? ONETOUCH ULTRA  TEST strips TEST THREE TIMES DAILY    ? polyethylene glycol (MIRALAX) 17 gram packet Take 17 g by mouth 2 (two) times a day.     ? temazepam (RESTORIL) 15 mg capsule Take 1 capsule (15 mg total) by mouth bedtime as needed for sleep. Do not take with lorazepam.  May refill every 30 days only.    ? warfarin (COUMADIN) 2.5 MG tablet Take 1 tablet PO daily. INR daily. Next INR check 12/23/2016, dose adjustment depending upon INR result.    ? omeprazole (PRILOSEC) 20 MG capsule Take 1 capsule (20 mg total) by mouth Daily before breakfast.    ? simvastatin (ZOCOR) 10 MG tablet Take 10 mg by mouth bedtime.      Social History     Social History Narrative    Patient of Dr. Bach since 2001.  .                 ______________________________________________________________________     History of present illness:    The patient comes in today for a number of issues with her .    We first reviewed her dysphagia.  This is been a little bit more prominent last few weeks.  She denies any coating of her tongue.  She has had EGD in the recent past.  It's mainly in her upper throat she notes issue.  She does not notice any issues with things going down the wrong way, is just hard to swallow at times.  She feels gassy.  Gas-X seems to help her.  She is using her medication for acid.  Her med list was updated with what she is currently actively taking.  She brought in her pill bottles to review today.    We also reviewed her mitral valve replacement.  She is having her INR done today.  She has been having issues with home monitoring in that the company is encouraging her to test more frequently.  I don't feel that this degree of frequency is medically necessary.  This was reviewed with her.    We reviewed her bloating and this is unchanged at this time.    She says her diabetes is doing well at this point.  She's had no further symptomatology related to her urinary tract infection.    She does need a refill of her  temazepam for her sleep.    She does need a refill of her Dilaudid for later in January.    On review of systems, the patient denies any chest pain or shortness of breath.    ______________________________________________________________________    Exam:    Wt Readings from Last 3 Encounters:   01/03/17 163 lb 9.6 oz (74.2 kg)   12/27/16 166 lb 9.6 oz (75.6 kg)   12/23/16 171 lb 14.4 oz (78 kg)     Wt Readings from Last 20 Encounters:   01/03/17 163 lb 9.6 oz (74.2 kg)   12/27/16 166 lb 9.6 oz (75.6 kg)   12/23/16 171 lb 14.4 oz (78 kg)   12/06/16 168 lb 3.2 oz (76.3 kg)   11/28/16 168 lb 12.8 oz (76.6 kg)   11/22/16 171 lb 12.8 oz (77.9 kg)   11/07/16 167 lb 6.4 oz (75.9 kg)   10/24/16 162 lb (73.5 kg)   10/19/16 171 lb 8 oz (77.8 kg)   09/13/16 162 lb (73.5 kg)   08/19/16 163 lb 6.4 oz (74.1 kg)   06/29/16 162 lb (73.5 kg)   06/10/16 161 lb (73 kg)   05/05/16 158 lb 12.8 oz (72 kg)   02/23/16 153 lb 12.8 oz (69.8 kg)   02/22/16 153 lb (69.4 kg)   02/01/16 150 lb 6.4 oz (68.2 kg)   01/26/16 151 lb (68.5 kg)   01/25/16 153 lb (69.4 kg)   01/21/16 150 lb 8 oz (68.3 kg)     BP Readings from Last 3 Encounters:   01/03/17 122/56   12/27/16 152/60   12/23/16 142/64     Visit Vitals   ? /56 (Patient Site: Right Arm, Patient Position: Sitting, Cuff Size: Adult Regular)   ? Pulse 75   ? Wt 163 lb 9.6 oz (74.2 kg)   ? SpO2 98%   ? BMI 28.98 kg/m2      The patient is comfortable, no acute distress.  Mood good.  Insight good.  Eyes are nonicteric.  Neck is supple without mass.  No cervical adenopathy.  No thyromegaly. Heart regular rate and rhythm.  Lungs clear to auscultation bilaterally.  Respiratory effort is good.  Abdomen soft and nontender.  No hepatosplenomegaly.  Extremities trace edema.  Her mouth is clear.  There is no signs of thrush.  There is no restriction in her swallowing noted.  ______________________________________________________________________    Assessment:    1. Dysphagia     2. DEMIAN (acute kidney  injury)     3. S/P MVR (mitral valve replacement) - 23 mm [employer] Sylvester mechanical valve - 2015  furosemide (LASIX) 40 MG tablet    INR   4. Recurrent UTI (urinary tract infection)     5. Abdominal bloating, chronic     6. Anxiety     7. HTN (hypertension)     8. Insomnia       ______________________________________________________________________      PHQ-2 Total Score: 2 (6/29/2016  1:00 PM)  No Data Recorded    Plan:    1. Continue current close follow-up.  2. Anxiety does have a part in all of this.  3. Consider swallow study if needed in the future.  4. Continue to follow clinically.  5. Blood work done today.  6. Blood pressure currently in control.  7. Refills of temazepam and Dilaudid done today.  The prescription monitoring program was referred to today in relationship to this.    Gabino Bach MD  General Internal Medicine  [facility]     Return in about 3 weeks (around 1/24/2017) for follow up visit, visit and blood work.

## 2021-06-25 NOTE — TELEPHONE ENCOUNTER
Anticoagulation Management    Unable to reach Riley today.    Today's INR result of 4.6 is Supratherapeutic (goal INR of 2.5-3.5).     Follow up required to discuss out of range INR .    Left message to hold warfarin tonight.   Sent my chart message as well.       ACN to follow up    Deena Kang RN

## 2021-06-25 NOTE — PROGRESS NOTES
Progress Notes by Gabino Bach MD at 2/17/2017 11:20 AM     Author: Gabino Bach MD Service: -- Author Type: Physician    Filed: 2/17/2017 12:35 PM Encounter Date: 2/17/2017 Status: Signed    : Gabino Bach MD (Physician)              [facility]    Comprehensive and complex medical care - Chronic disease management - Shared decision making - Care coordination - Compassionate care    Patient advocacy - Rational deprescribing - Minimally disruptive medicine - Ethical focus - Customized care    Date of Service: 2/17/2017  Primary Provider: Gabino Bach MD    Patient Care Team:  Gabino Bach MD as PCP - General (Internal Medicine)  Ayanna Camacho MD as Physician (Cardiology)  Al García MD as Physician (Ophthalmology)  Bria Nicole RN as Registered Nurse     ______________________________________________________________________     Patient[facility] Pharmacy:    [facility] [address] [place] [id number]  Phone: [phone] Fax: [phone]     Patient's Insurance:    Payor: MEDICA / Plan: MEDICA [employer]IME SOLUTIONS / Product Type: COST PLAN /     ______________________________________________________________________     Bernadette Yu is a 76 y.o. female who comes in today for:    Chief Complaint   Patient presents with   ? Follow-up     Toe wound       Current Outpatient Prescriptions   Medication Sig Note   ? furosemide (LASIX) 40 MG tablet Take 1 tablet (40 mg total) by mouth daily. 1 tablet PO daily x 5 days then increase to twice a day    ? HYDROmorphone (DILAUDID) 4 MG tablet Take 0.5-1 tablets (2-4 mg total) by mouth 4 (four) times a day as needed for pain. For use from 1/23/17 to 2/22/17.    ? insulin aspart (NOVOLOG) 100 unit/mL injection Inject 40 Units under the skin 3 (three) times a day.    ? insulin NPH (NOVOLIN N) 100 unit/mL injection Inject 40 Units  under the skin 3 (three) times a day.    ? insulin syringe-needle U-100 1/2 mL 29 Syrg USE AS DIRECTED UP TO SIX TIMES DAILY    ? levothyroxine (SYNTHROID, LEVOTHROID) 125 MCG tablet Take 1 tablet (125 mcg total) by mouth daily. 2/4/2017: Not at this time   ? LORazepam (ATIVAN) 0.5 MG tablet Take 0.5 mg by mouth 2 (two) times a day as needed for anxiety. 2/4/2017: As needed   ? NOVOLIN N 100 unit/mL injection Inject 10 Units under the skin 2 (two) times a day before meals. .    ? omeprazole (PRILOSEC) 20 MG capsule Take 1 capsule (20 mg total) by mouth Daily before breakfast. 2/4/2017: As needed   ? ONETOUCH ULTRA TEST strips TEST THREE TIMES DAILY    ? polyethylene glycol (MIRALAX) 17 gram packet Take 17 g by mouth 2 (two) times a day.  2/4/2017: stopped   ? temazepam (RESTORIL) 15 mg capsule Take 15 mg by mouth bedtime as needed. 2/17/2017: Received from: External Pharmacy Received Sig:    ? warfarin (COUMADIN) 2.5 MG tablet Take 1 tablet PO daily. INR daily. Next INR check 12/23/2016, dose adjustment depending upon INR result.    ? silver sulfADIAZINE (SILVADENE, SSD) 1 % cream Apply to toe daily      ______________________________________________________________________     History of present illness:    The patient comes in today with her  for follow-up of her toe ulcer on the left second toe.  She has a hammertoe deformity here as well.  Next para she is still on sulfa at this time but has about 3 days left.  She was found to have MRSA.  She is tolerating the antibiotic well.  The toe swelling has improved significantly.  She's not had any drainage.  She has been changing it daily with a bandage.    On review of systems, the patient has no fever.      ______________________________________________________________________      Exam:    Wt Readings from Last 3 Encounters:   02/17/17 159 lb 6.4 oz (72.3 kg)   02/09/17 166 lb (75.3 kg)   02/06/17 163 lb (73.9 kg)     BP Readings from Last 3 Encounters:    02/17/17 124/60   02/09/17 130/78   02/04/17 143/82      Visit Vitals   ? /60 (Patient Site: Left Arm, Patient Position: Sitting, Cuff Size: Adult Large)   ? Pulse 80   ? Wt 159 lb 6.4 oz (72.3 kg)   ? BMI 28.24 kg/m2      In general, the patient is comfortable, no apparent distress.  Mood good.  Insight good.  Her second toe shows good granulation tissue and a dry wound bed.  There is no signs of cellulitis left overall though there is some purplish discoloration to the dorsum of the foot.  I cannot palpate her distal pulses.  There is good capillary refill.    ______________________________________________________________________     Assessment:    1. Open toe wound    2. MRSA (methicillin resistant staph aureus) culture positive       ______________________________________________________________________     PHQ-2 Total Score: 2 (6/29/2016  1:00 PM)  No Data Recorded      Plan:    1. Given some tubi gauze for wrapping her toe and giving a low extra cushion.    2. Given Silvadene cream to put on the wound once a day.  3. Follow-up 3 weeks.    Gabino Bach MD  General Internal Medicine  [facility]    Return in about 3 weeks (around 3/10/2017) for follow up visit.

## 2021-06-25 NOTE — PROGRESS NOTES
Progress Notes by Gabino Bach MD at 3/14/2017  1:00 PM     Author: Gabino Bach MD Service: -- Author Type: Physician    Filed: 3/16/2017 10:41 AM Encounter Date: 3/14/2017 Status: Signed    : Gabino Bach MD (Physician)              [facility]    Comprehensive and complex medical care - Chronic disease management - Shared decision making - Care coordination - Compassionate care    Patient advocacy - Rational deprescribing - Minimally disruptive medicine - Ethical focus - Customized care    Date of Service: 3/14/2017  Primary Provider: Gabino Bach MD    Patient Care Team:  Gabino Bach MD as PCP - General (Internal Medicine)  Ayanna Camacho MD as Physician (Cardiology)  Al García MD as Physician (Ophthalmology)  Bria Nicole RN as Registered Nurse     ______________________________________________________________________     Patient's Pharmacy:    [email or web address] [address] [place] [id number]  Phone: [phone] Fax: [phone]     Patient's Insurance:    Payor: MEDICA / Plan: MEDICA PRIME SOLUTIONS / Product Type: COST PLAN /     ______________________________________________________________________     Bernadette Yu is 76 y.o. female who comes in today for:    Chief Complaint   Patient presents with   ? Follow-up     INR check/result. Wants another alternative for sleep aid, it's not working.       Patient Active Problem List   Diagnosis   ? Hypothyroidism   ? HLD (hyperlipidemia)   ? Anxiety   ? Chronic pain - Dr. Bach manages the pain   ? CN (constipation)   ? Insomnia   ? Restless legs syndrome (RLS)   ? Lumbosacral plexopathy - chronic pain related to this   ? Full code status - POLST form 1/2/16   ? IBS (irritable bowel syndrome)   ? ASCVD (arteriosclerotic cardiovascular disease) - CABG 2015   ? Subclavian artery stenosis, left - s/p stent    ? Paroxysmal atrial fibrillation - treated during surgery in 2015   ? HTN (hypertension)   ? Psoriasis   ? Former smoker   ? S/P MVR (mitral valve replacement) - 23 mm [employer] Sylvester mechanical valve - 2015   ? DM type 2 (diabetes mellitus, type 2)   ? Anticoagulation goal of INR 2.5 to 3.5   ? Abdominal bloating, chronic   ? Recurrent UTI (urinary tract infection)   ? MRSA (methicillin resistant staph aureus) culture positive     Current Outpatient Prescriptions   Medication Sig Note   ? furosemide (LASIX) 40 MG tablet Take 1 tablet (40 mg total) by mouth daily. 1 tablet PO daily x 5 days then increase to twice a day    ? insulin aspart (NOVOLOG) 100 unit/mL injection Inject 40 Units under the skin 3 (three) times a day.    ? insulin NPH (NOVOLIN N) 100 unit/mL injection Inject 40 Units under the skin 3 (three) times a day.    ? insulin syringe-needle U-100 1/2 mL 29 Syrg USE AS DIRECTED UP TO SIX TIMES DAILY    ? levothyroxine (SYNTHROID, LEVOTHROID) 125 MCG tablet Take 1 tablet (125 mcg total) by mouth daily. 2/4/2017: Not at this time   ? LORazepam (ATIVAN) 0.5 MG tablet Take 0.5 mg by mouth 2 (two) times a day as needed for anxiety. 2/4/2017: As needed   ? omeprazole (PRILOSEC) 20 MG capsule Take 1 capsule (20 mg total) by mouth daily.    ? ONETOUCH ULTRA TEST strips TEST THREE TIMES DAILY    ? polyethylene glycol (MIRALAX) 17 gram packet Take 17 g by mouth 2 (two) times a day.  2/4/2017: stopped   ? silver sulfADIAZINE (SILVADENE, SSD) 1 % cream Apply to toe daily    ? temazepam (RESTORIL) 15 mg capsule Take 1-2 capsules (15-30 mg total) by mouth bedtime as needed for sleep. May refill every 30 days only.    ? warfarin (COUMADIN) 2.5 MG tablet Take 1 tablet PO daily. INR daily. Next INR check 12/23/2016, dose adjustment depending upon INR result.    ? glimepiride (AMARYL) 2 MG tablet Take 1 tablet (2 mg total) by mouth every morning before breakfast.    ? [START ON 4/13/2017] HYDROmorphone (DILAUDID) 4 MG tablet  Take 0.5-1 tablets (2-4 mg total) by mouth 4 (four) times a day as needed for pain. For use from 4/13/17 to 5/13/17.  RX 2/2.      Social History     Social History Narrative    Patient of Dr. Bach since 2001.  .        Former patient of Dr. Harshad Ballard.     ______________________________________________________________________     History of present illness:    The patient comes in today with her .    We first reviewed her toe ulcer at this point.  This is healing on her.  It does not hurt her.  The redness and swelling have improved.  It is on her left second toe.    We reviewed her diabetes.  Her sugars are still running high.  She is taking 25 units of NPH and 20 units of R 3 times a day at this time.  She is not having any lows.    We reviewed her mitral valve replacement.  She does need her INR checked today.    We reviewed her high blood pressure and her blood pressure is doing well at this time without issue.    We reviewed her chronic pain and she does need a refill of her Dilaudid.    We reviewed her insomnia.  The temazepam does not seem to be working as well.  She is wondering if she should do something else at this point.    On review of systems, the patient denies any chest pain or shortness of breath.    ______________________________________________________________________    Exam:    Wt Readings from Last 3 Encounters:   03/14/17 162 lb (73.5 kg)   02/17/17 159 lb 6.4 oz (72.3 kg)   02/09/17 166 lb (75.3 kg)     BP Readings from Last 3 Encounters:   03/14/17 122/60   02/17/17 124/60   02/09/17 130/78     Visit Vitals   ? /60 (Patient Site: Right Arm, Patient Position: Sitting, Cuff Size: Adult Regular)   ? Pulse 81   ? Wt 162 lb (73.5 kg)   ? SpO2 98%   ? BMI 28.7 kg/m2      The patient is comfortable, no acute distress.  Mood good.  Insight good.  Eyes are nonicteric.  Neck is supple without mass.  No cervical adenopathy.  No thyromegaly. Heart regular rate and rhythm.  Valve  click is good.  Lungs clear to auscultation bilaterally.  Respiratory effort is good.  Abdomen soft and nontender.  No hepatosplenomegaly.  Extremities no edema.  Her fourth toe ulcer is about 40% healed over.      ______________________________________________________________________    Diagnostics:    Results for orders placed or performed in visit on 03/14/17   INR   Result Value Ref Range    INR 2.00 (H) 0.90 - 1.10      ______________________________________________________________________    Assessment:    1. Open toe wound    2. S/P MVR (mitral valve replacement) - 23 mm [employer] Sylvester mechanical valve - 2015    3. Anxiety state    4. Full code status - POLST form 1/2/16    5. DM type 2 (diabetes mellitus, type 2)    6. Insomnia    7. HTN (hypertension)       ______________________________________________________________________      PHQ-2 Total Score: 2 (6/29/2016  1:00 PM)  No Data Recorded    Plan:    1. Add back in glimepiride 2 mg a day.  2. Increase temazepam to 30 mg at at bedtime.  3. Follow-up again at least in 2 months.  4. Full code status reconfirmed.  5. Continue follow-up with anticoagulation nurses.  6. Tube gauze given to the patient to protect the toe.    Gabino Bach MD  General Internal Medicine  [facility]     Return in about 2 months (around 5/14/2017).

## 2021-06-25 NOTE — TELEPHONE ENCOUNTER
Who is calling:  Riley  Reason for Call:  Patients son returning INR call.  Date of last appointment with primary care: 5/6/21  Okay to leave a detailed message: Yes, please call tonight or after 9am tomorrow morning.

## 2021-06-25 NOTE — PATIENT INSTRUCTIONS - HE
Future Appointments   Date Time Provider Department Center   4/16/2019 11:30 AM [facility]   5/30/2019  1:15 PM [facility]

## 2021-06-25 NOTE — PROGRESS NOTES
Progress Notes by Gabino Bach MD at 2/9/2017 10:00 AM     Author: Gabino Bach MD Service: -- Author Type: Physician    Filed: 2/9/2017  1:08 PM Encounter Date: 2/9/2017 Status: Signed    : Gabino Bach MD (Physician)              [facility]    Comprehensive and complex medical care - Chronic disease management - Shared decision making - Care coordination - Compassionate care    Patient advocacy - Rational deprescribing - Minimally disruptive medicine - Ethical focus - Customized care    Date of Service: 2/9/2017  Primary Provider: Gabino Bach MD    Patient Care Team:  Gabino Bach MD as PCP - General (Internal Medicine)  Ayanna Camacho MD as Physician (Cardiology)  Al García MD as Physician (Ophthalmology)  Bria Nicole RN as Registered Nurse     ______________________________________________________________________     [facility] [address] [place] [id number]  Phone: [phone] Fax: [phone]     Patient's Insurance:    Payor: MEDICA / Plan: MEDICA PRIME SOLUTIONS / Product Type: COST PLAN /       ______________________________________________________________________     Patient[facility] Pharmacy:    [facility] [address] [place] [id number]  Phone: [phone] Fax: [phone]     Patient's Insurance:    Payor: MEDICA / Plan: MEDICA PRIME SOLUTIONS / Product Type: COST PLAN /      ______________________________________________________________________     Bernadette Yu is 76 y.o. female who comes in today for:    Chief Complaint   Patient presents with   ? Toe Pain     Follow up for right toe ulcer. Still swollen and red but not as bed.        Patient Active Problem List   Diagnosis   ? Hypothyroidism   ? HLD (hyperlipidemia)   ? Anxiety   ?  Chronic pain - Dr. Bach manages the pain   ? CN (constipation)   ? Insomnia   ? Restless legs syndrome (RLS)   ? Lumbosacral plexopathy - chronic pain related to this   ? Full code status - POLST form 1/2/16   ? IBS (irritable bowel syndrome)   ? ASCVD (arteriosclerotic cardiovascular disease) - CABG 2015   ? Subclavian artery stenosis, left - s/p stent   ? Paroxysmal atrial fibrillation - treated during surgery in 2015   ? HTN (hypertension)   ? Psoriasis   ? Former smoker   ? S/P MVR (mitral valve replacement) - 23 mm [employer] Sylvester mechanical valve - 2015   ? DM type 2 (diabetes mellitus, type 2)   ? Anticoagulation goal of INR 2.5 to 3.5   ? Abdominal bloating, chronic   ? Recurrent UTI (urinary tract infection)   ? MRSA (methicillin resistant staph aureus) culture positive     Current Outpatient Prescriptions   Medication Sig Note   ? furosemide (LASIX) 40 MG tablet Take 1 tablet (40 mg total) by mouth daily. 1 tablet PO daily x 5 days then increase to twice a day    ? HYDROmorphone (DILAUDID) 4 MG tablet Take 0.5-1 tablets (2-4 mg total) by mouth 4 (four) times a day as needed for pain. For use from 1/23/17 to 2/22/17.    ? insulin aspart (NOVOLOG) 100 unit/mL injection Inject 40 Units under the skin 3 (three) times a day.    ? insulin NPH (NOVOLIN N) 100 unit/mL injection Inject 40 Units under the skin 3 (three) times a day.    ? insulin syringe-needle U-100 1/2 mL 29 Syrg USE AS DIRECTED UP TO SIX TIMES DAILY    ? levothyroxine (SYNTHROID, LEVOTHROID) 125 MCG tablet Take 1 tablet (125 mcg total) by mouth daily. 2/4/2017: Not at this time   ? LORazepam (ATIVAN) 0.5 MG tablet Take 0.5 mg by mouth 2 (two) times a day as needed for anxiety. 2/4/2017: As needed   ? NOVOLIN N 100 unit/mL injection Inject 10 Units under the skin 2 (two) times a day before meals. .    ? omeprazole (PRILOSEC) 20 MG capsule Take 1 capsule (20 mg total) by mouth Daily before breakfast. 2/4/2017: As needed   ? ONETOUCH ULTRA TEST strips  TEST THREE TIMES DAILY    ? polyethylene glycol (MIRALAX) 17 gram packet Take 17 g by mouth 2 (two) times a day.  2/4/2017: stopped   ? sulfamethoxazole-trimethoprim (BACTRIM DS) 800-160 mg per tablet Take 1 tablet by mouth 2 (two) times a day for 7 days.    ? warfarin (COUMADIN) 2.5 MG tablet Take 1 tablet PO daily. INR daily. Next INR check 12/23/2016, dose adjustment depending upon INR result.      Social History     Social History Narrative    Patient of Dr. Bach since 2001.  .                 ______________________________________________________________________     History of present illness:    The patient comes in today with her .    We reviewed her left second toe ulcer and cellulitis.  This was cultured and came back positive for MRSA.  She was initially started on Keflex when the culture results were not noted, but then switched to Septra.  Since she started the Septra, it has improved and she started feeling better within a day or 2 of starting the medication.  She did not have any fevers or chills.  She almost felt flulike when she had the cellulitis and this is what she felt better.  The redness on her shin has gone away and has just stayed to the toe and it's not really red but more purplish for her.  The swelling has gone down as well.  She really didn't note any drainage from the wound.  She feels it is doing better at this time.  She didn't really have any pain with this.  We reviewed her decreased circulation in different options in relationship to this including more invasive tests for the circulation and she declines this at this time.  She would rather see how this goes.  Dr. Danielson saw her on Monday and talked about possible amputation and she is trying to avoid this at all possible.  She is going to have close follow-up in relationship to this.    Reviewed her MRSA which was found and this was not noted previously.  We discussed with this means.    Reviewed her diabetes and  "this is stable at this point.    Reviewed her mitral valve replacement and her INR is a little bit high today.  This is not surprising given her antibiotic therapy.  She has had no other symptoms in relationship to this.  She does have a history of significant vascular disease and it's possible that she may have lower extremity vascular disease present.    On review of systems, the patient denies any chest pain or fevers or chills    ______________________________________________________________________    Exam:    Wt Readings from Last 3 Encounters:   02/09/17 166 lb (75.3 kg)   02/06/17 163 lb (73.9 kg)   02/04/17 163 lb (73.9 kg)     BP Readings from Last 3 Encounters:   02/09/17 130/78   02/04/17 143/82   01/27/17 134/76     Visit Vitals   ? /78   ? Pulse 84   ? Ht 5' 3\" (1.6 m)   ? Wt 166 lb (75.3 kg)   ? BMI 29.41 kg/m2      The patient is comfortable, no acute distress.  Mood good.  Insight good.  Heart regular rate and rhythm.  Lungs clear to auscultation bilaterally.  Respiratory effort is good.  Extremities no edema.  Her left second toe ulcer is stable without discharge.  There is good granulation tissue.  It does not probe to the bone.  There is purplish discoloration and swelling to the toe consistent with a post infectious etiology.  There is no cellulitis extending up into the shin at this time.  Her distal pulses are nonpalpable.  ______________________________________________________________________    Assessment:    1. Open toe wound    2. S/P MVR (mitral valve replacement) - 23 mm [employer]. Sylvester mechanical valve - 2015    3. MRSA (methicillin resistant staph aureus) culture positive    4. Diabetic foot ulcer    5. Cellulitis of toe of left foot       ______________________________________________________________________      PHQ-2 Total Score: 2 (6/29/2016  1:00 PM)  No Data Recorded    Plan:    1. Continue Septra for toe ulcer.  Given another week of it.  2. Follow-up again in one week.  3. May " need close follow-up given this issue.  4. Diabetes unchanged at this time.  5. Consider other blood work if needed.    Gabino Bach MD  General Internal Medicine  [facility]     Return in about 1 week (around 2/16/2017).

## 2021-06-25 NOTE — PROGRESS NOTES
Progress Notes by Gabino Bach MD at 4/25/2017 11:20 AM     Author: Gabino Bach MD Service: -- Author Type: Physician    Filed: 5/5/2017  8:23 AM Encounter Date: 4/25/2017 Status: Signed    : Gabino Bach MD (Physician)              [facility]    Comprehensive and complex medical care - Chronic disease management - Shared decision making - Care coordination - Compassionate care    Patient advocacy - Rational deprescribing - Minimally disruptive medicine - Ethical focus - Customized care    Date of Service: 4/25/2017  Primary Provider: Gabino Bach MD    Patient Care Team:  Gabino Bach MD as PCP - General (Internal Medicine)  Ayanna Camacho MD as Physician (Cardiology)  Al García MD as Physician (Ophthalmology)  Bria Nicole RN as Registered Nurse     ______________________________________________________________________     Patient's Pharmacy:    [facility] [address] [place] [id number]  Phone: [phone] Fax: [phone]     Patient's Insurance:    Payor: MEDICA / Plan: MEDICA [employer] / Product Type: COST PLAN /     ______________________________________________________________________     Bernadette Yu is 76 y.o. female who comes in today for:     Chief Complaint   Patient presents with   ? Follow-up     joints, sleep, Blood Sugar high, gain weight, Bloated, miralax not working, blister second toe left foot       Patient Active Problem List   Diagnosis   ? Hypothyroidism   ? HLD (hyperlipidemia)   ? Anxiety   ? Chronic pain - Dr. Ardolf manages the pain   ? CN (constipation)   ? Insomnia   ? Restless legs syndrome (RLS)   ? Lumbosacral plexopathy - chronic pain related to this   ? Full code status - POLST form 1/2/16   ? IBS (irritable bowel syndrome)   ? ASCVD (arteriosclerotic cardiovascular disease) - CABG 2015   ? Subclavian artery  stenosis, left - s/p stent   ? Paroxysmal atrial fibrillation - treated during surgery in 2015   ? HTN (hypertension)   ? Psoriasis   ? Former smoker   ? S/P MVR (mitral valve replacement) - 23 mm St. Sylvester mechanical valve - 2015   ? DM type 2 (diabetes mellitus, type 2)   ? Anticoagulation goal of INR 2.5 to 3.5   ? Abdominal bloating, chronic   ? Recurrent UTI (urinary tract infection)   ? MRSA (methicillin resistant staph aureus) culture positive   ? Proteinuria   ? Joint pain     Past Medical History:   Diagnosis Date   ? Abdominal bloating, chronic 8/21/2016   ? Anticoagulation goal of INR 2.5 to 3.5 1/27/2016   ? Anxiety    ? ASCVD (arteriosclerotic cardiovascular disease)    ? Carotid artery stenosis, left    ? DM (diabetes mellitus), type 2 1990   ? Eczema    ? Former smoker    ? Full code status    ? HLD (hyperlipidemia)    ? HTN (hypertension)    ? Hypothyroidism    ? IBS (irritable bowel syndrome)    ? Insomnia    ? Lumbosacral plexopathy    ? Meningococcal meningitis Age 14   ? Mitral stenosis    ? MRSA (methicillin resistant staph aureus) culture positive 2/9/2017   ? Normocytic anemia    ? Paroxysmal atrial fibrillation - treated during surgery in 2015 11/16/2015    Bilateral pulmonary vein isolation with AtriCure Synergy (bipolar radiofrequency ablation) device, exclusion of the left atrial appendage with the AtriCure AtriClip device.     ? PN (peripheral neuropathy)    ? Psoriasis    ? Recurrent UTI (urinary tract infection)    ? RLS (restless legs syndrome)    ? S/P CABG (coronary artery bypass graft) 1/8/2016   ? S/P colonoscopy 12/12/07   ? S/P MVR (mitral valve replacement) - 23 mm St. Sylvester mechanical valve - 2015 12/30/2015   ? Subclavian artery stenosis, left - s/p stent 11/16/2015    Stented in 2015.       Current Outpatient Prescriptions   Medication Sig Note   ? furosemide (LASIX) 40 MG tablet Take 1 tablet (40 mg total) by mouth daily. 1 tablet PO daily x 5 days then increase to twice a day     ? HYDROmorphone (DILAUDID) 4 MG tablet Take 0.5-1 tablets (2-4 mg total) by mouth 4 (four) times a day as needed for pain. For use from 4/13/17 to 5/13/17.  RX 2/2.    ? insulin aspart (NOVOLOG) 100 unit/mL injection Inject 40 Units under the skin 3 (three) times a day.    ? insulin NPH (NOVOLIN N) 100 unit/mL injection Inject 40 Units under the skin 3 (three) times a day.    ? insulin syringe-needle U-100 1/2 mL 29 Syrg USE AS DIRECTED UP TO SIX TIMES DAILY    ? LORazepam (ATIVAN) 0.5 MG tablet Take 0.5 mg by mouth 2 (two) times a day as needed for anxiety. 2/4/2017: As needed   ? polyethylene glycol (MIRALAX) 17 gram packet Take 17 g by mouth 2 (two) times a day.  2/4/2017: stopped   ? warfarin (COUMADIN) 2.5 MG tablet Take 1 tablet PO daily. INR daily. Next INR check 12/23/2016, dose adjustment depending upon INR result.    ? warfarin (COUMADIN) 5 MG tablet Take 7.5mg (1 and 1/2 tabs) on Tues, and 5mg (1 tab) on all other days.  OR AS DIRECTED.  Adjust dose based on INR.    ? glimepiride (AMARYL) 2 MG tablet Take 1 tablet (2 mg total) by mouth every morning before breakfast.    ? levothyroxine (SYNTHROID, LEVOTHROID) 125 MCG tablet Take 1 tablet (125 mcg total) by mouth daily. 2/4/2017: Not at this time   ? omeprazole (PRILOSEC) 20 MG capsule Take 1 capsule (20 mg total) by mouth daily.    ? ONETOUCH ULTRA TEST strips TEST THREE TIMES DAILY    ? polymyxin B-trimethoprim (FOR POLYTRIM) 10,000 unit- 1 mg/mL Drop ophthalmic drops INSTILL 1 GTT INTO THE RIGHT EYE TID 4/24/2017: Received from: External Pharmacy   ? predniSONE (DELTASONE) 20 MG tablet Take 1 tablet (20 mg total) by mouth daily.    ? silver sulfADIAZINE (SILVADENE, SSD) 1 % cream Apply to toe daily    ? temazepam (RESTORIL) 15 mg capsule Take 1-2 capsules (15-30 mg total) by mouth bedtime as needed for sleep. May refill every 30 days only.    ? valACYclovir (VALTREX) 500 MG tablet TK 1 T PO TID 4/24/2017: Received from: External Pharmacy     Social  History     Social History   ? Marital status:      Spouse name: Aneudy   ? Number of children:  4   ? Years of education: N/A     Occupational History   ?  Retired     Social History Main Topics   ? Smoking status: Former Smoker     Years: 23.00     Quit date: 11/13/1978   ? Smokeless tobacco: Never Used      Comment: Was a social smoker   ? Alcohol use 2.0 oz/week     4 Standard drinks or equivalent per week      Comment: Social use of alcohol - nominal in past year   ? Drug use: No   ? Sexual activity: Not on file     Other Topics Concern   ? Not on file     Social History Narrative    Patient of Dr. Bach since 2001.  .        Former patient of Dr. Harshad Ballard.     Family History   Problem Relation Age of Onset   ? Colon cancer Father    ? Diabetes Father    ? Hypertension Mother    ? Heart disease Mother       congestive heart failure   ? Arthritis Mother    ? Diabetes Sister    ? Heart disease Brother    ? Rectal cancer Son    ? Colon cancer Son       Family history is otherwise noncontributory.    ______________________________________________________________________     History of present illness:    The patient comes in today with her  to review a number of issues.    We reviewed her foot exam.  She does have blisters on her toes.  This is new over the last day or 2.  They do not hurt.  There are no breaks in them at this point.  We reviewed this with her.    We reviewed her diabetes.  Her sugars have been harder to control.  This was reviewed with her.  She is on insulin.    We reviewed her neuropathy and issues in relationship to this.  She has a lumbosacral plexopathy detected in the past related to her diabetes.    We reviewed her mitral valve replacement and her INR issues with this    We reviewed her coronary disease and she has had no problems with chest pain or chest pressure.    We reviewed her low back pain and this is been bothering her more.  She has had increasing joint  pain all over.  She also has it in her hips.  She has difficulty sleeping somewhat due to the pain.  She does have pain in her thumbs.  She has had an elevated sed rate and CRP and this was reviewed with her today.  We reviewed different options for treatment.    She is noted more constipation and bloating as of late.  As well.    10 point review of systems is negative unless noted.    ______________________________________________________________________     Exam:    Wt Readings from Last 3 Encounters:   04/25/17 166 lb (75.3 kg)   03/14/17 162 lb (73.5 kg)   02/17/17 159 lb 6.4 oz (72.3 kg)     BP Readings from Last 3 Encounters:   04/25/17 138/60   03/14/17 122/60   02/17/17 124/60      /60  Pulse 80  Wt 166 lb (75.3 kg)  BMI 29.41 kg/m2  The patient is comfortable, no acute distress.  Mood good to slightly flat.  Insight is good.  No skin lesions or nodules of concern.  Ears clear.  Eyes are nonicteric.  Pupils equal and reactive.  Throat is clear.  Neck is supple without mass, no thyromegaly.  Carotids are clear.  No cervical or epitrochlear adenopathy.  Heart regular rate and rhythm.  Lungs clear to auscultation bilaterally.  Respiratory effort good.  Abdomen soft and nontender.  No hepatosplenomegaly.  Extremities show trace edema.  Foot exam shows decreased sensation and blisters over the first and second toes without sore.  The previous toe sore has healed up.    ______________________________________________________________________    Diagnostics:    Results for orders placed or performed in visit on 04/25/17   Sedimentation Rate   Result Value Ref Range    Sed Rate 47 (H) 0 - 20 mm/hr   C-Reactive Protein   Result Value Ref Range    CRP 0.9 (H) 0.0 - 0.8 mg/dL   Glycosylated Hemoglobin A1c   Result Value Ref Range    Hemoglobin A1c 7.3 (H) 3.5 - 6.0 %   Thyroid Stimulating Hormone (TSH)   Result Value Ref Range    TSH 1.09 0.30 - 5.00 uIU/mL   Microalbumin, Random Urine   Result Value Ref Range     Microalbumin, Random Urine 4.11 (H) 0.00 - 1.99 mg/dL    Creatinine, Urine 57.9 mg/dL    Microalbumin/Creatinine Ratio Random Urine 71.0 (H) <=19.9 mg/g   Pain Clinic Survey, Urine   Result Value Ref Range    Creatinine, Urine 58.6 mg/dL    Specific Gravity 1.014     pH 5.7     Oxidants Negative Cutoff: 200 mg/L    Adulterant Survey Comment Normal     Codeine Not Detected Cutoff: 25 ng/mL    Codeine-6-beta-glucuronide Not Detected Cutoff: 100 ng/mL    Morphine Not Detected Cutoff: 25 ng/mL    Morphine-6-beta-glucuronide Not Detected Cutoff: 100 ng/mL    6-monoacetylmorphine Not Detected Cutoff: 25 ng/mL    Hydrocodone Not Detected Cutoff: 25 ng/mL    Norhydrocodone Not Detected Cutoff: 25 ng/mL    Dihydrocodeine Not Detected Cutoff: 25 ng/mL    Hydromorphone Present (!) Cutoff: 25 ng/mL    Hydromorphone-3-beta-glucuronide Present (!) Cutoff: 100 ng/mL    Oxycodone Not Detected Cutoff: 25 ng/mL    Noroxycodone Not Detected Cutoff: 25 ng/mL    Oxymorphone Not Detected Cutoff: 25 ng/mL    Oxymorphone-3-beta-glucuronide Not Detected Cutoff: 100 ng/mL    Noroxymorphone Not Detected Cutoff: 25 ng/mL    Fentanyl Not Detected Cutoff: 2 ng/mL    Norfentanyl Not Detected Cutoff: 2 ng/mL    Meperidine Not Detected Cutoff: 25 ng/mL    Normeperidine Not Detected Cutoff: 25 ng/mL    Naloxone Not Detected Cutoff: 25 ng/mL    Naloxone-3-beta-glucuronide Not Detected Cutoff: 100 ng/mL    Methadone Not Detected Cutoff: 25 ng/mL    EDDP Not Detected Cutoff: 25 ng/mL    Propoxyphene Not Detected Cutoff: 25 ng/mL    Norpropoxyphene Not Detected Cutoff: 25 ng/mL    Tramadol Not Detected Cutoff: 25 ng/mL    O-desmethyltramadol Not Detected Cutoff: 25 ng/mL    Tapentadol Not Detected Cutoff: 25 ng/mL    N-desmethyltapentadol Not Detected Cutoff: 50 ng/mL    Tapentadol-beta-glucuronide Not Detected Cutoff: 100 ng/mL    Buprenorphine Not Detected Cutoff: 5 ng/mL    Norbuprenorphine Not Detected Cutoff: 5 ng/mL    Norbuprenorphine  glucuronide Not Detected Cutoff: 20 ng/mL    Opioid Interpretation SEE BELOW     Amphetamines Negative Cutoff: 500 ng/mL    Barbiturates Negative Cutoff: 200 ng/mL    Benzodiazepines Negative Cutoff: 100 ng/mL    Cocaine Negative Cutoff: 150 ng/mL    Phencyclidine Negative Cutoff: 25 ng/mL    Tetrahydrocannabinol (THC) Negative Cutoff: 50 ng/mL   HM2(CBC w/o Differential)   Result Value Ref Range    WBC 5.4 4.0 - 11.0 thou/uL    RBC 4.07 3.80 - 5.40 mill/uL    Hemoglobin 10.6 (L) 12.0 - 16.0 g/dL    Hematocrit 32.5 (L) 35.0 - 47.0 %    MCV 80 80 - 100 fL    MCH 26.2 (L) 27.0 - 34.0 pg    MCHC 32.8 32.0 - 36.0 g/dL    RDW 16.7 (H) 11.0 - 14.5 %    Platelets 201 140 - 440 thou/uL    MPV 9.7 7.0 - 10.0 fL   INR   Result Value Ref Range    INR 3.20 (H) 0.90 - 1.10      ______________________________________________________________________     Assessment:    1. DM type 2 (diabetes mellitus, type 2)    2. ASCVD (arteriosclerotic cardiovascular disease) - CABG 2015    3. Chronic pain    4. S/P MVR (mitral valve replacement) - 23 mm St. Sylvester mechanical valve - 2015    5. HTN (hypertension)    6. Joint pain    7. Hypothyroidism    8. Blister of toe    9. Lumbosacral plexopathy - chronic pain related to this    10. CN (constipation)    11. Insomnia    12. Proteinuria    13. Back pain       ______________________________________________________________________     Lab Results   Component Value Date    LDLCALC 96 11/11/2015       PHQ-2 Total Score: 2 (6/29/2016  1:00 PM)  No Data Recorded      Plan:    1. Likely will use prednisone for joint pain.  This likely does represent an inflammatory arthropathy.  2. Consider use of methotrexate or Arava for the patient.  3. Consider ACE inhibitor with proteinuria.  4. Given Surgitube samples for her toe blisters.  Her previous sore has healed up.  5. Reevaluate diabetes as we go along.  Patient declines Prevnar and does not want to have it.  6. Reviewed all other medical issues  today.        Gabino Bach MD  General Internal Medicine  [facility]     Personal office fax - [phone]  Voice mail - [phone]  E-mail - [email or web address]    Return in about 2 weeks (around 5/9/2017), or if symptoms worsen or fail to improve.

## 2021-06-25 NOTE — PROGRESS NOTES
Progress Notes by Gabino Bach MD at 1/27/2017  2:15 PM     Author: Gabino Bach MD Service: -- Author Type: Physician    Filed: 1/30/2017 11:09 PM Encounter Date: 1/27/2017 Status: Signed    : Gabino Bach MD (Physician)              [facility]    Comprehensive and complex medical care - Chronic disease management - Shared decision making - Care coordination - Compassionate care    Patient advocacy - Rational deprescribing - Minimally disruptive medicine - Ethical focus    Date of Service: 1/27/2017  Primary Provider: Gabino Bach MD    Patient Care Team:  Gabino Bach MD as PCP - General (Internal Medicine)  Ayanna Camacho MD as Physician (Cardiology)  Al García MD as Physician (Ophthalmology)  Bria Nicole RN as Registered Nurse     ______________________________________________________________________     [facility]    [facility] [address] [place] [address] [place] [address] [address]  Phone: [phone] Fax: [phone]     Patient's Insurance:    Payor: MEDICA / Plan: MEDICA PRIME SOLUTIONS / Product Type: COST PLAN /     ______________________________________________________________________     Bernadette Yu is 76 y.o. female who comes in today for:    Chief Complaint   Patient presents with   ? Follow-up     Wants sore on toe looked at.       Patient Active Problem List   Diagnosis   ? Hypothyroidism   ? HLD (hyperlipidemia)   ? Anxiety   ? Chronic pain - Dr. Bach manages the pain   ? CN (constipation)   ? Insomnia   ? Restless legs syndrome (RLS)   ? Lumbosacral plexopathy - chronic pain related to this   ? Full code status - POLST form 1/2/16   ? IBS (irritable bowel syndrome)   ? ASCVD (arteriosclerotic cardiovascular disease) - CABG 2015   ? Subclavian artery stenosis, left - s/p stent   ? Paroxysmal atrial fibrillation - treated during surgery in  2015   ? HTN (hypertension)   ? Psoriasis   ? Former smoker   ? S/P MVR (mitral valve replacement) - 23 mm [employer]. Sylvester mechanical valve - 2015   ? DM type 2 (diabetes mellitus, type 2)   ? Anticoagulation goal of INR 2.5 to 3.5   ? Abdominal bloating, chronic   ? Recurrent UTI (urinary tract infection)     Current Outpatient Prescriptions   Medication Sig   ? furosemide (LASIX) 40 MG tablet Take 1 tablet (40 mg total) by mouth daily. 1 tablet PO daily x 5 days then increase to twice a day   ? HYDROmorphone (DILAUDID) 4 MG tablet Take 0.5-1 tablets (2-4 mg total) by mouth 4 (four) times a day as needed for pain. For use from 1/23/17 to 2/22/17.   ? insulin syringe-needle U-100 1/2 mL 29 Syrg USE AS DIRECTED UP TO SIX TIMES DAILY   ? levothyroxine (SYNTHROID, LEVOTHROID) 125 MCG tablet Take 1 tablet (125 mcg total) by mouth daily.   ? LORazepam (ATIVAN) 0.5 MG tablet Take 0.5 mg by mouth 2 (two) times a day as needed for anxiety.   ? NOVOLIN N 100 unit/mL injection Inject 10 Units under the skin 2 (two) times a day before meals. .   ? omeprazole (PRILOSEC) 20 MG capsule Take 1 capsule (20 mg total) by mouth Daily before breakfast.   ? ONETOUCH ULTRA TEST strips TEST THREE TIMES DAILY   ? polyethylene glycol (MIRALAX) 17 gram packet Take 17 g by mouth 2 (two) times a day.    ? temazepam (RESTORIL) 15 mg capsule Take 1 capsule (15 mg total) by mouth bedtime as needed for sleep. Do not take with lorazepam.  May refill every 30 days only.   ? warfarin (COUMADIN) 2.5 MG tablet Take 1 tablet PO daily. INR daily. Next INR check 12/23/2016, dose adjustment depending upon INR result.   ? gabapentin (NEURONTIN) 100 MG capsule Take 100-300 mg by mouth bedtime as needed (sleep or leg discomfort).   ? insulin aspart (NOVOLOG) 100 unit/mL injection Inject 40 Units under the skin 3 (three) times a day.   ? insulin NPH (NOVOLIN N) 100 unit/mL injection Inject 40 Units under the skin 3 (three) times a day.   ? simvastatin (ZOCOR) 10 MG  tablet Take 10 mg by mouth bedtime.     Social History     Social History Narrative    Patient of Dr. Bach since .  .                 ______________________________________________________________________     History of present illness:    The patient comes in today with her .    We reviewed the death of her son.  He had a prolonged illness with rectal cancer.  He was unable to really eat and do well at the end.  I think she is doing a little bit better since his .  They were very stressed about his health.  They know it will be difficult going forward.    We reviewed her diabetes.  Her current insurance will not cover Humalog, Humulin.  They will cover NovoLog and NOVOLIN.  We will be making some changes in this.  Her blood sugars are running between 250 and 300.  This is in the morning.  We have told her about increasing her dosing at this time.    She has had some blisters on toes on both of her feet recently.  This is since the last time I saw her.  Her second toe still has a sore on the plantar surface.  It does have some slough with it.  There is no pain with it and no redness.  We reviewed this with her today.  We will likely want to see her back closely for this.    We reviewed her coronary disease and she's had no problems with chest pain or chest pressure.    Reviewed her mitral valve replacement and this is going okay for her.  She remains on the Coumadin without issue at this time.  Her INR is high today for her.    On review of systems, the patient denies any chest pain or shortness of breath.    ______________________________________________________________________    Exam:    Wt Readings from Last 3 Encounters:   17 159 lb 3.2 oz (72.2 kg)   17 163 lb 9.6 oz (74.2 kg)   16 166 lb 9.6 oz (75.6 kg)     BP Readings from Last 3 Encounters:   17 134/76   17 122/56   16 152/60     Visit Vitals   ? /76 (Patient Site: Right Arm, Patient  Position: Sitting, Cuff Size: Adult Regular)   ? Pulse 83   ? Wt 159 lb 3.2 oz (72.2 kg)   ? SpO2 97%   ? BMI 28.2 kg/m2      The patient is comfortable, no acute distress.  Mood okay.  Insight good.  Eyes are nonicteric.  Neck is supple without mass.  No cervical adenopathy.  No thyromegaly. Heart regular rate and rhythm.   Her valve click is good. Lungs clear to auscultation bilaterally.  Respiratory effort is good.  Abdomen soft and nontender.  No hepatosplenomegaly.  Extremities no edema.    There is a 1 cm sore on the second toe of the right foot without signs of cellulitis.  It does go below the cutaneous layer.  There is some slough but also good granulation present.  ______________________________________________________________________    Assessment:    1. DM type 2 (diabetes mellitus, type 2)    2. Grief reaction    3. Open toe wound    4. S/P MVR (mitral valve replacement) - 23 mm St. Sylvester mechanical valve - 2015    5. ASCVD (arteriosclerotic cardiovascular disease) - CABG 2015    6. Chronic pain       ______________________________________________________________________      PHQ-2 Total Score: 2 (6/29/2016  1:00 PM)  No Data Recorded    Plan:    1. Discussed wound care with her and her .  2. Follow-up again in 3 weeks.  3. Follow-up sooner if worsening or see podiatry.  4. Adjust Coumadin as needed.  5. I think she'll do better now that her son has passed on.  She was under a lot stress with his illness.  6. Consider controlled substance agreement next visit.  Consider UDS as appropriate.    Gabino Bach MD  General Internal Medicine  [facility]     Return in about 3 weeks (around 2/17/2017) for follow up visit.

## 2021-06-25 NOTE — PROGRESS NOTES
"Progress Notes by Gabino Bach MD at 5/18/2017 12:50 PM     Author: Gabino Bach MD Service: -- Author Type: Physician    Filed: 5/22/2017  9:01 PM Encounter Date: 5/18/2017 Status: Signed    : Gabino Bach MD (Physician)              [facility]    Comprehensive and complex medical care - Chronic disease management - Shared decision making - Care coordination - Compassionate care    Patient advocacy - Rational deprescribing - Minimally disruptive medicine - Ethical focus - Customized care    Date of Service: 5/18/2017  Primary Provider: Gabino Bach MD    Patient Care Team:  Gabino Bach MD as PCP - General (Internal Medicine)  Ayanna Camacho MD as Physician (Cardiology)  Al García MD as Physician (Ophthalmology)  Bria Nicole RN as Registered Nurse     ______________________________________________________________________     [facility] [address] [place] [id number]  Phone: [phone] Fax: [phone]     Patient's Insurance:    Payor: MEDICA / Plan: MEDICA [employer]IONS / Product Type: COST PLAN /     ______________________________________________________________________     Bernadette Yu is 76 y.o. female who comes in today for:    Chief Complaint   Patient presents with   ? Follow-up     toe doing much better, difficulty sleeping \"dreaming of you\"       Patient Active Problem List   Diagnosis   ? Hypothyroidism   ? HLD (hyperlipidemia)   ? Anxiety   ? Chronic pain - Dr. Bach manages the pain   ? CN (constipation)   ? Insomnia   ? Restless legs syndrome (RLS)   ? Lumbosacral plexopathy - chronic pain related to this   ? Full code status - POLST form 1/2/16   ? IBS (irritable bowel syndrome)   ? ASCVD (arteriosclerotic cardiovascular disease) - CABG 2015   ? Subclavian artery stenosis, left - s/p stent   ? Paroxysmal " atrial fibrillation - treated during surgery in 2015   ? HTN (hypertension)   ? Psoriasis   ? Former smoker   ? S/P MVR (mitral valve replacement) - 23 mm [employer] Sylvester mechanical valve - 2015   ? DM type 2 (diabetes mellitus, type 2)   ? Anticoagulation goal of INR 2.5 to 3.5   ? Abdominal bloating, chronic   ? Recurrent UTI (urinary tract infection)   ? MRSA (methicillin resistant staph aureus) culture positive   ? Proteinuria   ? Joint pain   ? Controlled substance agreement signed - 2/17     Current Outpatient Prescriptions   Medication Sig Note   ? cholecalciferol, vitamin D3, 5,000 unit Tab Take by mouth.    ? cyanocobalamin (VITAMIN B-12) 100 MCG tablet Take 100 mcg by mouth daily.    ? furosemide (LASIX) 40 MG tablet Take 1 tablet (40 mg total) by mouth daily. 1 tablet PO daily x 5 days then increase to twice a day    ? generic lancets Use 1 each As Directed 3 (three) times a day. Dx E11.65 DM2, uncontrolled    ? HYDROmorphone (DILAUDID) 4 MG tablet Take 0.5-1 tablets (2-4 mg total) by mouth 4 (four) times a day as needed for pain. For use from 5/13/17 to 6/12/17.  RX 1/3.    ? [START ON 6/12/2017] HYDROmorphone (DILAUDID) 4 MG tablet Take 0.5-1 tablets (2-4 mg total) by mouth 4 (four) times a day as needed for pain. For use from 6/12/17 to 7/12/17.  RX 2/3.    ? [START ON 7/12/2017] HYDROmorphone (DILAUDID) 4 MG tablet Take 0.5-1 tablets (2-4 mg total) by mouth 4 (four) times a day as needed for pain. For use from 7/12/17 to 8/11/17.  RX 3/3    ? insulin aspart (NOVOLOG) 100 unit/mL injection Inject 40 Units under the skin 3 (three) times a day.    ? insulin NPH (NOVOLIN N) 100 unit/mL injection Inject 40 Units under the skin 3 (three) times a day.    ? insulin syringe-needle U-100 1/2 mL 29 Syrg USE AS DIRECTED UP TO SIX TIMES DAILY    ? levothyroxine (SYNTHROID, LEVOTHROID) 125 MCG tablet Take 1 tablet (125 mcg total) by mouth daily. 2/4/2017: Not at this time   ? LORazepam (ATIVAN) 0.5 MG tablet Take 0.5 mg  by mouth 2 (two) times a day as needed for anxiety. 2/4/2017: As needed   ? magnesium 30 mg tablet Take 30 mg by mouth 2 (two) times a day.    ? polyethylene glycol (MIRALAX) 17 gram packet Take 17 g by mouth 2 (two) times a day.  2/4/2017: stopped   ? predniSONE (DELTASONE) 20 MG tablet Take 1 tablet (20 mg total) by mouth daily.    ? silver sulfADIAZINE (SILVADENE, SSD) 1 % cream Apply to toe daily    ? warfarin (COUMADIN) 2.5 MG tablet Take 1 tablet PO daily. INR daily. Next INR check 12/23/2016, dose adjustment depending upon INR result.    ? warfarin (COUMADIN) 5 MG tablet Take 7.5mg (1 and 1/2 tabs) on Tues, and 5mg (1 tab) on all other days.  OR AS DIRECTED.  Adjust dose based on INR.      Social History     Social History Narrative    Patient of Dr. Bach since 2001.  .        Former patient of Dr. Harshad Ballard.     ______________________________________________________________________     History of present illness:    The patient comes in today with her .    We first reviewed her probable inflammatory arthropathy.  Her overall pain is doing a lot better after starting prednisone 20 mg a day.  She feels a lot better relationship to this.  Denies problems with prednisone at this time.  This was reviewed with both of them today.  Her back pain in particular is a lot better but her pain overall is better also.  We reviewed other options in relationship to this.  So far, she wants to continue with the prednisone as is but understands we may need to transition to a different medication.    We reviewed the back pain and this is better.  She has had this problem for a while.    Reviewed toe blisters and these are doing better and healing at this time.  There is no signs or symptoms of infection.    We reviewed her chronic pain and she does need refills of her pain medication.  These were set up today.  Her last urine drug screen was appropriate.  The pain medication does help her.  She has not had  more pain medication as a result of this.    Reviewed her insomnia and this continues to bother her.  She uses the temazepam intermittently at this time and not so regularly.  She gets about 3-1/2 hours of sleep at this time.    She still has stomach bloating and this is reviewed.    We reviewed  He arer mitral valve replacement and things are stable from this standpoint.    On review of systems, the patient denies any chest pain or shortness of breath.    ______________________________________________________________________    Exam:    Wt Readings from Last 3 Encounters:   05/18/17 163 lb (73.9 kg)   04/25/17 166 lb (75.3 kg)   03/14/17 162 lb (73.5 kg)     BP Readings from Last 3 Encounters:   05/18/17 138/72   04/25/17 138/60   03/14/17 122/60     /72  Pulse 72  Wt 163 lb (73.9 kg)  BMI 28.87 kg/m2   The patient is comfortable, no acute distress.  Mood good.  Insight good.  Eyes are nonicteric.  Neck is supple without mass.  No cervical adenopathy.  No thyromegaly. Heart regular rate and rhythm.  Valve click is good. Lungs clear to auscultation bilaterally.  Respiratory effort is good.  Abdomen soft and nontender.  No hepatosplenomegaly.  Extremities no edema.  She is moving around better.      ______________________________________________________________________    Diagnostics:    Results for orders placed or performed in visit on 05/18/17   INR   Result Value Ref Range    INR 2.40 (H) 0.90 - 1.10      ______________________________________________________________________    Assessment:    1. Inflammatory arthropathy    2. S/P MVR (mitral valve replacement) - 23 mm [employer] Sylvester mechanical valve - 2015    3. Type 2 diabetes mellitus without complication, with long-term current use of insulin    4. Blister of toe    5. Back pain    6. Controlled substance agreement signed - 2/17    7. Insomnia       ______________________________________________________________________      Lab Results   Component Value  Date    LDLCALC 96 11/11/2015       PHQ-2 Total Score: 2 (6/29/2016  1:00 PM)  No Data Recorded    Plan:    1. Continue prednisone at this time.  2. Consider trial of Methotrexate or Arava in the future.  3. Refilled pain medication.  4. No change in sleep medication at this time.  5. Continue current diabetes mgmt.    Gabino Bach MD  General Internal Medicine  [facility]     Return in about 7 weeks (around 7/6/2017) for follow up visit.

## 2021-06-25 NOTE — PROGRESS NOTES
Progress Notes by Gabino Bcah MD at 9/11/2017 10:55 AM     Author: Gabino Bach MD Service: -- Author Type: Physician    Filed: 9/12/2017  7:51 AM Encounter Date: 9/11/2017 Status: Signed    : Gabino Bach MD (Physician)       Please fax to [facility] at [phone]          [facility]    Comprehensive and complex medical care - Chronic disease management - Shared decision making - Care coordination - Compassionate care    Patient advocacy - Rational deprescribing - Minimally disruptive medicine - Ethical focus - Customized care    Date of Service: 9/11/2017  Primary Provider: Gabino Bach MD    Patient Care Team:  Gabino Bach MD as PCP - General (Internal Medicine)  Ayanna Camacho MD as Physician (Cardiology)  Al García MD as Physician (Ophthalmology)     ______________________________________________________________________     Patient's Pharmacy:    [facility] [address] [place] [address] YASMIN PERRIN  [address] [place] [id number]  Phone: [phone] Fax: [phone]     Patient's Insurance:    Payor: MEDICA / Plan: MEDICA PRIME SOLUTIONS / Product Type: COST PLAN /     ______________________________________________________________________     Preoperative examination    Bernadette Yu is a 77 y.o. female (1940) who I am asked to see by her surgeon regarding her fitness for upcoming surgery.  LMP:  No LMP recorded. Patient is postmenopausal.    Chief Complaint   Patient presents with   ? Pre-op Exam     Cataract, Left eye 9/22, Right Eye 10/6, [facility], Dr. Avery     ______________________________________________________________________    History of present illness:    Patient comes in today with her  for preoperative medical evaluation.    She is having cataract surgery on both eyes.  The dates of surgery are 9/22/2017  and 10/6/2017.  She is having surgery with   Ricky.  She has had worsening vision over the last 2 years.    We reviewed her diabetes and this is doing okay at this time.  She has no concerns about it.    We reviewed her valve replacement and her Coumadin is doing well for her at this time.    We reviewed her chronic back pain and lumbosacral pain.  She is taking hydromorphone correctly.  [place]NPMP reviewed today and no inappropriate prescriptions identified.     We reviewed her ASCVD and she has had no issues with angina symptoms.     ______________________________________________________________________     Patient Active Problem List   Diagnosis   ? Hypothyroidism   ? HLD (hyperlipidemia)   ? Anxiety   ? Chronic pain - Dr. Bach manages the pain   ? CN (constipation)   ? Insomnia   ? Restless legs syndrome (RLS)   ? Lumbosacral plexopathy - chronic pain related to this   ? Full code status - POLST form 16   ? IBS (irritable bowel syndrome)   ? ASCVD (arteriosclerotic cardiovascular disease) - CABG    ? Subclavian artery stenosis, left - s/p stent   ? Paroxysmal atrial fibrillation - treated during surgery in    ? HTN (hypertension)   ? Psoriasis   ? Former smoker   ? S/P MVR (mitral valve replacement) - 23 mm [employer] Sylvester mechanical valve -    ? DM type 2 (diabetes mellitus, type 2)   ? Anticoagulation goal of INR 2.5 to 3.5   ? Abdominal bloating, chronic   ? Recurrent UTI (urinary tract infection)   ? MRSA (methicillin resistant staph aureus) culture positive   ? Proteinuria   ? Joint pain   ? Controlled substance agreement signed -  - temazepam, lorazepam, hydromorphone     Past Surgical History:   Procedure Laterality Date   ? APPENDECTOMY     ? CARDIAC CATHETERIZATION  11/12/15   ?  SECTION     ? CHOLECYSTECTOMY     ? COLONOSCOPY N/A 10/12/2016    Procedure: COLONOSCOPY;  Surgeon: Santiago Duran MD;  Location: [facility];  Service:    ? COLONOSCOPY N/A 10/13/2016    Procedure: COLONOSCOPY with polypectomy & rectum  biopsies;  Surgeon: Santiago Duran MD;  Location: [facility];  Service:    ? CORONARY ARTERY BYPASS GRAFT     ? ESOPHAGOGASTRODUODENOSCOPY N/A 10/12/2016    Procedure: ESOPHAGOGASTRODUODENOSCOPY with biopsies;  Surgeon: Santiago Duran MD;  Location: [facility];  Service:    ? TONSILLECTOMY AND ADENOIDECTOMY     ? UPPER GASTROINTESTINAL ENDOSCOPY        Social History     Social History   ? Marital status:      Spouse name: Aneudy   ? Number of children:  4   ? Years of education: N/A     Occupational History   ?  Retired     Social History Main Topics   ? Smoking status: Former Smoker     Years: 23.00     Quit date: 11/13/1978   ? Smokeless tobacco: Never Used      Comment: Was a social smoker   ? Alcohol use 2.0 oz/week     4 Standard drinks or equivalent per week      Comment: Social use of alcohol - nominal in past year   ? Drug use: No   ? Sexual activity: Not Asked     Other Topics Concern   ? None     Social History Narrative    Patient of Dr. Bach since 2001.  .        Former patient of Dr. Harshad Ballard.      Family History   Problem Relation Age of Onset   ? Colon cancer Father    ? Diabetes Father    ? Hypertension Mother    ? Heart disease Mother       congestive heart failure   ? Arthritis Mother    ? Diabetes Sister    ? Heart disease Brother    ? Rectal cancer Son    ? Colon cancer Son       Family history is noncontributory otherwise.    Allergies: Review of patient's allergies indicates no known allergies.     Current medications:  Current Outpatient Prescriptions   Medication Sig Note   ? ACCU-CHEK SMARTVIEW TEST STRIP strips TEST TID 9/11/2017: Received from: External Pharmacy   ? cholecalciferol, vitamin D3, 5,000 unit Tab Take by mouth.    ? cyanocobalamin (VITAMIN B-12) 100 MCG tablet Take 100 mcg by mouth daily.    ? DUREZOL 0.05 % Drop  9/11/2017: Received from: External Pharmacy   ? furosemide (LASIX) 40 MG tablet TAKE 1 TABLET BY MOUTH TWICE DAILY- START ON  10/21/16    ? generic lancets Use 1 each As Directed 3 (three) times a day. Dx E11.65 DM2, uncontrolled    ? HYDROmorphone (DILAUDID) 4 MG tablet Take 0.5-1 tablets (2-4 mg total) by mouth 4 (four) times a day as needed for pain. For use from 9/10/17 to 10/10/17.  RX 2/2.    ? ILEVRO 0.3 % DrpS  9/11/2017: Received from: External Pharmacy   ? insulin aspart (NOVOLOG) 100 unit/mL injection Inject 40 Units under the skin 3 (three) times a day.    ? insulin NPH (NOVOLIN N) 100 unit/mL injection Inject 40 Units under the skin 3 (three) times a day.    ? insulin syringe-needle U-100 1/2 mL 29 Syrg USE AS DIRECTED UP TO SIX TIMES DAILY    ? levothyroxine (SYNTHROID, LEVOTHROID) 125 MCG tablet Take 1 tablet (125 mcg total) by mouth daily. 2/4/2017: Not at this time   ? LORazepam (ATIVAN) 0.5 MG tablet Take 1 tablet (0.5 mg total) by mouth 2 (two) times a day as needed for anxiety.    ? magnesium 30 mg tablet Take 30 mg by mouth 2 (two) times a day.    ? moxifloxacin (VIGAMOX) 0.5 % ophthalmic solution  9/11/2017: Received from: External Pharmacy   ? polyethylene glycol (MIRALAX) 17 gram packet Take 17 g by mouth 2 (two) times a day.  2/4/2017: stopped   ? silver sulfADIAZINE (SILVADENE, SSD) 1 % cream Apply to toe daily    ? temazepam (RESTORIL) 15 mg capsule  6/30/2017: Received from: External Pharmacy   ? warfarin (COUMADIN) 2.5 MG tablet Take 1 tablet PO daily. INR daily. Next INR check 12/23/2016, dose adjustment depending upon INR result.    ? warfarin (COUMADIN) 5 MG tablet Take 7.5mg (1 and 1/2 tabs) on Tues, and 5mg (1 tab) on all other days.  OR AS DIRECTED.  Adjust dose based on INR.    ? codeine-guaiFENesin (CODEINE-GUAIFENESIN)  mg/5 mL liquid Take 5-10 mL by mouth every 4 (four) hours as needed for cough.    ? HYDROmorphone (DILAUDID) 4 MG tablet Take 0.5-1 tablets (2-4 mg total) by mouth 4 (four) times a day as needed for pain. For use from 7/12/17 to 8/11/17.  RX 3/3    ? HYDROmorphone (DILAUDID) 4 MG  "tablet Take 0.5-1 tablets (2-4 mg total) by mouth 4 (four) times a day as needed for pain. For use from 8/11/17 to 9/10/17.  RX 1/2.        Surgery specific questions:    Anesthesia/family history of complications: No  History of abnormal bleeding: No  Can patient walk a flight of stairs without stopping: Yes  Any chance the patient could be pregnant: No    Review of systems:    On ROS, the patient denies any chest pain or pressure.  No shortness of breath.   ______________________________________________________________________    Exam:    Wt Readings from Last 3 Encounters:   09/11/17 159 lb (72.1 kg)   07/06/17 166 lb 3.2 oz (75.4 kg)   05/18/17 163 lb (73.9 kg)     BP Readings from Last 3 Encounters:   09/11/17 138/64   07/06/17 132/78   05/18/17 138/72      /64  Pulse 78  Ht 5' 3\" (1.6 m)  Wt 159 lb (72.1 kg)  BMI 28.17 kg/m2     Patient is in no acute distress.  Mood good.  Insight fair to good.  Eyes nonicteric.  Pupils equal.  Ears clear.  Nose clear.  Throat clear.  Neck is supple.  No cervical adenopathy.  No thyromegaly.  Heart is regular rate and rhythm.  Stable bowel sounds.  Lungs clear to auscultation bilaterally.  Respiratory effort is good.  Abdomen is soft, nontender, no hepatosplenomegaly.  Extremities no edema.  Foot exam is normal.    ______________________________________________________________________    Diagnostics:    Results for orders placed or performed in visit on 09/11/17   INR   Result Value Ref Range    INR 3.00 (H) 0.90 - 1.10     Potassium is 4.8.      ______________________________________________________________________     Impression:    1. Preoperative cardiovascular examination    2. Cataract, bilateral    3. Paroxysmal atrial fibrillation - treated during surgery in 2015    4. S/P MVR (mitral valve replacement) - 23 mm [employer] Sylvester mechanical valve - 2015    5. Healthcare maintenance    6. HLD (hyperlipidemia)    7. Hypothyroidism    8. Anticoagulation goal of INR 2.5 " to 3.5    9. DM type 2 (diabetes mellitus, type 2)    10. ASCVD (arteriosclerotic cardiovascular disease) - CABG 2015       ______________________________________________________________________     PHQ-2 Total Score: 1 (7/6/2017  1:00 PM)  No Data Recorded    ______________________________________________________________________     Recommendations:    1. Patient is okay to proceed with surgery as planned.  2. Take NPH only on the night before and the morning of surgery.  Take half her usual dose and use no short acting insulin.  3. Follow-up with me in the next 3 months.  4. Refilled temazepam.       Gabino Bach MD  General Internal Medicine  [facility]     Personal office fax - [phone]   Voice mail - [phone]  E-mail - [email or web address]

## 2021-06-25 NOTE — PROGRESS NOTES
Progress Notes by Gabino Bach MD at 12/11/2017 10:55 AM     Author: Gabino Bach MD Service: -- Author Type: Physician    Filed: 12/12/2017  8:16 AM Encounter Date: 12/11/2017 Status: Signed    : Gabino Bach MD (Physician)              [facility]    Comprehensive and complex medical care - Chronic disease management - Shared decision making - Care coordination - Compassionate care    Patient advocacy - Rational deprescribing - Minimally disruptive medicine - Ethical focus - Customized care    ______________________________________________________________________     Date of Service: 12/11/2017  Primary Provider: Gabino Bach MD    Patient Care Team:  Gabino Bach MD as PCP - General (Internal Medicine)  Ayanna Camacho MD as Physician (Cardiology)  Al García MD as Physician (Ophthalmology)     ______________________________________________________________________     Patient's Pharmacy:    [facility] [address] [place] [address] [place] [address] [address]  Phone: [phone] Fax: [phone]     Patient's Contacts:  Name Home Phone Work Phone Mobile Phone Relationship Lgl ISHAN Carreon [phone]   Spouse    ANDREA VALENTIN [phone]   Child      Patient's Insurance:    Payor: MEDICA / Plan: [employer] / Product Type: COST PLAN /     ______________________________________________________________________     Bernadette Valentin is 77 y.o. female who comes in today for:     Chief Complaint   Patient presents with   ? Labs Only     INR   ? Hospital Visit Follow Up     fu iron deficiency anemia   ? Medication Questions   ? Medication Refill     pain med       Patient Active Problem List   Diagnosis   ? Hypothyroidism   ? HLD (hyperlipidemia)   ? Anxiety   ? Chronic pain - Dr. Bach manages the pain   ? CN (constipation)   ? Insomnia   ? Restless legs syndrome (RLS)    ? Lumbosacral plexopathy - chronic pain related to this   ? Full code status - POLST form 1/2/16   ? IBS (irritable bowel syndrome)   ? ASCVD (arteriosclerotic cardiovascular disease) - CABG 2015   ? Subclavian artery stenosis, left - s/p stent   ? Atrial fibrillation   ? HTN (hypertension)   ? Psoriasis   ? Former smoker   ? S/P mitral valve replacement   ? DM type 2 (diabetes mellitus, type 2)   ? Anticoagulation goal of INR 2.5 to 3.5   ? Abdominal bloating, chronic   ? Recurrent UTI (urinary tract infection)   ? MRSA (methicillin resistant staph aureus) culture positive   ? Proteinuria   ? Joint pain   ? Controlled substance agreement signed - 2/17 - temazepam, lorazepam, hydromorphone - UDS 4/17   ? Blood loss anemia   ? CKD (chronic kidney disease), stage III     Past Medical History:   Diagnosis Date   ? Abdominal bloating, chronic 8/21/2016   ? Anticoagulation goal of INR 2.5 to 3.5 1/27/2016   ? Anxiety    ? ASCVD (arteriosclerotic cardiovascular disease)    ? Carotid artery stenosis, left    ? DM (diabetes mellitus), type 2 1990   ? Eczema    ? Former smoker    ? Full code status    ? HLD (hyperlipidemia)    ? HTN (hypertension)    ? Hypothyroidism    ? IBS (irritable bowel syndrome)    ? Insomnia    ? Lumbosacral plexopathy    ? Meningococcal meningitis Age 14   ? Mitral stenosis    ? MRSA (methicillin resistant staph aureus) culture positive 2/9/2017   ? Normocytic anemia    ? Paroxysmal atrial fibrillation - treated during surgery in 2015 11/16/2015    Bilateral pulmonary vein isolation with AtriCure Synergy (bipolar radiofrequency ablation) device, exclusion of the left atrial appendage with the AtriCure AtriClip device.     ? PN (peripheral neuropathy)    ? Psoriasis    ? Recurrent UTI (urinary tract infection)    ? RLS (restless legs syndrome)    ? S/P CABG (coronary artery bypass graft) 1/8/2016   ? S/P colonoscopy 12/12/07   ? S/P MVR (mitral valve replacement) - 23 mm St. Sylvester mechanical valve -  "2015 12/30/2015   ? Subclavian artery stenosis, left - s/p stent 11/16/2015    Stented in 2015.       Current Outpatient Prescriptions   Medication Sig Note   ? ACCU-CHEK SMARTVIEW TEST STRIP strips TEST TID    ? BD ULTRA-FINE DAVID PEN NEEDLES 32 gauge x 5/32\" Ndle USE WITH NOVOLOG PEN AND LANTUS PENS 12/10/2017: Received from: External Pharmacy   ? cholecalciferol, vitamin D3, 5,000 unit Tab Take 5,000 Units by mouth daily.     ? cyanocobalamin (VITAMIN B-12) 100 MCG tablet Take 100 mcg by mouth daily.    ? enoxaparin (LOVENOX) 80 mg/0.8 mL syringe  12/11/2017: Received from: External Pharmacy   ? furosemide (LASIX) 40 MG tablet Take 40 mg by mouth daily.    ? generic lancets Use 1 each As Directed 3 (three) times a day. Dx E11.65 DM2, uncontrolled    ? insulin glargine (LANTUS; BASAGLAR) 100 unit/mL (3 mL) pen Inject 15 Units under the skin at bedtime.    ? levothyroxine (SYNTHROID, LEVOTHROID) 125 MCG tablet Take 1 tablet (125 mcg total) by mouth daily.    ? magnesium 30 mg tablet Take 30 mg by mouth 2 (two) times a day.    ? NOVOLIN N 100 unit/mL injection ADM 40 UNITS SC TID 12/10/2017: Received from: External Pharmacy   ? NOVOLOG 100 unit/mL injection INJECT 40 UNITS SUBCUTANEOUSLY TID UTD 12/10/2017: Received from: External Pharmacy   ? NOVOLOG FLEXPEN 100 unit/mL injection pen Check blood sugar four (4) times daily. AC/HS    ? omeprazole (PRILOSEC) 20 MG capsule Take 1 capsule (20 mg total) by mouth daily before breakfast.    ? polyethylene glycol (MIRALAX) 17 gram packet Take 1 packet (17 g total) by mouth daily as needed.    ? temazepam (RESTORIL) 15 mg capsule Take 15 mg by mouth at bedtime as needed.     ? warfarin (COUMADIN) 5 MG tablet Take 1-1.5 tablets (5-7.5 mg total) by mouth See Admin Instructions. Takes 5mg on MWF, 7.5mg all other days.  Goal INR 2.5-3.5.    ? HYDROmorphone (DILAUDID) 4 MG tablet Take 0.5-1 tablets (2-4 mg total) by mouth 4 (four) times a day as needed for pain. For use from " 12/11/17 to 1/10/18.  RX 1/1.    ? [START ON 1/10/2018] HYDROmorphone (DILAUDID) 4 MG tablet Take 0.5-1 tablets (2-4 mg total) by mouth 4 (four) times a day as needed for pain. For use from 1/10/2018 to 2/9/2018.  RX 2/2.      Social History     Social History   ? Marital status:      Spouse name: Aneudy   ? Number of children:  4   ? Years of education: N/A     Occupational History   ?  Retired     Social History Main Topics   ? Smoking status: Former Smoker     Years: 23.00     Quit date: 11/13/1978   ? Smokeless tobacco: Never Used      Comment: Was a social smoker   ? Alcohol use 2.0 oz/week     4 Standard drinks or equivalent per week      Comment: Social use of alcohol - nominal in past year   ? Drug use: No   ? Sexual activity: Not Asked     Other Topics Concern   ? None     Social History Narrative    Patient of Dr. Bach since 2001.  .        Former patient of Dr. Harshad Ballard.     Family History   Problem Relation Age of Onset   ? Colon cancer Father    ? Diabetes Father    ? Hypertension Mother    ? Heart disease Mother       congestive heart failure   ? Arthritis Mother    ? Diabetes Sister    ? Heart disease Brother    ? Rectal cancer Son    ? Colon cancer Son       Family history is otherwise noncontributory.    ______________________________________________________________________     History of present illness:    Patient comes in today for a hospital follow up visit.    We reviewed her hospital stay and the records from her visit were reviewed today.  I personally reviewed the lab tests, radiology and medical tests pertinent to that stay.     We reviewed her hospitalization for anemia.  She had transfusions while in the hospital and iron infusion.  She is feeling better at this time.  We are rechecking her blood work today.  She had incomplete colonoscopy and had a complete EGD.  She had a CT enterography and we reviewed this.  She is in contact with Dr. Wade from [facility] for  this.  She is not having any blood in her stools.  She did have a PillCam done and will see what these results show.  She will be in close follow-up with me in relationship to this.  We may have to monitor her anemia closer.    We reviewed her diabetes.  She was instructed to consider switching to Lantus and NovoLog in the pen forms.  She has been used to drawing up NPH and NovoLog in the vials and really does not want to do anything different with this.  We had tried her on Lantus before without success.  Her A1c has been good and she has not had problems with excessive hypoglycemia.  There is no compelling reason to make this switch at this time.    We reviewed her constipation and this is part of the reason her colonoscopy was incomplete.  She is using the MiraLAX at this time with good stool output.  It is still giving her issues.    Reviewed her back pain and this does bother her still quite a bit.  This has gotten worse over time.  It has had issues with radiation into the legs at times.  She does need a refill of her chronic pain medication and this was reviewed with her as well.    Reviewed her mitral valve replacement and this is doing okay.  Recent echo was reviewed.  Her anticoagulation is being restarted.  This is likely predisposing her to becoming anemic.    She did have some shoulder pain when she went and being anemic.  This resolved with transfusion.  Cardiac workup otherwise negative.    We reviewed her other issues noted in the assessment but not specifically addressed in the HPI above.     The 14-system review of systems form for [facility] was completed by patient, reviewed by me, and pertinent problems are reviewed above.  ______________________________________________________________________     Exam:    Wt Readings from Last 3 Encounters:   12/11/17 155 lb 9.6 oz (70.6 kg)   12/05/17 155 lb (70.3 kg)   11/28/17 159 lb (72.1 kg)     BP Readings from  Last 3 Encounters:   12/11/17 136/70   12/05/17 138/71   11/28/17 117/61      /70  Pulse 79  Wt 155 lb 9.6 oz (70.6 kg)  SpO2 98%  BMI 27.56 kg/m2  The patient is comfortable, no acute distress.  Mood good.  Insight is good.  No skin lesions or nodules of concern.  Ears clear.  Eyes are nonicteric.  Pupils equal and reactive.  Throat is clear.  Neck is supple without mass, no thyromegaly.  Carotids are clear.  No cervical or epitrochlear adenopathy.  Heart regular rate and rhythm.  Stable murmur.  Potter throughout.  Lungs clear to auscultation bilaterally.  Respiratory effort good.  Abdomen soft and nontender.  No hepatosplenomegaly.  Extremities show trace edema.     ______________________________________________________________________    Diagnostics:    Results for orders placed or performed in visit on 12/11/17   HM2(CBC w/o Differential)   Result Value Ref Range    WBC 3.8 (L) 4.0 - 11.0 thou/uL    RBC 3.72 (L) 3.80 - 5.40 mill/uL    Hemoglobin 10.7 (L) 12.0 - 16.0 g/dL    Hematocrit 32.1 (L) 35.0 - 47.0 %    MCV 86 80 - 100 fL    MCH 28.7 27.0 - 34.0 pg    MCHC 33.2 32.0 - 36.0 g/dL    RDW 16.2 (H) 11.0 - 14.5 %    Platelets 122 (L) 140 - 440 thou/uL    MPV 10.1 (H) 7.0 - 10.0 fL   Iron and Transferrin Iron Binding Capacity   Result Value Ref Range    Iron 69 42 - 175 ug/dL    Transferrin 298 212 - 360 mg/dL    Transferrin Saturation, Calculated 19 (L) 20 - 50 %    Transferrin IBC, Calculated 372 313 - 563 ug/dL   Ferritin   Result Value Ref Range    Ferritin 149 (H) 10 - 130 ng/mL   INR   Result Value Ref Range    INR 3.20 (H) 0.90 - 1.10      ______________________________________________________________________    Pertinent radiology for this visit includes the following:    Xr Chest 2 Views    Result Date: 11/28/2017  XR CHEST 2 VIEWS 11/28/2017 3:43 PM INDICATION: chest pain equivalent COMPARISON: 12/19/2016 FINDINGS: There is mild pulmonary vascular congestion with mild interstitial opacities.  The interstitial opacities are unchanged and may be chronic. The cardiac silhouette and pleural spaces appear normal. A prosthetic cardiac valve and atrial appendage clip are again noted with median sternotomy wires.      ______________________________________________________________________   ______________________________________________________________________     Assessment:    1. Anemia    2. Paroxysmal atrial fibrillation - treated during surgery in 2015    3. S/P MVR (mitral valve replacement) - 23 mm St. Sylvester mechanical valve - 2015    4. Hospital discharge follow-up    5. DM type 2 (diabetes mellitus, type 2)    6. Constipation    7. Back pain    8. Chronic pain       ______________________________________________________________________     PHQ-2 Total Score: 1 (7/6/2017  1:00 PM)  No Data Recorded      Plan:    1. Check blood work today.  2. Follow-up again in 3 weeks.  3. CT enterography reviewed.  4. Await PillCam results.  5. Continue to get anticoagulation in better control.  6. Continue current diabetic regimen.  No need for change.  7. Continue MiraLAX at this point.    Medication reconciliation was performed as a part of this visit as well.         Gabino Bach MD  General Internal Medicine  [facility]     Personal office fax - [phone]  Voice mail - [phone]  E-mail - [email or web address]    Return in about 3 weeks (around 1/1/2018).    Future Appointments  Date Time Provider Department Center   12/18/2017 11:50 AM [facility]   1/2/2018 3:55 PM [facility]

## 2021-06-25 NOTE — TELEPHONE ENCOUNTER
FYI - Status Update  Who is Calling: sonRiley  Update: calling to speak with ACN regarding patient INR, dosing recommendations. Can call back to him at [phone]. If you cannot reach him at that number, please call his wife at [phone].  Okay to leave a detailed message?:  Yes

## 2021-06-25 NOTE — PROGRESS NOTES
Progress Notes by Gabino Bach MD at 7/6/2017 12:50 PM     Author: Gabino Bach MD Service: -- Author Type: Physician    Filed: 7/8/2017  9:20 PM Encounter Date: 7/6/2017 Status: Signed    : Gabino Bach MD (Physician)              [facility]    Comprehensive and complex medical care - Chronic disease management - Shared decision making - Care coordination - Compassionate care    Patient advocacy - Rational deprescribing - Minimally disruptive medicine - Ethical focus - Customized care    Date of Service: 7/6/2017  Primary Provider: Gabino Bach MD    Patient Care Team:  Gabino Bach MD as PCP - General (Internal Medicine)  Ayanna Camacho MD as Physician (Cardiology)  Al García MD as Physician (Ophthalmology)  Bria Nicole RN as Registered Nurse     ______________________________________________________________________     Patient's Pharmacy:    [facility] [address] [place] [id number]  Phone: [phone] Fax: [phone]     Patient's Insurance:    Payor: MEDICA / Plan: MEDICA [employer] / Product Type: COST PLAN /     ______________________________________________________________________     Bernadette Yu is 76 y.o. female who comes in today for:    Chief Complaint   Patient presents with   ? Follow-up       Patient Active Problem List   Diagnosis   ? Hypothyroidism   ? HLD (hyperlipidemia)   ? Anxiety   ? Chronic pain - Dr. Bach manages the pain   ? CN (constipation)   ? Insomnia   ? Restless legs syndrome (RLS)   ? Lumbosacral plexopathy - chronic pain related to this   ? Full code status - POLST form 1/2/16   ? IBS (irritable bowel syndrome)   ? ASCVD (arteriosclerotic cardiovascular disease) - CABG 2015   ? Subclavian artery stenosis, left - s/p stent   ? Paroxysmal atrial fibrillation - treated during surgery in 2015   ? HTN  (hypertension)   ? Psoriasis   ? Former smoker   ? S/P MVR (mitral valve replacement) - 23 mm [employer] Sylvester mechanical valve - 2015   ? DM type 2 (diabetes mellitus, type 2)   ? Anticoagulation goal of INR 2.5 to 3.5   ? Abdominal bloating, chronic   ? Recurrent UTI (urinary tract infection)   ? MRSA (methicillin resistant staph aureus) culture positive   ? Proteinuria   ? Joint pain   ? Controlled substance agreement signed - 2/17      Current Outpatient Prescriptions   Medication Sig Note   ? cholecalciferol, vitamin D3, 5,000 unit Tab Take by mouth.    ? cyanocobalamin (VITAMIN B-12) 100 MCG tablet Take 100 mcg by mouth daily.    ? furosemide (LASIX) 40 MG tablet TAKE 1 TABLET BY MOUTH TWICE DAILY- START ON 10/21/16    ? generic lancets Use 1 each As Directed 3 (three) times a day. Dx E11.65 DM2, uncontrolled    ? [START ON 7/12/2017] HYDROmorphone (DILAUDID) 4 MG tablet Take 0.5-1 tablets (2-4 mg total) by mouth 4 (four) times a day as needed for pain. For use from 7/12/17 to 8/11/17.  RX 3/3    ? [START ON 9/10/2017] HYDROmorphone (DILAUDID) 4 MG tablet Take 0.5-1 tablets (2-4 mg total) by mouth 4 (four) times a day as needed for pain. For use from 9/10/17 to 10/10/17.  RX 2/2.    ? insulin aspart (NOVOLOG) 100 unit/mL injection Inject 40 Units under the skin 3 (three) times a day.    ? insulin NPH (NOVOLIN N) 100 unit/mL injection Inject 40 Units under the skin 3 (three) times a day.    ? insulin syringe-needle U-100 1/2 mL 29 Syrg USE AS DIRECTED UP TO SIX TIMES DAILY    ? levothyroxine (SYNTHROID, LEVOTHROID) 125 MCG tablet Take 1 tablet (125 mcg total) by mouth daily. 2/4/2017: Not at this time   ? LORazepam (ATIVAN) 0.5 MG tablet Take 1 tablet (0.5 mg total) by mouth 2 (two) times a day as needed for anxiety.    ? magnesium 30 mg tablet Take 30 mg by mouth 2 (two) times a day.    ? polyethylene glycol (MIRALAX) 17 gram packet Take 17 g by mouth 2 (two) times a day.  2/4/2017: stopped   ? silver sulfADIAZINE  (SILVADENE, SSD) 1 % cream Apply to toe daily    ? temazepam (RESTORIL) 15 mg capsule  6/30/2017: Received from: External Pharmacy   ? warfarin (COUMADIN) 2.5 MG tablet Take 1 tablet PO daily. INR daily. Next INR check 12/23/2016, dose adjustment depending upon INR result.    ? warfarin (COUMADIN) 5 MG tablet Take 7.5mg (1 and 1/2 tabs) on Tues, and 5mg (1 tab) on all other days.  OR AS DIRECTED.  Adjust dose based on INR.    ? [START ON 8/11/2017] HYDROmorphone (DILAUDID) 4 MG tablet Take 0.5-1 tablets (2-4 mg total) by mouth 4 (four) times a day as needed for pain. For use from 8/11/17 to 9/10/17.  RX 1/2.      Social History     Social History Narrative    Patient of Dr. Bach since 2001.  .        Former patient of Dr. Harshad Ballard.     ______________________________________________________________________     History of present illness:    Patient comes in today for follow-up of a number of issues.  Her  comes in with her.    We reviewed her diabetes.  This has been doing okay for her.  She does need refills of her medication.  She is going to be seeing the ophthalmologist soon.  She has had no recent issues with low sugars.  She has noticed her vision being a little bit worse.    She has a wart on her right hand finger.  Is quite irritated.  She hasn't really been able to get rid of it with over-the-counter remedies.  We discussed possibly freezing it today and she is willing to do so.    Reviewed her back pain and leg pain.  I reviewed her previous MRI with her.  It is possible that some of this is occurring due to back issues.  The prednisone did seem to help this, but she didn't want to continue on this.  We will continue to reassess this and agree and this gave the best treatment for this.  She says it's hard to get going in the morning and can take 5-10 minutes to get going.  Her  massages her back which helps.  She is using blue emu cream for this.  It goes down the back and then the  whole leg on both sides.    Reviewed her chronic pain in relationship to this.  She remains on the hydromorphone for this.  She will need medication set up for the future.    We reviewed her mitral valve replacement she is doing okay on the warfarin at this time.  There are no major issues here.    We reviewed her other issues noted in the assessment but not specifically addressed in the HPI above.     On review of systems, the patient denies any chest pain or shortness of breath.    ______________________________________________________________________    Exam:    Wt Readings from Last 3 Encounters:   07/06/17 166 lb 3.2 oz (75.4 kg)   05/18/17 163 lb (73.9 kg)   04/25/17 166 lb (75.3 kg)     BP Readings from Last 3 Encounters:   07/06/17 132/78   05/18/17 138/72   04/25/17 138/60     /78  Pulse 74  Temp 98.7  F (37.1  C) (Oral)   Wt 166 lb 3.2 oz (75.4 kg)  SpO2 96%  BMI 29.44 kg/m2    The patient is comfortable, no acute distress.  Mood good.  Insight fair to good.  Eyes are nonicteric.  Neck is supple without mass.  No cervical adenopathy.  No thyromegaly. Heart regular rate and rhythm.  Lungs clear to auscultation bilaterally.  Respiratory effort is good.  Abdomen soft and nontender.  No hepatosplenomegaly.  Extremities no edema.    There is an 8 mm wart on her finger.  It is irritated.    ______________________________________________________________________    Diagnostics:    Results for orders placed or performed in visit on 07/06/17   Glycosylated Hemoglobin A1c   Result Value Ref Range    Hemoglobin A1c 8.1 (H) 3.5 - 6.0 %   Basic Metabolic Panel   Result Value Ref Range    Sodium 141 136 - 145 mmol/L    Potassium 4.8 3.5 - 5.0 mmol/L    Chloride 104 98 - 107 mmol/L    CO2 23 22 - 31 mmol/L    Anion Gap, Calculation 14 5 - 18 mmol/L    Glucose 126 (H) 70 - 125 mg/dL    Calcium 9.3 8.5 - 10.5 mg/dL    BUN 16 8 - 28 mg/dL    Creatinine 0.88 0.60 - 1.10 mg/dL    GFR MDRD Af Amer >60 >60  mL/min/1.73m2    GFR MDRD Non Af Amer >60 >60 mL/min/1.73m2   INR   Result Value Ref Range    INR 2.50 (H) 0.90 - 1.10        ______________________________________________________________________    Recent Results (from the past 240 hour(s))   Glycosylated Hemoglobin A1c   Result Value Ref Range    Hemoglobin A1c 8.1 (H) 3.5 - 6.0 %   Basic Metabolic Panel   Result Value Ref Range    Sodium 141 136 - 145 mmol/L    Potassium 4.8 3.5 - 5.0 mmol/L    Chloride 104 98 - 107 mmol/L    CO2 23 22 - 31 mmol/L    Anion Gap, Calculation 14 5 - 18 mmol/L    Glucose 126 (H) 70 - 125 mg/dL    Calcium 9.3 8.5 - 10.5 mg/dL    BUN 16 8 - 28 mg/dL    Creatinine 0.88 0.60 - 1.10 mg/dL    GFR MDRD Af Amer >60 >60 mL/min/1.73m2    GFR MDRD Non Af Amer >60 >60 mL/min/1.73m2   INR   Result Value Ref Range    INR 2.50 (H) 0.90 - 1.10       ______________________________________________________________________     MR LUMBAR SPINE WO CONT7/20/2010  [facility]  Result Narrative   FINAL RESULT  [place], [place].  LIMITED MRI LUMBAR SPINE WITHOUT CONTRAST.    INDICATION: Back pain and right leg pain.  TECHNIQUE: Localizer and sagittal T2-weighted images were performed.  Patient refused to continue examination.  CONTRAST: None.  SEDATION: None.  COMPARISON: Plain films lumbar spine performed 7/16/2010.    FINDINGS: Examination assumes 5 lumbar type vertebral bodies with the  last full intervertebral disc space designated L5-S1. Alignment  unremarkable. Normal lumbar lordosis preserved. Visualized spinal  cord and cauda equina unremarkable. Conus terminates at the L1 level.    T12-L1: Intervertebral disc space height preserved. Loss of normal  disc T2 prolongation. Shallow posterior disc bulge indents the  ventral thecal sac but does not result in canal or foraminal  stenosis. Question mild facet arthropathy.    L1-L2: Moderate loss of intervertebral disc space height. Loss of  normal disc T2 prolongation. Annular  disc bulge partially effaces the  ventral thecal sac and caudal neural foramina without foraminal or  canal stenosis. Question mild facet arthropathy.    L2-L3: Intervertebral disc space height preserved. Mild loss of  normal disc T2 prolongation. Shallow posterior disc bulge with  shallow biforaminal disc protrusions results in slight effacement of  the caudal neural foramina without foraminal or canal stenosis.  Probable mild facet arthropathy.    L3-L4: Mild loss of intervertebral disc space height. Prominent  Schmorl node superior L4 endplate. Loss of normal disc T2  prolongation. Broad-based posterior disc bulge partially effaces the  caudal neural foramina and indents the ventral thecal sac without  clear canal or foraminal stenosis. At least mild facet arthropathy.    L4-L5: Intervertebral disc space height preserved. Loss of normal  disc T2 prolongation. Broad-based posterior disc bulge partially  effaces the caudal neural foramina and indents the ventral thecal  sac. In conjunction with moderate facet arthropathy there is at least  mild right and mild to moderate left neural foraminal narrowing. No  canal stenosis. There is a superimposed right paracentral disc  extrusion measuring approximately 1 x 0.6 cm in size (series 2, image  4).    L5-S1: Shallow intervertebral disc versus moderate to advanced loss  of intervertebral disc space height. Loss of normal disc T2  prolongation. Shallow broad-based posterior disc bulge in conjunction  with at least mild facet arthropathy and loss of height results in  moderate right greater left neural foraminal narrowing with mild  flattening of the exiting L5 nerve roots bilaterally. No canal  stenosis.    CONCLUSION:  1. Limited MRI lumbar spine consisting of localizer images and  sagittal T2-weighted images.  2. Posterior disc bulges are seen in each of the lumbar levels. These  partially indent the ventral thecal sac and efface the caudal neural  foramina. In  conjunction with facet arthropathy there is resultant at  least mild bilateral foraminal narrowing at L4-L5 and mild to  moderate foraminal narrowing at L5-S1. At L5-S1 there is slight  flattening of the exiting L5 nerve roots bilaterally.  3. Probable shallow disc extrusion seen laterally on the right at the  L4 level.    Report Electronically Signed by:  Erick Leyva MD Tue Jul 20 15:52:45  CDT 2010      PATRICIA/ELISEO/PATRICIA  D:07/20/201015:52  T:07/20/201015:52  S:07/20/1015:52  Page     ** CONFIDENTIAL - INTENDED FOR HEALTHCARE PROVIDER USE ONLY **       ______________________________________________________________________    Assessment:    1. Viral wart on finger    2. DM type 2 (diabetes mellitus, type 2)    3. S/P MVR (mitral valve replacement) - 23 mm [employer] mechanical valve - 2015    4. Chronic pain    5. HTN (hypertension)    6. Lumbosacral plexopathy - chronic pain related to this    7. Low back pain       ______________________________________________________________________     Lab Results   Component Value Date    LDLCALC 96 11/11/2015       PHQ-2 Total Score: 1 (7/6/2017  1:00 PM)  No Data Recorded      Plan:    1. Wart on the finger was shaved with a #15 blade and frozen with liquid nitrogen in a freeze thaw freeze technique.  2. She will follow-up with ophthalmology as scheduled.  3. Consider further evaluation of the back again with an MRI if continues to be an issue.  4. Continue current diabetic medication at this time.  5. Please follow up in 2 months, sooner if issues.        Gabino Bach MD  General Internal Medicine  [facility]     Personal office fax - [phone]  Voice mail - [phone]  E-mail - [email or web address]    Return in about 2 months (around 9/11/2017) for follow up visit.

## 2021-06-26 NOTE — PROGRESS NOTES
Progress Notes by Gabino Bach MD at 10/30/2018  1:00 PM     Author: Gabino Bach MD Service: -- Author Type: Physician    Filed: 10/31/2018 10:54 PM Encounter Date: 10/30/2018 Status: Signed    : Gabino Bach MD (Physician)              [facility]    Comprehensive and complex medical care - Chronic disease management - Shared decision making - Care coordination - Compassionate care    Patient advocacy - Rational deprescribing - Minimally disruptive medicine - Ethical focus - Customized care    ______________________________________________________________________     Date of Service: 10/30/2018  Primary Provider: Gabino Bach MD    Patient Care Team:  Gabino Bach MD as PCP - General (Internal Medicine)  Ayanna Camacho MD as Physician (Cardiology)  Al García MD as Physician (Ophthalmology)  Ishan Wade MD as Physician (Gastroenterology)  Mallory Grover RN as Registered Nurse  Gabino Bach MD (Internal Medicine)     ______________________________________________________________________     Patient's Pharmacy:    [facility] [address] [place] [address] AT [facility]  [address] [facility] [address]  Phone: [phone] Fax: [phone]     Patient's Contacts:  Name Home Phone Work Phone Mobile Phone Relationship Lgl Grd   ISHAN VALENTIN [phone]   Spouse    ANDREA VALENTIN [phone]   Child      Patient's Insurance:    Payor: MEDICA / Plan: [employer]ICA [employer] / Product Type: COST PLAN /     ______________________________________________________________________     Bernadette Valentin is 78 y.o. female who comes in today for:    Chief Complaint   Patient presents with   ? Diabetes     sugar always high in am 200 and 250s   ? Labs Only     INR   ? Follow-up     leg aches, bloating   ? Hand Pain     right hand   ? Fatigue       Patient Active Problem List   Diagnosis   ? Hypothyroidism  "  ? HLD (hyperlipidemia)   ? Anxiety   ? Chronic pain - Dr. Bach manages the pain   ? CN (constipation)   ? Insomnia   ? Restless legs syndrome (RLS)   ? Full code status - POLST form 1/2/16   ? IBS (irritable bowel syndrome)   ? ASCVD (arteriosclerotic cardiovascular disease) - CABG 2015   ? Subclavian artery stenosis, left (H) - s/p stent   ? Atrial fibrillation, Now in NSR   ? HTN (hypertension)   ? Psoriasis   ? Former smoker   ? S/P mitral valve replacement (MVR)   ? DM type 2 (diabetes mellitus, type 2) (H)   ? Anticoagulation goal of INR 2.5 to 3.5   ? Abdominal bloating, chronic   ? Recurrent UTI (urinary tract infection)   ? MRSA (methicillin resistant staph aureus) culture positive   ? Controlled substance agreement signed - 1/18 - temazepam, lorazepam, hydromorphone - UDS 4/18   ? Blood loss anemia   ? Bleeding diathesis (H) - due to warfarin   ? 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 Microalbuminuria due to type 2 diabetes mellitus (H)      Current Outpatient Prescriptions   Medication Sig Note   ? ACCU-CHEK SMARTVIEW TEST STRIP strips Use 1 each As Directed 3 (three) times a day. Dispense brand per patient's insurance at pharmacy discretion.  Dx E11.65 DM2, uncontrolled    ? ascorbic acid, vitamin C, (VITAMIN C) 100 MG tablet Take 100 mg by mouth daily.    ? BD ULTRA-FINE DAVID PEN NEEDLES 32 gauge x 5/32\" Ndle USE WITH NOVOLOG PEN AND LANTUS PENS    ? calcium carbonate (OS-DI) 600 mg calcium (1,500 mg) tablet Take 600 mg by mouth 2 (two) times a day with meals.    ? cholecalciferol, vitamin D3, 5,000 unit Tab Take 5,000 Units by mouth daily.    ? cyanocobalamin (VITAMIN B-12) 100 MCG tablet Take 100 mcg by mouth daily.    ? ferrous sulfate 325 (65 FE) MG tablet Take 1 tablet (325 mg total) by mouth every other day.    ? furosemide (LASIX) 40 MG tablet Take 40 mg by mouth daily.    ? generic lancets Use 1 each As Directed 3 (three) times a day. Dx E11.65 DM2, uncontrolled    ? " HYDROmorphone (DILAUDID) 4 MG tablet Take 0.5-1 tablets (2-4 mg total) by mouth 4 (four) times a day as needed for pain. For use from 10/12/2018 to 11/11/2018.  RX 3/3.    ? insulin aspart U-100 (NOVOLOG) 100 unit/mL injection Inject 10 Units under the skin 3 (three) times a day as needed. 4/17/2018: njects based on own sliding scale with each meal as needed based on sugars. Average of 10 units per dose   ? levothyroxine (SYNTHROID, LEVOTHROID) 125 MCG tablet Take 1 tablet (125 mcg total) by mouth daily.    ? magnesium 30 mg tablet Take 30 mg by mouth 2 (two) times a day.    ? NOVOLIN N NPH U-100 INSULIN 100 unit/mL injection ADMINISTER 40 UNITS UNDER THE SKIN THREE TIMES DAILY    ? polyethylene glycol (MIRALAX) 17 gram packet Take 1 packet (17 g total) by mouth daily as needed.    ? silver sulfADIAZINE (SILVADENE, SSD) 1 % cream Apply to affected area daily    ? temazepam (RESTORIL) 15 mg capsule Take 1 capsule (15 mg total) by mouth at bedtime as needed for sleep. May refill every 90 days only.    ? warfarin (COUMADIN) 5 MG tablet Take 5-7.5 mg by mouth See Admin Instructions. Take 7.5mg on Wednesdays and Saturdays. Take 5mg all other days of the week.    ? HYDROmorphone (DILAUDID) 4 MG tablet Take 0.5-1 tablets (2-4 mg total) by mouth 4 (four) times a day as needed for pain. For use from 8/13/2018 to 9/12/2018.  RX 1/3.    ? HYDROmorphone (DILAUDID) 4 MG tablet Take 0.5-1 tablets (2-4 mg total) by mouth 4 (four) times a day as needed for pain. For use from 9/12/2018 to 10/12/2018.  RX 2/3.      Social History     Social History Narrative    ** Merged History Encounter **         Patient of Dr. Bach since 2001.  .    Former patient of Dr. Harshad Ballard.     ______________________________________________________________________     History of present illness:    Patient comes in today with her  Aneudy.    We first reviewed her diabetes.  Her sugars have been running high in the morning between 200-250.   However, her A1c is in appropriate range for her age.  She has been on a unusual regimen which predated even my care.  She does not have issues with excessive globus.    We reviewed her back pain radiating down her legs.  She has had it for a while, but it has progressed in the last year.  She had a injection attempted through the pain clinic, but it really did not help her.  She also does not like having to go off of warfarin and onto Lovenox to bridge her for these procedures.  She would like to find other options where she would not have to do this.  She and I went through her options and she still would like to get another opinion on this and I will send her to the [facility] spine clinic to evaluate this further.  She has not noticed any worsening of weakness.    Reviewed her valve replacement and warfarin seems to be doing okay.  She does need blood work today.    She has been on a consistent chronic pain management for a while and there is been no need to increase this.  It actually has decreased from the past dosing when she was severe about 7-8 years ago.    She has issues with her thumb arthritis as well and we reviewed this as well.    On review of systems, the patient denies any chest pain or shortness of breath.    ______________________________________________________________________    Exam:    Wt Readings from Last 3 Encounters:   10/30/18 153 lb 4.8 oz (69.5 kg)   08/31/18 155 lb 8 oz (70.5 kg)   08/13/18 155 lb (70.3 kg)     BP Readings from Last 3 Encounters:   10/30/18 136/60   08/31/18 132/70   08/13/18 (!) 190/79     /60  Pulse 80  Wt 153 lb 4.8 oz (69.5 kg)  SpO2 96%  BMI 27.16 kg/m2    The patient is comfortable, no acute distress.  Mood good.  Insight good.  Eyes are nonicteric.  Neck is supple without mass.  No cervical adenopathy.  No thyromegaly. Heart regular rate and rhythm.  Lungs clear to auscultation bilaterally.  Respiratory effort is good.  Abdomen soft and nontender.   No hepatosplenomegaly.  Extremities no edema.  Her gait is a little stiff.    ______________________________________________________________________    Diagnostics:    Results for orders placed or performed in visit on 10/30/18   Glycosylated Hemoglobin A1c   Result Value Ref Range    Hemoglobin A1c 7.8 (H) 3.5 - 6.0 %   HM2(CBC w/o Differential)   Result Value Ref Range    WBC 5.9 4.0 - 11.0 thou/uL    RBC 4.23 3.80 - 5.40 mill/uL    Hemoglobin 12.8 12.0 - 16.0 g/dL    Hematocrit 36.7 35.0 - 47.0 %    MCV 87 80 - 100 fL    MCH 30.3 27.0 - 34.0 pg    MCHC 34.9 32.0 - 36.0 g/dL    RDW 12.3 11.0 - 14.5 %    Platelets 162 140 - 440 thou/uL    MPV 9.5 7.0 - 10.0 fL   Basic Metabolic Panel   Result Value Ref Range    Sodium 139 136 - 145 mmol/L    Potassium 4.2 3.5 - 5.0 mmol/L    Chloride 98 98 - 107 mmol/L    CO2 26 22 - 31 mmol/L    Anion Gap, Calculation 15 5 - 18 mmol/L    Glucose 155 (H) 70 - 125 mg/dL    Calcium 9.9 8.5 - 10.5 mg/dL    BUN 24 8 - 28 mg/dL    Creatinine 0.88 0.60 - 1.10 mg/dL    GFR MDRD Af Amer >60 >60 mL/min/1.73m2    GFR MDRD Non Af Amer >60 >60 mL/min/1.73m2   Sedimentation Rate   Result Value Ref Range    Sed Rate 42 (H) 0 - 20 mm/hr   C-Reactive Protein   Result Value Ref Range    CRP 0.4 0.0 - 0.8 mg/dL   Ferritin   Result Value Ref Range    Ferritin 40 10 - 130 ng/mL   Iron and Transferrin Iron Binding Capacity   Result Value Ref Range    Iron 50 42 - 175 ug/dL    Transferrin 278 212 - 360 mg/dL    Transferrin Saturation, Calculated 14 (L) 20 - 50 %    Transferrin IBC, Calculated 348 313 - 563 ug/dL   INR   Result Value Ref Range    INR 3.60 (H) 0.90 - 1.10      ______________________________________________________________________    Assessment:    1. DM type 2 (diabetes mellitus, type 2) (H)    2. Blood loss anemia    3. Need for influenza vaccination    4. Spinal stenosis of lumbar region, central    5. S/P mitral valve replacement (MVR)    6. Atrial fibrillation (H)    7.  Anticoagulation goal of INR 2.5 to 3.5    8. Diffuse pain    9. Arthritis of carpometacarpal (CMC) joint of both thumbs    10. Bleeding diathesis (H) - due to warfarin       ______________________________________________________________________     PHQ-2 Total Score: 0 (8/31/2018 11:00 AM)  No Data Recorded  ______________________________________________________________________       Plan:    1. Check blood work today.  See relevant orders and diagnosis associations at the bottom of this note.  2. Referral to the [facility] spine clinic for options.  They would like to avoid going off of warfarin for any procedure if possible.  3. Continue to follow her anemia closely.  Follow-up with me in January.  4. Continue current diabetic regimen.  5. Vaccines updated today.       Gabino Bach MD  General Internal Medicine  [facility]     Personal office fax - [phone]   Voice mail - [phone]  E-mail - [email or web address]      Return in about 3 months (around 1/30/2019) for visit and blood work.     Future Appointments  Date Time Provider Department Center   11/12/2018 9:40 AM [facility]   11/14/2018 1:20 PM Lucero Santos PA-C OPS SPINE SPN SPINE        ______________________________________________________________________    Relevant ICD-10 codes and order associations:      ICD-10-CM    1. DM type 2 (diabetes mellitus, type 2) (H) E11.9 Glycosylated Hemoglobin A1c     Basic Metabolic Panel   2. Blood loss anemia D50.0 ferrous sulfate 325 (65 FE) MG tablet     HM2(CBC w/o Differential)     Ferritin     Iron and Transferrin Iron Binding Capacity   3. Need for influenza vaccination Z23    4. Spinal stenosis of lumbar region, central M48.061 Ambulatory referral to Spine Care     Sedimentation Rate     C-Reactive Protein   5. S/P mitral valve replacement (MVR) Z95.2 INR   6. Atrial fibrillation (H) I48.91 INR   7. Anticoagulation goal of INR 2.5 to 3.5 Z51.81 INR    Z79.01     8. Diffuse pain R52    9. Arthritis of carpometacarpal (CMC) joint of both thumbs M18.11     M18.12    10. Bleeding diathesis (H) - due to warfarin D69.9

## 2021-06-26 NOTE — TELEPHONE ENCOUNTER
ANTICOAGULATION  MANAGEMENT    Assessment     Today's INR result of 3.2 is Therapeutic (goal INR of 2.5-3.5)        Warfarin taken as previously instructed    No new diet changes affecting INR    No new medication/supplements affecting INR    Continues to tolerate warfarin with no reported s/s of bleeding or thromboembolism     Previous INR was Supratherapeutic    Plan:     Left detailed message for Riley regarding INR result and instructed:      Warfarin Dosing Instructions:  Continue current warfarin dose 5 mg daily  (0 % change). Sent my chart with dosing as well    Instructed patient to follow up no later than: 2 weeks    Education provided: importance of therapeutic range, target INR goal and significance of current INR result, importance of following up for INR monitoring at instructed interval and importance of taking warfarin as instructed    Instructed to call the [facility] for any changes, questions or concerns. (#[phone])   ?   Deena Kang RN    Subjective/Objective:      Bernadette Yu, a 80 y.o. female is on warfarin. Bernadette Fleming reports:     Home warfarin dose: as updated on anticoagulation calendar per template     Missed doses: No     Medication changes:  No     S/S of bleeding or thromboembolism:  No     New Injury or illness:  No     Changes in diet or alcohol consumption:  No     Upcoming surgery, procedure or cardioversion:  No    Anticoagulation Episode Summary     Current INR goal:  2.5-3.5   TTR:  27.1 % (1 y)   Next INR check:  7/2/2021   INR from last check:  3.20 (6/18/2021)   Weekly max warfarin dose:     Target end date:     INR check location:     Preferred lab:     Send INR reminders to:  PABLO MITCHELL    Indications    S/P mitral valve replacement (MVR) - mechanical mitral valve placed 2015 [Z95.2]           Comments:           Anticoagulation Care Providers     Provider Role Specialty Phone number    Gabino Bach MD Referring Internal Medicine  [phone]

## 2021-06-26 NOTE — PROGRESS NOTES
Progress Notes by Gabino Bach MD at 4/6/2018 10:55 AM     Author: Gabino Bach MD Service: -- Author Type: Physician    Filed: 4/8/2018  8:35 PM Encounter Date: 4/6/2018 Status: Signed    : Gabino Bach MD (Physician)              [facility]    Comprehensive and complex medical care - Chronic disease management - Shared decision making - Care coordination - Compassionate care    Patient advocacy - Rational deprescribing - Minimally disruptive medicine - Ethical focus - Customized care    ______________________________________________________________________     Date of Service: 4/6/2018  Primary Provider: Gabino Bach MD    Patient Care Team:  Gabino Bach MD as PCP - General (Internal Medicine)  Ayanna Camacho MD as Physician (Cardiology)  Al García MD as Physician (Ophthalmology)  Ishan Wade MD as Physician (Gastroenterology)     ______________________________________________________________________     Patient's Pharmacy:    [facility] [address] [place] [address] [place] [address] [address]  Phone: [phone] Fax: [phone]     Patient's Contacts:  Name Home Phone Work Phone Mobile Phone Relationship Lgl Grd   ISHAN VALENTIN [phone]   Spouse    ANDREA VALENTIN [phone]   Child      Patient's Insurance:    Payor: MEDICA / Plan: MEDICA [employer] / Product Type: COST PLAN /     ______________________________________________________________________     Bernadette Valentin is 77 y.o. female who comes in today for:    Chief Complaint   Patient presents with   ? Medication Refill     virtussin   ? Follow-up     arthritis has been very bad in back and down side, sugar has been running higher   ? Results     colonoscopy   ? Medication Problem     does she need to take levothyroxine she lost it and has not been taking it, and she also read that it makes you shorter  "      Patient Active Problem List   Diagnosis   ? Hypothyroidism   ? HLD (hyperlipidemia)   ? Anxiety   ? Chronic pain - Dr. Bach manages the pain   ? CN (constipation)   ? Insomnia   ? Restless legs syndrome (RLS)   ? Lumbosacral plexopathy - chronic pain related to this   ? Full code status - POLST form 1/2/16   ? IBS (irritable bowel syndrome)   ? ASCVD (arteriosclerotic cardiovascular disease) - CABG 2015   ? Subclavian artery stenosis, left - s/p stent   ? Atrial fibrillation, Now in NSR   ? HTN (hypertension)   ? Psoriasis   ? Former smoker   ? S/P mitral valve replacement   ? DM type 2 (diabetes mellitus, type 2)   ? Anticoagulation goal of INR 2.5 to 3.5   ? Abdominal bloating, chronic   ? Recurrent UTI (urinary tract infection)   ? MRSA (methicillin resistant staph aureus) culture positive   ? Proteinuria   ? Controlled substance agreement signed - 1/18 - temazepam, lorazepam, hydromorphone - UDS 4/17   ? Blood loss anemia     Current Outpatient Prescriptions   Medication Sig Note   ? ACCU-CHEK SMARTVIEW TEST STRIP strips TEST TID    ? BD ULTRA-FINE DAVID PEN NEEDLES 32 gauge x 5/32\" Ndle USE WITH NOVOLOG PEN AND LANTUS PENS 12/10/2017: Received from: External Pharmacy   ? cholecalciferol, vitamin D3, 5,000 unit Tab Take 5,000 Units by mouth daily.     ? cyanocobalamin (VITAMIN B-12) 100 MCG tablet Take 100 mcg by mouth daily.    ? furosemide (LASIX) 40 MG tablet Take 40 mg by mouth daily.    ? generic lancets Use 1 each As Directed 3 (three) times a day. Dx E11.65 DM2, uncontrolled    ? [START ON 4/10/2018] HYDROmorphone (DILAUDID) 4 MG tablet Take 0.5-1 tablets (2-4 mg total) by mouth 4 (four) times a day as needed for pain. For use from 4/10/2018 to 5/10/2018.  RX 1/2.    ? [START ON 5/10/2018] HYDROmorphone (DILAUDID) 4 MG tablet Take 0.5-1 tablets (2-4 mg total) by mouth 4 (four) times a day as needed for pain. For use from 5/10/2018 to 6/9/2018.  RX 2/2.    ? levothyroxine (SYNTHROID, LEVOTHROID) 125 " MCG tablet Take 1 tablet (125 mcg total) by mouth daily.    ? magnesium 30 mg tablet Take 30 mg by mouth 2 (two) times a day.    ? NOVOLIN N 100 unit/mL injection ADMINISTER 40 UNITS UNDER THE SKIN THREE TIMES DAILY    ? NOVOLOG 100 unit/mL injection INJECT 40 UNITS SUBCUTANEOUSLY TID    ? polyethylene glycol (MIRALAX) 17 gram packet Take 1 packet (17 g total) by mouth daily as needed.    ? temazepam (RESTORIL) 15 mg capsule Take 1 capsule (15 mg total) by mouth at bedtime as needed.    ? warfarin (COUMADIN) 5 MG tablet Take 1-1.5 tablets (5-7.5 mg total) by mouth See Admin Instructions. Takes 5mg on MWF, 7.5mg all other days.  Goal INR 2.5-3.5.    ? codeine-guaiFENesin (CODEINE-GUAIFENESIN)  mg/5 mL liquid Take 5-10 mL by mouth every 4 (four) hours as needed for cough.      Social History     Social History Narrative    Patient of Dr. Bach since 2001.  .        Former patient of Dr. Harshad Ballard.     ______________________________________________________________________     History of present illness:    Patient comes in today with her .    We first reviewed her back pain.  She continues to have problems with back pain radiating down her legs.  Her previous MRI in 2010 was reviewed with her again.  We talked about possibly doing another scan with discussed what we would do if something was found to have normal.  She is not really sure she wants to do anything additional at this point in relationship to this.  This was reviewed with her today.    We reviewed her diabetes.  She has had some recent high sugars, especially in the morning.  She does not necessarily want to change anything at this time related to this.  She knows how to get on top of her diabetes.  Working on this at this point.    We reviewed her mitral valve replacement.  Her anticoagulation is going well for her.  She has no major concerns about it.    We reviewed her chronic pain.  She does need a refill of her medications.  This  has generally been doing well for her.    We reviewed her recent colonoscopy.  She has had some evidence of iron and blood loss anemia.  She was found to have colon polyps.  The results of these were reviewed.  She is not really sure she wants to repeat a colonoscopy.  This was reviewed with her as well.    We reviewed her other issues noted in the assessment but not specifically addressed in the HPI above.     On review of systems, the patient denies any chest pain or shortness of breath.    ______________________________________________________________________    Exam:    Wt Readings from Last 3 Encounters:   04/06/18 161 lb (73 kg)   02/26/18 158 lb 6.4 oz (71.8 kg)   01/30/18 157 lb 6.4 oz (71.4 kg)     BP Readings from Last 3 Encounters:   04/06/18 138/68   03/13/18 140/56   02/26/18 138/76     /68  Pulse 76  Wt 161 lb (73 kg)  SpO2 98%  BMI 30.93 kg/m2   The patient is comfortable, no acute distress.  Mood good.  Insight good.  Eyes are nonicteric.  Neck is supple without mass.  No cervical adenopathy.  No thyromegaly. Heart regular rate and rhythm.  Lungs clear to auscultation bilaterally.  Respiratory effort is good.  Abdomen soft and nontender.  No hepatosplenomegaly.  Extremities trace edema.      ______________________________________________________________________    Diagnostics:    Results for orders placed or performed in visit on 04/06/18   Glycosylated Hemoglobin A1c   Result Value Ref Range    Hemoglobin A1c 8.2 (H) 3.5 - 6.0 %   Drug Abuse 1+, Urine   Result Value Ref Range    Amphetamines Screen Negative Screen Negative    Benzodiazepines Screen Negative Screen Negative    Opiates (!) Screen Negative     Screen Positive (Confirmation available on request)    Phencyclidine Screen Negative Screen Negative    THC Screen Negative Screen Negative    Barbiturates Screen Negative Screen Negative    Cocaine Metabolite Screen Negative Screen Negative    Methadone Screen Negative Screen Negative     Oxycodone Screen Negative Screen Negative    Creatinine, Urine 11.7 mg/dL   HM2(CBC w/o Differential)   Result Value Ref Range    WBC 5.1 4.0 - 11.0 thou/uL    RBC 3.60 (L) 3.80 - 5.40 mill/uL    Hemoglobin 10.3 (L) 12.0 - 16.0 g/dL    Hematocrit 30.9 (L) 35.0 - 47.0 %    MCV 86 80 - 100 fL    MCH 28.6 27.0 - 34.0 pg    MCHC 33.4 32.0 - 36.0 g/dL    RDW 15.4 (H) 11.0 - 14.5 %    Platelets 203 140 - 440 thou/uL    MPV 9.2 7.0 - 10.0 fL   Ferritin   Result Value Ref Range    Ferritin 19 10 - 130 ng/mL   Iron and Transferrin Iron Binding Capacity   Result Value Ref Range    Iron 59 42 - 175 ug/dL    Transferrin 325 212 - 360 mg/dL    Transferrin Saturation, Calculated 15 (L) 20 - 50 %    Transferrin IBC, Calculated 406 313 - 563 ug/dL      ______________________________________________________________________    Assessment:    1. DM type 2 (diabetes mellitus, type 2)    2. Anemia    3. Chronic pain    4. Blood loss anemia    5. Recurrent UTI (urinary tract infection)    6. ASCVD (arteriosclerotic cardiovascular disease) - CABG 2015    7. Atrial fibrillation    8. S/P mitral valve replacement    9. Lumbosacral plexopathy - chronic pain related to this    10. Back pain    11. Colon polyps       ______________________________________________________________________      PHQ-2 Total Score: 1 (1/4/2018 12:00 PM)  No Data Recorded  ______________________________________________________________________    Plan:    1. Continue current medications as is  2. Refilled pain medications.  3. Review UDS/DOA screen at next visit if appropriate.  4. The patient has some mild proteinuria we could consider a low dose of losartan in the future.  The patient's goal, however is to keep her medications at a reasonable amount.  5. I will set up standing orders for her hemoglobin checks.  We will likely check every 2 months.    Gabino Bach MD  General Internal Medicine  [facility]     Return in about 3 months  (around 7/6/2018).     Future Appointments  Date Time Provider Department Center   4/12/2018 11:50 AM [facility]

## 2021-06-26 NOTE — PROGRESS NOTES
Progress Notes by Gabino Bach MD at 8/31/2018 10:55 AM     Author: Gabino Bach MD Service: -- Author Type: Physician    Filed: 9/3/2018  9:06 PM Encounter Date: 8/31/2018 Status: Signed    : Gabino Bach MD (Physician)              [facility]    Comprehensive and complex medical care - Chronic disease management - Shared decision making - Care coordination - Compassionate care    Patient advocacy - Rational deprescribing - Minimally disruptive medicine - Ethical focus - Customized care    ______________________________________________________________________     Date of Service: 8/31/2018  Primary Provider: Gabino Bach MD    Patient Care Team:  Gabino Bach MD as PCP - General (Internal Medicine)  Ayanna Camacho MD as Physician (Cardiology)  Al García MD as Physician (Ophthalmology)  Ishan Wade MD as Physician (Gastroenterology)  Mallory Grover RN as Registered Nurse  Gabino Bach MD (Internal Medicine)     ______________________________________________________________________     Patient's Pharmacy:    [facility] [address] [place] [address] AT [facility]  [address] [facility] [address]  Phone: [phone] Fax: [phone]     Patient's Contacts:  Name Home Phone Work Phone Mobile Phone Relationship Lgl Grd   ISHAN VALENTIN [phone]   Spouse    ANDREA VALENTIN [phone]   Child      Patient's Insurance:    Payor: MEDICA / Plan: MEDICA [employer] / Product Type: COST PLAN /     ______________________________________________________________________     Bernadette Valentin is 77 y.o. female who comes in today for:    Chief Complaint   Patient presents with   ? Follow-up     BACK PAIN, DM     ? medication question     Neurontin, Silvadine cream       Patient Active Problem List   Diagnosis   ? Hypothyroidism   ? HLD (hyperlipidemia)   ? Anxiety   ? Chronic pain - Dr. Bach  "manages the pain   ? CN (constipation)   ? Insomnia   ? Restless legs syndrome (RLS)   ? Full code status - POLST form 1/2/16   ? IBS (irritable bowel syndrome)   ? ASCVD (arteriosclerotic cardiovascular disease) - CABG 2015   ? Subclavian artery stenosis, left (H) - s/p stent   ? Atrial fibrillation, Now in NSR   ? HTN (hypertension)   ? Psoriasis   ? Former smoker   ? S/P mitral valve replacement (MVR)   ? DM type 2 (diabetes mellitus, type 2) (H)   ? Anticoagulation goal of INR 2.5 to 3.5   ? Abdominal bloating, chronic   ? Recurrent UTI (urinary tract infection)   ? MRSA (methicillin resistant staph aureus) culture positive   ? Controlled substance agreement signed - 1/18 - temazepam, lorazepam, hydromorphone - UDS 4/18   ? Blood loss anemia   ? Bleeding diathesis (H) - due to warfarin   ? 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 Microalbuminuria due to type 2 diabetes mellitus (H)      Current Outpatient Prescriptions   Medication Sig Note   ? ACCU-CHEK SMARTVIEW TEST STRIP strips TEST TID    ? ascorbic acid, vitamin C, (VITAMIN C) 100 MG tablet Take 100 mg by mouth daily.    ? BD ULTRA-FINE DAVID PEN NEEDLES 32 gauge x 5/32\" Ndle USE WITH NOVOLOG PEN AND LANTUS PENS    ? calcium carbonate (OS-DI) 600 mg calcium (1,500 mg) tablet Take 600 mg by mouth 2 (two) times a day with meals.    ? cholecalciferol, vitamin D3, 5,000 unit Tab Take 5,000 Units by mouth daily.    ? cyanocobalamin (VITAMIN B-12) 100 MCG tablet Take 100 mcg by mouth daily.    ? ferrous sulfate 325 (65 FE) MG tablet Take 1 tablet (325 mg total) by mouth every other day.    ? furosemide (LASIX) 40 MG tablet Take 40 mg by mouth daily.    ? gabapentin (NEURONTIN) 100 MG capsule Take 100 mg by mouth 3 (three) times a day.    ? generic lancets Use 1 each As Directed 3 (three) times a day. Dx E11.65 DM2, uncontrolled    ? HYDROmorphone (DILAUDID) 4 MG tablet Take 0.5-1 tablets (2-4 mg total) by mouth 4 (four) times a day as " needed for pain. For use from 8/13/2018 to 9/12/2018.  RX 1/3.    ? [START ON 9/12/2018] HYDROmorphone (DILAUDID) 4 MG tablet Take 0.5-1 tablets (2-4 mg total) by mouth 4 (four) times a day as needed for pain. For use from 9/12/2018 to 10/12/2018.  RX 2/3.    ? levothyroxine (SYNTHROID, LEVOTHROID) 125 MCG tablet Take 1 tablet (125 mcg total) by mouth daily.    ? magnesium 30 mg tablet Take 30 mg by mouth 2 (two) times a day.    ? NOVOLIN N NPH U-100 INSULIN 100 unit/mL injection ADMINISTER 40 UNITS UNDER THE SKIN THREE TIMES DAILY    ? omeprazole (PRILOSEC) 20 MG capsule Take 1 capsule (20 mg total) by mouth daily.    ? polyethylene glycol (MIRALAX) 17 gram packet Take 1 packet (17 g total) by mouth daily as needed.    ? temazepam (RESTORIL) 15 mg capsule Take 1 capsule (15 mg total) by mouth at bedtime as needed for sleep. May refill every 90 days only.    ? warfarin (COUMADIN) 5 MG tablet Take 5-7.5 mg by mouth See Admin Instructions. Take 7.5mg on Wednesdays and Saturdays. Take 5mg all other days of the week.    ? [START ON 10/12/2018] HYDROmorphone (DILAUDID) 4 MG tablet Take 0.5-1 tablets (2-4 mg total) by mouth 4 (four) times a day as needed for pain. For use from 10/12/2018 to 11/11/2018.  RX 3/3.    ? insulin aspart U-100 (NOVOLOG) 100 unit/mL injection Inject 10 Units under the skin 3 (three) times a day as needed. 4/17/2018: njects based on own sliding scale with each meal as needed based on sugars. Average of 10 units per dose   ? silver sulfADIAZINE (SILVADENE, SSD) 1 % cream Apply to affected area daily      Social History     Social History Narrative    ** Merged History Encounter **         Patient of Dr. Bach since 2001.  .    Former patient of Dr. Harshad Ballard.     ______________________________________________________________________     History of present illness:    Patient comes in today with her .    We first reviewed her back pain.  She has been diagnosed with spinal stenosis  and foraminal stenosis.  She continues to have issues with this but they are better since the injection.  However, she does not want to continue on injections at this point.  She is going to cancel her pain clinic appointment at this time.  She can take advantage of it in the future if needed.  She did see some help the procedure.  She feels the gabapentin has helped her somewhat.  She does need refills of her pain medication.    We reviewed her anemia going to follow-up on this at this time.    We reviewed her wart on her finger and this has healed up well for her.    She has had some extra bruising on her abdomen.  She is really getting tired of doing bridging therapy for procedures.  She would like to avoid doing another procedure at this time.    Her valve seems to be doing well without any issue.  Her breathing is good.    She has pain in her CMC joints of her hands.  This was reviewed with her.    We reviewed her diabetes and her diabetes has been running in the 200s.    We reviewed her other issues noted in the assessment but not specifically addressed in the HPI above.     On review of systems, the patient denies any chest pain or shortness of breath.    ______________________________________________________________________    Exam:    Wt Readings from Last 3 Encounters:   08/31/18 155 lb 8 oz (70.5 kg)   08/13/18 155 lb (70.3 kg)   08/01/18 155 lb (70.3 kg)     BP Readings from Last 3 Encounters:   08/31/18 132/70   08/13/18 (!) 190/79   08/01/18 161/68     /70 (Patient Site: Right Arm, Patient Position: Sitting)  Pulse 64  Wt 155 lb 8 oz (70.5 kg)  SpO2 96%  BMI 27.55 kg/m2    The patient is comfortable, no acute distress.  Mood good.  Insight good.  Eyes are nonicteric.  Neck is supple without mass.  No cervical adenopathy.  No thyromegaly. Heart regular rate and rhythm.  Lungs clear to auscultation bilaterally.  Respiratory effort is good.  Abdomen soft and nontender.  No hepatosplenomegaly.   "Extremities no edema.      ______________________________________________________________________    Diagnostics:    Results for orders placed or performed in visit on 08/31/18   Hemoglobin   Result Value Ref Range    Hemoglobin 11.6 (L) 12.0 - 16.0 g/dL   Basic Metabolic Panel   Result Value Ref Range    Sodium 142 136 - 145 mmol/L    Potassium 3.9 3.5 - 5.0 mmol/L    Chloride 107 98 - 107 mmol/L    CO2 24 22 - 31 mmol/L    Anion Gap, Calculation 11 5 - 18 mmol/L    Glucose 83 70 - 125 mg/dL    Calcium 9.2 8.5 - 10.5 mg/dL    BUN 22 8 - 28 mg/dL    Creatinine 0.82 0.60 - 1.10 mg/dL    GFR MDRD Af Amer >60 >60 mL/min/1.73m2    GFR MDRD Non Af Amer >60 >60 mL/min/1.73m2   Thyroid Stimulating Hormone (TSH)   Result Value Ref Range    TSH 1.28 0.30 - 5.00 uIU/mL   Glycosylated Hemoglobin A1c   Result Value Ref Range    Hemoglobin A1c 8.5 (H) 3.5 - 6.0 %   INR   Result Value Ref Range    INR 2.80 (H) 0.90 - 1.10      ______________________________________________________________________    Assessment:    1. ASCVD (arteriosclerotic cardiovascular disease) - CABG 2015    2. Type 2 diabetes mellitus with microalbuminuria, with long-term current use of insulin    3. Hypothyroidism    4. HTN (hypertension)    5. S/P mitral valve replacement (MVR)    6. Atrial fibrillation (H)    7. Anticoagulation goal of INR 2.5 to 3.5    8. Subclavian artery stenosis, left (H)    9. Bleeding diathesis (H)    10. Spinal stenosis of lumbar region, central    11. Multilevel neural foraminal stenosis    12. Low back pain    13. Osteoarthritis of carpometacarpal (CMC) joint of both thumbs       ______________________________________________________________________     PHQ-2 Total Score: 0 (8/31/2018 11:00 AM)  No Data Recorded    Estimated body mass index is 27.55 kg/(m^2) as calculated from the following:    Height as of 8/13/18: 5' 3\" (1.6 m).    Weight as of this encounter: 155 lb 8 oz (70.5 kg). "     ______________________________________________________________________       Plan:    1. Refill pain medications at this time for 3 months.  2. Blood work checked today.  3. Continue close monitoring of her blood work.  4. Follow-up at least every 2-3 months.  5. Follow-up sooner if issues.       Gabino Bach MD  General Internal Medicine  [facility]     Personal office fax - [phone]   Voice mail - [phone]  E-mail - [email or web address]      Return in about 3 months (around 11/30/2018) for visit and blood work.     Future Appointments  Date Time Provider Department Center   9/4/2018 2:00 PM Kevin Pierson MD [facility]   9/10/2018 11:20 AM Kevin Pierson MD [facility]        ______________________________________________________________________    Relevant ICD-10 codes and order associations:      ICD-10-CM    1. ASCVD (arteriosclerotic cardiovascular disease) - CABG 2015 I25.10 Hemoglobin     Basic Metabolic Panel   2. Type 2 diabetes mellitus with microalbuminuria, with long-term current use of insulin E11.29 Glycosylated Hemoglobin A1c    R80.9     Z79.4    3. Hypothyroidism E03.9 Thyroid Stimulating Hormone (TSH)   4. HTN (hypertension) I10 Basic Metabolic Panel   5. S/P mitral valve replacement (MVR) Z95.2 INR   6. Atrial fibrillation (H) I48.91 INR   7. Anticoagulation goal of INR 2.5 to 3.5 Z51.81 INR    Z79.01    8. Subclavian artery stenosis, left (H) I77.1    9. Bleeding diathesis (H) D69.9    10. Spinal stenosis of lumbar region, central M48.061    11. Multilevel neural foraminal stenosis M43.8X9    12. Low back pain M54.5    13. Osteoarthritis of carpometacarpal (CMC) joint of both thumbs M18.0

## 2021-06-26 NOTE — TELEPHONE ENCOUNTER
Refill Request  Did you contact pharmacy: No  Requested Prescriptions     Pending Prescriptions Disp Refills     HYDROmorphone (DILAUDID) 4 MG tablet 120 tablet 0     Sig: Take 0.5-1 tablets (2-4 mg total) by mouth 4 (four) times a day as needed for pain. May refill every 30 days only.     Who prescribed the medication: Dr. Bach  Requested Pharmacy: [facility]  Is patient out of medication: No.  2 days left  Patient notified refills processed in 3 business days:  yes  Okay to leave a detailed message: yes

## 2021-06-26 NOTE — PROGRESS NOTES
Progress Notes by Gabino Bach MD at 7/17/2018 11:20 AM     Author: Gabino Bach MD Service: -- Author Type: Physician    Filed: 7/19/2018  8:53 AM Encounter Date: 7/17/2018 Status: Signed    : Gabino Bach MD (Physician)              [facility]    Comprehensive and complex medical care - Chronic disease management - Shared decision making - Care coordination - Compassionate care    Patient advocacy - Rational deprescribing - Minimally disruptive medicine - Ethical focus - Customized care    ______________________________________________________________________     Date of Service: 7/17/2018  Primary Provider: Gabino Bach MD    Patient Care Team:  Gabino Bach MD as PCP - General (Internal Medicine)  Ayanna Camacho MD as Physician (Cardiology)  Al García MD as Physician (Ophthalmology)  Ishan Wade MD as Physician (Gastroenterology)  Mallory Grover RN as Registered Nurse  Gabino Bach MD (Internal Medicine)     ______________________________________________________________________     Patient's Pharmacy:    [facility] [address] [place] [address] AT [facility]  [address] [facility] [address]  Phone: [phone] Fax: [phone]     Patient's Contacts:  Name Home Phone Work Phone Mobile Phone Relationship Lgl Grd   ISHAN VALENTIN [phone]   Spouse    ANDREA VALENTIN [phone]   Child      Patient's Insurance:    Payor: MEDICA / Plan: MEDICA [employer]UTIONS / Product Type: COST PLAN /     ______________________________________________________________________     Bernadette Valentin is 77 y.o. female who comes in today for:    Chief Complaint   Patient presents with   ? Follow-up     is in a lot of pain       Patient Active Problem List   Diagnosis   ? Hypothyroidism   ? HLD (hyperlipidemia)   ? Anxiety   ? Chronic pain - Dr. Bach manages the pain   ? CN (constipation)   ? Insomnia  "  ? Restless legs syndrome (RLS)   ? Full code status - POLST form 1/2/16   ? IBS (irritable bowel syndrome)   ? ASCVD (arteriosclerotic cardiovascular disease) - CABG 2015   ? Subclavian artery stenosis, left - s/p stent   ? Atrial fibrillation, Now in NSR   ? HTN (hypertension)   ? Psoriasis   ? Former smoker   ? S/P mitral valve replacement (MVR)   ? DM type 2 (diabetes mellitus, type 2) (H)   ? Anticoagulation goal of INR 2.5 to 3.5   ? Abdominal bloating, chronic   ? Recurrent UTI (urinary tract infection)   ? MRSA (methicillin resistant staph aureus) culture positive   ? Proteinuria   ? Controlled substance agreement signed - 1/18 - temazepam, lorazepam, hydromorphone - UDS 4/18   ? Blood loss anemia      Current Outpatient Prescriptions   Medication Sig Note   ? ACCU-CHEK SMARTVIEW TEST STRIP strips TEST TID    ? BD ULTRA-FINE DAVID PEN NEEDLES 32 gauge x 5/32\" Ndle USE WITH NOVOLOG PEN AND LANTUS PENS    ? cholecalciferol, vitamin D3, 5,000 unit Tab Take 5,000 Units by mouth daily.    ? codeine-guaiFENesin (GUAIFENESIN AC)  mg/5 mL liquid Take 5 mL by mouth 4 (four) times a day as needed for cough or congestion.    ? cyanocobalamin (VITAMIN B-12) 100 MCG tablet Take 100 mcg by mouth daily.    ? ferrous sulfate 325 (65 FE) MG tablet Take 1 tablet (325 mg total) by mouth every other day.    ? furosemide (LASIX) 40 MG tablet Take 40 mg by mouth daily.    ? generic lancets Use 1 each As Directed 3 (three) times a day. Dx E11.65 DM2, uncontrolled    ? HYDROmorphone (DILAUDID) 4 MG tablet Take 0.5-1 tablets (2-4 mg total) by mouth 4 (four) times a day as needed for pain. For use from 7/9/2018 to 8/8/2018.  RX 2/2.    ? HYDROmorphone (DILAUDID) 4 MG tablet Take 2-4 mg by mouth 4 (four) times a day as needed.     ? insulin aspart U-100 (NOVOLOG) 100 unit/mL injection Inject 10 Units under the skin 3 (three) times a day as needed. 4/17/2018: njects based on own sliding scale with each meal as needed based on " sugars. Average of 10 units per dose   ? insulin NPH (NOVOLIN) 100 unit/mL injection Inject 15 Units under the skin 3 (three) times a day as needed. 4/17/2018: Injects based on own sliding scale with each meal as needed based on sugars. Average of 15 units per dose   ? levothyroxine (SYNTHROID, LEVOTHROID) 125 MCG tablet Take 1 tablet (125 mcg total) by mouth daily.    ? magnesium 30 mg tablet Take 30 mg by mouth 2 (two) times a day.    ? NOVOLIN N NPH U-100 INSULIN 100 unit/mL injection ADMINISTER 40 UNITS UNDER THE SKIN THREE TIMES DAILY    ? omeprazole (PRILOSEC) 20 MG capsule Take 1 capsule (20 mg total) by mouth daily.    ? polyethylene glycol (MIRALAX) 17 gram packet Take 1 packet (17 g total) by mouth daily as needed.    ? temazepam (RESTORIL) 15 mg capsule Take 1 capsule (15 mg total) by mouth at bedtime as needed.    ? warfarin (COUMADIN) 5 MG tablet Take 5-7.5 mg by mouth See Admin Instructions. Take 7.5mg on Wednesdays and Saturdays. Take 5mg all other days of the week.    ? HYDROmorphone (DILAUDID) 4 MG tablet Take 0.5-1 tablets (2-4 mg total) by mouth 4 (four) times a day as needed for pain. For use from 6/9/2018 to 7/9/2018.  RX 1/2.      Social History     Social History Narrative    ** Merged History Encounter **         Patient of Dr. Bach since 2001.  .    Former patient of Dr. Harshad Ballard.     ______________________________________________________________________     History of present illness:    Patient comes in today with her .    We mainly spent most of our time today reviewing her back pain.  This has been continuing to be a problem for her and continues to be so.  She notes the pain in both legs.  She is really had symptoms with this prior.  This was thought to be in part related to lumbosacral plexopathy but likely was also mainly due to some foraminal stenosis and spinal stenosis even back in 2011.  She did have an injection through the spine clinic which did not really  help her and may have made it worse for her.  We talked about possible surgical consultation.  She would like to avoid this if at all possible.  She would like to go to a different interventional pain specialist and see if there is anything else that can be done with this.    We reviewed a wart on her index finger on the right side.  She had this remotely.  She would like to have this treated.  It is on the pad of the finger and hurts at times.    Reviewed her anemia and will run a follow-up on this.  It generally has been doing well.    Reviewed her valve replacement and her valve replacement is doing well.  Her Coumadin needs to be checked today.    Her diabetes is stable at this time.    On review of systems, the patient denies any chest pain or shortness of breath.    ______________________________________________________________________    Exam:    Wt Readings from Last 3 Encounters:   07/17/18 155 lb (70.3 kg)   05/29/18 153 lb 8 oz (69.6 kg)   05/23/18 153 lb (69.4 kg)     BP Readings from Last 3 Encounters:   07/17/18 130/78   05/31/18 152/66   05/29/18 124/68     /78  Pulse 69  Temp 97.9  F (36.6  C) (Oral)   Wt 155 lb (70.3 kg)  SpO2 98%  BMI 27.46 kg/m2    The patient is comfortable, no acute distress.  Mood good.  Insight good.  Eyes are nonicteric.  Neck is supple without mass.  No cervical adenopathy.  No thyromegaly. Heart regular rate and rhythm.  Lungs clear to auscultation bilaterally.  Respiratory effort is good.  Extremities no edema.  Her back does show some stiffness in the low back.  Her strength in the lower extremities is stable at this point.  Her gait is stable.    ______________________________________________________________________    Diagnostics:    Results for orders placed or performed in visit on 07/17/18   HM2(CBC w/o Differential)   Result Value Ref Range    WBC 5.3 4.0 - 11.0 thou/uL    RBC 4.21 3.80 - 5.40 mill/uL    Hemoglobin 11.8 (L) 12.0 - 16.0 g/dL    Hematocrit  35.7 35.0 - 47.0 %    MCV 85 80 - 100 fL    MCH 28.1 27.0 - 34.0 pg    MCHC 33.1 32.0 - 36.0 g/dL    RDW 14.1 11.0 - 14.5 %    Platelets 157 140 - 440 thou/uL    MPV 9.5 7.0 - 10.0 fL   INR   Result Value Ref Range    INR 1.90 (H) 0.90 - 1.10      ______________________________________________________________________    Assessment:    1. Anemia    2. S/P mitral valve replacement (MVR)    3. Atrial fibrillation (H)    4. Anticoagulation goal of INR 2.5 to 3.5    5. Multilevel neural foraminal stenosis    6. Spinal stenosis of lumbar region, central    7. Low back pain    8. Viral wart on finger       ______________________________________________________________________     PHQ-2 Total Score: 1 (4/20/2018 12:00 PM)  No Data Recorded  ______________________________________________________________________       Plan:    1. I reviewed with the patient different options for management of her back pain.  There is no simple solution.  2. I recommended a referral to spinal specialist such as Dr. Caldwell or Dr. Whitley.  3. She would like to get a consult with spine clinic but would prefer a second trial through the a interventional pain specialist to see if there are any other options.  She is already been to the pain clinic.  A referral has been placed.  4. Atrial fibrillation is still absent at this time.  5. Check blood work today.  See relevant orders and diagnosis associations at the bottom of this note.  6. Anemia doing well.  7. Wart on the finger was treated with liquid nitrogen in a freeze thaw freeze technique.  The wart was shaved with a #15 blade prior to this.       Gabino Bach MD  General Internal Medicine  [facility]     Personal office fax - [phone]   Voice mail - [phone]  E-mail - [email or web address]      Return in about 6 weeks (around 8/28/2018) for follow up visit.     Future Appointments  Date Time Provider Department Center   7/24/2018 11:40 AM [facility]  Clinic   8/31/2018 10:55 AM Gabino Bach MD [facility] Clinic        ______________________________________________________________________    Relevant ICD-10 codes and order associations:      ICD-10-CM    1. Anemia D64.9 HM2(CBC w/o Differential)   2. S/P mitral valve replacement (MVR) Z95.2 INR   3. Atrial fibrillation (H) I48.91 INR   4. Anticoagulation goal of INR 2.5 to 3.5 Z51.81 INR    Z79.01    5. Multilevel neural foraminal stenosis M43.8X9 Ambulatory referral to Pain Clinic   6. Spinal stenosis of lumbar region, central M48.061 Ambulatory referral to Pain Clinic   7. Low back pain M54.5 Ambulatory referral to Pain Clinic   8. Viral wart on finger B07.9

## 2021-06-26 NOTE — PROGRESS NOTES
Progress Notes by Gabino Bach MD at 5/7/2018 11:20 AM     Author: Gabino Bach MD Service: -- Author Type: Physician    Filed: 5/7/2018  2:48 PM Encounter Date: 5/7/2018 Status: Signed    : Gabino Bach MD (Physician)              [facility]    Comprehensive and complex medical care - Chronic disease management - Shared decision making - Care coordination - Compassionate care    Patient advocacy - Rational deprescribing - Minimally disruptive medicine - Ethical focus - Customized care    ______________________________________________________________________     Date of Service: 5/7/2018  Primary Provider: Gabino Bach MD    Patient Care Team:  Gabino Bach MD as PCP - General (Internal Medicine)  Ayanna Camacho MD as Physician (Cardiology)  Al García MD as Physician (Ophthalmology)  Ishan Wade MD as Physician (Gastroenterology)  Mallory Grover RN as Registered Nurse     ______________________________________________________________________     Patient's Pharmacy:    [facility] [address] [place] [address] & [address] [address]  Phone: [phone] Fax: [phone]     Patient's Contacts:  Name Home Phone Work Phone Mobile Phone Relationship Lgl Bruced   ISHAN VALENTIN [phone]   Spouse    ANDREA VALENTIN [phone]   Child      Patient's Insurance:    Payor: MEDICA / Plan: [employer] / Product Type: COST PLAN /     ______________________________________________________________________     Preoperative examination    Bernadette Valentin is a 77 y.o. female (1940) who I am asked to see by her surgeon regarding her fitness for upcoming surgery.  LMP:  No LMP recorded. Patient is postmenopausal.    Chief Complaint   Patient presents with   ? Follow-up     no constipation, has not had black BMs   ? Arthritis     has been bad   ? Medication Refill     pain  medications     ______________________________________________________________________    History of present illness:    Patient comes in today with her  for follow-up.    We reviewed her blood loss anemia.  We did recheck her hemoglobin today.  Her hemoglobin continues to improve.  This is likely due to resolution of GI blood loss.  My suspicion with this is that it was due to her colonoscopy biopsy.  She was still on anticoagulation at that time.  She had black stools due to taking iron.  She is not noticed black stools at this time.  We cut back her iron to 1 pill every other day at this point.  She denies any bright red blood in her stools.    We reviewed her mitral valve replacement and anticoagulation.  We have tried to run her a little bit lower and we reviewed this with her.  She denies any major issues with this.    We reviewed her diabetes and this has been stable at this time.  She denies any major issues with this.    Reviewed her low back pain with radiation.  She has evidence of foraminal stenosis in the past.  She has been having increasing back pain radiating down both legs.  She has had this for quite a while.  We have been trying to deal with this with pain medication.  I think at this time it is important to do another MRI to look at this.  Her pain is identified over the lateral thighs and down across to the front of the legs.  She denies any new numbness and denies any new weakness.  Her pain worsens when she stands for any period of time or walks.  She is able to walk less due to this pain at this time.    We reviewed her other issues noted in the assessment but not specifically addressed in the HPI above.     ______________________________________________________________________     Patient Active Problem List   Diagnosis   ? Hypothyroidism   ? HLD (hyperlipidemia)   ? Anxiety   ? Chronic pain - Dr. Bach manages the pain   ? CN (constipation)   ? Insomnia   ? Restless legs syndrome  (RLS)   ? Lumbosacral plexopathy - chronic pain related to this   ? Full code status - POLST form 16   ? IBS (irritable bowel syndrome)   ? ASCVD (arteriosclerotic cardiovascular disease) - CABG    ? Subclavian artery stenosis, left - s/p stent   ? Atrial fibrillation, Now in NSR   ? HTN (hypertension)   ? Psoriasis   ? Former smoker   ? S/P mitral valve replacement (MVR)   ? DM type 2 (diabetes mellitus, type 2)   ? Anticoagulation goal of INR 2.5 to 3.5   ? Abdominal bloating, chronic   ? Recurrent UTI (urinary tract infection)   ? MRSA (methicillin resistant staph aureus) culture positive   ? Proteinuria   ? Controlled substance agreement signed -  - temazepam, lorazepam, hydromorphone - UDS    ? Blood loss anemia   ? GIB (gastrointestinal bleeding)     Past Surgical History:   Procedure Laterality Date   ? APPENDECTOMY     ? CARDIAC CATHETERIZATION  11/12/15   ?  SECTION     ? CHOLECYSTECTOMY     ? COLONOSCOPY N/A 10/12/2016    Procedure: COLONOSCOPY;  Surgeon: Santiago Duran MD;  Location: [facility];  Service:    ? COLONOSCOPY N/A 10/13/2016    Procedure: COLONOSCOPY with polypectomy & rectum biopsies;  Surgeon: Santiago Duran MD;  Location: [facility];  Service:    ? COLONOSCOPY N/A 12/3/2017    Procedure: COLONOSCOPY with multiple polyp removal using hot snare and mansfield net and biopsy forcep;  Surgeon: Marcelo Wade MD;  Location: [facility];  Service:    ? COLONOSCOPY N/A 3/13/2018    Procedure: COLONOSCOPY; POLYPECTOMY;  Surgeon: Marcelo Wade MD;  Location: [facility] OR;  Service:    ? CORONARY ARTERY BYPASS GRAFT     ? ESOPHAGOGASTRODUODENOSCOPY N/A 10/12/2016    Procedure: ESOPHAGOGASTRODUODENOSCOPY with biopsies;  Surgeon: Santiago Duran MD;  Location: [facility];  Service:    ? ESOPHAGOGASTRODUODENOSCOPY N/A 12/3/2017    Procedure: ESOPHAGOGASTRODUODENOSCOPY (EGD);  Surgeon: Marcelo Wade MD;  Location: [facility];  Service:   "  ? TONSILLECTOMY AND ADENOIDECTOMY     ? UPPER GASTROINTESTINAL ENDOSCOPY        Social History     Social History   ? Marital status:      Spouse name: Aneudy   ? Number of children:  4   ? Years of education: N/A     Occupational History   ?  Retired     Social History Main Topics   ? Smoking status: Former Smoker     Years: 23.00     Quit date: 11/13/1978   ? Smokeless tobacco: Never Used      Comment: Was a social smoker   ? Alcohol use 2.0 oz/week     4 Standard drinks or equivalent per week      Comment: Social use of alcohol - nominal in past year   ? Drug use: No   ? Sexual activity: Not Asked     Other Topics Concern   ? None     Social History Narrative    Patient of Dr. Bach since 2001.  .        Former patient of Dr. Harshad Ballard.      Family History   Problem Relation Age of Onset   ? Colon cancer Father    ? Diabetes Father    ? Hypertension Mother    ? Heart disease Mother       congestive heart failure   ? Arthritis Mother    ? Diabetes Sister    ? Heart disease Brother    ? Rectal cancer Son    ? Colon cancer Son       Family history is noncontributory otherwise.    Allergies: Review of patient's allergies indicates no known allergies.     Current medications:  Current Outpatient Prescriptions   Medication Sig Note   ? ACCU-CHEK SMARTVIEW TEST STRIP strips TEST TID    ? BD ULTRA-FINE DAVID PEN NEEDLES 32 gauge x 5/32\" Ndle USE WITH NOVOLOG PEN AND LANTUS PENS    ? cholecalciferol, vitamin D3, 5,000 unit Tab Take 5,000 Units by mouth daily.     ? cyanocobalamin (VITAMIN B-12) 100 MCG tablet Take 100 mcg by mouth daily.    ? ferrous sulfate 325 (65 FE) MG tablet Take 1 tablet (325 mg total) by mouth every other day.    ? furosemide (LASIX) 40 MG tablet Take 40 mg by mouth daily.    ? generic lancets Use 1 each As Directed 3 (three) times a day. Dx E11.65 DM2, uncontrolled    ? [START ON 6/9/2018] HYDROmorphone (DILAUDID) 4 MG tablet Take 0.5-1 tablets (2-4 mg total) by mouth 4 (four) " times a day as needed for pain. For use from 6/9/2018 to 7/9/2018.  RX 1/2.    ? [START ON 7/9/2018] HYDROmorphone (DILAUDID) 4 MG tablet Take 0.5-1 tablets (2-4 mg total) by mouth 4 (four) times a day as needed for pain. For use from 7/9/2018 to 8/8/2018.  RX 2/2.    ? insulin aspart U-100 (NOVOLOG) 100 unit/mL injection Inject 10 Units under the skin 3 (three) times a day as needed. 4/17/2018: njects based on own sliding scale with each meal as needed based on sugars. Average of 10 units per dose   ? insulin NPH (NOVOLIN) 100 unit/mL injection Inject 15 Units under the skin 3 (three) times a day as needed. 4/17/2018: Injects based on own sliding scale with each meal as needed based on sugars. Average of 15 units per dose   ? levothyroxine (SYNTHROID, LEVOTHROID) 125 MCG tablet Take 1 tablet (125 mcg total) by mouth daily.    ? magnesium 30 mg tablet Take 30 mg by mouth 2 (two) times a day.    ? omeprazole (PRILOSEC) 20 MG capsule Take 1 capsule (20 mg total) by mouth daily before breakfast.    ? polyethylene glycol (MIRALAX) 17 gram packet Take 1 packet (17 g total) by mouth daily as needed.    ? temazepam (RESTORIL) 15 mg capsule Take 1 capsule (15 mg total) by mouth at bedtime as needed.    ? warfarin (COUMADIN) 5 MG tablet Take 5-7.5 mg by mouth See Admin Instructions. Take 7.5mg on Wednesdays and Saturdays. Take 5mg all other days of the week.        Surgery specific questions:    Anesthesia/family history of complications: No  History of abnormal bleeding: No  Can patient walk a flight of stairs without stopping: Yes  Any chance the patient could be pregnant: No    Review of systems:    On ROS, the patient denies any chest pain or pressure.  No shortness of breath.   ______________________________________________________________________    Exam:    Wt Readings from Last 3 Encounters:   05/07/18 159 lb 11.2 oz (72.4 kg)   04/20/18 159 lb 8 oz (72.3 kg)   04/19/18 158 lb 14.4 oz (72.1 kg)     BP Readings from  Last 3 Encounters:   05/07/18 136/70   04/20/18 136/68   04/19/18 135/63      /70  Pulse 74  Wt 159 lb 11.2 oz (72.4 kg)  SpO2 98%  BMI 29.21 kg/m2     Patient is in no acute distress.  Mood good.  Insight good.  Eyes nonicteric.  Pupils equal.  Ears clear.  Nose clear.  Throat clear.  Neck is supple.  No cervical adenopathy.  No thyromegaly.  Heart is regular rate and rhythm.  Lungs clear to auscultation bilaterally.  Respiratory effort is good.  Abdomen is soft, nontender, no hepatosplenomegaly.  Extremities no edema.  Valve sounds are good.  There is no changes in her reflexes of the lower extremities.    ______________________________________________________________________    Diagnostics:    Results for orders placed or performed in visit on 05/07/18   INR   Result Value Ref Range    INR 3.40 (H) 0.90 - 1.10   Hemoglobin   Result Value Ref Range    Hemoglobin 10.8 (L) 12.0 - 16.0 g/dL      Lab Results   Component Value Date    WBC 4.5 04/19/2018    HGB 10.8 (L) 05/07/2018    HCT 24.6 (L) 04/19/2018     (L) 04/19/2018    CHOL 164 11/11/2015    TRIG 183 (H) 01/01/2016    HDL 30 (L) 11/11/2015    LDLDIRECT 79 01/06/2015    ALT 12 04/17/2018    AST 15 04/17/2018     04/18/2018    K 3.8 04/18/2018     04/18/2018    CREATININE 1.02 04/18/2018    BUN 23 04/18/2018    CO2 25 04/18/2018    TSH 1.09 04/25/2017    INR 3.40 (H) 05/07/2018    HGBA1C 8.2 (H) 04/06/2018    MICROALBUR 4.11 (H) 04/25/2017      ______________________________________________________________________     Impression:    1. Preoperative cardiovascular examination    2. Low back pain radiating to both legs    3. Foraminal stenosis of lumbar region    4. Chronic pain    5. Blood loss anemia    6. GIB (gastrointestinal bleeding)    7. S/P mitral valve replacement    8. Claustrophobia      ______________________________________________________________________     PHQ-2 Total Score: 1 (4/20/2018 12:00 PM)  No Data  Recorded    ______________________________________________________________________     Recommendations:    1. Patient is okay to proceed with surgery as planned.  2. She needs conscious sedation for her MRI as her claustrophobia with MRI is too severe.  Valium or Lorazepam have been ineffective in helping this.  She would rather do an MRI then an open sided MRI or sit up MRI.  3. She will likely follow-up with me in the next 4 weeks.  4. On the night before the MRI, she would take half of her usual NPH and none of her regular.  She would also do the same the following morning taking half of her NPH and none of her regular insulin.  5. She would take her usual medications on the morning of   MRI otherwise.  6. We reviewed her recent hemoglobin today and this is doing very well.  Continue to monitor her INR is closer.  7. Refills of her pain meds were done through August.  8. Medication reconciliation done today.       Gabino Bach MD  General Internal Medicine  [facility]     Personal office fax - [phone]   Voice mail - [phone]  E-mail - [email or web address]      Return in about 4 weeks (around 6/4/2018) for visit and blood work.     Future Appointments  Date Time Provider Department Center   5/10/2018 11:20 AM [facility] LAB W LAB [facility]   7/17/2018 11:20 AM Gabino Bach MD [facility] INTMED [facility]        ______________________________________________________________________     Relevant ICD-10 codes and order associations:      ICD-10-CM    1. Preoperative cardiovascular examination Z01.810    2. Low back pain radiating to both legs M54.5 MR Lumbar Spine Without Contrast   3. Foraminal stenosis of lumbar region M99.83 MR Lumbar Spine Without Contrast   4. Chronic pain G89.29 HYDROmorphone (DILAUDID) 4 MG tablet     HYDROmorphone (DILAUDID) 4 MG tablet     MR Lumbar Spine Without Contrast   5. Blood loss anemia D50.0    6. GIB (gastrointestinal bleeding) K92.2    7. S/P mitral  valve replacement Z95.2    8. Claustrophobia F40.240

## 2021-06-26 NOTE — PROGRESS NOTES
Progress Notes by Gabino Bach MD at 5/29/2018 11:45 AM     Author: Gabino Bach MD Service: -- Author Type: Physician    Filed: 5/31/2018  8:48 AM Encounter Date: 5/29/2018 Status: Signed    : Gabino Bach MD (Physician)              [facility]    Comprehensive and complex medical care - Chronic disease management - Shared decision making - Care coordination - Compassionate care    Patient advocacy - Rational deprescribing - Minimally disruptive medicine - Ethical focus - Customized care    ______________________________________________________________________     Date of Service: 5/29/2018  Primary Provider: Gabino Bach MD    Patient Care Team:  Gabino Bach MD as PCP - General (Internal Medicine)  Ayanna Camacho MD as Physician (Cardiology)  Al García MD as Physician (Ophthalmology)  Ishan Wade MD as Physician (Gastroenterology)  Mallory Grover RN as Registered Nurse  Gabino Bach MD (Internal Medicine)     ______________________________________________________________________     Patient's Pharmacy:    [facility] [address] [place] [address] AT [facility] [address]  [address] [facility] [address]  Phone: [phone] Fax: [phone]     Patient's Contacts:  Name Home Phone Work Phone Mobile Phone Relationship Lgl GrISHAN Camacho [phone]   Spouse    ANDREA VALENTIN [phone]   Child      Patient's Insurance:    Payor: MEDICA / Plan: [employer] / Product Type: COST PLAN /     ______________________________________________________________________     Bernadette Valentin is 77 y.o. female who comes in today for:    Chief Complaint   Patient presents with   ? Follow-up     F/u for hopitalization for cough        Patient Active Problem List   Diagnosis   ? Hypothyroidism   ? HLD (hyperlipidemia)   ? Anxiety   ? Chronic pain - Dr. Bach manages the pain   ? CN (constipation)  "  ? Insomnia   ? Restless legs syndrome (RLS)   ? Lumbosacral plexopathy - chronic pain related to this   ? Full code status - POLST form 1/2/16   ? IBS (irritable bowel syndrome)   ? ASCVD (arteriosclerotic cardiovascular disease) - CABG 2015   ? Subclavian artery stenosis, left - s/p stent   ? Atrial fibrillation, Now in NSR   ? HTN (hypertension)   ? Psoriasis   ? Former smoker   ? S/P mitral valve replacement (MVR)   ? DM type 2 (diabetes mellitus, type 2)   ? Anticoagulation goal of INR 2.5 to 3.5   ? Abdominal bloating, chronic   ? Recurrent UTI (urinary tract infection)   ? MRSA (methicillin resistant staph aureus) culture positive   ? Proteinuria   ? Controlled substance agreement signed - 1/18 - temazepam, lorazepam, hydromorphone - UDS 4/18   ? Blood loss anemia     Current Outpatient Prescriptions   Medication Sig Note   ? ACCU-CHEK SMARTVIEW TEST STRIP strips TEST TID    ? BD ULTRA-FINE DAVID PEN NEEDLES 32 gauge x 5/32\" Ndle USE WITH NOVOLOG PEN AND LANTUS PENS    ? cholecalciferol, vitamin D3, 5,000 unit Tab Take 5,000 Units by mouth daily.    ? codeine-guaiFENesin (GUAIFENESIN AC)  mg/5 mL liquid Take 5 mL by mouth 4 (four) times a day as needed for cough or congestion.    ? cyanocobalamin (VITAMIN B-12) 100 MCG tablet Take 100 mcg by mouth daily.    ? ferrous sulfate 325 (65 FE) MG tablet Take 1 tablet (325 mg total) by mouth every other day.    ? furosemide (LASIX) 40 MG tablet Take 40 mg by mouth daily.    ? generic lancets Use 1 each As Directed 3 (three) times a day. Dx E11.65 DM2, uncontrolled    ? [START ON 6/9/2018] HYDROmorphone (DILAUDID) 4 MG tablet Take 0.5-1 tablets (2-4 mg total) by mouth 4 (four) times a day as needed for pain. For use from 6/9/2018 to 7/9/2018.  RX 1/2.    ? [START ON 7/9/2018] HYDROmorphone (DILAUDID) 4 MG tablet Take 0.5-1 tablets (2-4 mg total) by mouth 4 (four) times a day as needed for pain. For use from 7/9/2018 to 8/8/2018.  RX 2/2.    ? HYDROmorphone " (DILAUDID) 4 MG tablet Take 2-4 mg by mouth 4 (four) times a day as needed.     ? insulin aspart U-100 (NOVOLOG) 100 unit/mL injection Inject 10 Units under the skin 3 (three) times a day as needed. 4/17/2018: njects based on own sliding scale with each meal as needed based on sugars. Average of 10 units per dose   ? insulin NPH (NOVOLIN) 100 unit/mL injection Inject 15 Units under the skin 3 (three) times a day as needed. 4/17/2018: Injects based on own sliding scale with each meal as needed based on sugars. Average of 15 units per dose   ? levothyroxine (SYNTHROID, LEVOTHROID) 125 MCG tablet Take 1 tablet (125 mcg total) by mouth daily.    ? magnesium 30 mg tablet Take 30 mg by mouth 2 (two) times a day.    ? NOVOLIN N NPH U-100 INSULIN 100 unit/mL injection ADMINISTER 40 UNITS UNDER THE SKIN THREE TIMES DAILY    ? omeprazole (PRILOSEC) 20 MG capsule Take 1 capsule (20 mg total) by mouth daily.    ? polyethylene glycol (MIRALAX) 17 gram packet Take 1 packet (17 g total) by mouth daily as needed.    ? predniSONE (DELTASONE) 20 MG tablet Take 1 tablet (20 mg total) by mouth daily for 5 days.    ? temazepam (RESTORIL) 15 mg capsule Take 1 capsule (15 mg total) by mouth at bedtime as needed.    ? warfarin (COUMADIN) 5 MG tablet Take 5-7.5 mg by mouth See Admin Instructions. Take 7.5mg on Wednesdays and Saturdays. Take 5mg all other days of the week.      Social History     Social History Narrative    ** Merged History Encounter **         Patient of Dr. Bach since 2001.  .    Former patient of Dr. Harshad Ballard.     ______________________________________________________________________     History of present illness:    Patient comes in today with her .    We first reviewed her short hospital stay for cough and bronchitis.  She was given steroids and antibiotics.  The steroids was given  by myself after her hospital stay.  She feels like this has helped.  Her cough is not as bad.  She is mainly concerned  because her wires have been more prominent with the cough.  She was concerned it had something to do with her valve.  She had a lot of mucus with this cough.  She had difficulty with laying down.  It has been going on for almost 2 weeks now.  She is not taking lisinopril.    Reviewed her diabetes.  Her sugars have been higher.  Her sugars have been running between 220 and 250 in the morning.    Reviewed her GI bleeding and this seems to be doing well.  We are following up her hemoglobin in relationship to this.    She is taking her omeprazole for reflux disease.    Reviewed her mitral valve replacement and her anticoagulation for this.    We reviewed her back pain.  She recently had an MRI.  There is multilevel foraminal stenosis noted.  We discussed different options for her.  She would like to try to do a epidural spinal injection.  She is not interested in surgery.  She is on chronic pain medication for some of these things as well.    On review of systems, the patient denies any chest pain or shortness of breath.    ______________________________________________________________________    Exam:    Wt Readings from Last 3 Encounters:   05/29/18 153 lb 8 oz (69.6 kg)   05/23/18 153 lb (69.4 kg)   05/22/18 152 lb 6.4 oz (69.1 kg)     BP Readings from Last 3 Encounters:   05/29/18 124/68   05/23/18 158/65   05/22/18 166/71     /68 (Patient Site: Right Arm, Patient Position: Sitting, Cuff Size: Adult Regular)  Pulse 70  Wt 153 lb 8 oz (69.6 kg)  SpO2 98%  BMI 27.19 kg/m2   The patient is comfortable, no acute distress.  Mood good.  Insight good.  Eyes are nonicteric.  Neck is supple without mass.  No cervical adenopathy.  No thyromegaly. Heart regular rate and rhythm.  Valve sounds are clear.  Lungs clear to auscultation bilaterally.  Respiratory effort is good.  Abdomen soft and nontender.  No hepatosplenomegaly.  Extremities trace edema.       ______________________________________________________________________    Diagnostics:    Results for orders placed or performed in visit on 05/25/18   INR   Result Value Ref Range    INR 3.60 (H) 0.90 - 1.10      ______________________________________________________________________    Pertinent radiology for this visit includes the following:    Xr Chest 2 Views    Result Date: 5/21/2018  XR CHEST 2 VIEWS 5/21/2018 10:50 PM INDICATION: Cough COMPARISON: 11/28/2017. FINDINGS: Heart is normal in size. No pneumothorax. No infiltrates. No pleural fluid. Minimal thoracic osteophytes. Previous mitral valve replacement. Left atrial appendage occlusion device. Vascular stent.    Mr Lumbar Spine Without Contrast    Result Date: 5/23/2018  [facility] MR LUMBAR SPINE WO CONTRAST 5/23/2018 9:33 AM INDICATION: Worsening back pain into both legs. TECHNIQUE: Without IV contrast. CONTRAST: None. COMPARISON: CT abdomen and pelvis 01/06/2016. FINDINGS: Nomenclature is based on 5 lumbar type vertebral bodies. Dextroscoliosis centered at L3-L4 by 18 degrees. Minimal posterior spondylolisthesis at L2-L3 by 3 mm. Otherwise normal alignment. Normal vertebral body heights. Advanced chronic degenerative endplate change from T12 through L4. Superimposed endplate edema at L1-L2 preferentially on the right and L2-L3 preferentially on the left. No disc space T2 hyperintensity or significant endplate erosions. No pars defect. The conus tip is identified at L1-L2. Mild increased or signal along the left paraspinal soft tissues at L2-L3. The paraspinal musculature is unremarkable. The visualized intra-abdominal structures are unremarkable. No abdominal aortic aneurysm. The visualized portions of the bony pelvis are normal for age. T12-L1: Moderate disc space narrowing with chronic degenerative endplate change and mild disc bulge with marginal osteophyte. Mild facet arthropathy. No spinal canal stenosis. No right neural foraminal  stenosis. No left neural foraminal stenosis. L1-L2: Moderate disc space narrowing with chronic degenerative endplate change and mild superimposed endplate edema. Marginal osteophyte preferentially on the right. Mild facet arthropathy. Mild effacement of the ventral thecal sac without significant spinal canal stenosis. Mild right neural foraminal stenosis. No left neural foraminal stenosis. L2-L3: Advanced disc space narrowing preferentially on the left with bulky left-sided osteophyte. Advanced chronic degenerative endplate changes with superimposed endplate edema. Mild facet arthropathy. Mild effacement of the ventral thecal sac with mild  left lateral recess stenosis. No significant spinal canal stenosis. No right neural foraminal stenosis. Severe left neural foraminal stenosis. L3-L4: Severe disc space narrowing with chronic degenerative endplate change and marginal osteophyte preferentially on the left. Mild facet arthropathy. Moderate spinal canal stenosis. Severe left and moderate right lateral recess stenoses. Moderate right neural foraminal stenosis. Moderate to severe left neural foraminal stenosis. L4-L5: Mild disc space narrowing and loss of T2 signal with mild disc bulge. No herniation. Moderate left facet arthropathy. Mild bilateral lateral recess stenoses without significant spinal canal stenosis. No right neural foraminal stenosis. Mild left neural foraminal stenosis. L5-S1: Severe disc space narrowing with chronic degenerative endplate change and marginal osteophyte. Mild facet arthropathy. No spinal canal stenosis. Severe right neural foraminal stenosis. Moderate to severe left neural foraminal stenosis.     CONCLUSION: 1.  Mild dextroscoliosis with advanced lumbar spondylosis. 2.  Advanced chronic degenerative endplate change L1-L4 and L5-S1. Superimposed endplate edema at L1-L2 and L2-L3, most consistent with degenerative endplate change (Modic type I). No disc space T2 hyperintensity or endplate  erosion to suggest infection,  however there is mild paraspinal edema on the left adjacent to the L2-L3 spur. Correlation with clinical and laboratory findings recommended regarding less likely possibility of early infection. 3.  Moderate spinal canal stenosis at L3-L4 with severe left and moderate right foraminal stenoses. Moderate to severe left and moderate right foraminal stenoses. 4.  Moderate left lateral recess stenosis and severe left foraminal stenosis at L2-L3. 5.  Mild lateral recess stenoses and left foraminal stenoses at L4-L5. 6.  Moderate to severe foraminal stenoses at L5-S1.       ______________________________________________________________________    ______________________________________________________________________    Assessment:    1. Low back pain    2. GIB (gastrointestinal bleeding)    3. Lumbosacral radiculopathy at S1    4. Multilevel neural foraminal stenosis    5. Cough    6. DM type 2 (diabetes mellitus, type 2)    7. S/P mitral valve replacement (MVR)      ______________________________________________________________________      PHQ-2 Total Score: 1 (4/20/2018 12:00 PM)  No Data Recorded  ______________________________________________________________________    Plan:    1. Patient will have epidural spinal injection at the spine clinic.  I think her most symptomatic area is the L5-S1 lesion on the right.  She has multiple findings that would be a difficult surgery candidate.  Hopefully, injection therapy will help her manage with the pain.  2. She is scheduled to follow-up with me within the next 4-6 weeks.  3. She will continue on her iron as is.  4. I am going to continue her steroids another 5 days to see if this helps with her cough.  5. She can be scheduled in sooner if issues arise.    Gabino Bach MD  General Internal Medicine  [facility]     Return in about 6 weeks (around 7/10/2018).     Future Appointments  Date Time Provider Department Center    5/31/2018 12:40 PM Deena Wang DO OPS SPINE SPN SPINE   6/7/2018 11:30 AM [facility]   7/17/2018 11:20 AM Gabino Bach MD [facility]         ______________________________________________________________________     Relevant ICD-10 codes and order associations:      ICD-10-CM    1. Low back pain M54.5 Ambulatory referral to Spine Care   2. GIB (gastrointestinal bleeding) K92.2 omeprazole (PRILOSEC) 20 MG capsule   3. Lumbosacral radiculopathy at S1 M54.17 Ambulatory referral to Spine Care   4. Multilevel neural foraminal stenosis M43.8X9 Ambulatory referral to Spine Care   5. Cough R05    6. DM type 2 (diabetes mellitus, type 2) E11.9    7. S/P mitral valve replacement (MVR) Z95.2

## 2021-06-26 NOTE — PROGRESS NOTES
Progress Notes by Gabino Bach MD at 2/26/2018 11:20 AM     Author: Gabino Bach MD Service: -- Author Type: Physician    Filed: 3/2/2018  7:56 AM Encounter Date: 2/26/2018 Status: Signed    : Gabino Bach MD (Physician)              [facility]    Comprehensive and complex medical care - Chronic disease management - Shared decision making - Care coordination - Compassionate care    Patient advocacy - Rational deprescribing - Minimally disruptive medicine - Ethical focus - Customized care    ______________________________________________________________________     Date of Service: 2/26/2018  Primary Provider: Gabino Bach MD    Patient Care Team:  Gabino Bach MD as PCP - General (Internal Medicine)  Ayanna Camacho MD as Physician (Cardiology)  Al García MD as Physician (Ophthalmology)  Ishan Wade MD as Physician (Gastroenterology)     ______________________________________________________________________     Patient's Pharmacy:    [facility] [address] [place] [address] [place] [address] [address]  Phone: [phone] Fax: [phone]     Patient's Contacts:  Name Home Phone Work Phone Mobile Phone Relationship Lgl Grd   ISHAN VALENTIN [phone]   Spouse    ANDREA VALENTIN [phone]   Child      Patient's Insurance:    Payor: MEDICA / Plan: [employer] / Product Type: COST PLAN /     ______________________________________________________________________     Preoperative examination    Bernadette Valentin is a 77 y.o. female (1940) who I am asked to see by her surgeon regarding her fitness for upcoming surgery.  LMP:  No LMP recorded. Patient is postmenopausal.    Chief Complaint   Patient presents with   ? Pre-op Exam     colonoscopy 3/13, [facility]   ? Labs Only     INR   ? Arthritis      ______________________________________________________________________    History of present illness:    Patient comes in today with her .    They are coming in for preoperative evaluation prior to her inpatient colonoscopy at [facility].  She is doing a inpatient due to multiple medical issues with potential complications.    We reviewed her anemia.  We are monitoring it closer.  It seems to be doing well.  Her labs have been stable.    We reviewed her abnormal CT scan.  Her colon prep with the last colonoscopy was not very good.  She had marketed colonic distention.  She apparently had quite extensive melanosis coli.  This will be reexamined at this time.  The patient wondered whether she should still go through this and I feel that the benefits outweigh the risk and she will be going through with that at this point.  The hope is that she will not need any further intervention after this.  She has an extensive colon prep procedure over 2 weeks and she is following this.  The reasoning behind this was reviewed.    We reviewed her mitral valve replacement.  This seems to be going well for her.  We are going to do Lovenox bridging for the procedure.  We went through this with her today.  Her  and her seemed to understand our protocol for this.    Her blood pressure generally has been doing well.    In relationship to her back pain this still gives her troubles in her back and into both legs.  It is with any time especially with laying and sitting.  She has been on hydromorphone for this but we may need to reinvestigate this again in the near future.    We reviewed her diabetes for evaluation prior to her planned surgery.    ______________________________________________________________________     Patient Active Problem List   Diagnosis   ? Hypothyroidism   ? HLD (hyperlipidemia)   ? Anxiety   ? Chronic pain - Dr. Bach manages the pain   ? CN (constipation)   ? Insomnia   ? Restless legs syndrome  (RLS)   ? Lumbosacral plexopathy - chronic pain related to this   ? Full code status - POLST form 16   ? IBS (irritable bowel syndrome)   ? ASCVD (arteriosclerotic cardiovascular disease) - CABG    ? Subclavian artery stenosis, left - s/p stent   ? Atrial fibrillation   ? HTN (hypertension)   ? Psoriasis   ? Former smoker   ? S/P mitral valve replacement   ? DM type 2 (diabetes mellitus, type 2)   ? Anticoagulation goal of INR 2.5 to 3.5   ? Abdominal bloating, chronic   ? Recurrent UTI (urinary tract infection)   ? MRSA (methicillin resistant staph aureus) culture positive   ? Proteinuria   ? Joint pain   ? Controlled substance agreement signed -  - temazepam, lorazepam, hydromorphone - UDS    ? Blood loss anemia   ? CKD (chronic kidney disease), stage III     Past Surgical History:   Procedure Laterality Date   ? APPENDECTOMY     ? CARDIAC CATHETERIZATION  11/12/15   ?  SECTION     ? CHOLECYSTECTOMY     ? COLONOSCOPY N/A 10/12/2016    Procedure: COLONOSCOPY;  Surgeon: Santiago Duran MD;  Location: [facility];  Service:    ? COLONOSCOPY N/A 10/13/2016    Procedure: COLONOSCOPY with polypectomy & rectum biopsies;  Surgeon: Santiago Duran MD;  Location: [facility];  Service:    ? COLONOSCOPY N/A 12/3/2017    Procedure: COLONOSCOPY with multiple polyp removal using hot snare and mansfield net and biopsy forcep;  Surgeon: Marcelo Wade MD;  Location: [facility];  Service:    ? CORONARY ARTERY BYPASS GRAFT     ? ESOPHAGOGASTRODUODENOSCOPY N/A 10/12/2016    Procedure: ESOPHAGOGASTRODUODENOSCOPY with biopsies;  Surgeon: Santiago Duran MD;  Location: [facility];  Service:    ? ESOPHAGOGASTRODUODENOSCOPY N/A 12/3/2017    Procedure: ESOPHAGOGASTRODUODENOSCOPY (EGD);  Surgeon: Marcelo Wade MD;  Location: [facility];  Service:    ? TONSILLECTOMY AND ADENOIDECTOMY     ? UPPER GASTROINTESTINAL ENDOSCOPY        Social History     Social History   ? Marital status:  "     Spouse name: Aneudy   ? Number of children:  4   ? Years of education: N/A     Occupational History   ?  Retired     Social History Main Topics   ? Smoking status: Former Smoker     Years: 23.00     Quit date: 11/13/1978   ? Smokeless tobacco: Never Used      Comment: Was a social smoker   ? Alcohol use 2.0 oz/week     4 Standard drinks or equivalent per week      Comment: Social use of alcohol - nominal in past year   ? Drug use: No   ? Sexual activity: Not Asked     Other Topics Concern   ? None     Social History Narrative    Patient of Dr. Bach since 2001.  .        Former patient of Dr. Harshad Ballard.      Family History   Problem Relation Age of Onset   ? Colon cancer Father    ? Diabetes Father    ? Hypertension Mother    ? Heart disease Mother       congestive heart failure   ? Arthritis Mother    ? Diabetes Sister    ? Heart disease Brother    ? Rectal cancer Son    ? Colon cancer Son       Family history is noncontributory otherwise.    Allergies: Review of patient's allergies indicates no known allergies.     Current medications:  Current Outpatient Prescriptions   Medication Sig Note   ? ACCU-CHEK SMARTVIEW TEST STRIP strips TEST TID    ? BD ULTRA-FINE DAVID PEN NEEDLES 32 gauge x 5/32\" Ndle USE WITH NOVOLOG PEN AND LANTUS PENS 12/10/2017: Received from: External Pharmacy   ? cholecalciferol, vitamin D3, 5,000 unit Tab Take 5,000 Units by mouth daily.     ? cyanocobalamin (VITAMIN B-12) 100 MCG tablet Take 100 mcg by mouth daily.    ? enoxaparin (LOVENOX) 80 mg/0.8 mL syringe Inject 0.8 mL (80 mg total) under the skin every 12 (twelve) hours. Use as directed.    ? furosemide (LASIX) 40 MG tablet Take 40 mg by mouth daily.    ? generic lancets Use 1 each As Directed 3 (three) times a day. Dx E11.65 DM2, uncontrolled    ? insulin glargine (LANTUS; BASAGLAR) 100 unit/mL (3 mL) pen Inject 15 Units under the skin at bedtime.    ? levothyroxine (SYNTHROID, LEVOTHROID) 125 MCG tablet Take 1 " tablet (125 mcg total) by mouth daily.    ? magnesium 30 mg tablet Take 30 mg by mouth 2 (two) times a day.    ? NOVOLIN N 100 unit/mL injection ADMINISTER 40 UNITS UNDER THE SKIN THREE TIMES DAILY    ? NOVOLOG FLEXPEN 100 unit/mL injection pen Check blood sugar four (4) times daily. AC/HS    ? omeprazole (PRILOSEC) 20 MG capsule Take 1 capsule (20 mg total) by mouth daily before breakfast.    ? polyethylene glycol (MIRALAX) 17 gram packet Take 1 packet (17 g total) by mouth daily as needed.    ? temazepam (RESTORIL) 15 mg capsule Take 1 capsule (15 mg total) by mouth at bedtime as needed.    ? warfarin (COUMADIN) 5 MG tablet Take 1-1.5 tablets (5-7.5 mg total) by mouth See Admin Instructions. Takes 5mg on MWF, 7.5mg all other days.  Goal INR 2.5-3.5.    ? [START ON 3/11/2018] HYDROmorphone (DILAUDID) 4 MG tablet Take 0.5-1 tablets (2-4 mg total) by mouth 4 (four) times a day as needed for pain. For use from 3/11/2018 to 4/10/2018.  RX 2/2.    ? HYDROmorphone (DILAUDID) 4 MG tablet Take 0.5-1 tablets (2-4 mg total) by mouth 4 (four) times a day as needed for pain. For use from 2/9/2018 to 3/11/2018.  RX 1/2.    ? NOVOLIN N 100 unit/mL injection ADMINISTER 40 UNITS UNDER THE SKIN THREE TIMES DAILY    ? NOVOLOG 100 unit/mL injection INJECT 40 UNITS SUBCUTANEOUSLY TID        Surgery specific questions:    Anesthesia/family history of complications: No  History of abnormal bleeding: No  Can patient walk a flight of stairs without stopping: Yes  Any chance the patient could be pregnant: No    Review of systems:    10 point review of systems is negative unless noted above.    ______________________________________________________________________    Exam:    Wt Readings from Last 3 Encounters:   02/26/18 158 lb 6.4 oz (71.8 kg)   01/30/18 157 lb 6.4 oz (71.4 kg)   01/02/18 160 lb 4.8 oz (72.7 kg)     BP Readings from Last 3 Encounters:   02/26/18 138/76   01/30/18 132/70   01/02/18 132/74      /76  Pulse 73  Temp  "98  F (36.7  C) (Oral)   Ht 5' 0.5\" (1.537 m)  Wt 158 lb 6.4 oz (71.8 kg)  SpO2 98%  BMI 30.43 kg/m2     The patient is comfortable, no acute distress.  Mood good.  Insight is fair to good.  No skin lesions or nodules of concern.  Ears clear.  Eyes are nonicteric.  Pupils equal and reactive.  Throat is clear.  Neck is supple without mass, no thyromegaly. No cervical or epitrochlear adenopathy.  Heart regular rate and rhythm. Valve sounds good.  Lungs clear to auscultation bilaterally.  Respiratory effort good.  Abdomen soft and nontender.  She has chronic abdominal bloating.  No hepatosplenomegaly.  Extremities show trace edema.      ______________________________________________________________________    Diagnostics:    Results for orders placed or performed in visit on 02/26/18   Rheumatoid Factor Quant   Result Value Ref Range    Rheumatoid Factor Quantitative <15.0 0 - 30 IU/mL   Sedimentation Rate   Result Value Ref Range    Sed Rate 43 (H) 0 - 20 mm/hr   CCP Antibodies   Result Value Ref Range    CCP IgG Antibodies 1.2 <=4.9 U/mL   HM2(CBC w/o Differential)   Result Value Ref Range    WBC 4.7 4.0 - 11.0 thou/uL    RBC 4.34 3.80 - 5.40 mill/uL    Hemoglobin 12.1 12.0 - 16.0 g/dL    Hematocrit 36.3 35.0 - 47.0 %    MCV 84 80 - 100 fL    MCH 27.9 27.0 - 34.0 pg    MCHC 33.4 32.0 - 36.0 g/dL    RDW 14.3 11.0 - 14.5 %    Platelets 135 (L) 140 - 440 thou/uL    MPV 9.5 7.0 - 10.0 fL   C-Reactive Protein   Result Value Ref Range    CRP 0.6 0.0 - 0.8 mg/dL   INR   Result Value Ref Range    INR 3.70 (H) 0.90 - 1.10      ______________________________________________________________________     Impression:    1. Preoperative cardiovascular examination    2. Anemia    3. Abnormal CT scan, colon    4. Joint pain    5. Paroxysmal atrial fibrillation - treated during surgery in 2015    6. S/P MVR (mitral valve replacement) - 23 mm [facility] mechanical valve - 2015    7. ASCVD (arteriosclerotic cardiovascular disease) - " CABG 2015    8. Anticoagulation goal of INR 2.5 to 3.5    9. DM type 2 (diabetes mellitus, type 2)    10. Chronic pain    11. S/P mitral valve replacement    12. HTN (hypertension)    13. Abdominal bloating, chronic    14. Colon distention    15. Low back pain radiating to both legs       ______________________________________________________________________     PHQ-2 Total Score: 1 (1/4/2018 12:00 PM)  No Data Recorded    ______________________________________________________________________     Recommendations:    1. Patient is okay to proceed with surgery as planned.  2. She will follow-up with me after the procedure to determine further workup from here.  3. Consider further treatment of her back issues as needed.  4. Consider follow-up MRI of the back as appropriate.  5. She should do the following prior to surgery:    Stop warfarin on March 9 do not take it on March 9.  Restart warfarin on March 13.    Starting on March 10 start taking Lovenox shots every 12 hours and stop on March 12 after her a.m. dose.    On March 13 after the procedure take an evening dose of Lovenox.    Take Lovenox through March 17, and then stop.    She should reduce her insulin the night before and the day of procedure taking half the dose of her usual NPH without any short acting insulin.       Gabino Bach MD  General Internal Medicine  [facility]     Personal office fax - [phone]   Voice mail - [phone]  E-mail - [email or web address]

## 2021-06-26 NOTE — PROGRESS NOTES
Progress Notes by Gabino Bach MD at 1/2/2018  3:55 PM     Author: Gabino Bach MD Service: -- Author Type: Physician    Filed: 1/4/2018 12:40 PM Encounter Date: 1/2/2018 Status: Signed    : Gabino Bach MD (Physician)              [facility]    Comprehensive and complex medical care - Chronic disease management - Shared decision making - Care coordination - Compassionate care    Patient advocacy - Rational deprescribing - Minimally disruptive medicine - Ethical focus - Customized care    ______________________________________________________________________     Date of Service: 1/2/2018  Primary Provider: Gabino Bach MD    Patient Care Team:  Gabino Bach MD as PCP - General (Internal Medicine)  Ayanna Camacho MD as Physician (Cardiology)  Al García MD as Physician (Ophthalmology)  Ishan Wade MD as Physician (Gastroenterology)     ______________________________________________________________________     Patient's Pharmacy:    [facility] [address] [place] [address] Y[place] [address] [address]  Phone: [phone] Fax: [phone]     Patient's Contacts:  Name Home Phone Work Phone Mobile Phone Relationship Lgl Grd   ISHAN VALENTIN [phone]   Spouse    ANDREA VALENTIN [phone]   Child      Patient's Insurance:    Payor: MEDICA / Plan: [employer] / Product Type: COST PLAN /     ______________________________________________________________________     Bernadette Valentin is 77 y.o. female who comes in today for:    Chief Complaint   Patient presents with   ? Follow-up     ARTHRITIS IS BAD, BACK/SHOULDER PAIN IS VERY BAD   ? Mass     ON ABD, NOW TURNING COLORS, PILL CAM DID NOT COME OUT, RIGHT SIDE IS BLOATING       Patient Active Problem List   Diagnosis   ? Hypothyroidism   ? HLD (hyperlipidemia)   ? Anxiety   ? Chronic pain - Dr. Bach manages the pain   ?  "CN (constipation)   ? Insomnia   ? Restless legs syndrome (RLS)   ? Lumbosacral plexopathy - chronic pain related to this   ? Full code status - POLST form 1/2/16   ? IBS (irritable bowel syndrome)   ? ASCVD (arteriosclerotic cardiovascular disease) - CABG 2015   ? Subclavian artery stenosis, left - s/p stent   ? Atrial fibrillation   ? HTN (hypertension)   ? Psoriasis   ? Former smoker   ? S/P mitral valve replacement   ? DM type 2 (diabetes mellitus, type 2)   ? Anticoagulation goal of INR 2.5 to 3.5   ? Abdominal bloating, chronic   ? Recurrent UTI (urinary tract infection)   ? MRSA (methicillin resistant staph aureus) culture positive   ? Proteinuria   ? Joint pain   ? Controlled substance agreement signed - 2/17 - temazepam, lorazepam, hydromorphone - UDS 4/17   ? Blood loss anemia   ? CKD (chronic kidney disease), stage III      Current Outpatient Prescriptions   Medication Sig Note   ? ACCU-CHEK SMARTVIEW TEST STRIP strips TEST TID    ? BD ULTRA-FINE DAVID PEN NEEDLES 32 gauge x 5/32\" Ndle USE WITH NOVOLOG PEN AND LANTUS PENS 12/10/2017: Received from: External Pharmacy   ? cholecalciferol, vitamin D3, 5,000 unit Tab Take 5,000 Units by mouth daily.     ? cyanocobalamin (VITAMIN B-12) 100 MCG tablet Take 100 mcg by mouth daily.    ? enoxaparin (LOVENOX) 80 mg/0.8 mL syringe  12/11/2017: Received from: External Pharmacy   ? furosemide (LASIX) 40 MG tablet Take 40 mg by mouth daily.    ? generic lancets Use 1 each As Directed 3 (three) times a day. Dx E11.65 DM2, uncontrolled    ? HYDROmorphone (DILAUDID) 4 MG tablet Take 0.5-1 tablets (2-4 mg total) by mouth 4 (four) times a day as needed for pain. For use from 12/11/17 to 1/10/18.  RX 1/1.    ? [START ON 1/10/2018] HYDROmorphone (DILAUDID) 4 MG tablet Take 0.5-1 tablets (2-4 mg total) by mouth 4 (four) times a day as needed for pain. For use from 1/10/2018 to 2/9/2018.  RX 2/2.    ? insulin glargine (LANTUS; BASAGLAR) 100 unit/mL (3 mL) pen Inject 15 Units " under the skin at bedtime.    ? levothyroxine (SYNTHROID, LEVOTHROID) 125 MCG tablet Take 1 tablet (125 mcg total) by mouth daily.    ? magnesium 30 mg tablet Take 30 mg by mouth 2 (two) times a day.    ? NOVOLIN N 100 unit/mL injection ADM 40 UNITS SC TID 12/10/2017: Received from: External Pharmacy   ? NOVOLOG 100 unit/mL injection INJECT 40 UNITS SUBCUTANEOUSLY TID UTD 12/10/2017: Received from: External Pharmacy   ? NOVOLOG FLEXPEN 100 unit/mL injection pen Check blood sugar four (4) times daily. AC/HS    ? omeprazole (PRILOSEC) 20 MG capsule Take 1 capsule (20 mg total) by mouth daily before breakfast.    ? polyethylene glycol (MIRALAX) 17 gram packet Take 1 packet (17 g total) by mouth daily as needed.    ? temazepam (RESTORIL) 15 mg capsule Take 15 mg by mouth at bedtime as needed.     ? warfarin (COUMADIN) 5 MG tablet Take 1-1.5 tablets (5-7.5 mg total) by mouth See Admin Instructions. Takes 5mg on MWF, 7.5mg all other days.  Goal INR 2.5-3.5.      Social History     Social History Narrative    Patient of Dr. Bach since 2001.  .        Former patient of Dr. Harshad Ballard.     ______________________________________________________________________     History of present illness:    We first reviewed her blood loss anemia.    This occurred over some period of 6 months or slightly greater.  She was found to have colon polyps and recently saw a surgeon for possible colectomy (partial).  I agree with the surgeons assessment that we would prefer to try to do this through a colonoscopy method rather than surgery.  The patient is not a great risk for surgery.  We are going to follow-up on her anemia with blood work today.    Her mitral valve replacement is still doing well without shortness of breath or other symptoms.  She is back on Coumadin and is coming back.    We reviewed her back pain and this continues to radiate down her legs it seems to be more on the right than the left.  It is mainly over the sides  "of the legs.  We reviewed her MRI from 2010.  Right now, we do not necessarily want to do something about this but this may need to be done in the future.    She has a fair amount of bruising over her abdomen related to the Lovenox shots.  She has a small hematoma at one spot and this was reviewed.    We reviewed her diabetes and this is doing okay at this time.    She recently had a PillCam study early last month but does not feel she expelled the pill and we decided to go ahead and do an x-ray with this.  She does have some chronic bloating noted.    On review of systems, the patient denies any chest pain or shortness of breath.    ______________________________________________________________________    Exam:    Wt Readings from Last 3 Encounters:   01/02/18 160 lb 4.8 oz (72.7 kg)   12/22/17 155 lb (70.3 kg)   12/11/17 155 lb 9.6 oz (70.6 kg)     BP Readings from Last 3 Encounters:   01/02/18 132/74   12/22/17 122/60   12/11/17 136/70     /74  Pulse 87  Temp 98.1  F (36.7  C) (Oral)   Ht 5' 1\" (1.549 m)  Wt 160 lb 4.8 oz (72.7 kg)  SpO2 98%  BMI 30.29 kg/m2    The patient is comfortable, no acute distress.  Mood good.  Insight good.  Eyes are nonicteric.  Neck is supple without mass.  No cervical adenopathy.  No thyromegaly. Heart regular rate and rhythm.  Valve click is stable.  Lungs clear to auscultation bilaterally.  Respiratory effort is good.  Abdomen soft and nontender.  She does have moderate bloating which is normal for her.  No hepatosplenomegaly.  Extremities no edema.      ______________________________________________________________________    Diagnostics:    Results for orders placed or performed in visit on 01/02/18   HM2(CBC w/o Differential)   Result Value Ref Range    WBC 5.6 4.0 - 11.0 thou/uL    RBC 4.19 3.80 - 5.40 mill/uL    Hemoglobin 11.7 (L) 12.0 - 16.0 g/dL    Hematocrit 35.6 35.0 - 47.0 %    MCV 85 80 - 100 fL    MCH 27.9 27.0 - 34.0 pg    MCHC 32.8 32.0 - 36.0 g/dL    RDW " 15.4 (H) 11.0 - 14.5 %    Platelets 153 140 - 440 thou/uL    MPV 9.3 7.0 - 10.0 fL   Iron and Transferrin Iron Binding Capacity   Result Value Ref Range    Iron 53 42 - 175 ug/dL    Transferrin 307 212 - 360 mg/dL    Transferrin Saturation, Calculated 14 (L) 20 - 50 %    Transferrin IBC, Calculated 383 313 - 563 ug/dL   Ferritin   Result Value Ref Range    Ferritin 84 10 - 130 ng/mL   Basic Metabolic Panel   Result Value Ref Range    Sodium 136 136 - 145 mmol/L    Potassium 4.7 3.5 - 5.0 mmol/L    Chloride 98 98 - 107 mmol/L    CO2 26 22 - 31 mmol/L    Anion Gap, Calculation 12 5 - 18 mmol/L    Glucose 195 (H) 70 - 125 mg/dL    Calcium 9.2 8.5 - 10.5 mg/dL    BUN 15 8 - 28 mg/dL    Creatinine 0.88 0.60 - 1.10 mg/dL    GFR MDRD Af Amer >60 >60 mL/min/1.73m2    GFR MDRD Non Af Amer >60 >60 mL/min/1.73m2   INR   Result Value Ref Range    INR 2.50 (H) 0.90 - 1.10      ______________________________________________________________________    Pertinent radiology for this visit includes the following:    Xr Chest 2 Views    Result Date: 11/28/2017  XR CHEST 2 VIEWS 11/28/2017 3:43 PM INDICATION: chest pain equivalent COMPARISON: 12/19/2016 FINDINGS: There is mild pulmonary vascular congestion with mild interstitial opacities. The interstitial opacities are unchanged and may be chronic. The cardiac silhouette and pleural spaces appear normal. A prosthetic cardiac valve and atrial appendage clip are again noted with median sternotomy wires.    Xr Abdomen Flat And Upright    Result Date: 1/2/2018  EXAM DATE:         01/02/2018 [facility] X-RAY ABDOMEN, 2 VIEWS 1/2/2018 5:15 PM INDICATION: PILL CAM NOT EXPELLED OVER 4 WEEKS. COMPARISON: None. FINDINGS: No remaining pill cam. Bowel gas pattern is normal. No obstruction. Postop changes gallbladder fossa.       ______________________________________________________________________     ______________________________________________________________________    Assessment:    1. Abdominal bloating    2. HTN (hypertension)    3. Paroxysmal atrial fibrillation - treated during surgery in 2015    4. S/P MVR (mitral valve replacement) - 23 mm [employer] mechanical valve - 2015    5. Blood loss anemia    6. Type 2 diabetes mellitus without complication, with long-term current use of insulin    7. Tubular adenoma of colon    8. Low back pain radiating to both legs       ______________________________________________________________________     PHQ-2 Total Score: 1 (1/4/2018 12:00 PM)  No Data Recorded  ______________________________________________________________________       Plan:    1. We did an x-ray of her belly which shows no residual PillCam.  2. We reviewed her CT scan of her abdomen and this was scanned into the chart.  3. I will try to contact Dr. Wade's office to coordinate care.  4. There is no signs of iron deficiency at this time.  5. There is no signs of active blood loss.  6. Await results from the PillCam study.  7. Continue current diabetes management.  8. Continue current anticoagulation.  9. Patient will need closer monitoring of her blood levels given the acute change that she had in her blood.  10. I would prefer the patient undergo repeat colonoscopy rather than surgery.  The patient was referred to surgery and his note is reviewed.  11. Consider follow-up MRI and injection if needed in the future.  Refill hydrocodone.  Patient comes in today with her  for follow-up.  12.   13. We reviewed her blood loss anemia.       Gabino Bach MD  General Internal Medicine  [facility] [[email or web address]]     Personal office fax - [phone]   Voice mail - [phone]  E-mail - [email or web address]      Return in about 4 weeks (around 1/30/2018) for visit and blood work.     Future Appointments  Date Time Provider Department Center   1/30/2018 1:25 PM Gabino Bach MD [facility]  [facility]

## 2021-06-26 NOTE — PROGRESS NOTES
Progress Notes by Gabino Bach MD at 4/20/2018 11:45 AM     Author: Gabino Bach MD Service: -- Author Type: Physician    Filed: 4/23/2018  8:44 AM Encounter Date: 4/20/2018 Status: Signed    : Gabino Bach MD (Physician)              [facility]    Comprehensive and complex medical care - Chronic disease management - Shared decision making - Care coordination - Compassionate care    Patient advocacy - Rational deprescribing - Minimally disruptive medicine - Ethical focus - Customized care    ______________________________________________________________________     Date of Service: 4/20/2018  Primary Provider: Gabino Bach MD    Patient Care Team:  Gabino Bach MD as PCP - General (Internal Medicine)  Ayanna Camacho MD as Physician (Cardiology)  Al García MD as Physician (Ophthalmology)  Ishan Wade MD as Physician (Gastroenterology)  Mallory rGover RN as Registered Nurse     ______________________________________________________________________     Patient's Pharmacy:    [facility] [address] [place] [address] [address] [place] [address] & [address] [address]  Phone: [phone] Fax: [phone]     Patient's Contacts:  Name Home Phone Work Phone Mobile Phone Relationship Lgl Bruced   ISHAN VALENTIN [phone]   Spouse    ANDREA VALENTIN [phone]   Child      Patient's Insurance:    Payor: MEDICA / Plan: MEDICA [employer] / Product Type: COST PLAN /     ______________________________________________________________________     Bernadette Valentin is 77 y.o. female who comes in today for:    Chief Complaint   Patient presents with   ? Hospital Visit Follow Up     5.8 HGB FEELING A LOT BETTER, HAS NOT HAD A BM SO UNSURE IF THEY ARE STILL DARK   ? Medication Questions     SHOULD TAKE OMEPRAZOLE IT WAS RX IN THE HOSPITAL       Patient Active Problem List   Diagnosis   ? Hypothyroidism   ? HLD  "(hyperlipidemia)   ? Anxiety   ? Chronic pain - Dr. Bach manages the pain   ? CN (constipation)   ? Insomnia   ? Restless legs syndrome (RLS)   ? Lumbosacral plexopathy - chronic pain related to this   ? Full code status - POLST form 1/2/16   ? IBS (irritable bowel syndrome)   ? ASCVD (arteriosclerotic cardiovascular disease) - CABG 2015   ? Subclavian artery stenosis, left - s/p stent   ? Atrial fibrillation, Now in NSR   ? HTN (hypertension)   ? Psoriasis   ? Former smoker   ? S/P mitral valve replacement   ? DM type 2 (diabetes mellitus, type 2)   ? Anticoagulation goal of INR 2.5 to 3.5   ? Abdominal bloating, chronic   ? Recurrent UTI (urinary tract infection)   ? MRSA (methicillin resistant staph aureus) culture positive   ? Proteinuria   ? Controlled substance agreement signed - 1/18 - temazepam, lorazepam, hydromorphone - UDS 4/18   ? Blood loss anemia   ? GIB (gastrointestinal bleeding)      Current Outpatient Prescriptions   Medication Sig Note   ? ACCU-CHEK SMARTVIEW TEST STRIP strips TEST TID    ? BD ULTRA-FINE DAVID PEN NEEDLES 32 gauge x 5/32\" Ndle USE WITH NOVOLOG PEN AND LANTUS PENS    ? cholecalciferol, vitamin D3, 5,000 unit Tab Take 5,000 Units by mouth daily.     ? cyanocobalamin (VITAMIN B-12) 100 MCG tablet Take 100 mcg by mouth daily.    ? furosemide (LASIX) 40 MG tablet Take 40 mg by mouth daily.    ? generic lancets Use 1 each As Directed 3 (three) times a day. Dx E11.65 DM2, uncontrolled    ? HYDROmorphone (DILAUDID) 4 MG tablet Take 0.5-1 tablets (2-4 mg total) by mouth 4 (four) times a day as needed for pain. For use from 4/10/2018 to 5/10/2018.  RX 1/2.    ? [START ON 5/10/2018] HYDROmorphone (DILAUDID) 4 MG tablet Take 0.5-1 tablets (2-4 mg total) by mouth 4 (four) times a day as needed for pain. For use from 5/10/2018 to 6/9/2018.  RX 2/2.    ? insulin aspart U-100 (NOVOLOG) 100 unit/mL injection Inject 10 Units under the skin 3 (three) times a day as needed. 4/17/2018: njects based " on own sliding scale with each meal as needed based on sugars. Average of 10 units per dose   ? insulin NPH (NOVOLIN) 100 unit/mL injection Inject 15 Units under the skin 3 (three) times a day as needed. 4/17/2018: Injects based on own sliding scale with each meal as needed based on sugars. Average of 15 units per dose   ? levothyroxine (SYNTHROID, LEVOTHROID) 125 MCG tablet Take 1 tablet (125 mcg total) by mouth daily.    ? magnesium 30 mg tablet Take 30 mg by mouth 2 (two) times a day.    ? omeprazole (PRILOSEC) 20 MG capsule Take 1 capsule (20 mg total) by mouth daily before breakfast.    ? polyethylene glycol (MIRALAX) 17 gram packet Take 1 packet (17 g total) by mouth daily as needed.    ? temazepam (RESTORIL) 15 mg capsule Take 1 capsule (15 mg total) by mouth at bedtime as needed.    ? warfarin (COUMADIN) 5 MG tablet Take 5-7.5 mg by mouth See Admin Instructions. Take 7.5mg on Wednesdays and Saturdays. Take 5mg all other days of the week.    ? ferrous sulfate 325 (65 FE) MG tablet Take 1 tablet (325 mg total) by mouth every other day.      Social History     Social History Narrative    Patient of Dr. Bach since 2001.  .        Former patient of Dr. Harshad Ballard.     ______________________________________________________________________     History of present illness:    Patient comes in today with her  for follow-up after hospital stay.    She recently had colonoscopy with polypectomy in March.  Her hemoglobin was starting to trend downwards at that time.    She presented with blood loss anemia with a low hemoglobin.  She did receive 2 units of transfused blood.  She was seen by GI but not felt necessary to have any procedures done.  She did have positive stools for blood.  She had recently started taking iron every other day.  We noted that she started having black stools around this time.  She was symptomatic with the anemia and felt dizzy and a little bit more short of breath.    GI wondered  whether she had AVMs in the small bowel accounting for this.  We reviewed this with her.  They had recommended a pill placed endoscopically for visualization, but the patient does not want to do this at this point.    She is feeling better with the transfusion and her hemoglobin is doing well today.    We reviewed her breathing and this is doing well.    We reviewed her mitral valve replacement and she has remained on the warfarin.  We reviewed may be running her INR a little bit on the lower and at this time given the current situation.    On review of systems, the patient denies any chest pain or shortness of breath.    ______________________________________________________________________    Exam:    Wt Readings from Last 3 Encounters:   04/20/18 159 lb 8 oz (72.3 kg)   04/19/18 158 lb 14.4 oz (72.1 kg)   04/06/18 161 lb (73 kg)     BP Readings from Last 3 Encounters:   04/20/18 136/68   04/19/18 135/63   04/06/18 138/68     /68  Pulse 73  Temp 98.3  F (36.8  C) (Oral)   Wt 159 lb 8 oz (72.3 kg)  SpO2 97%  BMI 29.17 kg/m2    The patient is comfortable, no acute distress.  Mood good.  Insight good.  Eyes are nonicteric.  Neck is supple without mass.  No cervical adenopathy.  No thyromegaly. Heart regular rate and rhythm.  Lungs clear to auscultation bilaterally.  Respiratory effort is good.  Abdomen soft and nontender.  No hepatosplenomegaly.  Extremities no edema.      ______________________________________________________________________    Diagnostics:    Results for orders placed or performed in visit on 04/20/18   Hemoglobin   Result Value Ref Range    Hemoglobin 9.3 (L) 12.0 - 16.0 g/dL   INR   Result Value Ref Range    INR 2.80 (H) 0.90 - 1.10      ______________________________________________________________________    Assessment:    1. Blood loss anemia    2. GIB (gastrointestinal bleeding)    3. Black stools    4. S/P colonoscopic polypectomy    5. S/P mitral valve replacement    6. Hospital  discharge follow-up       ______________________________________________________________________     PHQ-2 Total Score: 1 (4/20/2018 12:00 PM)  No Data Recorded  ______________________________________________________________________       Plan:    1. She will come in for blood level next week.  I have recommended this.  2. Close follow-up.  3. She should run her INR ideally be between 2.5 and 3.0 if possible over the next month to 2 months.  4. Continue iron, but be aware that it will make her stools dark.  5. The patient elects not to have pill endoscopy as she is unsure whether will really change her management.  6. Continue other medications as is.       Gabino Bach MD  General Internal Medicine  [facility]     Personal office fax - [phone]   Voice mail - [phone]  E-mail - [email or web address]      Return in about 3 weeks (around 5/11/2018) for visit and blood work.     Future Appointments  Date Time Provider Department Center   4/26/2018 11:30 AM [facility]   5/7/2018 11:20 AM Gabino Bach MD [facility]   7/17/2018 11:20 AM Gabino Bach MD [facility]

## 2021-06-26 NOTE — PROGRESS NOTES
Progress Notes by Gabino Bach MD at 1/30/2018  1:25 PM     Author: Gabino Bach MD Service: -- Author Type: Physician    Filed: 1/31/2018  9:29 AM Encounter Date: 1/30/2018 Status: Signed    : Gabino Bach MD (Physician)              [facility]    Comprehensive and complex medical care - Chronic disease management - Shared decision making - Care coordination - Compassionate care    Patient advocacy - Rational deprescribing - Minimally disruptive medicine - Ethical focus - Customized care    ______________________________________________________________________     Date of Service: 1/30/2018  Primary Provider: Gabino Bach MD    Patient Care Team:  Gabino Bach MD as PCP - General (Internal Medicine)  Ayanna Camacho MD as Physician (Cardiology)  Al García MD as Physician (Ophthalmology)  Ishan Wade MD as Physician (Gastroenterology)     ______________________________________________________________________     Patient's Pharmacy:    [facility] [address] [place] [id number]  Phone: [phone] Fax: [phone]     Patient's Contacts:  Name Home Phone Work Phone Mobile Phone Relationship Lgl Grd   ISHAN VALENTIN [phone]   Spouse    ANDREA VALENTIN [phone]   Child      Patient's Insurance:    Payor: MEDICA / Plan: MEDICA [employer] / Product Type: COST PLAN /     ______________________________________________________________________     Bernadette Valentin is 77 y.o. female who comes in today for:    Chief Complaint   Patient presents with   ? Follow-up     arthritis is bad, abd still bloating, blood sugar has been high   ? Labs Only     INR   ? Medication Refill     pain med       Patient Active Problem List   Diagnosis   ? Hypothyroidism   ? HLD (hyperlipidemia)   ? Anxiety   ? Chronic pain - Dr. Bach manages the pain   ? CN  "(constipation)   ? Insomnia   ? Restless legs syndrome (RLS)   ? Lumbosacral plexopathy - chronic pain related to this   ? Full code status - POLST form 1/2/16   ? IBS (irritable bowel syndrome)   ? ASCVD (arteriosclerotic cardiovascular disease) - CABG 2015   ? Subclavian artery stenosis, left - s/p stent   ? Atrial fibrillation   ? HTN (hypertension)   ? Psoriasis   ? Former smoker   ? S/P mitral valve replacement   ? DM type 2 (diabetes mellitus, type 2)   ? Anticoagulation goal of INR 2.5 to 3.5   ? Abdominal bloating, chronic   ? Recurrent UTI (urinary tract infection)   ? MRSA (methicillin resistant staph aureus) culture positive   ? Proteinuria   ? Joint pain   ? Controlled substance agreement signed - 1/18 - temazepam, lorazepam, hydromorphone - UDS 4/17   ? Blood loss anemia   ? CKD (chronic kidney disease), stage III      Current Outpatient Prescriptions   Medication Sig Note   ? ACCU-CHEK SMARTVIEW TEST STRIP strips TEST TID    ? BD ULTRA-FINE DAVID PEN NEEDLES 32 gauge x 5/32\" Ndle USE WITH NOVOLOG PEN AND LANTUS PENS 12/10/2017: Received from: External Pharmacy   ? cholecalciferol, vitamin D3, 5,000 unit Tab Take 5,000 Units by mouth daily.     ? cyanocobalamin (VITAMIN B-12) 100 MCG tablet Take 100 mcg by mouth daily.    ? enoxaparin (LOVENOX) 80 mg/0.8 mL syringe  12/11/2017: Received from: External Pharmacy   ? furosemide (LASIX) 40 MG tablet Take 40 mg by mouth daily.    ? generic lancets Use 1 each As Directed 3 (three) times a day. Dx E11.65 DM2, uncontrolled    ? HYDROmorphone (DILAUDID) 4 MG tablet Take 0.5-1 tablets (2-4 mg total) by mouth 4 (four) times a day as needed for pain. For use from 12/11/17 to 1/10/18.  RX 1/1.    ? HYDROmorphone (DILAUDID) 4 MG tablet Take 0.5-1 tablets (2-4 mg total) by mouth 4 (four) times a day as needed for pain. For use from 1/10/2018 to 2/9/2018.  RX 2/2.    ? insulin glargine (LANTUS; BASAGLAR) 100 unit/mL (3 mL) pen Inject 15 Units under the skin at bedtime.  "   ? levothyroxine (SYNTHROID, LEVOTHROID) 125 MCG tablet Take 1 tablet (125 mcg total) by mouth daily.    ? magnesium 30 mg tablet Take 30 mg by mouth 2 (two) times a day.    ? NOVOLOG 100 unit/mL injection INJECT 40 UNITS SUBCUTANEOUSLY TID    ? NOVOLOG FLEXPEN 100 unit/mL injection pen Check blood sugar four (4) times daily. AC/HS    ? omeprazole (PRILOSEC) 20 MG capsule Take 1 capsule (20 mg total) by mouth daily before breakfast.    ? polyethylene glycol (MIRALAX) 17 gram packet Take 1 packet (17 g total) by mouth daily as needed.    ? temazepam (RESTORIL) 15 mg capsule Take 1 capsule (15 mg total) by mouth at bedtime as needed.    ? warfarin (COUMADIN) 5 MG tablet Take 1-1.5 tablets (5-7.5 mg total) by mouth See Admin Instructions. Takes 5mg on MWF, 7.5mg all other days.  Goal INR 2.5-3.5.      Social History     Social History Narrative    Patient of Dr. Bach since 2001.  .        Former patient of Dr. Harshad Ballard.     ______________________________________________________________________     History of present illness:    Accompanied by  at today's visit.       Reviewed her anemia and abnormal colonoscopy and CT colonography.  I have reviewed her case with Dr. Wade and the plan is for bridging anticoagulation and inpatient bowel prep for the colonoscopy.  She asks some questions in relationship to this today.    We are rechecking her anemia.    She did have right midfoot pain about 1-2 weeks ago with limping and this is better at this time.  Did have some mild swelling.    Reviewed her chronic pain and back pain.  We might look into the back a bit more in the future.  It does go down her legs.  We renewed her pain refills and renewed her CSA.    Reviewed her hypertension today.  Blood pressure has been in the goal range.  Denies any excessive dizziness from the medication with this.     We reviewed the patient's diabetes and it is doing okay.  No significant hypoglycemia.      She has had  colonic distention as well.    On review of systems, the patient denies any chest pain or shortness of breath.    ______________________________________________________________________    Exam:    Wt Readings from Last 3 Encounters:   01/30/18 157 lb 6.4 oz (71.4 kg)   01/02/18 160 lb 4.8 oz (72.7 kg)   12/22/17 155 lb (70.3 kg)     BP Readings from Last 3 Encounters:   01/30/18 132/70   01/02/18 132/74   12/22/17 122/60     /70  Pulse 74  Wt 157 lb 6.4 oz (71.4 kg)  SpO2 97%  BMI 29.74 kg/m2    The patient is comfortable, no acute distress.  Mood good.  Insight fair to good.  Eyes are nonicteric.  Neck is supple without mass.  No cervical adenopathy.  No thyromegaly. Heart irregular rate and rhythm.  Lungs clear to auscultation bilaterally.  Respiratory effort is good.  Abdomen soft and nontender.  Distended.  No hepatosplenomegaly.  Extremities no edema.      ______________________________________________________________________    Diagnostics:    Results for orders placed or performed in visit on 01/30/18   HM2(CBC w/o Differential)   Result Value Ref Range    WBC 7.1 4.0 - 11.0 thou/uL    RBC 4.45 3.80 - 5.40 mill/uL    Hemoglobin 12.7 12.0 - 16.0 g/dL    Hematocrit 38.0 35.0 - 47.0 %    MCV 85 80 - 100 fL    MCH 28.5 27.0 - 34.0 pg    MCHC 33.4 32.0 - 36.0 g/dL    RDW 14.5 11.0 - 14.5 %    Platelets 164 140 - 440 thou/uL    MPV 8.8 7.0 - 10.0 fL   Iron and Transferrin Iron Binding Capacity   Result Value Ref Range    Iron 84 42 - 175 ug/dL    Transferrin 270 212 - 360 mg/dL    Transferrin Saturation, Calculated 25 20 - 50 %    Transferrin IBC, Calculated 338 313 - 563 ug/dL   Ferritin   Result Value Ref Range    Ferritin 43 10 - 130 ng/mL      ______________________________________________________________________    Assessment:    1. Blood loss anemia    2. Anemia    3. DM type 2 (diabetes mellitus, type 2)    4. Insomnia    5. Hypothyroidism    6. S/P mitral valve replacement    7. Joint pain    8.  Right foot pain    9. Controlled substance agreement signed - 1/18 - temazepam, lorazepam, hydromorphone - UDS 4/17    10. Colon distention       ______________________________________________________________________     PHQ-2 Total Score: 1 (1/4/2018 12:00 PM)  No Data Recorded  ______________________________________________________________________       Plan:    1. Check blood work today. CHECK A1c next visit.  2. Follow up in 4 weeks.  3. Bridging anticoagulation for upcoming colonoscopy.  Recommended to have colonoscopy given findings on CT scan.  4. Right foot pain could be due to gout.  5. Back pain could be due to spinal findings or autoimmune disease.  Follow at this time.  6. Renewed -  Controlled substance agreement completed by the patient today and will be scanned into the chart.   7. Pain meds refilled.       Gabino Bach MD  General Internal Medicine  [facility]     Personal office fax - [phone]   Voice mail - [phone]  E-mail - [email or web address]      Return in about 4 weeks (around 2/27/2018) for visit and blood work.     Future Appointments  Date Time Provider Department Center   2/13/2018 11:30 AM [facility]   2/26/2018 11:20 AM Gabino Bach MD [facility]

## 2021-06-27 NOTE — PROGRESS NOTES
Progress Notes by Gabino Bach MD at 9/3/2019  2:40 PM     Author: Gabino Bach MD Service: -- Author Type: Physician    Filed: 9/5/2019  9:24 AM Encounter Date: 9/3/2019 Status: Signed    : Gabino Bach MD (Physician)              [facility]    Comprehensive and complex medical care - Chronic disease management - Shared decision making - Care coordination - Compassionate care    Patient advocacy - Rational deprescribing - Minimally disruptive medicine - Ethical focus - Customized care    ______________________________________________________________________     Date of Service: 9/3/2019  Primary Provider: Gabino Bach MD    Patient Care Team:  Gabino Bach MD as PCP - General (Internal Medicine)  Ayanna Camacho MD as Physician (Cardiology)  Al García MD as Physician (Ophthalmology)  Mauro, Ishan Escobar MD as Physician (Gastroenterology)  [facility], Mallory MCCRAY RN as Registered Nurse  Gabino Bach MD (Internal Medicine)  Gabino Bach MD as Assigned PCP     ______________________________________________________________________     Patient's Pharmacy:    [id number] [place], [place] - [address] [place] [address] AT [facility] [address]  [address] [place] [id number]  Phone: [phone] Fax: [phone]     Patient's Contacts:  Name Home Phone Work Phone Mobile Phone Relationship Lgl Grd   ISHAN VALENTIN [phone]   Spouse    ANDREA VALENTIN [phone]   Child      Patient's Insurance:    Payor: MEDICARE / Plan: MEDICARE A AND B / Product Type: Medicare /   ______________________________________________________________________   ______________________________________________________________________     Bernadette NIMCO Valentin is a 78 y.o. female who comes in today for:    Chief Complaint   Patient presents with   ? Follow-up     still has leg pain worse (they are hard now), foot swelling, still has abd bloating   ? Test  "Results       Active Problem List:  Problem List as of 9/3/2019 Reviewed: 9/3/2019 11:03 PM by Gabino Bach MD       High    Former smoker    Full code status - POLST form 1/2/16    ASCVD (arteriosclerotic cardiovascular disease) - CABG 2015    Atrial fibrillation, Now in NSR (H)    Chronic pain - Dr. Bach manages the pain    DM type 2 (diabetes mellitus, type 2) (H)    S/P mitral valve replacement (MVR)    Subclavian artery stenosis, left (H) - s/p stent       Medium    Controlled substance agreement signed - 1/19 - temazepam, lorazepam, hydromorphone - UDS 4/18    HTN (hypertension)    Abdominal bloating, chronic    Anticoagulation goal of INR 2.5 to 3.5    Anxiety    Hypothyroidism    Recurrent UTI (urinary tract infection)       Low    Bleeding diathesis (H) - due to warfarin    CN (constipation)    HLD (hyperlipidemia)    IBS (irritable bowel syndrome)    Insomnia    Microalbuminuria due to type 2 diabetes mellitus (H)    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 ACCU-CHEK SMARTVIEW TEST STRIP strips Use 1 each As Directed 3 (three) times a day. Dispense brand per patient's insurance at pharmacy discretion.  Dx E11.65 DM2, uncontrolled    ? ascorbic acid, vitamin C, (VITAMIN C) 100 MG tablet Take 100 mg by mouth daily.    ? BD ULTRA-FINE DAVID PEN NEEDLES 32 gauge x 5/32\" Ndle USE WITH NOVOLOG PEN AND LANTUS PENS    ? calcium carbonate (OS-DI) 600 mg calcium (1,500 mg) tablet Take 600 mg by mouth 2 (two) times a day with meals.    ? cholecalciferol, vitamin D3, 5,000 unit Tab Take 5,000 Units by mouth daily.    ? cyanocobalamin (VITAMIN B-12) 100 MCG tablet Take 100 mcg by " mouth daily.    ? digoxin (LANOXIN) 125 mcg tablet Take 1 tablet (125 mcg total) by mouth daily.    ? diltiazem (TIAZAC) 120 MG 24 hr capsule Take 1 capsule (120 mg total) by mouth daily.    ? ferrous sulfate 325 (65 FE) MG tablet Take 1 tablet (325 mg total) by mouth every other day.    ? furosemide (LASIX) 40 MG tablet Take 40 mg by mouth daily.    ? furosemide (LASIX) 40 MG tablet TAKE 1 TABLET BY MOUTH DAILY    ? generic lancets Use 1 each As Directed 3 (three) times a day. Dx E11.65 DM2, uncontrolled    ? [START ON 9/15/2019] HYDROmorphone (DILAUDID) 4 MG tablet Take 0.5-1 tablets (2-4 mg total) by mouth 4 (four) times a day as needed for pain. For use from 9/15/2019 to 10/15/2019.    ? insulin aspart U-100 (NOVOLOG U-100 INSULIN ASPART) 100 unit/mL injection Inject 40 Units under the skin 3 (three) times a day.    ? insulin aspart U-100 (NOVOLOG) 100 unit/mL injection Inject 10 Units under the skin 3 (three) times a day as needed. 4/17/2018: njects based on own sliding scale with each meal as needed based on sugars. Average of 10 units per dose   ? levothyroxine (SYNTHROID, LEVOTHROID) 125 MCG tablet Take 1 tablet (125 mcg total) by mouth daily.    ? magnesium 30 mg tablet Take 30 mg by mouth 2 (two) times a day.    ? NOVOLIN N NPH U-100 INSULIN 100 unit/mL injection ADMINISTER 40 UNITS UNDER THE SKIN THREE TIMES DAILY    ? polyethylene glycol (MIRALAX) 17 gram packet Take 1 packet (17 g total) by mouth daily as needed.    ? silver sulfADIAZINE (SILVADENE, SSD) 1 % cream Apply to affected area daily    ? temazepam (RESTORIL) 15 mg capsule TAKE 1 CAPSULE(15 MG) BY MOUTH AT BEDTIME AS NEEDED FOR SLEEP. MAY REFILL EVERY 90 DAYS ONLY    ? warfarin (COUMADIN) 5 MG tablet Take 5-7.5 mg by mouth See Admin Instructions. Take 7.5mg on Wednesdays and Saturdays. Take 5mg all other days of the week.    ? warfarin (COUMADIN/JANTOVEN) 5 MG tablet TAKE 1 TABLET BY MOUTH ON MONDAY, WEDNESDAY, FRIDAY. TAKE 1 AND 1/2 TABLETS BY  MOUTH ON ALL OTHER DAYS      Social History     Social History Narrative    Patient of Dr. Bach since 2001.   to  Aneudy.        4 children.        Former patient of Dr. Harshad Ballard.     ______________________________________________________________________     History of present illness:    Roomed by: lissa nguyen    Accompanied by Spouse    Refills needed? No    Do you have any forms that need to be filled out? No      We first reviewed her and she usually does not have edema.  We did start Cardizem at the last visit.  She has it in both legs and its below the knee.  Her breathing has been short previously but it is no shorter than previous.  Her weight is stable.  She denies any new chest pain.    She does have chronic back and leg pain with foraminal stenosis.  With the swelling in her legs it seems to make the pain worse for her in this area.  She has had no redness in the legs.    We reviewed her anemia and she does have issues with this.  We have continued to monitor.  She has not noticed any changes.    She continues taking furosemide 40 mg a day.    She continues to have abdominal bloating which has been consistent for a while.    We reviewed her other issues noted in the assessment but not specifically addressed in the HPI above.     On review of systems, the patient denies any chest pain or shortness of breath.    ______________________________________________________________________    Exam:    Wt Readings from Last 3 Encounters:   08/07/19 155 lb (70.3 kg)   05/30/19 156 lb 14.4 oz (71.2 kg)   03/19/19 156 lb 12.8 oz (71.1 kg)     BP Readings from Last 3 Encounters:   09/03/19 132/74   08/20/19 124/58   08/12/19 124/72     /74   Pulse (!) 122   SpO2 99%    The patient is comfortable, no acute distress.  Mood good to slightly down.  Insight good.  Eyes are nonicteric.  Neck is supple without mass.  No cervical adenopathy.  No thyromegaly. Heart reveals an irregular rhythm and a rapid  rate.  Lungs clear to auscultation bilaterally.  Respiratory effort is good.  Abdomen soft and nontender.  No hepatosplenomegaly.  Extremities 1-2+ bilateral lower extremity edema.    ______________________________________________________________________    Diagnostics:    Results for orders placed or performed in visit on 09/03/19   BNP(B-type Natriuretic Peptide)   Result Value Ref Range     (H) 0 - 155 pg/mL   Basic Metabolic Panel   Result Value Ref Range    Sodium 141 136 - 145 mmol/L    Potassium 4.4 3.5 - 5.0 mmol/L    Chloride 103 98 - 107 mmol/L    CO2 24 22 - 31 mmol/L    Anion Gap, Calculation 14 5 - 18 mmol/L    Glucose 268 (H) 70 - 125 mg/dL    Calcium 9.0 8.5 - 10.5 mg/dL    BUN 21 8 - 28 mg/dL    Creatinine 1.16 (H) 0.60 - 1.10 mg/dL    GFR MDRD Af Amer 54 (L) >60 mL/min/1.73m2    GFR MDRD Non Af Amer 45 (L) >60 mL/min/1.73m2   INR   Result Value Ref Range    INR 4.50 (H) 0.90 - 1.10   HM2(CBC w/o Differential)   Result Value Ref Range    WBC 4.6 4.0 - 11.0 thou/uL    RBC 2.72 (L) 3.80 - 5.40 mill/uL    Hemoglobin 8.3 (L) 12.0 - 16.0 g/dL    Hematocrit 26.2 (L) 35.0 - 47.0 %    MCV 96 80 - 100 fL    MCH 30.6 27.0 - 34.0 pg    MCHC 31.8 (L) 32.0 - 36.0 g/dL    RDW 13.6 11.0 - 14.5 %    Platelets 172 140 - 440 thou/uL    MPV 10.0 7.0 - 10.0 fL   Digoxin (Lanoxin )   Result Value Ref Range    Digoxin 0.5 0.5 - 2.0 ng/mL   Reticulocytes   Result Value Ref Range    Retic Absolute Count 0.210 (H) 0.010 - 0.110 mill/uL    Retic Ct Pct 8.09 (H) 0.8 - 2.7 %     ______________________________________________________________________    Assessment:    1. Bilateral lower extremity edema    2. SOB (shortness of breath)    3. Essential hypertension    4. S/P mitral valve replacement (MVR)    5. Anticoagulation goal of INR 2.5 to 3.5    6. Atrial flutter, unspecified type (H)    7. Anemia, unspecified type    8. Multilevel neural foraminal stenosis    9. Chronic pain syndrome       ______________________________________________________________________     PHQ-2 Total Score: 1 (5/30/2019  1:00 PM)    No data recorded  ______________________________________________________________________     BMI Readings from Last 1 Encounters:   08/07/19 27.46 kg/m      ______________________________________________________________________    Plan:    1. Check blood work today.  See relevant orders and diagnosis associations at the bottom of this note.  2. Patient would like to keep things stable if possible.  We might increase her furosemide slightly.  3. She will continue to have close follow-up.  4. We talked about having her see cardiology again for consideration of cardioversion.  She does not want to follow this treatment plan at this point.  We could consider chemical conversion if we needed to.  5. Continue iron tablets at this point.    Gabino Bach MD  General Internal Medicine  [facility]     Return in about 3 weeks (around 9/24/2019) for visit and blood work.     Future Appointments   Date Time Provider Department Center   9/10/2019  1:50 PM [facility]   9/20/2019 11:20 [facility]         ______________________________________________________________________     Relevant ICD-10 codes and order associations:      ICD-10-CM    1. Bilateral lower extremity edema R60.0    2. SOB (shortness of breath) R06.02 BNP(B-type Natriuretic Peptide)   3. Essential hypertension I10 Basic Metabolic Panel   4. S/P mitral valve replacement (MVR) Z95.2 INR   5. Anticoagulation goal of INR 2.5 to 3.5 Z51.81 INR    Z79.01    6. Atrial flutter, unspecified type (H) I48.92 INR     Digoxin (Lanoxin )   7. Anemia, unspecified type D64.9 HM2(CBC w/o Differential)     Reticulocytes   8. Multilevel neural foraminal stenosis M43.8X9    9. Chronic pain syndrome G89.4 HYDROmorphone (DILAUDID) 4 MG tablet

## 2021-06-27 NOTE — PROGRESS NOTES
Progress Notes by Gabino Bach MD at 8/9/2019  2:40 PM     Author: Gabino Bach MD Service: -- Author Type: Physician    Filed: 8/12/2019  7:52 AM Encounter Date: 8/9/2019 Status: Signed    : Gabino Bach MD (Physician)              [facility]    Comprehensive and complex medical care - Chronic disease management - Shared decision making - Care coordination - Compassionate care    Patient advocacy - Rational deprescribing - Minimally disruptive medicine - Ethical focus - Customized care    ______________________________________________________________________     Date of Service: 8/9/2019  Primary Provider: Gabino Bach MD    Patient Care Team:  Gabino Bach MD as PCP - General (Internal Medicine)  Ayanna Camacho MD as Physician (Cardiology)  Al García MD as Physician (Ophthalmology)  Mauro, Ishan Escobar MD as Physician (Gastroenterology)  [facility], Mallory MCCRAY RN as Registered Nurse  Gabino Bach MD (Internal Medicine)     ______________________________________________________________________     Patient's Pharmacy:    [id number] [place], [place] - [address] [place] [address] AT [facility] & [address] [address]  Phone: [phone] Fax: [phone]     Patient's Contacts:  Name Home Phone Work Phone Mobile Phone Relationship Lgl ISHAN Carreon [phone]   Spouse    JOYCELYNMEIRANDREA [phone]   Child      Patient's Insurance:    Payor: MEDICARE / Plan: MEDICARE A AND B / Product Type: Medicare /   ______________________________________________________________________   ______________________________________________________________________     Bernadette Yu is a 78 y.o. female who comes in today for:    Chief Complaint   Patient presents with   ? Follow-up     feelling worse, legs are bothering a lot, the heart pain went away       Active Problem List:  Problem List as of 8/9/2019 Reviewed:  "8/6/2019  7:47 AM by Gabino Bach MD       High    Former smoker    Full code status - POLST form 1/2/16    ASCVD (arteriosclerotic cardiovascular disease) - CABG 2015    Atrial fibrillation, Now in NSR (H)    Chronic pain - Dr. Bach manages the pain    DM type 2 (diabetes mellitus, type 2) (H)    S/P mitral valve replacement (MVR)    Subclavian artery stenosis, left (H) - s/p stent       Medium    Controlled substance agreement signed - 1/19 - temazepam, lorazepam, hydromorphone - UDS 4/18    HTN (hypertension)    Abdominal bloating, chronic    Anticoagulation goal of INR 2.5 to 3.5    Anxiety    Hypothyroidism    Recurrent UTI (urinary tract infection)       Low    Bleeding diathesis (H) - due to warfarin    CN (constipation)    HLD (hyperlipidemia)    IBS (irritable bowel syndrome)    Insomnia    Microalbuminuria due to type 2 diabetes mellitus (H)    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 ACCU-CHEK SMARTVIEW TEST STRIP strips Use 1 each As Directed 3 (three) times a day. Dispense brand per patient's insurance at pharmacy discretion.  Dx E11.65 DM2, uncontrolled    ? ascorbic acid, vitamin C, (VITAMIN C) 100 MG tablet Take 100 mg by mouth daily.    ? BD ULTRA-FINE DAVID PEN NEEDLES 32 gauge x 5/32\" Ndle USE WITH NOVOLOG PEN AND LANTUS PENS    ? calcium carbonate (OS-DI) 600 mg calcium (1,500 mg) tablet Take 600 mg by mouth 2 (two) times a day with meals.    ? cholecalciferol, vitamin D3, 5,000 unit Tab Take 5,000 Units by mouth daily.    ? cyanocobalamin (VITAMIN B-12) 100 MCG tablet Take 100 mcg by mouth daily.    ? digoxin (LANOXIN) 125 mcg tablet Take 1 tablet (125 mcg " total) by mouth daily. Monday, Wednesday and Friday.    ? ferrous sulfate 325 (65 FE) MG tablet Take 1 tablet (325 mg total) by mouth every other day.    ? furosemide (LASIX) 40 MG tablet Take 40 mg by mouth daily.    ? generic lancets Use 1 each As Directed 3 (three) times a day. Dx E11.65 DM2, uncontrolled    ? [START ON 8/21/2019] HYDROmorphone (DILAUDID) 4 MG tablet Take 0.5-1 tablets (2-4 mg total) by mouth 4 (four) times a day as needed for pain. For use from 8/21/2019 to 9/20/2019.    ? insulin aspart U-100 (NOVOLOG U-100 INSULIN ASPART) 100 unit/mL injection Inject 40 Units under the skin 3 (three) times a day.    ? insulin aspart U-100 (NOVOLOG) 100 unit/mL injection Inject 10 Units under the skin 3 (three) times a day as needed. 4/17/2018: njects based on own sliding scale with each meal as needed based on sugars. Average of 10 units per dose   ? levothyroxine (SYNTHROID, LEVOTHROID) 125 MCG tablet Take 1 tablet (125 mcg total) by mouth daily.    ? magnesium 30 mg tablet Take 30 mg by mouth 2 (two) times a day.    ? NOVOLIN N NPH U-100 INSULIN 100 unit/mL injection ADMINISTER 40 UNITS UNDER THE SKIN THREE TIMES DAILY    ? polyethylene glycol (MIRALAX) 17 gram packet Take 1 packet (17 g total) by mouth daily as needed.    ? silver sulfADIAZINE (SILVADENE, SSD) 1 % cream Apply to affected area daily    ? temazepam (RESTORIL) 15 mg capsule TAKE 1 CAPSULE(15 MG) BY MOUTH AT BEDTIME AS NEEDED FOR SLEEP. MAY REFILL EVERY 90 DAYS ONLY    ? warfarin (COUMADIN) 5 MG tablet Take 5-7.5 mg by mouth See Admin Instructions. Take 7.5mg on Wednesdays and Saturdays. Take 5mg all other days of the week.    ? warfarin (COUMADIN/JANTOVEN) 5 MG tablet TAKE 1 TABLET BY MOUTH ON MONDAY, WEDNESDAY, FRIDAY. TAKE 1 AND 1/2 TABLETS BY MOUTH ON ALL OTHER DAYS      Social History     Social History Narrative    Patient of Dr. Bach since 2001.   to  Aneudy.        4 children.        Former patient of Dr. Harshad Ballard.      ______________________________________________________________________     History of present illness:    Patient comes in today with her .    We reviewed her acute blood loss anemia.  She had a RBC tagged red cell study which was negative.  She had transfusion of 2 units of blood.  She has had no signs of blood loss in her stools, but she had acute drop in her hemoglobin over the last 2 months.  She is feeling better with the transfusion.  She did have a full day of intervention for this.  She did get Lasix in between the units.  She denies any issues with breathing at this time.    Reviewed her atrial flutter.  She is taking digoxin as ordered.  We will likely add additional medications as we go.  She denies any chest pain or chest pressure.    We reviewed her mitral valve replacement and her anticoagulation which likely is precipitating the blood loss.    We reviewed her chronic back pain which continues to be an issue for her and gives us some pause for any intervention given her other medical concerns.    We reviewed her other issues noted in the assessment but not specifically addressed in the HPI above.     On review of systems, the patient denies any chest pain or shortness of breath.    ______________________________________________________________________    Exam:    Wt Readings from Last 3 Encounters:   08/07/19 155 lb (70.3 kg)   05/30/19 156 lb 14.4 oz (71.2 kg)   03/19/19 156 lb 12.8 oz (71.1 kg)     BP Readings from Last 3 Encounters:   08/09/19 124/60   08/08/19 125/80   08/05/19 126/54     /60   Pulse (!) 129   SpO2 98%    The patient is comfortable, no acute distress.  Mood good.  Insight okay.  Eyes are nonicteric.  Neck is supple without mass.  No cervical adenopathy.  No thyromegaly. Heart tachycardic rate and regular rhythm.  Lungs clear to auscultation bilaterally.  Respiratory effort is good.  Abdomen soft and nontender.  No hepatosplenomegaly.  Extremities no  edema.    ______________________________________________________________________    Diagnostics:    Results for orders placed or performed in visit on 08/09/19   Hemoglobin   Result Value Ref Range    Hemoglobin 9.1 (L) 12.0 - 16.0 g/dL   Iron and Transferrin Iron Binding Capacity   Result Value Ref Range    Iron 336 (H) 42 - 175 ug/dL    Transferrin 292 212 - 360 mg/dL    Transferrin Saturation, Calculated 92 (H) 20 - 50 %    Transferrin IBC, Calculated 365 313 - 563 ug/dL   Ferritin   Result Value Ref Range    Ferritin 25 10 - 130 ng/mL   Digoxin (Lanoxin )   Result Value Ref Range    Digoxin <0.3 (L) 0.5 - 2.0 ng/mL     ______________________________________________________________________    Assessment:    1. Anemia, unspecified type    2. Atrial flutter, unspecified type (H)    3. ASCVD (arteriosclerotic cardiovascular disease) - CABG 2015    4. Chronic pain syndrome    5. S/P mitral valve replacement (MVR)      ______________________________________________________________________     PHQ-2 Total Score: 1 (5/30/2019  1:00 PM)    No data recorded  ______________________________________________________________________     BMI Readings from Last 1 Encounters:   08/07/19 27.46 kg/m      ______________________________________________________________________    Plan:    1. Close follow-up.  2. Hemoglobin and iron studies done today.  3. Continue on iron.  4. Switch digoxin to daily use.  5. Consider addition of Cardizem at a low dose at the next visit.  6. Could have her follow-up with cardiology if needed related to this as well.  7. Likely will check hemoglobin again at some point to reassess her situation.    Gabino Bach MD  General Internal Medicine  [facility]     Return in about 3 days (around 8/12/2019).     Future Appointments   Date Time Provider Department Center   8[facility]12[facility]2019 11:45 [facility]   8/19/2019 11:30 AM [facility]          ______________________________________________________________________     Relevant ICD-10 codes and order associations:      ICD-10-CM    1. Anemia, unspecified type D64.9 Hemoglobin     Iron and Transferrin Iron Binding Capacity     Ferritin   2. Atrial flutter, unspecified type (H) I48.92 Digoxin (Lanoxin )     digoxin (LANOXIN) 125 mcg tablet   3. ASCVD (arteriosclerotic cardiovascular disease) - CABG 2015 I25.10    4. Chronic pain syndrome G89.4    5. S/P mitral valve replacement (MVR) Z95.2

## 2021-06-27 NOTE — PROGRESS NOTES
Progress Notes by Gabino Bach MD at 8/20/2019 10:30 AM     Author: Gabino Bach MD Service: -- Author Type: Physician    Filed: 8/24/2019 10:04 PM Encounter Date: 8/20/2019 Status: Signed    : Gabino Bach MD (Physician)              [facility]    Comprehensive and complex medical care - Chronic disease management - Shared decision making - Care coordination - Compassionate care    Patient advocacy - Rational deprescribing - Minimally disruptive medicine - Ethical focus - Customized care    ______________________________________________________________________     Date of Service: 8/20/2019  Primary Provider: Gabino Bach MD    Patient Care Team:  Gabino Bach MD as PCP - General (Internal Medicine)  Ayanna Camacho MD as Physician (Cardiology)  Al García MD as Physician (Ophthalmology)  Mauro, Ishan Escobar MD as Physician (Gastroenterology)  [facility], Mallory MCCRAY RN as Registered Nurse  Gabino Bach MD (Internal Medicine)     ______________________________________________________________________     Patient's Pharmacy:    [facility] #[id number] [place], [place] - [address] [place] [address] AT [facility] & [address] [address]  Phone: [phone] Fax: [phone]     Patient's Contacts:  Name Home Phone Work Phone Mobile Phone Relationship Lgl ISHAN Carreon [phone]   Spouse    JOYCELYNMEIRANDREA [phone]   Child      Patient's Insurance:    Payor: MEDICARE / Plan: MEDICARE A AND B / Product Type: Medicare /   ______________________________________________________________________   ______________________________________________________________________     Bernadette Yu is a 78 y.o. female who comes in today for:    Chief Complaint   Patient presents with   ? Follow-up     anemia, back/leg pain is okay if not walking, tachycardia and atrial flutter   ? Bloated     getting worse       Active Problem  "List:  Problem List as of 8/20/2019 Reviewed: 8/20/2019  1:32 PM by Gabino Bach MD       High    Former smoker    Full code status - POLST form 1/2/16    ASCVD (arteriosclerotic cardiovascular disease) - CABG 2015    Atrial fibrillation, Now in NSR (H)    Chronic pain - Dr. Bach manages the pain    DM type 2 (diabetes mellitus, type 2) (H)    S/P mitral valve replacement (MVR)    Subclavian artery stenosis, left (H) - s/p stent       Medium    Controlled substance agreement signed - 1/19 - temazepam, lorazepam, hydromorphone - UDS 4/18    HTN (hypertension)    Abdominal bloating, chronic    Anticoagulation goal of INR 2.5 to 3.5    Anxiety    Hypothyroidism    Recurrent UTI (urinary tract infection)       Low    Bleeding diathesis (H) - due to warfarin    CN (constipation)    HLD (hyperlipidemia)    IBS (irritable bowel syndrome)    Insomnia    Microalbuminuria due to type 2 diabetes mellitus (H)    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 ACCU-CHEK SMARTVIEW TEST STRIP strips Use 1 each As Directed 3 (three) times a day. Dispense brand per patient's insurance at pharmacy discretion.  Dx E11.65 DM2, uncontrolled    ? ascorbic acid, vitamin C, (VITAMIN C) 100 MG tablet Take 100 mg by mouth daily.    ? BD ULTRA-FINE DAVID PEN NEEDLES 32 gauge x 5/32\" Ndle USE WITH NOVOLOG PEN AND LANTUS PENS    ? calcium carbonate (OS-DI) 600 mg calcium (1,500 mg) tablet Take 600 mg by mouth 2 (two) times a day with meals.    ? cholecalciferol, vitamin D3, 5,000 unit Tab Take 5,000 Units by mouth daily.    ? cyanocobalamin (VITAMIN B-12) 100 MCG tablet Take 100 mcg by mouth daily.    ? digoxin " (LANOXIN) 125 mcg tablet Take 1 tablet (125 mcg total) by mouth daily.    ? diltiazem (TIAZAC) 120 MG 24 hr capsule Take 1 capsule (120 mg total) by mouth daily.    ? ferrous sulfate 325 (65 FE) MG tablet Take 1 tablet (325 mg total) by mouth every other day.    ? furosemide (LASIX) 40 MG tablet Take 40 mg by mouth daily.    ? furosemide (LASIX) 40 MG tablet TAKE 1 TABLET BY MOUTH DAILY    ? generic lancets Use 1 each As Directed 3 (three) times a day. Dx E11.65 DM2, uncontrolled    ? HYDROmorphone (DILAUDID) 4 MG tablet Take 0.5-1 tablets (2-4 mg total) by mouth 4 (four) times a day as needed for pain. For use from 8/16/2019 to 9/15/2019.    ? insulin aspart U-100 (NOVOLOG U-100 INSULIN ASPART) 100 unit/mL injection Inject 40 Units under the skin 3 (three) times a day.    ? insulin aspart U-100 (NOVOLOG) 100 unit/mL injection Inject 10 Units under the skin 3 (three) times a day as needed. 4/17/2018: njects based on own sliding scale with each meal as needed based on sugars. Average of 10 units per dose   ? levothyroxine (SYNTHROID, LEVOTHROID) 125 MCG tablet Take 1 tablet (125 mcg total) by mouth daily.    ? magnesium 30 mg tablet Take 30 mg by mouth 2 (two) times a day.    ? NOVOLIN N NPH U-100 INSULIN 100 unit/mL injection ADMINISTER 40 UNITS UNDER THE SKIN THREE TIMES DAILY    ? polyethylene glycol (MIRALAX) 17 gram packet Take 1 packet (17 g total) by mouth daily as needed.    ? silver sulfADIAZINE (SILVADENE, SSD) 1 % cream Apply to affected area daily    ? temazepam (RESTORIL) 15 mg capsule TAKE 1 CAPSULE(15 MG) BY MOUTH AT BEDTIME AS NEEDED FOR SLEEP. MAY REFILL EVERY 90 DAYS ONLY    ? warfarin (COUMADIN) 5 MG tablet Take 5-7.5 mg by mouth See Admin Instructions. Take 7.5mg on Wednesdays and Saturdays. Take 5mg all other days of the week.    ? warfarin (COUMADIN/JANTOVEN) 5 MG tablet TAKE 1 TABLET BY MOUTH ON MONDAY, WEDNESDAY, FRIDAY. TAKE 1 AND 1/2 TABLETS BY MOUTH ON ALL OTHER DAYS      Social History      Social History Narrative    Patient of Dr. Bach since 2001.   to  Aneudy.        4 children.        Former patient of Dr. Harshad Ballard.     ______________________________________________________________________     History of present illness:    Roomed by: lissa nguyen    Accompanied by Spouse    Refills needed? No    Do you have any forms that need to be filled out? No       Comes in today first to follow up her anemia.  She has had 2 units of prbcs related to this.  We will recheck.  There are no signs of active blood loss.    She has chronic bloating of the abdomen and this has not changed much.  Bowel movements are regular.    Her heart rate is still running faster.  We reviewed this with her and possible treatment options.  No worsening chest pain or shortness of breath.    Back is stable at this time.  Still only a problem with ambulation.    Reviewed her hypertension today.  Blood pressure has been in the goal range.  Denies any excessive dizziness from the medication with this.     We reviewed the patient's diabetes and it is doing okay.  No significant hypoglycemia.      We reviewed her other issues noted in the assessment but not specifically addressed in the HPI above.     On review of systems, the patient denies any chest pain or shortness of breath.    ______________________________________________________________________    Exam:    Wt Readings from Last 3 Encounters:   08/07/19 155 lb (70.3 kg)   05/30/19 156 lb 14.4 oz (71.2 kg)   03/19/19 156 lb 12.8 oz (71.1 kg)     BP Readings from Last 3 Encounters:   08/20/19 124/58   08/12/19 124/72   08/09/19 124/60     /58   Pulse (!) 119   SpO2 98%    The patient is comfortable, no acute distress.  Mood good.  Insight good.  Eyes are nonicteric.  Neck is supple without mass.  No cervical adenopathy.  No thyromegaly. Heart rapid rate and irregular rhythm.  Lungs clear to auscultation bilaterally.  Respiratory effort is good.  Abdomen  soft and nontender.  No hepatosplenomegaly.  Extremities no edema.    ______________________________________________________________________    Diagnostics:    Results for orders placed or performed in visit on 08/20/19   Hemoglobin   Result Value Ref Range    Hemoglobin 8.0 (L) 12.0 - 16.0 g/dL   INR   Result Value Ref Range    INR 3.70 (H) 0.90 - 1.10     ______________________________________________________________________    Assessment:    1. Atrial flutter, unspecified type (H)    2. Anemia, unspecified type    3. Encounter for therapeutic drug monitoring    4. S/P mitral valve replacement (MVR)    5. Anticoagulation goal of INR 2.5 to 3.5    6. Multilevel neural foraminal stenosis    7. Essential hypertension      ______________________________________________________________________     PHQ-2 Total Score: 1 (5/30/2019  1:00 PM)    No data recorded  ______________________________________________________________________     BMI Readings from Last 1 Encounters:   08/07/19 27.46 kg/m      ______________________________________________________________________    Plan:    1. Blood work before next visit done.  2. Increase the digoxin to daily.  Did not do this in the past.  3. Consider electrocardiogram (EKG) again at the next visit.  4. Discussed cardiology visit and she does not want to proceed with this yet at this time.  5. No changes in relationship to management of her back issues at this time.    Gabino Bach MD  General Internal Medicine  [facility]     Return in about 2 weeks (around 9/3/2019).     Future Appointments   Date Time Provider Department Center   9/3/2019 11:40 AM [facility]   9/3/2019  2:40 PM [facility] Bach [facility]         ______________________________________________________________________     Relevant ICD-10 codes and order associations:      ICD-10-CM    1. Atrial flutter, unspecified type (H) I48.92 digoxin (LANOXIN) 125 mcg  tablet     INR     Digoxin (Lanoxin )   2. Anemia, unspecified type D64.9 Hemoglobin     HM2(CBC w/o Differential)     Reticulocytes   3. Encounter for therapeutic drug monitoring Z51.81 Drugs of Abuse 1,Urine     CANCELED: Drug Abuse 1+, Urine   4. S/P mitral valve replacement (MVR) Z95.2 INR   5. Anticoagulation goal of INR 2.5 to 3.5 Z51.81 INR    Z79.01    6. Multilevel neural foraminal stenosis M43.8X9    7. Essential hypertension I10

## 2021-06-27 NOTE — PROGRESS NOTES
Progress Notes by Gabino Bach MD at 8/5/2019 10:55 AM     Author: Gabino Bach MD Service: -- Author Type: Physician    Filed: 8/6/2019  8:04 AM Encounter Date: 8/5/2019 Status: Signed    : Gabino Bach MD (Physician)            [facility]    Comprehensive and complex medical care - Chronic disease management - Shared decision making - Care coordination - Compassionate care    Patient advocacy - Rational deprescribing - Minimally disruptive medicine - Ethical focus - Customized care    ______________________________________________________________________     Date of Service: 8/5/2019  Primary Provider: Gabino Bach MD    Patient Care Team:  Gabino Bach MD as PCP - General (Internal Medicine)  Ayanna Camacho MD as Physician (Cardiology)  Al García MD as Physician (Ophthalmology)  Mauro, Ishan Escobar MD as Physician (Gastroenterology)  [facility], Mallory MCCRAY RN as Registered Nurse  Gabino Bach MD (Internal Medicine)     ______________________________________________________________________     Patient's Pharmacy:    [id number] [place], [place] - [address] [place] [address] AT [facility] & [address] [address]  Phone: [phone] Fax: [phone]     Patient's Contacts:  Name Home Phone Work Phone Mobile Phone Relationship Lgl ISHAN Carreon [phone]   Spouse    JOYCELYNMEIRANDREA [phone]   Child      Patient's Insurance:    Payor: MEDICARE / Plan: MEDICARE A AND B / Product Type: Medicare /   ______________________________________________________________________   ______________________________________________________________________     Bernadette Yu is a 78 y.o. female who comes in today for:    Chief Complaint   Patient presents with   ? Follow-up     still has a lot of leg pain, still has bloating, can not walk far       Active Problem List:  Problem List as of 8/5/2019 Reviewed:  "5/30/2019  1:57 PM by Gabino Bach MD       High    Former smoker    Full code status - POLST form 1/2/16    ASCVD (arteriosclerotic cardiovascular disease) - CABG 2015    Atrial fibrillation, Now in NSR (H)    Chronic pain - Dr. Bach manages the pain    DM type 2 (diabetes mellitus, type 2) (H)    S/P mitral valve replacement (MVR)    Subclavian artery stenosis, left (H) - s/p stent       Medium    Controlled substance agreement signed - 1/19 - temazepam, lorazepam, hydromorphone - UDS 4/18    HTN (hypertension)    Abdominal bloating, chronic    Anticoagulation goal of INR 2.5 to 3.5    Anxiety    Hypothyroidism    Recurrent UTI (urinary tract infection)       Low    Bleeding diathesis (H) - due to warfarin    CN (constipation)    HLD (hyperlipidemia)    IBS (irritable bowel syndrome)    Insomnia    Microalbuminuria due to type 2 diabetes mellitus (H)    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 ACCU-CHEK SMARTVIEW TEST STRIP strips Use 1 each As Directed 3 (three) times a day. Dispense brand per patient's insurance at pharmacy discretion.  Dx E11.65 DM2, uncontrolled    ? ascorbic acid, vitamin C, (VITAMIN C) 100 MG tablet Take 100 mg by mouth daily.    ? BD ULTRA-FINE DAVID PEN NEEDLES 32 gauge x 5/32\" Ndle USE WITH NOVOLOG PEN AND LANTUS PENS    ? calcium carbonate (OS-DI) 600 mg calcium (1,500 mg) tablet Take 600 mg by mouth 2 (two) times a day with meals.    ? cholecalciferol, vitamin D3, 5,000 unit Tab Take 5,000 Units by mouth daily.    ? cyanocobalamin (VITAMIN B-12) 100 MCG tablet Take 100 mcg by mouth daily.    ? ferrous sulfate 325 (65 FE) MG tablet Take 1 tablet (325 mg total) by mouth every other day.    ? furosemide (LASIX) 40 MG tablet Take 40 mg by mouth daily.  "   ? generic lancets Use 1 each As Directed 3 (three) times a day. Dx E11.65 DM2, uncontrolled    ? [START ON 8/21/2019] HYDROmorphone (DILAUDID) 4 MG tablet Take 0.5-1 tablets (2-4 mg total) by mouth 4 (four) times a day as needed for pain. For use from 8/21/2019 to 9/20/2019.    ? insulin aspart U-100 (NOVOLOG U-100 INSULIN ASPART) 100 unit/mL injection Inject 40 Units under the skin 3 (three) times a day.    ? insulin aspart U-100 (NOVOLOG) 100 unit/mL injection Inject 10 Units under the skin 3 (three) times a day as needed. 4/17/2018: njects based on own sliding scale with each meal as needed based on sugars. Average of 10 units per dose   ? levothyroxine (SYNTHROID, LEVOTHROID) 125 MCG tablet Take 1 tablet (125 mcg total) by mouth daily.    ? magnesium 30 mg tablet Take 30 mg by mouth 2 (two) times a day.    ? NOVOLIN N NPH U-100 INSULIN 100 unit/mL injection ADMINISTER 40 UNITS UNDER THE SKIN THREE TIMES DAILY    ? polyethylene glycol (MIRALAX) 17 gram packet Take 1 packet (17 g total) by mouth daily as needed.    ? silver sulfADIAZINE (SILVADENE, SSD) 1 % cream Apply to affected area daily    ? temazepam (RESTORIL) 15 mg capsule TAKE 1 CAPSULE(15 MG) BY MOUTH AT BEDTIME AS NEEDED FOR SLEEP. MAY REFILL EVERY 90 DAYS ONLY    ? warfarin (COUMADIN) 5 MG tablet Take 5-7.5 mg by mouth See Admin Instructions. Take 7.5mg on Wednesdays and Saturdays. Take 5mg all other days of the week.    ? warfarin (COUMADIN/JANTOVEN) 5 MG tablet TAKE 1 TABLET BY MOUTH ON MONDAY, WEDNESDAY, FRIDAY. TAKE 1 AND 1/2 TABLETS BY MOUTH ON ALL OTHER DAYS    ? digoxin (LANOXIN) 125 mcg tablet Take 1 tablet (125 mcg total) by mouth 3 (three) times a week. Monday, Wednesday and Friday.      Social History     Social History Narrative    Patient of Dr. Bach since 2001.   to  Aneudy.        4 children.        Former patient of Dr. Harshad Ballard.     ______________________________________________________________________     History  of present illness:    Patient comes in today with her  for a number of issues.    Her biggest concern is her back pain.  She continues to have problems with back pain.  She does get aching in her legs at night.  Her legs feel cold.  She can only stand up for 10 minutes or walk for 10 minutes before she has significant back pain.  Is mainly in her back and does not radiate down her legs.  She denies any new numbness in her legs.  If she sits  or lays down, she does not really have problems with the back pain.    We reviewed her history of anemia.  She has not noticed any blood or black stools.  She was hospitalized a year ago with this.  She did have a EGD and colonoscopy related to this.  She was found today to have anemia and we talked about transfusion for this.  She  would rather not go into the hospital if possible.  She denies any dizziness or lightheadedness.    She has had issues with tachycardia as well.  She was a little bit faster and her heart rate at the last visit.  This is compared to a normal pulse between 64 and 80.  She has noticed feeling a little bit more short of breath and a little bit of chest pounding.  This is mainly with exertion.  She is had no chest pain or pressure.    Reviewed her bloating and constipation.  She is using MiraLAX and having bowel movements daily at this point.    Reviewed her diabetes and she has sugars in the 200 range in the morning.  Otherwise, throughout the day, it is doing well.    We reviewed her other issues noted in the assessment but not specifically addressed in the HPI above.     Past medical, family and social history reviewed today.     Comprehensive review of systems was performed today with no major problems noted except as above.     ______________________________________________________________________    Exam:    Wt Readings from Last 3 Encounters:   05/30/19 156 lb 14.4 oz (71.2 kg)   03/19/19 156 lb 12.8 oz (71.1 kg)   01/15/19 156 lb (70.8  kg)     BP Readings from Last 3 Encounters:   08/05/19 126/54   05/30/19 132/74   03/19/19 134/66     /54   Pulse (!) 126   SpO2 98%     The patient is comfortable, no acute distress.  Mood good.  Insight good.  Eyes are nonicteric.  Neck is supple without mass.  No cervical adenopathy.  No thyromegaly. Heart rapid rate and regular rhythm.  Lungs clear to auscultation bilaterally.  Respiratory effort is good.  Abdomen soft and nontender.  No hepatosplenomegaly.  Extremities no edema.  Back shows no muscle spasm.    ______________________________________________________________________    Diagnostics:    Results for orders placed or performed in visit on 08/05/19   HM2(CBC w/o Differential)   Result Value Ref Range    WBC 5.8 4.0 - 11.0 thou/uL    RBC 2.29 (L) 3.80 - 5.40 mill/uL    Hemoglobin 6.8 (LL) 12.0 - 16.0 g/dL    Hematocrit 21.0 (L) 35.0 - 47.0 %    MCV 92 80 - 100 fL    MCH 29.5 27.0 - 34.0 pg    MCHC 32.2 32.0 - 36.0 g/dL    RDW 14.2 11.0 - 14.5 %    Platelets 181 140 - 440 thou/uL    MPV 9.5 7.0 - 10.0 fL   Glycosylated Hemoglobin A1c   Result Value Ref Range    Hemoglobin A1c 6.7 (H) 3.5 - 6.0 %   Basic Metabolic Panel   Result Value Ref Range    Sodium 139 136 - 145 mmol/L    Potassium 3.8 3.5 - 5.0 mmol/L    Chloride 100 98 - 107 mmol/L    CO2 28 22 - 31 mmol/L    Anion Gap, Calculation 11 5 - 18 mmol/L    Glucose 207 (H) 70 - 125 mg/dL    Calcium 9.3 8.5 - 10.5 mg/dL    BUN 28 8 - 28 mg/dL    Creatinine 1.12 (H) 0.60 - 1.10 mg/dL    GFR MDRD Af Amer 57 (L) >60 mL/min/1.73m2    GFR MDRD Non Af Amer 47 (L) >60 mL/min/1.73m2   Thyroid Stimulating Hormone (TSH)   Result Value Ref Range    TSH 16.41 (H) 0.30 - 5.00 uIU/mL   INR   Result Value Ref Range    INR 3.70 (H) 0.90 - 1.10   Electrocardiogram Perform and Read   Result Value Ref Range    SYSTOLIC BLOOD PRESSURE  mmHg    DIASTOLIC BLOOD PRESSURE  mmHg    VENTRICULAR RATE 129 BPM    ATRIAL RATE 129 BPM    P-R INTERVAL  ms    QRS DURATION 74 ms     Q-T INTERVAL 332 ms    QTC CALCULATION (BEZET) 486 ms    P Axis  degrees    R AXIS -38 degrees    T AXIS -50 degrees    MUSE DIAGNOSIS       Atrial flutter (atypical) with 2: 1conduction  Left axis deviation  Nonspecific ST abnormality  Abnormal ECG  When compared with ECG of 28-NOV-2017 13:57,  Vent. rate has increased BY  50 BPM  Confirmed by TYE BONILLA MD LOC:JN ([id number]) on 8/5/2019 4:31:34 PM       ______________________________________________________________________    Assessment:    1. Anemia due to blood loss, acute    2. Tachycardia    3. Encounter for therapeutic drug monitoring    4. GUTIÉRREZ (dyspnea on exertion)    5. Type 2 diabetes mellitus with microalbuminuria, with long-term current use of insulin (H)    6. S/P mitral valve replacement (MVR)    7. Anticoagulation goal of INR 2.5 to 3.5    8. Atrial flutter, unspecified type (H)    9. Bleeding diathesis (H) - due to warfarin    10. Chronic pain syndrome    11. Spinal stenosis of lumbar region with neurogenic claudication    12. Multilevel neural foraminal stenosis       ______________________________________________________________________     PHQ-2 Total Score: 1 (5/30/2019  1:00 PM)    No data recorded  ______________________________________________________________________     BMI Readings from Last 1 Encounters:   05/30/19 27.79 kg/m      ______________________________________________________________________       Plan:    1. The anemia was  an unexpected finding.  2. We will try to arrange transfusion at the infusion center.  She should get 2 units of packed red blood cells with 40 mg of furosemide intravenous in between.  3. We will likely start digoxin at this time for heart rate control.  This is mainly because she is running a lower blood pressure and I am concerned about dropping it too far.  4. We will see her back in short while.  5. Clinically she looks quite well today and there is no reason to emergently hospitalize unless her  symptoms worsen.  6. She should go in sooner if worsening.  7. We ordered a red blood cell scan to be done to his look for possible surreptitious blood loss.  The patient has not seen blood loss herself.  8. She could see a spine specialty in the future if needed, but I am not sure that there is an easy solution for her.         Gabino Bach MD  General Internal Medicine  [facility]     Personal office fax - [phone]   Voice mail - [phone]  E-mail - [email or web address]      Return in about 4 days (around 8/9/2019) for follow up visit.     Future Appointments   Date Time Provider Department Center   8/19/2019 11:30 AM [facility] LAB [facility] LAB [facility]        ______________________________________________________________________    Relevant ICD-10 codes and order associations:      ICD-10-CM    1. Anemia due to blood loss, acute D62 Ambulatory referral to Infusion Therapy     Transfuse RBC     NM GI Bleed     CANCELED: Ambulatory referral to Infusion Therapy   2. Tachycardia R00.0 Electrocardiogram Perform and Read     Thyroid Stimulating Hormone (TSH)   3. Encounter for therapeutic drug monitoring Z51.81 CANCELED: Drug Abuse 1+, Urine   4. GUTIÉRREZ (dyspnea on exertion) R06.09 BNP(B-type Natriuretic Peptide)     HM2(CBC w/o Differential)   5. Type 2 diabetes mellitus with microalbuminuria, with long-term current use of insulin (H) E11.29 Glycosylated Hemoglobin A1c    R80.9 Basic Metabolic Panel    Z79.4    6. S/P mitral valve replacement (MVR) Z95.2 INR   7. Anticoagulation goal of INR 2.5 to 3.5 Z51.81 INR    Z79.01    8. Atrial flutter, unspecified type (H) I48.92 INR   9. Bleeding diathesis (H) - due to warfarin D69.9    10. Chronic pain syndrome G89.4 HYDROmorphone (DILAUDID) 4 MG tablet   11. Spinal stenosis of lumbar region with neurogenic claudication M48.062    12. Multilevel neural foraminal stenosis M43.8X9

## 2021-06-27 NOTE — PROGRESS NOTES
Progress Notes by Gabino Bach MD at 8/12/2019 11:45 AM     Author: Gabino Bach MD Service: -- Author Type: Physician    Filed: 8/12/2019  1:30 PM Encounter Date: 8/12/2019 Status: Signed    : Gabino Bach MD (Physician)              [facility]    Comprehensive and complex medical care - Chronic disease management - Shared decision making - Care coordination - Compassionate care    Patient advocacy - Rational deprescribing - Minimally disruptive medicine - Ethical focus - Customized care    ______________________________________________________________________     Date of Service: 8/12/2019  Primary Provider: Gabino Bach MD    Patient Care Team:  Gabino Bach MD as PCP - General (Internal Medicine)  Ayanna Camacho MD as Physician (Cardiology)  Al García MD as Physician (Ophthalmology)  Mauro, Ishan Escobar MD as Physician (Gastroenterology)  [facility], Mallory MCCRAY RN as Registered Nurse  Gabino Bach MD (Internal Medicine)     ______________________________________________________________________     Patient's Pharmacy:    [id number] [place], [place] - [address] [place] [address] AT [facility] & [address] [address]  Phone: [phone] Fax: [phone]     Patient's Contacts:  Name Home Phone Work Phone Mobile Phone Relationship Lgl ISHAN Carreon [phone]   Spouse    JOYCELYNMEIRANDREA [phone]   Child      Patient's Insurance:    Payor: MEDICARE / Plan: MEDICARE A AND B / Product Type: Medicare /   ______________________________________________________________________   ______________________________________________________________________     Bernadette Yu is a 78 y.o. female who comes in today for:    Chief Complaint   Patient presents with   ? Follow-up     aflutter, anemia, legs are bad at night       Active Problem List:  Problem List as of 8/12/2019 Reviewed: 8/12/2019  1:27 PM by  "Gabino Bach MD       High    Former smoker    Full code status - POLST form 1/2/16    ASCVD (arteriosclerotic cardiovascular disease) - CABG 2015    Atrial fibrillation, Now in NSR (H)    Chronic pain - Dr. Bach manages the pain    DM type 2 (diabetes mellitus, type 2) (H)    S/P mitral valve replacement (MVR)    Subclavian artery stenosis, left (H) - s/p stent       Medium    Controlled substance agreement signed - 1/19 - temazepam, lorazepam, hydromorphone - UDS 4/18    HTN (hypertension)    Abdominal bloating, chronic    Anticoagulation goal of INR 2.5 to 3.5    Anxiety    Hypothyroidism    Recurrent UTI (urinary tract infection)       Low    Bleeding diathesis (H) - due to warfarin    CN (constipation)    HLD (hyperlipidemia)    IBS (irritable bowel syndrome)    Insomnia    Microalbuminuria due to type 2 diabetes mellitus (H)    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 ACCU-CHEK SMARTVIEW TEST STRIP strips Use 1 each As Directed 3 (three) times a day. Dispense brand per patient's insurance at pharmacy discretion.  Dx E11.65 DM2, uncontrolled    ? ascorbic acid, vitamin C, (VITAMIN C) 100 MG tablet Take 100 mg by mouth daily.    ? BD ULTRA-FINE DAVID PEN NEEDLES 32 gauge x 5/32\" Ndle USE WITH NOVOLOG PEN AND LANTUS PENS    ? calcium carbonate (OS-DI) 600 mg calcium (1,500 mg) tablet Take 600 mg by mouth 2 (two) times a day with meals.    ? cholecalciferol, vitamin D3, 5,000 unit Tab Take 5,000 Units by mouth daily.    ? cyanocobalamin (VITAMIN B-12) 100 MCG tablet Take 100 mcg by mouth daily.    ? digoxin (LANOXIN) 125 mcg tablet Take 1 tablet (125 mcg total) by mouth " daily. Monday, Wednesday and Friday.    ? ferrous sulfate 325 (65 FE) MG tablet Take 1 tablet (325 mg total) by mouth every other day.    ? furosemide (LASIX) 40 MG tablet Take 40 mg by mouth daily.    ? generic lancets Use 1 each As Directed 3 (three) times a day. Dx E11.65 DM2, uncontrolled    ? [START ON 8/21/2019] HYDROmorphone (DILAUDID) 4 MG tablet Take 0.5-1 tablets (2-4 mg total) by mouth 4 (four) times a day as needed for pain. For use from 8/21/2019 to 9/20/2019.    ? insulin aspart U-100 (NOVOLOG U-100 INSULIN ASPART) 100 unit/mL injection Inject 40 Units under the skin 3 (three) times a day.    ? insulin aspart U-100 (NOVOLOG) 100 unit/mL injection Inject 10 Units under the skin 3 (three) times a day as needed. 4/17/2018: njects based on own sliding scale with each meal as needed based on sugars. Average of 10 units per dose   ? levothyroxine (SYNTHROID, LEVOTHROID) 125 MCG tablet Take 1 tablet (125 mcg total) by mouth daily.    ? magnesium 30 mg tablet Take 30 mg by mouth 2 (two) times a day.    ? NOVOLIN N NPH U-100 INSULIN 100 unit/mL injection ADMINISTER 40 UNITS UNDER THE SKIN THREE TIMES DAILY    ? polyethylene glycol (MIRALAX) 17 gram packet Take 1 packet (17 g total) by mouth daily as needed.    ? silver sulfADIAZINE (SILVADENE, SSD) 1 % cream Apply to affected area daily    ? temazepam (RESTORIL) 15 mg capsule TAKE 1 CAPSULE(15 MG) BY MOUTH AT BEDTIME AS NEEDED FOR SLEEP. MAY REFILL EVERY 90 DAYS ONLY    ? warfarin (COUMADIN) 5 MG tablet Take 5-7.5 mg by mouth See Admin Instructions. Take 7.5mg on Wednesdays and Saturdays. Take 5mg all other days of the week.    ? warfarin (COUMADIN/JANTOVEN) 5 MG tablet TAKE 1 TABLET BY MOUTH ON MONDAY, WEDNESDAY, FRIDAY. TAKE 1 AND 1/2 TABLETS BY MOUTH ON ALL OTHER DAYS    ? diltiazem (TIAZAC) 120 MG 24 hr capsule Take 1 capsule (120 mg total) by mouth daily.      Social History     Social History Narrative    Patient of Dr. Bach since 2001.   to   Aneudy.        4 children.        Former patient of Dr. Harshad Ballard.     ______________________________________________________________________     History of present illness:    Patient comes in today with her .    We reviewed her anemia.  Her blood work was doing well at the last visit.  Her hemoglobin is up to 9.1.  There is no evidence for active blood loss at this time.  She received 2 units of blood transfusion.  She feels better after the transfusion.    Reviewed her tachycardia and atrial flutter.  The digoxin does not seem to be helping this.  We will try a low dose of Cardizem to try to get her heart rate down.  She denies any dyspnea on exertion at this point.  She denies any chest pain or chest pressure.  We talked about cardiology involvement and possible cardioversion but she does not want to do this at this point.    Reviewed her high blood pressure and her blood pressure is doing okay.    Reviewed her back pain and leg pain and if she stays quiet this does not generally bother her.  She does not want to change anything related to this at this time but we will continue to reassess it.    We reviewed her other issues noted in the assessment but not specifically addressed in the HPI above.     On review of systems, the patient denies any chest pain or shortness of breath.    ______________________________________________________________________    Exam:    Wt Readings from Last 3 Encounters:   08/07/19 155 lb (70.3 kg)   05/30/19 156 lb 14.4 oz (71.2 kg)   03/19/19 156 lb 12.8 oz (71.1 kg)     BP Readings from Last 3 Encounters:   08/12/19 124/72   08/09/19 124/60   08/08/19 125/80     /72   Pulse (!) 126   SpO2 97%    The patient is comfortable, no acute distress.  Mood good.  Insight good.  Eyes are nonicteric.  Neck is supple without mass.  No cervical adenopathy.  No thyromegaly. Heart tachycardic  rate and regular rhythm.  Lungs clear to auscultation bilaterally.  Respiratory  effort is good.  Abdomen soft and nontender.  No hepatosplenomegaly.  Extremities no edema.    ______________________________________________________________________    Diagnostics:    Results for orders placed or performed in visit on 08/12/19   INR   Result Value Ref Range    INR 3.20 (H) 0.90 - 1.10     ______________________________________________________________________    Assessment:    1. Anemia, unspecified type    2. Atrial flutter, unspecified type (H)    3. S/P mitral valve replacement (MVR)    4. Anticoagulation goal of INR 2.5 to 3.5    5. Essential hypertension      ______________________________________________________________________     PHQ-2 Total Score: 1 (5/30/2019  1:00 PM)    No data recorded  ______________________________________________________________________     BMI Readings from Last 1 Encounters:   08/07/19 27.46 kg/m      ______________________________________________________________________    Plan:    1. Check INR today.  2. Follow-up again in 1 week.  3. Add in Cardizem 120 mg a day.  4. Continue current dose of digoxin.  5. Full blood work at next visit including a drug of abuse screen.    Gabino Bach MD  General Internal Medicine  [facility]     Return in about 1 week (around 8/19/2019) for visit and blood work.     Future Appointments   Date Time Provider Department Center   8/19/2019 11:30 AM [facility]   8/20/2019 10:30 AM Gabino Bach MD W IN[facility]ED [facility]         ______________________________________________________________________     Relevant ICD-10 codes and order associations:      ICD-10-CM    1. Anemia, unspecified type D64.9    2. Atrial flutter, unspecified type (H) I48.92 diltiazem (TIAZAC) 120 MG 24 hr capsule     INR   3. S/P mitral valve replacement (MVR) Z95.2 INR   4. Anticoagulation goal of INR 2.5 to 3.5 Z51.81 INR    Z79.01    5. Essential hypertension I10

## 2021-06-27 NOTE — PROGRESS NOTES
Progress Notes by Gabino Bach MD at 5/30/2019  1:15 PM     Author: Gabino Bach MD Service: -- Author Type: Physician    Filed: 5/30/2019 10:01 PM Encounter Date: 5/30/2019 Status: Signed    : Gabino Bach MD (Physician)              [facility]    Comprehensive and complex medical care - Chronic disease management - Shared decision making - Care coordination - Compassionate care    Patient advocacy - Rational deprescribing - Minimally disruptive medicine - Ethical focus - Customized care    ______________________________________________________________________     Date of Service: 5/30/2019  Primary Provider: Gabino Bach MD    Patient Care Team:  Gabino Bach MD as PCP - General (Internal Medicine)  Ayanna Camacho MD as Physician (Cardiology)  Al García MD as Physician (Ophthalmology)  Mauro, Ishan Escobar MD as Physician (Gastroenterology)  [facility], Mallory MCCRAY RN as Registered Nurse  Gabino Bach MD (Internal Medicine)     ______________________________________________________________________     Patient's Pharmacy:    [facility] [address] [place] [address] AT [facility] & [address] [address]  Phone: [phone] Fax: [phone]     Patient's Contacts:  Name Home Phone Work Phone Mobile Phone Relationship Lgl ISHAN Carreon [phone]   Spouse    ANDREA VALENTIN [phone]   Child      Patient's Insurance:    Payor: MEDICARE / Plan: MEDICARE A AND B / Product Type: Medicare /   ______________________________________________________________________   ______________________________________________________________________     Bernadette Valentin is 78 y.o. female who comes in today for:    Chief Complaint   Patient presents with   ? Follow-up     left foot big toe is better, back pain is worse, pt is unable to walk far because of pain no pain if sitting, still has burning pain  "in chest if holds down part that burns it will go away   ? Medication Questions     can try CBD oil       Active Problem List:  Problem List as of 5/30/2019 Reviewed: 5/30/2019  1:57 PM by Gabino Bach MD       High    Former smoker    Full code status - POLST form 1/2/16    ASCVD (arteriosclerotic cardiovascular disease) - CABG 2015    Atrial fibrillation, Now in NSR (H)    Chronic pain - Dr. Bach manages the pain    DM type 2 (diabetes mellitus, type 2) (H)    S/P mitral valve replacement (MVR)    Subclavian artery stenosis, left (H) - s/p stent       Medium    Controlled substance agreement signed - 1/19 - temazepam, lorazepam, hydromorphone - UDS 4/18    HTN (hypertension)    Abdominal bloating, chronic    Anticoagulation goal of INR 2.5 to 3.5    Anxiety    Hypothyroidism    Recurrent UTI (urinary tract infection)       Low    Bleeding diathesis (H) - due to warfarin    CN (constipation)    HLD (hyperlipidemia)    IBS (irritable bowel syndrome)    Insomnia    Microalbuminuria due to type 2 diabetes mellitus (H)    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 ACCU-CHEK SMARTVIEW TEST STRIP strips Use 1 each As Directed 3 (three) times a day. Dispense brand per patient's insurance at pharmacy discretion.  Dx E11.65 DM2, uncontrolled    ? ascorbic acid, vitamin C, (VITAMIN C) 100 MG tablet Take 100 mg by mouth daily.    ? BD ULTRA-FINE DAVID PEN NEEDLES 32 gauge x 5/32\" Ndle USE WITH NOVOLOG PEN AND LANTUS PENS    ? calcium carbonate (OS-DI) 600 mg calcium (1,500 mg) tablet Take 600 mg by mouth 2 (two) times a day with meals.    ? cholecalciferol, vitamin D3, 5,000 unit Tab Take 5,000 Units by mouth daily.    ? cyanocobalamin (VITAMIN B-12) 100 MCG tablet Take 100 mcg by mouth " daily.    ? ferrous sulfate 325 (65 FE) MG tablet Take 1 tablet (325 mg total) by mouth every other day.    ? furosemide (LASIX) 40 MG tablet Take 40 mg by mouth daily.    ? generic lancets Use 1 each As Directed 3 (three) times a day. Dx E11.65 DM2, uncontrolled    ? HYDROmorphone (DILAUDID) 4 MG tablet Take 0.5-1 tablets (2-4 mg total) by mouth 4 (four) times a day as needed for pain. For use from 3/19/2019 to 4/18/2019.  RX 1/3.    ? HYDROmorphone (DILAUDID) 4 MG tablet Take 0.5-1 tablets (2-4 mg total) by mouth 4 (four) times a day as needed for pain. For use from 4/18/2019 to 5/18/2019.  RX 2/3.    ? HYDROmorphone (DILAUDID) 4 MG tablet Take 0.5-1 tablets (2-4 mg total) by mouth 4 (four) times a day as needed for pain. For use from 5/18/2019 to 6/17/2019.  RX 3/3    ? insulin aspart U-100 (NOVOLOG U-100 INSULIN ASPART) 100 unit/mL injection Inject 40 Units under the skin 3 (three) times a day.    ? insulin aspart U-100 (NOVOLOG) 100 unit/mL injection Inject 10 Units under the skin 3 (three) times a day as needed. 4/17/2018: njects based on own sliding scale with each meal as needed based on sugars. Average of 10 units per dose   ? levothyroxine (SYNTHROID, LEVOTHROID) 125 MCG tablet Take 1 tablet (125 mcg total) by mouth daily.    ? magnesium 30 mg tablet Take 30 mg by mouth 2 (two) times a day.    ? NOVOLIN N NPH U-100 INSULIN 100 unit/mL injection ADMINISTER 40 UNITS UNDER THE SKIN THREE TIMES DAILY    ? polyethylene glycol (MIRALAX) 17 gram packet Take 1 packet (17 g total) by mouth daily as needed.    ? silver sulfADIAZINE (SILVADENE, SSD) 1 % cream Apply to affected area daily    ? temazepam (RESTORIL) 15 mg capsule TAKE 1 CAPSULE(15 MG) BY MOUTH AT BEDTIME AS NEEDED FOR SLEEP. MAY REFILL EVERY 90 DAYS ONLY    ? warfarin (COUMADIN) 5 MG tablet Take 5-7.5 mg by mouth See Admin Instructions. Take 7.5mg on Wednesdays and Saturdays. Take 5mg all other days of the week.    ? warfarin (COUMADIN/JANTOVEN) 5 MG  tablet TAKE 1 TABLET BY MOUTH ON MONDAY, WEDNESDAY, FRIDAY. TAKE 1 AND 1/2 TABLETS BY MOUTH ON ALL OTHER DAYS      Social History     Social History Narrative    ** Merged History Encounter **         Patient of Dr. Bach since 2001.  .    Former patient of Dr. Harshad Ballard.     ______________________________________________________________________     History of present illness:    Roomed by: lissa nguyen    Accompanied by Spouse    Refills needed? No    Do you have any forms that need to be filled out? No       Reviewed her back pain first.  This is really just in her back but comes on whenever she stands or walks.  She likes to sit related to this.  No radiation down her legs generally but has had this in the past.  Legs in the upper legs ache at the end of the day.  She did not do acupuncture for this due to cost.  No new numbness in the legs.  Would like to try CBD oil oral for this - she has a cousin with fibromyalgia and headaches who it has helped.  We still discussed orthopedics or neurosurgery referral and she does not want to have this at this time.  No new weakness.    We reviewed her abdominal fat and bloating.  She feels this is worse after her last colonoscopy.  She denies any pain.  She wonders if there is something for weight loss.  She is not on metformin at this time and I do not feel that dulaglutide (Trulicity) or semaglutide (Ozempic) would provide enough benefit for her.    Mitral valve replacement (MVR) is doing well and she would like to do her inr.    She occasionally gets a bit of neurogenic pain and burning in her left chest which she has had for awhile and this is at rest and not with exertion.  Pressure on the area will help it.    Left toe ulcer has healed on the great toe.    We reviewed her other issues noted in the assessment but not specifically addressed in the HPI above.     On review of systems, the patient denies any chest pain or shortness of  breath.    ______________________________________________________________________    Exam:    BMI Readings from Last 1 Encounters:   05/30/19 27.79 kg/m      Wt Readings from Last 3 Encounters:   05/30/19 156 lb 14.4 oz (71.2 kg)   03/19/19 156 lb 12.8 oz (71.1 kg)   01/15/19 156 lb (70.8 kg)     BP Readings from Last 3 Encounters:   05/30/19 132/74   03/19/19 134/66   01/15/19 134/72     /74   Pulse (!) 106   Wt 156 lb 14.4 oz (71.2 kg)   SpO2 97%   BMI 27.79 kg/m     The patient is comfortable, no acute distress.  Mood good.  Insight good.  Eyes are nonicteric.  Neck is supple without mass.  No cervical adenopathy.  No thyromegaly. Heart regular rate and rhythm.  Lungs clear to auscultation bilaterally.  Respiratory effort is good.  Abdomen soft and nontender. Bloated moderate. No hepatosplenomegaly.  Extremities no edema.  No new synovitis.    ______________________________________________________________________    Diagnostics:    Results for orders placed or performed in visit on 05/30/19   HM2(CBC w/o Differential)   Result Value Ref Range    WBC 7.8 4.0 - 11.0 thou/uL    RBC 4.34 3.80 - 5.40 mill/uL    Hemoglobin 12.8 12.0 - 16.0 g/dL    Hematocrit 38.3 35.0 - 47.0 %    MCV 88 80 - 100 fL    MCH 29.6 27.0 - 34.0 pg    MCHC 33.6 32.0 - 36.0 g/dL    RDW 12.4 11.0 - 14.5 %    Platelets 145 140 - 440 thou/uL    MPV 10.1 (H) 7.0 - 10.0 fL   INR   Result Value Ref Range    INR 2.70 (H) 0.90 - 1.10     ______________________________________________________________________    Assessment:    1. Chronic pain syndrome    2. Atrial fibrillation, unspecified type (H)    3. S/P mitral valve replacement (MVR)    4. Anticoagulation goal of INR 2.5 to 3.5    5. Abdominal bloating, chronic    6. Spinal stenosis of lumbar region with neurogenic claudication    7. Multilevel neural foraminal stenosis    8. Skin ulcer of left great toe, limited to breakdown of skin (H)       ______________________________________________________________________     PHQ-2 Total Score: 1 (5/30/2019  1:00 PM)    No data recorded  ______________________________________________________________________    Plan:    1. Continue current pain medications.  2. Could try CBD oil.  3. Check blood work today.  See relevant orders and diagnosis associations at the bottom of this note.   4. No easy solution for bloating.  5. Could still see other specialists for back but likely no easy solution.  6. Follow up again in 2 months.    Gabino Bach MD  General Internal Medicine  [facility]     Return in about 2 months (around 7/30/2019).     Future Appointments   Date Time Provider Department Center   6/19/2019 11:40 AM [facility] LAB W LAB [facility]   8/5/2019 10:55 AM Gabino Bach MD [facility]         ______________________________________________________________________     Relevant ICD-10 codes and order associations:      ICD-10-CM    1. Chronic pain syndrome G89.4    2. Atrial fibrillation, unspecified type (H) I48.91 HM2(CBC w/o Differential)     INR   3. S/P mitral valve replacement (MVR) Z95.2 INR   4. Anticoagulation goal of INR 2.5 to 3.5 Z51.81 INR    Z79.01    5. Abdominal bloating, chronic R14.0    6. Spinal stenosis of lumbar region with neurogenic claudication M48.062    7. Multilevel neural foraminal stenosis M43.8X9    8. Skin ulcer of left great toe, limited to breakdown of skin (H) L92.884

## 2021-06-27 NOTE — PROGRESS NOTES
Progress Notes by Gabino Bach MD at 3/19/2019  1:00 PM     Author: Gabino Bach MD Service: -- Author Type: Physician    Filed: 3/20/2019  4:22 PM Encounter Date: 3/19/2019 Status: Signed    : Gabino Bach MD (Physician)              [facility]    Comprehensive and complex medical care - Chronic disease management - Shared decision making - Care coordination - Compassionate care    Patient advocacy - Rational deprescribing - Minimally disruptive medicine - Ethical focus - Customized care    ______________________________________________________________________     Date of Service: 3/19/2019  Primary Provider: Gabino Bach MD    Patient Care Team:  Gabino Bach MD as PCP - General (Internal Medicine)  Ayanna Camacho MD as Physician (Cardiology)  Al García MD as Physician (Ophthalmology)  Mauro, Ishan Escobar MD as Physician (Gastroenterology)  [facility], Mallory MCCRAY RN as Registered Nurse  Gabino Bach MD (Internal Medicine)     ______________________________________________________________________     Patient's Pharmacy:    [facility] [address] [place] [address] AT [facility] & [address] [address]  Phone: [phone] Fax: [phone]     Patient's Contacts:  Name Home Phone Work Phone Mobile Phone Relationship Lgl ISHAN Carreon [phone]   Spouse    ANDREA VALENTIN [phone]   Child      Patient's Insurance:    Payor: MEDICARE / Plan: MEDICARE A AND B / Product Type: Medicare /     ______________________________________________________________________     Bernadette Valentin is 78 y.o. female who comes in today for:    Chief Complaint   Patient presents with   ? Follow-up     wants to talk about left chest pain - been having it for x3 weeks.  Right chest has been beginning to start over the past few days.        Active Problem List:  Problem List as of 3/19/2019 Reviewed:  "1/15/2019 10:06 PM by Gabino Bach MD       High    Former smoker    Full code status - POLST form 1/2/16    ASCVD (arteriosclerotic cardiovascular disease) - CABG 2015    Atrial fibrillation, Now in NSR (H)    Chronic pain - Dr. Bach manages the pain    DM type 2 (diabetes mellitus, type 2) (H)    S/P mitral valve replacement (MVR)    Subclavian artery stenosis, left (H) - s/p stent       Medium    Controlled substance agreement signed - 1/19 - temazepam, lorazepam, hydromorphone - UDS 4/18    HTN (hypertension)    Abdominal bloating, chronic    Anticoagulation goal of INR 2.5 to 3.5    Anxiety    Hypothyroidism    Recurrent UTI (urinary tract infection)       Low    Bleeding diathesis (H) - due to warfarin    CN (constipation)    HLD (hyperlipidemia)    IBS (irritable bowel syndrome)    Insomnia    Microalbuminuria due to type 2 diabetes mellitus (H)    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 ACCU-CHEK SMARTVIEW TEST STRIP strips Use 1 each As Directed 3 (three) times a day. Dispense brand per patient's insurance at pharmacy discretion.  Dx E11.65 DM2, uncontrolled    ? ascorbic acid, vitamin C, (VITAMIN C) 100 MG tablet Take 100 mg by mouth daily.    ? BD ULTRA-FINE DAVID PEN NEEDLES 32 gauge x 5/32\" Ndle USE WITH NOVOLOG PEN AND LANTUS PENS    ? calcium carbonate (OS-DI) 600 mg calcium (1,500 mg) tablet Take 600 mg by mouth 2 (two) times a day with meals.    ? cholecalciferol, vitamin D3, 5,000 unit Tab Take 5,000 Units by mouth daily.    ? cyanocobalamin (VITAMIN B-12) 100 MCG tablet Take 100 mcg by mouth daily.    ? furosemide (LASIX) 40 MG tablet Take 40 mg by mouth daily.    ? generic lancets Use 1 each As Directed 3 (three) times a day. Dx E11.65 DM2, uncontrolled    ? " HYDROmorphone (DILAUDID) 4 MG tablet Take 0.5-1 tablets (2-4 mg total) by mouth 4 (four) times a day as needed for pain. For use from 2/17/2019 to 3/19/2019.  RX 3/3    ? insulin aspart U-100 (NOVOLOG) 100 unit/mL injection Inject 10 Units under the skin 3 (three) times a day as needed. 4/17/2018: njects based on own sliding scale with each meal as needed based on sugars. Average of 10 units per dose   ? levothyroxine (SYNTHROID, LEVOTHROID) 125 MCG tablet Take 1 tablet (125 mcg total) by mouth daily.    ? magnesium 30 mg tablet Take 30 mg by mouth 2 (two) times a day.    ? NOVOLIN N NPH U-100 INSULIN 100 unit/mL injection ADMINISTER 40 UNITS UNDER THE SKIN THREE TIMES DAILY    ? polyethylene glycol (MIRALAX) 17 gram packet Take 1 packet (17 g total) by mouth daily as needed.    ? silver sulfADIAZINE (SILVADENE, SSD) 1 % cream Apply to affected area daily    ? temazepam (RESTORIL) 15 mg capsule Take 1 capsule (15 mg total) by mouth at bedtime as needed for sleep. May refill every 90 days only.    ? warfarin (COUMADIN) 5 MG tablet Take 5-7.5 mg by mouth See Admin Instructions. Take 7.5mg on Wednesdays and Saturdays. Take 5mg all other days of the week.    ? warfarin (COUMADIN/JANTOVEN) 5 MG tablet TAKE 1 TABLET BY MOUTH ON MONDAY, WEDNESDAY, FRIDAY. TAKE 1 AND 1/2 TABLETS BY MOUTH ON ALL OTHER DAYS    ? ferrous sulfate 325 (65 FE) MG tablet Take 1 tablet (325 mg total) by mouth every other day.    ? HYDROmorphone (DILAUDID) 4 MG tablet Take 0.5-1 tablets (2-4 mg total) by mouth 4 (four) times a day as needed for pain. For use from 1/18/2019 to 2/17/2019.  RX 2/3.    ? HYDROmorphone (DILAUDID) 4 MG tablet Take 0.5-1 tablets (2-4 mg total) by mouth 4 (four) times a day as needed for pain. For use from 3/19/2019 to 4/18/2019.  RX 1/3.      Social History     Social History Narrative    ** Merged History Encounter **         Patient of Dr. Bach since 2001.  .    Former patient of Dr. Harshad Ballard.      ______________________________________________________________________     History of present illness:    Patient comes in today with her .    We first reviewed her diabetes.  Her diabetes has generally been stable she has no major concerns related to this.  She had no significant low sugars.    We reviewed her back pain.  She understands this will continue to give her problems off and on.  It goes down both legs when she is walking for any distance.  She has known foraminal stenosis.  It can get fairly severe for her at times.  She has not been interested in seeing a spinal specialist yet.    We reviewed her high blood pressure and her blood pressure is doing well at this time.    We reviewed her chest pains.  This is been present before.  It has been since her valve replacement.  It has been in the chest wall.  She notes that over the last 3-4 weeks is been worse.  She gets pain in the left and right parasternal areas as well as the mid sternum where she had her chest wall incision.  She denies any shortness of breath with it.  It is worse when laying down on the left side.  It is not exertional.  It is worse with pressure.    We reviewed her abdominal bloating and this continues.  She is going regularly at this time.    We are going to follow-up on her anemia today.    Her anticoagulation is going well.    She needs a refill of her pain medications.    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  She has one on her left great toe at this time.  It is doing well and she is using Silvadene.  She has had experience with this and generally has done letting it heal.  She will cover it and we gave her some more tubular gauze which has worked in the past for her.    We reviewed her other issues noted in the assessment but not specifically addressed in the HPI above.     On review of systems, the patient denies any chest pain or shortness of  "breath.    ______________________________________________________________________    Exam:    Wt Readings from Last 3 Encounters:   03/19/19 156 lb 12.8 oz (71.1 kg)   01/15/19 156 lb (70.8 kg)   10/30/18 153 lb 4.8 oz (69.5 kg)     BP Readings from Last 3 Encounters:   03/19/19 134/66   01/15/19 134/72   10/30/18 136/60     /66 (Patient Site: Right Arm, Patient Position: Sitting)   Pulse 72   Temp 98.1  F (36.7  C) (Oral)   Resp 16   Ht 5' 3\" (1.6 m)   Wt 156 lb 12.8 oz (71.1 kg)   SpO2 97%   BMI 27.78 kg/m      The patient is comfortable, no acute distress.  Mood good.  Insight fair to good.  Eyes are nonicteric.  Neck is supple without mass.  No cervical adenopathy.  No thyromegaly. Heart regular rate and rhythm.  Good valve sounds.  Lungs clear to auscultation bilaterally.  Respiratory effort is good.  Abdomen soft and nontender.  She is distended as is her baseline.  No hepatosplenomegaly.  Extremities no edema.  Her left great toe shows a distal toe ulcer consistent with neuropathic ulcer and she has a 5th toe blister which has broken.  No signs or symptoms of infection.    ______________________________________________________________________    Diagnostics:    Results for orders placed or performed in visit on 03/19/19   HM2(CBC w/o Differential)   Result Value Ref Range    WBC 4.9 4.0 - 11.0 thou/uL    RBC 4.33 3.80 - 5.40 mill/uL    Hemoglobin 12.7 12.0 - 16.0 g/dL    Hematocrit 37.6 35.0 - 47.0 %    MCV 87 80 - 100 fL    MCH 29.2 27.0 - 34.0 pg    MCHC 33.7 32.0 - 36.0 g/dL    RDW 13.0 11.0 - 14.5 %    Platelets 140 140 - 440 thou/uL    MPV 9.6 7.0 - 10.0 fL   Ferritin   Result Value Ref Range    Ferritin 57 10 - 130 ng/mL   Iron and Transferrin Iron Binding Capacity   Result Value Ref Range    Iron 85 42 - 175 ug/dL    Transferrin 260 212 - 360 mg/dL    Transferrin Saturation, Calculated 26 20 - 50 %    Transferrin IBC, Calculated 326 313 - 563 ug/dL   Basic Metabolic Panel   Result Value " Ref Range    Sodium 137 136 - 145 mmol/L    Potassium 4.2 3.5 - 5.0 mmol/L    Chloride 97 (L) 98 - 107 mmol/L    CO2 29 22 - 31 mmol/L    Anion Gap, Calculation 11 5 - 18 mmol/L    Glucose 215 (H) 70 - 125 mg/dL    Calcium 9.7 8.5 - 10.5 mg/dL    BUN 23 8 - 28 mg/dL    Creatinine 1.01 0.60 - 1.10 mg/dL    GFR MDRD Af Amer >60 >60 mL/min/1.73m2    GFR MDRD Non Af Amer 53 (L) >60 mL/min/1.73m2   Glycosylated Hemoglobin A1c   Result Value Ref Range    Hemoglobin A1c 8.2 (H) 3.5 - 6.0 %   Thyroid Stimulating Hormone (TSH)   Result Value Ref Range    TSH 1.46 0.30 - 5.00 uIU/mL   INR   Result Value Ref Range    INR 3.50 (H) 0.90 - 1.10     ______________________________________________________________________    Assessment:    1. Chest wall pain    2. Microalbuminuria due to type 2 diabetes mellitus (H)    3. Essential hypertension    4. Type 2 diabetes mellitus with microalbuminuria, with long-term current use of insulin (H)    5. Atrial fibrillation, unspecified type (H)    6. S/P mitral valve replacement (MVR)    7. Blood loss anemia    8. Hypothyroidism, unspecified type    9. Chronic pain syndrome    10. Anticoagulation goal of INR 2.5 to 3.5    11. Abdominal bloating    12. Skin ulcer of left great toe, unspecified ulcer stage (H)       ______________________________________________________________________     PHQ-2 Total Score: 0 (1/15/2019 11:00 AM)    No Data Recorded  ______________________________________________________________________       Plan:    1. Check blood work today.  See relevant orders and diagnosis associations at the bottom of this note.   2. Doubt further changes will be needed.  3. Refill chronic pain medications.  4. Use silvadene cream to toe with tubular gauze.  This has worked in the past.  5. Discussed diabetic footwear and she does not want to do this.  6. Please follow up in 2 months, sooner if issues. May use a reserved slot for appointment if needed for follow up.  7. Could consider  referral to [facility] orthopedics in the future.  For spine         Gabino Bach MD  General Internal Medicine  [facility]     Personal office fax - [phone]   Voice mail - [phone]  E-mail - [email or web address]      Return in about 2 months (around 5/19/2019).     Future Appointments   Date Time Provider Department Center   4/16/2019 11:30 AM [facility]   5/30/2019  1:15 PM Gabino Bach MD [facility]        ______________________________________________________________________    Relevant ICD-10 codes and order associations:      ICD-10-CM    1. Chest wall pain R07.89    2. Microalbuminuria due to type 2 diabetes mellitus (H) E11.29     R80.9    3. Essential hypertension I10 Basic Metabolic Panel   4. Type 2 diabetes mellitus with microalbuminuria, with long-term current use of insulin (H) E11.29 Glycosylated Hemoglobin A1c    R80.9     Z79.4    5. Atrial fibrillation, unspecified type (H) I48.91 INR   6. S/P mitral valve replacement (MVR) Z95.2 INR   7. Blood loss anemia D50.0 HM2(CBC w/o Differential)     Ferritin     Iron and Transferrin Iron Binding Capacity   8. Hypothyroidism, unspecified type E03.9 Thyroid Stimulating Hormone (TSH)   9. Chronic pain syndrome G89.4 HYDROmorphone (DILAUDID) 4 MG tablet   10. Anticoagulation goal of INR 2.5 to 3.5 Z51.81 INR    Z79.01    11. Abdominal bloating R14.0    12. Skin ulcer of left great toe, unspecified ulcer stage (H) L97.529

## 2021-06-28 NOTE — PROGRESS NOTES
Progress Notes by Emma Stevenson NP at 3/3/2020  9:20 AM     Author: mEma Stevenson NP Service: -- Author Type: Nurse Practitioner    Filed: 3/3/2020  4:36 PM Encounter Date: 3/3/2020 Status: Signed    : Emma Stevenson NP (Nurse Practitioner)       [facility] Consult Note    Date of Service:  3/3/2020    Requesting Provider: Dr. Gabino Bach    Chief Complaint: bilateral great toe ulcers; left heel ulcer    History of Present Illness: Bernadette Yu is being seen at [facility] today regarding bilateral great toe ulcers and left heel ulcer. They arrive to the clinic today with  Donovan. The patient reports that the toe wounds started on/off 1 year; was SSD cream on the toe wounds and was told to stop this; she has been getting varying opinions whether to use or not. The left heel wound started approx 4 months ago. She has been in and out of the hospital for a variety of reasons; abdominal pain; MRV, leg pain. Was currently/previously using dry gauze. Reports pain of 8/10 in the feet and toes; worse in the buttocks; sacral region; currently using dillaudid for pain.  Has used nothing as compression in the past, is currently using nothing for compression. Denies any fevers, chills, or generalized ill feeling. Denies history of cancer. Sleeps in a bed/recliner with legs elevated she sometimes needs to sleep in a chair with legs down due to back and buttocks pain; she states the sheets and blankets on her feet cause pain; in bed she prefers to sleep on her . Pt unsure if she  has their uterus and ovaries, they deny any abnormal vaginal bleeding. Denies history of DVT, joint replacement, cellulitis and vein procedures. She is ambulatory; the foot hurts when walking; she is wearing a post op shoe. Has diabetes checks fs two times a day running 170-200s. Last A1C was >10. Not taking protein shake; does take vitamin d, calcium, iron, and vitamin c. No recent  antibiotics      [facility]     DATE: 10/26/2019 4:15 PM     EXAM:  DUPLEX ARTERIAL ULTRASOUND OF THE LOWER EXTREMITIES BILATERALLY     INDICATION: Leg pain, vasculopathy.     COMPARISON: None.     TECHNIQUE: Duplex imaging is performed utilizing gray-scale, two-dimensional images, and color-flow imaging. Doppler waveform analysis and spectral Doppler imaging is also performed.     DUPLEX ARTERIAL ULTRASOUND FINDINGS:   LOWER EXTREMITY ARTERIAL DUPLEX EXAM WITH WAVEFORMS  RIGHT (cm/s)  EIA: 161 B  CFA: 126 B  PFA: 141 B  SFA-Proximal: 129 B  SFA-Mid: 307 B  SFA-Distal: 176 B  Popliteal: 114 B  PTA: 79 B  BAYLEE: 95.3 B  DPA: 119 B  (M=monophasic, B=biphasic, T=triphasic)     LEFT (cm/s)  EIA: 131 B  CFA: 138 B  PFA: 173 B  SFA-Proximal: 102 B-83 M  SFA-Mid: 72 M  SFA-Distal: 69 M  Popliteal: 55 M  PTA: 39 M  BAYLEE: 56 M  DPA: 92 M  (M=monophasic, B=biphasic, T=triphasic)     Right lower extremity: Biphasic waveforms throughout the right lower extremity. Focally elevated velocity within the mid superficial femoral artery suggestive of a stenosis within this distribution.     Left lower extremity: Change between multiphasic and monophasic waveforms within the mid superficial femoral artery.     IMPRESSION:   1. RIGHT LOWER EXTREMITY: Biphasic waveforms throughout the lower extremity. Focally elevated velocity within the mid SFA suggestive of a stenosis within this distribution.     2. LEFT LOWER EXTREMITY: Change between multiphasic and monophasic waveforms at the level of the mid superficial femoral artery. Suggestive of a stenosis within this distribution.   Scans on Order [id number]     Scan on 10/26/2019  4:11 PM by Kerry Zurita RDMS      Result History     US Arterial Legs Bilateral Complete (Order #[id number]) on 10/28/2019 - Order Result History Report         Review of Systems:   Constitutional:  negative  for fever, chills or night sweats  EENTM: negative for glasses;  positive [place]  GI:  negative for  nausea/vomiting;  positive for constipation; narcotic induced; taking miralax negative diarrhea;  negative for fecal incontinence negative weight loss  :  negative dysuria, negative incontinence  MS: patient is ambulatory;  does use assistive devices; just received a walker yesterday  Cardiac:  positive CABG and MVR; on coumadin; has afib  Respiratory:  negative SOB  Endocrine:  positive diabetes  Psych:  negative depression/anxiety    Past Medical History:    Past Medical History:   Diagnosis Date   ? Abdominal bloating, chronic 8/21/2016   ? Anticoagulation goal of INR 2.5 to 3.5 1/27/2016   ? Anxiety    ? Aortic stenosis 10/26/2019   ? ASCVD (arteriosclerotic cardiovascular disease)    ? Atherosclerosis of native coronary artery without angina pectoris 10/26/2019   ? Bleeding diathesis (H) - due to warfarin    ? Carotid artery stenosis, left    ? CHF (congestive heart failure) (H)    ? DM (diabetes mellitus), type 2 (H) 1990   ? Eczema    ? Former smoker    ? Full code status    ? HLD (hyperlipidemia)    ? HTN (hypertension)    ? Hypothyroidism    ? IBS (irritable bowel syndrome)    ? Insomnia    ? Liver disease    ? Long Q-T syndrome 10/26/2019   ? Meningococcal meningitis Age 14   ? Microalbuminuria due to type 2 diabetes mellitus (H)    ? Mild nonproliferative diabetic retinopathy of both eyes    ? Mitral stenosis    ? Moderate aortic stenosis     See transesophageal echocardiogram (ANTOINE) 2019.   ? MRSA (methicillin resistant staph aureus) culture positive 2/9/2017   ? Normocytic anemia    ? PAD (peripheral artery disease) (H)     [facility]   DATE: 10/26/2019 4:15 PM   EXAM:  DUPLEX ARTERIAL ULTRASOUND OF THE LOWER EXTREMITIES BILATERALLY   INDICATION: Leg pain, vasculopathy.   COMPARISON: None.   TECHNIQUE: Duplex imaging is performed utilizing gray-scale, two-dimensional images, and color-flow imaging. Doppler waveform analysis and spectral Doppler imaging is also performed.   DUPLEX ARTERIAL  ULTRASOUND FINDIN   ? Paroxysmal atrial fibrillation - treated during surgery in 2015    Bilateral pulmonary vein isolation with AtriCure Synergy (bipolar radiofrequency ablation) device, exclusion of the left atrial appendage with the AtriCure AtriClip device.     ? PN (peripheral neuropathy)    ? Psoriasis    ? PVD (peripheral vascular disease) (H) 2019    Bilateral lower extremities.  See ultrasound 2019.   ? Recurrent UTI (urinary tract infection)    ? RLS (restless legs syndrome)    ? S/P CABG (coronary artery bypass graft) 2016   ? S/P colonoscopy 07   ? S/P MVR (mitral valve replacement) - 23 mm [employer] mechanical valve - 2015   ? Subclavian artery stenosis, left - s/p stent 2015    Stented in .    ? 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        Surgical History:   Past Surgical History:   Procedure Laterality Date   ? APPENDECTOMY     ? CARDIAC CATHETERIZATION  11/12/15   ?  SECTION     ? CHOLECYSTECTOMY     ? COLONOSCOPY N/A 10/12/2016    Procedure: COLONOSCOPY;  Surgeon: Santiago Duran MD;  Location: [facility];  Service:    ? COLONOSCOPY N/A 10/13/2016    Procedure: COLONOSCOPY with polypectomy & rectum biopsies;  Surgeon: Santiago Duran MD;  Location: [facility];  Service:    ? COLONOSCOPY N/A 12/3/2017    Procedure: COLONOSCOPY with multiple polyp removal using hot snare and mansfield net and biopsy forcep;  Surgeon: Mareclo Wade MD;  Location: [facility];  Service:    ? COLONOSCOPY N/A 3/13/2018    Procedure: COLONOSCOPY; POLYPECTOMY;  Surgeon: Marcelo Wade MD;  Location: [facility] OR;  Service:    ? CORONARY ARTERY BYPASS GRAFT     ? ESOPHAGOGASTRODUODENOSCOPY N/A 10/12/2016    Procedure: ESOPHAGOGASTRODUODENOSCOPY with biopsies;  Surgeon: Santiago Duran MD;  Location: [facility];  Service:    ? ESOPHAGOGASTRODUODENOSCOPY N/A 12/3/2017    Procedure: ESOPHAGOGASTRODUODENOSCOPY  "(EGD);  Surgeon: Marcelo Wade MD;  Location: [facility];  Service:    ? TONSILLECTOMY AND ADENOIDECTOMY     ? UPPER GASTROINTESTINAL ENDOSCOPY          Medications:    Current Outpatient Medications:   ?  ACCU-CHEK SMARTVIEW TEST STRIP strips, Use 1 each As Directed 3 (three) times a day. Dispense brand per patient's insurance at pharmacy discretion.  Dx E11.65 DM2, uncontrolled, Disp: 300 strip, Rfl: 3  ?  bacitracin-polymyxin b (POLYSPORIN) ointment, Apply to affected area daily, Disp: 30 g, Rfl: 2  ?  BD ULTRA-FINE DAVID PEN NEEDLES 32 gauge x 5/32\" Ndle, USE WITH NOVOLOG PEN AND LANTUS PENS, Disp: , Rfl: 0  ?  cholecalciferol, vitamin D3, 5,000 unit Tab, Take 5,000 Units by mouth daily., Disp: , Rfl:   ?  cyanocobalamin (VITAMIN B-12) 100 MCG tablet, Take 100 mcg by mouth daily., Disp: , Rfl:   ?  diclofenac sodium (VOLTAREN) 1 % Gel, Apply 2 g topically 4 (four) times a day., Disp: 100 g, Rfl: 11  ?  digoxin (LANOXIN) 125 mcg (0.125 mg) tablet, Take 1 tablet (125 mcg total) by mouth daily with supper., Disp: 30 tablet, Rfl: 1  ?  diltiazem (CARDIZEM CD) 180 MG 24 hr capsule, Take 1 capsule (180 mg total) by mouth daily., Disp: 30 capsule, Rfl: 1  ?  ferrous sulfate 325 (65 FE) MG tablet, Take 1 tablet (325 mg total) by mouth daily with breakfast., Disp: 90 tablet, Rfl: 3  ?  furosemide (LASIX) 40 MG tablet, Take 1 tablet (40 mg total) by mouth daily., Disp: 90 tablet, Rfl: 3  ?  gabapentin (NEURONTIN) 300 MG capsule, Take 1 capsule (300 mg total) by mouth at bedtime., Disp: 30 capsule, Rfl: 0  ?  generic lancets, Use 1 each As Directed 3 (three) times a day. Dx E11.65 DM2, uncontrolled, Disp: 300 each, Rfl: 3  ?  HYDROmorphone (DILAUDID) 4 MG tablet, Take 0.5-1 tablets (2-4 mg total) by mouth 4 (four) times a day as needed for pain. For use from 2/23/2020 to 3/24/2020., Disp: 120 tablet, Rfl: 0  ?  insulin NPH (NOVOLIN) 100 unit/mL injection, Inject 20 Units under the skin 2 (two) times a day., Disp: , " Rfl:   ?  levothyroxine (SYNTHROID, LEVOTHROID) 125 MCG tablet, Take 1 tablet (125 mcg total) by mouth daily., Disp: 90 tablet, Rfl: 3  ?  metFORMIN (GLUCOPHAGE) 500 MG tablet, Take 1 tablet (500 mg total) by mouth 2 (two) times a day with meals., Disp: 180 tablet, Rfl: 3  ?  polyethylene glycol (MIRALAX) 17 gram packet, Take 1 packet (17 g total) by mouth daily., Disp: , Rfl: 0  ?  potassium chloride (K-DUR,KLOR-CON) 20 MEQ tablet, Take 1 tablet (20 mEq total) by mouth daily. Take along with lasix, Disp: 30 tablet, Rfl: 0  ?  pravastatin (PRAVACHOL) 20 MG tablet, Take 1 tablet (20 mg total) by mouth daily., Disp: 90 tablet, Rfl: 3  ?  silver sulfADIAZINE (SILVADENE, SSD) 1 % cream, Apply to affected area daily (Patient taking differently: Apply topically daily as needed (to toes). Apply to affected area daily), Disp: 50 g, Rfl: 3  ?  temazepam (RESTORIL) 15 mg capsule, Take 2 capsules (30 mg total) by mouth at bedtime as needed for sleep. (Patient taking differently: Take 15-30 mg by mouth at bedtime as needed for sleep. ), Disp: 90 capsule, Rfl: 1  ?  warfarin ANTICOAGULANT (COUMADIN/JANTOVEN) 5 MG tablet, Take by mouth See Admin Instructions. 2.5 mg on Thursday; 5 mg row per anticoagulation encounter , Disp: , Rfl:     Allergies:   Allergies   Allergen Reactions   ? Amiodarone Other (See Comments)     TORSADES DE POINTES   ? Aspirin Other (See Comments)     Recurrent severe gastrointestinal bleed.       Family history:   Family History   Problem Relation Age of Onset   ? Colon cancer Father    ? Diabetes Father    ? Hypertension Mother    ? Heart disease Mother          congestive heart failure   ? Arthritis Mother    ? Diabetes Sister    ? Heart disease Brother    ? Rectal cancer Son    ? Colon cancer Son         Social History:   Social History     Socioeconomic History   ? Marital status:      Spouse name: Aneudy   ? Number of children:  4   ? Years of education: Not on file   ? Highest education level:  "Not on file   Occupational History     Employer: RETIRED   Social Needs   ? Financial resource strain: Not on file   ? Food insecurity:     Worry: Not on file     Inability: Not on file   ? Transportation needs:     Medical: Not on file     Non-medical: Not on file   Tobacco Use   ? Smoking status: Former Smoker     Years: 23.00     Types: Cigarettes   ? Smokeless tobacco: Never Used   ? Tobacco comment: quit 1970's   Substance and Sexual Activity   ? Alcohol use: No   ? Drug use: No   ? Sexual activity: Never   Lifestyle   ? Physical activity:     Days per week: Not on file     Minutes per session: Not on file   ? Stress: Not on file   Relationships   ? Social connections:     Talks on phone: Not on file     Gets together: Not on file     Attends Religion service: Not on file     Active member of club or organization: Not on file     Attends meetings of clubs or organizations: Not on file     Relationship status: Not on file   ? Intimate partner violence:     Fear of current or ex partner: Not on file     Emotionally abused: Not on file     Physically abused: Not on file     Forced sexual activity: Not on file   Other Topics Concern   ? Not on file   Social History Narrative    Patient of Dr. Bach since 2001.   to  Aneudy.        4 children.        Former patient of Dr. Harshad Ballard.        Physical Exam  Vitals: Blood pressure 124/66, pulse 84, temperature 97.8  F (36.6  C), resp. rate 16, height 5' 3\" (1.6 m), weight 146 lb 12.8 oz (66.6 kg), not currently breastfeeding.  General: This is a 79 y.o. female who appears their reported age, not in acute distress  Head: normocephalic, Atraumatic; not wearing glasses; non-icteric sclera; no exudate; mild hearing loss   Respiratory: Clear throughout all lung fields; unlabored breathing; no cough   Cardiac: Regular, Rate and Rhythm, + murmurs appreciated (valve)  Skin: Uniformly warm and dry  Psych: Alert and oriented x4; calm and cooperative throughout " exam  Abdomen: Normal bowel sounds. Soft, symmetric, no guarding or rigidity, nontender with palpation.  No organomegaly or masses palpated.   Extremities: left lateral heel with full thickness boggy eschar; macerated skin surrounding; trace erythema; bilateral hallux tips with black eschar; possible bone exposed bilaterally; no erythema; no fluctuance; scant purulence could be expressed from the right hallux strength testing revealed 4/4 to BLEs.    Sensation: Decreased to pinprick and light touch in a stocking distribution bilaterally     Peripheral Vascular: normal dorsalis pedis, posterior tibial pulses to bilateral feet , using a handheld doppler these were strong; easily found and biphasic in nature.  Good capillary refill. No unusual venous distention. Negative for spider veins, telangiectasias, hemosiderin deposition or hyperpigmentation and fibrosis or scarring       Circumferential volume measures:    No flowsheet data found.    Ulceration(s)/Wound(s):     [facility] Wound 03/03/20 Lt great toe (Active)   Pre Size Length 1 3/3/2020  9:00 AM   Pre Size Width 1.5 3/3/2020  9:00 AM   Description callus 3/3/2020  9:00 AM       [facility] Wound 03/03/20 Rt great toe (Active)   Pre Size Length 1 3/3/2020  9:00 AM   Pre Size Width 1.3 3/3/2020  9:00 AM   Description callus 3/3/2020  9:00 AM       [facility] Wound 03/03/20 Lt heel (Active)   Pre Size Length 3.3 3/3/2020  9:00 AM   Pre Size Width 5 3/3/2020  9:00 AM   Pre Size Depth 0.1 3/3/2020  9:00 AM   Pre Total Sq cm 16.5 3/3/2020  9:00 AM       Wound 02/19/20 Other wound type (comment) Toe Left (Active)       Wound 02/19/20 Other wound type (comment) Toe Right (Active)       Wound 02/19/20 Other wound type (comment) Heel Left (Active)       Laboratory studies:   Lab Results   Component Value Date    SEDRATE 45 (H) 10/25/2019     Lab Results   Component Value Date    CREATININE 0.95 02/20/2020     Lab Results   Component Value Date    HGBA1C 10.2 (H) 02/10/2020     Lab Results    Component Value Date    BUN 17 02/20/2020     Lab Results   Component Value Date    ALBUMIN 3.2 (L) 10/27/2019     No results found for: [id number]          Impression:   1. Skin ulcer of right great toe with necrosis of bone (H)  US Ankle Brachial Indices    MR Forefoot With Without Contrast Left    MR Forefoot With Without Contrast Right   2. Skin ulcer of left great toe, unspecified ulcer stage (H)  US Ankle Brachial Indices    MR Forefoot With Without Contrast Left    MR Forefoot With Without Contrast Right   3. Pressure injury of left heel, unstageable (H)  US Ankle Brachial Indices    MR Forefoot With Without Contrast Left    MR Forefoot With Without Contrast Right   4. Pain in both lower extremities  US Ankle Brachial Indices    MR Forefoot With Without Contrast Left    MR Forefoot With Without Contrast Right   5. PAD (peripheral artery disease) (H)  US Ankle Brachial Indices    MR Forefoot With Without Contrast Left    MR Forefoot With Without Contrast Right   6. Claudication (H)  US Ankle Brachial Indices    MR Forefoot With Without Contrast Left    MR Forefoot With Without Contrast Right   7. Type 2 diabetes mellitus with foot ulcer, unspecified whether long term insulin use (H)     3/3/2020 bilateral feet        3/3/2020 left heel          Assessment/Plan:  1. Debridement: not done; will preserve the eschar as a biological dressing as I suspect underlying arterial disease    2. Treatment: wound treatment will include irrigation and dressings to promote autolytic debridement which will include: betadine to all wounds; dry gauze; will obtain updated danae; last one was abnormal; will send to Dr. Barry pending results, I suspect osteomyelitis in the toes; will obtain MRI bilaterally ADDENDUM: DANAE with abnormalities I will discuss the case with him and see if he would like to see her in consultation    3. Edema. none    4. Offloading: spoke about  roooke boot; spoke about pillow positioning; bed  positioning; keeping pressure off at all times; spoke about bed tent to keep blankets off the toes    5. Nutrition: diabetic diet poorly control; low albumin; focus on protein    Patient to return to clinic in 2 week(s) for re-evaluation. They were instructed to call the clinic sooner with any further questions or concerns. Answered all questions.              Emma Stevenson DNP, RN, CNP, CWOCN  [facility]  [phone]      This note was electronically signed by Emma Stevenson

## 2021-06-28 NOTE — PROGRESS NOTES
Progress Notes by Nba Cleveland DPM at 3/10/2020  2:40 PM     Author: Nba Cleveland DPM Service: -- Author Type: Physician    Filed: 3/10/2020  5:12 PM Encounter Date: 3/10/2020 Status: Signed    : Nba Cleveland DPM (Physician)       FOOT AND ANKLE SURGERY/PODIATRY CONSULT NOTE        ASSESSMENT:   Ulceration left heel  Ulceration bilateral hallux  Osteomyelitis bilateral hallux  DM2        TREATMENT:  -MRI is positive for osteomyelitis distal phalanx left hallux, suggestive of osteomyelitis right hallux. No osteomyelitis left heel. No abscess bilateral. Due to the presence of osteomyelitis, I reviewed the treatment options to include either 6 weeks IV antibiotics with Infectious Disease vs amputation of the involved bone. I have referred him to Infectious Disease for consultation and input for bone biopsy. Referred for CRP.     -I discussed the principles of wound healing today including the importance of limited walking on the involved limb, good vascular perfusion, good glycemic control and the absence of infection.     -After discussion of risk factors and consent obtained 2% Lidocaine HCL jelly was applied, under clean conditions, the bilateral and feet ulceration(s) were debrided using #15 blade scalpel.  Devitalized and nonviable tissue, along with any fibrin and slough, was removed to improve granulation tissue formation, stimulate wound healing, decrease overall bacteria load, disrupt biofilm formation and decrease edge senescence.  Total excisional debridement was 12 sq cm into the subcutaneous tissue with a depth of 0.2 cm.   Ulcers were improved afterwards and .  Measures were as noted on the flow sheet. A gauze dressing was applied. She will begin endoform along the right hallux and santyl on the left heel. Recommend limited walking in a surgical shoe left foot.     -She will follow-up with me in 2-3 weeks.      Nba Cleveland DPM  [facility] [place]      HPI:  Bernadette Yu was seen today at the request of Emma Stevenson for bilateral foot ulcerations. The patient states she developed the foot sores about 2 months ago, but she is not sure. She did obtain an MRI and would like to discuss the results today. PMH is significant for DM2.      Past Medical History:   Diagnosis Date   ? Abdominal bloating, chronic 8/21/2016   ? Anticoagulation goal of INR 2.5 to 3.5 1/27/2016   ? Anxiety    ? Aortic stenosis 10/26/2019   ? ASCVD (arteriosclerotic cardiovascular disease)    ? Atherosclerosis of native coronary artery without angina pectoris 10/26/2019   ? Bleeding diathesis (H) - due to warfarin    ? Carotid artery stenosis, left    ? CHF (congestive heart failure) (H)    ? DM (diabetes mellitus), type 2 (H) 1990   ? Eczema    ? Former smoker    ? Full code status    ? HLD (hyperlipidemia)    ? HTN (hypertension)    ? Hypothyroidism    ? IBS (irritable bowel syndrome)    ? Insomnia    ? Liver disease    ? Long Q-T syndrome 10/26/2019   ? Meningococcal meningitis Age 14   ? Microalbuminuria due to type 2 diabetes mellitus (H)    ? Mild nonproliferative diabetic retinopathy of both eyes    ? Mitral stenosis    ? Moderate aortic stenosis     See transesophageal echocardiogram (ANTOINE) 2019.   ? MRSA (methicillin resistant staph aureus) culture positive 2/9/2017   ? Normocytic anemia    ? PAD (peripheral artery disease) (H)     [facility]   DATE: 10/26/2019 4:15 PM   EXAM:  DUPLEX ARTERIAL ULTRASOUND OF THE LOWER EXTREMITIES BILATERALLY   INDICATION: Leg pain, vasculopathy.   COMPARISON: None.   TECHNIQUE: Duplex imaging is performed utilizing gray-scale, two-dimensional images, and color-flow imaging. Doppler waveform analysis and spectral Doppler imaging is also performed.   DUPLEX ARTERIAL ULTRASOUND FINDIN   ? Paroxysmal atrial fibrillation - treated during surgery in 2015 11/16/2015    Bilateral pulmonary vein isolation with AtriCure Synergy (bipolar radiofrequency  ablation) device, exclusion of the left atrial appendage with the AtriCure AtriClip device.     ? PN (peripheral neuropathy)    ? Psoriasis    ? PVD (peripheral vascular disease) (H) 2019    Bilateral lower extremities.  See ultrasound 2019.   ? Recurrent UTI (urinary tract infection)    ? RLS (restless legs syndrome)    ? S/P CABG (coronary artery bypass graft) 2016   ? S/P colonoscopy 07   ? S/P MVR (mitral valve replacement) - 23 mm [employer] mechanical valve - 2015   ? Subclavian artery stenosis, left - s/p stent 2015    Stented in .    ? 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       Past Surgical History:   Procedure Laterality Date   ? APPENDECTOMY     ? CARDIAC CATHETERIZATION  11/12/15   ?  SECTION     ? CHOLECYSTECTOMY     ? COLONOSCOPY N/A 10/12/2016    Procedure: COLONOSCOPY;  Surgeon: Santiago Duran MD;  Location: [facility];  Service:    ? COLONOSCOPY N/A 10/13/2016    Procedure: COLONOSCOPY with polypectomy & rectum biopsies;  Surgeon: Santiago Duran MD;  Location: [facility];  Service:    ? COLONOSCOPY N/A 12/3/2017    Procedure: COLONOSCOPY with multiple polyp removal using hot snare and mansfield net and biopsy forcep;  Surgeon: Marcelo Wade MD;  Location: [facility];  Service:    ? COLONOSCOPY N/A 3/13/2018    Procedure: COLONOSCOPY; POLYPECTOMY;  Surgeon: Marcelo Wade MD;  Location: [facility] OR;  Service:    ? CORONARY ARTERY BYPASS GRAFT     ? ESOPHAGOGASTRODUODENOSCOPY N/A 10/12/2016    Procedure: ESOPHAGOGASTRODUODENOSCOPY with biopsies;  Surgeon: Santiago Duran MD;  Location: [facility];  Service:    ? ESOPHAGOGASTRODUODENOSCOPY N/A 12/3/2017    Procedure: ESOPHAGOGASTRODUODENOSCOPY (EGD);  Surgeon: Marcelo Wade MD;  Location: [facility];  Service:    ? TONSILLECTOMY AND ADENOIDECTOMY     ? UPPER GASTROINTESTINAL ENDOSCOPY         Allergies   Allergen Reactions   ? Amiodarone  "Other (See Comments)     TORSADES DE POINTES   ? Aspirin Other (See Comments)     Recurrent severe gastrointestinal bleed.         Current Outpatient Medications:   ?  ACCU-CHEK SMARTVIEW TEST STRIP strips, Use 1 each As Directed 3 (three) times a day. Dispense brand per patient's insurance at pharmacy discretion.  Dx E11.65 DM2, uncontrolled, Disp: 300 strip, Rfl: 3  ?  bacitracin-polymyxin b (POLYSPORIN) ointment, Apply to affected area daily, Disp: 30 g, Rfl: 2  ?  BD ULTRA-FINE DAVID PEN NEEDLES 32 gauge x 5/32\" Ndle, USE WITH NOVOLOG PEN AND LANTUS PENS, Disp: , Rfl: 0  ?  cholecalciferol, vitamin D3, 5,000 unit Tab, Take 5,000 Units by mouth daily., Disp: , Rfl:   ?  cyanocobalamin (VITAMIN B-12) 100 MCG tablet, Take 100 mcg by mouth daily., Disp: , Rfl:   ?  diclofenac sodium (VOLTAREN) 1 % Gel, Apply 2 g topically 4 (four) times a day., Disp: 100 g, Rfl: 11  ?  digoxin (LANOXIN) 125 mcg (0.125 mg) tablet, Take 1 tablet (125 mcg total) by mouth daily with supper., Disp: 30 tablet, Rfl: 1  ?  diltiazem (CARDIZEM CD) 180 MG 24 hr capsule, Take 1 capsule (180 mg total) by mouth daily., Disp: 30 capsule, Rfl: 1  ?  ferrous sulfate 325 (65 FE) MG tablet, Take 1 tablet (325 mg total) by mouth daily with breakfast., Disp: 90 tablet, Rfl: 3  ?  furosemide (LASIX) 40 MG tablet, Take 1 tablet (40 mg total) by mouth daily., Disp: 90 tablet, Rfl: 3  ?  gabapentin (NEURONTIN) 300 MG capsule, Take 1 capsule (300 mg total) by mouth at bedtime., Disp: 30 capsule, Rfl: 0  ?  generic lancets, Use 1 each As Directed 3 (three) times a day. Dx E11.65 DM2, uncontrolled, Disp: 300 each, Rfl: 3  ?  HYDROmorphone (DILAUDID) 4 MG tablet, Take 0.5-1 tablets (2-4 mg total) by mouth 4 (four) times a day as needed for pain. For use from 2/23/2020 to 3/24/2020., Disp: 120 tablet, Rfl: 0  ?  insulin NPH (NOVOLIN) 100 unit/mL injection, Inject 20 Units under the skin 2 (two) times a day., Disp: , Rfl:   ?  levothyroxine (SYNTHROID, " LEVOTHROID) 125 MCG tablet, Take 1 tablet (125 mcg total) by mouth daily., Disp: 90 tablet, Rfl: 3  ?  metFORMIN (GLUCOPHAGE) 500 MG tablet, Take 1 tablet (500 mg total) by mouth 2 (two) times a day with meals., Disp: 180 tablet, Rfl: 3  ?  NOVOLOG U-100 INSULIN ASPART 100 unit/mL injection, INJECT 40 UNITS UNDER THE SKIN TID, Disp: , Rfl:   ?  polyethylene glycol (MIRALAX) 17 gram packet, Take 1 packet (17 g total) by mouth daily., Disp: , Rfl: 0  ?  potassium chloride (K-DUR,KLOR-CON) 20 MEQ tablet, Take 1 tablet (20 mEq total) by mouth daily. Take along with lasix, Disp: 30 tablet, Rfl: 0  ?  pravastatin (PRAVACHOL) 20 MG tablet, Take 1 tablet (20 mg total) by mouth daily., Disp: 90 tablet, Rfl: 3  ?  silver sulfADIAZINE (SILVADENE, SSD) 1 % cream, Apply to affected area daily (Patient taking differently: Apply topically daily as needed (to toes). Apply to affected area daily), Disp: 50 g, Rfl: 3  ?  temazepam (RESTORIL) 15 mg capsule, Take 2 capsules (30 mg total) by mouth at bedtime as needed for sleep. (Patient taking differently: Take 15-30 mg by mouth at bedtime as needed for sleep. ), Disp: 90 capsule, Rfl: 1  ?  warfarin ANTICOAGULANT (COUMADIN/JANTOVEN) 5 MG tablet, Take by mouth See Admin Instructions. 2.5 mg on Thursday; 5 mg row per anticoagulation encounter , Disp: , Rfl:   ?  collagenase (SANTYL) ointment, Apply qd, Disp: 15 g, Rfl: 2    Past Surgical History:   Procedure Laterality Date   ? APPENDECTOMY     ? CARDIAC CATHETERIZATION  11/12/15   ?  SECTION     ? CHOLECYSTECTOMY     ? COLONOSCOPY N/A 10/12/2016    Procedure: COLONOSCOPY;  Surgeon: Santiago Duran MD;  Location: [facility];  Service:    ? COLONOSCOPY N/A 10/13/2016    Procedure: COLONOSCOPY with polypectomy & rectum biopsies;  Surgeon: Santiago Duran MD;  Location: [facility];  Service:    ? COLONOSCOPY N/A 12/3/2017    Procedure: COLONOSCOPY with multiple polyp removal using hot snare and mansfield net and biopsy  forcep;  Surgeon: Marcelo Wade MD;  Location: [facility];  Service:    ? COLONOSCOPY N/A 3/13/2018    Procedure: COLONOSCOPY; POLYPECTOMY;  Surgeon: Marcelo Wade MD;  Location: [facility] OR;  Service:    ? CORONARY ARTERY BYPASS GRAFT     ? ESOPHAGOGASTRODUODENOSCOPY N/A 10/12/2016    Procedure: ESOPHAGOGASTRODUODENOSCOPY with biopsies;  Surgeon: Santiago Duran MD;  Location: [facility];  Service:    ? ESOPHAGOGASTRODUODENOSCOPY N/A 12/3/2017    Procedure: ESOPHAGOGASTRODUODENOSCOPY (EGD);  Surgeon: Marcelo Wade MD;  Location: [facility];  Service:    ? TONSILLECTOMY AND ADENOIDECTOMY     ? UPPER GASTROINTESTINAL ENDOSCOPY         Social History     Social History Narrative    Patient of Dr. Bach since 2001.   to  Aneudy.        4 children.        Former patient of Dr. Harshad Ballard.       Family History   Problem Relation Age of Onset   ? Colon cancer Father    ? Diabetes Father    ? Hypertension Mother    ? Heart disease Mother          congestive heart failure   ? Arthritis Mother    ? Diabetes Sister    ? Heart disease Brother    ? Rectal cancer Son    ? Colon cancer Son        Review of Systems - 10 point Review of Systems is negative except for bilateral foot ulcer which is noted in HPI.      OBJECTIVE:  Appearance: alert, well appearing, and in no distress.    Vitals:    03/10/20 1450   BP: 122/70   Pulse: 96   Temp: 97.9  F (36.6  C)       BMI= There is no height or weight on file to calculate BMI.    General appearance: Patient is alert and fully cooperative with history & exam.  No sign of distress is noted during the visit.     Psychiatric: Affect is pleasant & appropriate.  Patient appears motivated to improve health.     Respiratory: Breathing is regular & unlabored while sitting.     HEENT: Hearing is intact to spoken word.  Speech is clear.  No gross evidence of visual impairment that would impact ambulation.        Vascular: Dorsalis pedis palpable  right foot.   Dermatologic:   VASC Wound 03/03/20 Lt great toe (Active)   Pre Size Length 1 03/10/20 1400   Pre Size Width 1.5 03/10/20 1400   Pre Size Depth 0.1 03/10/20 1400   Pre Total Sq cm 1.5 03/10/20 1400   Description callus 03/03/20 0900       VASC Wound 03/03/20 Rt great toe (Active)   Pre Size Length 0.8 03/10/20 1400   Pre Size Width 1 03/10/20 1400   Pre Size Depth 0.1 03/10/20 1400   Pre Total Sq cm 0.8 03/10/20 1400   Description callus 03/03/20 0900       VASC Wound 03/03/20 Lt heel (Active)   Pre Size Length 3.7 03/10/20 1400   Pre Size Width 2.6 03/10/20 1400   Pre Size Depth 0.2 03/10/20 1400   Pre Total Sq cm 9.99 03/10/20 1400       Wound 02/19/20 Other wound type (comment) Toe Left (Active)       Wound 02/19/20 Other wound type (comment) Toe Right (Active)       Wound 02/19/20 Other wound type (comment) Heel Left (Active)   Granular base distal right hallux. Thinning of skin distal left hallux. No erythema bilateral. Fibrotic tissue medial left heel ulceration.   Neurologic: Diminished to light touch Bilateral.  Musculoskeletal: Contracted digits noted Bilateral.    Imaging:     Ct Head Without Contrast    Result Date: 2/19/2020  EXAM: CT HEAD WO CONTRAST LOCATION: [facility] DATE/TIME: 2/19/2020 2:57 AM INDICATION: Head trauma. COMPARISON: None. TECHNIQUE: Routine without IV contrast. Multiplanar reformats. Dose reduction techniques were used. FINDINGS: INTRACRANIAL CONTENTS: No definite intracranial hemorrhage, extraaxial collection, or mass effect. Subtle linear hyperdensity within the right temporal lobe (image 25 series 2) is favored to be artifact related. No CT evidence of acute infarct. Mild presumed chronic small vessel ischemic changes. Mild generalized volume loss. No hydrocephalus. Note is made of coarse atherosclerotic calcifications of the intracranial vasculature. VISUALIZED ORBITS/SINUSES/MASTOIDS: Prior bilateral cataract surgery. Visualized portions of the orbits are  otherwise unremarkable. Near-complete opacification of the right maxillary sinus. Air-fluid level with mucosal thickening involving the left maxillary sinus. The remaining paranasal sinuses are probably clear. No middle ear or mastoid effusion. BONES/SOFT TISSUES: No acute abnormality.     1.  No definite CT evidence for acute intracranial process. 2.  Brain atrophy and presumed chronic microvascular ischemic changes as above. 3.  Air-fluid level within the left maxillary sinus may reflect acute sinusitis or be related to trauma. Clinical correlation is recommended.    Xr Finger Left 2 Or More Vws    Result Date: 2/12/2020  EXAM DATE:         02/12/2020 EXAM: X-RAY EXAM OF FINGER(S),LEFT,MINIMUM TWO VIEWS LOCATION: [facility] DATE/TIME: 2/12/2020 3:00 PM INDICATION: Thumb pain, immobility COMPARISON: None. IMPRESSION: No acute fracture or dislocation. Advanced thumb CMC joint degenerative arthritis. Minimal degenerative arthritis of the thumb IP joint. Tiny chronic bone fragment along the dorsal radial aspect of the joint. Arterial calcification.     Mr Forefoot With Without Contrast Right    Result Date: 3/4/2020  EXAM: MR FOREFOOT W WO CONTRAST RIGHT LOCATION: [facility] DATE/TIME: 3/3/2020 10:07 PM INDICATION: Osteomyelitis suspected, diabetic COMPARISON: None. TECHNIQUE: Routine. Additional postgadolinium T1 sequences were obtained. IV CONTRAST: Gadavist 7 FINDINGS:  JOINTS AND BONES: Signal abnormality consistent with osteomyelitis within the distal phalanx of the great toe. There is some signal abnormality within the distal phalanx of the second toe but this is not confirmed on the sagittal STIR sequence and is potentially artifactual. This is doubtful for additional osteomyelitis. No additional areas worrisome for osteomyelitis are identified. No evidence for fracture. No significant effusion to suggest septic arthropathy. Degenerative change at the first MTP joint. Degenerative  change at multiple IP joints. Degenerative change at the first TMT joint. TENDONS: The Achilles tendon is not included on the field-of-view and cannot be evaluated. Within the foot, the flexor tendons are negative for tendinopathy. There is tenosynovitis involving the flexor hallucis and portions of the flexor digitorum tendons. The extensor tendons are negative. LIGAMENTS: The ligament of Lisfranc is intact. The collateral ligaments at the MTP joints are intact. MUSCLES AND SOFT TISSUES: Fatty infiltration and atrophy of the interosseous musculature. No evidence for soft tissue mass or fluid collection. Mild edema within the subcutaneous soft tissues of the forefoot. This could represent cellulitis. No organized  fluid collection.     1.  Signal abnormality throughout the distal phalanx of the great toe worrisome for osteomyelitis. 2.  Signal abnormality within the distal phalanx of the second toe is likely artifactual and not confirmed on the sagittal STIR sequence. This is unlikely to represent additional osteomyelitis. 3.  No evidence for fracture. 4.  No evidence for septic arthropathy or abscess. 5.  Nonspecific edema or potentially cellulitis within the forefoot. 6.  Fatty infiltration and atrophy of the interosseous musculature. 7.  Tenosynovitis involving the flexor hallucis tendon and medial flexor digitorum tendons.     Mr Forefoot With Without Contrast Left    Result Date: 3/4/2020  EXAM: MR FOREFOOT W WO CONTRAST LEFT LOCATION: [facility] DATE/TIME: 3/3/2020 10:08 PM INDICATION: Osteomyelitis suspected, diabetic COMPARISON: None. TECHNIQUE: Routine. Additional postgadolinium T1 sequences were obtained. IV CONTRAST: Gadavist 7 FINDINGS:  JOINTS AND BONES: There is signal abnormality within the distal aspect of the distal phalanx of the great toe consistent with osteomyelitis. No additional areas worrisome for osteomyelitis are identified. No evidence for fracture. No significant effusion  to  suggest septic arthropathy. TENDONS: The Achilles tendon is not included on the field-of-view and cannot be evaluated. Within the foot, the flexor tendons are negative for tendinopathy, tenosynovitis, or tearing. The extensor tendons are negative for tendinopathy, tenosynovitis, or  tearing. The hallucis tendons are negative. LIGAMENTS: The ligament of Lisfranc is intact. The collateral ligaments at the MTP joints are all intact. MUSCLES AND SOFT TISSUES: No intramuscular signal abnormality. Mild atrophy of the interosseous musculature. Nonspecific edema within the subcutaneous soft tissues about the great toe. No soft tissue mass or fluid collection.     1.  Signal abnormality consistent with osteomyelitis within the tuft of the distal aspect of the great toe. 2.  No evidence for fracture or additional marrow signal abnormality. No additional areas worrisome for osteomyelitis. 3.  No evidence for septic arthropathy or abscess. 4.  Nonspecific edema or potentially cellulitis within the great toe and bases of the additional toes. 5.  No evidence for tendinous or ligamentous tear.     Mr Ankle With Without Contrast Left    Result Date: 3/8/2020  EXAM: MR ANKLE WITHOUT/WITH CONTRAST LEFT LOCATION: [facility] DATE/TIME: 03/08/2020, 12:50 PM INDICATION: Left heel ulcer. Evaluate for osteomyelitis. COMPARISON: None. TECHNIQUE: Routine. Additional postgadolinium T1 sequences were obtained. IV CONTRAST: Gadavist 6 mL. FINDINGS: TENDONS: Peroneal tendons intact. No tendinopathy. No significant tendon sheath fluid. Medial tendons intact. No tendinopathy. No significant tendon sheath fluid. Anterior tendons intact. No tendinopathy. No significant tendon sheath fluid. Achilles tendon intact. No tendinopathy or paratenonitis. LIGAMENTS: No medial or lateral ligamentous abnormality definitely identified. JOINTS AND BONES: No fracture or bone contusion. No abnormal bone marrow edema or enhancement in the regions of the  heel ulcer. No evidence of osteomyelitis. Mild first MTP joint degenerative changes. SOFT TISSUES: Plantar fascia intact. No acute fasciitis. Sinus tarsi and tarsal tunnel are normal. There is an ulcer on the medial aspect of the heel with mild edema in this region. Otherwise, no significant subcutaneous edema. No evidence of abscess.     1.  Small ulcer on the medial plantar aspect of the heel. There is no evidence of abscess or significant subcutaneous edema. 2.  No evidence of osteomyelitis.     Ct Abdomen Pelvis Without Oral With Iv Contrast    Result Date: 2/19/2020  EXAM: CT ABDOMEN PELVIS WO ORAL W IV CONTRAST LOCATION: [facility] DATE/TIME: 2/19/2020 2:58 AM INDICATION: Abdominal pain. Constipation. COMPARISON: 12/19/2016. TECHNIQUE: CT scan of the abdomen and pelvis was performed following injection of IV contrast. Multiplanar reformats were obtained. Dose reduction techniques were used. CONTRAST: 75 mL iohexol (Omni). FINDINGS: LOWER CHEST: Mild atelectasis in the lung bases. PO sternotomy with cardiac valve prosthesis. HEPATOBILIARY: Liver is negative. Gallbladder is absent. No biliary dilatation. PANCREAS: Generalized pancreatic atrophy. Pancreas is otherwise negative. SPLEEN: Normal. ADRENAL GLANDS: Normal. KIDNEYS/BLADDER: Normal. BOWEL: Large amount of stool throughout the colon. No evidence for bowel obstruction or acute bowel findings. LYMPH NODES: Normal. VASCULATURE: Aortic calcification without aneurysm. PELVIC ORGANS: Normal. MUSCULOSKELETAL: Prominent degenerative changes throughout the lumbar spine.     1.  Large amount of stool throughout the colon. No evidence for bowel obstruction or acute bowel findings. 2.  PO cholecystectomy. No biliary dilatation. 3.  Generalized pancreatic atrophy.     Us Arterial Pressures Legs Bilateral    Result Date: 3/3/2020  [facility] OUTPATIENT DATE: 3/3/2020 12:14 PM EXAM: RESTING ANKLE-BRACHIAL INDICES (ABIs) INDICATION: Nonpressure chronic  ulcer of other part of the right foot with necrosis of bone. COMPARISON: None. DANAE FINDINGS: SEGMENTAL BP RIGHT Brachial: 152 Low thigh: 246; Index: 1.57 Calf: >254; Index -NC- Ankle (PT): 190; Index: 1.21 Ankle (DP): 186; Index: 1.18 Digit: 113; Index: 0.72 LEFT Brachial: 157 Low thigh: 214; Index: 1.36 Calf: 232; Index: 1.48 Ankle (PT): 117; Index: 0.75 Ankle (DP): 111; Index: 0.71 Digit: 79; Index: 0.50 WAVEFORMS: Multiphasic waveforms within the right posterior tibial and dorsalis pedis artery. Monophasic waveforms within the left posterior tibial and dorsalis pedis artery distribution.     RIGHT LOWER EXTREMITY: 1.  Normal resting ankle-brachial index and toe brachial index. Digital pressures suggest potential wound healing. Normal multiphasic waveforms within the dorsalis pedis and posterior tibial arteries. Note however is made of noncompressible arteries within the calf suggestive of calcific atherosclerotic involvement. This can falsely elevate the ankle-brachial indices calculation. LEFT LOWER EXTREMITY: 1.  Mildly decreased resting ankle-brachial index. Decreased toe brachial index. Abnormal waveforms within the posterior tibial and dorsalis pedis arteries.           Picture:

## 2021-06-28 NOTE — PROGRESS NOTES
Progress Notes by Mallory Grover RN at 2/10/2020 11:45 AM     Author: Mallory Grover RN Service: -- Author Type: Registered Nurse    Filed: 2/10/2020 12:31 PM Encounter Date: 2/10/2020 Status: Attested    : Mallory Grover RN (Registered Nurse) Cosigner: Gabino Bach MD at 2/10/2020  1:05 PM    Attestation signed by Gabino Bach MD at 2/10/2020  1:05 PM    Anticoagulation care reviewed.  I agree with the plan of care.    Gabino Bach MD  [facility]  2/10/2020, 1:05 PM                 ANTICOAGULATION  MANAGEMENT    Assessment     Today's INR result of 3.2 is Therapeutic (goal INR of 2.5-3.5)        More warfarin taken than instructed which may be affecting INR    No new diet changes affecting INR    No new medication/supplements affecting INR    Continues to tolerate warfarin with no reported s/s of bleeding or thromboembolism     Previous INR was Therapeutic    Plan:     Met with Bernadette in clinic regarding INR result and instructed:     Warfarin Dosing Instructions:  Continue current warfarin dose 2.5 mg daily on Thu; and 5 mg daily rest of week  (0 % change)    Instructed patient to follow up no later than: 2 weeks    Education provided: target INR goal and significance of current INR result, importance of taking warfarin as instructed and written instructions provided    Bernadette verbalizes understanding and agrees to warfarin dosing plan.    Instructed to call the [facility] for any changes, questions or concerns. (#[phone])   ?   Mallory Grover RN    Subjective/Objective:      Bernadette Yu, a 79 y.o. female is on warfarin.     Bernadette reports:     Home warfarin dose: verbally confirmed home dose with Bernadette and updated on anticoagulation calendar     Missed doses: No     Medication changes:  No     S/S of bleeding or thromboembolism:  No     New Injury or illness:  No     Changes in diet or alcohol consumption:  No     Upcoming surgery, procedure  or cardioversion:  No    Anticoagulation Episode Summary     Current INR goal:   2.5-3.5   TTR:   45.9 % (11.9 mo)   Next INR check:   2/24/2020   INR from last check:   3.20 (2/10/2020)   Weekly max warfarin dose:      Target end date:      INR check location:      Preferred lab:      Send INR reminders to:   [facility]    Indications    S/P mitral valve replacement (MVR) [Z95.2]           Comments:            Anticoagulation Care Providers     Provider Role Specialty Phone number    Gabino Bach MD Referring Internal Medicine [phone]

## 2021-06-28 NOTE — PROGRESS NOTES
Progress Notes by Gabino Bach MD at 10/18/2019 11:20 AM     Author: Gabino Bach MD Service: -- Author Type: Physician    Filed: 10/18/2019  9:56 PM Encounter Date: 10/18/2019 Status: Signed    : Gabino Bach MD (Physician)            [facility]       Comprehensive and complex medical care - Chronic disease management - Shared decision making - Care coordination - Compassionate care    Patient advocacy - Rational deprescribing - Minimally disruptive medicine - Ethical focus - Customized care          Date of Service: 10/18/2019  Primary Provider: Gabino Bach MD    Patient Care Team:  Gabino Bach MD as PCP - General (Internal Medicine)  Ayanna Camacho MD as Physician (Cardiology)  Al García MD as Physician (Ophthalmology)  Mauro, Ishan Escobar MD as Physician (Gastroenterology)  [facility], Mallory MCCRAY RN as Registered Nurse  Gabino Bach MD (Internal Medicine)  Gabino Bach MD as Assigned PCP     ______________________________________________________________________     Patient's Pharmacy:    [id number] [address] [address]  Phone: [phone] Fax: [phone]     Patient's Contacts:  Name Home Phone Work Phone Mobile Phone Relationship Lgl Grd   JOYCELYNMEIRISHAN [phone]   Spouse    ANDREA VALENTIN [phone]   Child      Patient's Insurance:    Payor: MEDICARE / Plan: MEDICARE A AND B / Product Type: Medicare /           Bernadette Valentin is a 79 y.o. female who comes in today for:    Chief Complaint   Patient presents with   ? Follow Up       Active Problem List:  Problem List as of 10/18/2019 Reviewed: 10/18/2019  9:50 PM by Gabino Bach MD       High    Former smoker    Full code status - POLST form 1/2/16    ASCVD (arteriosclerotic cardiovascular disease) - CABG 2015    Atrial fibrillation, Now in NSR (H)    Chronic pain -  "Dr. Bach manages the pain    DM type 2 (diabetes mellitus, type 2) (H)    S/P mitral valve replacement (MVR)    Subclavian artery stenosis, left (H) - s/p stent       Medium    Controlled substance agreement signed - 1/19 - temazepam, lorazepam, hydromorphone - UDS 8/19    HTN (hypertension)    Abdominal bloating, chronic    Anticoagulation goal of INR 2.5 to 3.5    Anxiety    Hypothyroidism    Recurrent UTI (urinary tract infection)       Low    Bleeding diathesis (H) - due to warfarin    CN (constipation)    HLD (hyperlipidemia)    IBS (irritable bowel syndrome)    Insomnia    Microalbuminuria due to type 2 diabetes mellitus (H)    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 ACCU-CHEK SMARTVIEW TEST STRIP strips Use 1 each As Directed 3 (three) times a day. Dispense brand per patient's insurance at pharmacy discretion.  Dx E11.65 DM2, uncontrolled    ? amiodarone (PACERONE) 200 MG tablet Take 1 tablet (200 mg total) by mouth daily. NPNPNPNPNPNPNP    ? ascorbic acid, vitamin C, (VITAMIN C) 100 MG tablet Take 100 mg by mouth daily.    ? BD ULTRA-FINE DAVID PEN NEEDLES 32 gauge x 5/32\" Ndle USE WITH NOVOLOG PEN AND LANTUS PENS    ? calcium carbonate (OS-DI) 600 mg calcium (1,500 mg) tablet Take 600 mg by mouth 2 (two) times a day with meals.    ? cholecalciferol, vitamin D3, 5,000 unit Tab Take 5,000 Units by mouth daily.    ? cyanocobalamin (VITAMIN B-12) 100 MCG tablet Take 100 mcg by mouth daily.    ? digoxin (LANOXIN) 125 mcg tablet Take 1 tablet (125 mcg total) by mouth daily.    ? diltiazem (TIAZAC) 120 MG 24 hr capsule Take 1 capsule (120 " mg total) by mouth daily.    ? ferrous sulfate 325 (65 FE) MG tablet Take 1 tablet (325 mg total) by mouth daily with breakfast.    ? furosemide (LASIX) 40 MG tablet Take 40 mg by mouth daily.    ? furosemide (LASIX) 40 MG tablet Take 1 tablet (40 mg total) by mouth every morning AND 1 tablet (40 mg total) daily with lunch.    ? generic lancets Use 1 each As Directed 3 (three) times a day. Dx E11.65 DM2, uncontrolled    ? [START ON 11/14/2019] HYDROmorphone (DILAUDID) 4 MG tablet Take 0.5-1 tablets (2-4 mg total) by mouth 4 (four) times a day as needed for pain. For use from 11/14/2019 to 12/14/2019.    ? insulin aspart U-100 (NOVOLOG U-100 INSULIN ASPART) 100 unit/mL injection Inject 40 Units under the skin 3 (three) times a day.    ? insulin aspart U-100 (NOVOLOG) 100 unit/mL injection Inject 10 Units under the skin 3 (three) times a day as needed. 4/17/2018: njects based on own sliding scale with each meal as needed based on sugars. Average of 10 units per dose   ? levothyroxine (SYNTHROID, LEVOTHROID) 125 MCG tablet Take 1 tablet (125 mcg total) by mouth daily.    ? magnesium 30 mg tablet Take 30 mg by mouth 2 (two) times a day.    ? NOVOLIN N NPH U-100 INSULIN 100 unit/mL injection ADMINISTER 40 UNITS UNDER THE SKIN THREE TIMES DAILY    ? polyethylene glycol (MIRALAX) 17 gram packet Take 1 packet (17 g total) by mouth daily as needed.    ? silver sulfADIAZINE (SILVADENE, SSD) 1 % cream Apply to affected area daily    ? temazepam (RESTORIL) 15 mg capsule TAKE 1 CAPSULE(15 MG) BY MOUTH AT BEDTIME AS NEEDED FOR SLEEP. MAY REFILL EVERY 90 DAYS ONLY    ? warfarin (COUMADIN) 5 MG tablet Take 5-7.5 mg by mouth See Admin Instructions. Take 7.5mg on Wednesdays and Saturdays. Take 5mg all other days of the week.    ? warfarin (COUMADIN/JANTOVEN) 5 MG tablet TAKE 1 TABLET BY MOUTH ON MONDAY, WEDNESDAY, FRIDAY. TAKE 1 AND 1/2 TABLETS BY MOUTH ON ALL OTHER DAYS      Social History     Patient does not qualify to have social  determinant information on file (likely too young).   Social History Narrative    Patient of Dr. Bach since 2001.   to  Aneudy.        4 children.        Former patient of Dr. Harshad Ballard.       Subjective:     Accompanied by  at today's visit.       First reviewed her anemia.  It is improving.  She remains on anticoagulation at this time and takes 1 iron sulfate tab a day.  She does feel better in relationship to this.    Reviewed atrial flutter and this seems to be doing well at this time. She is back is normal sinus rhythm (NSR) on amiodarone.  She is pleased with this.    Her back pain continues to bother her a lot.  Does not want further injections.  Might try chiropractor for this.    We reviewed her other issues noted in the assessment but not specifically addressed in the HPI above.     On review of systems, the patient denies any chest pain or shortness of breath.    Objective:     Wt Readings from Last 3 Encounters:   10/18/19 147 lb 6 oz (66.8 kg)   09/26/19 150 lb 11.2 oz (68.4 kg)   08/07/19 155 lb (70.3 kg)     BP Readings from Last 3 Encounters:   10/18/19 118/60   09/26/19 134/72   09/03/19 132/74     /60 (Patient Site: Right Arm, Patient Position: Sitting, Cuff Size: Adult Regular)   Pulse 80   Wt 147 lb 6 oz (66.8 kg)   BMI 26.11 kg/m     The patient is comfortable, no acute distress.  Mood good.  Insight good.  Eyes are nonicteric.  Neck is supple without mass.  No cervical adenopathy.  No thyromegaly. Heart regular rate and rhythm.  Valve sounds are good.  Lungs clear to auscultation bilaterally.  Respiratory effort is good.  Abdomen soft and nontender.  No hepatosplenomegaly.  Extremities trace edema.  2  Diagnostics:     Results for orders placed or performed in visit on 10/18/19   Hemoglobin   Result Value Ref Range    Hemoglobin 12.8 12.0 - 16.0 g/dL   Basic Metabolic Panel   Result Value Ref Range    Sodium 137 136 - 145 mmol/L    Potassium 4.2 3.5 - 5.0 mmol/L     Chloride 93 (L) 98 - 107 mmol/L    CO2 30 22 - 31 mmol/L    Anion Gap, Calculation 14 5 - 18 mmol/L    Glucose 334 (H) 70 - 125 mg/dL    Calcium 10.2 8.5 - 10.5 mg/dL    BUN 23 8 - 28 mg/dL    Creatinine 1.22 (H) 0.60 - 1.10 mg/dL    GFR MDRD Af Amer 51 (L) >60 mL/min/1.73m2    GFR MDRD Non Af Amer 42 (L) >60 mL/min/1.73m2   INR   Result Value Ref Range    INR 4.70 (H) 0.90 - 1.10   Electrocardiogram Perform and Read   Result Value Ref Range    SYSTOLIC BLOOD PRESSURE      DIASTOLIC BLOOD PRESSURE      VENTRICULAR RATE 85 BPM    ATRIAL RATE 85 BPM    P-R INTERVAL      QRS DURATION 82 ms    Q-T INTERVAL 366 ms    QTC CALCULATION (BEZET) 435 ms    P Axis      R AXIS -46 degrees    T AXIS -60 degrees    MUSE DIAGNOSIS       Sinus rhythm with 1st degree A-V block  Left anterior fascicular block  Nonspecific ST and T wave abnormality  Abnormal ECG  When compared with ECG of 05-AUG-2019 11:48,  Sinus rhythm has replaced Atrial flutter  Vent. rate has decreased BY  44 BPM  T wave inversion now evident in Anterior leads  Confirmed by TYE BONILLA MD LOC:WW ([id number]) on 10/18/2019 3:00:30 PM         Assessment:     1. A-fib (H)    2. Blood loss anemia    3. S/P mitral valve replacement (MVR)    4. Anticoagulation goal of INR 2.5 to 3.5    5. Atrial flutter, unspecified type (H)    6. Multilevel neural foraminal stenosis    7. Spinal stenosis of lumbar region with neurogenic claudication    8. Chronic pain syndrome      ______________________________________________________________________     PHQ-2 Total Score: 3 (10/18/2019 11:00 AM)    No data recorded  ______________________________________________________________________     BMI Readings from Last 1 Encounters:   10/18/19 26.11 kg/m        Plan:     1. Reduce amiodarone to 1 pill a day.  2. Check blood work today.  See relevant orders and diagnosis associations at the bottom of this note.   3. Continue iron sulfate.  4. Check into chiropractor.  Might try   [facility] in [employer].       Gabino Bach MD  General Internal Medicine  [facility]    Personal office fax - [phone]   Voice mail - [phone]  E-mail - [email or web address]     Return in about 6 weeks (around 11/29/2019) for visit and blood work.     Future Appointments   Date Time Provider Department Center   10/24/2019 11:30 AM [facility]         ______________________________________________________________________     Relevant ICD-10 codes and order associations:      ICD-10-CM    1. A-fib (H) I48.91 Electrocardiogram Perform and Read     Basic Metabolic Panel   2. Blood loss anemia D50.0 ferrous sulfate 325 (65 FE) MG tablet     Hemoglobin   3. S/P mitral valve replacement (MVR) Z95.2 INR   4. Anticoagulation goal of INR 2.5 to 3.5 Z51.81 INR    Z79.01    5. Atrial flutter, unspecified type (H) I48.92 INR     amiodarone (PACERONE) 200 MG tablet   6. Multilevel neural foraminal stenosis M99.89    7. Spinal stenosis of lumbar region with neurogenic claudication M48.062    8. Chronic pain syndrome G89.4 HYDROmorphone (DILAUDID) 4 MG tablet

## 2021-06-28 NOTE — PROGRESS NOTES
Progress Notes by Gabino Bach MD at 2/10/2020 10:55 AM     Author: Gabino Bach MD Service: -- Author Type: Physician    Filed: 2/10/2020  8:55 PM Encounter Date: 2/10/2020 Status: Signed    : Gabino Bach MD (Physician)              [facility]    Comprehensive and complex medical care - Chronic disease management - Shared decision making - Care coordination - Compassionate care    Patient advocacy - Rational deprescribing - Minimally disruptive medicine - Ethical focus - Customized care          Date of Service: 2/10/2020  Primary Provider: Gabino Bach MD    Patient Care Team:  Gabino Bach MD as PCP - General (Internal Medicine)  Ayanna Camacho MD as Physician (Cardiology)  Al García MD as Physician (Ophthalmology)  Mauro, Ishan Escobar MD as Physician (Gastroenterology)  [facility], Mallory MCCRAY RN as Registered Nurse  Gabino Bach MD (Internal Medicine)  Gabino Bach MD as Assigned PCP     ______________________________________________________________________     Patient's Pharmacy:    [id number] [address] [address]  Phone: [phone] Fax: [phone]     Patient's Contacts:  Name Home Phone Work Phone Mobile Phone Relationship Lgl Grd   ISHAN VALENTIN [phone]   Spouse    ANDREA VALENTIN [phone]   Child      Patient's Insurance:    Payor: MEDICARE / Plan: MEDICARE A AND B / Product Type: Medicare /           Bernadette Valentin is a 79 y.o. female who comes in today for:    Chief Complaint   Patient presents with   ? Diabetes   ? Follow-up     insomnia legs still hurt a lot at night, chronic back/leg pain   ? Gas   ? Arthritis   ? sore on toe       Active Problem List:  Problem List as of 2/10/2020 Reviewed: 2/10/2020  8:45 PM by Gabino Bach MD       High    Former smoker    Full code status - POLST form 1/2/16     "ASCVD (arteriosclerotic cardiovascular disease) - CABG 2015    Chronic pain - Dr. Bach manages the pain    DM type 2 (diabetes mellitus, type 2) (H)    Paroxysmal atrial fibrillation (H)    S/P mitral valve replacement (MVR)    Subclavian artery stenosis, left (H) - s/p stent       Medium    Controlled substance agreement signed - 2/20 - temazepam, lorazepam, hydromorphone - UDS 8/19    HTN (hypertension)    Abdominal bloating, chronic    Anticoagulation goal of INR 2.5 to 3.5    Anxiety    Hypothyroidism    Moderate aortic stenosis    Recurrent UTI (urinary tract infection)       Low    Bleeding diathesis (H) - due to warfarin    CN (constipation)    HLD (hyperlipidemia)    IBS (irritable bowel syndrome)    Insomnia    Microalbuminuria due to type 2 diabetes mellitus (H)    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 ACCU-CHEK SMARTVIEW TEST STRIP strips Use 1 each As Directed 3 (three) times a day. Dispense brand per patient's insurance at pharmacy discretion.  Dx E11.65 DM2, uncontrolled   ? BD ULTRA-FINE DAVID PEN NEEDLES 32 gauge x 5/32\" Ndle USE WITH NOVOLOG PEN AND LANTUS PENS   ? capsaicin-menthol (CAPZASIN) 0.025-10 % Gel gel Apply to legs up to three times a day.   ? cholecalciferol, vitamin D3, 5,000 unit Tab Take 5,000 Units by mouth daily.   ? cyanocobalamin (VITAMIN B-12) 100 MCG tablet Take 100 mcg by mouth daily.   ? ferrous sulfate 325 (65 FE) MG tablet Take 1 tablet (325 mg total) by mouth daily with breakfast.   ? furosemide (LASIX) 40 MG " tablet Take 1 tablet (40 mg total) by mouth every morning AND 1 tablet (40 mg total) daily with lunch.   ? generic lancets Use 1 each As Directed 3 (three) times a day. Dx E11.65 DM2, uncontrolled   ? HYDROmorphone (DILAUDID) 4 MG tablet Take 1 tablet (4 mg total) by mouth 4 (four) times a day as needed for pain.   ? HYDROmorphone (DILAUDID) 4 MG tablet Take 0.5-1 tablets (2-4 mg total) by mouth 4 (four) times a day as needed for pain. For use from 1/24/2020 to 2/23/2020.   ? insulin lispro (HUMALOG) 100 unit/mL injection Inject 15 Units under the skin 2 (two) times a day.   ? insulin NPH (NOVOLIN) 100 unit/mL injection Inject 20 Units under the skin 2 (two) times a day.   ? levothyroxine (SYNTHROID, LEVOTHROID) 125 MCG tablet Take 1 tablet (125 mcg total) by mouth daily.   ? temazepam (RESTORIL) 15 mg capsule Take 2 capsules (30 mg total) by mouth at bedtime as needed for sleep.   ? warfarin (COUMADIN/JANTOVEN) 3 MG tablet Take 1 tablet (3 mg total) by mouth daily.   ? diclofenac sodium (VOLTAREN) 1 % Gel Apply 2 g topically 4 (four) times a day.   ? pravastatin (PRAVACHOL) 20 MG tablet Take 1 tablet (20 mg total) by mouth daily.   ? silver sulfADIAZINE (SILVADENE, SSD) 1 % cream Apply to affected area daily     Social History     Social History Narrative    Patient of Dr. Bach since 2001.   to  Aneudy.        4 children.        Former patient of Dr. Harshad Ballard.       Subjective:     Roomed by: lissa nguyen    Refills needed? Yes    Do you have any forms that need to be filled out? No       Patient comes in today with her .    We first reviewed her back pain.  This continues to bother her regularly.  It limits her mobility as her legs get weak when she is walking further.  Her back pain and leg pain is worse with walking.  We talked about her seeing a specialist related to this, but I see the only possible endpoint being surgery.  She does not want to consider this at this time.  We can always  refer her in the future.  She would be at higher surgical risk with her comorbidities.    We reviewed her diabetes.  Her sugars have been running higher.  Her sugars have been in the 165-185 in the morning.  It generally is not around here.  She is taking her insulin regularly.    To follow-up on her hypothyroidism today.  She was not taking her thyroid medication for a while.    She has issues with fatigue over time.  Her legs ache a lot.  She does take her pain medications regularly.    She has had an issue with a sore toe.  This is on her right great toe.  It is the distal toe.  She would like a refill of the Silvadene cream which seems to help her with healing.  She is going to need to be attentive to this.    We reviewed her other issues noted in the assessment but not specifically addressed in the HPI above.     On review of systems, the patient denies any chest pain or shortness of breath.    Objective:     Wt Readings from Last 3 Encounters:   02/10/20 147 lb (66.7 kg)   12/03/19 147 lb (66.7 kg)   11/25/19 142 lb 6.4 oz (64.6 kg)     BP Readings from Last 3 Encounters:   02/10/20 128/62   12/03/19 132/68   11/25/19 116/58     /62   Pulse (!) 101   Wt 147 lb (66.7 kg)   SpO2 95%   BMI 26.04 kg/m     The patient is comfortable, no acute distress.  Mood good.  Insight fair.  Eyes are nonicteric.  Neck is supple without mass.  No cervical adenopathy.  No thyromegaly. Heart regular rate and rhythm.  Lungs clear to auscultation bilaterally.  Respiratory effort is good.  Abdomen soft and nontender.  No hepatosplenomegaly.  Extremities no edema.  Gait is limited.  There is an approximately 1 cm ulcer on the distal of her right toe without signs of infection.  This is likely due to her ongoing nerve damage and neuropathy.  She also has underlying PAD.      Diagnostics:     Results for orders placed or performed in visit on 02/10/20   HM2(CBC w/o Differential)   Result Value Ref Range    WBC 7.2 4.0 - 11.0  thou/uL    RBC 4.27 3.80 - 5.40 mill/uL    Hemoglobin 12.5 12.0 - 16.0 g/dL    Hematocrit 39.6 35.0 - 47.0 %    MCV 93 80 - 100 fL    MCH 29.3 27.0 - 34.0 pg    MCHC 31.6 (L) 32.0 - 36.0 g/dL    RDW 13.7 11.0 - 14.5 %    Platelets 163 140 - 440 thou/uL    MPV 13.2 (H) 8.5 - 12.5 fL   Basic Metabolic Panel   Result Value Ref Range    Sodium 138 136 - 145 mmol/L    Potassium 4.4 3.5 - 5.0 mmol/L    Chloride 102 98 - 107 mmol/L    CO2 27 22 - 31 mmol/L    Anion Gap, Calculation 9 5 - 18 mmol/L    Glucose 136 (H) 70 - 125 mg/dL    Calcium 9.6 8.5 - 10.5 mg/dL    BUN 27 8 - 28 mg/dL    Creatinine 1.04 0.60 - 1.10 mg/dL    GFR MDRD Af Amer >60 >60 mL/min/1.73m2    GFR MDRD Non Af Amer 51 (L) >60 mL/min/1.73m2   Glycosylated Hemoglobin A1c   Result Value Ref Range    Hemoglobin A1c 10.2 (H) 3.5 - 6.0 %   Thyroid Stimulating Hormone (TSH)   Result Value Ref Range    TSH 4.96 0.30 - 5.00 uIU/mL   INR   Result Value Ref Range    INR 3.20 (H) 0.90 - 1.10       Quality review:     PHQ-2 Total Score: 2 (10/25/2019  4:00 PM)    No data recorded  ______________________________________________________________________     BMI Readings from Last 1 Encounters:   02/10/20 26.04 kg/m        Assessment and Plan:     1. Skin ulcer of right great toe, limited to breakdown of skin (H)  Continue to monitor and follow-up sooner if issues.    - silver sulfADIAZINE (SILVADENE, SSD) 1 % cream; Apply to affected area daily  Dispense: 50 g; Refill: 3    2. Type 2 diabetes mellitus with microalbuminuria, with long-term current use of insulin (H)  We might need to increase her diabetic control.  We restart her cholesterol medication at this time.    - Basic Metabolic Panel  - Glycosylated Hemoglobin A1c  - pravastatin (PRAVACHOL) 20 MG tablet; Take 1 tablet (20 mg total) by mouth daily.  Dispense: 90 tablet; Refill: 3    3. PVD (peripheral vascular disease) (H)  I suspect that this is part of her leg symptoms but the back is likely a bigger  issue.    4. Atrial flutter, unspecified type (H)  She remains out of atrial flutter at this time since being on amiodarone, which caused significant side effects.  However, atrial flutter has not returned.    - HM2(CBC w/o Differential)  - INR    5. Bleeding diathesis (H)  No signs of active bleeding at this time.    6. Spinal stenosis of lumbar region with neurogenic claudication  Declined spine evaluation.  We will try diclofenac gel to see if it helps.    - diclofenac sodium (VOLTAREN) 1 % Gel; Apply 2 g topically 4 (four) times a day.  Dispense: 100 g; Refill: 11    7. Acquired hypothyroidism  Check blood work    - Thyroid Stimulating Hormone (TSH)    8. S/P mitral valve replacement (MVR)  Doing well overall and his endocardium has looked good.  Cardiac tests as noted:    Results for orders placed during the hospital encounter of 10/26/19   Echo ANTOINE [ECH01] 10/28/2019    Narrative 1. Normal left ventricular size and systolic performance with a visually   estimated ejection fraction of 65%.   2. There is moderate aortic stenosis.   3. There is a mechanical mitral valve prosthesis (Documented 23 mm [employer]   Medical valve). Probably normal mitral valve prosthesis function.      Mitral valve prosthetic leaflets appear to have normal opening/excursion   (see attached image clip).      Mean diastolic gradient is 6-7 mmHg.     There is mild valvar mitral insufficiency.  4. Normal right ventricular size and systolic performance.   5. There is moderate biatrial enlargement.  6. Patient with documented left atrial appendage ligation.  No significant   residual appendage is present.  7. Echo contrast examination was performed using agitated NS as contrast   agent. The right heart opacity was adequate and the left heart was   adequately visualized. There was no evidence of right to left shunting   during spontaneous respiration or following release of Valsalva.         The study was performed using a multiplane adult  transducer. 2-D,   colorflow Doppler, and spectral Doppler analysis was performed. Informed   consent was obtained prior to the procedure.  Sedation: fentanyl 75 mcg &   versed 1.0 mg iv. Topical anesthesia was performed with viscous lidocaine   and benzocaine spray. The transducer was introduced without difficulty.   There were no complications of the procedure.        Results for orders placed during the hospital encounter of 11/11/15   NM Pharmacologic Stress Test    [facility]   Pharmacological Stress Test Report    Patient: Bernadette Yu   MRN: [id number]  : 1940  Primary Care Provider: Gabino Bach MD  Ordering Physician: LONI Paz  Indication:  typical chest pain  Chest pain chronic, low to mod probability of CAD      STRESS SUMMARY: 0.4 mg of intravenous Lexiscan was administered over 15   seconds under the supervision of Dr. Ayanna Szymanski .  Chest   tightness was reported. The stress electrocardiogram was positive for   inducible ischemia with 1 mm of horizontal ST segment depression.    TECHNICAL COMMENTS: Nuclear imaging was accomplished utilizing 4.28 mCi of     thallium-201 injected at rest and 29.9 mCi of 99m technetium sestamibi   injected at   the completion of Lexiscan infusion. The  images are   satisfactory.     FINDINGS: The Lexiscan stress images demonstrate a small area of mildly   reduced tracer uptake pattern in the distal anterior, anteroapical and   apical lateral segments.  The resting images are normal.  The gated images   reveal normal resting regional   wall motion and global systolic performance. The measured resting ejection   fraction   is greater than 70 %.     CONCLUSION:   1. Lexiscan stress nuclear study is abnormal; there is a small area of   anterior and apical ischemia with associated ischemic EKG changes.  The   latter finding may suggest multivessel disease in female  patients.    COMMENTS:   The patient is at high risk of future cardiac ischemic events.    The findings of this examination were communicated to the nursing staff at   [facility].      Shlomo Diana MD  11/11/2015 1:30 PM                - INR    9. Anticoagulation goal of INR 2.5 to 3.5  Seeing anticoagulation RN today.    - INR    10. Chronic pain syndrome  Continue current medications.    11. Fatigue, unspecified type  Due to multiple medical issues.          Gabino Bach MD  General Internal Medicine  [facility]     Personal office fax - [phone]   Voice mail - [phone]  E-mail - [email or web address]     Return in about 2 months (around 4/10/2020) for visit and blood work.     Future Appointments   Date Time Provider Department Center   2/24/2020  1:20 PM [facility]         HCC issues resolved at this visit.

## 2021-06-28 NOTE — PROGRESS NOTES
Progress Notes by Mallory Grover RN at 1/27/2020 11:30 AM     Author: Mallory Grover RN Service: -- Author Type: Registered Nurse    Filed: 1/27/2020 11:07 AM Encounter Date: 1/27/2020 Status: Attested    : Mallory Grover RN (Registered Nurse) Cosigner: Gabino Bach MD at 1/27/2020 11:51 AM    Attestation signed by Gabino Bach MD at 1/27/2020 11:51 AM    Anticoagulation care reviewed.  I agree with the plan of care.    Gabino Bach MD  [facility]  1/27/2020, 11:51 AM                 ANTICOAGULATION  MANAGEMENT    Assessment     Today's INR result of 4.4 is Supratherapeutic (goal INR of 2.5-3.5)        Warfarin taken as previously instructed    No new diet changes affecting INR    No new medication/supplements affecting INR    Continues to tolerate warfarin with no reported s/s of bleeding or thromboembolism     Previous INR was Supratherapeutic    Plan:     Met with Bernadette in clinic regarding INR result and instructed:     Warfarin Dosing Instructions:  Hold warfarin today only then change warfarin dose to 2.5 mg daily on Thu; and 5 mg daily rest of week  (7.1 % change)    Instructed patient to follow up no later than: 1 week    Education provided: target INR goal and significance of current INR result, importance of following up for INR monitoring at instructed interval, importance of taking warfarin as instructed, importance of notifying clinic for changes in medications and written instructions provided    Bernadette verbalizes understanding and agrees to warfarin dosing plan.    Instructed to call the [facility] for any changes, questions or concerns. (#[phone])   ?   Mallory Grover RN    Subjective/Objective:      Bernadette Yu, a 79 y.o. female is on warfarin.     Bernadette reports:     Home warfarin dose: verbally confirmed home dose with Bernadette and updated on anticoagulation calendar     Missed doses: No     Medication changes:  No     S/S of  bleeding or thromboembolism:  No     New Injury or illness:  No     Changes in diet or alcohol consumption:  No     Upcoming surgery, procedure or cardioversion:  No    Anticoagulation Episode Summary     Current INR goal:   2.5-3.5   TTR:   43.1 % (11.9 mo)   Next INR check:   2/3/2020   INR from last check:   4.40! (1/27/2020)   Weekly max warfarin dose:      Target end date:      INR check location:      Preferred lab:      Send INR reminders to:   [facility]    Indications    S/P mitral valve replacement (MVR) [Z95.2]           Comments:            Anticoagulation Care Providers     Provider Role Specialty Phone number    Gabino Bach MD Referring Internal Medicine [phone]

## 2021-06-28 NOTE — PROGRESS NOTES
Progress Notes by Mallory Grover RN at 3/2/2020 11:45 AM     Author: Mallory Grover RN Service: -- Author Type: Registered Nurse    Filed: 3/2/2020 12:51 PM Encounter Date: 3/2/2020 Status: Attested    : Mallory Grover RN (Registered Nurse) Cosigner: Gabino Bach MD at 3/2/2020  1:03 PM    Attestation signed by Gabino Bach MD at 3/2/2020  1:03 PM    Anticoagulation care reviewed.  I agree with the plan of care.    Gabino Bach MD  [facility]  3/2/2020, 1:03 PM                 ANTICOAGULATION  MANAGEMENT    Assessment     Today's INR result of 2.3 is Subtherapeutic (goal INR of 2.5-3.5)        Warfarin taken as previously instructed    No new diet changes affecting INR    No new medication/supplements affecting INR    Continues to tolerate warfarin with no reported s/s of bleeding or thromboembolism     Previous INR was Subtherapeutic at time of hospital discharge    Plan:     Met with Bernadette in clinic regarding INR result and instructed:     Warfarin Dosing Instructions:  Take 7.5 mg today only then change warfarin dose to 5 mg daily  (7.7 % change)    Instructed patient to follow up no later than: 2 weeks at     Education provided: target INR goal and significance of current INR result, importance of following up for INR monitoring at instructed interval, importance of taking warfarin as instructed and written instructions provided    Bernadette verbalizes understanding and agrees to warfarin dosing plan.    Instructed to call the [facility] for any changes, questions or concerns. (#[phone])   ?   Mallory Grover RN    Subjective/Objective:      Bernadette Yu, a 79 y.o. female is on warfarin.     Bernadette reports:     Home warfarin dose: template incorrect; verbally confirmed home dose with Bernadette and updated on anticoagulation calendar     Missed doses: No     Medication changes:  No     S/S of bleeding or thromboembolism:  No     New Injury or  illness:  No     Changes in diet or alcohol consumption:  No     Upcoming surgery, procedure or cardioversion:  No    Anticoagulation Episode Summary     Current INR goal:   2.5-3.5   TTR:   42.8 % (11.4 mo)   Next INR check:   3/16/2020   INR from last check:   2.30! (3/2/2020)   Weekly max warfarin dose:      Target end date:      INR check location:      Preferred lab:      Send INR reminders to:   [facility] [place]    Indications    S/P mitral valve replacement (MVR) [Z95.2]           Comments:            Anticoagulation Care Providers     Provider Role Specialty Phone number    Gabino Bach MD Referring Internal Medicine [phone]

## 2021-06-28 NOTE — PROGRESS NOTES
Progress Notes by Gabino Bach MD at 3/16/2020  1:30 PM     Author: Gabino Bach MD Service: -- Author Type: Physician    Filed: 3/23/2020 10:11 PM Encounter Date: 3/16/2020 Status: Signed    : Gabino Bach MD (Physician)              [facility]    Comprehensive and complex medical care - Chronic disease management - Shared decision making - Care coordination - Compassionate care    Patient advocacy - Rational deprescribing - Minimally disruptive medicine - Ethical focus - Customized care          Date of Service: 3/16/2020  Primary Provider: Gabino Bach MD    Patient Care Team:  Gabino Bach MD as PCP - General (Internal Medicine)  Ayanna Camacho MD as Physician (Cardiology)  Ricky, Al ALDRIDGE MD as Physician (Ophthalmology)  Mauro, Ishan Escobar MD as Physician (Gastroenterology)  [facility], Mallory MCCRAY RN as Registered Nurse  Gabino Bach MD (Internal Medicine)  Gabino Bach MD as Assigned PCP     ______________________________________________________________________     Patient's Pharmacy:      [id number] [address] [address]  Phone: [phone] Fax: [phone]     Patient's Contacts:  Name Home Phone Work Phone Mobile Phone Relationship Lgl Grd   ISHAN VALENTIN [phone]   Spouse    ANDREA VALENTIN [phone]   Child        Patient's Insurance:    Payor: MEDICARE / Plan: MEDICARE A AND B / Product Type: Medicare /           Bernadette Valentin is a 79 y.o. female who comes in today for:    Chief Complaint   Patient presents with   ? Follow-up       Active Problem List:  Problem List as of 3/16/2020 Reviewed: 3/3/2020  4:36 PM by Graham, Emma A., NP       High    Former smoker    Full code status - POLST form 1/2/16    ASCVD (arteriosclerotic cardiovascular disease) - CABG 2015    Atrial fibrillation with rapid ventricular response (H)  "   Chronic pain - Dr. Bach manages the pain    DM type 2 (diabetes mellitus, type 2) (H)    Nonrheumatic mitral valve stenosis    Paroxysmal atrial fibrillation (H)    S/P mitral valve replacement (MVR) - mechanical mitral valve placed 2015    Subclavian artery stenosis, left (H) - s/p stent       Medium    Controlled substance agreement signed - 2/20 - temazepam, lorazepam, hydromorphone - UDS 8/19    HTN (hypertension)    Abdominal bloating, chronic    Anticoagulation goal of INR 2.5 to 3.5    Anxiety    Hypothyroidism    Moderate aortic stenosis    PAD (peripheral artery disease) (H)    Recurrent UTI (urinary tract infection)       Low    Bleeding diathesis (H) - due to warfarin    HLD (hyperlipidemia)    IBS (irritable bowel syndrome)    Insomnia    Microalbuminuria due to type 2 diabetes mellitus (H)    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 ACCU-CHEK SMARTVIEW TEST STRIP strips Use 1 each As Directed 3 (three) times a day. Dispense brand per patient's insurance at pharmacy discretion.  Dx E11.65 DM2, uncontrolled    ? BD ULTRA-FINE DAVID PEN NEEDLES 32 gauge x 5/32\" Ndle USE WITH NOVOLOG PEN AND LANTUS PENS    ? cholecalciferol, vitamin D3, 5,000 unit Tab Take 5,000 Units by mouth daily.    ? collagenase (SANTYL) ointment Apply qd    ? cyanocobalamin (VITAMIN B-12) 100 MCG tablet Take 100 mcg by mouth daily.    ? diclofenac sodium (VOLTAREN) 1 % Gel Apply 2 g topically 4 (four) times a day. 2/19/2020: " Patient states she is not taking PTA, but per chart review, appears this medication was just recently approved by insurance   ? digoxin (LANOXIN) 125 mcg (0.125 mg) tablet Take 1 tablet (125 mcg total) by mouth daily with supper.    ? diltiazem (CARDIZEM CD) 180 MG 24 hr capsule Take 1 capsule (180 mg total) by mouth daily.    ? ferrous sulfate 325 (65 FE) MG tablet Take 1 tablet (325 mg total) by mouth daily with breakfast.    ? furosemide (LASIX) 40 MG tablet Take 1 tablet (40 mg total) by mouth daily.    ? gabapentin (NEURONTIN) 300 MG capsule Take 1 capsule (300 mg total) by mouth at bedtime.    ? generic lancets Use 1 each As Directed 3 (three) times a day. Dx E11.65 DM2, uncontrolled    ? HYDROmorphone (DILAUDID) 4 MG tablet Take 0.5-1 tablets (2-4 mg total) by mouth 4 (four) times a day as needed for pain. For use from 2/23/2020 to 3/24/2020.    ? insulin NPH (NOVOLIN) 100 unit/mL injection Inject 20 Units under the skin 2 (two) times a day.    ? levothyroxine (SYNTHROID, LEVOTHROID) 125 MCG tablet Take 1 tablet (125 mcg total) by mouth daily.    ? metFORMIN (GLUCOPHAGE) 500 MG tablet Take 1 tablet (500 mg total) by mouth 2 (two) times a day with meals.    ? NOVOLOG U-100 INSULIN ASPART 100 unit/mL injection INJECT 40 UNITS UNDER THE SKIN TID    ? polyethylene glycol (MIRALAX) 17 gram packet Take 1 packet (17 g total) by mouth daily.    ? potassium chloride (K-DUR,KLOR-CON) 20 MEQ tablet Take 1 tablet (20 mEq total) by mouth daily. Take along with lasix    ? pravastatin (PRAVACHOL) 20 MG tablet Take 1 tablet (20 mg total) by mouth daily.    ? silver sulfADIAZINE (SILVADENE, SSD) 1 % cream Apply to affected area daily (Patient taking differently: Apply topically daily as needed (to toes). Apply to affected area daily)    ? temazepam (RESTORIL) 15 mg capsule Take 2 capsules (30 mg total) by mouth at bedtime as needed for sleep. (Patient taking differently: Take 15-30 mg by mouth at bedtime as needed for sleep.  )    ? warfarin ANTICOAGULANT (COUMADIN/JANTOVEN) 5 MG tablet Take by mouth See Admin Instructions. 2.5 mg on Thursday; 5 mg row per anticoagulation encounter     ? bacitracin-polymyxin b (POLYSPORIN) ointment Apply to toe wounds daily        Social History     Social History Narrative    Patient of Dr. Bach since 2001.   to  Aneudy.        4 children.        Former patient of Dr. Harshad Ballard.         Subjective:     Patient comes in today for follow-up of a number of issues.  She comes in on her own.  Her  is at the dentist.    We reviewed her left heel ulcer.  This has an eschar with this.  It has not been debrided.  She has been using Santyl for this.  It has not really made any progress so far.  She has seen the wound nurse who did not do any additional treatments for her on this.  She was seen by podiatry as well and they had tests to look for osteomyelitis.  She really does not want to see any of the specialists at this time and would rather try to give this time to heal.  She understands that there might be underlying infection in the toe which may need further management but at this point she wishes to follow this.  She has a number of medical issues which are intersecting and difficult to manage given her complexity.  She has ulcers in both great toes and there is evidence for osteomyelitis on MRI of 1 of them.    She would like us to try to continue to do wound care for her and see if these can be healed.    Her follow-up study for peripheral arterial disease were not as bad as indicated by the previous testing, however there is some disease.  It appears that there may be enough blood flow to allow for wound healing based on this.    We reviewed her mitral valve replacement and paroxysmal atrial fibrillation.  She continues on anticoagulation at this time without issues.    Reviewed her chronic pain in relationship to these issues.  Her heel is somewhat tender to touch with  this.    We reviewed her other issues noted in the assessment but not specifically addressed in the HPI above.     On review of systems, the patient denies any chest pain or shortness of breath.    Objective:     Wt Readings from Last 3 Encounters:   03/03/20 146 lb 12.8 oz (66.6 kg)   02/22/20 153 lb (69.4 kg)   02/12/20 148 lb (67.1 kg)       BP Readings from Last 3 Encounters:   03/16/20 126/70   03/10/20 122/70   03/03/20 124/66       /70   Pulse 94   SpO2 95%    The patient is comfortable, no acute distress.  Mood good.  Insight good.  Eyes are nonicteric.  Neck is supple without mass.  No cervical adenopathy.  No thyromegaly. Heart regular rate and rhythm.  Lungs clear to auscultation bilaterally.  Respiratory effort is good.  Abdomen soft and nontender.  No hepatosplenomegaly.  Extremities no edema.  Her left heel shows a heel ulcer medially with eschar present.  There is no signs of infection.  The distal toes show ulceration in both great toes without any obvious signs of cellulitis underlying this.    Diagnostics:     Results for orders placed or performed in visit on 03/16/20   INR   Result Value Ref Range    INR 3.30 (H) 0.90 - 1.10     ______________________________________________________________________    Pertinent radiology for this visit includes the following:    MR Ankle With Without Contrast Left  Narrative: EXAM: MR ANKLE WITHOUT/WITH CONTRAST LEFT  LOCATION: [facility]  DATE/TIME: 03/08/2020, 12:50 PM    INDICATION: Left heel ulcer. Evaluate for osteomyelitis.  COMPARISON: None.  TECHNIQUE: Routine. Additional postgadolinium T1 sequences were obtained.  IV CONTRAST: Gadavist 6 mL.    FINDINGS:     TENDONS:   Peroneal tendons intact. No tendinopathy. No significant tendon sheath fluid.  Medial tendons intact. No tendinopathy. No significant tendon sheath fluid.  Anterior tendons intact. No tendinopathy. No significant tendon sheath fluid.  Achilles tendon intact. No tendinopathy  or paratenonitis.    LIGAMENTS:   No medial or lateral ligamentous abnormality definitely identified.    JOINTS AND BONES: No fracture or bone contusion. No abnormal bone marrow edema or enhancement in the regions of the heel ulcer. No evidence of osteomyelitis. Mild first MTP joint degenerative changes.    SOFT TISSUES: Plantar fascia intact. No acute fasciitis. Sinus tarsi and tarsal tunnel are normal.     There is an ulcer on the medial aspect of the heel with mild edema in this region. Otherwise, no significant subcutaneous edema. No evidence of abscess.  Impression: 1.  Small ulcer on the medial plantar aspect of the heel. There is no evidence of abscess or significant subcutaneous edema.  2.  No evidence of osteomyelitis.      ______________________________________________________________________      EXAM: MR FOREFOOT W WO CONTRAST LEFT  LOCATION: [facility]  DATE/TIME: 3/3/2020 10:08 PM     INDICATION: Osteomyelitis suspected, diabetic  COMPARISON: None.  TECHNIQUE: Routine. Additional postgadolinium T1 sequences were obtained.  IV CONTRAST: Gadavist 7     FINDINGS:     JOINTS AND BONES: There is signal abnormality within the distal aspect of the distal phalanx of the great toe consistent with osteomyelitis. No additional areas worrisome for osteomyelitis are identified. No evidence for fracture. No significant effusion   to suggest septic arthropathy.     TENDONS: The Achilles tendon is not included on the field-of-view and cannot be evaluated. Within the foot, the flexor tendons are negative for tendinopathy, tenosynovitis, or tearing. The extensor tendons are negative for tendinopathy, tenosynovitis, or   tearing. The hallucis tendons are negative.     LIGAMENTS: The ligament of Lisfranc is intact. The collateral ligaments at the MTP joints are all intact.     MUSCLES AND SOFT TISSUES: No intramuscular signal abnormality. Mild atrophy of the interosseous musculature. Nonspecific edema within the  subcutaneous soft tissues about the great toe. No soft tissue mass or fluid collection.     IMPRESSION:   1.  Signal abnormality consistent with osteomyelitis within the tuft of the distal aspect of the great toe.  2.  No evidence for fracture or additional marrow signal abnormality. No additional areas worrisome for osteomyelitis.  3.  No evidence for septic arthropathy or abscess.  4.  Nonspecific edema or potentially cellulitis within the great toe and bases of the additional toes.  5.  No evidence for tendinous or ligamentous tear.       ______________________________________________________________________     EXAM: MR FOREFOOT W WO CONTRAST RIGHT  LOCATION: [facility]  DATE/TIME: 3/3/2020 10:07 PM     INDICATION: Osteomyelitis suspected, diabetic  COMPARISON: None.  TECHNIQUE: Routine. Additional postgadolinium T1 sequences were obtained.  IV CONTRAST: Gadavist 7     FINDINGS:     JOINTS AND BONES: Signal abnormality consistent with osteomyelitis within the distal phalanx of the great toe. There is some signal abnormality within the distal phalanx of the second toe but this is not confirmed on the sagittal STIR sequence and is   potentially artifactual. This is doubtful for additional osteomyelitis. No additional areas worrisome for osteomyelitis are identified. No evidence for fracture. No significant effusion to suggest septic arthropathy. Degenerative change at the first MTP   joint. Degenerative change at multiple IP joints. Degenerative change at the first TMT joint.     TENDONS: The Achilles tendon is not included on the field-of-view and cannot be evaluated. Within the foot, the flexor tendons are negative for tendinopathy. There is tenosynovitis involving the flexor hallucis and portions of the flexor digitorum   tendons. The extensor tendons are negative.     LIGAMENTS: The ligament of Lisfranc is intact. The collateral ligaments at the MTP joints are intact.     MUSCLES AND SOFT TISSUES:  Fatty infiltration and atrophy of the interosseous musculature. No evidence for soft tissue mass or fluid collection. Mild edema within the subcutaneous soft tissues of the forefoot. This could represent cellulitis. No organized   fluid collection.     IMPRESSION:   1.  Signal abnormality throughout the distal phalanx of the great toe worrisome for osteomyelitis.  2.  Signal abnormality within the distal phalanx of the second toe is likely artifactual and not confirmed on the sagittal STIR sequence. This is unlikely to represent additional osteomyelitis.  3.  No evidence for fracture.  4.  No evidence for septic arthropathy or abscess.  5.  Nonspecific edema or potentially cellulitis within the forefoot.  6.  Fatty infiltration and atrophy of the interosseous musculature.  7.  Tenosynovitis involving the flexor hallucis tendon and medial flexor digitorum tendons.    Assessment:     1. Skin ulcer of toe, limited to breakdown of skin, unspecified laterality (H)    2. S/P mitral valve replacement (MVR)    3. Anticoagulation goal of INR 2.5 to 3.5    4. Atrial flutter, unspecified type (H)    5. Ulcer of heel and midfoot, left, with unspecified severity (H)    6. Acquired hypothyroidism    7. Moderate aortic stenosis    8. PAD (peripheral artery disease) (H)    9. Paroxysmal atrial fibrillation (H) - VALVULAR        Quality review:     PHQ-2 Total Score: 2 (10/25/2019  4:00 PM)    No data recorded  ______________________________________________________________________     BMI Readings from Last 1 Encounters:   03/03/20 26.00 kg/m          Plan:     1. She wishes her consultations at this time to be canceled.  2. We will use DuoDERM on the heel.  She will used tubular gauze over the toes for padding.  She will continue in the postop shoe on the left foot.  3. She may need antibiotics but it is unclear at this time whether we should do this.  We might follow up a sed rate and CRP in the future.  She has had some chronic  elevation in this so this may be unhelpful as a marker.  4. There is also a chance of worsening and she understands this.  She understands that a potential outcome could be amputation.  We could have her see a different orthopedist in the future if she decides to have a second opinion related to this.  5. Her INR was done today.         Gabino Bach MD  General Internal Medicine  [facility]    Personal office fax - [phone]   Voice mail - [phone]  E-mail - [email or web address]     Return in about 10 days (around 3/26/2020) for follow up visit.     Future Appointments   Date Time Provider Department Center   3/26/2020 11:00 AM Gabino Bach MD [facility]   4/7/2020 11:20 AM Gabino Bach MD [facility]         ______________________________________________________________________     Relevant ICD-10 codes and order associations:      ICD-10-CM    1. Skin ulcer of toe, limited to breakdown of skin, unspecified laterality (H)  L97.501 bacitracin-polymyxin b (POLYSPORIN) ointment   2. S/P mitral valve replacement (MVR)  Z95.2 INR   3. Anticoagulation goal of INR 2.5 to 3.5  Z51.81 INR    Z79.01    4. Atrial flutter, unspecified type (H)  I48.92 INR   5. Ulcer of heel and midfoot, left, with unspecified severity (H)  L97.429    6. Acquired hypothyroidism  E03.9    7. Moderate aortic stenosis  I35.0    8. PAD (peripheral artery disease) (H)  I73.9    9. Paroxysmal atrial fibrillation (H) - VALVULAR  I48.0

## 2021-06-28 NOTE — PROGRESS NOTES
Progress Notes by Gabino Bach MD at 3/26/2020 11:00 AM     Author: Gabino Bach MD Service: -- Author Type: Physician    Filed: 3/27/2020  8:47 AM Encounter Date: 3/26/2020 Status: Signed    : Gabino Bach MD (Physician)          [facility]    Care coordination - Customized care -  Comprehensive and complex medical care            Date of Service: 3/26/2020  Primary Provider: Gabino Bach MD    Patient Care Team:  Gabino Bach MD as PCP - General (Internal Medicine)  Ayanna Camacho MD as Physician (Cardiology)  Ricky, Al ALDRIDGE MD as Physician (Ophthalmology)  Mauro, Ishan Escobar MD as Physician (Gastroenterology)  [facility], Mallory MCCRAY RN as Registered Nurse  Gabino Bach MD (Internal Medicine)  Gabino Bach MD as Assigned PCP     ______________________________________________________________________     Patient's Pharmacy:    [facility][id number] [address] AT [address] [address]  Phone: [phone] Fax: [phone]     Patient's Contacts:  Name Home Phone Work Phone Mobile Phone Relationship Lgl Grd   ISHAN VALENTIN [phone]   Spouse    ANDREA VALENTIN [phone]   Child      Patient's Insurance:    Payor: MEDICARE / Plan: MEDICARE A AND B / Product Type: Medicare /            Bernadette NIMCO Valentin is a 79 y.o. female who comes in today for:    Chief Complaint   Patient presents with   ? Follow-up   ? Wound Check       Active Problem List:  Problem List as of 3/26/2020 Reviewed: 3/23/2020 10:04 PM by Gabino Bach MD       High    Former smoker    Full code status - POLST form 1/2/16    ASCVD (arteriosclerotic cardiovascular disease) - CABG 2015    Atrial fibrillation with rapid ventricular response (H)    Chronic pain - Dr. Bach manages the pain    DM type 2 (diabetes mellitus, type 2) (H)    Nonrheumatic mitral valve stenosis    Paroxysmal atrial  "fibrillation (H) - VALVULAR    S/P mitral valve replacement (MVR) - mechanical mitral valve placed 2015    Subclavian artery stenosis, left (H) - s/p stent       Medium    Controlled substance agreement signed - 2/20 - temazepam, lorazepam, hydromorphone - UDS 8/19    HTN (hypertension)    Abdominal bloating, chronic    Anticoagulation goal of INR 2.5 to 3.5    Anxiety    Hypothyroidism    Moderate aortic stenosis    PAD (peripheral artery disease) (H)    Recurrent UTI (urinary tract infection)       Low    Bleeding diathesis (H) - due to warfarin    HLD (hyperlipidemia)    IBS (irritable bowel syndrome)    Insomnia    Microalbuminuria due to type 2 diabetes mellitus (H)    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 ACCU-CHEK SMARTVIEW TEST STRIP strips Use 1 each As Directed 3 (three) times a day. Dispense brand per patient's insurance at pharmacy discretion.  Dx E11.65 DM2, uncontrolled    ? amoxicillin-clavulanate (AUGMENTIN) 875-125 mg per tablet Take 1 tablet by mouth 2 (two) times a day for 14 days.    ? BD ULTRA-FINE DAVID PEN NEEDLES 32 gauge x 5/32\" Ndle USE WITH NOVOLOG PEN AND LANTUS PENS    ? cholecalciferol, vitamin D3, 5,000 unit Tab Take 5,000 Units by mouth daily.    ? cyanocobalamin (VITAMIN B-12) 100 MCG tablet Take 100 mcg by mouth daily.    ? diclofenac sodium (VOLTAREN) 1 % Gel Apply 2 g topically 4 (four) times a day. 2/19/2020: Patient states she is not taking PTA, but per chart review, appears " this medication was just recently approved by insurance   ? digoxin (LANOXIN) 125 mcg (0.125 mg) tablet Take 1 tablet (125 mcg total) by mouth daily with supper.    ? diltiazem (CARDIZEM CD) 180 MG 24 hr capsule Take 1 capsule (180 mg total) by mouth daily.    ? ferrous sulfate 325 (65 FE) MG tablet Take 1 tablet (325 mg total) by mouth daily with breakfast.    ? furosemide (LASIX) 40 MG tablet Take 1 tablet (40 mg total) by mouth daily.    ? gabapentin (NEURONTIN) 300 MG capsule Take 1 capsule (300 mg total) by mouth at bedtime.    ? generic lancets Use 1 each As Directed 3 (three) times a day. Dx E11.65 DM2, uncontrolled    ? HYDROmorphone (DILAUDID) 4 MG tablet Take 0.5-1 tablets (2-4 mg total) by mouth 4 (four) times a day as needed for pain. For use from 3/26/2020 to 4/25/2020.    ? insulin NPH (NOVOLIN) 100 unit/mL injection Inject 20 Units under the skin 2 (two) times a day.    ? levothyroxine (SYNTHROID, LEVOTHROID) 125 MCG tablet Take 1 tablet (125 mcg total) by mouth daily.    ? metFORMIN (GLUCOPHAGE) 500 MG tablet Take 1 tablet (500 mg total) by mouth 2 (two) times a day with meals.    ? NOVOLOG U-100 INSULIN ASPART 100 unit/mL injection INJECT 40 UNITS UNDER THE SKIN TID    ? polyethylene glycol (MIRALAX) 17 gram packet Take 1 packet (17 g total) by mouth daily.    ? potassium chloride (K-DUR,KLOR-CON) 20 MEQ tablet Take 1 tablet (20 mEq total) by mouth daily. Take along with lasix    ? potassium chloride (MICRO-K) 10 mEq CR capsule Take 1 capsule (10 mEq total) by mouth daily.    ? pravastatin (PRAVACHOL) 20 MG tablet Take 1 tablet (20 mg total) by mouth daily.    ? silver sulfADIAZINE (SILVADENE, SSD) 1 % cream Apply to affected area daily (Patient taking differently: Apply topically daily as needed (to toes). Apply to affected area daily)    ? temazepam (RESTORIL) 15 mg capsule Take 2 capsules (30 mg total) by mouth at bedtime as needed for sleep. (Patient taking differently: Take 15-30 mg by mouth at  bedtime as needed for sleep. )    ? warfarin ANTICOAGULANT (COUMADIN/JANTOVEN) 5 MG tablet Take by mouth See Admin Instructions. 2.5 mg on Thursday; 5 mg row per anticoagulation encounter       Social History     Social History Narrative    Patient of Dr. Bach since 2001.   to  Aneudy.        4 children.        Former patient of Dr. Harshad Ballard.     Subjective:     Patient comes in today on her own in light of the current coronavirus epidemic.  Her  is not here to help her.    We reviewed her toe ulcers and likely osteomyelitis.  We have not treated this so far.  Going to try her on antibiotics and see how she does.  There is limited resources in light of the current outbreak.  She denies any major pain issues with the toe ulcers.  She is having issues at times with the wound changes, but we gave her supplies today.  We talked about home care for her wounds, but she is unclear whether she wants to proceed with this at this point.  She denies any fevers, chills, night sweats.    She has a heel ulcer as well.  This also is giving her troubles.  She is trying to do her best with wound care.  We gave her instructions today as noted below.  There is been no signs of redness or fever associated with this.    Reviewed her diabetes and her sugars are essentially stable at this time.    We reviewed her anticoagulation and mitral valve replacement and she does need her INR checked today.    She needs some refills in relationship to her atrial fibrillation.  Her heart has been doing okay at this point.    She is due for refills of her pain medication for chronic pain and spinal stenosis.    We reviewed her other issues noted in the assessment but not specifically addressed in the HPI above.     On review of systems, the patient denies any chest pain or shortness of breath.    Objective:     Wt Readings from Last 3 Encounters:   03/03/20 146 lb 12.8 oz (66.6 kg)   02/22/20 153 lb (69.4 kg)   02/12/20 148  lb (67.1 kg)     BP Readings from Last 3 Encounters:   03/26/20 134/68   03/16/20 126/70   03/10/20 122/70     /68   Pulse 90   Temp 97.6  F (36.4  C)   SpO2 95%    The patient is comfortable, no acute distress.  Mood good.  Insight fair.  Eyes are nonicteric.  Neck is supple without mass.  No cervical adenopathy.  No thyromegaly. Heart regular rate and rhythm.  Lungs clear to auscultation bilaterally.  Respiratory effort is good.  Abdomen soft and nontender.  No hepatosplenomegaly.  Extremities no edema.  Her toe ulcerations are essentially stable at this time.  There is no obvious cellulitis.  They might be slightly improved.  Her heel ulceration is 2.5 x 3.5 cm without signs of cellulitis.  There is still a eschar over this.  It is softening a bit.      Diagnostics:     Results for orders placed or performed in visit on 03/26/20   Basic Metabolic Panel   Result Value Ref Range    Sodium 132 (L) 136 - 145 mmol/L    Potassium 4.1 3.5 - 5.0 mmol/L    Chloride 96 (L) 98 - 107 mmol/L    CO2 24 22 - 31 mmol/L    Anion Gap, Calculation 12 5 - 18 mmol/L    Glucose 353 (H) 70 - 125 mg/dL    Calcium 8.8 8.5 - 10.5 mg/dL    BUN 14 8 - 28 mg/dL    Creatinine 0.92 0.60 - 1.10 mg/dL    GFR MDRD Af Amer >60 >60 mL/min/1.73m2    GFR MDRD Non Af Amer 59 (L) >60 mL/min/1.73m2   Erythrocyte Sedimentation Rate   Result Value Ref Range    Sed Rate 53 (H) 0 - 20 mm/hr   C-Reactive Protein(CRP)   Result Value Ref Range    CRP 1.4 (H) 0.0 - 0.8 mg/dL   HM1 (CBC with Diff)   Result Value Ref Range    WBC 7.2 4.0 - 11.0 thou/uL    RBC 4.48 3.80 - 5.40 mill/uL    Hemoglobin 13.0 12.0 - 16.0 g/dL    Hematocrit 39.3 35.0 - 47.0 %    MCV 88 80 - 100 fL    MCH 29.0 27.0 - 34.0 pg    MCHC 33.1 32.0 - 36.0 g/dL    RDW 14.2 11.0 - 14.5 %    Platelets 168 140 - 440 thou/uL    MPV 12.9 (H) 8.5 - 12.5 fL    Neutrophils % 74 (H) 50 - 70 %    Lymphocytes % 15 (L) 20 - 40 %    Monocytes % 8 2 - 10 %    Eosinophils % 2 0 - 6 %    Basophils % 0  0 - 2 %    Neutrophils Absolute 5.3 2.0 - 7.7 thou/uL    Lymphocytes Absolute 1.1 0.8 - 4.4 thou/uL    Monocytes Absolute 0.5 0.0 - 0.9 thou/uL    Eosinophils Absolute 0.2 0.0 - 0.4 thou/uL    Basophils Absolute 0.0 0.0 - 0.2 thou/uL   INR   Result Value Ref Range    INR 5.40 (H) 0.90 - 1.10       Assessment:     1. Osteomyelitis of great toe (H)    2. Chronic pain syndrome    3. Intermittent atrial fibrillation (H)    4. S/P mitral valve replacement (MVR)    5. Anticoagulation goal of INR 2.5 to 3.5    6. Atrial flutter, unspecified type (H)    7. Skin ulcer of toe, limited to breakdown of skin, unspecified laterality (H)    8. Ulcer of heel and midfoot, left, with unspecified severity (H)    9. Moderate aortic stenosis        Quality review:     PHQ-2 Total Score: 2 (10/25/2019  4:00 PM)      No data recorded  ______________________________________________________________________     BMI Readings from Last 1 Encounters:   03/03/20 26.00 kg/m        Plan:     1. See patient instructions for more information.  2. Blood work done today.  3. Start treatment of likely osteomyelitis with amoxicillin.  Recheck blood work in the next 2 weeks.  Follow for side effects.  4. The patient best would benefit from in person visits even in light of the current coronavirus situation.  5. Given samples of DuoDERM today.    Patient Instructions   1.  Antibiotic for the wounds.  Amoxicillin take 1 tablet twice a day for 14 days.  We might need to continue this in the future.  2.  Blood work today and we will call you with the results.  3.  Soak feet in warm water every other day for 10 minutes.  4.  Change dressing every other day.  5.  Toes:  Put the bacitracin ointment on it every 2 days and cover.  6.  Heel:  Put DUODERM on every 2 days and cover.  7.  INR today and come back next week to have done.  8.  Stop potassium supplement and we will advise when the blood work comes back.  9.  FOLLOW UP MONDAY, APRIL 6th, at 1:30 pm.        45 minutes or greater were spent with the patient today with greater than 50% of the times spent in counseling and coordination of care.  This involved prognosis related to their health issues, risks and benefits of treatment options for their active health issues and instructions for management of diseases above in the assessment.        Gabino Bach MD  General Internal Medicine  [facility]       Return in about 2 weeks (around 4/9/2020) for follow up visit.     Future Appointments   Date Time Provider Department Center   3/30/2020  1:40 PM [facility]   4/6/2020  1:30 PM Gabino Bach MD [facility]   4/7/2020 11:20 AM Gabino Bach MD [facility]         ______________________________________________________________________     Relevant ICD-10 codes and order associations:      ICD-10-CM    1. Osteomyelitis of great toe (H)  M86.9 Basic Metabolic Panel     Erythrocyte Sedimentation Rate     C-Reactive Protein(CRP)     HM1(CBC and Differential)     HM1 (CBC with Diff)     amoxicillin-clavulanate (AUGMENTIN) 875-125 mg per tablet   2. Chronic pain syndrome  G89.4 HYDROmorphone (DILAUDID) 4 MG tablet   3. Intermittent atrial fibrillation (H)  I48.0 diltiazem (CARDIZEM CD) 180 MG 24 hr capsule     digoxin (LANOXIN) 125 mcg (0.125 mg) tablet   4. S/P mitral valve replacement (MVR)  Z95.2 INR   5. Anticoagulation goal of INR 2.5 to 3.5  Z51.81 INR    Z79.01    6. Atrial flutter, unspecified type (H)  I48.92 INR   7. Skin ulcer of toe, limited to breakdown of skin, unspecified laterality (H)  L97.501    8. Ulcer of heel and midfoot, left, with unspecified severity (H)  L97.429    9. Moderate aortic stenosis  I35.0

## 2021-06-28 NOTE — PROGRESS NOTES
Progress Notes by Gabino Bach MD at 11/25/2019  1:30 PM     Author: Gabino Bach MD Service: -- Author Type: Physician    Filed: 11/27/2019 11:23 AM Encounter Date: 11/25/2019 Status: Signed    : Gabino Bach MD (Physician)          [facility]    Care coordination - Customized care -  Comprehensive and complex medical care            Date of Service: 11/25/2019  Primary Provider: Gabino Bach MD    Patient Care Team:  Gabino Bach MD as PCP - General (Internal Medicine)  Ayanna Camacho MD as Physician (Cardiology)  Al García MD as Physician (Ophthalmology)  Ishan Wade MD as Physician (Gastroenterology)  [facility], Mallory MCCRAY RN as Registered Nurse  Gabino Bach MD (Internal Medicine)  Gabino Bach MD as Assigned PCP     ______________________________________________________________________     Patient's Pharmacy:    [facility] #[id number] [address] AT [address] [address]  Phone: [phone] Fax: [phone]     Patient's Contacts:  Name Home Phone Work Phone Mobile Phone Relationship Lgl Grd   ISHAN VALENTIN [phone]   Spouse    ANDREA VALENTIN [phone]   Child      Patient's Insurance:    Payor: MEDICARE / Plan: MEDICARE A AND B / Product Type: Medicare /            Bernadette Lozoyacristhian is a 79 y.o. female who comes in today for:    Chief Complaint   Patient presents with   ? Follow-up     STILL HAS BLOATING, A FLUTTER, ANEMIA, CHRONIC LEG/BACK PAIN IS USING PATCH FOR BACK THAT IS HELPING, FORAMINAL, STILL HAS ISSUES SLEEP   ? BONES     IN CHEST GET BIG       Active Problem List:  Problem List as of 11/25/2019 Reviewed: 10/27/2019 10:34 PM by Gabino Bach MD       High    ASCVD (arteriosclerotic cardiovascular disease) - CABG 2015    Chronic pain - Dr. Bach manages the pain    DM type 2 (diabetes mellitus, type 2) (H)    Former smoker     "Full code status - POLST form 1/2/16    Paroxysmal atrial fibrillation (H)    S/P mitral valve replacement (MVR)    Subclavian artery stenosis, left (H) - s/p stent       Medium    Abdominal bloating, chronic    Anticoagulation goal of INR 2.5 to 3.5    Anxiety    Controlled substance agreement signed - 1/19 - temazepam, lorazepam, hydromorphone - UDS 8/19    HTN (hypertension)    Hypothyroidism    Moderate aortic stenosis    Recurrent UTI (urinary tract infection)       Low    Bleeding diathesis (H) - due to warfarin    CN (constipation)    HLD (hyperlipidemia)    IBS (irritable bowel syndrome)    Insomnia    Microalbuminuria due to type 2 diabetes mellitus (H)    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 ACCU-CHEK SMARTVIEW TEST STRIP strips Use 1 each As Directed 3 (three) times a day. Dispense brand per patient's insurance at pharmacy discretion.  Dx E11.65 DM2, uncontrolled   ? BD ULTRA-FINE DAVID PEN NEEDLES 32 gauge x 5/32\" Ndle USE WITH NOVOLOG PEN AND LANTUS PENS   ? capsaicin-menthol (CAPZASIN) 0.025-10 % Gel gel Apply to legs up to three times a day.   ? cholecalciferol, vitamin D3, 5,000 unit Tab Take 5,000 Units by mouth daily.   ? cyanocobalamin (VITAMIN B-12) 100 MCG tablet Take 100 mcg by mouth daily.   ? ferrous sulfate 325 (65 FE) MG tablet Take 1 tablet (325 mg total) by mouth daily with breakfast.   ? furosemide (LASIX) 40 MG tablet Take 1 tablet (40 mg total) by mouth every morning AND 1 tablet (40 mg total) daily " with lunch.   ? generic lancets Use 1 each As Directed 3 (three) times a day. Dx E11.65 DM2, uncontrolled   ? HYDROmorphone (DILAUDID) 4 MG tablet Take 1 tablet (4 mg total) by mouth 4 (four) times a day as needed for pain.   ? insulin glargine (LANTUS) 100 unit/mL vial Inject 40 Units under the skin at bedtime.   ? NOVOLOG U-100 INSULIN ASPART 100 unit/mL injection Check blood sugar four (4) times daily.  11.65 Type 2 with hyperglycemia  Microlet Color Lancets (100s) - dispense 1, refill PRN for 1 year  BD Ultra-fine Felicia Pen Needles - NDC [address]-96 - dispense 1 case, refill PRN for 1 year   ? silver sulfADIAZINE (SILVADENE, SSD) 1 % cream Apply to affected area daily   ? warfarin (COUMADIN/JANTOVEN) 3 MG tablet Take 1 tablet (3 mg total) by mouth daily.   ? levothyroxine (SYNTHROID, LEVOTHROID) 125 MCG tablet Take 1 tablet (125 mcg total) by mouth daily.     Social History     Social History Narrative    Patient of Dr. Bach since 2001.   to  Aneudy.        4 children.        Former patient of Dr. Harshad Ballard.     Subjective:     Accompanied by  at today's visit.       First reviewed her atrial flutter and there have been no signs of recurrent episodes.  She feels well and her breathing is okay.    Reviewed her spinal stenosis and there continues to be issues related to this.  She has peripheral vascular disease (PVD) in her lower extremities but I do not think this represents the predominance of her symptoms.    She has not been taking levothyroxine when we reviewed her medications and we need to get her back on this.    She still feels the sternal area after her mitral valve surgery is still prominent.    Reviewed her diabetes and her insulin doses have decreased.  Medication list updated today in relationship to this.  No lows.    She is not taking atorvastatin (Lipitor) at this time either like she should be doing.    We reviewed her other issues noted in the assessment but not  specifically addressed in the HPI above.     On review of systems, the patient denies any chest pain or shortness of breath.    Objective:     Wt Readings from Last 3 Encounters:   11/25/19 142 lb 6.4 oz (64.6 kg)   11/04/19 146 lb 3.2 oz (66.3 kg)   10/30/19 142 lb (64.4 kg)     BP Readings from Last 3 Encounters:   11/25/19 116/58   11/04/19 116/58   10/31/19 131/66     /58   Pulse 74   Wt 142 lb 6.4 oz (64.6 kg)   SpO2 98%   BMI 25.23 kg/m     The patient is comfortable, no acute distress.  Mood good.  Insight fair - stable memory loss.  Eyes are nonicteric.  Neck is supple without mass.  No cervical adenopathy.  No thyromegaly. Heart regular rate and rhythm.  Lungs clear to auscultation bilaterally.  Respiratory effort is good.  Abdomen soft and nontender.  Abdomen is distended.  No hepatosplenomegaly.  Extremities no edema.      Diagnostics:     Results for orders placed or performed in visit on 11/25/19   Hemoglobin   Result Value Ref Range    Hemoglobin 13.5 12.0 - 16.0 g/dL   INR   Result Value Ref Range    INR 4.00 (H) 0.90 - 1.10     Assessment:     1. Anemia, unspecified type    2. S/P mitral valve replacement (MVR)    3. Anticoagulation goal of INR 2.5 to 3.5    4. Atrial flutter, unspecified type (H)    5. Torsades de pointes (H)    6. Spinal stenosis of lumbar region with neurogenic claudication    7. PVD (peripheral vascular disease) (H)    8. Multilevel neural foraminal stenosis    9. Blood loss anemia    10. Acquired hypothyroidism      ______________________________________________________________________     PHQ-2 Total Score: 2 (10/25/2019  4:00 PM)    No data recorded  ______________________________________________________________________     BMI Readings from Last 1 Encounters:   11/25/19 25.23 kg/m      Plan:     1. Anemia doing better.  2. Continue current pain management.  3. International normalized ratio (INR) and blood work done today.  4. Red blood cell count doing much  better.  5. No signs of recurrent atrial flutter   6. Follow up sooner if issues.  7. Add back in levothyroxine   8. Will need to address cholesterol medication at the next visit.         Gabino Bach MD  General Internal Medicine  [facility]    Return in about 5 weeks (around 12/30/2019).     Future Appointments   Date Time Provider Department Center   12/3/2019  1:25 PM Gabino Bach MD [facility]   12/18/2019  2:10 PM Sandeep Bray MD [facility]   12/30/2019 11:20 AM Gabino Bach MD [facility]         ______________________________________________________________________     Relevant ICD-10 codes and order associations:      ICD-10-CM    1. Anemia, unspecified type D64.9 Hemoglobin   2. S/P mitral valve replacement (MVR) Z95.2 INR   3. Anticoagulation goal of INR 2.5 to 3.5 Z51.81 INR    Z79.01    4. Atrial flutter, unspecified type (H) I48.92 INR   5. Torsades de pointes (H) I47.2    6. Spinal stenosis of lumbar region with neurogenic claudication M48.062    7. PVD (peripheral vascular disease) (H) I73.9    8. Multilevel neural foraminal stenosis M99.89    9. Blood loss anemia D50.0    10. Acquired hypothyroidism E03.9

## 2021-06-28 NOTE — PROGRESS NOTES
Progress Notes by Mallory Grover RN at 12/9/2019  1:30 PM     Author: Mallory Grover RN Service: -- Author Type: Registered Nurse    Filed: 12/9/2019  1:18 PM Encounter Date: 12/9/2019 Status: Attested    : Mallory Grover RN (Registered Nurse) Cosigner: Gabino Bach MD at 12/9/2019  1:19 PM    Attestation signed by Gabino Bach MD at 12/9/2019  1:19 PM    Anticoagulation care reviewed.  I agree with the plan of care.    Gabino Bach MD  [facility]  12/9/2019, 1:19 PM                 ANTICOAGULATION  MANAGEMENT    Assessment     Today's INR result of 2.7 is Therapeutic (goal INR of 2.5-3.5)        Warfarin taken as previously instructed    No new diet changes affecting INR    No new medication/supplements affecting INR    Continues to tolerate warfarin with no reported s/s of bleeding or thromboembolism     Previous INR was Supratherapeutic    Plan:     Met with Bernadette in clinic regarding INR result and instructed:     Warfarin Dosing Instructions:  Continue current warfarin dose 2.5 mg daily on Thu; and 5 mg daily rest of week  (0 % change)    Instructed patient to follow up no later than: 1 week with [facility]    Education provided: target INR goal and significance of current INR result, importance of following up for INR monitoring at instructed interval, importance of taking warfarin as instructed, importance of notifying clinic for changes in medications and written instructions provided    Bernadette verbalizes understanding and agrees to warfarin dosing plan.    Instructed to call the [facility] for any changes, questions or concerns. (#[phone])   ?   Mallory Grover RN    Subjective/Objective:      Bernadette Yu, a 79 y.o. female is on warfarin.     Bernadette reports:     Home warfarin dose: verbally confirmed home dose with Bernadette and updated on anticoagulation calendar     Missed doses: No     Medication changes:  No     S/S of bleeding or  thromboembolism:  No     New Injury or illness:  No     Changes in diet or alcohol consumption:  No     Upcoming surgery, procedure or cardioversion:  No    Anticoagulation Episode Summary     Current INR goal:   2.5-3.5   TTR:   45.0 % (11.9 mo)   Next INR check:   12/16/2019   INR from last check:   2.70 (12/9/2019)   Weekly max warfarin dose:      Target end date:      INR check location:      Preferred lab:      [facility]ers [facility]:   [facility] [place]    Indications    S/P mitral valve replacement (MVR) [Z95.2]           Comments:            Anticoagulation Care Providers     Provider Role Specialty Phone number    Gabino Bach MD Referring Internal Medicine [phone]

## 2021-06-28 NOTE — PROGRESS NOTES
Progress Notes by Gabino Bach MD at 9/26/2019 10:00 AM     Author: Gabino Bach MD Service: -- Author Type: Physician    Filed: 9/27/2019  7:51 AM Encounter Date: 9/26/2019 Status: Signed    : Gabino Bach MD (Physician)              [facility]    Comprehensive and complex medical care - Chronic disease management - Shared decision making - Care coordination - Compassionate care    Patient advocacy - Rational deprescribing - Minimally disruptive medicine - Ethical focus - Customized care    ______________________________________________________________________     Date of Service: 9/26/2019  Primary Provider: Gabino Bach MD    Patient Care Team:  Gabino Bach MD as PCP - General (Internal Medicine)  Ayanna Camacho MD as Physician (Cardiology)  Al García MD as Physician (Ophthalmology)  Mauro, Ishan Escobar MD as Physician (Gastroenterology)  [facility], Mallory MCCRAY RN as Registered Nurse  Gabino Bach MD (Internal Medicine)  Gabino aBch MD as Assigned PCP     ______________________________________________________________________     Patient's Pharmacy:    [id number] [place], [place] - [address] [place] [address] AT [facility] [address]  [address] [place] [id number]  Phone: [phone] Fax: [phone]     Patient's Contacts:  Name Home Phone Work Phone Mobile Phone Relationship Lgl Grd   ISHAN VALENTIN [phone]   Spouse    ANDREA VALENTIN [phone]   Child      Patient's Insurance:    Payor: MEDICARE / Plan: MEDICARE A AND B / Product Type: Medicare /   ______________________________________________________________________   ______________________________________________________________________     Bernadette Valentin is a 79 y.o. female who comes in today for:    Chief Complaint   Patient presents with   ? Follow-up   ? Anemia       Active Problem List:  Problem List as of 9/26/2019 Reviewed: 9/3/2019 11:03 PM  "by Mikala, Gabino PERRIN MD       High    Former smoker    Full code status - POLST form 1/2/16    ASCVD (arteriosclerotic cardiovascular disease) - CABG 2015    Atrial fibrillation, Now in NSR (H)    Chronic pain - Dr. Bach manages the pain    DM type 2 (diabetes mellitus, type 2) (H)    S/P mitral valve replacement (MVR)    Subclavian artery stenosis, left (H) - s/p stent       Medium    Controlled substance agreement signed - 1/19 - temazepam, lorazepam, hydromorphone - UDS 4/18    HTN (hypertension)    Abdominal bloating, chronic    Anticoagulation goal of INR 2.5 to 3.5    Anxiety    Hypothyroidism    Recurrent UTI (urinary tract infection)       Low    Bleeding diathesis (H) - due to warfarin    CN (constipation)    HLD (hyperlipidemia)    IBS (irritable bowel syndrome)    Insomnia    Microalbuminuria due to type 2 diabetes mellitus (H)    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 ACCU-CHEK SMARTVIEW TEST STRIP strips Use 1 each As Directed 3 (three) times a day. Dispense brand per patient's insurance at pharmacy discretion.  Dx E11.65 DM2, uncontrolled    ? amiodarone (PACERONE) 200 MG tablet Take 1 tablet (200 mg total) by mouth 2 (two) times a day.    ? ascorbic acid, vitamin C, (VITAMIN C) 100 MG tablet Take 100 mg by mouth daily.    ? BD ULTRA-FINE DAVID PEN NEEDLES 32 gauge x 5/32\" Ndle USE WITH NOVOLOG PEN AND LANTUS PENS    ? calcium carbonate (OS-DI) 600 mg calcium (1,500 mg) tablet Take 600 mg by mouth 2 (two) times a day with meals.    ? cholecalciferol, vitamin D3, 5,000 unit Tab Take 5,000 Units by mouth daily.    ? cyanocobalamin (VITAMIN B-12) 100 MCG tablet Take " 100 mcg by mouth daily.    ? digoxin (LANOXIN) 125 mcg tablet Take 1 tablet (125 mcg total) by mouth daily.    ? diltiazem (TIAZAC) 120 MG 24 hr capsule Take 1 capsule (120 mg total) by mouth daily.    ? ferrous sulfate 325 (65 FE) MG tablet Take 1 tablet (325 mg total) by mouth every other day.    ? furosemide (LASIX) 40 MG tablet Take 40 mg by mouth daily.    ? furosemide (LASIX) 40 MG tablet Take 1 tablet (40 mg total) by mouth every morning AND 1 tablet (40 mg total) daily with lunch.    ? generic lancets Use 1 each As Directed 3 (three) times a day. Dx E11.65 DM2, uncontrolled    ? [START ON 10/15/2019] HYDROmorphone (DILAUDID) 4 MG tablet Take 0.5-1 tablets (2-4 mg total) by mouth 4 (four) times a day as needed for pain. For use from 10/15/2019 to 11/14/2019.    ? insulin aspart U-100 (NOVOLOG U-100 INSULIN ASPART) 100 unit/mL injection Inject 40 Units under the skin 3 (three) times a day.    ? insulin aspart U-100 (NOVOLOG) 100 unit/mL injection Inject 10 Units under the skin 3 (three) times a day as needed. 4/17/2018: njects based on own sliding scale with each meal as needed based on sugars. Average of 10 units per dose   ? levothyroxine (SYNTHROID, LEVOTHROID) 125 MCG tablet Take 1 tablet (125 mcg total) by mouth daily.    ? magnesium 30 mg tablet Take 30 mg by mouth 2 (two) times a day.    ? NOVOLIN N NPH U-100 INSULIN 100 unit/mL injection ADMINISTER 40 UNITS UNDER THE SKIN THREE TIMES DAILY    ? polyethylene glycol (MIRALAX) 17 gram packet Take 1 packet (17 g total) by mouth daily as needed.    ? silver sulfADIAZINE (SILVADENE, SSD) 1 % cream Apply to affected area daily    ? temazepam (RESTORIL) 15 mg capsule TAKE 1 CAPSULE(15 MG) BY MOUTH AT BEDTIME AS NEEDED FOR SLEEP. MAY REFILL EVERY 90 DAYS ONLY    ? warfarin (COUMADIN) 5 MG tablet Take 5-7.5 mg by mouth See Admin Instructions. Take 7.5mg on Wednesdays and Saturdays. Take 5mg all other days of the week.    ? warfarin (COUMADIN/JANTOVEN) 5 MG  "tablet TAKE 1 TABLET BY MOUTH ON MONDAY, WEDNESDAY, FRIDAY. TAKE 1 AND 1/2 TABLETS BY MOUTH ON ALL OTHER DAYS      Social History     Patient does not qualify to have social determinant information on file (likely too young).   Social History Narrative    Patient of Dr. Bach since 2001.   to  Aneudy.        4 children.        Former patient of Dr. Harshad Ballard.     ______________________________________________________________________     History of present illness:    Patient comes in today with her .    We reviewed her anemia.  We are checking up on this again today.  Has been slow to recover.  She is taking an iron pill a day every day.  We will recheck her blood work today and see how its going.  She has not noticed any \"dark stools\".  She is not had \"black stools.\"  She has stable abdominal symptoms of abdominal bloating which she has had for a number of years.  She continues have issues with constipation and uses MiraLAX every day.    Reviewed her atrial flutter.  She has been on Cardizem and digoxin for this.  But this is not coming around.  We talked about cardioversion or cardiology consult or other interventions.  She is still not mentally ready to pursue this.  She would like to try something else medically in the meantime - she is really not had symptoms with it overall.  She denies any worsening shortness of breath.    Reviewed her lower extremity edema from the last visit.  She is continuing to take the furosemide twice daily and this is working well for her.    We reviewed her back pain.  We did give her a paper prescription for her next refill as our computer systems have had issues recently with electronic prescribing.  It does run down her legs still.  If she is not on her feet she can do pretty well with this.    We reviewed her other issues noted in the assessment but not specifically addressed in the HPI above.     On review of systems, the patient denies any chest pain or " shortness of breath.    ______________________________________________________________________    Exam:    Wt Readings from Last 3 Encounters:   09/26/19 150 lb 11.2 oz (68.4 kg)   08/07/19 155 lb (70.3 kg)   05/30/19 156 lb 14.4 oz (71.2 kg)     BP Readings from Last 3 Encounters:   09/26/19 134/72   09/03/19 132/74   08/20/19 124/58     /72   Pulse (!) 126   Wt 150 lb 11.2 oz (68.4 kg)   SpO2 97%   BMI 26.70 kg/m     The patient is comfortable, no acute distress.  Mood good.  Insight good.  Eyes are nonicteric.  Neck is supple without mass.  No cervical adenopathy.  No thyromegaly. Heart regular rate and rapid rhythm.  Lungs clear to auscultation bilaterally.  Respiratory effort is good.  Abdomen soft and nontender.  Her abdomen is chronically distended.  No hepatosplenomegaly.  Extremities trace edema.    ______________________________________________________________________    Diagnostics:    Results for orders placed or performed in visit on 09/26/19   2(CBC w/o Differential)   Result Value Ref Range    WBC 7.2 4.0 - 11.0 thou/uL    RBC 3.96 3.80 - 5.40 mill/uL    Hemoglobin 11.6 (L) 12.0 - 16.0 g/dL    Hematocrit 36.8 35.0 - 47.0 %    MCV 93 80 - 100 fL    MCH 29.3 27.0 - 34.0 pg    MCHC 31.5 (L) 32.0 - 36.0 g/dL    RDW 12.9 11.0 - 14.5 %    Platelets 168 140 - 440 thou/uL    MPV 12.6 (H) 8.5 - 12.5 fL   Basic Metabolic Panel   Result Value Ref Range    Sodium 138 136 - 145 mmol/L    Potassium 4.1 3.5 - 5.0 mmol/L    Chloride 99 98 - 107 mmol/L    CO2 28 22 - 31 mmol/L    Anion Gap, Calculation 11 5 - 18 mmol/L    Glucose 309 (H) 70 - 125 mg/dL    Calcium 9.4 8.5 - 10.5 mg/dL    BUN 19 8 - 28 mg/dL    Creatinine 1.08 0.60 - 1.10 mg/dL    GFR MDRD Af Amer 59 (L) >60 mL/min/1.73m2    GFR MDRD Non Af Amer 49 (L) >60 mL/min/1.73m2   INR   Result Value Ref Range    INR 4.72 (H) 0.90 - 1.10     ______________________________________________________________________    Assessment:    1. Anemia, unspecified  type    2. Atrial flutter, unspecified type (H)    3. Bilateral leg edema    4. Chronic pain syndrome    5. S/P mitral valve replacement (MVR)    6. Anticoagulation goal of INR 2.5 to 3.5    7. Blood loss anemia    8. Bleeding diathesis (H) - due to warfarin    9. Multilevel neural foraminal stenosis    10. Spinal stenosis of lumbar region with neurogenic claudication      ______________________________________________________________________     PHQ-2 Total Score: 1 (5/30/2019  1:00 PM)    No data recorded  ______________________________________________________________________     BMI Readings from Last 1 Encounters:   09/26/19 26.70 kg/m      ______________________________________________________________________    Plan:    1. Blood work done today.  2. Add amiodarone 200 mg twice daily and follow.  3. Refill of pain medication done.  4. Continue furosemide  40 mg twice daily.  5. She will continue to consider cardiology involvement if needed.  6. EKG to be done at next visit to check up on the atrial flutter.    Gabino Bach MD  General Internal Medicine  [facility]     Return in about 4 weeks (around 10/24/2019).     Future Appointments   Date Time Provider Department Center   10/7/2019 10:20 AM [facility]         ______________________________________________________________________     Relevant ICD-10 codes and order associations:      ICD-10-CM    1. Anemia, unspecified type D64.9 HM2(CBC w/o Differential)     Morphology,Smear Review (MORP)   2. Atrial flutter, unspecified type (H) I48.92 amiodarone (PACERONE) 200 MG tablet     Basic Metabolic Panel     INR   3. Bilateral leg edema R60.0 furosemide (LASIX) 40 MG tablet     Basic Metabolic Panel   4. Chronic pain syndrome G89.4 HYDROmorphone (DILAUDID) 4 MG tablet   5. S/P mitral valve replacement (MVR) Z95.2 INR   6. Anticoagulation goal of INR 2.5 to 3.5 Z51.81 INR    Z79.01    7. Blood loss anemia D50.0    8. Bleeding  diathesis (H) - due to warfarin D69.9    9. Multilevel neural foraminal stenosis M43.8X9    10. Spinal stenosis of lumbar region with neurogenic claudication M48.062

## 2021-06-28 NOTE — PROGRESS NOTES
"Progress Notes by Gabino Bach MD at 4/6/2020  2:00 PM     Author: Gabino Bach MD Service: -- Author Type: Physician    Filed: 4/6/2020  3:01 PM Encounter Date: 4/6/2020 Status: Signed    : Gabino Bach MD (Physician)            [facility]     TELEPHONE VISIT     Comprehensive and complex medical care - Chronic disease management - Shared decision making - Care coordination - Compassionate care    Patient advocacy - Rational deprescribing - Minimally disruptive medicine - Ethical focus - Customized care    ______________________________________________________________________     Bernadette NIMCO Yu is a 79 y.o. female who is being evaluated via a billable telephone visit.      The patient has been notified of following:     \"This telephone visit will be conducted via a call between you and your physician/provider. We have found that certain health care needs can be provided without the need for a physical exam.  This service lets us provide the care you need with a short phone conversation.  If a prescription is necessary we can send it directly to your pharmacy.  If lab work is needed we can place an order for that and you can then stop by our lab to have the test done at a later time.    If during the course of the call the physician/provider feels a telephone visit is not appropriate, you will not be charged for this service.\"     ______________________________________________________________________     Bernadette Yu complains of    Chief Complaint   Patient presents with   ? Follow-up     left heel ulcer doing very bad it is not the toes it is the round spot.  is having a hard time sleeping due to the pain. did not have INR       Active Problem List:  Problem List as of 4/6/2020 Reviewed: 4/6/2020  2:56 PM by Gabino Bach MD       High    Former smoker    Full code status - POLST form 1/2/16    ASCVD (arteriosclerotic cardiovascular " "disease) - CABG 2015    Atrial fibrillation with rapid ventricular response (H)    Chronic pain - Dr. Bach manages the pain    DM type 2 (diabetes mellitus, type 2) (H)    Nonrheumatic mitral valve stenosis    Paroxysmal atrial fibrillation (H) - VALVULAR    S/P mitral valve replacement (MVR) - mechanical mitral valve placed 2015    Subclavian artery stenosis, left (H) - s/p stent       Medium    Controlled substance agreement signed - 2/20 - temazepam, lorazepam, hydromorphone - UDS 8/19    HTN (hypertension)    Abdominal bloating, chronic    Anticoagulation goal of INR 2.5 to 3.5    Anxiety    Hypothyroidism    Moderate aortic stenosis    PAD (peripheral artery disease) (H)    Recurrent UTI (urinary tract infection)       Low    Bleeding diathesis (H) - due to warfarin    HLD (hyperlipidemia)    IBS (irritable bowel syndrome)    Insomnia    Microalbuminuria due to type 2 diabetes mellitus (H)    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 ACCU-CHEK SMARTVIEW TEST STRIP strips Use 1 each As Directed 3 (three) times a day. Dispense brand per patient's insurance at pharmacy discretion.  Dx E11.65 DM2, uncontrolled    ? amoxicillin-clavulanate (AUGMENTIN) 875-125 mg per tablet Take 1 tablet by mouth 2 (two) times a day for 14 days.    ? BD ULTRA-FINE DAVID PEN NEEDLES 32 gauge x 5/32\" Ndle USE WITH NOVOLOG PEN AND LANTUS PENS    ? cholecalciferol, vitamin D3, 5,000 unit Tab Take 5,000 Units by mouth daily.    ? " cyanocobalamin (VITAMIN B-12) 100 MCG tablet Take 100 mcg by mouth daily.    ? diclofenac sodium (VOLTAREN) 1 % Gel Apply 2 g topically 4 (four) times a day. 2/19/2020: Patient states she is not taking PTA, but per chart review, appears this medication was just recently approved by insurance   ? digoxin (LANOXIN) 125 mcg (0.125 mg) tablet Take 1 tablet (125 mcg total) by mouth daily with supper.    ? diltiazem (CARDIZEM CD) 180 MG 24 hr capsule Take 1 capsule (180 mg total) by mouth daily.    ? ferrous sulfate 325 (65 FE) MG tablet Take 1 tablet (325 mg total) by mouth daily with breakfast.    ? furosemide (LASIX) 40 MG tablet Take 1 tablet (40 mg total) by mouth daily.    ? gabapentin (NEURONTIN) 300 MG capsule Take 1 capsule (300 mg total) by mouth at bedtime.    ? generic lancets Use 1 each As Directed 3 (three) times a day. Dx E11.65 DM2, uncontrolled    ? HYDROmorphone (DILAUDID) 4 MG tablet Take 0.5-1 tablets (2-4 mg total) by mouth 4 (four) times a day as needed for pain. For use from 3/26/2020 to 4/25/2020.    ? insulin NPH (NOVOLIN) 100 unit/mL injection Inject 20 Units under the skin 2 (two) times a day.    ? levothyroxine (SYNTHROID, LEVOTHROID) 125 MCG tablet Take 1 tablet (125 mcg total) by mouth daily.    ? metFORMIN (GLUCOPHAGE) 500 MG tablet Take 1 tablet (500 mg total) by mouth 2 (two) times a day with meals.    ? NOVOLOG U-100 INSULIN ASPART 100 unit/mL injection INJECT 40 UNITS UNDER THE SKIN TID    ? polyethylene glycol (MIRALAX) 17 gram packet Take 1 packet (17 g total) by mouth daily.    ? potassium chloride (MICRO-K) 10 mEq CR capsule Take 1 capsule (10 mEq total) by mouth daily.    ? pravastatin (PRAVACHOL) 20 MG tablet Take 1 tablet (20 mg total) by mouth daily.    ? silver sulfADIAZINE (SILVADENE, SSD) 1 % cream Apply to affected area daily (Patient taking differently: Apply topically daily as needed (to toes). Apply to affected area daily)    ? temazepam (RESTORIL) 15 mg capsule Take 2  capsules (30 mg total) by mouth at bedtime as needed for sleep. (Patient taking differently: Take 15-30 mg by mouth at bedtime as needed for sleep. )    ? warfarin ANTICOAGULANT (COUMADIN/JANTOVEN) 5 MG tablet Take by mouth See Admin Instructions. 2.5 mg on Thursday; 5 mg row per anticoagulation encounter        ______________________________________________________________________     The patient has a phone visit today which is done in light of the current coronavirus outbreak.    Patient is on the phone and her  is listening in.    The patient does have confusion about some of her medications and we tried to go through these with her today.    We reviewed her mitral valve replacement and atrial fibrillation.  Her INR has been fluctuating.  She has not started amoxicillin yet at this time.  We reviewed this with her.  She is due for an INR and have encouraged her to get in this week for this.  She might not come in until the following Monday.  She is concerned about coronavirus overall.  She would be a candidate to have [facility] come into manage her INR but she is reluctant to do so.    Reviewed her left heel ulcer.  This continues to hurt her.  She is doing wound care on it.  They would rather not have a wound care nurse come in, either at this time.  As noted, she has not started the amoxicillin yet at this point.    We reviewed her toe ulcers and likely osteomyelitis.  She does not feel that these are worsening and generally they have been improving.    We reviewed the patient's diabetes and it is doing okay.  No significant hypoglycemia.      Reviewed her hypertension today.  Blood pressure has been in the goal range.  Denies any excessive dizziness from the medication with this.     On ROS, the patient denies any chest pain or pressure.  No shortness of breath.   ______________________________________________________________________     Limited phone exam reveals:  Insight is fair.  Mood stable.  No  shortness of breath.    Assessment:     1. Ulcer of heel and midfoot, left, with unspecified severity (H)    2. Osteomyelitis of great toe (H)    3. Intermittent atrial fibrillation (H)    4. S/P mitral valve replacement (MVR)    5. Skin ulcer of toe, limited to breakdown of skin, unspecified laterality (H)        Plan:     1. Continue wound care.  2. Get in for INR.  3. Follow up with me in clinic in 2 weeks for FACE TO FACE visit and possible debridement of the heel.  4. Start amoxicillin-clavulanate.  5. Follow up sooner if issues.    Phone call duration:  16 minutes    Gabino Bach MD  General Internal Medicine  [facility]       Return in about 2 weeks (around 4/20/2020) for follow up visit.     Future Appointments   Date Time Provider Department Center   4/20/2020  1:10 PM [facility]

## 2021-06-28 NOTE — PROGRESS NOTES
Progress Notes by Gabino Bach MD at 11/4/2019  1:30 PM     Author: Gabino Bach MD Service: -- Author Type: Physician    Filed: 11/4/2019  8:12 PM Encounter Date: 11/4/2019 Status: Signed    : Gabino Bach MD (Physician)                [facility]    Comprehensive and complex medical care - Chronic disease management - Shared decision making - Care coordination - Compassionate care    Patient advocacy - Rational deprescribing - Minimally disruptive medicine - Ethical focus - Customized care          Date of Service: 11/4/2019  Primary Provider: Gabino Bach MD    Patient Care Team:  Gabino Bach MD as PCP - General (Internal Medicine)  Ayanna Camacho MD as Physician (Cardiology)  Al García MD as Physician (Ophthalmology)  Mauro, Ishan Escobar MD as Physician (Gastroenterology)  [facility], Mallory MCCRAY RN as Registered Nurse  Gabino Bach MD (Internal Medicine)  Gabino Bach MD as Assigned PCP     ______________________________________________________________________     Patient[id number] [address] [place] [address] [address]  Phone: [phone] Fax: [phone]     Patient's Contacts:  Name Home Phone Work Phone Mobile Phone Relationship Lgl Bruced   ISHAN VALENTIN [phone]   Spouse    ANDREA VALENTIN [phone]   Child      Patient's Insurance:    Payor: MEDICARE / Plan: MEDICARE A AND B / Product Type: Medicare /     ______________________________________________________________________     Bernadette Valentin is 79 y.o. female who comes in today for:     Chief Complaint   Patient presents with   ? Hospital Visit Follow Up       Patient Active Problem List   Diagnosis   ? Hypothyroidism   ? HLD (hyperlipidemia)   ? Anxiety   ? Chronic pain - Dr. Bach manages the pain   ? CN (constipation)   ? Insomnia   ? Restless legs syndrome (RLS)    ? Full code status - POLST form 1/2/16   ? IBS (irritable bowel syndrome)   ? ASCVD (arteriosclerotic cardiovascular disease) - CABG 2015   ? Subclavian artery stenosis, left (H) - s/p stent   ? Paroxysmal atrial fibrillation (H)   ? HTN (hypertension)   ? Psoriasis   ? Former smoker   ? S/P mitral valve replacement (MVR)   ? DM type 2 (diabetes mellitus, type 2) (H)   ? Anticoagulation goal of INR 2.5 to 3.5   ? Abdominal bloating, chronic   ? Recurrent UTI (urinary tract infection)   ? MRSA (methicillin resistant staph aureus) culture positive   ? Controlled substance agreement signed - 1/19 - temazepam, lorazepam, hydromorphone - UDS 8/19   ? Blood loss anemia   ? Bleeding diathesis (H) - due to warfarin   ? 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 Microalbuminuria due to type 2 diabetes mellitus (H)   ? Spinal stenosis of lumbar region with neurogenic claudication   ? Multilevel neural foraminal stenosis   ? Atrial flutter, unspecified type (H)   ? Torsades de pointes (H)   ? Moderate aortic stenosis   ? PVD (peripheral vascular disease) (H)   ? Adverse effect of amiodarone, initial encounter     Past Medical History:   Diagnosis Date   ? Abdominal bloating, chronic 8/21/2016   ? Anticoagulation goal of INR 2.5 to 3.5 1/27/2016   ? Anxiety    ? Aortic stenosis 10/26/2019   ? ASCVD (arteriosclerotic cardiovascular disease)    ? Atherosclerosis of native coronary artery without angina pectoris 10/26/2019   ? Bleeding diathesis (H) - due to warfarin    ? Carotid artery stenosis, left    ? CHF (congestive heart failure) (H)    ? DM (diabetes mellitus), type 2 (H) 1990   ? Eczema    ? Former smoker    ? Full code status    ? HLD (hyperlipidemia)    ? HTN (hypertension)    ? Hypothyroidism    ? IBS (irritable bowel syndrome)    ? Insomnia    ? Liver disease    ? Long Q-T syndrome 10/26/2019   ? Meningococcal meningitis Age 14   ? Microalbuminuria due to type 2 diabetes mellitus (H)    ? Mild  "nonproliferative diabetic retinopathy of both eyes    ? Mitral stenosis    ? Moderate aortic stenosis     See transesophageal echocardiogram (ANTOINE) 2019.   ? MRSA (methicillin resistant staph aureus) culture positive 2/9/2017   ? Normocytic anemia    ? Paroxysmal atrial fibrillation - treated during surgery in 2015 11/16/2015    Bilateral pulmonary vein isolation with AtriCure Synergy (bipolar radiofrequency ablation) device, exclusion of the left atrial appendage with the AtriCure AtriClip device.     ? PN (peripheral neuropathy)    ? Psoriasis    ? PVD (peripheral vascular disease) (H) 11/4/2019    Bilateral lower extremities.  See ultrasound 2019.   ? Recurrent UTI (urinary tract infection)    ? RLS (restless legs syndrome)    ? S/P CABG (coronary artery bypass graft) 1/8/2016   ? S/P colonoscopy 12/12/07   ? S/P MVR (mitral valve replacement) - 23 mm [employer] mechanical valve - 2015 12/30/2015   ? Subclavian artery stenosis, left - s/p stent 11/16/2015    Stented in 2015.       Current Outpatient Medications   Medication Sig   ? ACCU-CHEK SMARTVIEW TEST STRIP strips Use 1 each As Directed 3 (three) times a day. Dispense brand per patient's insurance at pharmacy discretion.  Dx E11.65 DM2, uncontrolled   ? amLODIPine (NORVASC) 5 MG tablet Take 1 tablet (5 mg total) by mouth daily.   ? atorvastatin (LIPITOR) 20 MG tablet Take 1 tablet (20 mg total) by mouth daily.   ? BD ULTRA-FINE DAVID PEN NEEDLES 32 gauge x 5/32\" Ndle USE WITH NOVOLOG PEN AND LANTUS PENS   ? capsaicin-menthol (CAPZASIN) 0.025-10 % Gel gel Apply to legs up to three times a day.   ? cholecalciferol, vitamin D3, 5,000 unit Tab Take 5,000 Units by mouth daily.   ? cyanocobalamin (VITAMIN B-12) 100 MCG tablet Take 100 mcg by mouth daily.   ? ferrous sulfate 325 (65 FE) MG tablet Take 1 tablet (325 mg total) by mouth daily with breakfast.   ? furosemide (LASIX) 40 MG tablet Take 1 tablet (40 mg total) by mouth every morning AND 1 tablet (40 mg total) " daily with lunch.   ? gabapentin (NEURONTIN) 100 MG capsule Take 200 mg by mouth 2 (two) times a day.   ? generic lancets Use 1 each As Directed 3 (three) times a day. Dx E11.65 DM2, uncontrolled   ? HYDROmorphone (DILAUDID) 4 MG tablet Take 1 tablet (4 mg total) by mouth 4 (four) times a day as needed for pain.   ? insulin glargine (LANTUS) 100 unit/mL vial Inject 40 Units under the skin at bedtime.   ? levothyroxine (SYNTHROID, LEVOTHROID) 125 MCG tablet Take 1 tablet (125 mcg total) by mouth daily.   ? magnesium 30 mg tablet Take 30 mg by mouth 2 (two) times a day.   ? NOVOLOG U-100 INSULIN ASPART 100 unit/mL injection Check blood sugar four (4) times daily.  11.65 Type 2 with hyperglycemia  Microlet Color Lancets (100s) - dispense 1, refill PRN for 1 year  BD Ultra-fine Felicia Pen Needles - NDC [address]-54 - dispense 1 case, refill PRN for 1 year   ? silver sulfADIAZINE (SILVADENE, SSD) 1 % cream Apply to affected area daily   ? warfarin (COUMADIN/JANTOVEN) 3 MG tablet Take 1 tablet (3 mg total) by mouth daily.     Social History     Occupational History     Employer: RETIRED   Tobacco Use   ? Smoking status: Former Smoker     Years: 23.00     Types: Cigarettes   ? Smokeless tobacco: Never Used   ? Tobacco comment: Quit 30yrs ago   Substance and Sexual Activity   ? Alcohol use: No   ? Drug use: No   ? Sexual activity: Never     Social History     Patient does not qualify to have social determinant information on file (likely too young).   Social History Narrative    Patient of Dr. Bach since 2001.   to  Aneudy.        4 children.        Former patient of Dr. Harshad Ballard.      Family History   Problem Relation Age of Onset   ? Colon cancer Father    ? Diabetes Father    ? Hypertension Mother    ? Heart disease Mother          congestive heart failure   ? Arthritis Mother    ? Diabetes Sister    ? Heart disease Brother    ? Rectal cancer Son    ? Colon cancer Son       Family history is otherwise  noncontributory.    ______________________________________________________________________     History of present illness:    Patient comes in today for a hospital follow up visit.    We reviewed her hospital stay and the records from her visit were reviewed today.  I personally reviewed the lab tests, radiology and medical tests pertinent to that stay.     Comes in today for follow up hospital stay.    She did not like her recent stay at [facility].  She has been there before without issues but feels the staff was dismissive of her and did not get any information about what was going on.  She felt like she was marginalized because of her chronic pain medications.    We first reviewed her multiple tests and what they mean.  What they mean is that she has a lot of issues which are difficult to manage without considering the whole.  She has peripheral vascular disease (PVD), foraminal stenosis, aortic stenosis and multiple other issues.    Reviewed her atrial flutter and the results of the amiodarone I prescribed for her which resulted in torsades de pointes.  I apologized for this outcome.  We reviewed the results of her cardiac work-up.      Reviewed her mitral valve replacement (MVR) and her anticoagulation.  They had recommended she be on aspirin and this has resulted in too much gastrointestinal bleeding.    We are rechecking on the anemia again as well at this visit.  She had recurrent anemia in the hospital.    Reviewed her foraminal stenosis and chronic back and leg pain.  She remains on hydromorphone and feels it continues to help her manage with the pain.  She does not want to continue on the gabapentin (Neurontin).  It makes her too tired.    She is not sleeping and this is frustrating for her.    We reviewed her other issues noted in the assessment but not specifically addressed in the HPI above.     Comprehensive review of systems was performed today with no major problems noted except as  above.   ______________________________________________________________________     Exam:    Wt Readings from Last 3 Encounters:   11/04/19 146 lb 3.2 oz (66.3 kg)   10/30/19 142 lb (64.4 kg)   10/26/19 148 lb (67.1 kg)     BP Readings from Last 3 Encounters:   11/04/19 116/58   10/31/19 131/66   10/26/19 (!) 181/79      /58   Pulse 78   Wt 146 lb 3.2 oz (66.3 kg)   SpO2 96%   BMI 25.90 kg/m    The patient is comfortable, no acute distress.  Mood good.  Insight is fair.  Patient is tired.  No skin lesions or nodules of concern.  Has bruise in the left lower eye which is fading.  Ears clear.  Eyes are nonicteric.  Pupils equal and reactive.  Throat is clear.  Neck is supple without mass, no thyromegaly.  No cervical or epitrochlear adenopathy.  Heart regular rate and rhythm.  Lungs clear to auscultation bilaterally.  Respiratory effort good.  Abdomen soft and nontender.  No hepatosplenomegaly.  Extremities show no edema.  Gait is slow and stiff.    Results for orders placed or performed in visit on 11/04/19   INR   Result Value Ref Range    INR 2.20 (H) 0.90 - 1.10     ______________________________________________________________________    Pertinent radiology for this visit includes the following:    Xr Pelvis W 2 Vw Hips Bilateral    Result Date: 10/25/2019  EXAM DATE:         10/25/2019 EXAM: X-RAY HIPS, BILATERAL, WITH AP PELVIS LOCATION: [facility] DATE/TIME: 10/25/2019 5:30 PM INDICATION: Pain in right leg Pain in left leg Pain in right hip Pain in left hip COMPARISON: None. IMPRESSION: Hip joint spaces appear preserved. Small osteophytes along the acetabular margin. Enthesopathy along the iliac crests and ishial tuberosities. Old fracture left inferior pubic ramus. No acute fracture. Peripheral arterial disease.     Mr Lumbar Spine Without Contrast    Result Date: 10/29/2019  EXAM: MR LUMBAR SPINE WO CONTRAST LOCATION: [facility] DATE/TIME: 10/29/2019 8:13 PM INDICATION:  Back pain or radiculopathy, > 6 wks back pain COMPARISON: Lumbar spine MRI May 23, 2018. TECHNIQUE: Without IV contrast FINDINGS: 5 lumbar-type vertebral bodies. Dextroconvex scoliosis of the lumbar spine from the superior endplate of L1 to the inferior endplate of L5 measuring 16.2 degrees. Degenerative endplate changes and anterior marginal osteophytes at T12/L1-L5/S1, most pronounced at L1/L2 and L2/L3. The vertebral bodies of the lumbar spine otherwise have normal stature, alignment, and marrow signal intensity. The conus terminates at the L1/L2 level and has normal morphology and appearance. Disc desiccation and disc height loss at multiple levels, most pronounced at L1/L2 and L2/L3. T11/T12: Symmetric disc bulge and mild facet arthropathy without spinal canal narrowing. Mild bilateral neural foraminal narrowing. T12/L1:  Symmetric disc bulge and mild facet arthropathy without spinal canal narrowing. Mild right neural foraminal narrowing. No left neural foraminal narrowing. L1/L2:  Symmetric disc bulge without spinal canal narrowing. Moderate right and mild left neural foraminal narrowing. L2/L3:  Symmetric disc bulge and moderate facet arthropathy with mild spinal canal narrowing. Severe left and moderate right neural foraminal narrowing with impingement of the exiting left L2 nerve root. Correlation with left L2 radiculopathy is suggested. L3/L4:  Symmetric disc bulge and moderate facet arthropathy with moderate spinal canal and bilateral lateral recess narrowing, left greater than right. Severe left and moderate right neural foraminal narrowing with impingement of the exiting left L3 nerve root. Correlation with left L3 radiculopathy is suggested.  L4/L5:  Symmetric disc bulge, facet arthropathy and ligamentum flavum thickening with moderate spinal canal narrowing. Mild bilateral neural foraminal narrowing. L5/S1: Symmetric disc bulge and mild facet arthropathy with mild spinal canal narrowing. Moderate to  severe bilateral neural foraminal narrowing.     1.  Dextroconvex scoliosis of the lumbar spine from the superior endplate of L1 to the inferior endplate of L5 measuring 16.2 degrees. 2.  Multilevel degenerative disc disease of the lumbar spine with symmetric disc bulges, most pronounced at the L3/L4 level where there is moderate spinal canal and bilateral lateral recess narrowing. 3.  Multilevel posterolateral disc disease and facet arthropathy with multilevel neural foraminal narrowing, most pronounced at the L2/L3 and L3/L4 levels where there is severe left and moderate right neural foraminal narrowing with impingement of the exiting left L2 and L3 nerve root. Correlation with left L2 and L3 radiculopathy is suggested.    Us Arterial Legs Bilateral Complete    Result Date: 10/28/2019  [facility] DATE: 10/26/2019 4:15 PM EXAM:  DUPLEX ARTERIAL ULTRASOUND OF THE LOWER EXTREMITIES BILATERALLY INDICATION: Leg pain, vasculopathy. COMPARISON: None. TECHNIQUE: Duplex imaging is performed utilizing gray-scale, two-dimensional images, and color-flow imaging. Doppler waveform analysis and spectral Doppler imaging is also performed. DUPLEX ARTERIAL ULTRASOUND FINDINGS: LOWER EXTREMITY ARTERIAL DUPLEX EXAM WITH WAVEFORMS RIGHT (cm/s) EIA: 161 B CFA: 126 B PFA: 141 B SFA-Proximal: 129 B SFA-Mid: 307 B SFA-Distal: 176 B Popliteal: 114 B PTA: 79 B BAYLEE: 95.3 B DPA: 119 B (M=monophasic, B=biphasic, T=triphasic) LEFT (cm/s) EIA: 131 B CFA: 138 B PFA: 173 B SFA-Proximal: 102 B-83 M SFA-Mid: 72 M SFA-Distal: 69 M Popliteal: 55 M PTA: 39 M BAYLEE: 56 M DPA: 92 M (M=monophasic, B=biphasic, T=triphasic) Right lower extremity: Biphasic waveforms throughout the right lower extremity. Focally elevated velocity within the mid superficial femoral artery suggestive of a stenosis within this distribution. Left lower extremity: Change between multiphasic and monophasic waveforms within the mid superficial femoral artery.     1. RIGHT  LOWER EXTREMITY: Biphasic waveforms throughout the lower extremity. Focally elevated velocity within the mid SFA suggestive of a stenosis within this distribution. 2. LEFT LOWER EXTREMITY: Change between multiphasic and monophasic waveforms at the level of the mid superficial femoral artery. Suggestive of a stenosis within this distribution.    Xr Knee Bilateral Plus Sunrise Vw    Result Date: 10/25/2019  EXAM DATE:         10/25/2019 EXAM: X-RAY KNEES BILATERAL, 3 VIEWS LOCATION: [facility] DATE/TIME: 10/25/2019 5:45 PM INDICATION: Pain in right leg Pain in left leg Pain in right hip Pain in left hip COMPARISON: None. IMPRESSION: RIGHT KNEE: Negative. No fracture or dislocation. No effusion. No arthropathy. LEFT KNEE: Negative. No fracture or dislocation. No effusion. No arthropathy. There is peripheral vascular disease bilaterally.       ______________________________________________________________________   ______________________________________________________________________     Assessment:    1. Spinal stenosis of lumbar region with neurogenic claudication    2. Skin ulcer of left great toe, limited to breakdown of skin (H)    3. S/P mitral valve replacement (MVR)    4. Anticoagulation goal of INR 2.5 to 3.5    5. Atrial flutter, unspecified type (H)    6. PVD (peripheral vascular disease) (H)    7. Hospital discharge follow-up    8. Aspirin contraindicated    9. Normocytic anemia    10. Moderate aortic stenosis    11. Blood loss anemia    12. Multilevel neural foraminal stenosis    13. Torsades de pointes (H)    14. Adverse effect of amiodarone, initial encounter       ______________________________________________________________________     PHQ-2 Total Score: 2 (10/25/2019  4:00 PM)    No data recorded  ______________________________________________________________________     BMI Readings from Last 1 Encounters:   11/04/19 25.90 kg/m          Plan:    1. Keep off aspirin.  2. She  declined cardiology follow up at this time.  3. She will follow up in 3 weeks.  4. She will need blood work at the next visit.  5. Listed amiodarone and aspirin as contraindications.  6. International normalized ratio (INR) today.  7. Follow up sooner if issues.    Post Discharge Medication Reconciliation Status: discharge medications reconciled and changed, per note/orders (see AVS)         Gabino Bach MD  General Internal Medicine  [facility]     Personal office fax - [phone]  Voice mail - [phone]  E-mail - [email or web address]    Return in about 3 weeks (around 11/25/2019).    Future Appointments   Date Time Provider Department Center   11/7/2019  9:50 AM Ana Still CNP [facility]   11/7/2019 11:30 AM [facility] LAB [facility] LAB [facility]   12/3/2019  1:25 PM Gabino Bach MD [facility] INTMED [facility]   12/18/2019  2:10 PM Sandeep Bray MD [facility]        ______________________________________________________________________     Relevant ICD-10 codes and order associations:      ICD-10-CM    1. Spinal stenosis of lumbar region with neurogenic claudication M48.062 capsaicin-menthol (CAPZASIN) 0.025-10 % Gel gel   2. Skin ulcer of left great toe, limited to breakdown of skin (H) L97.521 silver sulfADIAZINE (SILVADENE, SSD) 1 % cream   3. S/P mitral valve replacement (MVR) Z95.2 INR   4. Anticoagulation goal of INR 2.5 to 3.5 Z51.81 INR    Z79.01    5. Atrial flutter, unspecified type (H) I48.92 INR   6. PVD (peripheral vascular disease) (H) I73.9    7. Hospital discharge follow-up Z09    8. Aspirin contraindicated Z53.09    9. Normocytic anemia D64.9    10. Moderate aortic stenosis I35.0    11. Blood loss anemia D50.0    12. Multilevel neural foraminal stenosis M99.89    13. Torsades de pointes (H) I47.2    14. Adverse effect of amiodarone, initial encounter T46.2X5A

## 2021-06-28 NOTE — PROGRESS NOTES
Progress Notes by Gabino Bach MD at 12/3/2019  1:25 PM     Author: Gabino Bach MD Service: -- Author Type: Physician    Filed: 12/5/2019 11:02 AM Encounter Date: 12/3/2019 Status: Signed    : Gabino Bach MD (Physician)              [facility]    Comprehensive and complex medical care - Chronic disease management - Shared decision making - Care coordination - Compassionate care    Patient advocacy - Rational deprescribing - Minimally disruptive medicine - Ethical focus - Customized care          Date of Service: 12/3/2019  Primary Provider: Gabino Bach MD    Patient Care Team:  Gabino Bach MD as PCP - General (Internal Medicine)  Ayanna Camacho MD as Physician (Cardiology)  Al García MD as Physician (Ophthalmology)  Mauro, Ishan Escobar MD as Physician (Gastroenterology)  [facility], Mallory MCCRAY RN as Registered Nurse  Gabino Bach MD (Internal Medicine)  Gabino Bach MD as Assigned PCP     ______________________________________________________________________     Patient's Pharmacy:    [id number] [address] [address]  Phone: [phone] Fax: [phone]     Patient's Contacts:  Name Home Phone Work Phone Mobile Phone Relationship Lgl Grd   ISHAN VALENTIN [phone]   Spouse    ANDREA VALENTIN [phone]   Child      Patient's Insurance:    Payor: MEDICARE / Plan: MEDICARE A AND B / Product Type: Medicare /           Bernadette Valentin is a 79 y.o. female who comes in today for:    Chief Complaint   Patient presents with   ? Follow-up     kidney, feels better but legs, is sleeping better taking 2 sleeping pills   ? Test Results   ? Labs Only     INR       Active Problem List:  Problem List as of 12/3/2019 Reviewed: 11/27/2019 11:15 AM by Gabino Bach MD       High    ASCVD (arteriosclerotic cardiovascular disease) - CABG 2015  "   Chronic pain - Dr. Bach manages the pain    DM type 2 (diabetes mellitus, type 2) (H)    Former smoker    Full code status - POLST form 1/2/16    Paroxysmal atrial fibrillation (H)    S/P mitral valve replacement (MVR)    Subclavian artery stenosis, left (H) - s/p stent       Medium    Abdominal bloating, chronic    Anticoagulation goal of INR 2.5 to 3.5    Anxiety    Controlled substance agreement signed - 1/19 - temazepam, lorazepam, hydromorphone - UDS 8/19    HTN (hypertension)    Hypothyroidism    Moderate aortic stenosis    Recurrent UTI (urinary tract infection)       Low    Bleeding diathesis (H) - due to warfarin    CN (constipation)    HLD (hyperlipidemia)    IBS (irritable bowel syndrome)    Insomnia    Microalbuminuria due to type 2 diabetes mellitus (H)    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 ACCU-CHEK SMARTVIEW TEST STRIP strips Use 1 each As Directed 3 (three) times a day. Dispense brand per patient's insurance at pharmacy discretion.  Dx E11.65 DM2, uncontrolled   ? BD ULTRA-FINE DAVID PEN NEEDLES 32 gauge x 5/32\" Ndle USE WITH NOVOLOG PEN AND LANTUS PENS   ? capsaicin-menthol (CAPZASIN) 0.025-10 % Gel gel Apply to legs up to three times a day.   ? cholecalciferol, vitamin D3, 5,000 unit Tab Take 5,000 Units by mouth daily.   ? cyanocobalamin (VITAMIN B-12) 100 MCG tablet Take 100 mcg by mouth daily.   ? ferrous sulfate 325 (65 FE) MG tablet Take 1 tablet (325 mg total) by mouth daily with breakfast.   ? furosemide (LASIX) 40 MG tablet " Take 1 tablet (40 mg total) by mouth every morning AND 1 tablet (40 mg total) daily with lunch.   ? generic lancets Use 1 each As Directed 3 (three) times a day. Dx E11.65 DM2, uncontrolled   ? HYDROmorphone (DILAUDID) 4 MG tablet Take 1 tablet (4 mg total) by mouth 4 (four) times a day as needed for pain.   ? insulin lispro (HUMALOG) 100 unit/mL injection Inject 15 Units under the skin 2 (two) times a day.   ? insulin NPH (NOVOLIN) 100 unit/mL injection Inject 20 Units under the skin 2 (two) times a day.   ? levothyroxine (SYNTHROID, LEVOTHROID) 125 MCG tablet Take 1 tablet (125 mcg total) by mouth daily.   ? silver sulfADIAZINE (SILVADENE, SSD) 1 % cream Apply to affected area daily   ? temazepam (RESTORIL) 15 mg capsule Take by mouth.   ? warfarin (COUMADIN/JANTOVEN) 3 MG tablet Take 1 tablet (3 mg total) by mouth daily.   ? [START ON 12/14/2019] HYDROmorphone (DILAUDID) 4 MG tablet Take 0.5-1 tablets (2-4 mg total) by mouth 4 (four) times a day as needed for pain. For use from 12/14/2019 to 1/13/2020.     Social History     Social History Narrative    Patient of Dr. Bach since 2001.   to  Aneudy.        4 children.        Former patient of Dr. Harshad Ballard.       Subjective:     Patient comes in today with her .    We reviewed her constipation.  She is using MiraLAX 2 times a day.  Generally works but occasionally does not work so well.  Wondering if she can use Ex-Lax intermittently.    We reviewed her insomnia.  She has noted success with using 2 temazepam at night.  We told her this was okay as long as she tolerates it well.  She has had no issues with excessive sleepiness.  Her  attests to this.    Reviewed her diabetes and things are stable here.    Reviewed her mitral valve replacement.  She does have issues with INR variability.  She does need her INR done today.    Chronic back and leg pain was reviewed and she remains on Dilaudid at this point.    We reviewed her other issues  noted in the assessment but not specifically addressed in the HPI above.     On review of systems, the patient denies any chest pain or shortness of breath.    Objective:     Wt Readings from Last 3 Encounters:   12/03/19 147 lb (66.7 kg)   11/25/19 142 lb 6.4 oz (64.6 kg)   11/04/19 146 lb 3.2 oz (66.3 kg)     BP Readings from Last 3 Encounters:   12/03/19 132/68   11/25/19 116/58   11/04/19 116/58     /68   Pulse 74   Wt 147 lb (66.7 kg)   SpO2 98%   BMI 26.04 kg/m     The patient is comfortable, no acute distress.  Mood good.  Insight good.  Eyes are nonicteric.  Neck is supple without mass.  No cervical adenopathy.  No thyromegaly. Heart regular rate and rhythm.  Lungs clear to auscultation bilaterally.  Respiratory effort is good.  Abdomen soft and nontender.  No hepatosplenomegaly.  Extremities no edema.      Diagnostics:     Results for orders placed or performed in visit on 12/03/19   INR   Result Value Ref Range    INR 3.95 (H) 0.90 - 1.10       Assessment:     1. Chronic pain syndrome    2. S/P mitral valve replacement (MVR)    3. Anticoagulation goal of INR 2.5 to 3.5    4. Atrial flutter, unspecified type (H)    5. Constipation, unspecified constipation type    6. Primary insomnia      ______________________________________________________________________     PHQ-2 Total Score: 2 (10/25/2019  4:00 PM)    No data recorded  ______________________________________________________________________     BMI Readings from Last 1 Encounters:   12/03/19 26.04 kg/m        Plan:     1. Refill Dilaudid.  2. Check INR today.  3. Remains out of atrial flutter.  4. May use Ex-Lax as needed.  5. Continue temazepam 2 at night at this time.      Gabino Bach MD  General Internal Medicine  [facility]    Return in about 4 weeks (around 12/31/2019) for follow up visit.     Future Appointments   Date Time Provider Department Center   12/9/2019  1:20 PM [facility] LAB W Clinic    12/18/2019  2:10 PM Sandeep Bray MD [facility]   12/30/2019 11:20 AM Gabino Bach MD [facility] INTMED [facility] Clinic         ______________________________________________________________________     Relevant ICD-10 codes and order associations:      ICD-10-CM    1. Chronic pain syndrome G89.4 HYDROmorphone (DILAUDID) 4 MG tablet   2. S/P mitral valve replacement (MVR) Z95.2 INR   3. Anticoagulation goal of INR 2.5 to 3.5 Z51.81 INR    Z79.01    4. Atrial flutter, unspecified type (H) I48.92 INR   5. Constipation, unspecified constipation type K59.00    6. Primary insomnia F51.01

## 2021-06-28 NOTE — PROGRESS NOTES
Progress Notes by Gabino Bach MD at 3/2/2020 10:55 AM     Author: Gabino Bach MD Service: -- Author Type: Physician    Filed: 3/2/2020 10:40 PM Encounter Date: 3/2/2020 Status: Signed    : Gabino Bach MD (Physician)              [facility]    Comprehensive and complex medical care - Chronic disease management - Shared decision making - Care coordination - Compassionate care    Patient advocacy - Rational deprescribing - Minimally disruptive medicine - Ethical focus - Customized care          Date of Service: 3/2/2020  Primary Provider: Gabino Bach MD    Patient Care Team:  Gabino Bach MD as PCP - General (Internal Medicine)  Ayanna Camacho MD as Physician (Cardiology)  Al García MD as Physician (Ophthalmology)  Mauro, Ishan Escobar MD as Physician (Gastroenterology)  [facility], Mallory MCCRAY RN as Registered Nurse  Gabino Bach MD (Internal Medicine)  Gabino Bach MD as Assigned PCP     ______________________________________________________________________     Patient's Pharmacy:    [facility] #[id number] [address]  Phone: [phone] Fax: [phone]     Patient's Contacts:  Name Home Phone Work Phone Mobile Phone Relationship Lgl Grd   ISHAN VALENTIN [phone]   Spouse    ANDREA VALENTIN [phone]   Child      Patient's Insurance:    Payor: MEDICARE / Plan: MEDICARE A AND B / Product Type: Medicare /          Bernadette Valentin is 79 y.o. female who comes in today for:     Chief Complaint   Patient presents with   ? Hospital Visit Follow Up   ? Blister     left foot   ? Tailbone Pain       Patient Active Problem List   Diagnosis   ? Hypothyroidism   ? HLD (hyperlipidemia)   ? Anxiety   ? Chronic pain - Dr. Ardolf manages the pain   ? Therapeutic opioid induced constipation   ? Insomnia   ? Full code status - POLST form 1/2/16   ?  IBS (irritable bowel syndrome)   ? Nonrheumatic mitral valve stenosis   ? ASCVD (arteriosclerotic cardiovascular disease) - CABG 2015   ? Subclavian artery stenosis, left (H) - s/p stent   ? Paroxysmal atrial fibrillation (H)   ? HTN (hypertension)   ? Former smoker   ? S/P mitral valve replacement (MVR) - mechanial mitral valve placed 2015   ? DM type 2 (diabetes mellitus, type 2) (H)   ? Anticoagulation goal of INR 2.5 to 3.5   ? Abdominal bloating, chronic   ? Recurrent UTI (urinary tract infection)   ? MRSA (methicillin resistant staph aureus) culture positive   ? Controlled substance agreement signed - 2/20 - temazepam, lorazepam, hydromorphone - UDS 8/19   ? Blood loss anemia   ? Bleeding diathesis (H) - due to warfarin   ? 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 Microalbuminuria due to type 2 diabetes mellitus (H)   ? Spinal stenosis of lumbar region with neurogenic claudication   ? Multilevel neural foraminal stenosis   ? Atrial flutter, unspecified type (H)   ? Moderate aortic stenosis   ? Adverse effect of amiodarone, initial encounter   ? Atrial fibrillation with rapid ventricular response (H)   ? Ulcer of heel and midfoot, left, with unspecified severity (H)   ? Hypokalemia   ? PAD (peripheral artery disease) (H)     Past Medical History:   Diagnosis Date   ? Abdominal bloating, chronic 8/21/2016   ? Anticoagulation goal of INR 2.5 to 3.5 1/27/2016   ? Anxiety    ? Aortic stenosis 10/26/2019   ? ASCVD (arteriosclerotic cardiovascular disease)    ? Atherosclerosis of native coronary artery without angina pectoris 10/26/2019   ? Bleeding diathesis (H) - due to warfarin    ? Carotid artery stenosis, left    ? CHF (congestive heart failure) (H)    ? DM (diabetes mellitus), type 2 (H) 1990   ? Eczema    ? Former smoker    ? Full code status    ? HLD (hyperlipidemia)    ? HTN (hypertension)    ? Hypothyroidism    ? IBS (irritable bowel syndrome)    ? Insomnia    ? Liver disease    ?  "Long Q-T syndrome 10/26/2019   ? Meningococcal meningitis Age 14   ? Microalbuminuria due to type 2 diabetes mellitus (H)    ? Mild nonproliferative diabetic retinopathy of both eyes    ? Mitral stenosis    ? Moderate aortic stenosis     See transesophageal echocardiogram (ANTOINE) 2019.   ? MRSA (methicillin resistant staph aureus) culture positive 2/9/2017   ? Normocytic anemia    ? PAD (peripheral artery disease) (H)     [facility]   DATE: 10/26/2019 4:15 PM   EXAM:  DUPLEX ARTERIAL ULTRASOUND OF THE LOWER EXTREMITIES BILATERALLY   INDICATION: Leg pain, vasculopathy.   COMPARISON: None.   TECHNIQUE: Duplex imaging is performed utilizing gray-scale, two-dimensional images, and color-flow imaging. Doppler waveform analysis and spectral Doppler imaging is also performed.   DUPLEX ARTERIAL ULTRASOUND FINDIN   ? Paroxysmal atrial fibrillation - treated during surgery in 2015 11/16/2015    Bilateral pulmonary vein isolation with AtriCure Synergy (bipolar radiofrequency ablation) device, exclusion of the left atrial appendage with the AtriCure AtriClip device.     ? PN (peripheral neuropathy)    ? Psoriasis    ? PVD (peripheral vascular disease) (H) 11/4/2019    Bilateral lower extremities.  See ultrasound 2019.   ? Recurrent UTI (urinary tract infection)    ? RLS (restless legs syndrome)    ? S/P CABG (coronary artery bypass graft) 1/8/2016   ? S/P colonoscopy 12/12/07   ? S/P MVR (mitral valve replacement) - 23 mm [employer] mechanical valve - 2015 12/30/2015   ? Subclavian artery stenosis, left - s/p stent 11/16/2015    Stented in 2015.    ? 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      Current Outpatient Medications   Medication Sig Note   ? ACCU-CHEK SMARTVIEW TEST STRIP strips Use 1 each As Directed 3 (three) times a day. Dispense brand per patient's insurance at pharmacy discretion.  Dx E11.65 DM2, uncontrolled    ? BD ULTRA-FINE DAVID PEN NEEDLES 32 gauge x 5/32\" Ndle USE WITH NOVOLOG " PEN AND LANTUS PENS    ? cholecalciferol, vitamin D3, 5,000 unit Tab Take 5,000 Units by mouth daily.    ? cyanocobalamin (VITAMIN B-12) 100 MCG tablet Take 100 mcg by mouth daily.    ? diclofenac sodium (VOLTAREN) 1 % Gel Apply 2 g topically 4 (four) times a day. 2/19/2020: Patient states she is not taking PTA, but per chart review, appears this medication was just recently approved by insurance   ? digoxin (LANOXIN) 125 mcg (0.125 mg) tablet Take 1 tablet (125 mcg total) by mouth daily with supper.    ? diltiazem (CARDIZEM CD) 180 MG 24 hr capsule Take 1 capsule (180 mg total) by mouth daily.    ? ferrous sulfate 325 (65 FE) MG tablet Take 1 tablet (325 mg total) by mouth daily with breakfast.    ? furosemide (LASIX) 40 MG tablet Take 1 tablet (40 mg total) by mouth daily.    ? gabapentin (NEURONTIN) 300 MG capsule Take 1 capsule (300 mg total) by mouth at bedtime.    ? generic lancets Use 1 each As Directed 3 (three) times a day. Dx E11.65 DM2, uncontrolled    ? HYDROmorphone (DILAUDID) 4 MG tablet Take 0.5-1 tablets (2-4 mg total) by mouth 4 (four) times a day as needed for pain. For use from 2/23/2020 to 3/24/2020.    ? insulin NPH (NOVOLIN) 100 unit/mL injection Inject 20 Units under the skin 2 (two) times a day.    ? levothyroxine (SYNTHROID, LEVOTHROID) 125 MCG tablet Take 1 tablet (125 mcg total) by mouth daily.    ? metFORMIN (GLUCOPHAGE) 500 MG tablet Take 1 tablet (500 mg total) by mouth 2 (two) times a day with meals.    ? polyethylene glycol (MIRALAX) 17 gram packet Take 1 packet (17 g total) by mouth daily.    ? potassium chloride (K-DUR,KLOR-CON) 20 MEQ tablet Take 1 tablet (20 mEq total) by mouth daily. Take along with lasix    ? pravastatin (PRAVACHOL) 20 MG tablet Take 1 tablet (20 mg total) by mouth daily.    ? silver sulfADIAZINE (SILVADENE, SSD) 1 % cream Apply to affected area daily (Patient taking differently: Apply topically daily as needed (to toes). Apply to affected area daily)    ?  temazepam (RESTORIL) 15 mg capsule Take 2 capsules (30 mg total) by mouth at bedtime as needed for sleep. (Patient taking differently: Take 15-30 mg by mouth at bedtime as needed for sleep. )    ? warfarin ANTICOAGULANT (COUMADIN/JANTOVEN) 5 MG tablet Take by mouth See Admin Instructions. 2.5 mg on Thursday; 5 mg row per anticoagulation encounter     ? bacitracin-polymyxin b (POLYSPORIN) ointment Apply to affected area daily      Allergies   Allergen Reactions   ? Amiodarone Other (See Comments)     TORSADES DE POINTES   ? Aspirin Other (See Comments)     Recurrent severe gastrointestinal bleed.     Social History     Occupational History     Employer: RETIRED   Tobacco Use   ? Smoking status: Former Smoker     Years: 23.00     Types: Cigarettes   ? Smokeless tobacco: Never Used   ? Tobacco comment: quit 1970's   Substance and Sexual Activity   ? Alcohol use: No   ? Drug use: No   ? Sexual activity: Never     Social History     Social History Narrative    Patient of Dr. Bach since 2001.   to  Aneudy.        4 children.        Former patient of Dr. Harshad Ballard.      Family History   Problem Relation Age of Onset   ? Colon cancer Father    ? Diabetes Father    ? Hypertension Mother    ? Heart disease Mother          congestive heart failure   ? Arthritis Mother    ? Diabetes Sister    ? Heart disease Brother    ? Rectal cancer Son    ? Colon cancer Son       Family history is otherwise noncontributory.      Subjective:     Patient comes in today for a hospital follow up visit.    We reviewed her hospital stay and the records from her visit were reviewed today.  I personally reviewed the lab tests, radiology and medical tests pertinent to that stay.    Roomed by: lissa nguyen    Accompanied by Spouse    Refills needed? No    Do you have any forms that need to be filled out? No       First reviewed her hospital stay for abdomen pain.  This was felt to be due to constipation.  She does have issues with  chronic constipation and bloating.  She was able to eliminate in the hospital and the Miralax seems to be doing well for her at this time.    Next reviewed her atrial fibrillation.  This came back in the hospital with RVR.  On rate control medications.  In a regular rhythm on exam today.  No side effects from the medication at this time.    Has a new ulcer on the left medial heel.  First noted 2 weeks ago.  Painful to wear a shoe with this.  The bottom part of the wound is the most painful.  Has know peripheral arterial disease (PAD) in the SFA likely based on ultrasound.  She has had no redness of fever or chills.  Has some toe ulcers and has had this chronically off and on.  Also has peripheral neuropathy (PN) in the feet.  We discussed management options and we will have her go to the wound clinic to start with.      We reviewed the patient's diabetes and it is doing okay.  No significant hypoglycemia.      She is having issues with insomnia still especially with the heel pain.  She is already on chronic narcotic therapy for her back.    Her tailbone has also been hurting her more in the last week.    We reviewed her other issues noted in the assessment but not specifically addressed in the HPI above.     Comprehensive review of systems performed today with no major problems noted except as above.     Objective:     Wt Readings from Last 3 Encounters:   02/22/20 153 lb (69.4 kg)   02/12/20 148 lb (67.1 kg)   02/10/20 147 lb (66.7 kg)     BP Readings from Last 3 Encounters:   03/02/20 138/76   02/22/20 127/61   02/12/20 106/66      /76   Pulse 61   Temp 97.9  F (36.6  C) (Oral)   SpO2 96%    The patient is comfortable, no acute distress.  Mood good.  Insight is fair.  Has a significant heel ulcer on the left heel with eschar but without signs of cellulitis.  The bottom of the heel is a little soft and tender.  Ears clear.  Eyes are nonicteric.  Pupils equal and reactive.  Throat is clear.  Neck is supple  without mass, no thyromegaly.  Carotids are clear.  No cervical or epitrochlear adenopathy.  Heart regular rate and rhythm.  Lungs clear to auscultation bilaterally.  Respiratory effort good.  Abdomen soft and nontender.  No hepatosplenomegaly.  Extremities show no edema.  There is also a distal great toe ulcer which is healing.  Buttocks show no signs of ulcer formation.            Diagnostics:     Results for orders placed or performed in visit on 03/02/20   INR   Result Value Ref Range    INR 2.30 (H) 0.90 - 1.10       Assessment:     1. Ulcer of left heel and midfoot, unspecified ulcer stage (H)    2. S/P mitral valve replacement (MVR)    3. Anticoagulation goal of INR 2.5 to 3.5    4. Atrial flutter, unspecified type (H)    5. PAD (peripheral artery disease) (H)    6. Hospital discharge follow-up    7. Atrial fibrillation with rapid ventricular response (H)    8. Constipation, unspecified constipation type    9. Therapeutic opioid induced constipation    10. Chronic heel pain, left    11. Pain in the coccyx    12. Primary insomnia      ______________________________________________________________________     PHQ-2 Total Score: 2 (10/25/2019  4:00 PM)    No data recorded  ______________________________________________________________________     BMI Readings from Last 1 Encounters:   02/22/20 27.10 kg/m        Plan:     1. Wound clinic referral.  2. Use bacitracin ungt and nonstick PAD and cover for now.  3. International normalized ratio (INR) done today and anticoagulation follow up.  4. Continue current medications.  5. Postop shoe dispensed today for use.  6. Continue Miralax.  7. Follow up sooner if issues.    Post Discharge Medication Reconciliation Status: discharge medications reconciled, continue medications without change         Gabino Bach MD  General Internal Medicine  [facility]    Personal office fax - [phone]   Voice mail - [phone]  E-mail -  [email or web address]      Return in about 2 weeks (around 3/16/2020) for visit and blood work.     Future Appointments   Date Time Provider Department Center   3/3/2020  9:20 AM Emma Stevenson NP OPS VASC MPW MPW Vasc   3/16/2020  2:30 PM Gabino Bach MD [facility] INTMED [facility] Clinic   3/16/2020  3:00 PM MPW ANTICOAG MP[facility] CS [facility]W Clinic   4/7/2020 11:20 AM Gabino Bach MD [facility] Clinic         ______________________________________________________________________     Relevant ICD-10 codes and order associations:      ICD-10-CM    1. Ulcer of left heel and midfoot, unspecified ulcer stage (H) L97.429 Ankle/Foot DME: Post-op Shoe; Left     Ambulatory referral to Wound Clinic     bacitracin-polymyxin b (POLYSPORIN) ointment   2. S/P mitral valve replacement (MVR) Z95.2 INR   3. Anticoagulation goal of INR 2.5 to 3.5 Z51.81 INR    Z79.01    4. Atrial flutter, unspecified type (H) I48.92 INR   5. PAD (peripheral artery disease) (H) I73.9    6. Hospital discharge follow-up Z09    7. Atrial fibrillation with rapid ventricular response (H) I48.91    8. Constipation, unspecified constipation type K59.00    9. Therapeutic opioid induced constipation K59.03     T40.2X5A    10. Chronic heel pain, left M79.672     G89.29    11. Pain in the coccyx M53.3    12. Primary insomnia F51.01

## 2021-06-28 NOTE — PROGRESS NOTES
Progress Notes by Gabino Bach MD at 10/25/2019  3:55 PM     Author: Gabino Bach MD Service: -- Author Type: Physician    Filed: 10/27/2019 10:42 PM Encounter Date: 10/25/2019 Status: Signed    : Gabino Bach MD (Physician)            [facility]       Comprehensive and complex medical care - Chronic disease management - Shared decision making - Care coordination - Compassionate care    Patient advocacy - Rational deprescribing - Minimally disruptive medicine - Ethical focus - Customized care          Date of Service: 10/25/2019  Primary Provider: Gabino Bach MD    Patient Care Team:  Gabino Bach MD as PCP - General (Internal Medicine)  Ayanna Camacho MD as Physician (Cardiology)  Al García MD as Physician (Ophthalmology)  Mauro, Ishan Escobar MD as Physician (Gastroenterology)  [facility], Mallory MCCRAY RN as Registered Nurse  Gabino Bach MD (Internal Medicine)  Gabino Bach MD as Assigned PCP     ______________________________________________________________________     Patient's Pharmacy:    [id number] [address] [address]  Phone: [phone] Fax: [phone]     Patient's Contacts:  Name Home Phone Work Phone Mobile Phone Relationship Lgl Grd   ISHAN VALENTIN [phone]   Spouse    ANDREA VALENTIN [phone]   Child      Patient's Insurance:    Payor: MEDICARE / Plan: MEDICARE A AND B / Product Type: Medicare /           Bernadette Valentin is a 79 y.o. female who comes in today for:    Chief Complaint   Patient presents with   ? Knee Pain     OTC medications not helping   ? Follow-up     still bloating   ? Insomnia     medication not helping   ? Dizziness       Active Problem List:  Problem List as of 10/25/2019 Reviewed: 10/18/2019  9:50 PM by Gabino Bach MD       High    Former smoker    Full code status - POLST form 1/2/16     ASCVD (arteriosclerotic cardiovascular disease) - CABG 2015    Atrial fibrillation, Now in NSR (H)    Chronic pain - Dr. Bach manages the pain    DM type 2 (diabetes mellitus, type 2) (H)    S/P mitral valve replacement (MVR)    Subclavian artery stenosis, left (H) - s/p stent       Medium    Controlled substance agreement signed - 1/19 - temazepam, lorazepam, hydromorphone - UDS 8/19    HTN (hypertension)    Abdominal bloating, chronic    Anticoagulation goal of INR 2.5 to 3.5    Anxiety    Hypothyroidism    Recurrent UTI (urinary tract infection)       Low    Bleeding diathesis (H) - due to warfarin    CN (constipation)    HLD (hyperlipidemia)    IBS (irritable bowel syndrome)    Insomnia    Microalbuminuria due to type 2 diabetes mellitus (H)    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     Social History     Patient does not qualify to have social determinant information on file (likely too young).   Social History Narrative    Patient of Dr. Bach since 2001.   to  Aneudy.        4 children.        Former patient of Dr. Harshad Ballard.       Subjective:     Roomed by: lissa nguyen    Accompanied by Spouse    Refills needed? Yes    Do you have any forms that need to be filled out? No       Comes in today for multiple issues.    First discussed her insomnia.  She is not sleeping well on the temazepam at this time.  She is up at night most of the night.  The medication has worked for her in the past.    Next reviewed her bilateral leg pain.  Her right knee was very painful earlier this week.  Better now but not moving as well due to the pain.   She notes it in her hips.  She does not feel the hydromorphone helps for this.  This has been used for chronic radiculopathy due to foraminal stenosis of the spine and central stenosis.  She is shuffling more.    Her a flutter is still doing well and she has not had recurrence.  She is taking her amiodarone once a day as discussed.    Mitral valve replacement (MVR) is stable.    Reviewed the patient's anxiety and this is not doing well.    Having some dizziness when standing up but not orthostatic on today's exam.    Abdomen bloating continues to both her a lot.    We reviewed her other issues noted in the assessment but not specifically addressed in the HPI above.     Past medical, family and social history reviewed today.     Comprehensive review of systems was performed today with no major problems noted except as above.     Objective:     Wt Readings from Last 3 Encounters:   10/27/19 150 lb 4.8 oz (68.2 kg)   10/26/19 148 lb (67.1 kg)   10/25/19 148 lb 3.2 oz (67.2 kg)     BP Readings from Last 3 Encounters:   10/27/19 119/56   10/26/19 (!) 181/79   10/25/19 168/72     /72 (Patient Site: Right Arm, Patient Position: Standing, Cuff Size: Adult Regular)   Pulse 60   Wt 148 lb 3.2 oz (67.2 kg)   BMI 26.25 kg/m     The patient is comfortable, no acute distress.  Mood good to slightly anxious.  Insight good.  Eyes are nonicteric.  Neck is supple without mass.  No cervical adenopathy.  No thyromegaly. Heart regular rate and rhythm.  Lungs clear to auscultation bilaterally.  Respiratory effort is good.  Abdomen soft and nontender.  No hepatosplenomegaly.  Extremities no edema.  There is some warmth and a slight effusion of the right knee without redness.  The left knee is okay.  The hips show no signs of trochanteric bursitis but she walks with issues with her hips.  2  Diagnostics:     Results for orders placed or performed in visit on 10/25/19   HM2(CBC w/o Differential)   Result Value Ref Range    WBC 7.7  4.0 - 11.0 thou/uL    RBC 4.30 3.80 - 5.40 mill/uL    Hemoglobin 12.7 12.0 - 16.0 g/dL    Hematocrit 38.0 35.0 - 47.0 %    MCV 88 80 - 100 fL    MCH 29.6 27.0 - 34.0 pg    MCHC 33.4 32.0 - 36.0 g/dL    RDW 12.2 11.0 - 14.5 %    Platelets 142 140 - 440 thou/uL    MPV 10.2 (H) 7.0 - 10.0 fL   Comprehensive Metabolic Panel   Result Value Ref Range    Sodium 134 (L) 136 - 145 mmol/L    Potassium 4.2 3.5 - 5.0 mmol/L    Chloride 93 (L) 98 - 107 mmol/L    CO2 28 22 - 31 mmol/L    Anion Gap, Calculation 13 5 - 18 mmol/L    Glucose 293 (H) 70 - 125 mg/dL    BUN 28 8 - 28 mg/dL    Creatinine 1.21 (H) 0.60 - 1.10 mg/dL    GFR MDRD Af Amer 52 (L) >60 mL/min/1.73m2    GFR MDRD Non Af Amer 43 (L) >60 mL/min/1.73m2    Bilirubin, Total 0.4 0.0 - 1.0 mg/dL    Calcium 8.9 8.5 - 10.5 mg/dL    Protein, Total 7.6 6.0 - 8.0 g/dL    Albumin 3.9 3.5 - 5.0 g/dL    Alkaline Phosphatase 70 45 - 120 U/L    AST 21 0 - 40 U/L    ALT 21 0 - 45 U/L   Erythrocyte Sedimentation Rate   Result Value Ref Range    Sed Rate 45 (H) 0 - 20 mm/hr   C-Reactive Protein(CRP)   Result Value Ref Range    CRP 1.5 (H) 0.0 - 0.8 mg/dL   Thyroid Stimulating Hormone (TSH)   Result Value Ref Range    TSH 12.66 (H) 0.30 - 5.00 uIU/mL       Assessment:     1. Bilateral leg pain    2. Hypothyroidism    3. Atrial flutter, unspecified type (H)    4. Chronic pain of both knees    5. Bilateral hip pain    6. Other specified soft tissue disorders     7. Essential (primary) hypertension     8. Spinal stenosis of lumbar region with neurogenic claudication    9. Abdominal bloating    10. S/P mitral valve replacement (MVR)      ______________________________________________________________________     PHQ-2 Total Score: 2 (10/25/2019  4:00 PM)    No data recorded  ______________________________________________________________________     BMI Readings from Last 1 Encounters:   10/27/19 26.62 kg/m        Plan:     1. X-rays of the knees and hips.  2. Ultrasound of the arteries of  the lower extremities.  3. Check blood work today.  See relevant orders and diagnosis associations at the bottom of this note.   4. Follow up sooner if needed.  Will advise further when blood work comes back.  5. Patient advised to seek more urgent attention if worsening - At the time I am typing this, she is hospitalized at [facility].  6. Continue current medications.       Gabino Bach MD  General Internal Medicine  [facility]    Personal office fax - [phone]   Voice mail - [phone]  E-mail - [email or web address]      Return in about 1 week (around 11/1/2019) for follow up visit.     Future Appointments   Date Time Provider Department Center   12/3/2019  1:25 PM Gabino Bach MD [facility]         ______________________________________________________________________     Relevant ICD-10 codes and order associations:      ICD-10-CM    1. Bilateral leg pain M79.604 XR Pelvis W 2 Vw Hips Bilateral    M79.605 XR Knee Bilateral Plus Sunrise VW     XR Pelvis W 2 Vw Hips Bilateral     XR Knee Bilateral Plus [place] VW     US Arterial Legs Bilateral Complete     CANCELED: US Arterial Legs Bilateral Complete     CANCELED: US Arterial Legs Bilateral Complete   2. Hypothyroidism E03.9 levothyroxine (SYNTHROID, LEVOTHROID) 125 MCG tablet   3. Atrial flutter, unspecified type (H) I48.92 diltiazem (TIAZAC) 120 MG 24 hr capsule     HM2(CBC w/o Differential)     Comprehensive Metabolic Panel     Thyroid Stimulating Hormone (TSH)   4. Chronic pain of both knees M25.561 XR Knee Bilateral Plus Sunrise VW    M25.562 XR Knee Bilateral Plus Sunrise VW    G89.29    5. Bilateral hip pain M25.551 XR Pelvis W 2 Vw Hips Bilateral    M25.552 XR Pelvis W 2 Vw Hips Bilateral     Erythrocyte Sedimentation Rate     C-Reactive Protein(CRP)   6. Other specified soft tissue disorders  M79.89 US Arterial Legs Bilateral Complete     CANCELED: US Arterial Legs Bilateral Complete      CANCELED: US Arterial Legs Bilateral Complete   7. Essential (primary) hypertension  I10 Thyroid Stimulating Hormone (TSH)   8. Spinal stenosis of lumbar region with neurogenic claudication M48.062 HLA B27 Typing   9. Abdominal bloating R14.0    10. S/P mitral valve replacement (MVR) Z95.2

## 2021-06-28 NOTE — PROGRESS NOTES
Progress Notes by Mallory Grover RN at 2/3/2020 10:45 AM     Author: Mallory Grover RN Service: -- Author Type: Registered Nurse    Filed: 2/3/2020 10:33 AM Encounter Date: 2/3/2020 Status: Attested    : Mallory Grover RN (Registered Nurse) Cosigner: Gabino Bach MD at 2/3/2020 10:36 AM    Attestation signed by Gabino Bach MD at 2/3/2020 10:36 AM     Anticoagulation care reviewed.  I agree with the plan of care.    Gabino Bach MD  [facility]  2/3/2020, 10:36 AM                 ANTICOAGULATION  MANAGEMENT    Assessment     Today's INR result of 2.7 is Therapeutic (goal INR of 2.5-3.5)        More warfarin taken than instructed which may be affecting INR    No new diet changes affecting INR    No new medication/supplements affecting INR    Continues to tolerate warfarin with no reported s/s of bleeding or thromboembolism     Previous INR was Supratherapeutic    Plan:     Met with Bernadette in clinic regarding INR result and instructed:     Warfarin Dosing Instructions:  Continue current warfarin dose 2.5 mg daily on Thu; and 5 mg daily rest of week  (0 % change)    Instructed patient to follow up no later than: 1 week at     Education provided: target INR goal and significance of current INR result, importance of following up for INR monitoring at instructed interval, importance of taking warfarin as instructed and written instructions provided    Bernadette verbalizes understanding and agrees to warfarin dosing plan.    Instructed to call the [facility] for any changes, questions or concerns. (#[phone])   ?   Mallory Grover RN    Subjective/Objective:      Bernadette Yu, a 79 y.o. female is on warfarin.     Bernadette reports:     Home warfarin dose: verbally confirmed home dose with Bernadette and updated on anticoagulation calendar     Missed doses: No     Medication changes:  No     S/S of bleeding or thromboembolism:  No     New Injury or illness:   No     Changes in diet or alcohol consumption:  No     Upcoming surgery, procedure or cardioversion:  No    Anticoagulation Episode Summary     Current INR goal:   2.5-3.5   TTR:   44.0 % (11.9 mo)   Next INR check:   2/10/2020   INR from last check:   2.70 (2/3/2020)   Weekly max warfarin dose:      Target end date:      INR check location:      Preferred lab:      Send INR reminders to:   [facility]    Indications    S/P mitral valve replacement (MVR) [Z95.2]           Comments:            Anticoagulation Care Providers     Provider Role Specialty Phone number    Gabino Bach MD Referring Internal Medicine [phone]

## 2021-06-29 NOTE — PROGRESS NOTES
Progress Notes by Gabino Bach MD at 2020  1:50 PM     Author: Gabino Bach MD Service: -- Author Type: Physician    Filed: 2020  8:58 AM Encounter Date: 2020 Status: Signed    : Gabino Bach MD (Physician)              [facility]    Comprehensive and complex medical care - Chronic disease management - Shared decision making - Care coordination - Compassionate care    Patient advocacy - Rational deprescribing - Minimally disruptive medicine - Ethical focus - Customized care          Date of Service: 2020  Primary Provider: Gabino Bach MD    Patient Care Team:  Gabino Bach MD as PCP - General (Internal Medicine)  Ayanna aCmacho MD as Physician (Cardiology)  Ricky, Al ALDRIDGE MD as Physician (Ophthalmology)  Mauro, Ishan Escobar MD as Physician (Gastroenterology)  [facility], Mallory MCCRAY RN as Registered Nurse  Gabino Bach MD (Internal Medicine)  Gabino Bach MD as Assigned PCP     ______________________________________________________________________     Patient's Pharmacy:      [id number] [address]  Phone: [phone] Fax: [phone]     Patient's Contacts:  Name Home Phone Work Phone Mobile Phone Relationship Lgl Grd   JOYCELYNISHAN WORLEY [phone]   Spouse    ANDREA VALENTIN [phone]   Child        Patient's Insurance:    Payor/Plan Subscr  Sex Relation Sub. Ins. ID Effective Group Num   1. MEDICA - MEDI* BERNADETTE VALENTIN 1940 Female  [id number] Not Eff [id number]                                   [address] [address]   2. MEDICARE - ME* BERNADETTE VALENTIN NIMCO 1940 Female  [id number]                                    [place], [address]           Bernadette Valentin is a 79 y.o. female who comes in today for:    Chief Complaint   Patient presents with   ? Follow-up     heel ulcer, mitral valve replacment, still has  "leg pain at night   ? Medication Management     can take advil with pain pill   ? Medication Refill     pain medication, sleeping pill       Active Problem List:  Problem List as of 9/1/2020 Reviewed: 7/24/2020  8:01 AM by Gabino Bach MD       High    Former smoker    Full code status - POLST form 1/2/16    ASCVD (arteriosclerotic cardiovascular disease) - CABG 2015    Atrial fibrillation with rapid ventricular response (H)    Chronic pain - Dr. Bach manages the pain    DM type 2 (diabetes mellitus, type 2) (H)    Nonrheumatic mitral valve stenosis    Paroxysmal atrial fibrillation (H) - VALVULAR    S/P mitral valve replacement (MVR) - mechanical mitral valve placed 2015    Subclavian artery stenosis, left (H) - s/p stent       Medium    Controlled substance agreement signed - 2/20 - temazepam, lorazepam, hydromorphone - UDS 8/19    HTN (hypertension)    Abdominal bloating, chronic    Anticoagulation goal of INR 2.5 to 3.5    Anxiety    Hypothyroidism    Moderate aortic stenosis    PAD (peripheral artery disease) (H)    Recurrent UTI (urinary tract infection)       Low    Bleeding diathesis (H) - due to warfarin    HLD (hyperlipidemia)    IBS (irritable bowel syndrome)    Insomnia    Microalbuminuria due to type 2 diabetes mellitus (H)    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 BD ULTRA-FINE DAVID PEN NEEDLE 32 gauge x 5/32\" Ndle USE WITH NOVOLOG AND NOVOLIN PENS    ? blood glucose test strips Use 1 each As " Directed 3 (three) times a day. Dispense brand per patient's insurance at pharmacy discretion.    ? blood-glucose meter Misc Dispense 1 meter as covered by patient's insurance.    ? cholecalciferol, vitamin D3, 5,000 unit Tab Take 1 tablet (5,000 Units total) by mouth daily.    ? diclofenac sodium (VOLTAREN) 1 % Gel Apply 2 g topically 4 (four) times a day. 2/19/2020: Patient states she is not taking PTA, but per chart review, appears this medication was just recently approved by insurance   ? digoxin (LANOXIN) 125 mcg (0.125 mg) tablet Take 1 tablet (125 mcg total) by mouth daily with supper.    ? diltiazem (CARDIZEM CD) 180 MG 24 hr capsule Take 1 capsule (180 mg total) by mouth daily.    ? gabapentin (NEURONTIN) 300 MG capsule Take 1 capsule (300 mg total) by mouth at bedtime.    ? generic lancets Use 1 each As Directed 3 (three) times a day. Dx E11.65 DM2, uncontrolled    ? insulin NPH (HUMULIN N NPH U-100 INSULIN) 100 unit/mL injection Inject 40 Units under the skin 2 (two) times a day.    ? levothyroxine (SYNTHROID, LEVOTHROID) 125 MCG tablet Take 1 tablet (125 mcg total) by mouth daily.    ? NOVOLOG U-100 INSULIN ASPART 100 unit/mL injection INJECT 40 UNITS UNDER THE SKIN TID    ? polyethylene glycol (MIRALAX) 17 gram packet Take 1 packet (17 g total) by mouth daily.    ? pravastatin (PRAVACHOL) 20 MG tablet Take 1 tablet (20 mg total) by mouth daily.    ? silver sulfADIAZINE (SILVADENE, SSD) 1 % cream Apply topically daily as needed (to toes). Apply to affected area daily    ? warfarin ANTICOAGULANT (COUMADIN/JANTOVEN) 5 MG tablet Take 1 tablet (5 mg total) by mouth daily. Please adjust as recommended by anticoagulation nurse.    ? HYDROmorphone (DILAUDID) 4 MG tablet Take 0.5-1 tablets (2-4 mg total) by mouth 4 (four) times a day as needed for pain. For use from 9/1/2020 to 10/1/2020.    ? temazepam (RESTORIL) 15 mg capsule Take 1-2 capsules (15-30 mg total) by mouth at bedtime as needed for sleep.         Social History     Social History Narrative    Patient of Dr. Bach since 2001.   to  Aneudy.        4 children.        Former patient of Dr. Harshad Ballard.         Subjective:     Roomed by: lissa Johnson    Accompanied by Spouse    Refills needed? No    Do you have any forms that need to be filled out? No       Reviewed her heel ulcer and this has healed up.  Some callusing and cracking of the overlying skin.  Reviewed this with her.    Toe is doing well too.    Reviewed insomnia and she would like a refill of her temazepam.    Her chronic back pain continues to be an issue and we reviewed this.  No worsening.  Needs refill of medications.    Reviewed her anticoagulation and no current issues with this.    We reviewed her other issues noted in the assessment but not specifically addressed in the HPI above.     On review of systems, the patient denies any chest pain or shortness of breath.    Objective:     Wt Readings from Last 3 Encounters:   09/01/20 146 lb (66.2 kg)   03/03/20 146 lb 12.8 oz (66.6 kg)   02/22/20 153 lb (69.4 kg)       BP Readings from Last 3 Encounters:   09/01/20 128/72   07/23/20 132/60   06/18/20 128/60       /72   Pulse 76   Wt 146 lb (66.2 kg)   SpO2 97%   BMI 25.86 kg/m     The patient is comfortable, no acute distress.  Mood good.  Insight fair to good.  Eyes are nonicteric.  Neck is supple without mass.  No cervical adenopathy.  No thyromegaly. Heart irregular rate and rhythm.  Lungs clear to auscultation bilaterally.  Respiratory effort is good.  Abdomen soft and nontender.  No hepatosplenomegaly.  Extremities no edema.  No ulcer on heel at this time.    Diagnostics:     Results for orders placed or performed in visit on 08/18/20   INR   Result Value Ref Range    INR 2.80 (H) 0.90 - 1.10       Assessment:     1. Ulcer of heel and midfoot, left, with unspecified severity (H)    2. Immunization due    3. Primary insomnia    4. Chronic pain syndrome    5. Type 2  diabetes mellitus with microalbuminuria, with long-term current use of insulin (H)    6. Multilevel neural foraminal stenosis    7. Spinal stenosis of lumbar region with neurogenic claudication    8. Bleeding diathesis (H) - due to warfarin        Quality review:     PHQ-2 Total Score: 2 (5/22/2020  1:00 PM)      No data recorded  ______________________________________________________________________     BMI Readings from Last 1 Encounters:   09/01/20 25.86 kg/m          Plan:     1. Vaseline to the heel callus.  2. Flu shot done today   3. Refilled temazepam.  4. Refilled hydromorphone   5. Continue current medications otherwise.  6. Review UDS/DOA screen at next visit if appropriate.        Gabino Bach MD  General Internal Medicine  [facility]    Personal office fax - [phone]   Voice mail - [phone]  E-mail - [email or web address]     Return in about 2 months (around 11/1/2020) for follow up visit.     Future Appointments   Date Time Provider Department Center   11/6/2020  2:15 PM Gabino Bach MD [facility]         ______________________________________________________________________     Relevant ICD-10 codes and order associations:      ICD-10-CM    1. Ulcer of heel and midfoot, left, with unspecified severity (H)  L97.429    2. Immunization due  Z23 Influenza,Quad,High Dose,PF 65 YR+   3. Primary insomnia  F51.01 temazepam (RESTORIL) 15 mg capsule   4. Chronic pain syndrome  G89.4 HYDROmorphone (DILAUDID) 4 MG tablet   5. Type 2 diabetes mellitus with microalbuminuria, with long-term current use of insulin (H)  E11.29     R80.9     Z79.4    6. Multilevel neural foraminal stenosis  M99.89    7. Spinal stenosis of lumbar region with neurogenic claudication  M48.062    8. Bleeding diathesis (H) - due to warfarin  D69.9

## 2021-06-29 NOTE — PROGRESS NOTES
Progress Notes by Gabino Bach MD at 2020  3:20 PM     Author: Gabino Bach MD Service: -- Author Type: Physician    Filed: 2020  4:57 PM Encounter Date: 2020 Status: Signed    : Gabino Bach MD (Physician)              [facility]    Comprehensive and complex medical care - Chronic disease management - Shared decision making - Care coordination - Compassionate care    Patient advocacy - Rational deprescribing - Minimally disruptive medicine - Ethical focus - Customized care          Date of Service: 2020  Primary Provider: Gabino Bach MD    Patient Care Team:  Gabino Bach MD as PCP - General (Internal Medicine)  Ayanna Camacho MD as Physician (Cardiology)  Al García MD as Physician (Ophthalmology)  Mauro, Ishan Escobar MD as Physician (Gastroenterology)  [facility], Mallory MCCRAY RN as Registered Nurse  Gabino Bach MD (Internal Medicine)  Gabino Bach MD as Assigned PCP  Emma Stevenson, NP as Assigned Surgical Provider  Nba Cleveland DPM as Assigned Musculoskeletal Provider     ______________________________________________________________________     Patient's Pharmacy:      [id number] [address] [address]  Phone: [phone] Fax: [phone]     Patient's Contacts:  Name Home Phone Work Phone Mobile Phone Relationship Lgl Grd   ISHAN VALENTIN [phone]   Spouse    ANDREA VALENTIN [phone]   Child        Patient's Insurance:    Payor/Plan Subscr  Sex Relation Sub. Ins. ID Effective Group Num   1. MEDICA - MEDI* BERNADETTE VALENTIN 1940 Female  [id number] Not Eff [id number]                                   [address] [address]   2. MEDICARE - ME* HOMEROBERNADETTE NIMCO 1940 Female Self [id number]                                    [place], [address]           Bernadette NIMCO Lozoyacristhian is a 80 y.o. female who comes in  "today for:    Chief Complaint   Patient presents with   ? Follow-up     heel ulcer is better, chronic back pain is worse, sugar is running high   ? Insomnia       Active Problem List:  Problem List as of 11/6/2020 Reviewed: 9/2/2020  8:54 AM by Gabino Bach MD       High    Former smoker    Full code status - POLST form 1/2/16    ASCVD (arteriosclerotic cardiovascular disease) - CABG 2015    Atrial fibrillation with rapid ventricular response (H)    Chronic pain - Dr. Bach manages the pain    DM type 2 (diabetes mellitus, type 2) (H)    Nonrheumatic mitral valve stenosis    Paroxysmal atrial fibrillation (H) - VALVULAR    S/P mitral valve replacement (MVR) - mechanical mitral valve placed 2015    Subclavian artery stenosis, left (H) - s/p stent       Medium    Controlled substance agreement signed - 2/20 - temazepam, lorazepam, hydromorphone - UDS 8/19    HTN (hypertension)    Abdominal bloating, chronic    Anticoagulation goal of INR 2.5 to 3.5    Anxiety    Hypothyroidism    Moderate aortic stenosis    PAD (peripheral artery disease) (H)    Recurrent UTI (urinary tract infection)       Low    Bleeding diathesis (H) - due to warfarin    HLD (hyperlipidemia)    IBS (irritable bowel syndrome)    Insomnia    Microalbuminuria due to type 2 diabetes mellitus (H)    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 BD ULTRA-FINE DAVID PEN NEEDLE 32 gauge x 5/32\" Ndle USE WITH NOVOLOG AND NOVOLIN PENS    ? blood glucose test " strips Use 1 each As Directed 3 (three) times a day. Dispense brand per patient's insurance at pharmacy discretion.    ? blood-glucose meter Misc Dispense 1 meter as covered by patient's insurance.    ? cholecalciferol, vitamin D3, 5,000 unit Tab Take 1 tablet (5,000 Units total) by mouth daily.    ? diclofenac sodium (VOLTAREN) 1 % Gel Apply 2 g topically 4 (four) times a day. 2/19/2020: Patient states she is not taking PTA, but per chart review, appears this medication was just recently approved by insurance   ? digoxin (LANOXIN) 125 mcg (0.125 mg) tablet Take 1 tablet (125 mcg total) by mouth daily with supper.    ? diltiazem (CARDIZEM CD) 180 MG 24 hr capsule Take 1 capsule (180 mg total) by mouth daily.    ? gabapentin (NEURONTIN) 300 MG capsule Take 1 capsule (300 mg total) by mouth at bedtime.    ? generic lancets Use 1 each As Directed 3 (three) times a day. Dx E11.65 DM2, uncontrolled    ? levothyroxine (SYNTHROID, LEVOTHROID) 125 MCG tablet Take 1 tablet (125 mcg total) by mouth daily.    ? polyethylene glycol (MIRALAX) 17 gram packet Take 1 packet (17 g total) by mouth daily.    ? pravastatin (PRAVACHOL) 20 MG tablet Take 1 tablet (20 mg total) by mouth daily.    ? silver sulfADIAZINE (SILVADENE, SSD) 1 % cream Apply topically daily as needed (to toes). Apply to affected area daily    ? temazepam (RESTORIL) 15 mg capsule Take 1-2 capsules (15-30 mg total) by mouth at bedtime as needed for sleep.    ? warfarin ANTICOAGULANT (COUMADIN/JANTOVEN) 5 MG tablet Take 1 tablet (5 mg total) by mouth daily. Please adjust as recommended by anticoagulation nurse.    ? HUMULIN N NPH U-100 INSULIN 100 unit/mL injection Inject 40 Units under the skin 2 (two) times a day before meals.    ? HYDROmorphone (DILAUDID) 4 MG tablet Take 0.5-1 tablets (2-4 mg total) by mouth 4 (four) times a day as needed for pain. For use from 9/14/2020 to 10/14/2020.    ? insulin aspart U-100 (NOVOLOG U-100 INSULIN ASPART) 100 unit/mL  injection Inject 40 Units under the skin 2 (two) times a day.        Social History     Social History Narrative    Patient of Dr. Bach since 2001.   to  Aneudy.        4 children.        Former patient of Dr. Harshad Ballard.         Subjective:     Roomed by: lissa nguyen    Accompanied by Spouse    Refills needed? Yes    Do you have any forms that need to be filled out? No       Comes in today for multiple issues.    Diabetes has continued to be an issue.  Needs refills of her diabetes medication - insulin.  These were done today.    Reviewed her hypertension today.  Blood pressure has been in the goal range.  Denies any excessive dizziness from the medication with this.     Back pain continues to be an issue but does not want to escalate this further.  Not needing a refill of her hydromorphone.    Stomach bloating continues at this time and helped by the Miralax.    Insomnia medication refill needed as well today.    Needs international normalized ratio (INR) done today.    Heel ulcer has completely healed at this time.  No issues at this time.  Toes have healed also.    We reviewed her other issues noted in the assessment but not specifically addressed in the HPI above.     On review of systems, the patient denies any chest pain or shortness of breath.    Objective:     Wt Readings from Last 3 Encounters:   11/06/20 151 lb (68.5 kg)   09/01/20 146 lb (66.2 kg)   03/03/20 146 lb 12.8 oz (66.6 kg)       BP Readings from Last 3 Encounters:   11/06/20 134/72   09/01/20 128/72   07/23/20 132/60       /72   Pulse 77   Wt 151 lb (68.5 kg)   SpO2 97%   BMI 26.75 kg/m     The patient is comfortable, no acute distress.  Mood good.  Insight fair.  Eyes are nonicteric.  Neck is supple without mass.  No cervical adenopathy.  No thyromegaly. Heart regular rate and rhythm.  Lungs clear to auscultation bilaterally.  Respiratory effort is good.  Abdomen soft and nontender.  No hepatosplenomegaly.  Extremities  no edema.      Diagnostics:     Results for orders placed or performed in visit on 11/06/20   Glycosylated Hemoglobin A1c   Result Value Ref Range    Hemoglobin A1c 11.9 (H) <=5.6 %   HM2(CBC w/o Differential)   Result Value Ref Range    WBC 5.7 4.0 - 11.0 thou/uL    RBC 4.20 3.80 - 5.40 mill/uL    Hemoglobin 13.0 12.0 - 16.0 g/dL    Hematocrit 37.7 35.0 - 47.0 %    MCV 90 80 - 100 fL    MCH 30.9 27.0 - 34.0 pg    MCHC 34.5 32.0 - 36.0 g/dL    RDW 13.3 11.0 - 14.5 %    Platelets 104 (L) 140 - 440 thou/uL    MPV 9.5 7.0 - 10.0 fL       Assessment:     1. Type 2 diabetes mellitus with microalbuminuria, with long-term current use of insulin (H)    2. Primary insomnia    3. Intermittent atrial fibrillation (H)    4. Ulcer of heel and midfoot, left, with unspecified severity (H)    5. Multilevel neural foraminal stenosis    6. Chronic midline low back pain with bilateral sciatica    7. Essential hypertension        Quality review:     PHQ-2 Total Score: 2 (5/22/2020  1:00 PM)      No data recorded  ______________________________________________________________________     BMI Readings from Last 1 Encounters:   11/06/20 26.75 kg/m          Plan:     1. Elevated A1C is likely due to incomplete insulin usage with memory issues.  2. Refilled temazepam.  3. Check blood work today.  See relevant orders and diagnosis associations at the bottom of this note.   4. Heel doing very well.  5. Check complete blood count (CBC) with recurrent anemia.         Gabino Bach MD  General Internal Medicine  [facility]    Return in about 3 months (around 2/6/2021), or if symptoms worsen or fail to improve, for visit and blood work.     No future appointments.      ______________________________________________________________________     Relevant ICD-10 codes and order associations:      ICD-10-CM    1. Type 2 diabetes mellitus with microalbuminuria, with long-term current use of insulin (H)  E11.29 insulin  aspart U-100 (NOVOLOG U-100 INSULIN ASPART) 100 unit/mL injection    R80.9 HUMULIN N NPH U-100 INSULIN 100 unit/mL injection    Z79.4 Glycosylated Hemoglobin A1c   2. Primary insomnia  F51.01 temazepam (RESTORIL) 15 mg capsule   3. Intermittent atrial fibrillation (H)  I48.0 HM2(CBC w/o Differential)   4. Ulcer of heel and midfoot, left, with unspecified severity (H)  L97.429    5. Multilevel neural foraminal stenosis  M99.79    6. Chronic midline low back pain with bilateral sciatica  M54.41     M54.42     G89.29    7. Essential hypertension  I10

## 2021-06-29 NOTE — PROGRESS NOTES
Progress Notes by Gabino Bach MD at 2020  1:00 PM     Author: Gabino Bach MD Service: -- Author Type: Physician    Filed: 2020  9:23 PM Encounter Date: 2020 Status: Signed    : Gabino Bach MD (Physician)              [facility]    Comprehensive and complex medical care - Chronic disease management - Shared decision making - Care coordination - Compassionate care    Patient advocacy - Rational deprescribing - Minimally disruptive medicine - Ethical focus - Customized care          Date of Service: 2020  Primary Provider: Gabino Bach MD    Patient Care Team:  Gabino Bach MD as PCP - General (Internal Medicine)  Ayanna Camacho MD as Physician (Cardiology)  Ricky, Al ALDRIDGE MD as Physician (Ophthalmology)  Mauro, Ishan Escobar MD as Physician (Gastroenterology)  [facility], Mallory MCCRAY RN as Registered Nurse  Gabino Bach MD (Internal Medicine)  Gabino Bach MD as Assigned PCP     ______________________________________________________________________     Patient's Pharmacy:      [facility] #[id number] [address] [address]  Phone: [phone] Fax: [phone]     Patient's Contacts:  Name Home Phone Work Phone Mobile Phone Relationship Lgl Grd   JOYCELYNMEIRISHAN [phone]   Spouse    ANDREA VALENTIN [phone]   Child        Patient's Insurance:    Payor/Plan Subscr  Sex Relation Sub. Ins. ID Effective Group Num   1. MEDICA - MEDI* BERNADETTE VALENTIN 1940 Female  [id number] Not Eff [id number]                                   [address] [address]   2. MEDICARE - ME* BERNADETTE VALENTIN NIMCO 1940 Female  [id number]                                    [place], [address]           Bernadette Valentin is a 79 y.o. female who comes in today for:    Chief Complaint   Patient presents with   ? Follow-up     FOOT ULCER       Active Problem  "List:  Problem List as of 4/27/2020 Reviewed: 4/6/2020  2:56 PM by Gabino Bach MD       High    Former smoker    Full code status - POLST form 1/2/16    ASCVD (arteriosclerotic cardiovascular disease) - CABG 2015    Atrial fibrillation with rapid ventricular response (H)    Chronic pain - Dr. Bach manages the pain    DM type 2 (diabetes mellitus, type 2) (H)    Nonrheumatic mitral valve stenosis    Paroxysmal atrial fibrillation (H) - VALVULAR    S/P mitral valve replacement (MVR) - mechanical mitral valve placed 2015    Subclavian artery stenosis, left (H) - s/p stent       Medium    Controlled substance agreement signed - 2/20 - temazepam, lorazepam, hydromorphone - UDS 8/19    HTN (hypertension)    Abdominal bloating, chronic    Anticoagulation goal of INR 2.5 to 3.5    Anxiety    Hypothyroidism    Moderate aortic stenosis    PAD (peripheral artery disease) (H)    Recurrent UTI (urinary tract infection)       Low    Bleeding diathesis (H) - due to warfarin    HLD (hyperlipidemia)    IBS (irritable bowel syndrome)    Insomnia    Microalbuminuria due to type 2 diabetes mellitus (H)    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 ACCU-CHEK SMARTVIEW TEST STRIP strips Use 1 each As Directed 3 (three) times a day. Dispense brand per patient's insurance at pharmacy discretion.  Dx E11.65 DM2, uncontrolled    ? BD ULTRA-FINE DAVID PEN NEEDLE 32 gauge x 5/32\" Ndle USE WITH NOVOLOG AND NOVOLIN PENS    ? cholecalciferol, " vitamin D3, 5,000 unit Tab Take 1 tablet (5,000 Units total) by mouth daily.    ? clindamycin (CLEOCIN) 300 MG capsule Take 1 capsule (300 mg total) by mouth 3 (three) times a day for 14 days.    ? cyanocobalamin (VITAMIN B-12) 100 MCG tablet Take 1 tablet (100 mcg total) by mouth daily.    ? diclofenac sodium (VOLTAREN) 1 % Gel Apply 2 g topically 4 (four) times a day. 2/19/2020: Patient states she is not taking PTA, but per chart review, appears this medication was just recently approved by insurance   ? digoxin (LANOXIN) 125 mcg (0.125 mg) tablet Take 1 tablet (125 mcg total) by mouth daily with supper.    ? diltiazem (CARDIZEM CD) 180 MG 24 hr capsule Take 1 capsule (180 mg total) by mouth daily.    ? ferrous sulfate 325 (65 FE) MG tablet Take 1 tablet (325 mg total) by mouth daily with breakfast.    ? gabapentin (NEURONTIN) 300 MG capsule Take 1 capsule (300 mg total) by mouth at bedtime.    ? generic lancets Use 1 each As Directed 3 (three) times a day. Dx E11.65 DM2, uncontrolled    ? HYDROmorphone (DILAUDID) 4 MG tablet Take 0.5-1 tablets (2-4 mg total) by mouth 4 (four) times a day as needed for pain. For use from 3/26/2020 to 4/25/2020.    ? insulin NPH (HUMULIN N NPH U-100 INSULIN) 100 unit/mL injection Inject 40 Units under the skin 2 (two) times a day.    ? levothyroxine (SYNTHROID, LEVOTHROID) 125 MCG tablet Take 1 tablet (125 mcg total) by mouth daily.    ? metFORMIN (GLUCOPHAGE) 500 MG tablet Take 1 tablet (500 mg total) by mouth 2 (two) times a day with meals.    ? NOVOLOG U-100 INSULIN ASPART 100 unit/mL injection INJECT 40 UNITS UNDER THE SKIN TID    ? polyethylene glycol (MIRALAX) 17 gram packet Take 1 packet (17 g total) by mouth daily.    ? potassium chloride (MICRO-K) 10 mEq CR capsule Take 1 capsule (10 mEq total) by mouth daily.    ? pravastatin (PRAVACHOL) 20 MG tablet Take 1 tablet (20 mg total) by mouth daily.    ? silver sulfADIAZINE (SILVADENE, SSD) 1 % cream Apply to affected area  daily (Patient taking differently: Apply topically daily as needed (to toes). Apply to affected area daily)    ? temazepam (RESTORIL) 15 mg capsule Take 1-2 capsules (15-30 mg total) by mouth at bedtime as needed for sleep.    ? warfarin ANTICOAGULANT (COUMADIN/JANTOVEN) 5 MG tablet Take by mouth See Admin Instructions. 2.5 mg on Thursday; 5 mg row per anticoagulation encounter         Social History     Social History Narrative    Patient of Dr. Bach since 2001.   to  Aneudy.        4 children.        Former patient of Dr. Harshad Ballard.         Subjective:     Patient comes in today with her .    We first reviewed her toe and heel ulcer.  Her great toe on the right has healed up.  The left great toe still has a fairly large ulcer over the dorsum of the toe.  They are soaking it every other day as best they can and then leaving it open to the air for dressing it.  The heel was debrided at the last visit and appears to be healing well.  She denies any fevers or chills.  There is quite a bit of confusion between her and her  related to the medications.  For example, she is currently taking Augmentin and clindamycin at this time.    We reviewed her atrial fibrillation.  She is on warfarin for this and needs her INR done.    Her blood pressure has generally been doing well without issue.    Her back pain is stable at this time, especially as she is not walking as much with the ulcerations.    Her other health issues are stable at this point.    We reviewed her other issues noted in the assessment but not specifically addressed in the HPI above.     On review of systems, the patient denies any chest pain or shortness of breath.    Objective:     Wt Readings from Last 3 Encounters:   03/03/20 146 lb 12.8 oz (66.6 kg)   02/22/20 153 lb (69.4 kg)   02/12/20 148 lb (67.1 kg)       BP Readings from Last 3 Encounters:   04/27/20 134/62   04/20/20 132/76   03/26/20 134/68       /62   Pulse 72    SpO2 97%    The patient is comfortable, no acute distress.  Mood good.  Insight fair.  Eyes are nonicteric.  Neck is supple without mass.  No cervical adenopathy.  No thyromegaly. Heart regular rate and rhythm.  Lungs clear to auscultation bilaterally.  Respiratory effort is good.  Abdomen soft and nontender.  No hepatosplenomegaly.  Extremities no edema.  Her left heel shows a medial heel ulcer which is 1.5 x 3.0 cm.  The base shows good granulation tissue.  There is no signs of infection.  The tenderness that she was previously experiencing is doing well.  The dorsum of the left great toe shows no obvious cellulitis but there is dryness of the wound and this does not look quite as healthy.  However, I think it is reasonable to follow at this time.  We offered her other options but she wants to continue to try medical management as we have been doing.    Diagnostics:     Results for orders placed or performed in visit on 03/30/20   INR   Result Value Ref Range    INR 2.30 (H) 0.90 - 1.10       Assessment:     1. Osteomyelitis of great toe (H)    2. Acquired hypothyroidism    3. Ulcer of heel and midfoot, left, with unspecified severity (H)    4. Skin ulcer of toe of left foot, limited to breakdown of skin (H)    5. Patient forgets to take medication    6. Spinal stenosis of lumbar region with neurogenic claudication    7. Adverse effect of amiodarone, initial encounter    8. Atrial fibrillation with rapid ventricular response (H)        Quality review:     PHQ-2 Total Score: 2 (10/25/2019  4:00 PM)      No data recorded  ______________________________________________________________________     BMI Readings from Last 1 Encounters:   03/03/20 26.00 kg/m          Plan:     1. Due to using 2 separate medications, finish out the course of Augmentin first and then restart the clindamycin.  2. The toe does not look quite as bad but the area of the ulcer has extended slightly.  The heel ulcer is doing better.  3. I  ordered additional blood work today, but this was not completed by the lab.  This will likely need to be done in 3 weeks due to issues with traveling.  4. The foot was dressed today.  Aftercare instructions were given.  5. We went through her medications and what she should be taking at this time.  Her med list was updated.  6. Her back pain is stable at this point.         Gabino Bach MD  General Internal Medicine  [facility]    Personal office fax - [phone]   Voice mail - [phone]  E-mail - [email or web address]     Return in about 1 week (around 5/4/2020) for telephone visit with Dr. Bach.     Future Appointments   Date Time Provider Department Center   5/4/2020  2:10 PM [facility]         ______________________________________________________________________     Relevant ICD-10 codes and order associations:      ICD-10-CM    1. Osteomyelitis of great toe (H)  M86.9 Basic Metabolic Panel     C-Reactive Protein(CRP)     HM1(CBC and Differential)     Sedimentation Rate     CANCELED: HM1(CBC and Differential)     CANCELED: Sedimentation Rate     CANCELED: C-Reactive Protein(CRP)     CANCELED: Basic Metabolic Panel     CANCELED: HM1 (CBC with Diff)   2. Acquired hypothyroidism  E03.9 levothyroxine (SYNTHROID, LEVOTHROID) 125 MCG tablet   3. Ulcer of heel and midfoot, left, with unspecified severity (H)  L97.429    4. Skin ulcer of toe of left foot, limited to breakdown of skin (H)  L97.521    5. Patient forgets to take medication  Z91.14    6. Spinal stenosis of lumbar region with neurogenic claudication  M48.062    7. Adverse effect of amiodarone, initial encounter  T46.2X5A    8. Atrial fibrillation with rapid ventricular response (H)  I48.91

## 2021-06-29 NOTE — PROGRESS NOTES
"Progress Notes by Gabino Bach MD at 5/8/2020  1:30 PM     Author: Gabino Bach MD Service: -- Author Type: Physician    Filed: 5/10/2020  9:25 PM Encounter Date: 5/8/2020 Status: Signed    : Gabino Bach MD (Physician)            [facility]     TELEPHONE VISIT     Comprehensive and complex medical care - Chronic disease management - Shared decision making - Care coordination - Compassionate care    Patient advocacy - Rational deprescribing - Minimally disruptive medicine - Ethical focus - Customized care         Bernadette Yu is a 79 y.o. female who is being evaluated via a billable telephone visit.      The patient has been notified of following:     \"This telephone visit will be conducted via a call between you and your physician/provider. We have found that certain health care needs can be provided without the need for a physical exam.  This service lets us provide the care you need with a short phone conversation.  If a prescription is necessary we can send it directly to your pharmacy.  If lab work is needed we can place an order for that and you can then stop by our lab to have the test done at a later time.    If during the course of the call the physician/provider feels a telephone visit is not appropriate, you will not be charged for this service.\"          Active Problem List:  Problem List as of 5/8/2020 Reviewed: 4/30/2020  8:29 AM by Gabino Bach MD       High    Former smoker    Full code status - POLST form 1/2/16    ASCVD (arteriosclerotic cardiovascular disease) - CABG 2015    Atrial fibrillation with rapid ventricular response (H)    Chronic pain - Dr. Bach manages the pain    DM type 2 (diabetes mellitus, type 2) (H)    Nonrheumatic mitral valve stenosis    Paroxysmal atrial fibrillation (H) - VALVULAR    S/P mitral valve replacement (MVR) - mechanical mitral valve placed 2015    Subclavian artery stenosis, left (H) - s/p " "stent       Medium    Controlled substance agreement signed - 2/20 - temazepam, lorazepam, hydromorphone - UDS 8/19    HTN (hypertension)    Abdominal bloating, chronic    Anticoagulation goal of INR 2.5 to 3.5    Anxiety    Hypothyroidism    Moderate aortic stenosis    PAD (peripheral artery disease) (H)    Recurrent UTI (urinary tract infection)       Low    Bleeding diathesis (H) - due to warfarin    HLD (hyperlipidemia)    IBS (irritable bowel syndrome)    Insomnia    Microalbuminuria due to type 2 diabetes mellitus (H)    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 ACCU-CHEK SMARTVIEW TEST STRIP strips Use 1 each As Directed 3 (three) times a day. Dispense brand per patient's insurance at pharmacy discretion.  Dx E11.65 DM2, uncontrolled    ? BD ULTRA-FINE DAVID PEN NEEDLE 32 gauge x 5/32\" Ndle USE WITH NOVOLOG AND NOVOLIN PENS    ? cholecalciferol, vitamin D3, 5,000 unit Tab Take 1 tablet (5,000 Units total) by mouth daily.    ? cyanocobalamin (VITAMIN B-12) 100 MCG tablet Take 1 tablet (100 mcg total) by mouth daily.    ? diclofenac sodium (VOLTAREN) 1 % Gel Apply 2 g topically 4 (four) times a day. 2/19/2020: Patient states she is not taking PTA, but per chart review, appears this medication was just recently approved by insurance   ? digoxin (LANOXIN) 125 mcg (0.125 mg) tablet Take 1 tablet (125 mcg total) by mouth daily with supper.    ? diltiazem (CARDIZEM CD) 180 MG 24 hr capsule Take 1 capsule (180 mg total) " by mouth daily.    ? ferrous sulfate 325 (65 FE) MG tablet Take 1 tablet (325 mg total) by mouth daily with breakfast.    ? gabapentin (NEURONTIN) 300 MG capsule Take 1 capsule (300 mg total) by mouth at bedtime.    ? generic lancets Use 1 each As Directed 3 (three) times a day. Dx E11.65 DM2, uncontrolled    ? insulin NPH (HUMULIN N NPH U-100 INSULIN) 100 unit/mL injection Inject 40 Units under the skin 2 (two) times a day.    ? levothyroxine (SYNTHROID, LEVOTHROID) 125 MCG tablet Take 1 tablet (125 mcg total) by mouth daily.    ? metFORMIN (GLUCOPHAGE) 500 MG tablet Take 1 tablet (500 mg total) by mouth 2 (two) times a day with meals.    ? NOVOLOG U-100 INSULIN ASPART 100 unit/mL injection INJECT 40 UNITS UNDER THE SKIN TID    ? polyethylene glycol (MIRALAX) 17 gram packet Take 1 packet (17 g total) by mouth daily.    ? potassium chloride (MICRO-K) 10 mEq CR capsule Take 1 capsule (10 mEq total) by mouth daily.    ? pravastatin (PRAVACHOL) 20 MG tablet Take 1 tablet (20 mg total) by mouth daily.    ? silver sulfADIAZINE (SILVADENE, SSD) 1 % cream Apply to affected area daily (Patient taking differently: Apply topically daily as needed (to toes). Apply to affected area daily)    ? temazepam (RESTORIL) 15 mg capsule Take 1-2 capsules (15-30 mg total) by mouth at bedtime as needed for sleep.    ? warfarin ANTICOAGULANT (COUMADIN/JANTOVEN) 5 MG tablet Take by mouth See Admin Instructions. 2.5 mg on Thursday; 5 mg row per anticoagulation encounter     ? clindamycin (CLEOCIN) 300 MG capsule Take 1 capsule (300 mg total) by mouth 3 (three) times a day.    ? HYDROmorphone (DILAUDID) 4 MG tablet Take 0.5-1 tablets (2-4 mg total) by mouth 4 (four) times a day as needed for pain. For use from 5/8/2020 to 6/7/2020.        Subjective:     Bernadette Yu presents with:      Chief Complaint   Patient presents with   ? Follow-up     foot ulcer will not go away toe is dry, one on the heel is better did bump it       The patient  has a phone visit today which is done in light of the current coronavirus outbreak.    Patient is on the telephone with her  and her grandson.    We reviewed her heel ulcer on the left.  This continues to do better overall.  It appears to be healing in.  It is not painful.  She is very careful with it.  She is not soaking it as much.  The scab has become more dry and hard.    We reviewed her toe ulcer on the left and osteomyelitis with this.  She is on clindamycin and is tolerating it just fine.  She says the toe looks better overall.  We will be seeing it here in the future face-to-face again.    We reviewed her diabetes and things are stable here.    Reviewed her chronic pain and she does need a refill of this.  This is due to spinal stenosis.    She has been taking her medication more regularly.  It sounds like she was very compliant and after she sometimes feels sick after taking her medication.  We will review this again at the future visit.    We reviewed her other issues noted in the assessment but not specifically addressed in the HPI above.     On ROS, the patient denies any chest pain or pressure.  No shortness of breath.     Objective:     Limited phone exam reveals:  Patient in no distress.  Mood is good.  Insight is good.  Her speech is fluent.  She is not short of breath.    Diagnostics:     Results for orders placed or performed in visit on 05/07/20   INR   Result Value Ref Range    INR 1.60 (H) 0.90 - 1.10       Quality review:     PHQ-2 Total Score: 2 (10/25/2019  4:00 PM)      No data recorded  ______________________________________________________________________     BMI Readings from Last 1 Encounters:   03/03/20 26.00 kg/m        Assessment:     1. Osteomyelitis of great toe (H)    2. Chronic pain syndrome    3. Spinal stenosis of lumbar region with neurogenic claudication    4. Multilevel neural foraminal stenosis    5. Ulcer of heel and midfoot, left, with unspecified severity (H)    6.  Skin ulcer of toe of left foot, limited to breakdown of skin (H)    7. Patient forgets to take medication        Plan:     1. Continue clindamycin and I wrote it for another 4 weeks.  2. Refilled pain medication.  3. Follow-up sooner if issues.  4. Consider debridement at next visit if needed.  5. Continue to monitor closely.    Phone call duration: 14 minutes       Gabino Bach MD  General Internal Medicine  [facility]       Return in about 2 weeks (around 5/22/2020) for follow up visit.     Future Appointments   Date Time Provider Department Center   5/13/2020 11:50 AM [facility]   5/22/2020 11:30 AM [facility]

## 2021-06-29 NOTE — PROGRESS NOTES
Progress Notes by Gabino Bach MD at 2020  1:40 PM     Author: Gabino Bach MD Service: -- Author Type: Physician    Filed: 2020 10:44 PM Encounter Date: 2020 Status: Signed    : Gabino Bach MD (Physician)              [facility]    Comprehensive and complex medical care - Chronic disease management - Shared decision making - Care coordination - Compassionate care    Patient advocacy - Rational deprescribing - Minimally disruptive medicine - Ethical focus - Customized care          Date of Service: 2020  Primary Provider: Gabino Bach MD    Patient Care Team:  Gabino Bach MD as PCP - General (Internal Medicine)  Ayanna Camacho MD as Physician (Cardiology)  Ricky, Al ALDRIDGE MD as Physician (Ophthalmology)  Mauro, Ishan Escobar MD as Physician (Gastroenterology)  [facility], Mallory MCCRAY RN as Registered Nurse  Gabino Bach MD (Internal Medicine)  Gabino Bach MD as Assigned PCP     ______________________________________________________________________     Patient's Pharmacy:      [id number] [address] [address]  Phone: [phone] Fax: [phone]     Patient's Contacts:  Name Home Phone Work Phone Mobile Phone Relationship Lgl Grd   JOYCELYNISHAN WORLEY [phone]   Spouse    ANDREA VALENTIN [phone]   Child        Patient's Insurance:    Payor/Plan Subscr  Sex Relation Sub. Ins. ID Effective Group Num   1. MEDICA - MEDI* BERNADETTE VALENTIN 1940 Female  [id number] Not Eff [id number]                                   [address] [address]   2. MEDICARE - ME* BERNADETTE VALENTIN NIMCO 1940 Female  [id number]                                    [place], [address]           Bernadette Valentin is a 79 y.o. female who comes in today for:    Chief Complaint   Patient presents with   ? Follow-up     heel ulcer   ? Medication Refill     pain  medication was sent in and RX of 16 pills       Active Problem List:  Problem List as of 6/18/2020 Reviewed: 5/24/2020 11:27 PM by Gabino Bach MD       High    Former smoker    Full code status - POLST form 1/2/16    ASCVD (arteriosclerotic cardiovascular disease) - CABG 2015    Atrial fibrillation with rapid ventricular response (H)    Chronic pain - Dr. Bach manages the pain    DM type 2 (diabetes mellitus, type 2) (H)    Nonrheumatic mitral valve stenosis    Paroxysmal atrial fibrillation (H) - VALVULAR    S/P mitral valve replacement (MVR) - mechanical mitral valve placed 2015    Subclavian artery stenosis, left (H) - s/p stent       Medium    Controlled substance agreement signed - 2/20 - temazepam, lorazepam, hydromorphone - UDS 8/19    HTN (hypertension)    Abdominal bloating, chronic    Anticoagulation goal of INR 2.5 to 3.5    Anxiety    Hypothyroidism    Moderate aortic stenosis    PAD (peripheral artery disease) (H)    Recurrent UTI (urinary tract infection)       Low    Bleeding diathesis (H) - due to warfarin    HLD (hyperlipidemia)    IBS (irritable bowel syndrome)    Insomnia    Microalbuminuria due to type 2 diabetes mellitus (H)    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 ACCU-CHEK SMARTVIEW TEST STRIP strips Use 1 each As Directed 3 (three) times a day. Dispense brand per patient's insurance at pharmacy discretion.  Dx E11.65 DM2, uncontrolled    ? BD ULTRA-FINE DAVID PEN NEEDLE 32 gauge x  "5/32\" Ndle USE WITH NOVOLOG AND NOVOLIN PENS    ? cholecalciferol, vitamin D3, 5,000 unit Tab Take 1 tablet (5,000 Units total) by mouth daily.    ? diclofenac sodium (VOLTAREN) 1 % Gel Apply 2 g topically 4 (four) times a day. 2/19/2020: Patient states she is not taking PTA, but per chart review, appears this medication was just recently approved by insurance   ? digoxin (LANOXIN) 125 mcg (0.125 mg) tablet Take 1 tablet (125 mcg total) by mouth daily with supper.    ? diltiazem (CARDIZEM CD) 180 MG 24 hr capsule Take 1 capsule (180 mg total) by mouth daily.    ? gabapentin (NEURONTIN) 300 MG capsule Take 1 capsule (300 mg total) by mouth at bedtime.    ? generic lancets Use 1 each As Directed 3 (three) times a day. Dx E11.65 DM2, uncontrolled    ? HYDROmorphone (DILAUDID) 4 MG tablet Take 0.5-1 tablets (2-4 mg total) by mouth 4 (four) times a day as needed for pain.    ? insulin NPH (HUMULIN N NPH U-100 INSULIN) 100 unit/mL injection Inject 40 Units under the skin 2 (two) times a day.    ? levothyroxine (SYNTHROID, LEVOTHROID) 125 MCG tablet Take 1 tablet (125 mcg total) by mouth daily.    ? NOVOLOG U-100 INSULIN ASPART 100 unit/mL injection INJECT 40 UNITS UNDER THE SKIN TID    ? polyethylene glycol (MIRALAX) 17 gram packet Take 1 packet (17 g total) by mouth daily.    ? pravastatin (PRAVACHOL) 20 MG tablet Take 1 tablet (20 mg total) by mouth daily.    ? silver sulfADIAZINE (SILVADENE, SSD) 1 % cream Apply topically daily as needed (to toes). Apply to affected area daily    ? warfarin ANTICOAGULANT (COUMADIN/JANTOVEN) 5 MG tablet Take by mouth See Admin Instructions. 2.5 mg on Thursday; 5 mg row per anticoagulation encounter     ? HYDROmorphone (DILAUDID) 4 MG tablet Take 0.5-1 tablets (2-4 mg total) by mouth 4 (four) times a day as needed for pain. For use from 6/18/2020 to 7/18/2020.        Social History     Social History Narrative    Patient of Dr. Bach since 2001.   to  Aneudy.        4 " children.        Former patient of Dr. Harshad Ballard.         Subjective:     Roomed by: lissa nguyen    Accompanied by Spouse    Refills needed? No    Do you have any forms that need to be filled out? No       First reviewed her chronic back pain.  This continues to give her problems off and on.  This was reviewed with her.  Difficulty walking for distance due to this pain.    We reviewed her heel ulcer.  This is slowly healing but doing better.  Her  is helping her with daily wound care.  This seems to be going well for her without any major issues.    We reviewed her diabetes and things are doing okay with this.  No major concerns.    She has a sore on her buttock from sitting for a while.  This was also reviewed with her today.    We reviewed her antibiotics and she is not taking any antibiotics at this time.    Her blood pressure is doing well without issue.    We reviewed her other issues noted in the assessment but not specifically addressed in the HPI above.     On review of systems, the patient denies any chest pain or shortness of breath.    Objective:     Wt Readings from Last 3 Encounters:   03/03/20 146 lb 12.8 oz (66.6 kg)   02/22/20 153 lb (69.4 kg)   02/12/20 148 lb (67.1 kg)       BP Readings from Last 3 Encounters:   06/18/20 128/60   05/22/20 134/78   04/27/20 134/62       /60   Pulse 72   SpO2 97%    The patient is comfortable, no acute distress.  Mood good.  Insight fair.  Eyes are nonicteric.  Neck is supple without mass.  No cervical adenopathy.  No thyromegaly. Heart regular rate and rhythm.  Lungs clear to auscultation bilaterally.  Respiratory effort is good.  Abdomen soft and nontender.  No hepatosplenomegaly.  Extremities no edema.  Her heel ulcer measures 2.8 cm long by 1 cm wide.  There is no signs of infection.  Her toes look good at this time.    Diagnostics:     Results for orders placed or performed in visit on 06/02/20    DIABETES EYE EXAM   Result Value Ref Range     RETINOPATHY POSITIVE (!)        Assessment:     1. Ulcer of heel and midfoot, left, with unspecified severity (H)    2. Chronic pain syndrome    3. Multilevel neural foraminal stenosis    4. Skin ulcer of right great toe, limited to breakdown of skin (H)    5. Essential hypertension    6. Type 2 diabetes mellitus with microalbuminuria, with long-term current use of insulin (H)    7. Pressure injury of skin of sacral region, unspecified injury stage        Quality review:     PHQ-2 Total Score: 2 (5/22/2020  1:00 PM)      No data recorded  ______________________________________________________________________     BMI Readings from Last 1 Encounters:   03/03/20 26.00 kg/m          Plan:     1. Continue wound care.  2. Monitor blood work closely.  3. Follow-up again as noted.  4. Consider PRAFO in the future if needed, but doubt.  5. Follow up sooner if issues.         Gabino Bach MD  General Internal Medicine  [facility]    Personal office fax - [phone]   Voice mail - [phone]  E-mail - [email or web address]     Return in about 4 weeks (around 7/16/2020) for follow up visit.     Future Appointments   Date Time Provider Department Center   7/2/2020 11:30 AM W LAB [facility]   7/23/2020  2:15 PM [facility]         ______________________________________________________________________     Relevant ICD-10 codes and order associations:      ICD-10-CM    1. Ulcer of heel and midfoot, left, with unspecified severity (H)  L97.429    2. Chronic pain syndrome  G89.4 HYDROmorphone (DILAUDID) 4 MG tablet   3. Multilevel neural foraminal stenosis  M99.89    4. Skin ulcer of right great toe, limited to breakdown of skin (H)  L97.511    5. Essential hypertension  I10    6. Type 2 diabetes mellitus with microalbuminuria, with long-term current use of insulin (H)  E11.29     R80.9     Z79.4    7. Pressure injury of skin of sacral region, unspecified  injury stage  L89.159

## 2021-06-29 NOTE — PROGRESS NOTES
Progress Notes by Gabino Bach MD at 2020  2:15 PM     Author: Gabino Bach MD Service: -- Author Type: Physician    Filed: 2020  8:07 AM Encounter Date: 2020 Status: Signed    : Gabino Bach MD (Physician)              [facility]    Comprehensive and complex medical care - Chronic disease management - Shared decision making - Care coordination - Compassionate care    Patient advocacy - Rational deprescribing - Minimally disruptive medicine - Ethical focus - Customized care          Date of Service: 2020  Primary Provider: Gabino Bach MD    Patient Care Team:  Gabino Bach MD as PCP - General (Internal Medicine)  Ayanna Camacho MD as Physician (Cardiology)  Ricky, Al ALDRIDGE MD as Physician (Ophthalmology)  Mauro, Ishan Escobar MD as Physician (Gastroenterology)  [facility], Mallory MCCRAY RN as Registered Nurse  Gabino Bach MD (Internal Medicine)  Gabino Bach MD as Assigned PCP     ______________________________________________________________________     Patient's Pharmacy:      [id number] [address] [address]  Phone: [phone] Fax: [phone]     Patient's Contacts:  Name Home Phone Work Phone Mobile Phone Relationship Lgl Grd   JOYCELYNISHAN WORLEY [phone]   Spouse    ANDREA VALENTIN [phone]   Child        Patient's Insurance:    Payor/Plan Subscr  Sex Relation Sub. Ins. ID Effective Group Num   1. MEDICA - MEDI* BERNADETTE VALENTIN 1940 Female  [id number] Not Eff [id number]                                   [address] [address]   2. MEDICARE - ME* BERNADETTE VALENTIN NIMCO 1940 Female  [id number]                                    [place], [address]           Bernadette Valentin is a 79 y.o. female who comes in today for:    Chief Complaint   Patient presents with   ? Follow-up     CHRONIC BACK PAIN, STILL HAS HEEL ULCER, SORE  ON BUTTOCK IS GETTING BETTER   ? Labs Only     INR   ? Constipation     MIRALAX HAS NOT BEEN HELPING       Active Problem List:  Problem List as of 7/23/2020 Reviewed: 5/24/2020 11:27 PM by Gabino Bach MD       High    Former smoker    Full code status - POLST form 1/2/16    ASCVD (arteriosclerotic cardiovascular disease) - CABG 2015    Atrial fibrillation with rapid ventricular response (H)    Chronic pain - Dr. Bach manages the pain    DM type 2 (diabetes mellitus, type 2) (H)    Nonrheumatic mitral valve stenosis    Paroxysmal atrial fibrillation (H) - VALVULAR    S/P mitral valve replacement (MVR) - mechanical mitral valve placed 2015    Subclavian artery stenosis, left (H) - s/p stent       Medium    Controlled substance agreement signed - 2/20 - temazepam, lorazepam, hydromorphone - UDS 8/19    HTN (hypertension)    Abdominal bloating, chronic    Anticoagulation goal of INR 2.5 to 3.5    Anxiety    Hypothyroidism    Moderate aortic stenosis    PAD (peripheral artery disease) (H)    Recurrent UTI (urinary tract infection)       Low    Bleeding diathesis (H) - due to warfarin    HLD (hyperlipidemia)    IBS (irritable bowel syndrome)    Insomnia    Microalbuminuria due to type 2 diabetes mellitus (H)    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 ACCU-CHEK SMARTVIEW TEST STRIP strips Use 1 each As Directed 3 (three) times a day. Dispense brand per patient's insurance at pharmacy discretion.  Dx E11.65 DM2,  "uncontrolled    ? BD ULTRA-FINE DAVID PEN NEEDLE 32 gauge x 5/32\" Ndle USE WITH NOVOLOG AND NOVOLIN PENS    ? cholecalciferol, vitamin D3, 5,000 unit Tab Take 1 tablet (5,000 Units total) by mouth daily.    ? diclofenac sodium (VOLTAREN) 1 % Gel Apply 2 g topically 4 (four) times a day. 2/19/2020: Patient states she is not taking PTA, but per chart review, appears this medication was just recently approved by insurance   ? digoxin (LANOXIN) 125 mcg (0.125 mg) tablet Take 1 tablet (125 mcg total) by mouth daily with supper.    ? diltiazem (CARDIZEM CD) 180 MG 24 hr capsule Take 1 capsule (180 mg total) by mouth daily.    ? gabapentin (NEURONTIN) 300 MG capsule Take 1 capsule (300 mg total) by mouth at bedtime.    ? generic lancets Use 1 each As Directed 3 (three) times a day. Dx E11.65 DM2, uncontrolled    ? insulin NPH (HUMULIN N NPH U-100 INSULIN) 100 unit/mL injection Inject 40 Units under the skin 2 (two) times a day.    ? levothyroxine (SYNTHROID, LEVOTHROID) 125 MCG tablet Take 1 tablet (125 mcg total) by mouth daily.    ? NOVOLOG U-100 INSULIN ASPART 100 unit/mL injection INJECT 40 UNITS UNDER THE SKIN TID    ? polyethylene glycol (MIRALAX) 17 gram packet Take 1 packet (17 g total) by mouth daily.    ? pravastatin (PRAVACHOL) 20 MG tablet Take 1 tablet (20 mg total) by mouth daily.    ? silver sulfADIAZINE (SILVADENE, SSD) 1 % cream Apply topically daily as needed (to toes). Apply to affected area daily    ? warfarin ANTICOAGULANT (COUMADIN/JANTOVEN) 5 MG tablet Take 1 tablet (5 mg total) by mouth daily. Please adjust as recommended by anticoagulation nurse.    ? blood-glucose meter Misc Dispense 1 meter as covered by patient's insurance.    ? HYDROmorphone (DILAUDID) 4 MG tablet Take 0.5-1 tablets (2-4 mg total) by mouth 4 (four) times a day as needed for pain. For use from 7/23/2020 to 8/22/2020.        Social History     Social History Narrative    Patient of Dr. Bach since 2001.   to  Aneudy. "        4 children.        Former patient of Dr. Harshad Ballard.         Subjective:     Patient comes in today for follow-up of a number of issues.    We reviewed her heel ulcer.  This is doing better.  She still patient and not the patient with the healing process with this.  She is not soaking it any longer as it seemed to make it worse.  She is just basically putting a advanced bandage on it.  She denies any fevers, chills, redness, or drainage.  She denies any pain.    Reviewed her mitral valve replacement.  This still gives her fits and starts as far as the warfarin goes.  She is not very consistent in her INR is.  This is a trouble, but I do not see an easy solution for her.  We will just continue with warfarin at this time as this is the main thing we can do to manage this in this situation.    She has had no recurrence of her irregular heart rhythm that we can tell.    Her diabetes is stable.  She does need a refill of her strips.  She had questions about the freestyle prabhu and the Dexcom machines.    Her constipation has continued to be an issue for her off and on.  We talked about increasing the MiraLAX.  She is generally taking it once a day at this point.    Her back pain is stable at this point.    We reviewed her other issues noted in the assessment but not specifically addressed in the HPI above.     On review of systems, the patient denies any chest pain or shortness of breath.    Objective:     Wt Readings from Last 3 Encounters:   03/03/20 146 lb 12.8 oz (66.6 kg)   02/22/20 153 lb (69.4 kg)   02/12/20 148 lb (67.1 kg)       BP Readings from Last 3 Encounters:   07/23/20 132/60   06/18/20 128/60   05/22/20 134/78       /60   Pulse 70   SpO2 97%    The patient is comfortable, no acute distress.  Mood good.  Insight fair.  Eyes are nonicteric.  Neck is supple without mass.  No cervical adenopathy.  No thyromegaly. Heart regular rate and rhythm.  Lungs clear to auscultation bilaterally.   Respiratory effort is good.  Abdomen soft and nontender.  No hepatosplenomegaly.  Extremities no edema.  Her ulcer measures 5 mm x 2.3 cm.  It is more shallow than in the past and there is no signs of infection.  It is not tender.      Diagnostics:     Results for orders placed or performed in visit on 07/23/20   Lipid Cascade RANDOM   Result Value Ref Range    Cholesterol 228 (H) <=199 mg/dL    Triglycerides 690 (H) <=149 mg/dL    HDL Cholesterol 31 (L) >=50 mg/dL    LDL Calculated      Patient Fasting > 8hrs? No    Glycosylated Hemoglobin A1c   Result Value Ref Range    Hemoglobin A1c 9.0 (H) 3.5 - 6.0 %   Basic Metabolic Panel   Result Value Ref Range    Sodium 139 136 - 145 mmol/L    Potassium 4.4 3.5 - 5.0 mmol/L    Chloride 99 98 - 107 mmol/L    CO2 29 22 - 31 mmol/L    Anion Gap, Calculation 11 5 - 18 mmol/L    Glucose 213 (H) 70 - 125 mg/dL    Calcium 9.7 8.5 - 10.5 mg/dL    BUN 23 8 - 28 mg/dL    Creatinine 1.04 0.60 - 1.10 mg/dL    GFR MDRD Af Amer >60 >60 mL/min/1.73m2    GFR MDRD Non Af Amer 51 (L) >60 mL/min/1.73m2   HM2(CBC w/o Differential)   Result Value Ref Range    WBC 6.0 4.0 - 11.0 thou/uL    RBC 3.81 3.80 - 5.40 mill/uL    Hemoglobin 11.9 (L) 12.0 - 16.0 g/dL    Hematocrit 34.3 (L) 35.0 - 47.0 %    MCV 90 80 - 100 fL    MCH 31.2 27.0 - 34.0 pg    MCHC 34.7 32.0 - 36.0 g/dL    RDW 11.9 11.0 - 14.5 %    Platelets 141 140 - 440 thou/uL    MPV 9.5 7.0 - 10.0 fL   Custom LDL Cholesterol, Direct   Result Value Ref Range    Direct LDL 86 <=129 mg/dl       Assessment:     1. Ulcer of heel and midfoot, left, with unspecified severity (H)    2. Intermittent atrial fibrillation (H)    3. Type 2 diabetes mellitus with microalbuminuria, with long-term current use of insulin (H)    4. Chronic pain syndrome    5. Multilevel neural foraminal stenosis    6. Skin ulcer of right great toe, limited to breakdown of skin (H)    7. Constipation, unspecified constipation type        Quality review:     PHQ-2 Total  Score: 2 (5/22/2020  1:00 PM)      No data recorded  ______________________________________________________________________     BMI Readings from Last 1 Encounters:   03/03/20 26.00 kg/m          Plan:     1. Continue current wound care management.  2. Check blood work today.  3. Refilled pain medication.  4. Continue to monitor closely and follow-up again in 6 to 8 weeks.  5. Follow-up sooner if issues.  Managing okay at this time.         Gabino Bach MD  General Internal Medicine  [facility]    Personal office fax - [phone]   Voice mail - [phone]  E-mail - [email or web address]     Return in about 6 weeks (around 9/3/2020) for follow up visit.     Future Appointments   Date Time Provider Department Center   8/6/2020  1:00 PM [facility]   9/1/2020  1:50 PM Gabino Bach MD [facility]         ______________________________________________________________________     Relevant ICD-10 codes and order associations:      ICD-10-CM    1. Ulcer of heel and midfoot, left, with unspecified severity (H)  L97.429    2. Intermittent atrial fibrillation (H)  I48.0 diltiazem (CARDIZEM CD) 180 MG 24 hr capsule     HM2(CBC w/o Differential)   3. Type 2 diabetes mellitus with microalbuminuria, with long-term current use of insulin (H)  E11.29 pravastatin (PRAVACHOL) 20 MG tablet    R80.9 Lipid Cascade RANDOM    Z79.4 Glycosylated Hemoglobin A1c     Basic Metabolic Panel     blood-glucose meter Misc     Custom LDL Cholesterol, Direct   4. Chronic pain syndrome  G89.4 HYDROmorphone (DILAUDID) 4 MG tablet   5. Multilevel neural foraminal stenosis  M99.89    6. Skin ulcer of right great toe, limited to breakdown of skin (H)  L97.511    7. Constipation, unspecified constipation type  K59.00

## 2021-06-29 NOTE — PROGRESS NOTES
Progress Notes by Gabino Bach MD at 2020  2:50 PM     Author: Gabino Bach MD Service: -- Author Type: Physician    Filed: 2020  8:35 AM Encounter Date: 2020 Status: Signed    : Gabino Bach MD (Physician)              [facility]    Comprehensive and complex medical care - Chronic disease management - Shared decision making - Care coordination - Compassionate care    Patient advocacy - Rational deprescribing - Minimally disruptive medicine - Ethical focus - Customized care          Date of Service: 2020  Primary Provider: Gabino Bach MD    Patient Care Team:  Gabino Bach MD as PCP - General (Internal Medicine)  Ayanna Camacho MD as Physician (Cardiology)  Ricky, Al ALDRIDGE MD as Physician (Ophthalmology)  Mauro, Ishan Escobar MD as Physician (Gastroenterology)  [facility], Mallory MCCRAY RN as Registered Nurse  Gabino Bach MD (Internal Medicine)  Gabino Bach MD as Assigned PCP     ______________________________________________________________________     Patient's Pharmacy:      [facility][id number] [address] [address]  Phone: [phone] Fax: [phone]     Patient's Contacts:  Name Home Phone Work Phone Mobile Phone Relationship Lgl Grd   JOYCELYNMEIRISHAN [phone]   Spouse    ANDREA VALENTIN [phone]   Child        Patient's Insurance:    Payor/Plan Subscr  Sex Relation Sub. Ins. ID Effective Group Num   1. MEDICA - MEDI* BERNADETTE VALENTIN 1940 Female  [id number] Not Eff [id number]                                   [address] [address]   2. MEDICARE - ME* BERNADETTE VALENTIN NIMCO 1940 Female  [id number]                                    NGS, [address]           Bernadette Valentin is a 79 y.o. female who comes in today for:    Chief Complaint   Patient presents with   ? Follow-up     foot ulcer       Active Problem  List:  Problem List as of 4/20/2020 Reviewed: 4/6/2020  2:56 PM by Gabino Bach MD       High    Former smoker    Full code status - POLST form 1/2/16    ASCVD (arteriosclerotic cardiovascular disease) - CABG 2015    Atrial fibrillation with rapid ventricular response (H)    Chronic pain - Dr. Bach manages the pain    DM type 2 (diabetes mellitus, type 2) (H)    Nonrheumatic mitral valve stenosis    Paroxysmal atrial fibrillation (H) - VALVULAR    S/P mitral valve replacement (MVR) - mechanical mitral valve placed 2015    Subclavian artery stenosis, left (H) - s/p stent       Medium    Controlled substance agreement signed - 2/20 - temazepam, lorazepam, hydromorphone - UDS 8/19    HTN (hypertension)    Abdominal bloating, chronic    Anticoagulation goal of INR 2.5 to 3.5    Anxiety    Hypothyroidism    Moderate aortic stenosis    PAD (peripheral artery disease) (H)    Recurrent UTI (urinary tract infection)       Low    Bleeding diathesis (H) - due to warfarin    HLD (hyperlipidemia)    IBS (irritable bowel syndrome)    Insomnia    Microalbuminuria due to type 2 diabetes mellitus (H)    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 diclofenac sodium (VOLTAREN) 1 % Gel Apply 2 g topically 4 (four) times a day. 2/19/2020: Patient states she is not taking PTA, but per chart review, appears this medication was just recently approved by insurance   ? digoxin (LANOXIN) 125 mcg (0.125 mg) tablet Take 1 tablet (125 mcg  "total) by mouth daily with supper.    ? diltiazem (CARDIZEM CD) 180 MG 24 hr capsule Take 1 capsule (180 mg total) by mouth daily.    ? ferrous sulfate 325 (65 FE) MG tablet Take 1 tablet (325 mg total) by mouth daily with breakfast.    ? HYDROmorphone (DILAUDID) 4 MG tablet Take 0.5-1 tablets (2-4 mg total) by mouth 4 (four) times a day as needed for pain. For use from 3/26/2020 to 4/25/2020.    ? metFORMIN (GLUCOPHAGE) 500 MG tablet Take 1 tablet (500 mg total) by mouth 2 (two) times a day with meals.    ? NOVOLOG U-100 INSULIN ASPART 100 unit/mL injection INJECT 40 UNITS UNDER THE SKIN TID    ? polyethylene glycol (MIRALAX) 17 gram packet Take 1 packet (17 g total) by mouth daily.    ? potassium chloride (MICRO-K) 10 mEq CR capsule Take 1 capsule (10 mEq total) by mouth daily.    ? pravastatin (PRAVACHOL) 20 MG tablet Take 1 tablet (20 mg total) by mouth daily.    ? silver sulfADIAZINE (SILVADENE, SSD) 1 % cream Apply to affected area daily (Patient taking differently: Apply topically daily as needed (to toes). Apply to affected area daily)    ? warfarin ANTICOAGULANT (COUMADIN/JANTOVEN) 5 MG tablet Take by mouth See Admin Instructions. 2.5 mg on Thursday; 5 mg row per anticoagulation encounter     ? ACCU-CHEK SMARTVIEW TEST STRIP strips Use 1 each As Directed 3 (three) times a day. Dispense brand per patient's insurance at pharmacy discretion.  Dx E11.65 DM2, uncontrolled    ? BD ULTRA-FINE DAVID PEN NEEDLE 32 gauge x 5/32\" Ndle USE WITH NOVOLOG AND NOVOLIN PENS    ? cholecalciferol, vitamin D3, 5,000 unit Tab Take 1 tablet (5,000 Units total) by mouth daily.    ? clindamycin (CLEOCIN) 300 MG capsule Take 1 capsule (300 mg total) by mouth 3 (three) times a day for 14 days.    ? cyanocobalamin (VITAMIN B-12) 100 MCG tablet Take 1 tablet (100 mcg total) by mouth daily.    ? gabapentin (NEURONTIN) 300 MG capsule Take 1 capsule (300 mg total) by mouth at bedtime.    ? generic lancets Use 1 each As Directed 3 (three) " times a day. Dx E11.65 DM2, uncontrolled    ? insulin NPH (HUMULIN N NPH U-100 INSULIN) 100 unit/mL injection Inject 40 Units under the skin 2 (two) times a day.    ? levothyroxine (SYNTHROID, LEVOTHROID) 125 MCG tablet Take 1 tablet (125 mcg total) by mouth daily.    ? temazepam (RESTORIL) 15 mg capsule Take 1-2 capsules (15-30 mg total) by mouth at bedtime as needed for sleep.        Social History     Social History Narrative    Patient of Dr. Bach since 2001.   to  Aneudy.        4 children.        Former patient of Dr. Harshad Ballard.         Subjective:     Patient comes in today by herself.    We reviewed her toe ulcers and her heel ulcer.  Her heel ulcer continues to bother her.  It is not as painful as it was.  She has been soaking it somewhat.  She has questions related to this.  The left toe ulcer also has been irritated but the right toe ulcer is doing better.  She denies any fevers or chills at this point.  We talked about antibiotics today.  She does have some issues with compliance.  This is likely due to complexity of her medical situation more than anything.    We reviewed her valve replacement and her anticoagulation related to this.    Reviewed her diabetes and her diabetes is stable at this time.    We reviewed her blood pressure - her blood pressure is doing fine.    We reviewed her other issues noted in the assessment but not specifically addressed in the HPI above.     On review of systems, the patient denies any chest pain or shortness of breath.    Objective:     Wt Readings from Last 3 Encounters:   03/03/20 146 lb 12.8 oz (66.6 kg)   02/22/20 153 lb (69.4 kg)   02/12/20 148 lb (67.1 kg)       BP Readings from Last 3 Encounters:   04/27/20 134/62   04/20/20 132/76   03/26/20 134/68       /76   Pulse 63   SpO2 96%    The patient is comfortable, no acute distress.  Mood good.  Insight fair.  Eyes are nonicteric.  Neck is supple without mass.  No cervical adenopathy.  No  thyromegaly. Heart regular rate and rhythm.  Lungs clear to auscultation bilaterally.  Respiratory effort is good.  Abdomen soft and nontender.  No hepatosplenomegaly.  Extremities no edema.  Her right great toe ulcer is doing better.  The left great toe has some maceration and some mild clear to bloody drainage.  The left heel has some eschar but is 3.5 cm x 2.2 cm at this time.      Diagnostics:     Results for orders placed or performed in visit on 03/30/20   INR   Result Value Ref Range    INR 2.30 (H) 0.90 - 1.10       Assessment:     1. Osteomyelitis of great toe (H)    2. Ulcer of heel and midfoot, left, with unspecified severity (H)    3. Intermittent atrial fibrillation (H)    4. S/P mitral valve replacement (MVR)    5. Skin ulcer of toe, limited to breakdown of skin, unspecified laterality (H)    6. Type 2 diabetes mellitus with microalbuminuria, with long-term current use of insulin (H)        Quality review:     PHQ-2 Total Score: 2 (10/25/2019  4:00 PM)      No data recorded  ______________________________________________________________________     BMI Readings from Last 1 Encounters:   03/03/20 26.00 kg/m          Plan:     See patient instructions below for recommendations:    Patient Instructions   1. Follow up next Monday, April 27th, at 1:00 pm.  2. Take clindamycin antibiotics 300 mg three times a day for 14 days.  3. INR next week.  4. Soak left foot in warm water every even numbered day.  5. For left toe:   After soaking the foot, over very lightly with regular gauze or leave open to the air.  6. For left heel:    After soaking the foot, cover with vaseline gauze and then regular gauze and secure with a wrap like ACE bandage.          Gabino Bach MD  General Internal Medicine  [facility]    Personal office fax - [phone]   Voice mail - [phone]  E-mail - [email or web address]     Return in about 1 week (around 4/27/2020) for follow up visit.     Future  Appointments   Date Time Provider Department Center   4/30/2020  1:30 PM Gabino Bach MD [facility]   5/4/2020  2:10 PM [facility]         ______________________________________________________________________     Relevant ICD-10 codes and order associations:      ICD-10-CM    1. Osteomyelitis of great toe (H)  M86.9 DISCONTINUED: clindamycin (CLEOCIN) 300 MG capsule   2. Ulcer of heel and midfoot, left, with unspecified severity (H)  L97.429    3. Intermittent atrial fibrillation (H)  I48.0    4. S/P mitral valve replacement (MVR)  Z95.2    5. Skin ulcer of toe, limited to breakdown of skin, unspecified laterality (H)  L97.501    6. Type 2 diabetes mellitus with microalbuminuria, with long-term current use of insulin (H)  E11.29     R80.9     Z79.4

## 2021-06-29 NOTE — PROGRESS NOTES
Progress Notes by Gabino Bach MD at 2020 11:30 AM     Author: Gabino Bach MD Service: -- Author Type: Physician    Filed: 2020 11:32 PM Encounter Date: 2020 Status: Signed    : Gabino Bach MD (Physician)              [facility]    Comprehensive and complex medical care - Chronic disease management - Shared decision making - Care coordination - Compassionate care    Patient advocacy - Rational deprescribing - Minimally disruptive medicine - Ethical focus - Customized care          Date of Service: 2020  Primary Provider: Gabino Bach MD    Patient Care Team:  Gabino Bach MD as PCP - General (Internal Medicine)  Ayanna Camacho MD as Physician (Cardiology)  Ricky, Al ALDRIDGE MD as Physician (Ophthalmology)  Mauro, Ishan Escobar MD as Physician (Gastroenterology)  [facility], Mallory MCCRAY RN as Registered Nurse  Gabino Bach MD (Internal Medicine)  Gabino Bach MD as Assigned PCP     ______________________________________________________________________     Patient's Pharmacy:      [id number] [address] [address]  Phone: [phone] Fax: [phone]     Patient's Contacts:  Name Home Phone Work Phone Mobile Phone Relationship Lgl Grd   JOYCELYNISHAN WORLEY [phone]   Spouse    ANDREA VALENTIN [phone]   Child        Patient's Insurance:    Payor/Plan Subscr  Sex Relation Sub. Ins. ID Effective Group Num   1. MEDICA - MEDI* BERNADETTE VALENTIN 1940 Female  [id number] Not Eff [id number]                                   [address] [address]   2. MEDICARE - ME* BERNADETTE VALENTIN NIMCO 1940 Female  [id number]                                    NGS, [address]           Bernadette Valentin is a 79 y.o. female who comes in today for:    Chief Complaint   Patient presents with   ? Follow-up     ulcer    ? Medication Management       Active  "Problem List:  Problem List as of 5/22/2020 Reviewed: 5/10/2020  9:22 PM by Gabino Bach MD       High    Former smoker    Full code status - POLST form 1/2/16    ASCVD (arteriosclerotic cardiovascular disease) - CABG 2015    Atrial fibrillation with rapid ventricular response (H)    Chronic pain - Dr. Bach manages the pain    DM type 2 (diabetes mellitus, type 2) (H)    Nonrheumatic mitral valve stenosis    Paroxysmal atrial fibrillation (H) - VALVULAR    S/P mitral valve replacement (MVR) - mechanical mitral valve placed 2015    Subclavian artery stenosis, left (H) - s/p stent       Medium    Controlled substance agreement signed - 2/20 - temazepam, lorazepam, hydromorphone - UDS 8/19    HTN (hypertension)    Abdominal bloating, chronic    Anticoagulation goal of INR 2.5 to 3.5    Anxiety    Hypothyroidism    Moderate aortic stenosis    PAD (peripheral artery disease) (H)    Recurrent UTI (urinary tract infection)       Low    Bleeding diathesis (H) - due to warfarin    HLD (hyperlipidemia)    IBS (irritable bowel syndrome)    Insomnia    Microalbuminuria due to type 2 diabetes mellitus (H)    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 ACCU-CHEK SMARTVIEW TEST STRIP strips Use 1 each As Directed 3 (three) times a day. Dispense brand per patient's insurance at pharmacy discretion.  Dx E11.65 DM2, uncontrolled    ? BD ULTRA-FINE DAVID PEN NEEDLE 32 gauge x 5/32\" Ndle USE WITH NOVOLOG AND NOVOLIN PENS    ? " cholecalciferol, vitamin D3, 5,000 unit Tab Take 1 tablet (5,000 Units total) by mouth daily.    ? clindamycin (CLEOCIN) 300 MG capsule Take 1 capsule (300 mg total) by mouth 3 (three) times a day.    ? diclofenac sodium (VOLTAREN) 1 % Gel Apply 2 g topically 4 (four) times a day. 2/19/2020: Patient states she is not taking PTA, but per chart review, appears this medication was just recently approved by insurance   ? digoxin (LANOXIN) 125 mcg (0.125 mg) tablet Take 1 tablet (125 mcg total) by mouth daily with supper.    ? diltiazem (CARDIZEM CD) 180 MG 24 hr capsule Take 1 capsule (180 mg total) by mouth daily.    ? furosemide (LASIX) 40 MG tablet Take 1 tablet (40 mg total) by mouth daily.    ? gabapentin (NEURONTIN) 300 MG capsule Take 1 capsule (300 mg total) by mouth at bedtime.    ? generic lancets Use 1 each As Directed 3 (three) times a day. Dx E11.65 DM2, uncontrolled    ? HYDROmorphone (DILAUDID) 4 MG tablet Take 0.5-1 tablets (2-4 mg total) by mouth 4 (four) times a day as needed for pain. For use from 5/8/2020 to 6/7/2020.    ? insulin NPH (HUMULIN N NPH U-100 INSULIN) 100 unit/mL injection Inject 40 Units under the skin 2 (two) times a day.    ? levothyroxine (SYNTHROID, LEVOTHROID) 125 MCG tablet Take 1 tablet (125 mcg total) by mouth daily.    ? metFORMIN (GLUCOPHAGE) 500 MG tablet Take 1 tablet (500 mg total) by mouth 2 (two) times a day with meals.    ? NOVOLOG U-100 INSULIN ASPART 100 unit/mL injection INJECT 40 UNITS UNDER THE SKIN TID    ? polyethylene glycol (MIRALAX) 17 gram packet Take 1 packet (17 g total) by mouth daily.    ? potassium chloride (MICRO-K) 10 mEq CR capsule Take 1 capsule (10 mEq total) by mouth daily.    ? pravastatin (PRAVACHOL) 20 MG tablet Take 1 tablet (20 mg total) by mouth daily.    ? silver sulfADIAZINE (SILVADENE, SSD) 1 % cream Apply topically daily as needed (to toes). Apply to affected area daily    ? temazepam (RESTORIL) 15 mg capsule Take 1-2 capsules (15-30 mg  total) by mouth at bedtime as needed for sleep.    ? warfarin ANTICOAGULANT (COUMADIN/JANTOVEN) 5 MG tablet Take by mouth See Admin Instructions. 2.5 mg on Thursday; 5 mg row per anticoagulation encounter         Social History     Social History Narrative    Patient of Dr. Bach since 2001.   to  Aneudy.        4 children.        Former patient of Dr. Harshad Ballard.         Subjective:     We first reviewed her heel ulcer.  This has gotten smaller.  Its not as tender.  She is avoiding putting a lot of direct pressure on it.  She is generally feeling better related to this.    We reviewed her left toe ulcer and likely osteomyelitis.  She continues on the clindamycin without any major issues.  She denies any diarrheal issues.    We reviewed her diabetes.  She is noticing feeling nauseated after taking her medications.  She would like to see if there is anything else she can do related to this.  We talked about maybe trying off the metformin in case this was causing the problem.    We reviewed her high blood pressure and her blood pressure is doing well.    She needs her INR done for her valve replacement.    We reviewed her other issues noted in the assessment but not specifically addressed in the HPI above.     On review of systems, the patient denies any chest pain or shortness of breath.    Objective:     Wt Readings from Last 3 Encounters:   03/03/20 146 lb 12.8 oz (66.6 kg)   02/22/20 153 lb (69.4 kg)   02/12/20 148 lb (67.1 kg)       BP Readings from Last 3 Encounters:   05/22/20 134/78   04/27/20 134/62   04/20/20 132/76       /78   Pulse 69   SpO2 98%    The patient is comfortable, no acute distress.  Mood good.  Insight fair.  Eyes are nonicteric.  Neck is supple without mass.  No cervical adenopathy.  No thyromegaly. Heart regular rate and rhythm.  Lungs clear to auscultation bilaterally.  Respiratory effort is good.  Abdomen soft and nontender.  No hepatosplenomegaly.  Extremities no  edema.  Her left foot shows a left heel ulcer which is oblong and measures 1.5 cm x 3.0 cm is has good granulation tissue and there might be a little bit of maceration at the edges of the wound but overall looks good.  The toe shows a crusty wound on the top of the toe.  There is no drainage associated with this.  There is no cellulitis noted.    Diagnostics:     Results for orders placed or performed in visit on 05/13/20   INR   Result Value Ref Range    INR 2.20 (H) 0.90 - 1.10       Assessment:     1. Essential hypertension    2. Skin ulcer of right great toe, limited to breakdown of skin (H)    3. Ulcer of heel and midfoot, left, with unspecified severity (H)    4. Osteomyelitis of great toe (H)    5. S/P mitral valve replacement (MVR) - mechanical mitral valve placed 2015    6. Nausea        Quality review:     PHQ-2 Total Score: 2 (5/22/2020  1:00 PM)      No data recorded  ______________________________________________________________________     BMI Readings from Last 1 Encounters:   03/03/20 26.00 kg/m          Plan:     1. Continue clindamycin at this time.  2. Blood work done today.  3. Follow-up with me as noted below.  4. Try off metformin.  5. Keep off Augmentin.  6. Consider stopping Lasix with dehydration on her blood work.  7. Follow-up sooner if issues.         Gabino Bach MD  General Internal Medicine  [facility]    Personal office fax - [phone]   Voice mail - [phone]  E-mail - [email or web address]     Return in about 4 weeks (around 6/19/2020) for follow up visit.     Future Appointments   Date Time Provider Department Center   6/5/2020  1:30 PM [facility]   6/18/2020  1:40 PM Gabino Bach MD [facility]         ______________________________________________________________________     Relevant ICD-10 codes and order associations:      ICD-10-CM    1. Essential hypertension  I10 furosemide (LASIX) 40 MG tablet   2. Skin  ulcer of right great toe, limited to breakdown of skin (H)  L97.511 silver sulfADIAZINE (SILVADENE, SSD) 1 % cream   3. Ulcer of heel and midfoot, left, with unspecified severity (H)  L97.429    4. Osteomyelitis of great toe (H)  M86.9    5. S/P mitral valve replacement (MVR) - mechanical mitral valve placed 2015  Z95.2    6. Nausea  R11.0

## 2021-06-30 NOTE — PROGRESS NOTES
Progress Notes by Gabino Bach MD at 2021  1:15 PM     Author: Gabino Bach MD Service: -- Author Type: Physician    Filed: 2021  9:26 PM Encounter Date: 2021 Status: Signed    : Gabino Bach MD (Physician)              [facility]    Comprehensive and complex medical care - Chronic disease management - Shared decision making - Care coordination - Compassionate care    Patient advocacy - Rational deprescribing - Minimally disruptive medicine - Ethical focus - Customized care          Date of Service: 2021  Primary Provider: Gabino Bach MD    Patient Care Team:  Gabino Bach MD as PCP - General (Internal Medicine)  Ayanna Camacho MD as Physician (Cardiology)  Al García MD as Physician (Ophthalmology)  Ishan Wade MD as Physician (Gastroenterology)  [facility], Mallory MCCRAY RN as Registered Nurse  Gabino Bach MD (Internal Medicine)  Gabino Bach MD as Assigned PCP  Emma Stevenson, NP as Assigned Surgical Provider  Nba Cleveland DPM as Assigned Musculoskeletal Provider     ______________________________________________________________________     Patient's Pharmacy:    [facility] #[id number] [address]  Phone: [phone] Fax: [phone]     Patient's Contacts:  Name Home Phone Work Phone Mobile Phone Relationship Lgl Grd   ISHAN VALENTIN [phone]   Spouse    JOYCELYNANDREA WORLEY [phone]   Child      Patient's Insurance:    Payor/Plan Subscr  Sex Relation Sub. Ins. ID Effective Group Num   1. MEDICA - MEDI* BERNADETTE VALENTIN 1940 Female  [id number] Not Eff [id number]                                   [address] [address]   2. MEDICARE - ME* BERNADETTE VALENTIN 1940 Female Self [id number]                                    NGS, [address]           Bernadette Valentin is a 80 y.o. female who comes in  today for:    Chief Complaint   Patient presents with   ? Follow-up     anemia, lower extremity edema is better but has hard skin and still has a rash , ABD pain/ bloating is okay, AFIB   ? Medication Refill     water pills, 2 rx for needles one for insulin one for needles   ? Referral     to have labs drawn at home       Active Problem List:  Problem List as of 1/18/2021 Reviewed: 12/31/2020 10:18 AM by Gabino Bach MD       High    Former smoker    Full code status - POLST form 1/2/16    ASCVD (arteriosclerotic cardiovascular disease) - CABG 2015    Atrial fibrillation with rapid ventricular response (H)    Chronic pain - Dr. Bach manages the pain    DM type 2 (diabetes mellitus, type 2) (H)    Nonrheumatic mitral valve stenosis    Paroxysmal atrial fibrillation (H) - VALVULAR    S/P mitral valve replacement (MVR) - mechanical mitral valve placed 2015    Subclavian artery stenosis, left (H) - s/p stent       Medium    Controlled substance agreement signed - 2/20 - temazepam, lorazepam, hydromorphone - UDS 8/19    HTN (hypertension)    Abdominal bloating, chronic    Anticoagulation goal of INR 2.5 to 3.5    Anxiety    CKD (chronic kidney disease) stage 3, GFR 30-59 ml/min    Hypothyroidism    Moderate aortic stenosis    PAD (peripheral artery disease) (H)    Recurrent UTI (urinary tract infection)       Low    Bleeding diathesis (H) - due to warfarin    HLD (hyperlipidemia)    IBS (irritable bowel syndrome)    Insomnia    Microalbuminuria due to type 2 diabetes mellitus (H)    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 blood glucose test strips Use 1 each As Directed 3 (three) times a day. Dispense brand per patient's insurance at pharmacy discretion.    ? blood-glucose meter Misc Dispense 1 meter as covered by patient's insurance.    ? diclofenac sodium (VOLTAREN) 1 % Gel Apply 2 g topically 4 (four) times a day. 2/19/2020: Patient states she is not taking PTA, but per chart review, appears this medication was just recently approved by insurance   ? diltiazem (CARDIZEM CD) 180 MG 24 hr capsule Take 1 capsule (180 mg total) by mouth daily.    ? dimethicone-ZnOx-vit A-D-aloe (ZINC OXIDE DIAPER CREAM) 1-10 % Crea Apply 2 g topically 2 (two) times a day.    ? docusate sodium (COLACE) 100 MG capsule Take 1 capsule (100 mg total) by mouth every 12 (twelve) hours.    ? gabapentin (NEURONTIN) 300 MG capsule Take 1 capsule (300 mg total) by mouth at bedtime.    ? generic lancets Use 1 each As Directed 3 (three) times a day. Dx E11.65 DM2, uncontrolled    ? HYDROmorphone (DILAUDID) 4 MG tablet Take 0.5-1 tablets (2-4 mg total) by mouth 4 (four) times a day as needed for pain. For use from 1/10/2021 to 2/9/21.    ? insulin aspart protamine-insulin aspart (NOVOLOG MIX 70-30FLEXPEN U-100) 100 unit/mL (70-30) pen Inject 30 Units under the skin every morning AND 15 Units every evening.    ? levothyroxine (SYNTHROID, LEVOTHROID) 125 MCG tablet Take 1 tablet (125 mcg total) by mouth daily.    ? polyethylene glycol (MIRALAX) 17 gram packet Take 1 packet (17 g total) by mouth daily.    ? temazepam (RESTORIL) 15 mg capsule Take 1-2 capsules (15-30 mg total) by mouth at bedtime as needed for sleep.    ? warfarin ANTICOAGULANT (COUMADIN/JANTOVEN) 5 MG tablet Take 1 tablet (5 mg total) by mouth daily. Please adjust as recommended by anticoagulation nurse.   "  ? BD ULTRA-FINE DAVID PEN NEEDLE 32 gauge x 5/32\" Ndle 1 each by abdominal subcutaneous route 2 (two) times a day. USE WITH NOVOLOG PENS.   Please keep this Rx on file for the next RF.    ? furosemide (LASIX) 40 MG tablet Take 1 tablet (40 mg total) by mouth daily for 14 days. (Patient not taking: Reported on 1/18/2021)    ? furosemide (LASIX) 40 MG tablet Take 1 tablet (40 mg total) by mouth daily.    ? triamcinolone (KENALOG) 0.1 % cream Apply topically daily.        Social History     Social History Narrative    Patient of Dr. Bach since 2001.   to  Aneudy.        4 children.        Former patient of Dr. Harshad Ballard.       Subjective:     Roomed by: lissa nguyen    Accompanied by Spouse sons      Generally doing better overall.    Leg swelling is better but leg rash has spread to both legs.  Not itching a lot.  No fever or chills.  No shortness of breath.  Does generally sleep in chair with her legs down.  No chest pain.    Stomach bloating is better.    They would like to set up home international normalized ratio (INR) monitoring through [facility] ([facility]    Urination doing well without signs of urinary tract infection (UTI).    We reviewed her other issues noted in the assessment but not specifically addressed in the HPI above.     Objective:     Wt Readings from Last 3 Encounters:   01/18/21 167 lb (75.8 kg)   12/29/20 160 lb (72.6 kg)   12/21/20 150 lb (68 kg)     BP Readings from Last 3 Encounters:   01/18/21 136/78   12/29/20 128/62   12/21/20 160/66     /78   Pulse 78   Wt 167 lb (75.8 kg)   SpO2 96%   BMI 29.58 kg/m     The patient is comfortable, no acute distress.  Mood good.  Insight fair.  Eyes are nonicteric.  Neck is supple without mass.  No cervical adenopathy.  No thyromegaly. Heart regular rate and rhythm.  Valve sounds stable. Lungs clear to auscultation bilaterally.  Respiratory effort is good.  Abdomen soft and nontender.  No hepatosplenomegaly.  " "Extremities 1-2+ edema.  Venous stasis skin changes.      Diagnostics:     Results for orders placed or performed in visit on 01/12/21   INR   Result Value Ref Range    INR 1.90 (H) 0.90 - 1.10     *Note: Due to a large number of results and/or encounters for the requested time period, some results have not been displayed. A complete set of results can be found in Results Review.       Quality review:     PHQ-2 Total Score: 2 (5/22/2020  1:00 PM)    No data recorded  ______________________________________________________________________     BMI Readings from Last 1 Encounters:   01/18/21 29.58 kg/m        Assessment and Plan:     1. Atrial fibrillation with rapid ventricular response (H)  Continue anticoagulation.  Some issues with compliance.  We will initiate home INR monitoring through [facility] ([facility]) and coordination with anticoagulation nurse.    2. S/P mitral valve replacement (MVR) - mechanical mitral valve placed 2015  Suboptimal INR control.     3. Anticoagulation goal of INR 2.5 to 3.5    4. Type 2 diabetes mellitus with microalbuminuria, with long-term current use of insulin (H)  Doing better based on recent blood work.    - BD ULTRA-FINE DAVID PEN NEEDLE 32 gauge x 5/32\" Ndle; 1 each by abdominal subcutaneous route 2 (two) times a day. USE WITH NOVOLOG PENS.   Please keep this Rx on file for the next RF.  Dispense: 100 each; Refill: 11    5. Bilateral lower extremity edema  Continue diuretic.    - furosemide (LASIX) 40 MG tablet; Take 1 tablet (40 mg total) by mouth daily.  Dispense: 60 tablet; Refill: 1    6. Venous stasis dermatitis of both lower extremities    - furosemide (LASIX) 40 MG tablet; Take 1 tablet (40 mg total) by mouth daily.  Dispense: 60 tablet; Refill: 1  - triamcinolone (KENALOG) 0.1 % cream; Apply topically daily.  Dispense: 453.6 g; Refill: 11    7. Pressure injury of left heel, unstageable (H)  Healed heel at this time.    8. Osteomyelitis of great toe (H)  No evidence " of issues at this time.    9. PAD (peripheral artery disease) (H)  No change.    10. Bleeding diathesis (H)  No signs of bleeding.    11. Stage 3a chronic kidney disease  Stable at this time.          Gabino Bach MD  General Internal Medicine  [facility]      Return in about 6 weeks (around 3/1/2021), or if symptoms worsen or fail to improve, for visit and blood work.     Future Appointments   Date Time Provider Department Center   3/4/2021 12:35 PM Gabino Bach MD [facility]         HCC issues resolved at this visit.

## 2021-06-30 NOTE — PROGRESS NOTES
Progress Notes by Gabino Bach MD at 2020  3:30 PM     Author: Gabino Bach MD Service: -- Author Type: Physician    Filed: 2020 10:24 AM Encounter Date: 2020 Status: Signed    : Gabino Bach MD (Physician)              [facility]    Comprehensive and complex medical care - Chronic disease management - Shared decision making - Care coordination - Compassionate care    Patient advocacy - Rational deprescribing - Minimally disruptive medicine - Ethical focus - Customized care          Date of Service: 2020  Primary Provider: Gabino Bach MD    Patient Care Team:  Gabino Bach MD as PCP - General (Internal Medicine)  Ayanna Camacho MD as Physician (Cardiology)  Al García MD as Physician (Ophthalmology)  Mauro, Ishan Escobar MD as Physician (Gastroenterology)  [facility], Mallory MCCRAY RN as Registered Nurse  Gabino Bach MD (Internal Medicine)  Gabino Bach MD as Assigned PCP  Emma Stevenson, NP as Assigned Surgical Provider  Nba Cleveland DPM as Assigned Musculoskeletal Provider     ______________________________________________________________________     Patient's Pharmacy:    [id number] [address] [address]  Phone: [phone] Fax: [phone]     Patient's Contacts:  Name Home Phone Work Phone Mobile Phone Relationship Lgl Grd   ISHAN VALENTIN [phone]   Spouse    ANDREA VALENTIN [phone]   Child      Patient's Insurance:    Payor/Plan Subscr  Sex Relation Sub. Ins. ID Effective Group Num   1. MEDICA - MEDI* BERNADETTE VALENTIN 1940 Female  [id number] Not Eff [id number]                                   [address] [address]   2. MEDICARE - ME* HOMEROBERNADETTE NIMCO 1940 Female Self [id number]                                    [place], [address]           Bernadette NIMCO Lozoyacristhian is a 80 y.o. female who comes in  today for:    Chief Complaint   Patient presents with   ? Follow Up     Needing refills on insulin. Neeing warfirin.    ? Diabetes   ? Leg Swelling     Bilateral leg swelling still on going. Rash now on the right leg. Legs ache at night and causing issues with sleep.    ? Bloated     Ongoing issue with bloating. Last BM was 4 days ago. Straining. Stool was medium but not hard.        Active Problem List:  Problem List as of 12/29/2020 Reviewed: 12/14/2020  9:54 AM by Gabino Bach MD       High    Former smoker    Full code status - POLST form 1/2/16    ASCVD (arteriosclerotic cardiovascular disease) - CABG 2015    Atrial fibrillation with rapid ventricular response (H)    Chronic pain - Dr. Bach manages the pain    DM type 2 (diabetes mellitus, type 2) (H)    Nonrheumatic mitral valve stenosis    Paroxysmal atrial fibrillation (H) - VALVULAR    S/P mitral valve replacement (MVR) - mechanical mitral valve placed 2015    Subclavian artery stenosis, left (H) - s/p stent       Medium    Controlled substance agreement signed - 2/20 - temazepam, lorazepam, hydromorphone - UDS 8/19    HTN (hypertension)    Abdominal bloating, chronic    Anticoagulation goal of INR 2.5 to 3.5    Anxiety    CKD (chronic kidney disease) stage 3, GFR 30-59 ml/min    Hypothyroidism    Moderate aortic stenosis    PAD (peripheral artery disease) (H)    Recurrent UTI (urinary tract infection)       Low    Bleeding diathesis (H) - due to warfarin    HLD (hyperlipidemia)    IBS (irritable bowel syndrome)    Insomnia    Microalbuminuria due to type 2 diabetes mellitus (H)    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 aspirin 81 MG EC tablet Take 81 mg by mouth.    ? BD ULTRA-FINE DAVID PEN NEEDLE 32 gauge x 5/32\" Ndle USE WITH NOVOLOG AND NOVOLIN PENS    ? blood glucose test strips Use 1 each As Directed 3 (three) times a day. Dispense brand per patient's insurance at pharmacy discretion.    ? blood-glucose meter Misc Dispense 1 meter as covered by patient's insurance.    ? diltiazem (CARDIZEM CD) 180 MG 24 hr capsule Take 1 capsule (180 mg total) by mouth daily.    ? dimethicone-ZnOx-vit A-D-aloe (ZINC OXIDE DIAPER CREAM) 1-10 % Crea Apply 2 g topically 2 (two) times a day.    ? docusate sodium (COLACE) 100 MG capsule Take 1 capsule (100 mg total) by mouth every 12 (twelve) hours.    ? gabapentin (NEURONTIN) 300 MG capsule Take 1 capsule (300 mg total) by mouth at bedtime.    ? generic lancets Use 1 each As Directed 3 (three) times a day. Dx E11.65 DM2, uncontrolled    ? levothyroxine (SYNTHROID, LEVOTHROID) 125 MCG tablet Take 1 tablet (125 mcg total) by mouth daily.    ? polyethylene glycol (MIRALAX) 17 gram packet Take 1 packet (17 g total) by mouth daily.    ? temazepam (RESTORIL) 15 mg capsule Take 1-2 capsules (15-30 mg total) by mouth at bedtime as needed for sleep.    ? warfarin ANTICOAGULANT (COUMADIN/JANTOVEN) 5 MG tablet Take 1 tablet (5 mg total) by mouth daily. Please adjust as recommended by anticoagulation nurse.    ? diclofenac sodium (VOLTAREN) 1 % Gel Apply 2 g topically 4 (four) times a day. 2/19/2020: Patient states she is not taking PTA, but per chart review, appears this medication was just recently approved by insurance   ? furosemide (LASIX) 40 MG tablet Take 1 tablet (40 mg total) by mouth daily for 14 days.    ? [START ON 1/10/2021] " HYDROmorphone (DILAUDID) 4 MG tablet Take 0.5-1 tablets (2-4 mg total) by mouth 4 (four) times a day as needed for pain. For use from 1/10/2021 to 2/9/21.    ? insulin aspart protamine-insulin aspart (NOVOLOG MIX 70-30FLEXPEN U-100) 100 unit/mL (70-30) pen Inject 30 Units under the skin every morning AND 15 Units every evening.      Social History     Social History Narrative    Patient of Dr. Bach since 2001.   to  Aneudy.        4 children.        Former patient of Dr. Harshad Ballard.       Subjective:     Roomed by:      Accompanied by Spouse    Refills needed? Yes    Do you have any forms that need to be filled out? No       Also in with son Sarbjit Yu today.    Following up multiple issues.    We reviewed her anemia.  She had a significant drop in her hemoglobin to 7 range.  She is instructed to stay off of aspirin at this time.  She has not noticed any blood in her stools.  We will recheck this today.    We reviewed her sleeping and she still has problems with sleep.  She has problems with back pain and some leg discomfort.  Is unclear whether this is also contributing to her issues.    Reviewed her lower extremity edema.  This is doing better than the last visit but is still present.  She took the furosemide for about 1 week last time and this seemed to help it.    We reviewed her abdominal pain and bloating.  She does get postprandial epigastric pain.  She has had work-up for this in the past.  She does have issues with her bowel movements and has not had anything for the last 3 to 4 days.  She will pass rocks or patricia out the backside.  She has a dilated colon.    Reviewed her atrial fibrillation and at the last visit she appeared to be in atrial fibrillation but today her rhythm is good.      She has some redness on the right anterior SHIN.  But no warmth or pain related to this    We reviewed her other issues noted in the assessment but not specifically addressed in the HPI above.  "    Past medical, family and social history reviewed today.     Comprehensive review of systems was performed today with no major problems noted except as above.     Objective:     Wt Readings from Last 3 Encounters:   12/29/20 160 lb (72.6 kg)   12/21/20 150 lb (68 kg)   11/23/20 150 lb (68 kg)       BP Readings from Last 3 Encounters:   12/29/20 128/62   12/21/20 160/66   12/11/20 130/68       /62   Pulse 77   Ht 5' 3\" (1.6 m)   Wt 160 lb (72.6 kg)   SpO2 98%   BMI 28.34 kg/m     The patient is comfortable, no acute distress.  Mood good.  Insight good.  Eyes are nonicteric.  Neck is supple without mass.  No cervical adenopathy.  No thyromegaly. Heart regular rate and rhythm.  Lungs clear to auscultation bilaterally.  Respiratory effort is good.  Abdomen soft and nontender.  No hepatosplenomegaly.  Extremities 2+ edema in both legs.  There is some mild passive pink discoloration of the anterior shin which is not consistent with cellulitis but more related to venous insufficiency.  Or edema.      Diagnostics:     Results for orders placed or performed in visit on 12/29/20   Comprehensive Metabolic Panel   Result Value Ref Range    Sodium 140 136 - 145 mmol/L    Potassium 4.7 3.5 - 5.0 mmol/L    Chloride 107 98 - 107 mmol/L    CO2 24 22 - 31 mmol/L    Anion Gap, Calculation 9 5 - 18 mmol/L    Glucose 167 (H) 70 - 125 mg/dL    BUN 24 8 - 28 mg/dL    Creatinine 0.87 0.60 - 1.10 mg/dL    GFR MDRD Af Amer >60 >60 mL/min/1.73m2    GFR MDRD Non Af Amer >60 >60 mL/min/1.73m2    Bilirubin, Total 0.4 0.0 - 1.0 mg/dL    Calcium 8.8 8.5 - 10.5 mg/dL    Protein, Total 6.9 6.0 - 8.0 g/dL    Albumin 3.6 3.5 - 5.0 g/dL    Alkaline Phosphatase 76 45 - 120 U/L    AST 20 0 - 40 U/L    ALT 20 0 - 45 U/L   HM2(CBC w/o Differential)   Result Value Ref Range    WBC 5.7 4.0 - 11.0 thou/uL    RBC 3.26 (L) 3.80 - 5.40 mill/uL    Hemoglobin 10.1 (L) 12.0 - 16.0 g/dL    Hematocrit 30.8 (L) 35.0 - 47.0 %    MCV 94 80 - 100 fL    MCH " 31.0 27.0 - 34.0 pg    MCHC 32.8 32.0 - 36.0 g/dL    RDW 13.7 11.0 - 14.5 %    Platelets 178 140 - 440 thou/uL    MPV 10.0 7.0 - 10.0 fL   Glycosylated Hemoglobin A1c   Result Value Ref Range    Hemoglobin A1c 7.0 (H) <=5.6 %     *Note: Due to a large number of results and/or encounters for the requested time period, some results have not been displayed. A complete set of results can be found in Results Review.       Assessment:     1. Paroxysmal atrial fibrillation (H) - VALVULAR    2. Chronic pain syndrome    3. Atrial fibrillation with rapid ventricular response (H)    4. S/P mitral valve replacement (MVR) - mechanical mitral valve placed 2015    5. Primary insomnia    6. Bilateral lower extremity edema    7. Type 2 diabetes mellitus with microalbuminuria, with long-term current use of insulin (H)    8. Postprandial abdominal bloating    9. Slow transit constipation        Quality review:     PHQ-2 Total Score: 2 (5/22/2020  1:00 PM)      No data recorded  ______________________________________________________________________     BMI Readings from Last 1 Encounters:   12/29/20 28.34 kg/m        Plan:     1. Keep off aspirin.  2. Blood work done today.  Hemoglobin doing better.  3. Contact son with the results.  4. Continue Dilaudid for pain at this point.  5. Refilled insulin in pen form as this has been easier for them.  6. Diabetes doing better.  7. Furosemide 40 mg a day for 2 weeks.  8. Monitor blood sugars.    40 minutes or greater were spent with the patient today with greater than 50% of the times spent in counseling and coordination of care.  This involved going over diagnostic studies done at or before the visit, prognosis related to their health issues, risks and benefits of treatment options for their active health issues and instructions for management of diseases above in the assessment.          Gabino Bach MD  General Internal Medicine  [facility]    Return in  about 3 weeks (around 1/19/2021), or if symptoms worsen or fail to improve, for visit and blood work.     Future Appointments   Date Time Provider Department Center   1/6/2021 11:20 AM [facility]   1/18/2021  1:15 PM Gabino Bach MD [facility]   3/4/2021 12:35 PM Gabino Bach MD [facility]         ______________________________________________________________________     Relevant ICD-10 codes and order associations:      ICD-10-CM    1. Paroxysmal atrial fibrillation (H) - VALVULAR  I48.0    2. Chronic pain syndrome  G89.4 HYDROmorphone (DILAUDID) 4 MG tablet   3. Atrial fibrillation with rapid ventricular response (H)  I48.91 warfarin ANTICOAGULANT (COUMADIN/JANTOVEN) 5 MG tablet     Comprehensive Metabolic Panel     HM2(CBC w/o Differential)   4. S/P mitral valve replacement (MVR) - mechanical mitral valve placed 2015  Z95.2 warfarin ANTICOAGULANT (COUMADIN/JANTOVEN) 5 MG tablet   5. Primary insomnia  F51.01 temazepam (RESTORIL) 15 mg capsule   6. Bilateral lower extremity edema  R60.0 furosemide (LASIX) 40 MG tablet   7. Type 2 diabetes mellitus with microalbuminuria, with long-term current use of insulin (H)  E11.29 insulin aspart protamine-insulin aspart (NOVOLOG MIX 70-30FLEXPEN U-100) 100 unit/mL (70-30) pen    R80.9 blood glucose test strips    Z79.4 Glycosylated Hemoglobin A1c   8. Postprandial abdominal bloating  R14.0    9. Slow transit constipation  K59.01

## 2021-06-30 NOTE — PROGRESS NOTES
Progress Notes by Gabino Bach MD at 3/4/2021 12:35 PM     Author: Gabino Bach MD Service: -- Author Type: Physician    Filed: 3/4/2021 10:23 PM Encounter Date: 3/4/2021 Status: Signed    : Gabino Bach MD (Physician)              [facility]    Comprehensive and complex medical care - Chronic disease management - Shared decision making - Care coordination - Compassionate care    Patient advocacy - Rational deprescribing - Minimally disruptive medicine - Ethical focus - Customized care          Date of Service: 3/4/2021  Primary Provider: Gabino Bach MD    Patient Care Team:  Gabino Bach MD as PCP - General (Internal Medicine)  Ayanna Camacho MD as Physician (Cardiology)  Al García MD as Physician (Ophthalmology)  Ishan Wade MD as Physician (Gastroenterology)  [facility], Mallory MCCRAY RN as Registered Nurse  Gabino Bach MD (Internal Medicine)  Gabino Bach MD as Assigned PCP  Emma Stevenson, NP as Assigned Surgical Provider  Nba Cleveland DPM as Assigned Musculoskeletal Provider     ______________________________________________________________________     Patient's Pharmacy:    [id number] [address] [address]  Phone: [phone] Fax: [phone]     Patient's Contacts:  Name Home Phone Work Phone Mobile Phone Relationship Lgl Grd   ISHAN VALENTIN [phone]   Spouse    JOYCELYNANDREA WORLEY [phone]   Child      Patient's Insurance:    Payor/Plan Subscr  Sex Relation Sub. Ins. ID Effective Group Num   1. MEDICA - MEDI* BERNADETTE VALENTIN 1940 Female  [id number] Not Eff [id number]                                   [address] [address]   2. MEDICARE - ME* BERNADETTE VALENTIN 1940 Female Self [id number]                                    [place], [address]           Bernadette Valentin is a 80 y.o. female who comes in today  for:    Chief Complaint   Patient presents with   ? Follow-up     leg rash, stomach bloating, legs still hurt has not been sleeping well because of it, hard time walking when wakes up       Active Problem List:  Problem List as of 3/4/2021 Reviewed: 3/4/2021 10:17 PM by Gabino Bach MD       High    Former smoker    Full code status - POLST form 1/2/16    ASCVD (arteriosclerotic cardiovascular disease) - CABG 2015    Atrial fibrillation with rapid ventricular response (H)    Chronic pain - Dr. Bach manages the pain    DM type 2 (diabetes mellitus, type 2) (H)    Nonrheumatic mitral valve stenosis    Paroxysmal atrial fibrillation (H) - VALVULAR    S/P mitral valve replacement (MVR) - mechanical mitral valve placed 2015    Subclavian artery stenosis, left (H) - s/p stent       Medium    Chronic, continuous use of opioids    Controlled substance agreement signed - 3/21 - temazepam, lorazepam, hydromorphone - UDS 3/21    HTN (hypertension)    Abdominal bloating, chronic    Anticoagulation goal of INR 2.5 to 3.5    Anxiety    CKD (chronic kidney disease) stage 3, GFR 30-59 ml/min    Hypothyroidism    Moderate aortic stenosis    PAD (peripheral artery disease) (H)    Recurrent UTI (urinary tract infection)       Low    Bleeding diathesis (H) - due to warfarin    HLD (hyperlipidemia)    IBS (irritable bowel syndrome)    Insomnia    Microalbuminuria due to type 2 diabetes mellitus (H)    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 BD ULTRA-FINE DAVID PEN NEEDLE  "32 gauge x 5/32\" Ndle 1 each by abdominal subcutaneous route 2 (two) times a day. USE WITH NOVOLOG PENS.   Please keep this Rx on file for the next RF.   ? blood glucose test strips Use 1 each As Directed 3 (three) times a day. Dispense brand per patient's insurance at pharmacy discretion.   ? blood-glucose meter Misc Dispense 1 meter as covered by patient's insurance.   ? diltiazem (CARDIZEM CD) 180 MG 24 hr capsule Take 1 capsule (180 mg total) by mouth daily.   ? furosemide (LASIX) 40 MG tablet Take 1 tablet (40 mg total) by mouth daily.   ? gabapentin (NEURONTIN) 300 MG capsule Take 1 capsule (300 mg total) by mouth at bedtime.   ? generic lancets Use 1 each As Directed 3 (three) times a day. Dx E11.65 DM2, uncontrolled   ? insulin aspart protamine-insulin aspart (NOVOLOG MIX 70-30FLEXPEN U-100) 100 unit/mL (70-30) pen Inject 30 Units under the skin every morning AND 15 Units every evening.   ? levothyroxine (SYNTHROID, LEVOTHROID) 125 MCG tablet Take 1 tablet (125 mcg total) by mouth daily.   ? warfarin ANTICOAGULANT (COUMADIN/JANTOVEN) 5 MG tablet Take 1 tablet (5 mg total) by mouth daily. Please adjust as recommended by anticoagulation nurse.   ? HYDROmorphone (DILAUDID) 4 MG tablet Take 0.5-1 tablets (2-4 mg total) by mouth 4 (four) times a day as needed for pain. May refill every 30 days only.   ? polyethylene glycol (MIRALAX) 17 gram packet Take 1 packet (17 g total) by mouth daily.   ? triamcinolone (KENALOG) 0.1 % cream Apply topically daily.       Social History     Social History Narrative    Patient of Dr. Bach since 2001.   to  Aneudy.        4 children.        Former patient of Dr. Harshad Ballard.         Subjective:     Roomed by: lissa nguyen    Accompanied by Spouse son   Refills needed? No    Do you have any forms that need to be filled out? No       Comes in today for follow up.    Back pain and leg pain continues to bother patient, but maybe not as much.  At night can be bad and she " would like to use the hydromorphone for this.  She is not using as much as in the past and it does tend to make her groggy during the day.    Anticoagulation and options for this reviewed today.    Lower extremity edema doing better and redness doing better with triamcinolone (Kenalog) cream.    No new issues with her bowels.    We reviewed her other issues noted in the assessment but not specifically addressed in the HPI above.     Objective:     Wt Readings from Last 3 Encounters:   03/04/21 152 lb (68.9 kg)   01/18/21 167 lb (75.8 kg)   12/29/20 160 lb (72.6 kg)     BP Readings from Last 3 Encounters:   03/04/21 128/76   01/18/21 136/78   12/29/20 128/62     /76   Pulse 77   Wt 152 lb (68.9 kg)   SpO2 95%   BMI 26.93 kg/m     The patient is comfortable, no acute distress.  Mood good.  Insight gfair.  Eyes are nonicteric.  Neck is supple without mass.  No cervical adenopathy.  No thyromegaly. Heart regular rate and rhythm.  Lungs clear to auscultation bilaterally.  Respiratory effort is good.  Abdomen soft and nontender.  No hepatosplenomegaly.  Extremities trace edema and no significant erythema.      Diagnostics:     Results for orders placed or performed in visit on 03/04/21   Drug Abuse 1+, Urine   Result Value Ref Range    Amphetamines Screen Negative Screen Negative    Benzodiazepines Screen Negative Screen Negative    Opiates (!) Screen Negative     Screen Positive (Confirmation available on request)    Phencyclidine Screen Negative Screen Negative    THC Screen Negative Screen Negative    Barbiturates Screen Negative Screen Negative    Cocaine Metabolite Screen Negative Screen Negative    Methadone Screen Negative Screen Negative    Oxycodone Screen Negative Screen Negative    Creatinine, Urine 51.1 mg/dL   Thyroid Stimulating Hormone (TSH)   Result Value Ref Range    TSH 0.21 (L) 0.30 - 5.00 uIU/mL   HM2(CBC w/o Differential)   Result Value Ref Range    WBC 8.4 4.0 - 11.0 thou/uL    RBC 3.82  3.80 - 5.40 mill/uL    Hemoglobin 10.1 (L) 12.0 - 16.0 g/dL    Hematocrit 31.6 (L) 35.0 - 47.0 %    MCV 83 80 - 100 fL    MCH 26.4 (L) 27.0 - 34.0 pg    MCHC 32.0 32.0 - 36.0 g/dL    RDW 15.0 (H) 11.0 - 14.5 %    Platelets 207 140 - 440 thou/uL    MPV 12.1 (H) 7.0 - 10.0 fL   Basic Metabolic Panel   Result Value Ref Range    Sodium 138 136 - 145 mmol/L    Potassium 3.9 3.5 - 5.0 mmol/L    Chloride 104 98 - 107 mmol/L    CO2 21 (L) 22 - 31 mmol/L    Anion Gap, Calculation 13 5 - 18 mmol/L    Glucose 280 (H) 70 - 125 mg/dL    Calcium 8.6 8.5 - 10.5 mg/dL    BUN 18 8 - 28 mg/dL    Creatinine 0.92 0.60 - 1.10 mg/dL    GFR MDRD Af Amer >60 >60 mL/min/1.73m2    GFR MDRD Non Af Amer 58 (L) >60 mL/min/1.73m2     *Note: Due to a large number of results and/or encounters for the requested time period, some results have not been displayed. A complete set of results can be found in Results Review.       Assessment:     1. Type 2 diabetes mellitus with microalbuminuria, with long-term current use of insulin (H)    2. Chronic, continuous use of opioids    3. Acquired hypothyroidism    4. Atrial fibrillation with rapid ventricular response (H)    5. Encounter for therapeutic drug level monitoring    6. Moderate aortic stenosis    7. Paroxysmal atrial fibrillation (H) - VALVULAR    8. S/P mitral valve replacement (MVR) - mechanical mitral valve placed 2015        Quality review:     PHQ-2 Total Score: 2 (5/22/2020  1:00 PM)    No data recorded  ______________________________________________________________________     BMI Readings from Last 1 Encounters:   03/04/21 26.93 kg/m          Plan:     1. Check blood work today.  See relevant orders and diagnosis associations at the bottom of this note.   2. Urine drug screen (UDS) done today and renewed controlled substance agreement (CSA).  3. MATHEW-7 next visit.  PHQ-9 next visit.   4. Make sure she gets hydromorphone before bedtime.  Monitor her symptoms.  5. Discussed options for  managing international normalized ratio (INR).  6. Continue current medications otherwise.  7. Follow up sooner if issues.    40 minutes or greater was spent today on the patient's care on the day of service.      This includes time for chart preparation, reviewing medical tests done before or during the visit, talking with the patient, review of quality indicators, required documentation, and other elements of care.        Gabino Bach MD  General Internal Medicine  [facility]    Return in about 2 months (around 5/4/2021), or if symptoms worsen or fail to improve, for visit and blood work.     Future Appointments   Date Time Provider Department Center   3/16/2021 12:15 PM [facility] COVID VACCINE 21 DAY [facility]ER [facility]   3/25/2021 11:45 AM [facility]   5/6/2021  1:00 PM Gabino Bach MD [facility]         ______________________________________________________________________     Relevant ICD-10 codes and order associations:      ICD-10-CM    1. Type 2 diabetes mellitus with microalbuminuria, with long-term current use of insulin (H)  E11.29 Basic Metabolic Panel    R80.9     Z79.4    2. Chronic, continuous use of opioids  F11.90 Drug Abuse 1+, Urine   3. Acquired hypothyroidism  E03.9 Thyroid Stimulating Hormone (TSH)   4. Atrial fibrillation with rapid ventricular response (H)  I48.91 HM2(CBC w/o Differential)   5. Encounter for therapeutic drug level monitoring  Z51.81 Drug Abuse 1+, Urine   6. Moderate aortic stenosis  I35.0    7. Paroxysmal atrial fibrillation (H) - VALVULAR  I48.0    8. S/P mitral valve replacement (MVR) - mechanical mitral valve placed 2015  Z95.2

## 2021-06-30 NOTE — PROGRESS NOTES
Progress Notes by Gabino Bach MD at 2020 11:45 AM     Author: Gabino Bach MD Service: -- Author Type: Physician    Filed: 2020 10:07 AM Encounter Date: 2020 Status: Signed    : Gabino Bach MD (Physician)              [facility]    Comprehensive and complex medical care - Chronic disease management - Shared decision making - Care coordination - Compassionate care    Patient advocacy - Rational deprescribing - Minimally disruptive medicine - Ethical focus - Customized care          Date of Service: 2020  Primary Provider: Gabino Bach MD    Patient Care Team:  Gabino Bach MD as PCP - General (Internal Medicine)  Ayanna Camacho MD as Physician (Cardiology)  Al García MD as Physician (Ophthalmology)  Mauro, Ishan Escobar MD as Physician (Gastroenterology)  [facility], Mallory MCCRAY RN as Registered Nurse  Gabino Bach MD (Internal Medicine)  Gabino Bach MD as Assigned PCP  Emma Stevenson, NP as Assigned Surgical Provider  Nba Cleveland DPM as Assigned Musculoskeletal Provider     ______________________________________________________________________     Patient's Pharmacy:    [id number] [address] [address]  Phone: [phone] Fax: [phone]     Patient's Contacts:  Name Home Phone Work Phone Mobile Phone Relationship Lgl Grd   ISHAN VALENTIN [phone]   Spouse    ANDREA VALENTIN [phone]   Child      Patient's Insurance:    Payor/Plan Subscr  Sex Relation Sub. Ins. ID Effective Group Num   1. MEDICA - MEDI* BERNADETTE VALENTIN 1940 Female  [id number] Not Eff [id number]                                   [address] [address]   2. MEDICARE - ME* HOMEROBERNADETTE NIMCO 1940 Female Self [id number]                                    [place], [address]           Bernadette NIMCO Lozoyacristhian is a 80 y.o. female who comes in  today for:    Chief Complaint   Patient presents with   ? Diabetes     could increase insulin, past week sugar has been around 250 in am, 360-380 in afternoon and 280-350 at night   ? Follow-up     back pain, stomach bloating   ? Edema     in feet and ankles x 1 week   ? Medication Refill     CGM       Active Problem List:  Problem List as of 12/11/2020 Reviewed: 9/2/2020  8:54 AM by Gabino Bach MD       High    Former smoker    Full code status - POLST form 1/2/16    ASCVD (arteriosclerotic cardiovascular disease) - CABG 2015    Atrial fibrillation with rapid ventricular response (H)    Chronic pain - Dr. Bach manages the pain    DM type 2 (diabetes mellitus, type 2) (H)    Nonrheumatic mitral valve stenosis    Paroxysmal atrial fibrillation (H) - VALVULAR    S/P mitral valve replacement (MVR) - mechanical mitral valve placed 2015    Subclavian artery stenosis, left (H) - s/p stent       Medium    Controlled substance agreement signed - 2/20 - temazepam, lorazepam, hydromorphone - UDS 8/19    HTN (hypertension)    Abdominal bloating, chronic    Anticoagulation goal of INR 2.5 to 3.5    Anxiety    Hypothyroidism    Moderate aortic stenosis    PAD (peripheral artery disease) (H)    Recurrent UTI (urinary tract infection)       Low    Bleeding diathesis (H) - due to warfarin    HLD (hyperlipidemia)    IBS (irritable bowel syndrome)    Insomnia    Microalbuminuria due to type 2 diabetes mellitus (H)    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 aspirin 81 MG EC tablet Take 81 mg by mouth.    ? BD ULTRA-FINE DAVID PEN NEEDLE 32 gauge x 5/32\" Ndle USE WITH NOVOLOG AND NOVOLIN PENS    ? blood glucose test strips Use 1 each As Directed 3 (three) times a day. Dispense brand per patient's insurance at pharmacy discretion.    ? blood-glucose meter Misc Dispense 1 meter as covered by patient's insurance.    ? diclofenac sodium (VOLTAREN) 1 % Gel Apply 2 g topically 4 (four) times a day. 2/19/2020: Patient states she is not taking PTA, but per chart review, appears this medication was just recently approved by insurance   ? diltiazem (CARDIZEM CD) 180 MG 24 hr capsule Take 1 capsule (180 mg total) by mouth daily.    ? gabapentin (NEURONTIN) 300 MG capsule Take 1 capsule (300 mg total) by mouth at bedtime.    ? generic lancets Use 1 each As Directed 3 (three) times a day. Dx E11.65 DM2, uncontrolled    ? HYDROmorphone (DILAUDID) 4 MG tablet Take 0.5-1 tablets (2-4 mg total) by mouth 4 (four) times a day as needed for pain. For use from 12/11/2020 to 1/10/2021.    ? levothyroxine (SYNTHROID, LEVOTHROID) 125 MCG tablet Take 1 tablet (125 mcg total) by mouth daily.    ? warfarin ANTICOAGULANT (COUMADIN/JANTOVEN) 5 MG tablet Take 1 tablet (5 mg total) by mouth daily. Please adjust as recommended by anticoagulation nurse.    ? furosemide (LASIX) 40 MG tablet Take 1 tablet (40 mg total) by mouth daily for 7 days.    ? insulin asp prt-insulin aspart (NOVOLOG MIX 70-30 U-100 INSULN) 100 unit/mL (70-30) Soln Inject 30 Units under the skin daily with breakfast AND 15 Units daily with supper.    ? polyethylene glycol (MIRALAX) 17 gram packet Take 1 packet (17 g total) by mouth daily.    ? potassium chloride (MICRO-K) 10 mEq CR capsule Take 1 capsule (10 mEq total) by mouth daily for 7 days.    ? temazepam (RESTORIL) 15 mg capsule Take 1-2 capsules (15-30 mg total) by mouth at bedtime as needed for sleep.      Social History     Social " History Narrative    Patient of Dr. Bach since 2001.   to  Aneudy.        4 children.        Former patient of Dr. Harshad Ballard.       Subjective:     Roomed by: lissa nguyen    Accompanied by Spouse 2 sons   Refills needed? No    Do you have any forms that need to be filled out? No       Comes in for follow up of hospital stay at [facility].    Was in for urosepsis.  In the hospital for 7 days.    Has had home care and help with her sons.    No symptoms prior to the hospital stay with her urinary system.  Was more confused.    Diabetes regimen changed to 70/30 insulin.  She is taking 20 units in the am and 10 in the pm.  Sugars in the 200s to 300s.    Has increased lower extremity edema in the last week.  Weight up.  No increased shortness of breath.  Gets tired easily, though.  Heart is irregular on exam today.    Has some evidence of worsening anemia in the hospital.  Placed back on aspirin for unclear reasons.    Having some increased pain lately.  Not using pain medication as much.    Thyroid-stimulating hormone (TSH) was high again likely due to to noncompliance with regimen due to memory issues.    Stomach bloating also an issue.    We reviewed her other issues noted in the assessment but not specifically addressed in the HPI above.     Past medical, family and social history reviewed today.     Comprehensive review of systems was performed today with no major problems noted except as above.     Objective:     Wt Readings from Last 3 Encounters:   11/23/20 150 lb (68 kg)   11/06/20 151 lb (68.5 kg)   09/01/20 146 lb (66.2 kg)       BP Readings from Last 3 Encounters:   12/11/20 130/68   11/24/20 101/57   11/06/20 134/72       /68   Pulse 72   SpO2 97%    The patient is comfortable, no acute distress.  Mood good.  Insight fair.  Eyes are nonicteric.  Neck is supple without mass.  No cervical adenopathy.  No thyromegaly. Heart irregular rate and rhythm.  Lungs clear to  auscultation bilaterally.  Respiratory effort is good.  Abdomen soft and nontender.  No hepatosplenomegaly.  Extremities 2-3+ bilateral edema.  No synovitis of her joints today.    Diagnostics:     Results for orders placed or performed in visit on 12/11/20   INR   Result Value Ref Range    INR 2.10 (H) 0.90 - 1.10   HM2(CBC w/o Differential)   Result Value Ref Range    WBC 6.0 4.0 - 11.0 thou/uL    RBC 2.32 (L) 3.80 - 5.40 mill/uL    Hemoglobin 7.4 (LL) 12.0 - 16.0 g/dL    Hematocrit 21.6 (L) 35.0 - 47.0 %    MCV 93 80 - 100 fL    MCH 31.8 27.0 - 34.0 pg    MCHC 34.2 32.0 - 36.0 g/dL    RDW 14.0 11.0 - 14.5 %    Platelets 179 140 - 440 thou/uL    MPV 9.7 7.0 - 10.0 fL   Basic Metabolic Panel   Result Value Ref Range    Sodium 138 136 - 145 mmol/L    Potassium 4.3 3.5 - 5.0 mmol/L    Chloride 105 98 - 107 mmol/L    CO2 23 22 - 31 mmol/L    Anion Gap, Calculation 10 5 - 18 mmol/L    Glucose 258 (H) 70 - 125 mg/dL    Calcium 8.5 8.5 - 10.5 mg/dL    BUN 33 (H) 8 - 28 mg/dL    Creatinine 1.14 (H) 0.60 - 1.10 mg/dL    GFR MDRD Af Amer 55 (L) >60 mL/min/1.73m2    GFR MDRD Non Af Amer 46 (L) >60 mL/min/1.73m2   BNP(B-type Natriuretic Peptide)   Result Value Ref Range     (H) 0 - 163 pg/mL     *Note: Due to a large number of results and/or encounters for the requested time period, some results have not been displayed. A complete set of results can be found in Results Review.     Lab Results   Component Value Date    COLORU Brown (!) 11/23/2020    CLARITYU Turbid (!) 11/23/2020    GLUCOSEU >1000 mg/dL (!!) 11/23/2020    BILIRUBINUR Negative 11/23/2020    KETONESU 20 mg/dL (!!) 11/23/2020    SPECGRAV 1.017 11/23/2020    HGBUA Large (!) 11/23/2020    PHUR 6.0 11/23/2020    PROTEINUA 300 mg/dL (!) 11/23/2020    UROBILINOGEN <2.0 E.U./dL 11/23/2020    NITRITE Negative 11/23/2020    LEUKOCYTESUR Moderate (!) 11/23/2020    BACTERIA None Seen 11/23/2020    RBCUA >100 (!) 11/23/2020    WBCUA >100 (!) 11/23/2020    NELDA  0-5 11/23/2020    TRANSEPI 25-50 (!) 10/26/2019      Contains abnormal data Culture, Urine  Order: [id number] - Reflex for Order [id number]  Status:  Final result   Visible to patient:  No (not released) Next appt:  12/22/2020 at 12:00 PM in Internal Medicine (Gabino Bach MD)  Specimen Information: Urine        Culture >100,000 col/ml Klebsiella pneumoniaeAbnormal           Resulting Agency: [facility]   Susceptibility     Klebsiella pneumoniae     ARJUN     Amoxicillin + Clavulanate <=4/2  Sensitive     Ampicillin >16  Resistant     Cefazolin <=1  Sensitive     Cefepime <=1  Sensitive     Ceftriaxone <=1  Sensitive     Ciprofloxacin <=0.5  Sensitive     Gentamicin <=2  Sensitive     Levofloxacin <=1  Sensitive     Meropenem <=0.25  Sensitive     Nitrofurantoin 64  Intermediate     Tetracycline <=2  Sensitive     Tobramycin <=2  Sensitive     Trimethoprim + Sulfamethoxazole <=0.5  Sensitive               ______________________________________________________________________    Pertinent radiology for this visit includes the following:    CT Cervical Spine Without Contrast  Narrative: EXAM: CT CERVICAL SPINE WO CONTRAST  LOCATION: [facility]  DATE/TIME: 11/23/2020 10:57 PM    INDICATION: fall  COMPARISON: None.  TECHNIQUE: Routine CT Cervical Spine without IV contrast. Multiplanar reformats. Dose reduction techniques were used.    FINDINGS:  Straightening of the usual cervical lordosis. Normal cervical vertebral body heights. Mild age-indeterminate depression of the upper T1 endplate without definite acute fracture line. No acute cervical spine fracture. No prevertebral soft tissue swelling.   Multilevel degenerative changes without evidence for high-grade spinal canal narrowing.  Impression: 1.  Mild depression of the upper T1 endplate suspected to be chronic but is of uncertain age. No definite acute fracture line. If there is persistent clinical concern MRI could evaluate for edema.  2.  No  acute cervical spine fracture.      ______________________________________________________________________     XR Pelvis AP  Narrative: EXAM: XR PELVIS AP  LOCATION: [facility]  DATE/TIME: 11/23/2020 11:12 PM    INDICATION: Fall  COMPARISON: CT scan from 02/19/2020.  Impression: There is an old left inferior pubic ramus fracture which is also present on the prior CT scan. No definite evidence for acute fracture. Degenerative changes lower lumbar spine. Minimal degenerative narrowing both hips. Vascular calcification.   Remainder unremarkable.      ______________________________________________________________________     XR Chest 1 View  Narrative: EXAM: XR CHEST 1 VIEW  LOCATION: [facility]  DATE/TIME: 11/23/2020 11:09 PM    INDICATION: AMS  COMPARISON: 05/21/2018.  Impression: PO sternotomy with cardiac valve prosthesis and atrial appendage closure device. Heart size within normal limits. Pulmonary vascularity is mildly increased. Question some minimal patchy infiltrate left lower lung. Right lung is clear. Aortic   calcification. Remainder unremarkable.    ______________________________________________________________________     CT Head Without Contrast  Narrative: EXAM: CT HEAD WO CONTRAST  LOCATION: [facility]  DATE/TIME: 11/23/2020 10:55 PM    INDICATION: Worsening confusion after fall.  COMPARISON: Head CT 02/19/2020  TECHNIQUE: Routine without IV contrast. Multiplanar reformats. Dose reduction techniques were used.    FINDINGS:  INTRACRANIAL CONTENTS: No intracranial hemorrhage, extraaxial collection, or mass effect.  No CT evidence of acute infarct. Chronic encephalomalacia right temporal lobe. Mild presumed chronic small vessel ischemic changes. Mild generalized volume loss.   No hydrocephalus.     VISUALIZED ORBITS/SINUSES/MASTOIDS: No intraorbital abnormality. Sequela of chronic right maxillary sinusitis. No middle ear or mastoid  effusion.    BONES/SOFT TISSUES: No acute abnormality.  Impression: 1.  No acute intracranial injury, hemorrhage, mass, or CT evidence of recent ischemia.      ______________________________________________________________________      Most Recent EKG     Units 10/30/19  0332 10/30/19  0332 11/23/20  2312   VENTRATE BPM 64   < > 84   ATRIALRATE BPM 64   < > 84   QRSDURATION ms 72   < > 78   QTINTERVAL ms 456   < > 570   QTCCALC ms 470   < > 673   P Axis degrees 93  --   --    RAXIS degrees -35   < > 243   TAXIS degrees -44   < > -67   MUSEDX  Sinus rhythm with 1st degree A-V block  Left axis deviation  Nonspecific ST and T wave abnormality  Abnormal ECG  When compared with ECG of 28-OCT-2019 23:29,  CA interval has increased  T wave inversion no longer evident in Anterolateral leads  QT has shortened  Confirmed by LIAN OSULLIVAN, MARIE LOC:JN ([id number]) on 10/30/2019 9:12:13 AM     < > Sinus rhythm with 1st degree A-V block  Right superior axis deviation  ST & T wave abnormality, consider inferior ischemia  Prolonged QT  Abnormal ECG  When compared with ECG of 19-FEB-2020 00:59,  Significant changes have occurred  Confirmed by SEE ED PROVIDER NOTE FOR, ECG INTERPRETATION ([id number]),  JOAQUIN RON ([id number]) on 11/24/2020 7:01:34 AM      < > = values in this interval not displayed.        Assessment:     1. Bilateral lower extremity edema    2. Type 2 diabetes mellitus with microalbuminuria, with long-term current use of insulin (H)    3. Intermittent atrial fibrillation (H)    4. SOB (shortness of breath)    5. S/P mitral valve replacement (MVR) - mechanical mitral valve placed 2015    6. Patient forgets to take medication    7. Atrial fibrillation with rapid ventricular response (H)    8. Hospital discharge follow-up    9. Normocytic anemia    10. Sepsis due to urinary tract infection (H)    11. Chronic anticoagulation    12. Diffuse pain    13. Non-traumatic rhabdomyolysis    14. Stage 3a chronic kidney disease         Quality review:     PHQ-2 Total Score: 2 (5/22/2020  1:00 PM)      No data recorded  ______________________________________________________________________     BMI Readings from Last 1 Encounters:   11/23/20 26.57 kg/m        Plan:       Check blood work today.  See relevant orders and diagnosis associations at the bottom of this note.     Continue home care.    Follow up blood work closely.    Worsening anemia noted today.  Will need follow up or emergency room (ER) visit if worsening.  Stop aspirin.  Has had issues with gastrointestinal bleed in the past.  Likely avm related.    Increase the insulin to 30 in the am and 15 in the pm.    Sons are helping to set up medications for compliance.    Back in atrial fibrillation on exam today.  Consider follow up electrocardiogram (EKG) in the future.    Refilled medications.    Furosemide (Lasix) and potassium for her edema and close follow up.    Check CK next visit.  Keep off pravastatin (Pravachol)     Post Discharge Medication Reconciliation Status: discharge medications reconciled and changed, per note/orders           Gabino Bach MD  General Internal Medicine  [facility]    Return in about 11 days (around 12/22/2020), or if symptoms worsen or fail to improve, for visit and blood work.     Future Appointments   Date Time Provider Department Center   12/22/2020 12:00 PM Gabino Bach MD [facility]   3/4/2021 12:35 PM Gabino Bach MD [facility]         ______________________________________________________________________     Relevant ICD-10 codes and order associations:      ICD-10-CM    1. Bilateral lower extremity edema  R60.0 furosemide (LASIX) 40 MG tablet     potassium chloride (MICRO-K) 10 mEq CR capsule   2. Type 2 diabetes mellitus with microalbuminuria, with long-term current use of insulin (H)  E11.29 Basic Metabolic Panel    R80.9 insulin asp prt-insulin aspart (NOVOLOG MIX 70-30 U-100  INSULN) 100 unit/mL (70-30) Soln    Z79.4    3. Intermittent atrial fibrillation (H)  I48.0    4. SOB (shortness of breath)  R06.02 HM2(CBC w/o Differential)     BNP(B-type Natriuretic Peptide)   5. S/P mitral valve replacement (MVR) - mechanical mitral valve placed 2015  Z95.2 INR   6. Patient forgets to take medication  Z91.14    7. Atrial fibrillation with rapid ventricular response (H)  I48.91    8. Hospital discharge follow-up  Z09    9. Normocytic anemia  D64.9    10. Sepsis due to urinary tract infection (H)  A41.9     N39.0    11. Chronic anticoagulation  Z79.01    12. Diffuse pain  R52    13. Non-traumatic rhabdomyolysis  M62.82    14. Stage 3a chronic kidney disease  N18.31

## 2021-06-30 NOTE — PROGRESS NOTES
Progress Notes by Gabino Bach MD at 2021  1:00 PM     Author: Gabino Bach MD Service: -- Author Type: Physician    Filed: 2021 10:44 PM Encounter Date: 2021 Status: Signed    : Gabino Bach MD (Physician)       PHQ-9 Total Score: 12 (2021  1:00 PM)       MATHEW-7 Total: 4 (2021  1:00 PM)       ______________________________________________________________________            [facility]    Comprehensive and complex medical care - Chronic disease management - Shared decision making - Care coordination - Compassionate care    Patient advocacy - Rational deprescribing - Minimally disruptive medicine - Ethical focus - Customized care          Date of Service: 2021  Primary Provider: Gabino Bach MD    Patient Care Team:  Gabino Bach MD as PCP - General (Internal Medicine)  Ayanna Camacho MD as Physician (Cardiology)  Al García MD as Physician (Ophthalmology)  Ishan Wade MD as Physician (Gastroenterology)  [facility], Mallory MCCRAY RN as Registered Nurse  Gabino Bach MD (Internal Medicine)  Gabino Bach MD as Assigned PCP  Emma Stevenson, NP as Assigned Surgical Provider  Nba Cleveland DPM as Assigned Musculoskeletal Provider     ______________________________________________________________________     Patient's Pharmacy:    [id number] [place], [place] - [address]  [address] [address]  Phone: [phone] Fax: [phone]     Patient's Contacts:  Name Home Phone Work Phone Mobile Phone Relationship Lgl Grd   ISHAN VALENTIN [phone]   Spouse    ANDREA VALENTIN [phone]   Child      Patient's Insurance:    Payor/Plan Subscr  Sex Relation Sub. Ins. ID Effective Group Num   1. MEDICA - MEDI* HOMERONABILAANNA MARIE R 1940 Female  [id number] Not Eff [id number]                                   [address] [address]   2. MEDICARE - [place]*  BERNADETTE VALENTIN 1940 Female Self [id number] 9/[birth year]/04                                    [facility], [address]           Bernadette Valentin is a 80 y.o. female who comes in today for:    Chief Complaint   Patient presents with   ? Follow-up     back pain, bloating is still very bad, lower extrmity edema   ? Medication Management     sleeping medication   ? Mass     on left arm and upper legs       Active Problem List:  Problem List as of 5/6/2021 Reviewed: 5/6/2021 10:35 PM by Gabino Bach MD       High    Former smoker    Full code status - POLST form 1/2/16    ASCVD (arteriosclerotic cardiovascular disease) - CABG 2015    Atrial fibrillation with rapid ventricular response (H)    Chronic pain - Dr. Bach manages the pain    DM type 2 (diabetes mellitus, type 2) (H)    Nonrheumatic mitral valve stenosis    Paroxysmal atrial fibrillation (H) - VALVULAR    S/P mitral valve replacement (MVR) - mechanical mitral valve placed 2015    Subclavian artery stenosis, left (H) - s/p stent       Medium    Chronic, continuous use of opioids    Controlled substance agreement signed - 3/21 - temazepam, lorazepam, hydromorphone - UDS 3/21    HTN (hypertension)    Abdominal bloating, chronic    Anticoagulation goal of INR 2.5 to 3.5    Anxiety    CKD (chronic kidney disease) stage 3, GFR 30-59 ml/min    Hypothyroidism    Moderate aortic stenosis    PAD (peripheral artery disease) (H)    Recurrent UTI (urinary tract infection)       Low    Bleeding diathesis (H) - due to warfarin    HLD (hyperlipidemia)    IBS (irritable bowel syndrome)    Insomnia    Microalbuminuria due to type 2 diabetes mellitus (H)    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 BD ULTRA-FINE DAVID PEN NEEDLE 32 gauge x 5/32\" Ndle 1 each by abdominal subcutaneous route 2 (two) times a day. USE WITH NOVOLOG PENS.   Please keep this Rx on file for the next RF.   ? blood glucose test strips Use 1 each As Directed 3 (three) times a day. Dispense brand per patient's insurance at pharmacy discretion.   ? blood-glucose meter Misc Dispense 1 meter as covered by patient's insurance.   ? diltiazem (CARDIZEM CD) 180 MG 24 hr capsule Take 1 capsule (180 mg total) by mouth daily.   ? furosemide (LASIX) 40 MG tablet Take 1 tablet (40 mg total) by mouth daily.   ? gabapentin (NEURONTIN) 300 MG capsule Take 1 capsule (300 mg total) by mouth at bedtime.   ? generic lancets Use 1 each As Directed 3 (three) times a day. Dx E11.65 DM2, uncontrolled   ? HYDROmorphone (DILAUDID) 4 MG tablet Take 0.5-1 tablets (2-4 mg total) by mouth 4 (four) times a day as needed for pain. May refill every 30 days only.   ? insulin aspart protamine-insulin aspart (NOVOLOG MIX 70-30FLEXPEN U-100) 100 unit/mL (70-30) pen Inject 30 Units under the skin every morning AND 15 Units every evening.   ? levothyroxine (SYNTHROID, LEVOTHROID) 125 MCG tablet Take 1 tablet (125 mcg total) by mouth daily.   ? polyethylene glycol (MIRALAX) 17 gram packet Take 1 packet (17 g total) by mouth daily.   ? triamcinolone (KENALOG) 0.1 % cream Apply topically daily.   ? warfarin ANTICOAGULANT (COUMADIN/JANTOVEN) 5 MG tablet Take 1 tablet (5 mg total) by mouth daily. Please adjust as recommended by anticoagulation nurse.       Social History     Social History Narrative    Patient of Dr. Bach since 2001.   to  Aneudy.        4 children.        Former patient of Dr. Harshad Ballard.         Subjective:     Patient comes in today with her son and her .    We " first reviewed a nodule on the tip index finger.  This has been growing.  It is not getting better.  She has tried some over treatments for this.  This has not been effective.  It does irritate her quite a bit.  She can not really grab things with it.    She had issues with recurrent anemia and we will follow-up on this today.    Reviewed her chronic back pain and radiculopathy.  She did to Dilaudid at night generally.  They bring in the pill bottle to review.    Her diabetes will be checked today and is generally stable.    She is feeling better overall and her son also feels that she is doing much better than she was earlier this year.    We reviewed her coagulation and she needs this checked today.    We reviewed her other issues noted in the assessment but not specifically addressed in the HPI above.     Objective:     Wt Readings from Last 3 Encounters:   05/06/21 147 lb (66.7 kg)   03/04/21 152 lb (68.9 kg)   01/18/21 167 lb (75.8 kg)     BP Readings from Last 3 Encounters:   05/06/21 132/70   03/04/21 128/76   01/18/21 136/78     /70   Pulse 88   Wt 147 lb (66.7 kg)   SpO2 97%   BMI 26.04 kg/m     The patient is comfortable, no acute distress.  Mood good.  Insight fair to good.  Eyes are nonicteric.  Neck is supple without mass.  No cervical adenopathy.  No thyromegaly. Heart regular rate and rhythm.  Lungs clear to auscultation bilaterally.  Respiratory effort is good.  Abdomen soft and nontender.  No hepatosplenomegaly.  Extremities no edema.      Diagnostics:     Results for orders placed or performed in visit on 05/06/21   HM2(CBC w/o Differential)   Result Value Ref Range    WBC 9.3 4.0 - 11.0 thou/uL    RBC 4.47 3.80 - 5.40 mill/uL    Hemoglobin 11.7 (L) 12.0 - 16.0 g/dL    Hematocrit 37.0 35.0 - 47.0 %    MCV 83 80 - 100 fL    MCH 26.2 (L) 27.0 - 34.0 pg    MCHC 31.6 (L) 32.0 - 36.0 g/dL    RDW 16.6 (H) 11.0 - 14.5 %    Platelets 232 140 - 440 thou/uL    MPV 11.3 (H) 7.0 - 10.0 fL   Basic  Metabolic Panel   Result Value Ref Range    Sodium 141 136 - 145 mmol/L    Potassium 4.8 3.5 - 5.0 mmol/L    Chloride 102 98 - 107 mmol/L    CO2 24 22 - 31 mmol/L    Anion Gap, Calculation 15 5 - 18 mmol/L    Glucose 209 (H) 70 - 125 mg/dL    Calcium 9.0 8.5 - 10.5 mg/dL    BUN 18 8 - 28 mg/dL    Creatinine 0.95 0.60 - 1.10 mg/dL    GFR MDRD Af Amer >60 >60 mL/min/1.73m2    GFR MDRD Non Af Amer 56 (L) >60 mL/min/1.73m2   Thyroid Stimulating Hormone (TSH)   Result Value Ref Range    TSH 0.44 0.30 - 5.00 uIU/mL   Glycosylated Hemoglobin A1c   Result Value Ref Range    Hemoglobin A1c 7.7 (H) <=5.6 %   INR   Result Value Ref Range    INR 3.60 (H) 0.90 - 1.10     *Note: Due to a large number of results and/or encounters for the requested time period, some results have not been displayed. A complete set of results can be found in Results Review.       Assessment:     1. Skin nodule    2. Type 2 diabetes mellitus with microalbuminuria, with long-term current use of insulin (H)    3. Acquired hypothyroidism    4. Moderate aortic stenosis    5. S/P mitral valve replacement (MVR) - mechanical mitral valve placed 2015    6. Paroxysmal atrial fibrillation (H) - VALVULAR    7. Neoplasm of uncertain behavior of skin        Quality review:     PHQ-2 Total Score: 4 (5/6/2021  1:00 PM)  Depression Follow-up Plan: patient follow-up to return when and if necessary (5/6/2021  1:00 PM)    PHQ-9 Total Score: 12 (5/6/2021  1:00 PM)    ______________________________________________________________________     BMI Readings from Last 1 Encounters:   05/06/21 26.04 kg/m          Plan:     1. Blood work done today.  2. Shave excision of the skin was done.  Sent for pathology.  3. Overall clinically stable.  4. Continue current medications otherwise.  5. Follow up sooner if issues.         Gabino Bach MD  General Internal Medicine  [facility]    Return in about 2 months (around 7/6/2021), or if symptoms worsen  or fail to improve, for follow up visit, visit and blood work.     Future Appointments   Date Time Provider Department Center   7/5/2021  1:15 PM Gabino Bach MD [facility]         ______________________________________________________________________     Relevant ICD-10 codes and order associations:      ICD-10-CM    1. Skin nodule  R22.9 Surgical Pathology Exam   2. Type 2 diabetes mellitus with microalbuminuria, with long-term current use of insulin (H)  E11.29 Glycosylated Hemoglobin A1c    R80.9     Z79.4    3. Acquired hypothyroidism  E03.9 Thyroid Stimulating Hormone (TSH)   4. Moderate aortic stenosis  I35.0 HM2(CBC w/o Differential)     Basic Metabolic Panel   5. S/P mitral valve replacement (MVR) - mechanical mitral valve placed 2015  Z95.2 INR   6. Paroxysmal atrial fibrillation (H) - VALVULAR  I48.0 INR   7. Neoplasm of uncertain behavior of skin  D48.5

## 2021-07-03 NOTE — ADDENDUM NOTE
Addendum Note by Tatiana Hernandez MD at 4/23/2020 10:19 PM     Author: Tatiana Hernandez MD Service: -- Author Type: Physician    Filed: 4/23/2020 10:19 PM Encounter Date: 4/22/2020 Status: Signed    : Tatiana Hernandez MD (Physician)    Addended by: TATIANA HERNANDEZ on: 4/23/2020 10:19 PM        Modules accepted: Orders

## 2021-07-03 NOTE — ADDENDUM NOTE
Addendum Note by Emma Park NP at 3/3/2020  9:20 AM     Author: Emma Park NP Service: -- Author Type: Nurse Practitioner    Filed: 3/4/2020  4:32 PM Encounter Date: 3/3/2020 Status: Signed    : Emma Park NP (Nurse Practitioner)    Addended by: EMMA PARK on: 3/4/2020 04:32 PM        Modules accepted: Orders

## 2021-07-04 NOTE — TELEPHONE ENCOUNTER
Telephone Encounter by Deena Kang RN at 5/26/2021  9:55 AM     Author: Deena Kang RN Service: -- Author Type: Registered Nurse    Filed: 5/26/2021 10:02 AM Encounter Date: 5/25/2021 Status: Signed    : Deena Kang RN (Registered Nurse)       ANTICOAGULATION  MANAGEMENT    Assessment     Yesterday's INR result of 4.6 is Supratherapeutic (goal INR of 2.5-3.5)        Warfarin taken as previously instructed    No new diet changes affecting INR    No new medication/supplements affecting INR    Continues to tolerate warfarin with no reported s/s of bleeding or thromboembolism     Previous INR was Supratherapeutic     Per son, he did get the message to hold yesterday's dose, however patient takes warfarin in the AM so unable to hold yesterday's dose.    Plan:     Spoke on phone with Bernadette regarding INR result and instructed:      Warfarin Dosing Instructions:  Hold today  then change warfarin dose to 7.5 mg daily on Sunday; and 5 mg daily rest of week  (6.3 % change)    Instructed patient to follow up no later than: 7-10 days    Education provided: importance of consistent vitamin K intake, impact of vitamin K foods on INR, target INR goal and significance of current INR result, importance of following up for INR monitoring at instructed interval, importance of taking warfarin as instructed, monitoring for bleeding signs and symptoms and when to seek medical attention/emergency care    Riley verbalizes understanding and agrees to warfarin dosing plan.    Instructed to call the [facility] for any changes, questions or concerns. (#[phone])   ?   Deena Kang RN    Subjective/Objective:      Bernadette Yu, a 80 y.o. female is on warfarin. Bernadette Fleming reports:     Home warfarin dose: verbally confirmed home dose with Riley and updated on anticoagulation calendar     Missed doses: No     Medication changes:  No     S/S of bleeding or thromboembolism:  No     New  Injury or illness:  No     Changes in diet or alcohol consumption:  No     Upcoming surgery, procedure or cardioversion:  No    Anticoagulation Episode Summary     Current INR goal:  2.5-3.5   TTR:  29.9 % (1 y)   Next INR check:  6/4/2021   INR from last check:  4.60 (5/25/2021)   Weekly max warfarin dose:     Target end date:     INR check location:     Preferred lab:     Send INR reminders to:  [facility]    Indications    S/P mitral valve replacement (MVR) - mechanical mitral valve placed 2015 [Z95.2]           Comments:           Anticoagulation Care Providers     Provider Role Specialty Phone number    Gabino Bach MD Referring Internal Medicine [phone]

## 2021-07-04 NOTE — TELEPHONE ENCOUNTER
Telephone Encounter by Alea Cooley RN at 7/3/2021  2:42 PM     Author: Alea Cooley RN Service: -- Author Type: Registered Nurse    Filed: 7/3/2021  2:42 PM Encounter Date: 7/3/2021 Status: Signed    : Alea Cooley RN (Registered Nurse)       RN cannot approve Refill Request    RN can NOT refill this medication A break in medication, RN cannot fill. Patient would be out of medication in April of 2021. . Last office visit: Visit date not found Last Physical: Visit date not found Last MTM visit: Visit date not found Last visit same specialty: Visit date not found.  Next visit within 3 mo: Visit date not found  Next physical within 3 mo: Visit date not found      Salvatore Banerjee Connection Triage/Med Refill 7/3/2021    Requested Prescriptions   Pending Prescriptions Disp Refills   ? gabapentin (NEURONTIN) 300 MG capsule 90 capsule 3     Sig: Take 1 capsule (300 mg total) by mouth at bedtime.       Gabapentin/Levetiracetam/Tiagabine Refill Protocol  Passed - 7/3/2021  2:41 PM        Passed - PCP or prescribing provider visit in past 12 months or next 3 months     Last office visit with prescriber/PCP: Visit date not found OR same dept: Visit date not found OR same specialty: Visit date not found  Last physical: Visit date not found Last MTM visit: Visit date not found   Next visit within 3 mo: Visit date not found  Next physical within 3 mo: Visit date not found  Prescriber OR PCP: Fidel Stone RN  Last diagnosis associated with med order: 1. Wound, surgical, infected  - gabapentin (NEURONTIN) 300 MG capsule; Take 1 capsule (300 mg total) by mouth at bedtime.  Dispense: 90 capsule; Refill: 3    If protocol passes may refill for 12 months if within 3 months of last provider visit (or a total of 15 months).

## 2021-07-04 NOTE — TELEPHONE ENCOUNTER
Telephone Encounter by Fidel Stone RN at 7/3/2021  2:26 PM     Author: Fidel Stone RN Service: -- Author Type: Registered Nurse    Filed: 7/3/2021  2:35 PM Encounter Date: 7/3/2021 Status: Signed    : Fidel Stone RN (Registered Nurse)       Gilma Dorman, is calling to see if patient can get a refill of gabapentin.    Advised will route message to refill pool to review.      Fidel Stone RN  [facility]

## 2021-07-04 NOTE — TELEPHONE ENCOUNTER
Telephone Encounter by Deena Kang RN at 6/4/2021  2:46 PM     Author: Deena Kang RN Service: -- Author Type: Registered Nurse    Filed: 6/4/2021  2:52 PM Encounter Date: 6/4/2021 Status: Signed    : Deena Kang RN (Registered Nurse)       ANTICOAGULATION  MANAGEMENT    Assessment     Today's INR result of 3.9 is Supratherapeutic (goal INR of 2.5-3.5)        Warfarin taken as previously instructed    No new diet changes affecting INR    No new medication/supplements affecting INR    Continues to tolerate warfarin with no reported s/s of bleeding or thromboembolism     Previous INR was Supratherapeutic    Plan:     Spoke on phone with Riley, son regarding INR result and instructed:      Warfarin Dosing Instructions:  Change warfarin dose to 5 mg daily  (6.7 % change)    Instructed patient to follow up no later than: 2 weeks    Education provided: impact of vitamin K foods on INR, importance of therapeutic range, target INR goal and significance of current INR result, importance of following up for INR monitoring at instructed interval, importance of taking warfarin as instructed, monitoring for bleeding signs and symptoms and when to seek medical attention/emergency care    Riley verbalizes understanding and agrees to warfarin dosing plan.    Instructed to call the [facility] for any changes, questions or concerns. (#[phone])   ?   Deena Kang RN    Subjective/Objective:      Bernadette Yu, a 80 y.o. female is on warfarin. Bernadette Fleming reports:     Home warfarin dose: verbally confirmed home dose with Riley and updated on anticoagulation calendar     Missed doses: No     Medication changes:  No     S/S of bleeding or thromboembolism:  No     New Injury or illness:  No     Changes in diet or alcohol consumption:  No     Upcoming surgery, procedure or cardioversion:  No    Anticoagulation Episode Summary     Current INR goal:  2.5-3.5   TTR:  27.4 % (1 y)    Next INR check:  6/18/2021   INR from last check:  3.90 (6/4/2021)   Weekly max warfarin dose:     Target end date:     INR check location:     Preferred lab:     Send INR reminders to:  [facility]    Indications    S/P mitral valve replacement (MVR) - mechanical mitral valve placed 2015 [Z95.2]           Comments:           Anticoagulation Care Providers     Provider Role Specialty Phone number    Gabino Bach MD Referring Internal Medicine [phone]

## 2021-07-05 NOTE — TELEPHONE ENCOUNTER
Telephone Encounter by Meredith Patino RN at 7/5/2021  4:16 PM     Author: Meredith Patino RN Service: -- Author Type: Registered Nurse    Filed: 7/5/2021  4:57 PM Encounter Date: 7/5/2021 Status: Signed    : Meredith Patino RN (Registered Nurse)       ANTICOAGULATION MANAGEMENT     Bernadette Yu 80 y.o., female is on warfarin with Therapeutic INR result (goal range 2.5-3.5)    Recent labs: (last 7 days)     07/05/21  1354   INR 2.90*       ASSESSMENT     Source: Template      Warfarin dosing taken: Warfarin taken as instructed    Diet: No new diet changes affecting INR    Illness, Injury or hospitalization: No    Medication changes: None    Signs or symptoms of bleeding or clotting: No    Previous INR: therapeutic last visit; previously outside of goal range    Additional findings: None     PLAN     Recommended plan for no diet, medication or health factor changes affecting INR:     Dosing instructions: Continue your current warfarin dose 5 mg daily    Follow up no later than: 4 weeks     Detailed voice message left for Riley diaz with dosing instructions and follow up date.     Left detailed message with recommended recheck date     Plan made per [facility] anticoagulation protocol    Meredith Patino  Anticoagulation Clinic   [phone]    Anticoagulation Episode Summary     Current INR goal:  2.5-3.5   TTR:  31.8 % (1 y)   Next INR check:  8/2/2021   INR from last check:  2.90 (7/5/2021)   Weekly max warfarin dose:     Target end date:     INR check location:     Preferred lab:     Send INR reminders to:  [facility]    Indications    S/P mitral valve replacement (MVR) - mechanical mitral valve placed 2015 [Z95.2]           Comments:           Anticoagulation Care Providers     Provider Role Specialty Phone number    Gabino Bach MD Referring Internal Medicine [phone]

## 2021-07-08 NOTE — TELEPHONE ENCOUNTER
Telephone Encounter by Steinert, Joy C, CMA at 7/8/2021 12:15 PM     Author: Steinert, Joy C, CMA Service: -- Author Type: Certified Medical Assistant    Filed: 7/8/2021 12:28 PM Encounter Date: 7/8/2021 Status: Signed    : Steinert, Joy C, CMA (Certified Medical Assistant)       Notified pt's son and he said that he is going to notified his father.  I couldn't get a hold of her  to relay the message. Patient' son Sarbjit said that she is out of the lasix.  Please send in new a rx.

## 2021-07-08 NOTE — TELEPHONE ENCOUNTER
Telephone Encounter by Gabino Bach MD at 7/8/2021  1:37 PM     Author: Gabino Bach MD Service: -- Author Type: Physician    Filed: 7/8/2021  1:37 PM Encounter Date: 7/8/2021 Status: Signed    : Gabino Bach MD (Physician)       Done.    Gabino Bach MD  General Internal Medicine  [facility]  7/8/2021, 1:37 PM

## 2021-07-08 NOTE — TELEPHONE ENCOUNTER
Telephone Encounter by Gabino Bach MD at 7/8/2021 10:31 AM     Author: Gabino Bach MD Service: -- Author Type: Physician    Filed: 7/8/2021 10:37 AM Encounter Date: 7/8/2021 Status: Signed    : Gabino Bach MD (Physician)       Please call patient and .  Please also communicate to son Sarbjit.    ______________________________________________________________________     Home phone:  [phone] (home)     Cell phone:   Telephone Information:   Mobile [phone]       Other contacts:  Name Home Phone Work Phone Mobile Phone Relationship Lgl Grd   ISHAN VALENTIN [phone]   Spouse    SARBJIT VALENTIN [phone]   Child      ______________________________________________________________________     Her iron level has dropped down again.  Her red cell count has dropped as a result of this.    I would recommend restarting iron.  She should take it Monday Wednesday and Friday.  I will send this into her pharmacy.    There is signs that her heart is working a bit harder and this could be contributing to her swelling in her legs.  She does have an aortic valve that has been tight in the past.  We may want to recheck this and her replaced valve to see how they are working.  I would strongly recommend this.  I will put a referral for an echocardiogram at [facility].  To schedule your appointment, please call [facility] at [phone].     I would recommend that she increase her furosemide to 2 tablets a day.  This will help with the swelling as well.  I will send in a new prescription for this if they need it.    Because of these findings, I would like to see her back in follow-up in about 5 to 6 weeks to see how she is doing.  We will likely follow-up her blood work at that point.    Thank you,    Gabino Bach MD  [facility]  7/8/2021, 10:31 AM     ______________________________________________________________________    Recent Results (from the past 240  "hour(s))   INR   Result Value Ref Range    INR 2.90 (H) 0.90 - 1.10   Basic Metabolic Panel   Result Value Ref Range    Sodium 140 136 - 145 mmol/L    Potassium 4.3 3.5 - 5.0 mmol/L    Chloride 104 98 - 107 mmol/L    CO2 24 22 - 31 mmol/L    Anion Gap, Calculation 12 5 - 18 mmol/L    Glucose 103 70 - 125 mg/dL    Calcium 8.5 8.5 - 10.5 mg/dL    BUN 21 8 - 28 mg/dL    Creatinine 0.94 0.60 - 1.10 mg/dL    GFR MDRD Af Amer >60 >60 mL/min/1.73m2    GFR MDRD Non Af Amer 57 (L) >60 mL/min/1.73m2   HM2(CBC w/o Differential)   Result Value Ref Range    WBC 6.6 4.0 - 11.0 thou/uL    RBC 3.38 (L) 3.80 - 5.40 mill/uL    Hemoglobin 8.2 (L) 12.0 - 16.0 g/dL    Hematocrit 28.0 (L) 35.0 - 47.0 %    MCV 83 80 - 100 fL    MCH 24.3 (L) 27.0 - 34.0 pg    MCHC 29.3 (L) 32.0 - 36.0 g/dL    RDW 17.0 (H) 11.0 - 14.5 %    Platelets 216 140 - 440 thou/uL    MPV 11.4 (H) 7.0 - 10.0 fL   BNP(B-type Natriuretic Peptide)   Result Value Ref Range     (H) 0 - 163 pg/mL   Iron and Transferrin Iron Binding Capacity   Result Value Ref Range    Iron 25 (L) 42 - 175 ug/dL    Transferrin 318 212 - 360 mg/dL    Transferrin Saturation, Calculated 6 (L) 20 - 50 %    Transferrin IBC, Calculated 397 313 - 563 ug/dL   Ferritin   Result Value Ref Range    Ferritin 26 10 - 130 ng/mL     Current Outpatient Medications   Medication Sig   ? BD ULTRA-FINE DAVID PEN NEEDLE 32 gauge x 5/32\" Ndle 1 each by abdominal subcutaneous route 2 (two) times a day. USE WITH NOVOLOG PENS.   Please keep this Rx on file for the next RF.   ? blood glucose test strips Use 1 each As Directed 3 (three) times a day. Dispense brand per patient's insurance at pharmacy discretion.   ? blood-glucose meter Misc Dispense 1 meter as covered by patient's insurance.   ? diltiazem (CARDIZEM CD) 180 MG 24 hr capsule Take 1 capsule (180 mg total) by mouth daily.   ? furosemide (LASIX) 40 MG tablet Take 1 tablet (40 mg total) by mouth daily.   ? gabapentin (NEURONTIN) 300 MG capsule Take 1 " capsule (300 mg total) by mouth at bedtime.   ? generic lancets Use 1 each As Directed 3 (three) times a day. Dx E11.65 DM2, uncontrolled   ? HYDROmorphone (DILAUDID) 4 MG tablet Take 0.5-1 tablets (2-4 mg total) by mouth 4 (four) times a day as needed for pain. May refill every 30 days only.   ? insulin aspart protamine-insulin aspart (NOVOLOG MIX 70-30FLEXPEN U-100) 100 unit/mL (70-30) pen Inject 30 Units under the skin every morning AND 15 Units every evening.   ? levothyroxine (SYNTHROID, LEVOTHROID) 125 MCG tablet Take 1 tablet (125 mcg total) by mouth daily.   ? polyethylene glycol (MIRALAX) 17 gram packet Take 1 packet (17 g total) by mouth daily.   ? triamcinolone (KENALOG) 0.1 % cream Apply topically daily.   ? warfarin ANTICOAGULANT (COUMADIN/JANTOVEN) 5 MG tablet Take 1 tablet (5 mg total) by mouth daily. Please adjust as recommended by anticoagulation nurse.      ______________________________________________________________________

## 2021-07-14 PROBLEM — K92.2 GIB (GASTROINTESTINAL BLEEDING): Status: RESOLVED | Noted: 2018-04-17 | Resolved: 2018-05-31

## 2021-07-14 PROBLEM — M62.82 NON-TRAUMATIC RHABDOMYOLYSIS: Status: RESOLVED | Noted: 2020-01-01 | Resolved: 2021-01-01

## 2021-07-14 PROBLEM — I25.10 ATHEROSCLEROSIS OF NATIVE CORONARY ARTERY WITHOUT ANGINA PECTORIS: Status: RESOLVED | Noted: 2019-10-26 | Resolved: 2019-11-04

## 2021-07-21 NOTE — TELEPHONE ENCOUNTER
Controlled Substance Refill Request     Last refill:   HYDROmorphone (DILAUDID) 4 MG tablet 120 tablet 0 6/21/2021 7/21/2021 No   Sig - Route: [HYDROMORPHONE (DILAUDID) 4 MG TABLET] Take 0.5-1 tablets (2-4 mg total) by mouth 4 (four) times a day as needed for pain. May refill every 30 days only. - Oral   Class: Normal   Notes to Pharmacy:  reviewed 4/19/2021.         Last clinic visit: 7/5/2021-Dr Gabino Bach      1. Paroxysmal atrial fibrillation (H) - VALVULAR    2. ASCVD (arteriosclerotic cardiovascular disease) - CABG 2015    3. SOB (shortness of breath)    4. Moderate aortic stenosis    5. Type 2 diabetes mellitus with microalbuminuria, with long-term current use of insulin (H)    6. S/P mitral valve replacement (MVR) - mechanical mitral valve placed 2015    7. Spinal stenosis of lumbar region with neurogenic claudication    8. Abdominal distention    9. Bilateral lower extremity edema    10. Primary insomnia          Clinic visit frequency required: Q 6  months    Next appt: 8/24/2021    Controlled substance agreement on file: Yes:  Date 3/4/2021.    Documentation in problem list reviewed:  Yes    Processing:  Rx to be electronically transmitted to pharmacy by provider

## 2021-07-21 NOTE — TELEPHONE ENCOUNTER
Reason for Call:  Medication or medication refill:    Do you use a [facility]?  Name of the pharmacy and phone number for the current request:      [facility] on file    Name of the medication requested:     Hydromorphone 4 mg tablet    Other request: Patient will be out of meds in the next 2 days.    Can we leave a detailed message on this number? YES    Phone number patient can be reached at: Home number on file [phone] (home)    Best Time: Any time    Call taken on 7/21/2021 at 8:22 AM by Justice White

## 2021-07-23 NOTE — TELEPHONE ENCOUNTER
Please call patient's son Sarbjit Valentin -    ______________________________________________________________________       We are calling about her ultrasound results.  Her ultrasound shows that her heart function is good overall.  Her heart is squeezing well.    She has good function of her repaired valve.  She also has gradual progression of the aortic valve which is tighter than it has been in the past.    No acute changes need to be done at this time.  Please follow-up as scheduled and we will review it more at that time.     Gabino Hernandez MD  [facility]  2021, 11:24 PM   ______________________________________________________________________    Pertinent radiology for this visit includes the following:    Echocardiogram Complete  [id number]  [id number]  [id number]  [id number]^MARY^[place] [address]     Name: ANNA MARIE VALENTIN  MRN: [id number]  : 1940  Study Date: 2021 12:53 PM  Age: 80 yrs  Gender: Female  Patient Location: On license of [facility]  Reason For Study: Bilateral lower extremity edema, Paroxysmal atrial  fibrillation  Ordering Physician: GABINO HERNANDEZ  Referring Physician: GABINO HERNANDEZ  Performed By: THOM     BSA: 1.8 m2  Height: 63 in  Weight: 168 lb  HR: 78  BP: 130/62 mmHg  ______________________________________________________________________________  Procedure  Compared to the prior study dated 10/26/2019, there have been no changes.  ______________________________________________________________________________  Interpretation Summary     1.Left ventricular function is decreased. The ejection fraction is 50-55%  (borderline).  2.There is mild concentric left ventricular hypertrophy.  3.TAPSE is abnormal, which is consistent with abnormal right ventricular  systolic function.  4.There is a bi-leaflet ([employer]) mechanical prosthesis. Normal prosthetic  mitral valve gradients at 6 mmHg.  5.There is moderate to  mod-severe (2-3+) tricuspid regurgitation. Right  ventricular systolic pressure is elevated, consistent with moderate pulmonary  hypertension.  6.Moderate to severe valvular aortic stenosis.Mean gradient 34 mmHg with peak  velocity 3.4 m/s. There is mild (1+) aortic regurgitation.  7.Compared to the prior study dated 10/26/2019, the AS is worse.  ______________________________________________________________________________  I      WMSI = 1.00     % Normal = 100     X - Cannot   0 -                      (2) - Mildly 2 -          Segments  Size  Interpret    Hyperkinetic 1 - Normal  Hypokinetic  Hypokinetic  1-2     small                                                     7 -          3-5      moderate  3 - Akinetic 4 -          5 -         6 - Akinetic Dyskinetic   6-14    large               Dyskinetic   Aneurysmal  w/scar       w/scar       15-16   diffuse     Left Ventricle  Left ventricular function is decreased. The ejection fraction is 50-55%  (borderline). There is mild concentric left ventricular hypertrophy. Left  ventricular diastolic function is not assessable. Septal motion is consistent  with post-operative state.     Right Ventricle  TAPSE is abnormal, which is consistent with abnormal right ventricular  systolic function. Mildly decreased right ventricular systolic function.     Atria  The left atrium is mildly dilated. Right atrial size is normal. There is no  color Doppler evidence of an atrial shunt.     Mitral Valve  There is a bi-leaflet ([employer]) mechanical prosthesis. Normal prosthetic  mitral valve gradients.     Tricuspid Valve  Tricuspid valve leaflets appear normal. There is moderate to mod-severe (2-3+)  tricuspid regurgitation. Right ventricular systolic pressure is elevated,  consistent with moderate pulmonary hypertension.     Aortic Valve  The aortic valve is not well visualized. The aortic valve is trileaflet.  Thickened aortic valve leaflets. There is mild (1+) aortic  regurgitation.  Moderate to severe valvular aortic stenosis.     Pulmonic Valve  The pulmonic valve is not well seen, but is grossly normal. This degree of  valvular regurgitation is within normal limits.     Vessels  The aorta root is normal. Normal size ascending aorta. IVC diameter <2.1 cm  collapsing >50% with sniff suggests a normal RA pressure of 3 mmHg.     Pericardium  There is no pericardial effusion.     Rhythm  The rhythm was atrial fibrillation.     ______________________________________________________________________________  MMode/2D Measurements & Calculations  IVSd: 1.6 cm  LVIDd: 3.4 cm  LVIDs: 2.6 cm  LVPWd: 1.3 cm  FS: 24.4 %  LV mass(C)d: 176.5 grams  LV mass(C)dI: 98.3 grams/m2     Ao root diam: 2.6 cm  LA dimension: 3.8 cm  asc Aorta Diam: 2.6 cm  LA/Ao: 1.5  LVOT diam: 2.2 cm  LVOT area: 3.7 cm2     Time Measurements  MM HR: 78.0 BPM     Doppler Measurements & Calculations  MV E max ursula: 187.4 cm/sec  MV A max ursula: 79.4 cm/sec  MV E/A: 2.4  MV max P.9 mmHg  MV mean P.6 mmHg  MV V2 VTI: 40.0 cm  MVA(VTI): 2.4 cm2  MV dec time: 0.14 sec  Ao V2 max: 343.1 cm/sec  Ao max P.0 mmHg  Ao V2 mean: 276.0 cm/sec  Ao mean P.9 mmHg  Ao V2 VTI: 95.2 cm  MISAEL(I,D): 1.00 cm2  MISAEL(V,D): 1.1 cm2  AI P1/2t: 243.3 msec  LV V1 max P.2 mmHg  LV V1 max: 102.2 cm/sec  LV V1 VTI: 25.4 cm  SV(LVOT): 94.9 ml  SI(LVOT): 52.8 ml/m2  PA acc time: 0.08 sec     TR max ursula: 338.0 cm/sec  TR max P.7 mmHg  MISAEL Index (cm2/m2): 0.55  E/E' av.1  Lateral E/e': 22.4  Medial E/e': 41.8     ______________________________________________________________________________  Report approved by: Janette Vicente 2021 05:02 PM            ______________________________________________________________________

## 2021-08-03 PROBLEM — I48.91 ATRIAL FIBRILLATION WITH RVR (H): Status: RESOLVED | Noted: 2020-02-19 | Resolved: 2020-03-02

## 2021-08-03 PROBLEM — I47.21 TORSADES DE POINTES (H): Status: RESOLVED | Noted: 2019-10-26 | Resolved: 2019-11-27

## 2021-08-03 PROBLEM — M86.9 OSTEOMYELITIS OF GREAT TOE (H): Status: RESOLVED | Noted: 2021-01-01 | Resolved: 2021-01-01

## 2021-08-03 PROBLEM — L89.620 PRESSURE INJURY OF LEFT HEEL, UNSTAGEABLE (H): Status: RESOLVED | Noted: 2021-01-01 | Resolved: 2021-01-01

## 2021-08-03 PROBLEM — R65.10 SIRS (SYSTEMIC INFLAMMATORY RESPONSE SYNDROME) (H): Status: RESOLVED | Noted: 2020-01-01 | Resolved: 2021-01-01

## 2021-08-06 NOTE — TELEPHONE ENCOUNTER
Telephone Encounter by Laurie Diallo RN at 4/22/2020 12:42 PM     Author: Laurie Diallo RN Service: -- Author Type: Registered Nurse    Filed: 4/22/2020  2:20 PM Encounter Date: 4/22/2020 Status: Addendum    : Laurie Diallo RN (Registered Nurse)    Related Notes: Original Note by Laurie Diallo RN (Registered Nurse) filed at 4/22/2020  2:15 PM         RN cannot approve Refill Request    RN can NOT refill this medication Protocol failed and NO refill given, historical medication requested and discrepancies. Last office visit: 4/20/2020 Gabino Bach MD Last Physical: 2/26/2018 Last MTM visit: Visit date not found Last visit same specialty: 4/20/2020 Gabino Bach MD.  Next visit within 3 mo: Visit date not found  Next physical within 3 mo: Visit date not found      Salvatore Garcia Connection Triage/Med Refill 4/22/2020    Requested Prescriptions   Pending Prescriptions Disp Refills   ? ACCU-CHEK SMARTVIEW TEST STRIP strips 300 strip 3     Sig: Use 1 each As Directed 3 (three) times a day. Dispense brand per patient's insurance at pharmacy discretion.  Dx E11.65 DM2, uncontrolled       Diabetic Supplies Refill Protocol Passed - 4/22/2020  2:15 PM        Passed - Visit with PCP or prescribing provider visit in last 6 months     Last office visit with prescriber/PCP: 4/20/2020 Gabino Bach MD OR same dept: 4/20/2020 Gabino Bach MD OR same specialty: 4/20/2020 Gabino Bach MD  Last physical: 2/26/2018 Last MTM visit: Visit date not found   Next visit within 3 mo: Visit date not found  Next physical within 3 mo: Visit date not found  Prescriber OR PCP: Gabino Bach MD  Last diagnosis associated with med order: 1. Bilateral leg edema  - furosemide (LASIX) 40 MG tablet; Take 1 tablet (40 mg total) by mouth daily.  Dispense: 90 tablet; Refill: 3    2. Wound, surgical, infected  - gabapentin (NEURONTIN) 300 MG capsule; Take 1 capsule (300 mg total) by mouth at bedtime.  Dispense: 90 capsule; Refill:  "3    3. Primary insomnia  - temazepam (RESTORIL) 15 mg capsule; Take 1-2 capsules (15-30 mg total) by mouth at bedtime as needed for sleep.  Dispense: 60 capsule; Refill: 2    If protocol passes may refill for 12 months if within 3 months of last provider visit (or a total of 15 months).             Passed - A1C in last 6 months     Hemoglobin A1c   Date Value Ref Range Status   02/10/2020 10.2 (H) 3.5 - 6.0 % Final                ? BD ULTRA-FINE DAVID PEN NEEDLE 32 gauge x 5/32\" Ndle  0     Sig: USE WITH NOVOLOG AND NOVOLIN PENS       Diabetic Supplies Refill Protocol Passed - 4/22/2020  2:15 PM        Passed - Visit with PCP or prescribing provider visit in last 6 months     Last office visit with prescriber/PCP: 4/20/2020 Gabino Bach MD OR same dept: 4/20/2020 Gabino Bach MD OR same specialty: 4/20/2020 Gabino Bach MD  Last physical: 2/26/2018 Last MTM visit: Visit date not found   Next visit within 3 mo: Visit date not found  Next physical within 3 mo: Visit date not found  Prescriber OR PCP: Gabino Bach MD  Last diagnosis associated with med order: 1. Bilateral leg edema  - furosemide (LASIX) 40 MG tablet; Take 1 tablet (40 mg total) by mouth daily.  Dispense: 90 tablet; Refill: 3    2. Wound, surgical, infected  - gabapentin (NEURONTIN) 300 MG capsule; Take 1 capsule (300 mg total) by mouth at bedtime.  Dispense: 90 capsule; Refill: 3    3. Primary insomnia  - temazepam (RESTORIL) 15 mg capsule; Take 1-2 capsules (15-30 mg total) by mouth at bedtime as needed for sleep.  Dispense: 60 capsule; Refill: 2    If protocol passes may refill for 12 months if within 3 months of last provider visit (or a total of 15 months).             Passed - A1C in last 6 months     Hemoglobin A1c   Date Value Ref Range Status   02/10/2020 10.2 (H) 3.5 - 6.0 % Final                ? cyanocobalamin (VITAMIN B-12) 100 MCG tablet 90 tablet 3     Sig: Take 1 tablet (100 mcg total) by mouth daily.       " Cyanocobalamin (Vitamin B12)  Refill Protocol Failed - 4/22/2020  2:15 PM        Failed - Vitamin B12 level in last 12 months     Vitamin B-12   Date Value Ref Range Status   10/10/2016 >2,000 (H) 213 - 816 pg/mL Final             Passed - PCP or prescribing provider visit in past 12 months       Last office visit with prescriber/PCP: 4/20/2020 Gabino Bach MD OR same dept: 4/20/2020 Gabino Bach MD OR same specialty: 4/20/2020 Gabino Bach MD Last physical: 2/26/2018 Last MTM visit: Visit date not found    Next visit within 3 mo: Visit date not found  Next physical within 3 mo: Visit date not found  Prescriber OR PCP: Gabino Bach MD  Last diagnosis associated with med order: 1. Bilateral leg edema  - furosemide (LASIX) 40 MG tablet; Take 1 tablet (40 mg total) by mouth daily.  Dispense: 90 tablet; Refill: 3    2. Wound, surgical, infected  - gabapentin (NEURONTIN) 300 MG capsule; Take 1 capsule (300 mg total) by mouth at bedtime.  Dispense: 90 capsule; Refill: 3    3. Primary insomnia  - temazepam (RESTORIL) 15 mg capsule; Take 1-2 capsules (15-30 mg total) by mouth at bedtime as needed for sleep.  Dispense: 60 capsule; Refill: 2               Passed - CBC in last 12 months     WBC   Date Value Ref Range Status   03/26/2020 7.2 4.0 - 11.0 thou/uL Final     RBC   Date Value Ref Range Status   03/26/2020 4.48 3.80 - 5.40 mill/uL Final     Hemoglobin   Date Value Ref Range Status   03/26/2020 13.0 12.0 - 16.0 g/dL Final     Hematocrit   Date Value Ref Range Status   03/26/2020 39.3 35.0 - 47.0 % Final     MCV   Date Value Ref Range Status   03/26/2020 88 80 - 100 fL Final     MCH   Date Value Ref Range Status   03/26/2020 29.0 27.0 - 34.0 pg Final     MCHC   Date Value Ref Range Status   03/26/2020 33.1 32.0 - 36.0 g/dL Final     RDW   Date Value Ref Range Status   03/26/2020 14.2 11.0 - 14.5 % Final     Platelets   Date Value Ref Range Status   03/26/2020 168 140 - 440 thou/uL Final     MPV   Date  Value Ref Range Status   03/26/2020 12.9 (H) 8.5 - 12.5 fL Final                 ? cholecalciferol, vitamin D3, 5,000 unit Tab 90 tablet 3     Sig: Take 1 tablet (5,000 Units total) by mouth daily.       There is no refill protocol information for this order      ? furosemide (LASIX) 40 MG tablet 90 tablet 3     Sig: Take 1 tablet (40 mg total) by mouth daily.       Diuretics/Combination Diuretics Refill Protocol  Passed - 4/22/2020  2:15 PM        Passed - Visit with PCP or prescribing provider visit in past 12 months     Last office visit with prescriber/PCP: 4/20/2020 Gabino Bach MD OR same dept: 4/20/2020 Gabino Bach MD OR same specialty: 4/20/2020 Gabino Bach MD  Last physical: 2/26/2018 Last MTM visit: Visit date not found   Next visit within 3 mo: Visit date not found  Next physical within 3 mo: Visit date not found  Prescriber OR PCP: Gabino Bach MD  Last diagnosis associated with med order: 1. Bilateral leg edema  - furosemide (LASIX) 40 MG tablet; Take 1 tablet (40 mg total) by mouth daily.  Dispense: 90 tablet; Refill: 3    2. Wound, surgical, infected  - gabapentin (NEURONTIN) 300 MG capsule; Take 1 capsule (300 mg total) by mouth at bedtime.  Dispense: 90 capsule; Refill: 3    3. Primary insomnia  - temazepam (RESTORIL) 15 mg capsule; Take 1-2 capsules (15-30 mg total) by mouth at bedtime as needed for sleep.  Dispense: 60 capsule; Refill: 2    If protocol passes may refill for 12 months if within 3 months of last provider visit (or a total of 15 months).             Passed - Serum Potassium in past 12 months      Lab Results   Component Value Date    Potassium 4.1 03/26/2020             Passed - Serum Sodium in past 12 months      Lab Results   Component Value Date    Sodium 132 (L) 03/26/2020             Passed - Blood pressure on file in past 12 months     BP Readings from Last 1 Encounters:   04/20/20 132/76             Passed - Serum Creatinine in past 12 months       Creatinine   Date Value Ref Range Status   03/26/2020 0.92 0.60 - 1.10 mg/dL Final              ? gabapentin (NEURONTIN) 300 MG capsule 90 capsule 3     Sig: Take 1 capsule (300 mg total) by mouth at bedtime.       Gabapentin/Levetiracetam/Tiagabine Refill Protocol  Passed - 4/22/2020  2:15 PM        Passed - PCP or prescribing provider visit in past 12 months or next 3 months     Last office visit with prescriber/PCP: 4/20/2020 Gabino Bach MD OR same dept: 4/20/2020 Gabino Bach MD OR same specialty: 4/20/2020 Gabino Bach MD  Last physical: 2/26/2018 Last MTM visit: Visit date not found   Next visit within 3 mo: Visit date not found  Next physical within 3 mo: Visit date not found  Prescriber OR PCP: Gabino Bach MD  Last diagnosis associated with med order: 1. Bilateral leg edema  - furosemide (LASIX) 40 MG tablet; Take 1 tablet (40 mg total) by mouth daily.  Dispense: 90 tablet; Refill: 3    2. Wound, surgical, infected  - gabapentin (NEURONTIN) 300 MG capsule; Take 1 capsule (300 mg total) by mouth at bedtime.  Dispense: 90 capsule; Refill: 3    3. Primary insomnia  - temazepam (RESTORIL) 15 mg capsule; Take 1-2 capsules (15-30 mg total) by mouth at bedtime as needed for sleep.  Dispense: 60 capsule; Refill: 2    If protocol passes may refill for 12 months if within 3 months of last provider visit (or a total of 15 months).              ? generic lancets 300 each 3     Sig: Use 1 each As Directed 3 (three) times a day. Dx E11.65 DM2, uncontrolled       Diabetic Supplies Refill Protocol Passed - 4/22/2020  2:15 PM        Passed - Visit with PCP or prescribing provider visit in last 6 months     Last office visit with prescriber/PCP: 4/20/2020 Gabino Bach MD OR same dept: 4/20/2020 Gabino Bach MD OR same specialty: 4/20/2020 Gabino Bach MD  Last physical: 2/26/2018 Last MTM visit: Visit date not found   Next visit within 3 mo: Visit date not found  Next physical within 3 mo:  Visit date not found  Prescriber OR PCP: Gabino Bach MD  Last diagnosis associated with med order: 1. Bilateral leg edema  - furosemide (LASIX) 40 MG tablet; Take 1 tablet (40 mg total) by mouth daily.  Dispense: 90 tablet; Refill: 3    2. Wound, surgical, infected  - gabapentin (NEURONTIN) 300 MG capsule; Take 1 capsule (300 mg total) by mouth at bedtime.  Dispense: 90 capsule; Refill: 3    3. Primary insomnia  - temazepam (RESTORIL) 15 mg capsule; Take 1-2 capsules (15-30 mg total) by mouth at bedtime as needed for sleep.  Dispense: 60 capsule; Refill: 2    If protocol passes may refill for 12 months if within 3 months of last provider visit (or a total of 15 months).             Passed - A1C in last 6 months     Hemoglobin A1c   Date Value Ref Range Status   02/10/2020 10.2 (H) 3.5 - 6.0 % Final                ? HUMULIN N NPH INSULIN KWIKPEN 100 unit/mL (3 mL) pen 10 adj dose pen 3     Sig: Inject 20 Units under the skin 2 (two) times a day with meals. Give 20 units subcutaneously every morning and 20 units subcutaneously every afternoon  11.9 Type 2 without complications       Insulin/GLP-1 Refill Protocol Failed - 4/22/2020  2:15 PM        Failed - Microalbumin in last year     Microalbumin, Random Urine   Date Value Ref Range Status   04/25/2017 4.11 (H) 0.00 - 1.99 mg/dL Final                  Passed - Visit with PCP or prescribing provider visit in last 6 months     Last office visit with prescriber/PCP: 4/20/2020 OR same dept: 4/20/2020 Gabino Bach MD OR same specialty: 4/20/2020 Gabino Bach MD Last physical: Visit date not found Last MTM visit: Visit date not found     Next appt within 3 mo: Visit date not found  Next physical within 3 mo: Visit date not found  Prescriber OR PCP: Gabino Bach MD  Last diagnosis associated with med order: 1. Bilateral leg edema  - furosemide (LASIX) 40 MG tablet; Take 1 tablet (40 mg total) by mouth daily.  Dispense: 90 tablet; Refill: 3    2. Wound,  surgical, infected  - gabapentin (NEURONTIN) 300 MG capsule; Take 1 capsule (300 mg total) by mouth at bedtime.  Dispense: 90 capsule; Refill: 3    3. Primary insomnia  - temazepam (RESTORIL) 15 mg capsule; Take 1-2 capsules (15-30 mg total) by mouth at bedtime as needed for sleep.  Dispense: 60 capsule; Refill: 2    If protocol passes may refill for 6 months if within 3 months of last provider visit (or a total of 9 months).              Passed - A1C in last 6 months     Hemoglobin A1c   Date Value Ref Range Status   02/10/2020 10.2 (H) 3.5 - 6.0 % Final               Passed - Blood pressure in last year     BP Readings from Last 1 Encounters:   04/20/20 132/76             Passed - Creatinine done in last year     Creatinine   Date Value Ref Range Status   03/26/2020 0.92 0.60 - 1.10 mg/dL Final              ? temazepam (RESTORIL) 15 mg capsule 60 capsule 2     Sig: Take 1-2 capsules (15-30 mg total) by mouth at bedtime as needed for sleep.       Controlled Substances Refill Protocol Failed - 4/22/2020  2:15 PM        Failed - Route all Controlled Substance Requests to Provider        Passed - Patient has controlled substance agreement in past 12 months     Encounter-Level CSA Scan Date - 08/01/2018:    Scan on 8/6/2018  8:40 AM: CHRONIC PAIN MANAGEMENT           Encounter-Level CSA Scan Date - 01/30/2018:    Scan on 2/1/2018 12:13 PM: HE -           Encounter-Level CSA Scan Date - 02/09/2017:    Scan on 2/10/2017  3:16 PM               Passed - Visit with PCP or prescribing provider visit in past 12 months      Last office visit with prescriber/PCP: 4/20/2020 Gabino Bach MD OR same dept: 4/20/2020 Gabino Bach MD OR same specialty: 4/20/2020 Gabino Bach MD Last physical: 2/26/2018 Last MTM visit: Visit date not found    Next visit within 3 mo: Visit date not found  Next physical within 3 mo: Visit date not found  Prescriber OR PCP: Gabino Bach MD  Last diagnosis associated with med order: 1.  "Bilateral leg edema  - furosemide (LASIX) 40 MG tablet; Take 1 tablet (40 mg total) by mouth daily.  Dispense: 90 tablet; Refill: 3    2. Wound, surgical, infected  - gabapentin (NEURONTIN) 300 MG capsule; Take 1 capsule (300 mg total) by mouth at bedtime.  Dispense: 90 capsule; Refill: 3    3. Primary insomnia  - temazepam (RESTORIL) 15 mg capsule; Take 1-2 capsules (15-30 mg total) by mouth at bedtime as needed for sleep.  Dispense: 60 capsule; Refill: 2                           Called and spoke with patient.    She was reviewing her after visit summary and noted medications that are historical or have not been refilled for a while.  Patient is inquiring as to whether she should be on these medications and if so, needs refills.    1.  BD ULTRA-FINE DAVID PEN NEEDLES 32 gauge x 5/32\" Ndle   0  12/5/2017   Yes    Sig: USE WITH NOVOLOG PEN AND LANTUS PENS    Class: Historical Med      Patient states that she is checking her blood sugars 2 times a day.    2. Patient states she is monitoring her blood sugars 2 times a day.  generic lancets  300 each  3  5/8/2017   --    Sig - Route: Use 1 each As Directed 3 (three) times a day. Dx E11.65 DM2, uncontrolled - Miscellaneous    E-Prescribing Status: Receipt confirmed by pharmacy (5/8/2017  2:28 PM CDT)      3.  BD ULTRA-FINE DAVID PEN NEEDLES 32 gauge x 5/32\" Ndle   Novolog: 3 times  Novolin: 2 times  Total a day: 5 needles.    4.  NOVOLOG U-100 INSULIN ASPART 100 unit/mL injection    3/5/2020   Yes    Sig: INJECT 40 UNITS UNDER THE SKIN TID    Class: Historical Med      5.  Frequency discrepancy: Patient states she is taking 40 units daily.  insulin NPH (NOVOLIN) 100 unit/mL injection      --    Sig - Route: Inject 20 Units under the skin 2 (two) times a day. - Subcutaneous    Class: Historical Med        6. Patient is inquiring if she is suppose to be on this medication and if yes, needs refills.  cyanocobalamin (VITAMIN B-12) 100 MCG tablet      No    Sig - Route: Take " 100 mcg by mouth daily. - Oral    Class: Historical Med      7.Patient is inquiring if she is suppose to be taking this medication and if yes, needs refills.  furosemide (LASIX) 40 MG tablet  90 tablet  3  2/22/2020 2/21/2021  No    Sig - Route: Take 1 tablet (40 mg total) by mouth daily. - Oral    Class: No Print      8. Patient is inquiring if she is suppose to be taking this medication and if yes, needs refills.  gabapentin (NEURONTIN) 300 MG capsule  30 capsule  0  2/22/2020   No    Sig - Route: Take 1 capsule (300 mg total) by mouth at bedtime. - Oral    Sent to pharmacy as: gabapentin 300 mg capsule (NEURONTIN)    E-Prescribing Status: Receipt confirmed by pharmacy (2/22/2020 12:01 PM CST)        9.  temazepam (RESTORIL) 15 mg capsule  90 capsule  1  12/19/2019   No    Sig - Route: Take 2 capsules (30 mg total) by mouth at bedtime as needed for sleep. - Oral    Patient taking differently: Take 15-30 mg by mouth at bedtime as needed for sleep.         Sent to pharmacy as: temazepam 15 mg capsule (RESTORIL)    E-Prescribing Status: Receipt confirmed by pharmacy (12/19/2019 10:29 PM CST)        Last office visit: 3/26/20  Assessment:      1. Osteomyelitis of great toe (H)    2. Chronic pain syndrome    3. Intermittent atrial fibrillation (H)    4. S/P mitral valve replacement (MVR)    5. Anticoagulation goal of INR 2.5 to 3.5    6. Atrial flutter, unspecified type (H)    7. Skin ulcer of toe, limited to breakdown of skin, unspecified laterality (H)    8. Ulcer of heel and midfoot, left, with unspecified severity (H)    9. Moderate aortic stenosis         Quality review:      PHQ-2 Total Score: 2 (10/25/2019  4:00 PM)       No data recorded  ______________________________________________________________________          BMI Readings from Last 1 Encounters:   03/03/20 26.00 kg/m         Plan:      1. See patient instructions for more information.  2. Blood work done today.  3. Start treatment of likely  osteomyelitis with amoxicillin.  Recheck blood work in the next 2 weeks.  Follow for side effects.  4. The patient best would benefit from in person visits even in light of the current coronavirus situation.  5. Given samples of DuoDERM today.     Patient Instructions   1.  Antibiotic for the wounds.  Amoxicillin take 1 tablet twice a day for 14 days.  We might need to continue this in the future.  2.  Blood work today and we will call you with the results.  3.  Soak feet in warm water every other day for 10 minutes.  4.  Change dressing every other day.  5.  Toes:  Put the bacitracin ointment on it every 2 days and cover.  6.  Heel:  Put DUODERM on every 2 days and cover.  7.  INR today and come back next week to have done.  8.  Stop potassium supplement and we will advise when the blood work comes back.  9.  FOLLOW UP MONDAY, APRIL 6th, at 1:30 pm.        45 minutes or greater were spent with the patient today with greater than 50% of the times spent in counseling and coordination of care.  This involved prognosis related to their health issues, risks and benefits of treatment options for their active health issues and instructions for management of diseases above in the assessment.         Gabino Bach MD  General Internal Medicine  [facility]        Return in about 2 weeks (around 4/9/2020) for follow up visit.

## 2021-08-06 NOTE — PROGRESS NOTES
Progress Notes by Gabino Bach MD at 1/15/2019 11:20 AM     Author: Gabino Bach MD Service: -- Author Type: Physician    Filed: 1/15/2019 10:17 PM Encounter Date: 1/15/2019 Status: Signed    : Gabino Bach MD (Physician)              [facility]    Comprehensive and complex medical care - Chronic disease management - Shared decision making - Care coordination - Compassionate care    Patient advocacy - Rational deprescribing - Minimally disruptive medicine - Ethical focus - Customized care    ______________________________________________________________________     Date of Service: 1/15/2019  Primary Provider: Gabino Bach MD    Patient Care Team:  Gabino Bach MD as PCP - General (Internal Medicine)  Ayanna Camacho MD as Physician (Cardiology)  Al García MD as Physician (Ophthalmology)  Mauro, Ishan Escobar MD as Physician (Gastroenterology)  [facility], Mallory MCCRAY RN as Registered Nurse  Gabino Bach MD (Internal Medicine)     ______________________________________________________________________     Patient's Pharmacy:    [facility] [address] [place] [address] AT [facility] & [address] [address]  Phone: [phone] Fax: [phone]     Patient's Contacts:  Name Home Phone Work Phone Mobile Phone Relationship Lgl ISHAN Carreon [phone]   Spouse    ANDREA VALENTIN [phone]   Child      Patient's Insurance:    Payor: MEDICA / Plan: MEDICA / Product Type: PPO/POS/FFS /     ______________________________________________________________________     Bernadette RINALDI Aimee is 78 y.o. female who comes in today for:    Chief Complaint   Patient presents with   ? Follow-up     still has back pain radiating down legs, legs hurt more   ? Diabetes   ? Medication Problem     pharmacy will only rx hydromorphone 7 days at a time   ? Bloated     since had colonoscopy will get hard on the right  "side       Active Problem List:  Problem List as of 1/15/2019 Reviewed: 1/15/2019 10:06 PM by Gabino Bach MD       High    Former smoker    Full code status - POLST form 1/2/16    ASCVD (arteriosclerotic cardiovascular disease) - CABG 2015    Atrial fibrillation, Now in NSR (H)    Chronic pain - Dr. Bach manages the pain    DM type 2 (diabetes mellitus, type 2) (H)    S/P mitral valve replacement (MVR)    Subclavian artery stenosis, left (H) - s/p stent       Medium    Controlled substance agreement signed - 1/18 - temazepam, lorazepam, hydromorphone - UDS 4/18    HTN (hypertension)    Abdominal bloating, chronic    Anticoagulation goal of INR 2.5 to 3.5    Anxiety    Hypothyroidism    Recurrent UTI (urinary tract infection)       Low    Bleeding diathesis (H) - due to warfarin    CN (constipation)    HLD (hyperlipidemia)    IBS (irritable bowel syndrome)    Insomnia    Microalbuminuria due to type 2 diabetes mellitus (H)    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 ACCU-CHEK SMARTVIEW TEST STRIP strips Use 1 each As Directed 3 (three) times a day. Dispense brand per patient's insurance at pharmacy discretion.  Dx E11.65 DM2, uncontrolled    ? ascorbic acid, vitamin C, (VITAMIN C) 100 MG tablet Take 100 mg by mouth daily.    ? BD ULTRA-FINE DAVID PEN NEEDLES 32 gauge x 5/32\" Ndle USE WITH NOVOLOG PEN AND LANTUS PENS    ? calcium carbonate (OS-DI) 600 mg calcium (1,500 mg) tablet Take 600 mg by mouth 2 (two) times a day with meals.    ? cholecalciferol, vitamin D3, 5,000 unit Tab Take 5,000 Units by mouth daily.    ? cyanocobalamin (VITAMIN B-12) 100 MCG tablet Take 100 mcg by mouth daily.    ? ferrous sulfate 325 (65 FE) MG tablet Take 1 tablet (325 mg total) by mouth every other " day.    ? furosemide (LASIX) 40 MG tablet Take 40 mg by mouth daily.    ? generic lancets Use 1 each As Directed 3 (three) times a day. Dx E11.65 DM2, uncontrolled    ? HYDROmorphone (DILAUDID) 4 MG tablet Take 0.5-1 tablets (2-4 mg total) by mouth 4 (four) times a day as needed for pain. For use from 1/11/2019 to 1/18/2019.  RX 1/3.    ? [START ON 1/18/2019] HYDROmorphone (DILAUDID) 4 MG tablet Take 0.5-1 tablets (2-4 mg total) by mouth 4 (four) times a day as needed for pain. For use from 1/18/2019 to 2/17/2019.  RX 2/3.    ? [START ON 2/17/2019] HYDROmorphone (DILAUDID) 4 MG tablet Take 0.5-1 tablets (2-4 mg total) by mouth 4 (four) times a day as needed for pain. For use from 2/17/2019 to 3/19/2019.  RX 3/3    ? insulin aspart U-100 (NOVOLOG) 100 unit/mL injection Inject 10 Units under the skin 3 (three) times a day as needed. 4/17/2018: njects based on own sliding scale with each meal as needed based on sugars. Average of 10 units per dose   ? levothyroxine (SYNTHROID, LEVOTHROID) 125 MCG tablet Take 1 tablet (125 mcg total) by mouth daily.    ? magnesium 30 mg tablet Take 30 mg by mouth 2 (two) times a day.    ? NOVOLIN N NPH U-100 INSULIN 100 unit/mL injection ADMINISTER 40 UNITS UNDER THE SKIN THREE TIMES DAILY    ? polyethylene glycol (MIRALAX) 17 gram packet Take 1 packet (17 g total) by mouth daily as needed.    ? silver sulfADIAZINE (SILVADENE, SSD) 1 % cream Apply to affected area daily    ? temazepam (RESTORIL) 15 mg capsule Take 1 capsule (15 mg total) by mouth at bedtime as needed for sleep. May refill every 90 days only.    ? warfarin (COUMADIN) 5 MG tablet Take 5-7.5 mg by mouth See Admin Instructions. Take 7.5mg on Wednesdays and Saturdays. Take 5mg all other days of the week.      Social History     Social History Narrative    ** Merged History Encounter **         Patient of Dr. Bach since 2001.  .    Former patient of Dr. Harshad Ballard.      ______________________________________________________________________     History of present illness:    Patient comes in today for follow-up of a number of issues with her .    We reviewed her valve and this seems to be doing well.  She has no major concerns related to it.  She has had no chest pain or chest pressure.  She is on warfarin and it seems to be going well for her.    We reviewed her diabetes.  Her sugars run high at times but overall good.  She is been on the same irregular insulin injections over a long time and has not had issues.  Her last A1c was reasonable.    We reviewed her bloating of her stomach.  She has had this for a long time.  Is likely multifactorial.  She denies any stomach pain.  She has feels tight in the stomach when it looks up.  She has redundant colon on her colonoscopy.    We reviewed her high blood pressure and her blood pressure is doing okay at this point.    We reviewed her history of anemia and we will follow-up on this today.    Her back pain on spine.  Continue to give her this.  There is some issues with refills of her hydromorphone, but will continue to try to work on this to her is the best of our ability.    We reviewed her other issues noted in the assessment but not specifically addressed in the HPI above.     On review of systems, the patient denies any chest pain or shortness of breath.    ______________________________________________________________________    Exam:    Wt Readings from Last 3 Encounters:   01/15/19 156 lb (70.8 kg)   10/30/18 153 lb 4.8 oz (69.5 kg)   08/31/18 155 lb 8 oz (70.5 kg)     BP Readings from Last 3 Encounters:   01/15/19 134/72   10/30/18 136/60   08/31/18 132/70     /72   Pulse 71   Wt 156 lb (70.8 kg)   SpO2 97%   BMI 27.63 kg/m     The patient is comfortable, no acute distress.  Mood good.  Insight good.  Eyes are nonicteric.  Neck is supple without mass.  No cervical adenopathy.  No thyromegaly. Heart regular  rate and rhythm.  Lungs clear to auscultation bilaterally.  Respiratory effort is good.  Abdomen soft and nontender.  Her abdomen is markedly bloated.  There are positive bowel sounds.  No hepatosplenomegaly.  Extremities no edema.    ______________________________________________________________________    Diagnostics:    Results for orders placed or performed in visit on 01/15/19   HM2(CBC w/o Differential)   Result Value Ref Range    WBC 4.9 4.0 - 11.0 thou/uL    RBC 4.27 3.80 - 5.40 mill/uL    Hemoglobin 12.4 12.0 - 16.0 g/dL    Hematocrit 36.9 35.0 - 47.0 %    MCV 87 80 - 100 fL    MCH 29.0 27.0 - 34.0 pg    MCHC 33.5 32.0 - 36.0 g/dL    RDW 12.0 11.0 - 14.5 %    Platelets 146 140 - 440 thou/uL    MPV 9.5 7.0 - 10.0 fL   Basic Metabolic Panel   Result Value Ref Range    Sodium 137 136 - 145 mmol/L    Potassium 4.2 3.5 - 5.0 mmol/L    Chloride 102 98 - 107 mmol/L    CO2 26 22 - 31 mmol/L    Anion Gap, Calculation 9 5 - 18 mmol/L    Glucose 171 (H) 70 - 125 mg/dL    Calcium 9.2 8.5 - 10.5 mg/dL    BUN 22 8 - 28 mg/dL    Creatinine 1.00 0.60 - 1.10 mg/dL    GFR MDRD Af Amer >60 >60 mL/min/1.73m2    GFR MDRD Non Af Amer 53 (L) >60 mL/min/1.73m2   INR   Result Value Ref Range    INR 3.60 (H) 0.90 - 1.10     ______________________________________________________________________    Assessment:    1. Abdominal bloating    2. Mild nonproliferative diabetic retinopathy of both eyes without macular edema associated with type 2 diabetes mellitus (H)    3. Microalbuminuria due to type 2 diabetes mellitus (H)    4. Bleeding diathesis (H) - due to warfarin    5. Essential hypertension    6. Type 2 diabetes mellitus with microalbuminuria, with long-term current use of insulin (H)    7. Atrial fibrillation, unspecified type (H)    8. S/P mitral valve replacement (MVR)    9. Subclavian artery stenosis, left (H) - s/p stent    10. Atrial fibrillation (H)    11. Anticoagulation goal of INR 2.5 to 3.5    12. Blood loss anemia     13. Chronic pain syndrome    14. Spinal stenosis of lumbar region, unspecified whether neurogenic claudication present      ______________________________________________________________________     PHQ-2 Total Score: 0 (1/15/2019 11:00 AM)    No Data Recorded  ______________________________________________________________________    Plan:    1. Blood work done today.  Blood work at next visit to include iron levels and A1c.    2. Follow-up with me again in 2-3 months.  3. Can give information on Shingrix at next visit.  4. Continue hydromorphone for chronic pain management of her spinal disease.  5. She could go to an acupuncturist if needed.  We gave her the name of Amina Cotter in [place].  6. No easy solution for her problems.  7. Bloating likely due to slow colonic transport due to chronic diabetes.  She does have redundant colon.  8. Continue current medications as is.  9. Renewed controlled substance agreement today.    Gabino Bach MD  General Internal Medicine  [facility]     Return in about 3 months (around 4/15/2019) for visit and blood work.     Future Appointments   Date Time Provider Department Center   1/29/2019 11:30 AM [facility]         ______________________________________________________________________     Relevant ICD-10 codes and order associations:      ICD-10-CM    1. Abdominal bloating R14.0    2. Mild nonproliferative diabetic retinopathy of both eyes without macular edema associated with type 2 diabetes mellitus (H) E11.3293    3. Microalbuminuria due to type 2 diabetes mellitus (H) E11.29     R80.9    4. Bleeding diathesis (H) - due to warfarin D69.9    5. Essential hypertension I10 Basic Metabolic Panel   6. Type 2 diabetes mellitus with microalbuminuria, with long-term current use of insulin (H) E11.29 Basic Metabolic Panel    R80.9     Z79.4    7. Atrial fibrillation, unspecified type (H) I48.91    8. S/P mitral valve replacement (MVR) Z95.2  HM2(CBC w/o Differential)     INR   9. Subclavian artery stenosis, left (H) - s/p stent I77.1    10. Atrial fibrillation (H) I48.91 INR   11. Anticoagulation goal of INR 2.5 to 3.5 Z51.81 INR    Z79.01    12. Blood loss anemia D50.0    13. Chronic pain syndrome G89.4    14. Spinal stenosis of lumbar region, unspecified whether neurogenic claudication present M48.061

## 2021-08-16 NOTE — PROGRESS NOTES
Patient is overdue for INR.   Updating check date to today so patient shows on our reminder list.      Patient was due for INR on 8/2/21    Overdue INR reminder updated

## 2021-08-17 NOTE — TELEPHONE ENCOUNTER
Patient daughter in law calling this morning to report that patient currently only has 3 pills left a this time from previous Rx.  Was told by pharmacy that they cannot  and start new Rx until 8/20/21.  Patient will run out of meds before they are able to .  Is this able to be filled any sooner?  Unsure how to proceed.  Please call daughter in law at [phone] to discuss.

## 2021-08-17 NOTE — TELEPHONE ENCOUNTER
Please call daughter-in-law.  They appeared to be taking more than they should be.  She should only take up to 4 a day.    We did authorize release now on a one-time basis.  She may need closer monitoring so she does not take too many of the medications.  Thank you.    Gabino Bach MD  General Internal Medicine  [facility]  8/17/2021, 4:41 PM

## 2021-08-24 NOTE — PROGRESS NOTES
ANTICOAGULATION MANAGEMENT     Bernadette Yu 80 year old female is on warfarin with therapeutic INR result. (Goal INR 2.5-3.5)    Recent labs: (last 7 days)     08/24/21  1528   INR 3.5*       ASSESSMENT     Source(s): Chart Review and Template       Warfarin doses taken: Warfarin taken as instructed    Diet: No new diet changes identified    New illness, injury, or hospitalization: No    Medication/supplement changes: None noted    Signs or symptoms of bleeding or clotting: No    Previous INR: Therapeutic last 2(+) visits    Additional findings: None     PLAN     Recommended plan for no diet, medication or health factor changes affecting INR     Dosing Instructions: Continue your current warfarin dose with next INR in 6 weeks       Summary  As of 8/24/2021    Full warfarin instructions:  5 mg every day   Next INR check:  10/5/2021             Detailed voice message left for  Riley La with dosing instructions and follow up date.     Contact [phone] to schedule and with any changes, questions or concerns.     Education provided: Please call back if any changes to your diet, medications or how you've been taking warfarin    Plan made per [facility] anticoagulation protocol    Meredith Patino RN  Anticoagulation Clinic  8/24/2021    _______________________________________________________________________     Anticoagulation Episode Summary     Current INR goal:  2.5-3.5   TTR:  40.6 % (1 y)   Target end date:     Send INR reminders to:  PABLO MITCHELL    Indications    S/P mitral valve replacement (MVR) - mechanical mitral valve placed 2015 [Z95.2]           Comments:           Anticoagulation Care Providers     Provider Role Specialty Phone number    Gabino Bach MD Referring Internal Medicine [phone]

## 2021-08-24 NOTE — PROGRESS NOTES
[facility]    Comprehensive and complex medical care - Chronic disease management - Shared decision making - Care coordination - Compassionate care    Patient advocacy - Rational deprescribing - Minimally disruptive medicine - Ethical focus - Customized care         Date of Service: 8/24/2021  Primary Provider: Gabino Bach    Patient Care Team:  Gabino Bach MD as PCP - General  Gabino Bach MD as Assigned PCP  Nba Cleveland DPM as Assigned Musculoskeletal Provider  Emma Stevenson NP as Assigned Surgical Provider  Al García MD as MD (Ophthalmology)  Ishan Wade MD as MD (Gastroenterology)  Ayanna Camacho MD as MD (Cardiovascular Disease)          Patient's Pharmacy:    [id number] - [address]  [address] [facility] [address]  Phone: [phone] Fax: [phone]     Patient's Contacts:  Name Home Phone Work Phone Mobile Phone Relationship Lgl Grd   ISHAN VALENTIN [phone]   Spouse    ANDREA VALENTIN [phone]   Other      Patient's Insurance:    Payor: MEDICARE / Plan: MEDICARE / Product Type: Medicare /            Active Problem List:  Problem List as of 8/24/2021 Never Reviewed       Medium    S/P mitral valve replacement       Other    Former smoker    Controlled substance agreement signed    Chronic, continuous use of opioids    Hypothyroidism    HLD (hyperlipidemia)    Anxiety state    Chronic pain    Therapeutic opioid induced constipation    Insomnia    IBS (irritable bowel syndrome)    Nonrheumatic mitral valve stenosis    ASCVD (arteriosclerotic cardiovascular disease) - CABG 2015    Subclavian artery stenosis, left (H)    Paroxysmal atrial fibrillation (H) - VALVULAR    Anticoagulation goal of INR 2.5 to 3.5    MRSA (methicillin resistant staph aureus) culture positive    Spinal stenosis of lumbar region with neurogenic claudication    Multilevel neural  "foraminal stenosis    Atrial flutter, unspecified type (H)    Moderate aortic stenosis    Adverse effect of amiodarone, initial encounter    Atrial fibrillation with rapid ventricular response (H)    Ulcer of heel and midfoot, left, with unspecified severity (H)    PAD (peripheral artery disease) (H)       Other    Full code status - POLST form 1/2/16    DM type 2 (diabetes mellitus, type 2) (H)       Other    HTN (hypertension)    Abdominal bloating, chronic    Recurrent UTI (urinary tract infection)    CKD (chronic kidney disease) stage 3, GFR 30-59 ml/min       Other    Bleeding diathesis (H) - due to warfarin    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     diltiazem ER COATED BEADS (CARDIZEM CD/CARTIA XT) 180 mg, Oral, DAILY     ferrous sulfate 325 (65 FE) MG tablet 1 tablet, Oral, THREE TIMES WEEKLY     furosemide (LASIX) 80 mg, Oral, DAILY     gabapentin (NEURONTIN) 300 mg, Oral, AT BEDTIME     gabapentin (NEURONTIN) 300 mg, Oral, AT BEDTIME     generic lancets 1 each, Does not apply, 3 TIMES DAILY     HYDROmorphone (DILAUDID) 2-4 mg, Oral, 4 TIMES DAILY PRN     HYDROmorphone (DILAUDID) 2-4 mg, Oral, 4 TIMES DAILY PRN, For use from 8/17/21 to 9/16/21.  No more than 4 a day.  May release today.     insulin aspart protamine-insulin aspart (NOVOLOG MIX 70-30FLEXPEN U-100) 100 unit/mL (70-30) pen Subcutaneous     insulin aspart protamine-insulin aspart (NOVOLOG MIX 70-30FLEXPEN U-100) 100 unit/mL (70-30) pen 30 Units, Subcutaneous, EVERY MORNING     insulin aspart protamine-insulin aspart (NOVOLOG MIX " 70-30FLEXPEN U-100) 100 unit/mL (70-30) pen 15 Units, Subcutaneous, EVERY EVENING     levothyroxine (SYNTHROID/LEVOTHROID) 125 mcg, Oral, DAILY     polyethylene glycol (MIRALAX) 17 g, Oral, DAILY     triamcinolone (KENALOG) 0.1 % external cream Topical, DAILY     warfarin ANTICOAGULANT (COUMADIN) 5 mg, Oral, DAILY      Social History     Social History Narrative    Patient of Dr. Bach since 2001.   to  Aneudy.    4 children.    Former patient of Dr. Harshad Ballard.       Subjective:     Bernadette Yu is a 80 year old female who comes in today for:    Chief Complaint   Patient presents with     RECHECK     lower extremity edema, ABD distention is painful now, chronic pain     Results     ultrasound     Recheck Medication     would like sleeping medication, is it okay to take voltaren gen      Accompanied by  at today's visit.   Accompanied by son to today's visit.      Reviewed her abdomen bloating and this continues to be an issue for the patient.  No change in bowel habits.  This was reviewed with her.    Her weight is down and leg swelling is better on the increased furosemide (Lasix).  She is breathing somewhat better as well.  We reviewed her echocardiogram and there was moderate to severe aortic stenosis noted and tricuspid regurg as well.  We will follow up on her blood work.  She is not having chest pain or presyncope.  We reviewed options for treatment this time including cardiology consultation but she wishes to treat medically at this time rather than intervention.    Legs do continue to bother.  Reviewed her current use of hydromorphone and recent need for an early refill.    Back pain helped by  Diclofenac (Voltaren) gel at this time.    We reviewed her other issues noted in the assessment but not specifically addressed in the HPI above.     Objective:     Wt Readings from Last 3 Encounters:   08/24/21 67.1 kg (148 lb)   07/05/21 76.2 kg (168 lb)   05/06/21 66.7 kg (147 lb)     BP  Readings from Last 3 Encounters:   08/24/21 134/72   07/05/21 134/60   05/06/21 132/70     /72   Pulse 99   Wt 67.1 kg (148 lb)   SpO2 94%   BMI 26.22 kg/m     The patient is comfortable, no acute distress.  Mood good.  Insight fair.  Eyes are nonicteric.  Neck is supple without mass.  No cervical adenopathy.  No thyromegaly. Heart regular rate and rhythm.  Lungs clear to auscultation bilaterally.  Respiratory effort is good.  Abdomen soft and nontender.  No hepatosplenomegaly.  Extremities trace-1+ edema.      Diagnostics:     Hospital Outpatient Visit on 07/22/2021   Component Date Value Ref Range Status     LVEF  07/22/2021 50-55% (borderline)   Final       Results for orders placed or performed in visit on 08/24/21   INR point of care     Status: Abnormal   Result Value Ref Range    INR 3.5 (H) 0.9 - 1.1    Narrative    This test is intended for monitoring Coumadin therapy. Results are not accurate in patients with prolonged INR due to factor deficiency.   CBC with platelets     Status: Abnormal   Result Value Ref Range    WBC Count 9.4 4.0 - 11.0 10e3/uL    RBC Count 4.31 3.80 - 5.20 10e6/uL    Hemoglobin 11.2 (L) 11.7 - 15.7 g/dL    Hematocrit 35.7 35.0 - 47.0 %    MCV 83 78 - 100 fL    MCH 26.0 (L) 26.5 - 33.0 pg    MCHC 31.4 (L) 31.5 - 36.5 g/dL    RDW 18.6 (H) 10.0 - 15.0 %    Platelet Count 182 150 - 450 10e3/uL   Basic metabolic panel     Status: Abnormal   Result Value Ref Range    Sodium 138 136 - 145 mmol/L    Potassium 4.1 3.5 - 5.0 mmol/L    Chloride 99 98 - 107 mmol/L    Carbon Dioxide (CO2) 25 22 - 31 mmol/L    Anion Gap 14 5 - 18 mmol/L    Urea Nitrogen 22 8 - 28 mg/dL    Creatinine 1.22 (H) 0.60 - 1.10 mg/dL    Calcium 9.0 8.5 - 10.5 mg/dL    Glucose 216 (H) 70 - 125 mg/dL    GFR Estimate 41 (L) >60 mL/min/1.73m2        PHQ-2 Score:   No flowsheet data found.     Assessment:     1. Venous stasis dermatitis of both lower extremities    2. Bilateral lower extremity edema    3. Paroxysmal  atrial fibrillation (H)    4. S/P mitral valve replacement    5. Moderate to severe aortic stenosis    6. SOB (shortness of breath)    7. Moderate tricuspid insufficiency    8. Abdominal bloating, chronic    9. Essential hypertension    10. Bilateral leg pain        Plan:     1. Continue current furosemide (Lasix).  2. Blood work done today.  3. Refilled triamcinolone (Kenalog) for dermatitis   4. Continue current medications otherwise.  5. Follow up sooner if issues.    40 minutes or greater was spent today on the patient's care on the day of service.      This includes time for chart preparation, reviewing medical tests done before or during the visit, talking with the patient, review of quality indicators, required documentation, and other elements of care.        Gabino Bach MD  General Internal Medicine  [facility]    Return in about 2 months (around 10/24/2021), or if symptoms worsen or fail to improve, for follow up visit.     No future appointments.

## 2021-08-24 NOTE — LETTER
8/24/2021    Bernadette Yu   [address] [place] [address]         Dear Bernadette,    It was good to see you in clinic.  I hope your questions were answered at the time of your visit.    The results of your tests from your visit were as follows:    Resulted Orders   CBC with platelets   Result Value Ref Range    WBC Count 9.4 4.0 - 11.0 10e3/uL    RBC Count 4.31 3.80 - 5.20 10e6/uL    Hemoglobin 11.2 (L) 11.7 - 15.7 g/dL    Hematocrit 35.7 35.0 - 47.0 %    MCV 83 78 - 100 fL    MCH 26.0 (L) 26.5 - 33.0 pg    MCHC 31.4 (L) 31.5 - 36.5 g/dL    RDW 18.6 (H) 10.0 - 15.0 %    Platelet Count 182 150 - 450 10e3/uL   Basic metabolic panel   Result Value Ref Range    Sodium 138 136 - 145 mmol/L    Potassium 4.1 3.5 - 5.0 mmol/L    Chloride 99 98 - 107 mmol/L    Carbon Dioxide (CO2) 25 22 - 31 mmol/L    Anion Gap 14 5 - 18 mmol/L    Urea Nitrogen 22 8 - 28 mg/dL    Creatinine 1.22 (H) 0.60 - 1.10 mg/dL    Calcium 9.0 8.5 - 10.5 mg/dL    Glucose 216 (H) 70 - 125 mg/dL    GFR Estimate 41 (L) >60 mL/min/1.73m2      Comment:      As of July 11, 2021, eGFR is calculated by the CKD-EPI creatinine equation, without race adjustment. eGFR can be influenced by muscle mass, exercise, and diet. The reported eGFR is an estimation only and is only applicable if the renal function is stable.     Your red blood cell count is doing a lot better.  Your potassium and kidney function are doing well.    Please schedule a follow up again in 2 months, sooner if issues.    If you have any questions regarding these results, please feel free to contact me at [phone].  I wish you the best of health!      Sincerely,     Gabino Bach MD  General Internal Medicine  [facility]

## 2021-08-28 PROBLEM — I48.91 ATRIAL FIBRILLATION WITH RAPID VENTRICULAR RESPONSE (H): Status: RESOLVED | Noted: 2020-02-19 | Resolved: 2021-01-01

## 2021-08-28 PROBLEM — I07.1 MODERATE TRICUSPID INSUFFICIENCY: Status: ACTIVE | Noted: 2021-01-01

## 2021-08-28 NOTE — PATIENT INSTRUCTIONS
Please follow up if you have any further issues.    You may contact me by phone or MyChart if you are worsening or if things are not improving.    ______________________________________________________________________     Please remember that you can call [phone] to schedule an appointment.     You can schedule appointments 24 hours a day, 7 days a week.  Sometimes the best time to schedule an appointment is after clinic hours when less people are calling in.  Weekends are another option for calling in to schedule appointments.

## 2021-08-31 NOTE — PATIENT INSTRUCTIONS
Start cephalexin 2 times a day      Keep appointment Dr. Dash, podiatry  on September 2 at 3:20 here at this building in [place]    Keep foot in post op shoe, elevate foot, heat/warm compresses to foot, soak foot daily and change dressings 2 times a day     Apply Bactroban/mupirocin ointment to the ulcer on the top of her toe with each dressing change    If feve, worsening swelling, pain,  redness is going up the foot into the leg- to the ER                  Patient Education     Discharge Instructions for Osteomyelitis  You have a condition called osteomyelitis. This is a bone infection caused by bacteria or fungi. The infection may have come from one area of your body and spread to the bone by traveling through the blood. Osteomyelitis is described as  acute  when the infection is new and  chronic  when you have had the infection for a longer time. If you have any questions or concerns, call your healthcare provider.   Home care    Take your medicine exactly as directed. If you were given antibiotics or antifungal medicine, make sure you finish the prescription--even if you feel better. If you don t finish the medicine, the infection may return and may make future infections harder to treat.    Be careful not to injure the area where you have the infection.    Carefully follow all instructions for taking care of any wounds.    Use a splint, sling, or brace as directed by your healthcare provider.    Follow-up care  Make a follow-up appointment, or as directed.   When to seek medical care  Call your healthcare provider if you have any of the following:    Increasing pain, redness, swelling, or drainage in the infected area    Fever 100.4  F ( 38 C) or greater, or as advised    Increasing fatigue or feeling tired  [employer] last reviewed this educational content on 6/1/2019 2000-2021 [employer]. All rights reserved. This information is not intended as a substitute for professional medical  care. Always follow your healthcare professional's instructions.

## 2021-08-31 NOTE — PROGRESS NOTES
jarrod   Patient presents with:  Nail Problem: Sore on 2nd L toe,  causing swelling. x10 days       Subjective     Bernadette Yu is a 80 year old female who presents to clinic today for the following health issues:    HPI     Musculoskeletal problem/pain-second toe       Duration: 10 day     Description  Location: sore on second toe, left foot -does not recall injury to toe   Patient is a insulin dependent diabetic with long history of neuropathy and past history of osteomyelitis of her left great toe 3/2020      Intensity:  moderate    Accompanying signs and symptoms: warmth, redness, sore on proximal  phalanx of toe, mild swelling of dorsum of left  foot compared to right foot     History  Previous similar problem: YES- as noted  Previous evaluation:  none    Precipitating or alleviating factors:  Trauma or overuse: unknown-patient with severe neuropathy in left foot, does not recall injuring foot, however, would not have noted acute pain if she did injure toe/foot due to neuropathy   Aggravating factors include: increase swelling/redness when foot is not elevated,    Therapies tried and outcome: nothing        Past Medical History:   Diagnosis Date     Abdominal bloating 8/21/2016     Anticoagulation goal of INR 2.5 to 3.5 1/27/2016     Anxiety      Aortic stenosis 10/26/2019     ASCVD (arteriosclerotic cardiovascular disease)      Atherosclerosis of native coronary artery without angina pectoris 10/26/2019     Bleeding diathesis (H)      Carotid artery stenosis, left      CHF (congestive heart failure) (H)      CKD (chronic kidney disease) stage 3, GFR 30-59 ml/min (H)      DM (diabetes mellitus), type 2 (H) 1990     Eczema      Former smoker      Full code status      History of metabolic encephalopathy with delirium 9/28/2021     History of partial ray amputation of second toe of left foot (H) 9/28/2021     HLD (hyperlipidemia)      HTN (hypertension)      Hypothyroidism      IBS (irritable bowel  syndrome)      Insomnia      Liver disease      Long Q-T syndrome 10/26/2019     Meningococcal meningitis Age 14     Microalbuminuria due to type 2 diabetes mellitus (H)      Mild nonproliferative diabetic retinopathy of both eyes (H)      Mitral stenosis      Moderate aortic stenosis     See transesophageal echocardiogram (ANTOINE) 2019.     Moderate tricuspid insufficiency 8/28/2021     MRSA (methicillin resistant staph aureus) culture positive 2/9/2017     Normocytic anemia      PAD (peripheral artery disease) (H)     [facility]   DATE: 10/26/2019 4:15 PM   EXAM:  DUPLEX ARTERIAL ULTRASOUND OF THE LOWER EXTREMITIES BILATERALLY   INDICATION: Leg pain, vasculopathy.   COMPARISON: None.   TECHNIQUE: Duplex imaging is performed utilizing gray-scale, two-dimensional images, and color-flow imaging. Doppler waveform analysis and spectral Doppler imaging is also performed.   DUPLEX ARTERIAL ULTRASOUND FINDIN     Paroxysmal atrial fibrillation (H) 11/16/2015    Bilateral pulmonary vein isolation with AtriCure Synergy (bipolar radiofrequency ablation) device, exclusion of the left atrial appendage with the AtriCure AtriClip device.       Paroxysmal atrial fibrillation with RVR (H) 9/29/2021     PN (peripheral neuropathy)      Psoriasis      PVD (peripheral vascular disease) (H) 11/4/2019    Bilateral lower extremities.  See ultrasound 2019.     Recurrent UTI (urinary tract infection)      RLS (restless legs syndrome)      S/P CABG (coronary artery bypass graft) 1/8/2016     S/P colonoscopy 12/12/07     S/P MVR (mitral valve replacement) 12/30/2015     Subclavian artery stenosis, left (H) 11/16/2015    Stented in 2015.      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     Ulcer of heel and midfoot, left, with unspecified severity (H)      Social History     Tobacco Use     Smoking status: Former Smoker     Years: 23.00     Types: Cigarettes     Smokeless tobacco: Never Used     Tobacco comment: quit  "1970's   Substance Use Topics     Alcohol use: No       Current Outpatient Medications   Medication Sig Dispense Refill     BD ULTRA-FINE DAVID PEN NEEDLE 32 gauge x 5/32\" Ndle [BD ULTRA-FINE DAVID PEN NEEDLE 32 GAUGE X 5/32\" NDLE] 1 each by abdominal subcutaneous route 2 (two) times a day. USE WITH NOVOLOG PENS.   Please keep this Rx on file for the next RF. 100 each 11     blood glucose test strips [BLOOD GLUCOSE TEST STRIPS] Use 1 each As Directed 3 (three) times a day. Dispense brand per patient's insurance at pharmacy discretion. 300 strip 3     blood-glucose meter Misc [BLOOD-GLUCOSE METER MISC] Dispense 1 meter as covered by patient's insurance. 1 each 0     ferrous sulfate 325 (65 FE) MG tablet [FERROUS SULFATE 325 (65 FE) MG TABLET] Take 1 tablet (325 mg total) by mouth 3 (three) times a week. Monday, Wednesday, and Friday 50 tablet 3     furosemide (LASIX) 40 MG tablet [FUROSEMIDE (LASIX) 40 MG TABLET] Take 2 tablets (80 mg total) by mouth daily. 120 tablet 1     generic lancets [GENERIC LANCETS] Use 1 each As Directed 3 (three) times a day. Dx E11.65 DM2, uncontrolled 300 each 3     insulin aspart protamine-insulin aspart (NOVOLOG MIX 70-30FLEXPEN U-100) 100 unit/mL (70-30) pen [INSULIN ASPART PROTAMINE-INSULIN ASPART (NOVOLOG MIX 70-30FLEXPEN U-100) 100 UNIT/ML (70-30) PEN] Inject 30 Units under the skin every morning AND 15 Units every evening.  3     levothyroxine (SYNTHROID, LEVOTHROID) 125 MCG tablet [LEVOTHYROXINE (SYNTHROID, LEVOTHROID) 125 MCG TABLET] Take 1 tablet (125 mcg total) by mouth daily. 90 tablet 3     mupirocin (BACTROBAN) 2 % external ointment Apply topically 2 times daily 22 g 0     polyethylene glycol (MIRALAX) 17 gram packet [POLYETHYLENE GLYCOL (MIRALAX) 17 GRAM PACKET] Take 1 packet (17 g total) by mouth daily.  0     triamcinolone (KENALOG) 0.1 % external cream Apply topically daily 453.6 g 11     acetaminophen (TYLENOL) 500 MG tablet Take 1,000 mg by mouth 3 times daily (give at " 8am, 2pm, 8pm)       diltiazem ER COATED BEADS (CARDIZEM CD/CARTIA XT) 180 MG 24 hr capsule Take 1 capsule (180 mg) by mouth daily 90 capsule 3     gabapentin (NEURONTIN) 100 MG capsule Take 1 capsule (100 mg) by mouth At Bedtime (Patient not taking: Reported on 10/1/2021)       HYDROmorphone (DILAUDID) 2 MG tablet Take 1 tablet (2 mg) by mouth every 6 hours as needed for moderate to severe pain 120 tablet 0     metoprolol tartrate (LOPRESSOR) 50 MG tablet Take 1 tablet (50 mg) by mouth 2 times daily       QUEtiapine (SEROQUEL) 50 MG tablet Take 50 mg by mouth 3 times daily (give at 8am, 2pm, 8pm) (Patient not taking: Reported on 10/1/2021)       senna-docusate (SENOKOT-S/PERICOLACE) 8.6-50 MG tablet Take 1 tablet by mouth 2 times daily       WARFARIN SODIUM PO 9/30/21 INR 2.8  Take 5mg daily.  Next INR 10/5/21. (Patient not taking: Reported on 10/1/2021)       Allergies   Allergen Reactions     Amiodarone Other (See Comments)     TORSADES DE POINTES     Aspirin Other (See Comments)     Recurrent severe gastrointestinal bleed.             ROS are negative, except as otherwise noted HPI      Objective    /65 (BP Location: Left arm, Patient Position: Sitting, Cuff Size: Adult Regular)   Pulse 115   Temp 97.6  F (36.4  C) (Oral)   Resp 10   Wt 67.5 kg (148 lb 14.2 oz)   SpO2 94%   BMI 26.37 kg/m    Body mass index is 26.37 kg/m .  Physical Exam   GENERAL:  alert and no distress  MS: Left foot-second toe-entire toe is erythematous and swollen, toenail is absent, ulcerative lesion over the dorsum of the  proximal phalanx/no obvious streaking into the dorsum of the foot, small area pink/red tissue with mild swelling on the dorsum of the foot starting at  the second MTP extending down approximately 2 cm in length  on dorsum of the foot and about 1 cm in width  Unable to palpate dorsalis pedis or posterior tibialis pulses, attempted to find pulse a hand-held OB Doppler device -unable to appreciate pulse, cap  refill 3 seconds toes/foot pale, no rubor or dusky appearance to the toes or foot compared to right foot/toes  NEURO: Normal strength and tone, mentation intact and speech normal      Diagnostic Test Results:  Labs reviewed in Epic  Results for orders placed or performed in visit on 08/31/21   XR Foot Left G/E 3 Views     Status: None    Narrative    EXAM DATE:         08/31/2021    EXAM: X-RAY FOOT LEFT, MINIMUM 3 VIEWS  LOCATION: [facility]  DATE/TIME: 8/31/2021 1:00 PM    INDICATION: Swelling erythema pt diabetic, uncertain if injured foot, second toe swollen small sore on dorsum of toe  COMPARISON: 3/3/2020 MRI.    IMPRESSION: Diffuse soft tissue swelling of the second toe. Irregular destructive changes in the mid to distal aspect of the second toe proximal phalanx consistent with osteomyelitis. Chronic resorption of the tuft of the great toe distal phalanx   consistent with sequela from prior osteomyelitis. No soft tissue swelling of the great toe. Mild soft tissue swelling the dorsum of the foot. Osteopenia. Atheromatous calcification.                     ASSESSMENT/PLAN:      ICD-10-CM    1. Osteomyelitis of toe of left foot (H)  M86.9 Ankle/Foot Bracing Supplies Order for DME - ONLY FOR DME     DISCONTINUED: cephALEXin (KEFLEX) 500 MG capsule   2. Foot injury, left, initial encounter  S99.922A XR Foot Left G/E 3 Views     XR Foot Left G/E 3 Views     Orthopedic  Referral   3. Skin ulcer of toe of left foot, limited to breakdown of skin (H)  L97.521 mupirocin (BACTROBAN) 2 % external ointment     Ankle/Foot Bracing Supplies Order for DME - ONLY FOR DME   4. Diabetes mellitus type 2, insulin dependent (H)  E11.9     Z79.4        Called ortho  service- Dr. Dash/podiatry was paged regarding findings on x-ray - osteomyelitis of the second toe  Discussed with Dr. Britt who recommended starting cephalexin two times a day, dressing to toe keep foot elevated warm  compresses to foot/soak foot daily and to change dressings 2  times a day-needs to remain nonweightbearing he will see her in follow-up on 9/2/21  at 3:20 at the [facility]          On the day of the encounter, time spend on chart review, patient visit, review of testing, documentation and discussion with other providers was 45  minutes      Patient Instructions     Start cephalexin 2 times a day      Keep appointment Dr. Dash, podiatry  on September 2 at 3:20 here at this building in [place]    Keep foot in post op shoe, elevate foot, heat/warm compresses to foot, soak foot daily and change dressings 2 times a day     Apply Bactroban/mupirocin ointment to the ulcer on the top of her toe with each dressing change    If feve, worsening swelling, pain,  redness is going up the foot into the leg- to the ER                  Patient Education     Discharge Instructions for Osteomyelitis  You have a condition called osteomyelitis. This is a bone infection caused by bacteria or fungi. The infection may have come from one area of your body and spread to the bone by traveling through the blood. Osteomyelitis is described as  acute  when the infection is new and  chronic  when you have had the infection for a longer time. If you have any questions or concerns, call your healthcare provider.   Home care    Take your medicine exactly as directed. If you were given antibiotics or antifungal medicine, make sure you finish the prescription--even if you feel better. If you don t finish the medicine, the infection may return and may make future infections harder to treat.    Be careful not to injure the area where you have the infection.    Carefully follow all instructions for taking care of any wounds.    Use a splint, sling, or brace as directed by your healthcare provider.    Follow-up care  Make a follow-up appointment, or as directed.   When to seek medical care  Call your healthcare provider if you have any of the  following:    Increasing pain, redness, swelling, or drainage in the infected area    Fever 100.4  F ( 38 C) or greater, or as advised    Increasing fatigue or feeling tired  [employer] last reviewed this educational content on 6/1/2019 2000-2021 [employer]. All rights reserved. This information is not intended as a substitute for professional medical care. Always follow your healthcare professional's instructions.

## 2021-09-02 PROBLEM — L97.524 DIABETIC ULCER OF TOE OF LEFT FOOT ASSOCIATED WITH TYPE 2 DIABETES MELLITUS, WITH NECROSIS OF BONE (H): Status: ACTIVE | Noted: 2021-01-01

## 2021-09-02 PROBLEM — E11.621 DIABETIC ULCER OF TOE OF LEFT FOOT ASSOCIATED WITH TYPE 2 DIABETES MELLITUS, WITH NECROSIS OF BONE (H): Status: ACTIVE | Noted: 2021-01-01

## 2021-09-02 PROBLEM — S90.822A BLISTER OF LEFT FOOT, INITIAL ENCOUNTER: Status: ACTIVE | Noted: 2021-01-01

## 2021-09-02 NOTE — TELEPHONE ENCOUNTER
Left message for patient's son Riley and spoke with son Alirio with details of ultrasound and MRI appointments.    MRI - [facility]   Friday 9/10/21 check in time 2:15 PM  *NEEDS TO BRING MECHANICAL HEART VALVE DEVICE CARD WITH TO THIS APPOINTMENT    Ultrasound- [facility]  Tuesday 9/14/21 check in time 10:35 AM

## 2021-09-02 NOTE — PROGRESS NOTES
[facility]        Patient is here for a consult to discuss Wound(s) on left foot.    /68   Pulse 88   Temp 98.1  F (36.7  C)   Resp 20   Ht 5' (1.524 m)   Wt 140 lb (63.5 kg)   BMI 27.34 kg/m      Has homecare services and agency name:  No       Left 2nd toe wound and left hallux blister    Patient states she noticed wound 3 weeks ago. No falls or injury that caused it. Was just started on antibiotics yesterday.     Onofre Hendricks RN

## 2021-09-02 NOTE — PATIENT INSTRUCTIONS
Finish off the antibiotics that you have.    Call to [facility] cardiology [phone] and request the device information about the heart valve implant for an MRI     Once you have this information you can call [facility] [phone] to schedule the MRI. They will ask about the implant and information that can be found on the device card.     Once you have the appointment for the MRI scheduled please call the clinic back at [phone] in order to schedule the ultrasound.           Wound Care Instructions    daily Cleanse your right foot wound(s) with Normal Saline or Wound Cleanser    Pat dry with non-sterile gauze    Apply gauze into/onto the wounds      Secure with roll gauze as needed      It is not ok to get your wound wet in the bath or shower      SEEK MEDICAL CARE IF:    You have an increase in swelling, pain, or redness around the wound.    You have an increase in the amount of pus coming from the wound.    There is a bad smell coming from the wound.    The wound appears to be worsening/enlarging    You have a fever greater than 101.5 F

## 2021-09-02 NOTE — PROGRESS NOTES
FOOT AND ANKLE SURGERY/PODIATRY CONSULT NOTE        ASSESSMENT:   Diabetic Ulceration 2nd digit left foot  Cellulitis 2nd digit left foot  Blister left hallux  Hammertoe  PAD        TREATMENT:  Ulceration/Cellulitis 2nd digit left foot: There is an ulceration along the dorsal PIPJ 2nd digit left foot with non-viable tissue, probes to bone with localized erythema, no ascending cellulitis.     -I reviewed the above with the patient and her family members today which is concerning for underlying infection. Due to the amount of non-viable tissue we discussed that surgical I&D is recommended and that amputation may be necessary depending on the extent of infection present. I have referred her for an MRI to evaluate for osteomyelitis or septic arthritis.    -I recommend she finish course of Keflex.     -Referred for a wheelchair to remain non-weight bearing post-op. Also referred for DANAE's.     -After discussion of risk factors and consent obtained 2% Lidocaine HCL jelly was applied, under clean conditions, the left and foot ulceration(s) were debrided using .bone.  Devitalized and nonviable tissue, along with any fibrin and slough, was removed to improve granulation tissue formation, stimulate wound healing, decrease overall bacteria load, disrupt biofilm formation and decrease edge senescence. Wound drainage was scant No. Total excisional debridement was 0.4 sq cm bone with a depth of 0.4 cm.   Ulcers were improved afterwards and .  Measures were as noted on the flow sheet. A gauze dressing was applied. She will continue to apply a gauze dressing qday.    Blister left hallux: Bulla along the distal lateral left hallux. A #15 blade was used to robert the blister, serous drainage with superficial ulceration. Gauze dressing applied today which she will change daily.           Does the patient have mobility limitation significantly impairs his/her ability to participate in one or more mobility-related activities  of daily living such as toileting, dressing, grooming, and bathing in customary locations in the home?  Yes       Can the patient's mobility limitation be sufficiently resolved by the use of an appropriately fitted cane, crutches or walker?  No   Patient cannot use a cane, crutches, or walker due to the need for non-weight bearing status.       Does the patient's home provide adequate access between rooms, maneuvering space, and surfaces for the use of a manual wheelchair?  Yes       Is the patient or caregiver able to safely use/maneuver the wheelchair and expressed willingness to use the manual wheelchair in the home on a regular basis?  Yes      Can the patient's functional mobility deficit be sufficiently resolved by the use of a manual wheelchair and significantly improve the patient's ability to participate in mobility-related activities of daily living?  Yes       Does the patient have sufficient upper extremity function and other physical and mental capabilities needed to safely propel the manual wheel chair during a typical day?  Yes      Does the patient have a caregiver who is willing, available, and is able to provide assistance with the wheelchair.  Yes       If a standard juni-wheelchair is ordered does the patient require a lower seat height because of short stature or to enable the patient to place his/her feet on the ground for propulsion?  No       If a lightweight wheelchair is ordered is the patient unable to self-propel in a standard weight wheelchair and would be able to self-propel in a lightweight chair?  No       Does your patient require height adjustable arms because they require an arm height that is different than that available using nonadjustable arms?  No       Does your patient spend more than 2 hours per day in the wheelchair?  Yes      Does your patient require a safety belt because they have weak upper body muscles, upper body instability, muscle spasticity which requires use of  this item for positioning? No        Does the beneficiary self-propel and require anti-rollback devices because of ramps?  No       If the patient is in need of a bariatric wheelchair is their weight over 250 lbs?  No       Does the patient require elevating leg rests?   Yes   Elevating leg rests are needed due to the beneficiary having significant edema of the lower extremities that require an elevating leg rest.    -I will contact her with the MRI report and we will be guided by the results.     Nba Cleveland DPM  [facility] [place]      HPI: Bernadette Yu was seen today for a wound on the 2nd digit left foot. The patient appears to be a poor historian and is joined today by her  and two sons. Her family believes the sore was noticed about one month ago, no known source of injury. She was seen at her primary care's office on 8/31 and placed on keflex. PMH significant for DM2. Denies smoking. She lives with her  and one of her sons.     Past Medical History:   Diagnosis Date     Abdominal bloating 8/21/2016     Anticoagulation goal of INR 2.5 to 3.5 1/27/2016     Anxiety      Aortic stenosis 10/26/2019     ASCVD (arteriosclerotic cardiovascular disease)      Atherosclerosis of native coronary artery without angina pectoris 10/26/2019     Bleeding diathesis (H)      Carotid artery stenosis, left      CHF (congestive heart failure) (H)      CKD (chronic kidney disease) stage 3, GFR 30-59 ml/min      DM (diabetes mellitus), type 2 (H) 1990     Eczema      Former smoker      Full code status      HLD (hyperlipidemia)      HTN (hypertension)      Hypothyroidism      IBS (irritable bowel syndrome)      Insomnia      Liver disease      Long Q-T syndrome 10/26/2019     Meningococcal meningitis Age 14     Microalbuminuria due to type 2 diabetes mellitus (H)      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     Moderate tricuspid insufficiency 2021     MRSA (methicillin resistant staph aureus) culture positive 2017     Normocytic anemia      PAD (peripheral artery disease) (H)     [facility]   DATE: 10/26/2019 4:15 PM   EXAM:  DUPLEX ARTERIAL ULTRASOUND OF THE LOWER EXTREMITIES BILATERALLY   INDICATION: Leg pain, vasculopathy.   COMPARISON: None.   TECHNIQUE: Duplex imaging is performed utilizing gray-scale, two-dimensional images, and color-flow imaging. Doppler waveform analysis and spectral Doppler imaging is also performed.   DUPLEX ARTERIAL ULTRASOUND FINDIN     Paroxysmal atrial fibrillation (H) 2015    Bilateral pulmonary vein isolation with AtriCure Synergy (bipolar radiofrequency ablation) device, exclusion of the left atrial appendage with the AtriCure AtriClip device.       PN (peripheral neuropathy)      Psoriasis      PVD (peripheral vascular disease) (H) 2019    Bilateral lower extremities.  See ultrasound 2019.     Recurrent UTI (urinary tract infection)      RLS (restless legs syndrome)      S/P CABG (coronary artery bypass graft) 2016     S/P colonoscopy 07     S/P MVR (mitral valve replacement) 2015     Subclavian artery stenosis, left (H) 2015    Stented in .      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     Ulcer of heel and midfoot, left, with unspecified severity (H)        Past Surgical History:   Procedure Laterality Date     APPENDECTOMY       BYPASS GRAFT ARTERY CORONARY       CARDIAC CATHETERIZATION  11/12/15      SECTION       CHOLECYSTECTOMY       COLONOSCOPY N/A 10/12/2016    Procedure: COLONOSCOPY;  Surgeon: Santiago Duran MD;  Location: [facility];  Service:      COLONOSCOPY N/A 10/13/2016    Procedure: COLONOSCOPY with polypectomy & rectum biopsies;  Surgeon: Santiago Duran MD;  Location: [facility];  Service:      COLONOSCOPY N/A  "12/3/2017    Procedure: COLONOSCOPY with multiple polyp removal using hot snare and mansfield net and biopsy forcep;  Surgeon: Marcelo Wade MD;  Location: [facility];  Service:      COLONOSCOPY N/A 3/13/2018    Procedure: COLONOSCOPY; POLYPECTOMY;  Surgeon: Marcelo Wade MD;  Location: [facility] OR;  Service:      ESOPHAGOSCOPY, GASTROSCOPY, DUODENOSCOPY (EGD), COMBINED N/A 10/12/2016    Procedure: ESOPHAGOGASTRODUODENOSCOPY with biopsies;  Surgeon: Santiago Duran MD;  Location: [facility];  Service:      ESOPHAGOSCOPY, GASTROSCOPY, DUODENOSCOPY (EGD), COMBINED N/A 12/3/2017    Procedure: ESOPHAGOGASTRODUODENOSCOPY (EGD);  Surgeon: Marcelo Wade MD;  Location: [facility];  Service:      TONSILLECTOMY & ADENOIDECTOMY       UPPER GASTROINTESTINAL ENDOSCOPY          Allergies   Allergen Reactions     Amiodarone Other (See Comments)     TORSADES DE POINTES     Aspirin Other (See Comments)     Recurrent severe gastrointestinal bleed.         Current Outpatient Medications:      BD ULTRA-FINE DAVID PEN NEEDLE 32 gauge x 5/32\" Ndle, [BD ULTRA-FINE DAVID PEN NEEDLE 32 GAUGE X 5/32\" NDLE] 1 each by abdominal subcutaneous route 2 (two) times a day. USE WITH NOVOLOG PENS.   Please keep this Rx on file for the next RF., Disp: 100 each, Rfl: 11     blood glucose test strips, [BLOOD GLUCOSE TEST STRIPS] Use 1 each As Directed 3 (three) times a day. Dispense brand per patient's insurance at pharmacy discretion., Disp: 300 strip, Rfl: 3     blood-glucose meter Misc, [BLOOD-GLUCOSE METER MISC] Dispense 1 meter as covered by patient's insurance., Disp: 1 each, Rfl: 0     cephALEXin (KEFLEX) 500 MG capsule, Take 1 capsule (500 mg) by mouth 2 times daily for 10 days, Disp: 20 capsule, Rfl: 0     ferrous sulfate 325 (65 FE) MG tablet, [FERROUS SULFATE 325 (65 FE) MG TABLET] Take 1 tablet (325 mg total) by mouth 3 (three) times a week. Monday, Wednesday, and Friday, Disp: 50 tablet, Rfl: 3     furosemide " (LASIX) 40 MG tablet, [FUROSEMIDE (LASIX) 40 MG TABLET] Take 2 tablets (80 mg total) by mouth daily., Disp: 120 tablet, Rfl: 1     gabapentin (NEURONTIN) 300 MG capsule, [GABAPENTIN (NEURONTIN) 300 MG CAPSULE] Take 1 capsule (300 mg total) by mouth at bedtime., Disp: 90 capsule, Rfl: 3     generic lancets, [GENERIC LANCETS] Use 1 each As Directed 3 (three) times a day. Dx E11.65 DM2, uncontrolled, Disp: 300 each, Rfl: 3     HYDROmorphone (DILAUDID) 4 MG tablet, Take 0.5-1 tablets (2-4 mg) by mouth 4 times daily as needed for moderate to severe pain For use from 8/17/21 to 9/16/21.  No more than 4 a day.  May release today., Disp: 120 tablet, Rfl: 0     insulin aspart protamine-insulin aspart (NOVOLOG MIX 70-30FLEXPEN U-100) 100 unit/mL (70-30) pen, [INSULIN ASPART PROTAMINE-INSULIN ASPART (NOVOLOG MIX 70-30FLEXPEN U-100) 100 UNIT/ML (70-30) PEN] Inject 30 Units under the skin every morning AND 15 Units every evening., Disp: 42 mL, Rfl:      insulin aspart protamine-insulin aspart (NOVOLOG MIX 70-30FLEXPEN U-100) 100 unit/mL (70-30) pen, [INSULIN ASPART PROTAMINE-INSULIN ASPART (NOVOLOG MIX 70-30FLEXPEN U-100) 100 UNIT/ML (70-30) PEN] Inject 30 Units under the skin every morning AND 15 Units every evening., Disp: , Rfl: 3     levothyroxine (SYNTHROID, LEVOTHROID) 125 MCG tablet, [LEVOTHYROXINE (SYNTHROID, LEVOTHROID) 125 MCG TABLET] Take 1 tablet (125 mcg total) by mouth daily., Disp: 90 tablet, Rfl: 3     mupirocin (BACTROBAN) 2 % external ointment, Apply topically 2 times daily, Disp: 22 g, Rfl: 0     polyethylene glycol (MIRALAX) 17 gram packet, [POLYETHYLENE GLYCOL (MIRALAX) 17 GRAM PACKET] Take 1 packet (17 g total) by mouth daily., Disp: , Rfl: 0     triamcinolone (KENALOG) 0.1 % external cream, Apply topically daily, Disp: 453.6 g, Rfl: 11     warfarin ANTICOAGULANT (COUMADIN/JANTOVEN) 5 MG tablet, [WARFARIN ANTICOAGULANT (COUMADIN/JANTOVEN) 5 MG TABLET] Take 1 tablet (5 mg total) by mouth daily. Please adjust  as recommended by anticoagulation nurse., Disp: 90 tablet, Rfl: 3     diltiazem (CARDIZEM CD) 180 MG 24 hr capsule, [DILTIAZEM (CARDIZEM CD) 180 MG 24 HR CAPSULE] Take 1 capsule (180 mg total) by mouth daily., Disp: 90 capsule, Rfl: 3    Current Facility-Administered Medications:      lidocaine (XYLOCAINE) 2 % external gel, , Topical, Daily TREN, Nba Cleveland DPM, Given at 09/02/21 1601    Social History     Social History Narrative    Patient of Dr. Bach since 2001.   to  Aneudy.    4 children.    Former patient of Dr. Harshad Ballard.       Family History   Problem Relation Age of Onset     Colon Cancer Father      Diabetes Father      Hypertension Mother      Heart Disease Mother          congestive heart failure     Arthritis Mother      Diabetes Sister      Heart Disease Brother      Rectal Cancer Son      Colon Cancer Son        Review of Systems - 10 point Review of Systems is negative except for left foot foot ulcer which is noted in HPI.      OBJECTIVE:  Appearance: alert, well appearing, and in no distress.    /68   Pulse 88   Temp 98.1  F (36.7  C)   Resp 20   Ht 5' (1.524 m)   Wt 140 lb (63.5 kg)   BMI 27.34 kg/m      BMI= Body mass index is 27.34 kg/m .    General appearance: Patient is alert and fully cooperative with history & exam.  No sign of distress is noted during the visit.     Psychiatric: Affect is pleasant & appropriate.  Patient appears motivated to improve health.     Respiratory: Breathing is regular & unlabored while sitting.     HEENT: Hearing is intact to spoken word.  Speech is clear.  No gross evidence of visual impairment that would impact ambulation.    Vascular: Dorsalis pedis non-palpableLeft.  Dermatologic:   VASC Wound LEFT 2ND TOE (Active)   Pre Size Length 1 09/02/21 1500   Pre Size Width 1.4 09/02/21 1500   Pre Size Depth 0.1 09/02/21 1500   Pre Total Sq cm 1.4 09/02/21 1500   Post Size Length 0.9 09/02/21 1500   Post Size Width 1.2 09/02/21  1500   Post Size Depth 0.1 09/02/21 1500   Post Total Sq cm 1.08 09/02/21 1500       VASC Wound LEFT HALLUX (Active)   Pre Size Length 1.5 09/02/21 1500   Pre Size Width 1.2 09/02/21 1500   Post Size Length 1.4 09/02/21 1500   Post Size Width 1 09/02/21 1500   Post Size Depth 0.1 09/02/21 1500   Post Total Sq cm 1.4 09/02/21 1500   Description BLISTER 09/02/21 1500   Ulceration dorsal PIPJ 2nd digit left foot with non-viable tissue, probes to bone with localized erythema, no ascending cellulitis. Bulla along the distal lateral left hallux, after lancing serous drainage with superficial ulceration.   Neurologic: Diminished to light touch Left.  Musculoskeletal: Contracted digits noted Left.    Imaging:     XR Foot Left G/E 3 Views    Result Date: 8/31/2021  EXAM DATE:         08/31/2021 EXAM: X-RAY FOOT LEFT, MINIMUM 3 VIEWS LOCATION: [facility] DATE/TIME: 8/31/2021 1:00 PM INDICATION: Swelling erythema pt diabetic, uncertain if injured foot, second toe swollen small sore on dorsum of toe COMPARISON: 3/3/2020 MRI. IMPRESSION: Diffuse soft tissue swelling of the second toe. Irregular destructive changes in the mid to distal aspect of the second toe proximal phalanx consistent with osteomyelitis. Chronic resorption of the tuft of the great toe distal phalanx consistent with sequela from prior osteomyelitis. No soft tissue swelling of the great toe. Mild soft tissue swelling the dorsum of the foot. Osteopenia. Atheromatous calcification.

## 2021-09-07 NOTE — TELEPHONE ENCOUNTER
Daughter in law is calling for refill for her mother and father in law.    Pt needs refill of Diltiazem    [email or web address] in [place] is the preferred pharmacy.  Requesting refill on 9/3 via [facility] was advised to contact clinic today.

## 2021-09-10 NOTE — TELEPHONE ENCOUNTER
I reviewed the MRI report with the patient today: 1.  Osteomyelitis involving the middle and proximal phalanx of the second toe as well as the second metatarsal head.  2.  No additional areas worrisome for osteomyelitis are identified. No significant joint effusion to suggest septic arthropathy. No evidence for abscess.  3.  No evidence for fracture.  4.  Tenosynovitis involving the flexor digitorum tendon to the second toe. This could represent a toxic tenosynovitis.  5.  Mild flexor hallucis tendinopathy without tearing.  6.  Fatty infiltration and atrophy of the plantar and interosseous musculature. There is some reactive edema about the second metatarsal shaft.    Based on the extensive amount of infection, I am not able to salvage the toe and recommend a 2nd ray amputation. Reviewed the procedure including potential primary closure, and need for wound vac post-op. She will also need to remain non-weight bearing post-op including use of TCU after hospital admission. All questions invited and answered. Patient consents to surgery. I will ask my office to coordinate surgery at [facility] for later next week. DANAE's are pending early next week. She will continue with keflex, and I have asked her to contact my office with the number of days left on the keflex prescription, as I would like her to continue with antibiotics through the day of surgery.

## 2021-09-13 NOTE — TELEPHONE ENCOUNTER
Received a call from Podiatry stating patient will be having an amputation of her L toe on 9/17/21 and requesting [facility] manage hold/bridge orders. Routing to [facility] pharmacist to advise on orders.     Meredith Patino RN

## 2021-09-13 NOTE — TELEPHONE ENCOUNTER
Called and spoke with Bernadette instructing to hold warfarin tonight. Notified that [facility] would be following up tomorrow once response received from PCP for further instructions regarding warfarin hold and possible need for lovenox injections.     Voice mails also left for SonRiley, and daughterAbraham with instructions to hold warfarin tonight.     ACN to follow up.    Meredith Patino RN

## 2021-09-13 NOTE — TELEPHONE ENCOUNTER
[facility]-PROCEDURAL ANTICOAGULATION  MANAGEMENT    ASSESSMENT     Warfarin interruption plan for Toe Amputation on 9/17/21.    Indication for Anticoagulation: Mechanical MVR (2015 [facility]) and atrial fibrillation      Risk stratification for thromboembolism: high (2012 Chest guidelines)    MVR: 2020 AHA/ACC Management of Valvular Heart disease guidelines suggests bridging is reasonable on individual basis with risk of bleeding weighed against risks of clotting for mechanical MVR patients     2012 Chest charissa-procedural guidelines suggests bridging patients with a mechanical heart valve, atrial fibrillation, or VTE at high risk for thromboembolism     RECOMMENDATION       Pre-Procedure:  o Recheck Creatinine at pre-op    o Hold warfarin for 4 days, until after procedure starting: TODAY 9/13 (5 day hold requested; but only scheduled today; 4 days remain until procedure)    o Enoxaparin (Lovenox) 60 mg subq Q 24 hrs (1 mg/kg Q 24 hrs for CrCl 15-29 ml/min)   - Wed 9/15 AM: 60 mg   - Thur 9/16 AM: 30 mg (50% due to Q 24 hr dosing to ensure elimination prior to procedure)      Post-Procedure PER INPATIENT TEAM:  o Resume warfarin dose if okay with provider doing procedure on night of procedure, 9/17 PM    o Resume  enoxaparin (Lovenox) ~ 24-48 hrs post procedure when okay with provider doing procedure. Continue until INR >= 2.5 x 2  o Recheck INR 2-4 days after hospital discharge   ?   Plan routed to referring provider for approval  ?   Rena Lemons, [facility]    SUBJECTIVE/OBJECTIVE     Bernadette Yu, a 81 year old female    Goal INR Range: 2.5-3.5     Patient bridged in past: Yes    Wt Readings from Last 3 Encounters:   09/02/21 63.5 kg (140 lb)   08/31/21 67.5 kg (148 lb 14.2 oz)   08/24/21 67.1 kg (148 lb)      Ideal body weight: 45.5 kg (100 lb 4.9 oz)  Adjusted ideal body weight: 52.7 kg (116 lb 3 oz)     Estimated body mass index is 27.34 kg/m  as calculated from the following:    Height as of 9/2/21: 1.524 m (5').     Weight as of 9/2/21: 63.5 kg (140 lb).    Lab Results   Component Value Date    INR 3.5 (H) 08/24/2021    INR 2.90 (H) 07/05/2021    INR 3.20 (H) 06/18/2021     Lab Results   Component Value Date    HGB 11.2 08/24/2021    HCT 35.7 08/24/2021     08/24/2021     Lab Results   Component Value Date    CR 1.22 (H) 08/24/2021    CR 0.94 07/05/2021    CR 0.95 05/06/2021     Estimated Creatinine Clearance: 29.1 mL/min (A) (based on SCr of 1.22 mg/dL (H)).

## 2021-09-13 NOTE — TELEPHONE ENCOUNTER
Patient calling to request call back in regards to upcoming surgical procedure to remove left little toe.  Patient saw podiatry and was told because of ongoing infection she has to have the toe removed.  Patient does not think this is the right plan of action and would like Dr Muirfs input on this prior to moving forward with surgery.  Please call patient back to discuss at [phone]. Okay to leave message if needed.

## 2021-09-13 NOTE — TELEPHONE ENCOUNTER
Reason for Call:  Other appointment    Detailed comments: Pt needs preop appt ASAP. Proc is scheduled on 09/17. Pt will see any provider at [facility]     Phone Number Patient can be reached at: Cell number on file:    Telephone Information:   Mobile [phone]       Best Time: Any    Can we leave a detailed message on this number? YES    Call taken on 9/13/2021 at 4:52 PM by Dorota Moss

## 2021-09-13 NOTE — PROGRESS NOTES
Spoke with dtr in law and son will call   Reviewed details and let her know anticoagulation nurse would be calling.       Surgery Scheduled      Surgery/Procedure: AMPUTATION, second ray left foot    Special Equipment: pulse lavage    Location: [facility]    Date: 9/17    Time: 4:30    Surgeon: Dr. Cleveland    OR Confirmed/ :  Yes with Susan on 9/13    Orders In:  Yes    Entered on [employer] Calendar:  Yes    Post Op: 9/24 11 am    Covid Scheduled: with PCP- preop    Blood Thinners Addressed:  Yes- Talked with anticoag clinic and they will address and deal with bridging.

## 2021-09-14 PROBLEM — S90.822A BLISTER OF LEFT FOOT, INITIAL ENCOUNTER: Status: RESOLVED | Noted: 2021-01-01 | Resolved: 2021-01-01

## 2021-09-14 NOTE — TELEPHONE ENCOUNTER
Called and spoke with daughter in law and son. Patient has an ultrasound today at 2:45 pm so that time of 2:45 would not work. Scheduled pre op for today at 4pm.    Stated that patient is wanting to see Dr. Bach and get a second opinion on this so they are going to see about rescheduling the surgery to next week but wanted to still do pre op today incase they are unable to change surgery date.

## 2021-09-14 NOTE — TELEPHONE ENCOUNTER
Will try to get patient in Tuesday to address because this is the first time I am hearing of this issue as I have not received any records from [facility] or podiatry to date.

## 2021-09-14 NOTE — TELEPHONE ENCOUNTER
Caller: Daughter-in-law Abraham    Provider: MD Nba Cleveland    Detailed reason for call: Things are moving kind of fast for Bernadette. Could her amputation be postponed to next week with Dr. Cleveland? Please advise if safe to do so. If not they will proceed with this Friday.    Best phone number to contact: [phone]    Best time to contact: this morning    Ok to leave a detailed message: Yes

## 2021-09-14 NOTE — TELEPHONE ENCOUNTER
Spoke to daughter in law Abraham, they changed their minds and would like to go forward with current plan.

## 2021-09-14 NOTE — TELEPHONE ENCOUNTER
Please call as soon as possible and see if they can come in at 2:45 pm tomorrow.    If they cannot, then offer 4 pm or we will have to look into Thursday.    Please let them know the patient is uncertain whether she needs this procedure and we can review this at this time.    Gabino Bach MD  General Internal Medicine  [facility]  9/13/2021, 9:53 PM

## 2021-09-14 NOTE — LETTER
9/16/2021    Bernadette Yu   [address] [place] [address]         Dear Bernadette,    It was good to see you in clinic.  I hope your questions were answered at the time of your visit.    The results of your tests from your visit were as follows:    Resulted Orders   Hemoglobin A1c   Result Value Ref Range    Hemoglobin A1C 9.0 (H) 0.0 - 5.6 %      Comment:      Normal <5.7%   Prediabetes 5.7-6.4%    Diabetes 6.5% or higher     Note: Adopted from ADA consensus guidelines.   Basic metabolic panel   Result Value Ref Range    Sodium 140 136 - 145 mmol/L    Potassium 4.1 3.5 - 5.0 mmol/L    Chloride 101 98 - 107 mmol/L    Carbon Dioxide (CO2) 27 22 - 31 mmol/L    Anion Gap 12 5 - 18 mmol/L    Urea Nitrogen 17 8 - 28 mg/dL    Creatinine 1.00 0.60 - 1.10 mg/dL    Calcium 9.1 8.5 - 10.5 mg/dL    Glucose 144 (H) 70 - 125 mg/dL    GFR Estimate 52 (L) >60 mL/min/1.73m2      Comment:      As of July 11, 2021, eGFR is calculated by the CKD-EPI creatinine equation, without race adjustment. eGFR can be influenced by muscle mass, exercise, and diet. The reported eGFR is an estimation only and is only applicable if the renal function is stable.   CBC with platelets   Result Value Ref Range    WBC Count 6.8 4.0 - 11.0 10e3/uL    RBC Count 4.21 3.80 - 5.20 10e6/uL    Hemoglobin 10.8 (L) 11.7 - 15.7 g/dL    Hematocrit 35.2 35.0 - 47.0 %    MCV 84 78 - 100 fL    MCH 25.7 (L) 26.5 - 33.0 pg    MCHC 30.7 (L) 31.5 - 36.5 g/dL    RDW 18.9 (H) 10.0 - 15.0 %    Platelet Count 239 150 - 450 10e3/uL   Erythrocyte sedimentation rate auto   Result Value Ref Range    Erythrocyte Sedimentation Rate 63 (H) 0 - 20 mm/hr   CRP inflammation   Result Value Ref Range    CRP 1.0 (H) 0.0-<0.8 mg/dL     Your markers of inflammation are elevated consistent with your toe infection.    You still have a low red cell count, but your anemia is doing well overall.    Your electrolytes were normal.  Your kidney tests were normal.      Your A1c is up, but  this may get better if the infection is taken care of.    See you again on:  Future Appointments   Date Time Provider Department Center   9/24/2021 11:20 AM [facility]   10/4/2021  1:00 PM [facility]        Best wishes on your upcoming surgery.     If you have any questions regarding these results, please feel free to contact me at [phone].  I wish you the best of health!      Sincerely,       Gabino Bach MD  General Internal Medicine  [facility]

## 2021-09-14 NOTE — TELEPHONE ENCOUNTER
This is the first time I have been made aware of this issue.    I am trying to get the patient in for a visit tomorrow and it can be addressed at that time.  The patient is reluctant to proceed with the procedure and we can review anticoagulation at that time.    Gabino Bach MD  General Internal Medicine  [facility]  9/13/2021, 9:56 PM

## 2021-09-14 NOTE — PROGRESS NOTES
I reviewed DANAE's, decreased left TBI's. Dr. Romero to contact the patient for angio intervention.

## 2021-09-15 NOTE — PROGRESS NOTES
[facility]  [facility] Specialists    Care coordination - Customized care -  Comprehensive and complex medical care            Date of Service: 9/14/2021  Primary Provider: Gabino Bach    Patient Care Team:  Gabino Bach MD as PCP - General  Gabino Bach MD as Assigned PCP  Nba Cleveland DPM as Assigned Musculoskeletal Provider  Emma Stevenson NP as Assigned Surgical Provider  Al García MD as MD (Ophthalmology)  Ishan Wade MD as MD (Gastroenterology)  Ayanna Camacho MD as MD (Cardiovascular Disease)  Indy Lopez LSW as Clinic Care Coordinator (Primary Care - CC)            Patient's Pharmacy:    [facility][id number] [address]  [address] [facility] [address]  Phone: [phone] Fax: [phone]     Patient's Contacts:  Name Home Phone Work Phone Mobile Phone Relationship Lgl Grd   HOMEROISHAN [phone]   Spouse    ANDREA VALENTIN [phone]   Other        Patient's Insurance:    Payor: MEDICARE / Plan: MEDICARE / Product Type: Medicare /           Preoperative examination    Bernadette Valentin is a 81 year old female (1940) who I am asked to see by her surgeon regarding her fitness for upcoming surgery.      Chief Complaint   Patient presents with     Pre-Op Exam     Counseling     about toe amputation        Subjective:     Patient comes in today with her 2 sons.  Her  is with her as well.    In late August, she had a sore that came up on the top of the toe and was with significant cellulitis.  She was given Keflex in walk-in care and had follow-up with podiatry.  She had an MRI which showed significant osteomyelitis of the toe.  There were a number of findings on the MRI of this was reviewed.  She has been recommended to have amputation of the toe and we reviewed this with them.  We reviewed the local standard of care in relationship to this.  They are ready to proceed with the  procedure.      She had an ultrasound of her vascular system today.      We reviewed her diabetes in relationship to these issues.  She will be having her surgery until 2 in the afternoon so we need to be careful for low sugars.  She is on a mixed insulin at this point.      Her blood pressure is doing okay at this time without any major issues.    She is on chronic pain medication for back pain radiating into her legs.  She will have some tolerance of pain medication due to this.    Apparently they are looking at a wound VAC and possible transitional care options for the patient after her surgery.    Her valve is an issue and she does need bridging with this.  Pharmacist note for anticoagulation department reviewed.    She had recurrent anemia and GI bleeding.  Her most recent hemoglobins have been good.    She has had paroxysmal A. fib in the past and had torsades from amiodarone.    She has aortic stenosis but no symptomatology related to this.  Her echocardiogram is as noted below.  ______________________________________________________________________       Pre-op Questionnaire 9/14/2021   1. Have you ever had a heart attack or stroke? No   2. Have you ever had surgery on your heart or blood vessels, such as a stent placement, a coronary artery bypass, or surgery on an artery in your head, neck, heart, or legs? YES - BYPASS AND VALVE REPLACEMENT   3. Do you have chest pain with activity? No   4. Do you have a history of  heart failure? No   5. Do you currently have a cold, bronchitis or symptoms of other infection? No   6. Do you have a cough, shortness of breath, or wheezing? No   7. Do you or anyone in your family have previous history of blood clots? No   8. Do you or does anyone in your family have a serious bleeding problem such as prolonged bleeding following surgeries or cuts? No   9. Have you ever had problems with anemia or been told to take iron pills? YES - MULTIPLE RECURRENT ANEMIA EPISODES.   10.  Have you had any abnormal blood loss such as black, tarry or bloody stools, or abnormal vaginal bleeding? No   11. Have you ever had a blood transfusion? YES - MULTIPLE IN THE PAST WITH ANEMIA.   11a. Have you ever had a transfusion reaction? No   12. Are you willing to have a blood transfusion if it is medically needed before, during, or after your surgery? Yes   13. Have you or any of your relatives ever had problems with anesthesia? No   14. Do you have sleep apnea, excessive snoring or daytime drowsiness? No   15. Do you have any artifical heart valves or other implanted medical devices like a pacemaker, defibrillator, or continuous glucose monitor? YES - MITRAL VALVE REPLACEMENT.   15a. What type of device do you have? Na   15b. Name of the clinic that manages your device:  Na   16. Do you have artificial joints? No   17. Are you allergic to latex? No     ______________________________________________________________________     We reviewed her other issues noted in the assessment but not specifically addressed in the HPI above.   ______________________________________________________________________     Patient Active Problem List   Diagnosis     Hypothyroidism     HLD (hyperlipidemia)     Anxiety     Chronic pain - Dr. Bach manages the pain     Insomnia     Full code status - POLST form 1/2/16     IBS (irritable bowel syndrome)     ASCVD (arteriosclerotic cardiovascular disease) - CABG 2015     Subclavian artery stenosis, left (H) - s/p stent     Paroxysmal atrial fibrillation (H) - VALVULAR     HTN (hypertension)     Former smoker     S/P mitral valve replacement (MVR) - mechanical mitral valve placed 2015     DM type 2 (diabetes mellitus, type 2) (H)     Anticoagulation goal of INR 2.5 to 3.5     Abdominal bloating, chronic     Recurrent UTI (urinary tract infection)     MRSA (methicillin resistant staph aureus) culture positive     Controlled substance agreement signed - 3/21 - temazepam, lorazepam,  hydromorphone - UDS 3/21     Bleeding diathesis (H) - due to warfarin     Mild nonproliferative diabetic retinopathy of both eyes (H)     Microalbuminuria due to type 2 diabetes mellitus (H)     Spinal stenosis of lumbar region with neurogenic claudication     Multilevel neural foraminal stenosis     Atrial flutter, unspecified type (H)     Moderate to severe aortic stenosis     Adverse effect of amiodarone, initial encounter     PAD (peripheral artery disease) (H)     CKD (chronic kidney disease) stage 3, GFR 30-59 ml/min     Chronic, continuous use of opioids     Moderate tricuspid insufficiency     Diabetic ulcer of toe of left foot associated with type 2 diabetes mellitus, with necrosis of bone (H)       Past Surgical History:   Procedure Laterality Date     APPENDECTOMY       BYPASS GRAFT ARTERY CORONARY       CARDIAC CATHETERIZATION  11/12/15      SECTION       CHOLECYSTECTOMY       COLONOSCOPY N/A 10/12/2016    Procedure: COLONOSCOPY;  Surgeon: Santiago Duran MD;  Location: [facility];  Service:      COLONOSCOPY N/A 10/13/2016    Procedure: COLONOSCOPY with polypectomy & rectum biopsies;  Surgeon: Santiago Duran MD;  Location: [facility];  Service:      COLONOSCOPY N/A 12/3/2017    Procedure: COLONOSCOPY with multiple polyp removal using hot snare and mansfield net and biopsy forcep;  Surgeon: Marcelo Wade MD;  Location: [facility];  Service:      COLONOSCOPY N/A 3/13/2018    Procedure: COLONOSCOPY; POLYPECTOMY;  Surgeon: Marcelo Wade MD;  Location: [facility] OR;  Service:      ESOPHAGOSCOPY, GASTROSCOPY, DUODENOSCOPY (EGD), COMBINED N/A 10/12/2016    Procedure: ESOPHAGOGASTRODUODENOSCOPY with biopsies;  Surgeon: Santiago Duran MD;  Location: [facility];  Service:      ESOPHAGOSCOPY, GASTROSCOPY, DUODENOSCOPY (EGD), COMBINED N/A 12/3/2017    Procedure: ESOPHAGOGASTRODUODENOSCOPY (EGD);  Surgeon: Marcelo Wade MD;  Location: [facility];  Service:       "TONSILLECTOMY & ADENOIDECTOMY       UPPER GASTROINTESTINAL ENDOSCOPY        Social History     Occupational History     Not on file   Tobacco Use     Smoking status: Former Smoker     Years: 23.00     Types: Cigarettes, Cigarettes     Smokeless tobacco: Never Used     Tobacco comment: quit 1970's   Substance and Sexual Activity     Alcohol use: No     Drug use: No     Sexual activity: Never     Social History     Social History Narrative    Patient of Dr. Bach since 2001.   to  Aneudy.    4 children.    Former patient of Dr. Harshad Ballard.      Family History   Problem Relation Age of Onset     Colon Cancer Father      Diabetes Father      Hypertension Mother      Heart Disease Mother          congestive heart failure     Arthritis Mother      Diabetes Sister      Heart Disease Brother      Rectal Cancer Son      Colon Cancer Son       Family history is noncontributory otherwise.    Allergies: Amiodarone and Aspirin     Current medications:  Current Outpatient Medications   Medication Instructions     BD ULTRA-FINE DAVID PEN NEEDLE 32 gauge x 5/32\" Ndle 1 each, Subcutaneous, 2 TIMES DAILY     blood glucose test strips 1 each, Does not apply, 3 TIMES DAILY     blood-glucose meter Misc [BLOOD-GLUCOSE METER MISC] Dispense 1 meter as covered by patient's insurance.     cephALEXin (KEFLEX) 500 mg, Oral, 2 TIMES DAILY, FILL 6 ONLY     diltiazem ER COATED BEADS (CARDIZEM CD/CARTIA XT) 180 mg, Oral, DAILY     enoxaparin ANTICOAGULANT (LOVENOX) 60 mg, Subcutaneous, ONCE, TAKE Wednesday AM     enoxaparin ANTICOAGULANT (LOVENOX) 30 mg, Subcutaneous, ONCE, TAKE Thursday AM     ferrous sulfate 325 (65 FE) MG tablet 1 tablet, Oral, THREE TIMES WEEKLY     furosemide (LASIX) 80 mg, Oral, DAILY     gabapentin (NEURONTIN) 300 mg, Oral, AT BEDTIME     generic lancets 1 each, Does not apply, 3 TIMES DAILY     HYDROmorphone (DILAUDID) 2-4 mg, Oral, 4 TIMES DAILY PRN, For use from 8/17/21 to 9/16/21.  No more than 4 a day.  " May release today.     insulin aspart protamine-insulin aspart (NOVOLOG MIX 70-30FLEXPEN U-100) 100 unit/mL (70-30) pen Subcutaneous     insulin aspart protamine-insulin aspart (NOVOLOG MIX 70-30FLEXPEN U-100) 100 unit/mL (70-30) pen 30 Units, Subcutaneous, EVERY MORNING     levothyroxine (SYNTHROID/LEVOTHROID) 125 mcg, Oral, DAILY     mupirocin (BACTROBAN) 2 % external ointment Topical, 2 TIMES DAILY     polyethylene glycol (MIRALAX) 17 g, Oral, DAILY     triamcinolone (KENALOG) 0.1 % external cream Topical, DAILY     warfarin ANTICOAGULANT (COUMADIN) 5 mg, Oral, DAILY        Objective:     Wt Readings from Last 3 Encounters:   09/14/21 63.5 kg (140 lb)   09/02/21 63.5 kg (140 lb)   08/31/21 67.5 kg (148 lb 14.2 oz)     BP Readings from Last 3 Encounters:   09/14/21 128/76   09/02/21 124/68   08/31/21 115/65     /76   Pulse 114   Temp 98.2  F (36.8  C) (Oral)   Ht 1.524 m (5')   Wt 63.5 kg (140 lb)   SpO2 95%   BMI 27.34 kg/m     Patient is in no acute distress.  Mood good.  Insight fair.  Eyes nonicteric.  Pupils equal.  Ears clear.  Nose clear.  Throat clear.  Neck is supple.  No cervical adenopathy.  No thyromegaly.  Heart is regular rate and rhythm.  Lungs clear to auscultation bilaterally.  Respiratory effort is good.  Extremities trace edema.  She has peeling skin over her dorsal and plantar parts of her foot where she had swelling.  This is not severe.  The cellulitis as documented by Dr. Cleveland photographically has improved from this time.  She is not having pain with the toe but is able to move it.    Diagnostics:     Office Visit on 08/24/2021   Component Date Value Ref Range Status     INR 08/24/2021 3.5* 0.9 - 1.1 Final     WBC Count 08/24/2021 9.4  4.0 - 11.0 10e3/uL Final     RBC Count 08/24/2021 4.31  3.80 - 5.20 10e6/uL Final     Hemoglobin 08/24/2021 11.2* 11.7 - 15.7 g/dL Final     Hematocrit 08/24/2021 35.7  35.0 - 47.0 % Final     MCV 08/24/2021 83  78 - 100 fL Final     MCH  08/24/2021 26.0* 26.5 - 33.0 pg Final     MCHC 08/24/2021 31.4* 31.5 - 36.5 g/dL Final     RDW 08/24/2021 18.6* 10.0 - 15.0 % Final     Platelet Count 08/24/2021 182  150 - 450 10e3/uL Final     Sodium 08/24/2021 138  136 - 145 mmol/L Final     Potassium 08/24/2021 4.1  3.5 - 5.0 mmol/L Final     Chloride 08/24/2021 99  98 - 107 mmol/L Final     Carbon Dioxide (CO2) 08/24/2021 25  22 - 31 mmol/L Final     Anion Gap 08/24/2021 14  5 - 18 mmol/L Final     Urea Nitrogen 08/24/2021 22  8 - 28 mg/dL Final     Creatinine 08/24/2021 1.22* 0.60 - 1.10 mg/dL Final     Calcium 08/24/2021 9.0  8.5 - 10.5 mg/dL Final     Glucose 08/24/2021 216* 70 - 125 mg/dL Final     GFR Estimate 08/24/2021 41* >60 mL/min/1.73m2 Final    As of July 11, 2021, eGFR is calculated by the CKD-EPI creatinine equation, without race adjustment. eGFR can be influenced by muscle mass, exercise, and diet. The reported eGFR is an estimation only and is only applicable if the renal function is stable.        Results for orders placed or performed in visit on 09/14/21   Hemoglobin A1c     Status: Abnormal   Result Value Ref Range    Hemoglobin A1C 9.0 (H) 0.0 - 5.6 %   Basic metabolic panel     Status: Abnormal   Result Value Ref Range    Sodium 140 136 - 145 mmol/L    Potassium 4.1 3.5 - 5.0 mmol/L    Chloride 101 98 - 107 mmol/L    Carbon Dioxide (CO2) 27 22 - 31 mmol/L    Anion Gap 12 5 - 18 mmol/L    Urea Nitrogen 17 8 - 28 mg/dL    Creatinine 1.00 0.60 - 1.10 mg/dL    Calcium 9.1 8.5 - 10.5 mg/dL    Glucose 144 (H) 70 - 125 mg/dL    GFR Estimate 52 (L) >60 mL/min/1.73m2   CBC with platelets     Status: Abnormal   Result Value Ref Range    WBC Count 6.8 4.0 - 11.0 10e3/uL    RBC Count 4.21 3.80 - 5.20 10e6/uL    Hemoglobin 10.8 (L) 11.7 - 15.7 g/dL    Hematocrit 35.2 35.0 - 47.0 %    MCV 84 78 - 100 fL    MCH 25.7 (L) 26.5 - 33.0 pg    MCHC 30.7 (L) 31.5 - 36.5 g/dL    RDW 18.9 (H) 10.0 - 15.0 %    Platelet Count 239 150 - 450 10e3/uL   Erythrocyte  sedimentation rate auto     Status: Abnormal   Result Value Ref Range    Erythrocyte Sedimentation Rate 63 (H) 0 - 20 mm/hr   CRP inflammation     Status: Abnormal   Result Value Ref Range    CRP 1.0 (H) 0.0-<0.8 mg/dL   EKG 12-lead, tracing only     Status: None   Result Value Ref Range    Systolic Blood Pressure  mmHg    Diastolic Blood Pressure  mmHg    Ventricular Rate 116 BPM    Atrial Rate 117 BPM    NE Interval  ms    QRS Duration 76 ms     ms    QTc 489 ms    P Axis  degrees    R AXIS -54 degrees    T Axis -63 degrees    Interpretation ECG       Sinus tachycardia  Left anterior fascicular block  Nonspecific ST and T wave abnormality  Abnormal ECG  When compared with ECG of 23-NOV-2020 23:12,  QT has shortened  T wave inversion less evident in Inferior leads  T wave inversion no longer evident in Anterior leads  Confirmed by ADELINE ACEVEDO MD LOC:JN ([id number]) on 9/14/2021 4:17:27 PM     Results for orders placed or performed in visit on 09/14/21   US Lower Extremity Arterial Duplex Bilateral     Status: None    Narrative    Arterial Duplex Ultrasound (Date: 09/14/21)  Lower Extremity Artery Evaluation      Indication: SURVEILLANCE: LEFT 2ND TOE OSTEOMYELITIS, DIABETIC ULCER,   CELLULITIS. HAMMER TOE, PAD. NON COMPRESSIBLE DANAE'S      Previous: 3/3/2020    History: Previous Smoker, Diabetic, PAD and Vascular Ulcers    Technique: Duplex imaging is performed utilizing gray-scale,   two-dimensional images, and color-flow imaging. Doppler waveform analysis   and spectral Doppler imaging is also performed.    LOWER EXTREMITY ARTERIAL DUPLEX EXAM WITH WAVEFORMS    Right Leg:(cm/s)  Location: [facility]   EIA:   89  B   CFA:   66  B   PFA:   46  B   SFA Proximal:   65  B   SFA Mid:   59  B   SFA Distal:   64  B   Popliteal Artery:   50  B   PTA:   20   B   BAYLEE:   48  B   DPA:   66  B   Waveforms: T=Triphasic, M=Monophasic, B=Biphasic      Left Leg:(cm/s)  Location: [facility]   EIA:   58  B    CFA:   69  B   PFA:   48  B   SFA Proximal:   35 /43 B   SFA Mid:   20  M   SFA Distal:   86  M   Popliteal Artery:   52  M   PTA:   22   M   BAYLEE:   76  M   DPA:   75  M   Waveforms: T=Triphasic, M=Monophasic, B=Biphasic    Impression:     Right Lower Extremity: Patent lower extremity vasculature with biphasic   flow.  No significant stenosis identified.    Left Lower Extremity: Patent vasculature to the proximal superficial   femoral artery with biphasic flow.  Transition to monophasic flow in the   mid superficial femoral artery.  The remaining vasculature is patent with   monophasic flow.    Reference:  Category Normal 1-19% 20-49% 50-99% Occluded   PSV <160 cm/sec without spectral broadening <160 cm/sec with spectral   broadening Increased Increased Absent Flow   Ratio N/A N/A < 2.0 >2.0 N/A   Post-Stenotic Turbulence No No No Yes N/A      Results for orders placed or performed in visit on 09/14/21   US DANAE Doppler No Exercise     Status: None    Narrative    BILATERAL RESTING ANKLE-BRACHIAL INDICES (DANAE'S) (Date: 09/14/21)      Indication: SURVEILLANCE DANAE'S: LEFT 2ND TOE OSTEOMYELITIS, DIABETIC   ULCER, CELLULITIS. HAMMER TOE, PAD.     Previous: 3/3/2020    History: Previous Smoker, Diabetic, PAD and Vascular Ulcers     Resting DANAE's             Right: mmHg Index       Brachial: 126    Ankle-(PT): >254 NC   Ankle-(DP): >254 NC             Digit: 124 0.98                   Left: mmHg Index       Brachial: 113    Ankle-(PT): 99 0.79   Ankle-(DP): >254 NC             Digit: 45 0.36     Resting ankle-brachial index is non compressible on the right. Toe   Pressures of 124 mmHg and TBI of 0.98     Resting ankle-brachial index of noncompressible on the left. Toe Pressures   of 45 mmHg and TBI of 0.36     WAVEFORMS: The right dorsalis pedis and posterior tibial arteries show   monophasic waveforms.   The left dorsalis pedis and posterior tibial arteries show monophasic   waveforms.     Impression:      1. RIGHT  LOWER EXTREMITY: DANAE is Uninterpretable due to incompressible   vessels. and Normal toe pressures of 124 mmHg.    2. LEFT LOWER EXTREMITY: DANAE is Uninterpretable due to incompressible   vessels. and Mildly abnormal, but adequate for healing toe pressures of 45   mmHg.    Reference:     Wound classification  Grade DANAE Ankle Systolic Pressure Toe Pressures   0 > 0.80 > 100 mmHg > 60 mmHg   1 0.6 - 0.79 70 - 100 mmHg 40 - 59 mmHg   2 0.4 - 0.59 50-70 mmHg 30 - 39 mmHg   3 < 0.39 < 50 mmHg < 30 mmHg     Digit Pressures  DBI Disease Category   > 0.70 Normal   < 0.70 Abnormal   > 30 mmHg Potential wound healing   < 30 mmHg Impaired wound healing     Ankle Brachial Pressures  DANAE Disease Category   > 1.3  Likely vessel calcification with monophasic waveforms,   non-diagnostic   0.95-1.30 Normal with multiphasic waveforms   0.50-0.95 Single level disease   0.30-0.50 Multilevel disease   < 0.30 Critical limb ischema     Volume Plethysmography Recording (VPR) at all levels  Normal Sharp systolic peak, fast upstroke, prominent dicrotic notch in   wave   Mild Sharp systolic peak, fast upstroke, absent dicrotic notch in wave   Moderate Flattened systolic peak, slowed upstroke, absent dicrotic notch   in wave   Severe Low-amplitude wave with = upslope and down slope   Occluded Flat Line     Multiple Level Segmental Pressures   Normal Abnormal   Upper Thigh > 1.2 pressure indices, <30 mmHg between lateral and   horizontal cuffs < 0.8 pressure indices suggest proximal occlusion,   0.8-1.2 pressure indices suggest inflow disease, > 30 mmHg between lateral   and horizontal cuffs    Lower Thigh < 30 mmHg between lateral and horizontal cuffs > 30 mmHg   between lateral and horizontal cuffs   Upper Calf < 30 mmHg between lateral and horizontal cuffs > 30 mmHg   between lateral and horizontal cuffs   Note: As limb diameter increases, pressure measurements also increase.         ______________________________________________________________________     Echocardiogram Complete  Order: [id number]  Status:  Edited Result - FINAL   Visible to patient:  Yes (Homer) Next appt:  2021 at 11:20 AM in Vascular Surgery (Nba Cleveland DPM) Dx:  Paroxysmal atrial fibrillation (H); S...   1 Result Note     1 Follow-up Encounter  Details    Reading Physician Reading Date Result Priority   Milan Maya MD  [phone] 2021       Narrative & Impression  [id number]  [id number]  [id number]  [id number]^MARY^[place] [address]     Name: ANNA MARIE VALENTIN  MRN: [id number]  : 1940  Study Date: 2021 12:53 PM  Age: 80 yrs  Gender: Female  Patient Location: [facility]  Reason For Study: Bilateral lower extremity edema, Paroxysmal atrial  fibrillation  Ordering Physician: TATIANA HERNANDEZ  Referring Physician: TATIANA HERNANDEZ  Performed By: THOM     BSA: 1.8 m2  Height: 63 in  Weight: 168 lb  HR: 78  BP: 130/62 mmHg  ______________________________________________________________________________  Procedure  Compared to the prior study dated 10/26/2019, there have been no changes.  ______________________________________________________________________________  Interpretation Summary     1.Left ventricular function is decreased. The ejection fraction is 50-55%  (borderline).  2.There is mild concentric left ventricular hypertrophy.  3.TAPSE is abnormal, which is consistent with abnormal right ventricular  systolic function.  4.There is a bi-leaflet ([employer]) mechanical prosthesis. Normal prosthetic  mitral valve gradients at 6 mmHg.  5.There is moderate to mod-severe (2-3+) tricuspid regurgitation. Right  ventricular systolic pressure is elevated, consistent with moderate pulmonary  hypertension.  6.Moderate to severe valvular aortic stenosis.Mean gradient 34 mmHg with peak  velocity 3.4 m/s. There is mild (1+) aortic  regurgitation.  7.Compared to the prior study dated 10/26/2019, the AS is worse.        EXAM DATE:         09/10/2021     EXAM: MRI FOOT LEFT W/O and WITH CONTRAST  LOCATION: [facility]  DATE/TIME: 9/10/2021 2:30 PM     INDICATION: Infection, pain, swelling  COMPARISON: 8/31/21  TECHNIQUE: Routine. Additional postgadolinium T1 sequences were obtained.  IV CONTRAST: 13mL IV Dotarem was given from a 15mL single dose vial with 2mL discarded. MWR I-STAT Cr=1.0mg/dL, eGFR=53     FINDINGS:     JOINTS AND BONES:  -There is bone signal abnormality within the proximal and middle phalanx of the second toe consistent with osteomyelitis. Additional osteomyelitis involving the second metatarsal head. No significant effusion to suggest septic arthropathy at the second   MTP joint or the IP joint of the second toe but the presence of infection on both sides of the articulation is worrisome for septic joint as well. No additional areas worrisome for osteomyelitis are identified. No evidence for fracture.     TENDONS:  -The Achilles tendon is not included on the field-of-view and cannot be evaluated. Within the foot, the flexor tendons are negative for tendinopathy or tearing. There is mild tenosynovitis involving the flexor tendon to the second toe. Potentially, this   could represent a toxic tenosynovitis. Mild flexor hallucis tendon tendinopathy without tearing. The extensor tendons are negative for tendinopathy, tenosynovitis, or tearing.     LIGAMENTS:  -The ligament of Lisfranc is intact. The collateral ligaments at the MTP joints are all intact.     MUSCLES AND SOFT TISSUES:  -Fatty infiltration and atrophy of the plantar and interosseous musculature. There is some superimposed edema or potentially a component of infectious or inflammatory myositis. There is no evidence for abscess.     IMPRESSION:  1.  Osteomyelitis involving the middle and proximal phalanx of the second toe as well as the  second metatarsal head.  2.  No additional areas worrisome for osteomyelitis are identified. No significant joint effusion to suggest septic arthropathy. No evidence for abscess.  3.  No evidence for fracture.  4.  Tenosynovitis involving the flexor digitorum tendon to the second toe. This could represent a toxic tenosynovitis.  5.  Mild flexor hallucis tendinopathy without tearing.  6.  Fatty infiltration and atrophy of the plantar and interosseous musculature. There is some reactive edema about the second metatarsal shaft.      Impression:     1. Preoperative cardiovascular examination    2. Osteomyelitis of second toe of left foot (H)    3. Cellulitis of second toe of left foot    4. Intermittent atrial fibrillation (H)    5. Bilateral lower extremity edema    6. Moderate tricuspid insufficiency    7. Essential hypertension    8. Type 2 diabetes mellitus with diabetic polyneuropathy, with long-term current use of insulin (H)    9. S/P mitral valve replacement    10. Paroxysmal atrial fibrillation (H) - VALVULAR    11. ASCVD (arteriosclerotic cardiovascular disease) - CABG 2015    12. Stage 3 chronic kidney disease, unspecified whether stage 3a or 3b CKD    13. Bleeding diathesis (H) - due to warfarin    14. Acquired hypothyroidism        Plan:     1. Okay to proceed with surgery as planned.  2. Blood work was done today.  EKG done today.  3. Low risk surgery at this time.  Okay to proceed.  4. She will need a lot of support upon discharge.  She does have family members who are involved in her care.  5. I extended the course of her Keflex to get her through the surgery.  6. She will continue off of her warfarin and use Lovenox tomorrow and a half dose on Thursday.  7. She should remain inpatient for restarting her blood thinner for her valve.  This self has a fairly high risk of thrombosis.  Recommend hospitalist consult during her stay and possible cardiology consult if needed.  8. She may take her usual  medications on the morning of the procedure with a sip of water.  9. She can take 10 units of insulin the night before the procedure but not on the day of procedure as I am concerned about low sugars during the day of the procedure.    63 minutes was spent today on the patient's care on the day of service.    This includes time for chart preparation, reviewing medical tests done before or during the visit, talking with the patient, review of quality indicators, required documentation, and other elements of care.        Gabino Bach MD  General Internal Medicine  [facility]    Return in about 20 days (around 10/4/2021), or if symptoms worsen or fail to improve, for follow up visit.     Future Appointments   Date Time Provider Department Center   9/24/2021 11:20 AM [facility]   10/4/2021  1:00 PM [facility]       Answers for HPI/ROS submitted by the patient on 9/14/2021  If you checked off any problems, how difficult have these problems made it for you to do your work, take care of things at home, or get along with other people?: Not difficult at all  PHQ9 TOTAL SCORE: 4  MATHEW 7 TOTAL SCORE: 10

## 2021-09-15 NOTE — PATIENT INSTRUCTIONS
Future Appointments   Date Time Provider Department Center   9/24/2021 11:20 [facility]   10/4/2021  1:00 PM [facility]

## 2021-09-15 NOTE — TELEPHONE ENCOUNTER
Spoke with daughter in law, Abraham and confirmed understanding of warfarin hold and lovenox bridge instructions. Abraham repeated back instructions correctly and denied further questions.     Plan is for patient to go to TCU following surgery on Friday, ACN to follow up after discharge from TCU.     Meredith Patino RN

## 2021-09-17 PROBLEM — E11.9 DIABETES MELLITUS (H): Status: ACTIVE | Noted: 2021-01-01

## 2021-09-17 PROBLEM — M86.9 OSTEOMYELITIS OF ANKLE AND FOOT (H): Status: ACTIVE | Noted: 2021-01-01

## 2021-09-17 NOTE — ANESTHESIA PROCEDURE NOTES
Popliteal Procedure Note  Pre-Procedure   Staff -        Anesthesiologist:  Myrtle Marroquin MD       Performed By: anesthesiologist       Location: pre-op       Procedure Start/Stop Times: 9/17/2021 3:49 PM and 9/17/2021 3:52 PM       Pre-Anesthestic Checklist: patient identified, IV checked, site marked, risks and benefits discussed, informed consent, monitors and equipment checked, pre-op evaluation, at physician/surgeon's request and post-op pain management  Timeout:       Correct Patient: Yes        Correct Procedure: Yes        Correct Site: Yes        Correct Position: Yes        Correct Laterality: Yes        Site Marked: Yes  Procedure Documentation  Procedure: Popliteal       Laterality: left       Patient Position: lateral       Skin prep: Chloraprep       Ultrasound guided       1. Ultrasound was used to identify targeted nerve, plexus, vascular marker, or fascial plane and place a needle adjacent to it in real-time.       2. Ultrasound was used to visualize the spread of anesthetic in close proximity to the above referenced structure.    Assessment/Narrative         The placement was negative for: blood aspirated       Paresthesias: No.       Complications: none

## 2021-09-17 NOTE — ANESTHESIA PROCEDURE NOTES
Adductor canal Procedure Note  Pre-Procedure   Staff -        Anesthesiologist:  Myrtle Marroquin MD       Performed By: anesthesiologist       Location: pre-op       Procedure Start/Stop Times: 9/17/2021 3:53 PM and 9/17/2021 3:55 PM       Pre-Anesthestic Checklist: patient identified, IV checked, site marked, risks and benefits discussed, informed consent, monitors and equipment checked, pre-op evaluation, at physician/surgeon's request and post-op pain management  Timeout:       Correct Patient: Yes        Correct Procedure: Yes        Correct Site: Yes        Correct Position: Yes        Correct Laterality: Yes        Site Marked: Yes  Procedure Documentation  Procedure: Adductor canal       Laterality: left       Patient Position: supine       Skin prep: Chloraprep       Ultrasound guided       1. Ultrasound was used to identify targeted nerve, plexus, vascular marker, or fascial plane and place a needle adjacent to it in real-time.       2. Ultrasound was used to visualize the spread of anesthetic in close proximity to the above referenced structure.    Assessment/Narrative         Paresthesias: No.       Complications: none

## 2021-09-17 NOTE — ANESTHESIA PREPROCEDURE EVALUATION
Anesthesia Pre-Procedure Evaluation    Patient: Bernadette Yu   MRN: [id number] : 1940        Preoperative Diagnosis: Osteomyelitis of ankle and foot (H) [M86.9]   Procedure : Procedure(s):  AMPUTATION, second ray left foot     Past Medical History:   Diagnosis Date     Abdominal bloating 2016     Anticoagulation goal of INR 2.5 to 3.5 2016     Anxiety      Aortic stenosis 10/26/2019     ASCVD (arteriosclerotic cardiovascular disease)      Atherosclerosis of native coronary artery without angina pectoris 10/26/2019     Bleeding diathesis (H)      Carotid artery stenosis, left      CHF (congestive heart failure) (H)      CKD (chronic kidney disease) stage 3, GFR 30-59 ml/min      DM (diabetes mellitus), type 2 (H) 1990     Eczema      Former smoker      Full code status      HLD (hyperlipidemia)      HTN (hypertension)      Hypothyroidism      IBS (irritable bowel syndrome)      Insomnia      Liver disease      Long Q-T syndrome 10/26/2019     Meningococcal meningitis Age 14     Microalbuminuria due to type 2 diabetes mellitus (H)      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     Moderate tricuspid insufficiency 2021     MRSA (methicillin resistant staph aureus) culture positive 2017     Normocytic anemia      PAD (peripheral artery disease) (H)     [facility]   DATE: 10/26/2019 4:15 PM   EXAM:  DUPLEX ARTERIAL ULTRASOUND OF THE LOWER EXTREMITIES BILATERALLY   INDICATION: Leg pain, vasculopathy.   COMPARISON: None.   TECHNIQUE: Duplex imaging is performed utilizing gray-scale, two-dimensional images, and color-flow imaging. Doppler waveform analysis and spectral Doppler imaging is also performed.   DUPLEX ARTERIAL ULTRASOUND FINDIN     Paroxysmal atrial fibrillation (H) 2015    Bilateral pulmonary vein isolation with AtriCure Synergy (bipolar  radiofrequency ablation) device, exclusion of the left atrial appendage with the AtriCure AtriClip device.       PN (peripheral neuropathy)      Psoriasis      PVD (peripheral vascular disease) (H) 2019    Bilateral lower extremities.  See ultrasound 2019.     Recurrent UTI (urinary tract infection)      RLS (restless legs syndrome)      S/P CABG (coronary artery bypass graft) 2016     S/P colonoscopy 07     S/P MVR (mitral valve replacement) 2015     Subclavian artery stenosis, left (H) 2015    Stented in .      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     Ulcer of heel and midfoot, left, with unspecified severity (H)       Past Surgical History:   Procedure Laterality Date     APPENDECTOMY       BYPASS GRAFT ARTERY CORONARY       CARDIAC CATHETERIZATION  11/12/15      SECTION       CHOLECYSTECTOMY       COLONOSCOPY N/A 10/12/2016    Procedure: COLONOSCOPY;  Surgeon: Santiago Duran MD;  Location[facility];  Service:      COLONOSCOPY N/A 10/13/2016    Procedure: COLONOSCOPY with polypectomy & rectum biopsies;  Surgeon: Santiago Duran MD;  Location: [facility];  Service:      COLONOSCOPY N/A 12/3/2017    Procedure: COLONOSCOPY with multiple polyp removal using hot snare and mansfield net and biopsy forcep;  Surgeon: Marcelo Wade MD;  Location: [facility];  Service:      COLONOSCOPY N/A 3/13/2018    Procedure: COLONOSCOPY; POLYPECTOMY;  Surgeon: Marcelo Wade MD;  Location: [facility] OR;  Service:      ESOPHAGOSCOPY, GASTROSCOPY, DUODENOSCOPY (EGD), COMBINED N/A 10/12/2016    Procedure: ESOPHAGOGASTRODUODENOSCOPY with biopsies;  Surgeon: Santiago Duran MD;  Location: [facility];  Service:      ESOPHAGOSCOPY, GASTROSCOPY, DUODENOSCOPY (EGD), COMBINED N/A 12/3/2017    Procedure: ESOPHAGOGASTRODUODENOSCOPY (EGD);  Surgeon: Marcelo Wade MD;  Location: [facility];  Service:      TONSILLECTOMY & ADENOIDECTOMY        UPPER GASTROINTESTINAL ENDOSCOPY        Allergies   Allergen Reactions     Amiodarone Other (See Comments)     TORSADES DE POINTES     Aspirin Other (See Comments)     Recurrent severe gastrointestinal bleed.      Social History     Tobacco Use     Smoking status: Former Smoker     Years: 23.00     Types: Cigarettes     Smokeless tobacco: Never Used     Tobacco comment: quit 1970's   Substance Use Topics     Alcohol use: No      Wt Readings from Last 1 Encounters:   09/17/21 71 kg (156 lb 9.6 oz)        Anesthesia Evaluation   Pt has had prior anesthetic.     No history of anesthetic complications       ROS/MED HX  ENT/Pulmonary:       Neurologic:       Cardiovascular:     (+) hypertension-Peripheral Vascular Disease-CAD ---CHF     METS/Exercise Tolerance:     Hematologic:       Musculoskeletal:       GI/Hepatic:     (+) liver disease,     Renal/Genitourinary:     (+) renal disease,     Endo:     (+) type I DM, thyroid problem,     Psychiatric/Substance Use:       Infectious Disease:       Malignancy:       Other:            Physical Exam    Airway        Mallampati: II    Neck ROM: full     Respiratory Devices and Support         Dental       (+) missing      Cardiovascular           (+) murmur       Pulmonary   pulmonary exam normal                OUTSIDE LABS:  CBC:   Lab Results   Component Value Date    WBC 6.8 09/14/2021    WBC 9.4 08/24/2021    HGB 10.8 (L) 09/14/2021    HGB 11.2 (L) 08/24/2021    HCT 35.2 09/14/2021    HCT 35.7 08/24/2021     09/14/2021     08/24/2021     BMP:   Lab Results   Component Value Date     09/14/2021     08/24/2021    POTASSIUM 4.1 09/14/2021    POTASSIUM 4.1 08/24/2021    CHLORIDE 101 09/14/2021    CHLORIDE 99 08/24/2021    CO2 27 09/14/2021    CO2 25 08/24/2021    BUN 17 09/14/2021    BUN 22 08/24/2021    CR 1.00 09/14/2021    CR 1.22 (H) 08/24/2021     (H) 09/17/2021     (H) 09/14/2021     COAGS:   Lab Results   Component Value Date     PTT 41 (H) 11/23/2020    INR 3.5 (H) 08/24/2021     POC: No results found for: BGM, HCG, HCGS  HEPATIC:   Lab Results   Component Value Date    ALBUMIN 3.6 12/29/2020    PROTTOTAL 6.9 12/29/2020    ALT 20 12/29/2020    AST 20 12/29/2020    ALKPHOS 76 12/29/2020    BILITOTAL 0.4 12/29/2020     OTHER:   Lab Results   Component Value Date    LACT 6.4 (HH) 11/24/2020    A1C 9.0 (H) 09/14/2021    DI 9.1 09/14/2021    PHOS 2.6 10/27/2019    MAG 2.0 02/20/2020    LIPASE 13 11/23/2020    TSH 0.44 05/06/2021    CRP 1.0 (H) 09/14/2021    SED 63 (H) 09/14/2021       Anesthesia Plan    ASA Status:  3      Anesthesia Type: MAC.              Consents    Anesthesia Plan(s) and associated risks, benefits, and realistic alternatives discussed. Questions answered and patient/representative(s) expressed understanding.     - Discussed with:  Patient         Postoperative Care    Pain management: Peripheral nerve block (Single Shot).        Comments:                Myrtle Marroquin MD

## 2021-09-17 NOTE — ANESTHESIA CARE TRANSFER NOTE
Patient: Bernadette Lozoyagren    Procedure(s):  AMPUTATION, second ray left foot    Diagnosis: Osteomyelitis of ankle and foot (H) [M86.9]  Diagnosis Additional Information: No value filed.    Anesthesia Type:   Peripheral Nerve Block     Note:    Oropharynx: oropharynx clear of all foreign objects and spontaneously breathing  Level of Consciousness: awake  Oxygen Supplementation: face mask  Level of Supplemental Oxygen (L/min / FiO2): 6  Independent Airway: airway patency satisfactory and stable  Dentition: dentition unchanged  Vital Signs Stable: post-procedure vital signs reviewed and stable  Report to RN Given: handoff report given  Patient transferred to: PACU    Handoff Report: Identifed the Patient, Identified the Reponsible Provider, Reviewed the pertinent medical history, Discussed the surgical course, Reviewed Intra-OP anesthesia mangement and issues during anesthesia, Set expectations for post-procedure period and Allowed opportunity for questions and acknowledgement of understanding      Vitals:  Vitals Value Taken Time   BP     Temp     Pulse     Resp     SpO2         Electronically Signed By: ELVIS Alfaro CRNA  September 17, 2021  5:18 PM

## 2021-09-17 NOTE — PHARMACY-ADMISSION MEDICATION HISTORY
Pharmacy Note - Admission Medication History    Pertinent Provider Information: Patient took last cephalexin this morning. Has held warfarin and bridged with Lovenox. She takes insulin 70/30 30 units in morning and 15 units in the evening.     ______________________________________________________________________    Prior To Admission (PTA) med list completed and updated in EMR.       Current Facility-Administered Medications for the 9/17/21 encounter (Hospital Encounter)   Medication     lidocaine (XYLOCAINE) 2 % external gel     PTA Med List   Medication Sig Note Last Dose     cephALEXin (KEFLEX) 500 MG capsule Take 1 capsule (500 mg) by mouth 2 times daily FILL 6 ONLY 9/17/2021: Finished prescription am on 9/17 9/17/2021 at am     diltiazem ER COATED BEADS (CARDIZEM CD/CARTIA XT) 180 MG 24 hr capsule Take 1 capsule (180 mg) by mouth daily  9/17/2021 at am     ferrous sulfate 325 (65 FE) MG tablet [FERROUS SULFATE 325 (65 FE) MG TABLET] Take 1 tablet (325 mg total) by mouth 3 (three) times a week. Monday, Wednesday, and Friday 9/17/2021 at am     furosemide (LASIX) 40 MG tablet [FUROSEMIDE (LASIX) 40 MG TABLET] Take 2 tablets (80 mg total) by mouth daily.  9/17/2021 at am     gabapentin (NEURONTIN) 300 MG capsule [GABAPENTIN (NEURONTIN) 300 MG CAPSULE] Take 1 capsule (300 mg total) by mouth at bedtime.  9/16/2021 at pm     HYDROmorphone (DILAUDID) 4 MG tablet Take 0.5-1 tablets (2-4 mg) by mouth 4 times daily as needed for moderate to severe pain For use from 8/17/21 to 9/16/21.  No more than 4 a day.  May release today.  9/17/2021 at am     insulin aspart protamine-insulin aspart (NOVOLOG MIX 70-30FLEXPEN U-100) 100 unit/mL (70-30) pen [INSULIN ASPART PROTAMINE-INSULIN ASPART (NOVOLOG MIX 70-30FLEXPEN U-100) 100 UNIT/ML (70-30) PEN] Inject 30 Units under the skin every morning AND 15 Units every evening. 9/17/2021: Had 10 units in the evening on 9/16 9/16/2021 at pm     levothyroxine (SYNTHROID, LEVOTHROID) 125  MCG tablet [LEVOTHYROXINE (SYNTHROID, LEVOTHROID) 125 MCG TABLET] Take 1 tablet (125 mcg total) by mouth daily.  9/17/2021 at am     mupirocin (BACTROBAN) 2 % external ointment Apply topically 2 times daily 9/17/2021: Has not been using d/t procedure Past Week at Unknown time     polyethylene glycol (MIRALAX) 17 gram packet [POLYETHYLENE GLYCOL (MIRALAX) 17 GRAM PACKET] Take 1 packet (17 g total) by mouth daily.  9/17/2021 at am     triamcinolone (KENALOG) 0.1 % external cream Apply topically daily 9/17/2021: Venous stasis dermatitis of both lower extremities Past Week       Information source(s): Family member, Patient's pharmacy and Clinic records  Method of interview communication: in-person    Summary of Changes to PTA Med List  New: None  Discontinued: Should be finished with cephalexin  Changed: none    Patient was asked about OTC/herbal products specifically.  PTA med list reflects this.    In the past week, patient estimated taking medication this percent of the time:  greater than 90%.    Allergies were reviewed, assessed, and updated with the patient.      Medications currently not available for use during hospital stay. Family/Patient representative states they will bring triamcinolone and Bactroban to [facility].    The information provided in this note is only as accurate as the sources available at the time of the update(s).    Thank you for the opportunity to participate in the care of this patient.    Jason John, PharmD  9/17/2021 4:44 PM

## 2021-09-17 NOTE — ANESTHESIA POSTPROCEDURE EVALUATION
Patient: Bernadette Yu    Procedure(s):  AMPUTATION, second ray left foot    Diagnosis:Osteomyelitis of ankle and foot (H) [M86.9]  Diagnosis Additional Information: No value filed.    Anesthesia Type:  Peripheral Nerve Block    Note:  Disposition: Inpatient   Postop Pain Control: Uneventful            Sign Out: Well controlled pain   PONV: No   Neuro/Psych: Uneventful            Sign Out: Acceptable/Baseline neuro status   Airway/Respiratory: Uneventful            Sign Out: Acceptable/Baseline resp. status   CV/Hemodynamics: Uneventful            Sign Out: Acceptable CV status; No obvious hypovolemia; No obvious fluid overload   Other NRE: NONE   DID A NON-ROUTINE EVENT OCCUR? No           Last vitals:  Vitals Value Taken Time   /66 09/17/21 1745   Temp     Pulse 105 09/17/21 1751   Resp 29 09/17/21 1751   SpO2 98 % 09/17/21 1751   Vitals shown include unvalidated device data.    Electronically Signed By: Myrtle Marroquin MD  September 17, 2021  5:52 PM

## 2021-09-17 NOTE — OP NOTE
Date: 9/17/21    Surgeon: COOKIE Cleveland DPM    Preoperative diagnosis:   1. Osteomyelitis 2nd digit left foot  2. Osteomyelitis 2nd metatarsal left foot  3. Abscess left foot    Postoperative diagnosis: Same    Procedure:   1. Amputation 2nd digit left foot  2. Partial amputation 2nd metatarsal left foot   3. Incision and drainage left foot     Anesthesia: Popliteal block with IV-sedation    Hemostasis: None    Pathology: 2nd digit, 2nd metatarsal, Aerobic/anaerobic culture     Injectables: None     Materials: 3-0 Vicryl, 3-0 Nylon, YUKO drain     Complications: None    Blood loss:  25 cc       Findings: Patient presents for operative intervention for osteomyelitis and abscess of the 2nd ray left foot. Recent MRI indicates osteomyelitis along the middle and proximal phalanges of the 2nd digit left foot and 2nd metatarsal head. I discussed today's procedure with the patient in pre-op to include amputation of the 2nd digit and distal 2nd metatarsal with removal of all non-viable infected tissue. She consents to surgery. Patient questions invited and answered, including appropriate risk, benefits and complications. No guarantees given or implied. Patient has been NPO.    Description: Patient was brought to the operating room and placed on the table in supine position. IV-sedation and popliteal block was administered by the anesthesia department. The foot was then prepped and draped in usual aseptic manner and the following procedure was then performed: Attention was directed to the 2nd digit of the left foot where two semi-elliptical incisions were made at the base of the digit. The incisions were carried full thickness into the 2nd MPJ where the digit was disarticulated. The digit was removed from the surgical site in toto and sent to pathology. Significant non-viable tissue was noted and tracked along the 2nd metatarsal head. The 2nd metatarsal head was also noted to be non-viable. Deep aerobic and anaerobic cultures  taken. Next, a sagittal saw was used to resect the 2nd metatarsal at a clean proximal margin and sent in toto to pathology. All non-viable tissue was sharply debrided. Next, a 1000cc pulse lavage was used to irrigate the surgical site. Following sharp debridement with irrigation, no remaining non-viable tissue was noted. Tracking to other foot compartments was not noted. Primary closure was determined to be appropriate.     The subcutaneous tissue was reapproximated with 3-0 vicryl in a simple fashion and the skin was closed with 3-0 nylon in a simple suture fashion.     Dressings consisted of adaptic, 4x4's, ABD, kerlix/hannah roll and an ace wrap.     The patient appeared to tolerate all the procedures and anesthesia well without apparent complications. Patient was transported from the operating room to the recovery room with vital signs stable and neurovascular status as it was pre-operatively to the left foot. Patient to be admitted to observation. Consult placed to hospital for medical management of diabetes, and to ID. Non-weight bearing left foot. Surgical dressing to remain intact. Empty YUKO drain prn. Proximal margin appeared clean. Expected hospital stay 2-4 days pending culture results. Recommend discharge to TCU to assist with non-weight bearing during post-op recovery. Care management to assist with TCU placement.

## 2021-09-18 NOTE — PROGRESS NOTES
FOOT AND ANKLE SURGERY/PODIATRY Progress Note        ASSESSMENT/PLAN:   1. Osteomyelitis second ray left foot  2. Status post amputation second ray left foot    Continue same. Leave dressing clean dry and intact and follow-up with Dr. Cleveland in 10 days. The patient is to remain nonweightbearing. Patient awaiting transfer to TCU. Will sign off on this patient. Please call with questions.    HPI: Bernadette Yu was seen again today at bedside resting comfortably. The patient is status post amputation second ray left foot performed by Dr. Cleveland. The patient indicated that she is pain-free. She is anxious to be discharged. She has no other complaints.    Findings: Dressings are clean dry and intact. Neurovascular status is intact. No active drainage or bleeding noted. Pain well managed.          Past Medical History:   Diagnosis Date     Abdominal bloating 8/21/2016     Anticoagulation goal of INR 2.5 to 3.5 1/27/2016     Anxiety      Aortic stenosis 10/26/2019     ASCVD (arteriosclerotic cardiovascular disease)      Atherosclerosis of native coronary artery without angina pectoris 10/26/2019     Bleeding diathesis (H)      Carotid artery stenosis, left      CHF (congestive heart failure) (H)      CKD (chronic kidney disease) stage 3, GFR 30-59 ml/min      DM (diabetes mellitus), type 2 (H) 1990     Eczema      Former smoker      Full code status      HLD (hyperlipidemia)      HTN (hypertension)      Hypothyroidism      IBS (irritable bowel syndrome)      Insomnia      Liver disease      Long Q-T syndrome 10/26/2019     Meningococcal meningitis Age 14     Microalbuminuria due to type 2 diabetes mellitus (H)      Mild nonproliferative diabetic retinopathy of both eyes (H)      Mitral stenosis      Moderate aortic stenosis     See transesophageal echocardiogram (ANTOINE) 2019.     Moderate tricuspid insufficiency 8/28/2021     MRSA (methicillin resistant staph aureus) culture positive 2/9/2017     Normocytic anemia       PAD (peripheral artery disease) (H)     [facility]   DATE: 10/26/2019 4:15 PM   EXAM:  DUPLEX ARTERIAL ULTRASOUND OF THE LOWER EXTREMITIES BILATERALLY   INDICATION: Leg pain, vasculopathy.   COMPARISON: None.   TECHNIQUE: Duplex imaging is performed utilizing gray-scale, two-dimensional images, and color-flow imaging. Doppler waveform analysis and spectral Doppler imaging is also performed.   DUPLEX ARTERIAL ULTRASOUND FINDIN     Paroxysmal atrial fibrillation (H) 2015    Bilateral pulmonary vein isolation with AtriCure Synergy (bipolar radiofrequency ablation) device, exclusion of the left atrial appendage with the AtriCure AtriClip device.       PN (peripheral neuropathy)      Psoriasis      PVD (peripheral vascular disease) (H) 2019    Bilateral lower extremities.  See ultrasound 2019.     Recurrent UTI (urinary tract infection)      RLS (restless legs syndrome)      S/P CABG (coronary artery bypass graft) 2016     S/P colonoscopy 07     S/P MVR (mitral valve replacement) 2015     Subclavian artery stenosis, left (H) 2015    Stented in .      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     Ulcer of heel and midfoot, left, with unspecified severity (H)         Past Surgical History:   Procedure Laterality Date     APPENDECTOMY       BYPASS GRAFT ARTERY CORONARY       CARDIAC CATHETERIZATION  11/12/15      SECTION       CHOLECYSTECTOMY       COLONOSCOPY N/A 10/12/2016    Procedure: COLONOSCOPY;  Surgeon: Santiago Duran MD;  Location: [facility];  Service:      COLONOSCOPY N/A 10/13/2016    Procedure: COLONOSCOPY with polypectomy & rectum biopsies;  Surgeon: Santiago Duran MD;  Location: [facility];  Service:      COLONOSCOPY N/A 12/3/2017    Procedure: COLONOSCOPY with multiple polyp removal using hot snare and mansfield net and biopsy forcep;  Surgeon: Marcelo Wade MD;  Location: [facility];  Service:      COLONOSCOPY  N/A 3/13/2018    Procedure: COLONOSCOPY; POLYPECTOMY;  Surgeon: Marcelo Wade MD;  Location: [facility] OR;  Service:      ESOPHAGOSCOPY, GASTROSCOPY, DUODENOSCOPY (EGD), COMBINED N/A 10/12/2016    Procedure: ESOPHAGOGASTRODUODENOSCOPY with biopsies;  Surgeon: Santiago Duran MD;  Location: [facility];  Service:      ESOPHAGOSCOPY, GASTROSCOPY, DUODENOSCOPY (EGD), COMBINED N/A 12/3/2017    Procedure: ESOPHAGOGASTRODUODENOSCOPY (EGD);  Surgeon: Marcelo Wade MD;  Location: [facility];  Service:      TONSILLECTOMY & ADENOIDECTOMY       UPPER GASTROINTESTINAL ENDOSCOPY         Allergies   Allergen Reactions     Amiodarone Other (See Comments)     TORSADES DE POINTES     Aspirin Other (See Comments)     Recurrent severe gastrointestinal bleed.         Current Facility-Administered Medications:      [START ON 9/20/2021] acetaminophen (TYLENOL) tablet 650 mg, 650 mg, Oral, Q4H PRN, Nba Cleveland DPM     acetaminophen (TYLENOL) tablet 975 mg, 975 mg, Oral, Q8H, Nba Cleveland DPM, 975 mg at 09/18/21 0238     bisacodyl (DULCOLAX) Suppository 10 mg, 10 mg, Rectal, Daily PRN, Nba Cleveland DPM     glucose gel 15-30 g, 15-30 g, Oral, Q15 Min PRN **OR** dextrose 50 % injection 25-50 mL, 25-50 mL, Intravenous, Q15 Min PRN **OR** glucagon injection 1 mg, 1 mg, Subcutaneous, Q15 Min PRN, Nessa Beckford MD     diltiazem ER COATED BEADS (CARDIZEM CD/CARTIA XT) 24 hr capsule 180 mg, 180 mg, Oral, Daily, Nessa Beckford MD     fentaNYL (PF) (SUBLIMAZE) injection 50 mcg, 50 mcg, Intravenous, q1 min prn, Nba Cleveland DPM, 50 mcg at 09/17/21 1536     [START ON 9/20/2021] ferrous sulfate (FEROSUL) tablet 325 mg, 325 mg, Oral, Once per day on Mon Wed Fri, Nessa Beckford MD     furosemide (LASIX) tablet 80 mg, 80 mg, Oral, Daily, Nessa Beckford MD     gabapentin (NEURONTIN) capsule 300 mg, 300 mg, Oral, At Bedtime, Nessa Beckford MD, 300 mg at 09/17/21 2216     HYDROmorphone  (DILAUDID) injection 0.2 mg, 0.2 mg, Intravenous, Q2H PRN, Nba Cleveland DPM, 0.2 mg at 09/18/21 0228     HYDROmorphone (DILAUDID) tablet 2-4 mg, 2-4 mg, Oral, 4x Daily PRN, Nessa Beckford MD     insulin aspart (NovoLOG) injection (RAPID ACTING), 1-10 Units, Subcutaneous, TID AC, Nessa Beckford MD     insulin aspart (NovoLOG) injection (RAPID ACTING), 1-7 Units, Subcutaneous, At Bedtime, Nessa Beckford MD, 1 Units at 09/17/21 2219     insulin aspart (NovoLOG) injection (RAPID ACTING), , Subcutaneous, QAM JOVAN, Nessa Beckford MD     insulin aspart (NovoLOG) injection (RAPID ACTING), , Subcutaneous, Daily with lunch, Nessa Beckford MD     insulin aspart (NovoLOG) injection (RAPID ACTING), , Subcutaneous, Daily with supper, Nessa Beckford MD     insulin aspart (NovoLOG) injection (RAPID ACTING), , Subcutaneous, With Snacks or Supplements, Nessa Beckford MD     insulin detemir (LEVEMIR PEN) injection 10 Units, 10 Units, Subcutaneous, At Bedtime, Nessa Beckford MD, 10 Units at 09/17/21 2220     insulin detemir (LEVEMIR PEN) injection 20 Units, 20 Units, Subcutaneous, Shakeel MCLEOD Liu-Ying, MD     levothyroxine (SYNTHROID/LEVOTHROID) tablet 125 mcg, 125 mcg, Oral, Shakeel MCLEOD Liu-Ying, MD     lidocaine (LMX4) cream, , Topical, Q1H PRN, Nba Cleveland DPM     lidocaine (LMX4) cream, , Topical, Q1H PRN, Nba Cleveland DPM     lidocaine 1 % 0.1-1 mL, 0.1-1 mL, Other, Q1H PRN, Nba Cleveland DPM     lidocaine 1 % 0.1-1 mL, 0.1-1 mL, Other, Q1H PRN, Nba Cleveland DPM     magnesium hydroxide (MILK OF MAGNESIA) suspension 30 mL, 30 mL, Oral, Daily PRN, Nba Cleveland DPM     midazolam (VERSED) injection 1 mg, 1 mg, Intravenous, q1 min prn, Nba Cleveland DPM, 1 mg at 09/17/21 1535     naloxone (NARCAN) injection 0.2 mg, 0.2 mg, Intravenous, Q2 Min PRN **OR** naloxone (NARCAN) injection 0.4 mg, 0.4 mg, Intravenous, Q2 Min PRN **OR** naloxone (NARCAN) injection 0.2 mg, 0.2  mg, Intramuscular, Q2 Min PRN **OR** naloxone (NARCAN) injection 0.4 mg, 0.4 mg, Intramuscular, Q2 Min PRN, Nba Cleveland DPM     ondansetron (ZOFRAN-ODT) ODT tab 4 mg, 4 mg, Oral, Q6H PRN **OR** ondansetron (ZOFRAN) injection 4 mg, 4 mg, Intravenous, Q6H PRN, Nba Cleveland DPM     oxyCODONE IR (ROXICODONE) half-tab 2.5 mg, 2.5 mg, Oral, Q4H PRN **OR** oxyCODONE (ROXICODONE) tablet 5 mg, 5 mg, Oral, Q4H PRN, Nba Cleveland DPM, 5 mg at 09/18/21 0440     [COMPLETED] piperacillin-tazobactam (ZOSYN) 3.375 g vial to attach to  mL bag, 3.375 g, Intravenous, Once, Last Rate: 200 mL/hr at 09/17/21 2226, 3.375 g at 09/17/21 2226 **FOLLOWED BY** piperacillin-tazobactam (ZOSYN) 3.375 g vial to attach to  mL bag, 3.375 g, Intravenous, Q8H, Nessa Beckford MD, 3.375 g at 09/18/21 0427     polyethylene glycol (MIRALAX) Packet 17 g, 17 g, Oral, Daily, Nessa Beckford MD     prochlorperazine (COMPAZINE) injection 5 mg, 5 mg, Intravenous, Q6H PRN **OR** prochlorperazine (COMPAZINE) tablet 5 mg, 5 mg, Oral, Q6H PRN, Nba Cleveland DPM     senna-docusate (SENOKOT-S/PERICOLACE) 8.6-50 MG per tablet 1 tablet, 1 tablet, Oral, BID, Nba Cleveland DPM, 1 tablet at 09/17/21 2219     sodium chloride (PF) 0.9% PF flush 3 mL, 3 mL, Intracatheter, Q8H, Nba Cleveland DPM, 3 mL at 09/17/21 2230     sodium chloride (PF) 0.9% PF flush 3 mL, 3 mL, Intracatheter, q1 min prn, Nba Cleveland DPM     sodium chloride (PF) 0.9% PF flush 3 mL, 3 mL, Intracatheter, Q8H, Nba Cleveland DPM, 3 mL at 09/18/21 0231     sodium chloride (PF) 0.9% PF flush 3 mL, 3 mL, Intracatheter, q1 min prn, Nba Clevelnad DPM     triamcinolone (KENALOG) 0.1 % cream, , Topical, Daily, Nessa Beckford MD     vancomycin (VANCOCIN) 1,250 mg in sodium chloride 0.9 % 250 mL intermittent infusion, 1,250 mg, Intravenous, Q24H, Nessa Beckford MD, 1,250 mg at 09/18/21 0029    Family History   Problem  Relation Age of Onset     Colon Cancer Father      Diabetes Father      Hypertension Mother      Heart Disease Mother          congestive heart failure     Arthritis Mother      Diabetes Sister      Heart Disease Brother      Rectal Cancer Son      Colon Cancer Son        Social History     Socioeconomic History     Marital status:      Spouse name: Not on file     Number of children:  4     Years of education: Not on file     Highest education level: Not on file   Occupational History     Not on file   Tobacco Use     Smoking status: Former Smoker     Years: 23.00     Types: Cigarettes     Smokeless tobacco: Never Used     Tobacco comment: quit 1970's   Substance and Sexual Activity     Alcohol use: No     Drug use: No     Sexual activity: Never   Other Topics Concern     Not on file   Social History Narrative    Patient of Dr. Bach since 2001.   to  Aneudy.    4 children.    Former patient of Dr. Harshad Ballard.     Social Determinants of Health     Financial Resource Strain:      Difficulty of Paying Living Expenses:    Food Insecurity:      Worried About Running Out of Food in the Last Year:      Ran Out of Food in the Last Year:    Transportation Needs:      Lack of Transportation (Medical):      Lack of Transportation (Non-Medical):    Physical Activity:      Days of Exercise per Week:      Minutes of Exercise per Session:    Stress:      Feeling of Stress :    Social Connections:      Frequency of Communication with Friends and Family:      Frequency of Social Gatherings with Friends and Family:      Attends [employer] Services:      Active Member of Clubs or Organizations:      Attends Club or Organization Meetings:      Marital Status:    Intimate Partner Violence:      Fear of Current or Ex-Partner:      Emotionally Abused:      Physically Abused:      Sexually Abused:        10 point Review of Systems is negative      /76   Pulse 105   Temp 98.2  F (36.8  C) (Oral)   Resp 20    Wt 71 kg (156 lb 9.6 oz)   SpO2 95%   BMI 30.58 kg/m      BMI= Body mass index is 30.58 kg/m .        Imaging:         MR Foot Left w/o & w Contrast    Result Date: 9/10/2021  EXAM DATE:         09/10/2021 EXAM: MRI FOOT LEFT W/O and WITH CONTRAST LOCATION: [place] [facility] DATE/TIME: 9/10/2021 2:30 PM INDICATION: Infection, pain, swelling COMPARISON: 8/31/21 TECHNIQUE: Routine. Additional postgadolinium T1 sequences were obtained. IV CONTRAST: 13mL IV Dotarem was given from a 15mL single dose vial with 2mL discarded. MWR I-STAT Cr=1.0mg/dL, eGFR=53 FINDINGS: JOINTS AND BONES: -There is bone signal abnormality within the proximal and middle phalanx of the second toe consistent with osteomyelitis. Additional osteomyelitis involving the second metatarsal head. No significant effusion to suggest septic arthropathy at the second MTP joint or the IP joint of the second toe but the presence of infection on both sides of the articulation is worrisome for septic joint as well. No additional areas worrisome for osteomyelitis are identified. No evidence for fracture. TENDONS: -The Achilles tendon is not included on the field-of-view and cannot be evaluated. Within the foot, the flexor tendons are negative for tendinopathy or tearing. There is mild tenosynovitis involving the flexor tendon to the second toe. Potentially, this could represent a toxic tenosynovitis. Mild flexor hallucis tendon tendinopathy without tearing. The extensor tendons are negative for tendinopathy, tenosynovitis, or tearing. LIGAMENTS: -The ligament of Lisfranc is intact. The collateral ligaments at the MTP joints are all intact. MUSCLES AND SOFT TISSUES: -Fatty infiltration and atrophy of the plantar and interosseous musculature. There is some superimposed edema or potentially a component of infectious or inflammatory myositis. There is no evidence for abscess. IMPRESSION: 1.  Osteomyelitis involving the middle and proximal  phalanx of the second toe as well as the second metatarsal head. 2.  No additional areas worrisome for osteomyelitis are identified. No significant joint effusion to suggest septic arthropathy. No evidence for abscess. 3.  No evidence for fracture. 4.  Tenosynovitis involving the flexor digitorum tendon to the second toe. This could represent a toxic tenosynovitis. 5.  Mild flexor hallucis tendinopathy without tearing. 6.  Fatty infiltration and atrophy of the plantar and interosseous musculature. There is some reactive edema about the second metatarsal shaft.     US Lower Extremity Arterial Duplex Bilateral    Result Date: 9/14/2021  Arterial Duplex Ultrasound (Date: 09/14/21) Lower Extremity Artery Evaluation Indication: SURVEILLANCE: LEFT 2ND TOE OSTEOMYELITIS, DIABETIC ULCER, CELLULITIS. HAMMER TOE, PAD. NON COMPRESSIBLE DANAE'S  Previous: 3/3/2020 History: Previous Smoker, Diabetic, PAD and Vascular Ulcers Technique: Duplex imaging is performed utilizing gray-scale, two-dimensional images, and color-flow imaging. Doppler waveform analysis and spectral Doppler imaging is also performed. LOWER EXTREMITY ARTERIAL DUPLEX EXAM WITH WAVEFORMS Right Leg:(cm/s) Location: Velocities Waveforms EIA:   89  B CFA:   66  B PFA:   46  B SFA Proximal:   65  B SFA Mid:   59  B SFA Distal:   64  B Popliteal Artery:   50  B PTA:   20   B BAYLEE:   48  B DPA:   66  B Waveforms: T=Triphasic, M=Monophasic, B=Biphasic Left Leg:(cm/s) Location: Velocities Waveforms EIA:   58  B CFA:   69  B PFA:   48  B SFA Proximal:   35 /43 B SFA Mid:   20  M SFA Distal:   86  M Popliteal Artery:   52  M PTA:   22   M BAYLEE:   76  M DPA:   75  M Waveforms: T=Triphasic, M=Monophasic, B=Biphasic Impression: Right Lower Extremity: Patent lower extremity vasculature with biphasic flow.  No significant stenosis identified. Left Lower Extremity: Patent vasculature to the proximal superficial femoral artery with biphasic flow.  Transition to monophasic flow in  the mid superficial femoral artery.  The remaining vasculature is patent with monophasic flow. Reference: Category Normal 1-19% 20-49% 50-99% Occluded PSV <160 cm/sec without spectral broadening <160 cm/sec with spectral broadening Increased Increased Absent Flow Ratio N/A N/A < 2.0 >2.0 N/A Post-Stenotic Turbulence No No No Yes N/A     US DANAE Doppler No Exercise    Result Date: 9/14/2021  BILATERAL RESTING ANKLE-BRACHIAL INDICES (DANAE'S) (Date: 09/14/21) Indication: SURVEILLANCE DANAE'S: LEFT 2ND TOE OSTEOMYELITIS, DIABETIC ULCER, CELLULITIS. HAMMER TOE, PAD. Previous: 3/3/2020 History: Previous Smoker, Diabetic, PAD and Vascular Ulcers  Resting DANAE's          Right: mmHg Index     Brachial: 126  Ankle-(PT): >254 NC Ankle-(DP): >254 NC           Digit: 124 0.98               Left: mmHg Index     Brachial: 113  Ankle-(PT): 99 0.79 Ankle-(DP): >254 NC           Digit: 45 0.36 Resting ankle-brachial index is non compressible on the right. Toe Pressures of 124 mmHg and TBI of 0.98  Resting ankle-brachial index of noncompressible on the left. Toe Pressures of 45 mmHg and TBI of 0.36  WAVEFORMS: The right dorsalis pedis and posterior tibial arteries show monophasic waveforms. The left dorsalis pedis and posterior tibial arteries show monophasic waveforms.  Impression:  1. RIGHT LOWER EXTREMITY: DANAE is Uninterpretable due to incompressible vessels. and Normal toe pressures of 124 mmHg. 2. LEFT LOWER EXTREMITY: DANAE is Uninterpretable due to incompressible vessels. and Mildly abnormal, but adequate for healing toe pressures of 45 mmHg. Reference: Wound classification Grade DANAE Ankle Systolic Pressure Toe Pressures 0 > 0.80 > 100 mmHg > 60 mmHg 1 0.6 - 0.79 70 - 100 mmHg 40 - 59 mmHg 2 0.4 - 0.59 50-70 mmHg 30 - 39 mmHg 3 < 0.39 < 50 mmHg < 30 mmHg Digit Pressures DBI Disease Category > 0.70 Normal < 0.70 Abnormal > 30 mmHg Potential wound healing < 30 mmHg Impaired wound healing Ankle Brachial Pressures DANAE Disease Category >  1.3  Likely vessel calcification with monophasic waveforms, non-diagnostic 0.95-1.30 Normal with multiphasic waveforms 0.50-0.95 Single level disease 0.30-0.50 Multilevel disease < 0.30 Critical limb ischema Volume Plethysmography Recording (VPR) at all levels Normal Sharp systolic peak, fast upstroke, prominent dicrotic notch in wave Mild Sharp systolic peak, fast upstroke, absent dicrotic notch in wave Moderate Flattened systolic peak, slowed upstroke, absent dicrotic notch in wave Severe Low-amplitude wave with = upslope and down slope Occluded Flat Line Multiple Level Segmental Pressures  Normal Abnormal Upper Thigh > 1.2 pressure indices, <30 mmHg between lateral and horizontal cuffs < 0.8 pressure indices suggest proximal occlusion, 0.8-1.2 pressure indices suggest inflow disease, > 30 mmHg between lateral and horizontal cuffs  Lower Thigh < 30 mmHg between lateral and horizontal cuffs > 30 mmHg between lateral and horizontal cuffs Upper Calf < 30 mmHg between lateral and horizontal cuffs > 30 mmHg between lateral and horizontal cuffs Note: As limb diameter increases, pressure measurements also increase.    XR Foot Left G/E 3 Views    Result Date: 8/31/2021  EXAM DATE:         08/31/2021 EXAM: X-RAY FOOT LEFT, MINIMUM 3 VIEWS LOCATION: [place] [facility] DATE/TIME: 8/31/2021 1:00 PM INDICATION: Swelling erythema pt diabetic, uncertain if injured foot, second toe swollen small sore on dorsum of toe COMPARISON: 3/3/2020 MRI. IMPRESSION: Diffuse soft tissue swelling of the second toe. Irregular destructive changes in the mid to distal aspect of the second toe proximal phalanx consistent with osteomyelitis. Chronic resorption of the tuft of the great toe distal phalanx consistent with sequela from prior osteomyelitis. No soft tissue swelling of the great toe. Mild soft tissue swelling the dorsum of the foot. Osteopenia. Atheromatous calcification.              Dario Dash;  DPM  [facility]

## 2021-09-18 NOTE — PHARMACY-VANCOMYCIN DOSING SERVICE
Pharmacy Vancomycin Initial Note  Date of Service 2021  Patient's  1940  81 year old, female    Indication: Skin and Soft Tissue Infection    Current estimated CrCl = Estimated Creatinine Clearance: 37.5 mL/min (based on SCr of 1 mg/dL).    Creatinine for last 3 days  No results found for requested labs within last 72 hours.    Recent Vancomycin Level(s) for last 3 days  No results found for requested labs within last 72 hours.      Vancomycin IV Administrations (past 72 hours)      No vancomycin orders with administrations in past 72 hours.                Nephrotoxins and other renal medications (From now, onward)    Start     Dose/Rate Route Frequency Ordered Stop    21 0900  furosemide (LASIX) tablet 80 mg      80 mg Oral DAILY 21 1923      09/18/21 0347  piperacillin-tazobactam (ZOSYN) 3.375 g vial to attach to  mL bag     Note to Pharmacy: Extended infusion dosing to start 6 hours after initial infusion.    3.375 g  over 240 Minutes Intravenous EVERY 8 HOURS 21 21421 2300  vancomycin (VANCOCIN) 1,250 mg in sodium chloride 0.9 % 250 mL intermittent infusion      1,250 mg  over 90 Minutes Intravenous EVERY 24 HOURS 21 2228      21 2200  piperacillin-tazobactam (ZOSYN) 3.375 g vial to attach to  mL bag      3.375 g  over 30 Minutes Intravenous ONCE 21 214            Contrast Orders - past 72 hours (72h ago, onward)    None          Loading dose: 1250 mg at 23:00 2021.  Regimen: 1250 mg IV every 24 hours.  Exposure target: AUC24 (range)400-600 mg/L.hr   AUC24,ss: 482 mg/L.hr  Probability of AUC24 > 400: 71 %  Ctrough,ss: 14.2 mg/L  Probability of Ctrough,ss > 20: 19 %  Probability of nephrotoxicity (Lodise SHAWN ): 9 %          Plan:  1. Start vancomycin  1250 mg IV q24h.   2. Vancomycin monitoring method: AUC  3. Vancomycin therapeutic monitoring goal: 400-600 mg*h/L  4. Pharmacy will check vancomycin levels as appropriate  in 1-3 Days.    5. Serum creatinine levels will be ordered a minimum of twice weekly.      Katheryn Khoury RPH

## 2021-09-18 NOTE — PLAN OF CARE
Patient has been alert and oriented x 2-3 this shift, with occasional forgetfulness of time and place.  She is having pain in lower left leg, but not the foot. Rated 8/10 PRN Dilaudid given and helpful. Discussed with Dr. Gutierrez, would prefer us to use Oxycodone over oral Dilaudid, will pass onto the next shift. Had to remind patient many times to elevate her legs while up in recliner. She pivot transferred with assist of 1 to commode and chair today. Non weightbearing on the left foot. IV zosyn given as ordered. Lina Braun RN     Problem: Risk for Delirium  Goal: Optimal Coping  Outcome: No Change  Goal: Improved Behavioral Control  Outcome: No Change  Goal: Improved Attention and Thought Clarity  Outcome: No Change  Goal: Improved Sleep  Outcome: No Change     Problem: Pain Acute  Goal: Acceptable Pain Control and Functional Ability  Outcome: No Change  Intervention: Develop Pain Management Plan  Recent Flowsheet Documentation  Taken 9/18/2021 1400 by Lina Braun, RN  Pain Management Interventions: (elevated feet) repositioned  Taken 9/18/2021 1203 by Lina Braun RN  Pain Management Interventions:   repositioned   cold applied  Taken 9/18/2021 1103 by Lina Braun, RN  Pain Management Interventions:   medication (see MAR)   repositioned

## 2021-09-18 NOTE — PROGRESS NOTES
Progress Note    Assessment/Plan  Bernadette Yu is a 81 year old female  with a medical history of hypertension, diabetes, mitral valve replacement, paroxysmal atrial fibrillation, CKD and chronic pain syndrome admitted in the podiatry service for left foot osteomyelitis. [facility] was consulted for medical management.     Left foot osteomyelitis: Osteomyelitis of 2nd digit left foot and 2nd metatarsal left foot. s/p amputation of 2nd digit left foot, partial amputation 2nd metatarsal left foot and incision and drainage.   On 9/17  - Start zosyn and Vancomycin given history of MRSA. ID was consulted. Further antibiotics per ID.  - Pain control --continue oxycodone for the same  - Follow up intra op culture result and pathology  - PT/OT. NWB of left foot     Diabetes, type II: Uncontrolled with recent hemoglobin A1c 9.0.  PTA Novolog 70/30  30 units qam and 15 units qpm.   - simplify insulin regimen to Levemir 25 units at AM . NovoLog carb count 1:10 ratio and sliding scale.  -Order diabetic diet 60 gm.     Left calf swelling  --Order duplex ultrasound to rule out DVT  --Restart Coumadin.  Pharmacy to dose    Essential hypertension: Blood pressure controlled.  Continue home dose Lasix and diltiazem.     Mechanical mitral valve: Chronically anticoagulated on Coumadin.    Restart Coumadin  Today. Will hold off on bridging anticoagulation given risk of bleeding from the recent amputation.   --Monitor hemoglobin    Barriers to discharge: IV antibiotics    Anticipated discharge date: As per podiatry and ID        Subjective  patient new to me.  Chart reviewed.  Left foot pain is better.  Nurse noted the left cough muscle.  Ordered duplex ultrasound.  Blood sugars are uncontrolled.  Change diet to CHO 60 g/day  We will try oxycodone for pain control    Objective    /75 (BP Location: Left arm)   Pulse 110   Temp 97.9  F (36.6  C) (Oral)   Resp 20   Wt 71 kg (156 lb 9.6 oz)   SpO2 95%   BMI 30.58 kg/m    Weight:    Wt Readings from Last 5 Encounters:   09/17/21 71 kg (156 lb 9.6 oz)   09/14/21 63.5 kg (140 lb)   09/02/21 63.5 kg (140 lb)   08/31/21 67.5 kg (148 lb 14.2 oz)   08/24/21 67.1 kg (148 lb)       I/O last 3 completed shifts:  In: 1608.33 [P.O.:120; I.V.:1488.33]  Out: 15 [Drains:15]  I/O this shift:  In: 240 [P.O.:240]  Out: 8 [Drains:8]          Physical Exam  Alert, oriented*3  No pallor, icterus, clubbing, cyanosis  Body mass index is 30.58 kg/m .    No sinus tenderness  Moist membranes  Neck supple  CVS: S1 S2-N, no murmurs, gallops, rubs  Resp: B/L vesicular breath sounds, no wheezing, crackles  Abd: soft, No t/g/r  Neuro: no involuntary movements such as tremors  Vasc: Left foot in bandages    Pertinent Labs  ----------------------  Recent Labs   Lab 09/18/21  1147 09/18/21  0753 09/18/21  0406 09/17/21  1447 09/14/21  1717   NA  --   --   --   --  140   POTASSIUM  --   --   --   --  4.1   CO2  --   --   --   --  27   BUN  --   --   --   --  17   CR  --   --   --   --  1.00   * 195* 209*   < > 144*    < > = values in this interval not displayed.     Recent Labs   Lab 09/14/21  1717   WBC 6.8   HGB 10.8*   HCT 35.2        No results for input(s): INR in the last 168 hours.  Glucose Values Latest Ref Rng & Units 8/24/2021 9/14/2021   Bedside Glucose (mg/dl )  - -- --   GLUCOSE 70 - 125 mg/dL 216(H) 144(H)   Some recent data might be hidden         Pertinent Radiology   Radiology Results: Personally reviewed impression/s  No results found.  EKG Results: not reviewed.

## 2021-09-18 NOTE — PLAN OF CARE
Problem: Pain Acute  Goal: Acceptable Pain Control and Functional Ability  Outcome: No Change  Intervention: Develop Pain Management Plan  Recent Flowsheet Documentation  Taken 9/18/2021 0053 by Roxane Melton RN  Pain Management Interventions:    medication (see MAR)    back rub    emotional support   Pt. Alert and confused. Awake most of the shift. Restless, continuously attempted to get out of bed, difficult to redirect d/t confusion. House officer updated, and came visited. Pt is constantly c/o pain on left leg, Had given prn IV Dilaudid x 1 and oxycodone x 2 with minimal effect. Refused cold pack. Left leg drsg covered on Ace wrap, clean and intact. VS stable. Recived IV ABX. . YUKO drain output 5cc.

## 2021-09-18 NOTE — CONSULTS
[facility]  Consult Note - Hospitalist Service     Date of Admission:  9/17/2021  Consult Requested by: Nba Cleveland  Reason for Consult: Post op medical management    Assessment & Plan   Bernadette Yu is a 81 year old female  with a medical history of hypertension, diabetes, mitral valve replacement, paroxysmal atrial fibrillation, CKD and chronic pain syndrome admitted in the podiatry service for left foot osteomyelitis. [facility] was consulted for medical management.    Left foot osteomyelitis: Osteomyelitis of 2nd digit left foot and 2nd metatarsal left foot. s/p amputation of 2nd digit left foot, partial amputation 2nd metatarsal left foot and incision and drainage.   - Start zosyn and Vancomycin given history of MRSA. ID was consulted. Further antibiotics per ID.  - Pain control  - Follow up intra op culture result and pathology  - PT/OT. NWB of left foot    Diabetes, type II: Uncontrolled with recent hemoglobin A1c 9.0.  PTA Novolog 70/30  30 units qam and 15 units qpm.   - Change insulin regimen to Levemir 20 units qam and 10 units at bedtime. NovoLog carb count 1:10 ratio and sliding scale.  - Diabetes educator consult.    Essential hypertension: Blood pressure controlled.  Continue home dose Lasix and diltiazem.    Mitral valve replaced: Chronically anticoagulated on Coumadin.  Holding Coumadin until cleared by podiatry    The patient's care was discussed with the Bedside Nurse, Care Coordinator/ and Patient.        Nessa Beckford MD  [facility]  Securely message with the Vocera Web Console (learn more here)  Text page via [employer] Paging/Directory        ______________________________________________________________________    Chief Complaint   Post op medical management    History is obtained from the patient    History of Present Illness   Bernadette Yu is a 81 year old female with a medical history of hypertension, diabetes, mitral valve  replacement, paroxysmal atrial fibrillation, CKD and chronic pain syndrome admitted in the podiatry service for left foot osteomyelitis.  Today, patient received amputation of 2nd digit left foot, partial amputation 2nd metatarsal left foot and incision and drainage left foot.  [facility] was consulted for post op medical management. Patient was seen and examined after arriving to the unit. She reports no pain from her left foot. She reports no chest pain, SOB, abdominal pain and nausea.    Review of Systems   The 10 point Review of Systems is negative other than noted in the HPI or here.     Past Medical History    I have reviewed this patient's medical history and updated it with pertinent information if needed.   Past Medical History:   Diagnosis Date     Abdominal bloating 8/21/2016     Anticoagulation goal of INR 2.5 to 3.5 1/27/2016     Anxiety      Aortic stenosis 10/26/2019     ASCVD (arteriosclerotic cardiovascular disease)      Atherosclerosis of native coronary artery without angina pectoris 10/26/2019     Bleeding diathesis (H)      Carotid artery stenosis, left      CHF (congestive heart failure) (H)      CKD (chronic kidney disease) stage 3, GFR 30-59 ml/min      DM (diabetes mellitus), type 2 (H) 1990     Eczema      Former smoker      Full code status      HLD (hyperlipidemia)      HTN (hypertension)      Hypothyroidism      IBS (irritable bowel syndrome)      Insomnia      Liver disease      Long Q-T syndrome 10/26/2019     Meningococcal meningitis Age 14     Microalbuminuria due to type 2 diabetes mellitus (H)      Mild nonproliferative diabetic retinopathy of both eyes (H)      Mitral stenosis      Moderate aortic stenosis     See transesophageal echocardiogram (ANTOINE) 2019.     Moderate tricuspid insufficiency 8/28/2021     MRSA (methicillin resistant staph aureus) culture positive 2/9/2017     Normocytic anemia      PAD (peripheral artery disease) (H)     [facility]   DATE: 10/26/2019 4:15 PM    EXAM:  DUPLEX ARTERIAL ULTRASOUND OF THE LOWER EXTREMITIES BILATERALLY   INDICATION: Leg pain, vasculopathy.   COMPARISON: None.   TECHNIQUE: Duplex imaging is performed utilizing gray-scale, two-dimensional images, and color-flow imaging. Doppler waveform analysis and spectral Doppler imaging is also performed.   DUPLEX ARTERIAL ULTRASOUND FINDIN     Paroxysmal atrial fibrillation (H) 2015    Bilateral pulmonary vein isolation with AtriCure Synergy (bipolar radiofrequency ablation) device, exclusion of the left atrial appendage with the AtriCure AtriClip device.       PN (peripheral neuropathy)      Psoriasis      PVD (peripheral vascular disease) (H) 2019    Bilateral lower extremities.  See ultrasound 2019.     Recurrent UTI (urinary tract infection)      RLS (restless legs syndrome)      S/P CABG (coronary artery bypass graft) 2016     S/P colonoscopy 07     S/P MVR (mitral valve replacement) 2015     Subclavian artery stenosis, left (H) 2015    Stented in .      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     Ulcer of heel and midfoot, left, with unspecified severity (H)        Past Surgical History   I have reviewed this patient's surgical history and updated it with pertinent information if needed.  Past Surgical History:   Procedure Laterality Date     APPENDECTOMY       BYPASS GRAFT ARTERY CORONARY       CARDIAC CATHETERIZATION  11/12/15      SECTION       CHOLECYSTECTOMY       COLONOSCOPY N/A 10/12/2016    Procedure: COLONOSCOPY;  Surgeon: Santiago Duran MD;  Location: [facility];  Service:      COLONOSCOPY N/A 10/13/2016    Procedure: COLONOSCOPY with polypectomy & rectum biopsies;  Surgeon: Santiago Duran MD;  Location: [facility];  Service:      COLONOSCOPY N/A 12/3/2017    Procedure: COLONOSCOPY with multiple polyp removal using hot snare and mansfield net and biopsy forcep;  Surgeon: Marcelo Wade MD;  Location: [facility];  Service:      COLONOSCOPY N/A 3/13/2018    Procedure: COLONOSCOPY; POLYPECTOMY;  Surgeon: Marcelo Wade MD;  Location: [facility] OR;  Service:      ESOPHAGOSCOPY, GASTROSCOPY, DUODENOSCOPY (EGD), COMBINED N/A 10/12/2016    Procedure: ESOPHAGOGASTRODUODENOSCOPY with biopsies;  Surgeon: Santiago Duran MD;  Location: [facility];  Service:      ESOPHAGOSCOPY, GASTROSCOPY, DUODENOSCOPY (EGD), COMBINED N/A 12/3/2017    Procedure: ESOPHAGOGASTRODUODENOSCOPY (EGD);  Surgeon: Marcelo Wade MD;  Location: [facility];  Service:      TONSILLECTOMY & ADENOIDECTOMY       UPPER GASTROINTESTINAL ENDOSCOPY         Social History   I have reviewed this patient's social history and updated it with pertinent information if needed.  Social History     Tobacco Use     Smoking status: Former Smoker     Years: 23.00     Types: Cigarettes     Smokeless tobacco: Never Used     Tobacco comment: quit 1970's   Substance Use Topics     Alcohol use: No     Drug use: No       Family History   I have reviewed this patient's family history and updated it with pertinent information if needed.  Family History   Problem Relation Age of Onset     Colon Cancer Father      Diabetes Father      Hypertension Mother      Heart Disease Mother          congestive heart failure     Arthritis Mother      Diabetes Sister      Heart Disease Brother      Rectal Cancer Son      Colon Cancer Son        Medications   I have reviewed this patient's current medications    Allergies   Allergies   Allergen Reactions     Amiodarone Other (See Comments)     TORSADES DE POINTES     Aspirin Other (See Comments)     Recurrent severe gastrointestinal bleed.       Physical Exam   Vital Signs: Temp: 98.3  F (36.8  C) Temp src: Oral BP: 117/60 Pulse: 106   Resp: 18 SpO2: 93 % O2 Device: None (Room air) Oxygen Delivery: 2 LPM  Weight: 156 lbs 9.6 oz    General appearance: not in acute distress  HEENT: PERRL, EOMI  Lungs: Clear breath sounds in  bilateral lung fields  Cardiovascular: Regular rate and rhythm, normal S1-S2  Abdomen: Soft, non tender, no distension, normal bowel sound  Musculoskeletal: No joint swelling. Left foot in surgical dressing with YUKO drainage in place  Skin: No rash and no edema  Neurology: AAO ×3.  Cranial nerves II - XII normal.  Normal muscle strength in all four extremities.    Data   Most Recent 3 CBC's:Recent Labs   Lab Test 09/14/21  1717 08/24/21  1541 07/05/21  1354   WBC 6.8 9.4 6.6   HGB 10.8* 11.2* 8.2*   MCV 84 83 83    182 216     Most Recent 3 BMP's:Recent Labs   Lab Test 09/17/21 2056 09/17/21  1743 09/17/21  1447 09/14/21 1717 08/24/21  1541 08/24/21  1541 07/05/21  1354 07/05/21  1354 11/24/20  0732   NA  --   --   --  140  --  138  --  140  --    POTASSIUM  --   --   --  4.1  --  4.1  --  4.3  --    CHLORIDE  --   --   --  101  --  99  --  104  --    CO2  --   --   --  27  --  25  --  24  --    BUN  --   --   --  17  --  22  --  21  --    CR  --   --   --  1.00  --  1.22*  --  0.94  --    ANIONGAP  --   --   --  12  --  14  --  12  --    DI  --   --   --  9.1  --  9.0  --  8.5  --    * 194* 250* 144*   < > 216*   < > 103   < >    < > = values in this interval not displayed.

## 2021-09-18 NOTE — PLAN OF CARE
Problem: Pain Acute  Goal: Acceptable Pain Control and Functional Ability  Outcome: No Change   Block in effect to LLE, denies pain.    Ace wrap c/d/I to LLE. Remains NWB to LLE/bedrest. Okay for commode. YUKO intact with out put of 10cc.

## 2021-09-18 NOTE — PROGRESS NOTES
Care Management Follow Up    Length of Stay (days): 1    Expected Discharge Date :9/20/21   Concerns to be Addressed: Care progression    Patient plan of care discussed at interdisciplinary rounds: Yes    Anticipated Discharge Disposition:  TCU     Anticipated Discharge Services:  TCU    Education Provided on the Discharge Plan:  Yes  Patient/Family in Agreement with the Plan:  Yes    Referrals Placed by CM/SW:  Yes    Additional Information:  Per chart preview, TCU is recommended upon discharge. Referral placed. CM to follow.      JACKIE Joseph  09/18/21  10:58 AM

## 2021-09-18 NOTE — CONSULTS
Consultation - Infectious Disease  [facility]  Bernadette Yu,  1940, MRN [id number]    Admitting Dx: Osteomyelitis of ankle and foot (H) [M86.9]  Diabetes mellitus (H) [E11.9]    PCP: Gabino Bach, [phone]       ASSESSMENT   81-year-old woman with a history of hypertension, mitral valve replacement, diabetes, A. fib who is admitted for osteomyelitis of the second toe on her left foot      1. Left second toe osteomyelitis. Nonhealing wound on the second toe, seen in clinic by Dr. Cleveland. MRI showing osteomyelitis of the metatarsal head and the proximal and mid phalanx. Status post amputation down to the second metatarsal. Cultures and path collected and are pending. Patient was on cephalexin up until the time of admission.  2. Mitral valve replacement. On anticoagulation  3. Diabetes. Last A1c 9    Active Problems:    Diabetes mellitus (H)    Osteomyelitis of ankle and foot (H)       PLAN   -Continue vancomycin and Zosyn  -Watch creatinine closely  -Narrow antibiotics based on cultures  -Follow-up on pathology to determine if there are clear margins    Thank you for this consult. Will follow.    Jason Pan MD  [facility]  Direct messaging: [employer] Paging  On-Call ID provider: [phone], option: [phone]      ===========================================      Chief Complaint   <principal problem not specified>       HPI     We have been requested by Gaston Gutierrez MD to evaluate Bernadette Yu for the above.    History obtained by patient and electronic health record    Bernadette Yu is a 81 year old woman with a history of diabetes, hypertension, mitral valve replacement, A. fib who is admitted with osteomyelitis of her second toe on her left foot. She was admitted by Dr. Cleveland for planned surgery. She underwent amputation down to the second metatarsal head. She tolerated procedure well. She says that she was taking antibiotics up until the time of  admission. She has some numbness in her left leg today, but otherwise feels well.          Review of Systems   Ten systems reviewed and negative except for what is noted in the HPI         Medical History  Past Medical History:   Diagnosis Date     Abdominal bloating 8/21/2016     Anticoagulation goal of INR 2.5 to 3.5 1/27/2016     Anxiety      Aortic stenosis 10/26/2019     ASCVD (arteriosclerotic cardiovascular disease)      Atherosclerosis of native coronary artery without angina pectoris 10/26/2019     Bleeding diathesis (H)      Carotid artery stenosis, left      CHF (congestive heart failure) (H)      CKD (chronic kidney disease) stage 3, GFR 30-59 ml/min      DM (diabetes mellitus), type 2 (H) 1990     Eczema      Former smoker      Full code status      HLD (hyperlipidemia)      HTN (hypertension)      Hypothyroidism      IBS (irritable bowel syndrome)      Insomnia      Liver disease      Long Q-T syndrome 10/26/2019     Meningococcal meningitis Age 14     Microalbuminuria due to type 2 diabetes mellitus (H)      Mild nonproliferative diabetic retinopathy of both eyes (H)      Mitral stenosis      Moderate aortic stenosis     See transesophageal echocardiogram (ANTOINE) 2019.     Moderate tricuspid insufficiency 8/28/2021     MRSA (methicillin resistant staph aureus) culture positive 2/9/2017     Normocytic anemia      PAD (peripheral artery disease) (H)     [facility]   DATE: 10/26/2019 4:15 PM   EXAM:  DUPLEX ARTERIAL ULTRASOUND OF THE LOWER EXTREMITIES BILATERALLY   INDICATION: Leg pain, vasculopathy.   COMPARISON: None.   TECHNIQUE: Duplex imaging is performed utilizing gray-scale, two-dimensional images, and color-flow imaging. Doppler waveform analysis and spectral Doppler imaging is also performed.   DUPLEX ARTERIAL ULTRASOUND FINDIN     Paroxysmal atrial fibrillation (H) 11/16/2015    Bilateral pulmonary vein isolation with AtriCure Synergy (bipolar radiofrequency ablation) device, exclusion  of the left atrial appendage with the AtriCure AtriClip device.       PN (peripheral neuropathy)      Psoriasis      PVD (peripheral vascular disease) (H) 2019    Bilateral lower extremities.  See ultrasound 2019.     Recurrent UTI (urinary tract infection)      RLS (restless legs syndrome)      S/P CABG (coronary artery bypass graft) 2016     S/P colonoscopy 07     S/P MVR (mitral valve replacement) 2015     Subclavian artery stenosis, left (H) 2015    Stented in .      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     Ulcer of heel and midfoot, left, with unspecified severity (H)     Surgical History  She  has a past surgical history that includes tonsillectomy & adenoidectomy; appendectomy; Cholecystectomy;  Section; Cardiac catheterization (11/12/15); Bypass graft artery coronary; Colonoscopy (N/A, 10/12/2016); Esophagoscopy, gastroscopy, duodenoscopy (EGD), combined (N/A, 10/12/2016); Upper Gastrointestinal Endoscopy; Colonoscopy (N/A, 10/13/2016); Esophagoscopy, gastroscopy, duodenoscopy (EGD), combined (N/A, 12/3/2017); Colonoscopy (N/A, 12/3/2017); and Colonoscopy (N/A, 3/13/2018).     Social History  Reviewed, and she  reports that she has quit smoking. Her smoking use included cigarettes. She quit after 23.00 years of use. She has never used smokeless tobacco. She reports that she does not drink alcohol and does not use drugs.  Social History     Social History Narrative    Patient of Dr. Bach since .   to  [place]ne[place].    4 children.    Former patient of Dr. Harshad Ballard.     Family History  family history includes Arthritis in her mother; Colon Cancer in her father and son; Diabetes in her father and sister; Heart Disease in her brother and mother; Hypertension in her mother; Rectal Cancer in her son.  family history reviewed and is not pertinent to the presenting problem.            Allergies     Allergies   Allergen Reactions      Amiodarone Other (See Comments)     TORSADES DE POINTES     Aspirin Other (See Comments)     Recurrent severe gastrointestinal bleed.         Antibiotics   Zosyn 9/17-  Vancomycin 9/18-    Previous:  Cephalexin prior to admission      Physical Exam     Temp:  [97.9  F (36.6  C)-98.6  F (37  C)] 98.6  F (37  C)  Pulse:  [103-112] 112  Resp:  [18-20] 18  BP: (105-128)/(60-94) 121/94  SpO2:  [93 %-98 %] 98 %    BP (!) 121/94 (BP Location: Left arm)   Pulse 112   Temp 98.6  F (37  C) (Oral)   Resp 18   Wt 71 kg (156 lb 9.6 oz)   SpO2 98%   BMI 30.58 kg/m      GENERAL:  well-developed, well-nourished, sitting in chair in no acute distress.   HENT:  Head is normocephalic, atraumatic. Oropharynx is moist without exudates or ulcers.  EYES:  Eyes have anicteric sclerae without conjunctival injection or stigmata of endocarditis.   NECK:  Supple.  LUNGS:  Clear to auscultation.  CARDIOVASCULAR:  Regular rate and rhythm, 2 out of 6 systolic murmur throughout the precordium  ABDOMEN:  Normal bowel sounds, soft, nontender. No appreciable hepatosplenomegaly  EXT: Extremities warm and without edema. Left foot is wrapped, drain in place.  SKIN:  No acute rashes. No stigmata of endocarditis.  NEUROLOGIC:  Grossly nonfocal.      Cultures   9/17 left toe: No organisms on Gram stain; culture pending      Laboratory results     Recent Labs   Lab 09/14/21  1717   WBC 6.8   HGB 10.8*          Recent Labs   Lab 09/14/21  1717      CO2 27   BUN 17       Recent Labs   Lab 09/14/21 1717   CRP 1.0*           Imaging   Radiology results reviewed    US Lower Extremity Venous Duplex Left    Result Date: 9/18/2021  EXAM: US LOWER EXTREMITY VENOUS DUPLEX LEFT LOCATION: [facility] DATE/TIME: 9/18/2021 4:11 PM INDICATION: Left calf swelling. COMPARISON: None. TECHNIQUE: Venous Duplex ultrasound of the left lower extremity with and without compression, augmentation and duplex. Color flow and spectral Doppler  with waveform analysis performed. FINDINGS: Exam includes the common femoral, femoral, popliteal, and contralateral common femoral veins as well as segmentally visualized deep calf veins and greater saphenous vein. LEFT: No deep vein thrombosis. No superficial thrombophlebitis. No popliteal cyst.     IMPRESSION: 1.  No deep venous thrombosis in the left lower extremity. 2.  Nonspecific subcutaneous edema.    US Lower Extremity Arterial Duplex Bilateral    Result Date: 9/14/2021  Arterial Duplex Ultrasound (Date: 09/14/21) Lower Extremity Artery Evaluation Indication: SURVEILLANCE: LEFT 2ND TOE OSTEOMYELITIS, DIABETIC ULCER, CELLULITIS. HAMMER TOE, PAD. NON COMPRESSIBLE DANAE'S  Previous: 3/3/2020 History: Previous Smoker, Diabetic, PAD and Vascular Ulcers Technique: Duplex imaging is performed utilizing gray-scale, two-dimensional images, and color-flow imaging. Doppler waveform analysis and spectral Doppler imaging is also performed. LOWER EXTREMITY ARTERIAL DUPLEX EXAM WITH WAVEFORMS Right Leg:(cm/s) Location: Velocities Waveforms EIA:   89  B CFA:   66  B PFA:   46  B SFA Proximal:   65  B SFA Mid:   59  B SFA Distal:   64  B Popliteal Artery:   50  B PTA:   20   B BAYLEE:   48  B DPA:   66  B Waveforms: T=Triphasic, M=Monophasic, B=Biphasic Left Leg:(cm/s) Location: Velocities Waveforms EIA:   58  B CFA:   69  B PFA:   48  B SFA Proximal:   35 /43 B SFA Mid:   20  M SFA Distal:   86  M Popliteal Artery:   52  M PTA:   22   M BAYLEE:   76  M DPA:   75  M Waveforms: T=Triphasic, M=Monophasic, B=Biphasic Impression: Right Lower Extremity: Patent lower extremity vasculature with biphasic flow.  No significant stenosis identified. Left Lower Extremity: Patent vasculature to the proximal superficial femoral artery with biphasic flow.  Transition to monophasic flow in the mid superficial femoral artery.  The remaining vasculature is patent with monophasic flow. Reference: Category Normal 1-19% 20-49% 50-99% Occluded PSV <160  cm/sec without spectral broadening <160 cm/sec with spectral broadening Increased Increased Absent Flow Ratio N/A N/A < 2.0 >2.0 N/A Post-Stenotic Turbulence No No No Yes N/A     US DANAE Doppler No Exercise    Result Date: 9/14/2021  BILATERAL RESTING ANKLE-BRACHIAL INDICES (DANAE'S) (Date: 09/14/21) Indication: SURVEILLANCE DANAE'S: LEFT 2ND TOE OSTEOMYELITIS, DIABETIC ULCER, CELLULITIS. HAMMER TOE, PAD. Previous: 3/3/2020 History: Previous Smoker, Diabetic, PAD and Vascular Ulcers  Resting DANAE's          Right: mmHg Index     Brachial: 126  Ankle-(PT): >254 NC Ankle-(DP): >254 NC           Digit: 124 0.98               Left: mmHg Index     Brachial: 113  Ankle-(PT): 99 0.79 Ankle-(DP): >254 NC           Digit: 45 0.36 Resting ankle-brachial index is non compressible on the right. Toe Pressures of 124 mmHg and TBI of 0.98  Resting ankle-brachial index of noncompressible on the left. Toe Pressures of 45 mmHg and TBI of 0.36  WAVEFORMS: The right dorsalis pedis and posterior tibial arteries show monophasic waveforms. The left dorsalis pedis and posterior tibial arteries show monophasic waveforms.  Impression:  1. RIGHT LOWER EXTREMITY: DANAE is Uninterpretable due to incompressible vessels. and Normal toe pressures of 124 mmHg. 2. LEFT LOWER EXTREMITY: DANAE is Uninterpretable due to incompressible vessels. and Mildly abnormal, but adequate for healing toe pressures of 45 mmHg. Reference: Wound classification Grade DANAE Ankle Systolic Pressure Toe Pressures 0 > 0.80 > 100 mmHg > 60 mmHg 1 0.6 - 0.79 70 - 100 mmHg 40 - 59 mmHg 2 0.4 - 0.59 50-70 mmHg 30 - 39 mmHg 3 < 0.39 < 50 mmHg < 30 mmHg Digit Pressures DBI Disease Category > 0.70 Normal < 0.70 Abnormal > 30 mmHg Potential wound healing < 30 mmHg Impaired wound healing Ankle Brachial Pressures DANAE Disease Category > 1.3  Likely vessel calcification with monophasic waveforms, non-diagnostic 0.95-1.30 Normal with multiphasic waveforms 0.50-0.95 Single level disease 0.30-0.50  Multilevel disease < 0.30 Critical limb ischema Volume Plethysmography Recording (VPR) at all levels Normal Sharp systolic peak, fast upstroke, prominent dicrotic notch in wave Mild Sharp systolic peak, fast upstroke, absent dicrotic notch in wave Moderate Flattened systolic peak, slowed upstroke, absent dicrotic notch in wave Severe Low-amplitude wave with = upslope and down slope Occluded Flat Line Multiple Level Segmental Pressures  Normal Abnormal Upper Thigh > 1.2 pressure indices, <30 mmHg between lateral and horizontal cuffs < 0.8 pressure indices suggest proximal occlusion, 0.8-1.2 pressure indices suggest inflow disease, > 30 mmHg between lateral and horizontal cuffs  Lower Thigh < 30 mmHg between lateral and horizontal cuffs > 30 mmHg between lateral and horizontal cuffs Upper Calf < 30 mmHg between lateral and horizontal cuffs > 30 mmHg between lateral and horizontal cuffs Note: As limb diameter increases, pressure measurements also increase.      Data reviewed today: I reviewed all medications, new labs and imaging results over the last 24 hours. I personally reviewed no images or EKG's today.  The patient's care was discussed with the Patient.

## 2021-09-18 NOTE — PROGRESS NOTES
09/18/21 1015   Quick Adds   Type of Visit Initial PT Evaluation   Living Environment   People in home spouse;child(kristina), adult  (2 adult sons)   Current Living Arrangements house   Home Accessibility no concerns  (pt reports no stairs to enter)   Living Environment Comments able to live on main level   Self-Care   Equipment Currently Used at Home cane, straight;walker, rolling;other (see comments)  (pt reports family trying to get W/C; pt reports using cane )   Activity/Exercise/Self-Care Comment independent ADL's; assist for IADL's; pt is questionable historian changing history at times   Disability/Function   Change in Functional Status Since Onset of Current Illness/Injury yes   General Information   Onset of Illness/Injury or Date of Surgery 09/17/21   Referring Physician Dr. ORI Gutierrez   Patient/Family Therapy Goals Statement (PT) go home   Pertinent History of Current Problem (include personal factors and/or comorbidities that impact the POC) osteomyelitis L foot s/p amp. 2nd digit L foot 9/17   Existing Precautions/Restrictions fall;other (see comments)  (PRAFO (not yet in pt's room))   Weight-Bearing Status - LLE nonweight-bearing   Cognition   Orientation Status (Cognition) oriented x 4   Affect/Mental Status (Cognition) WFL   Follows Commands (Cognition) WFL   Safety Deficit (Cognition) minimal deficit   Cognitive Status Comments pt demonstrates decreased problem-solving ability for NWB L foot at home   Range of Motion (ROM)   ROM Quick Adds ROM WFL   Strength   Manual Muscle Testing Quick Adds Strength WFL   Transfers   Transfers sit-stand transfer;bed-chair transfer;other (see comments)  (commode<>recliner with RW min assist cuing for NWB L foot)   Transfer Safety Concerns Noted decreased balance during turns;losing balance backward;inability to maintain weight-bearing restrictions w/o assist;decreased sequencing ability   Impairments Contributing to Impaired Transfers impaired balance;decreased  strength   Bed-Chair Transfer   Bed-Chair [place] (Transfers) minimum assist (75% patient effort);nonverbal cues (demo/gesture);verbal cues   Assistive Device (Bed-Chair Transfers) walker, front-wheeled   Bed/Chair Transfer Comments cuing for hand placement and NWB LLE   Sit-Stand Transfer   Sit-Stand [place] (Transfers) minimum assist (75% patient effort);verbal cues;nonverbal cues (demo/gesture)   Assistive Device (Sit-Stand Transfers) walker, front-wheeled   Sit/Stand Transfer Comments cuing for hand placement and NWB LLE   Gait/Stairs (Locomotion)   [place] Level (Gait) minimum assist (75% patient effort);2 person assist;verbal cues;nonverbal cues (demo/gesture)   Assistive Device (Gait) walker, front-wheeled   Distance in Feet (Required for LE Total Joints) 2   Pattern (Gait) step-to   Maintains Weight-bearing Status (Gait) physical assist to maintain;nonverbal cues (demo/gesture) to maintain;verbal cues to maintain   Clinical Impression   Criteria for Skilled Therapeutic Intervention yes, treatment indicated   PT Diagnosis (PT) impaired functional mobility   Influenced by the following impairments decreased safety awareness, balance, strength; orthopedic restrictions   Functional limitations due to impairments transfers, gait, stairs   Clinical Presentation Stable/Uncomplicated   Clinical Presentation Rationale Pt presents as medically diagnosed.   Clinical Decision Making (Complexity) moderate complexity   Therapy Frequency (PT) Daily   Predicted Duration of Therapy Intervention (days/wks) 7 days   Planned Therapy Interventions (PT) balance training;bed mobility training;gait training;home exercise program;patient/family education;strengthening;stair training;transfer training;wheelchair management/propulsion training   Anticipated Equipment Needs at Discharge (PT) wheelchair;walker, rolling;commode chair   Risk & Benefits of therapy have been explained evaluation/treatment results  reviewed;patient   PT Discharge Planning    PT Discharge Recommendation (DC Rec) Transitional Care Facility   PT Rationale for DC Rec needs assist of 1 for transfers to maintain NWB LLE; home with 24 hour assist, W/C and commode with family or TCU   Total Evaluation Time   Total Evaluation Time (Minutes) 10

## 2021-09-18 NOTE — PROGRESS NOTES
09/18/21 1001   Quick Adds   Type of Visit Initial Occupational Therapy Evaluation   Living Environment   People in home spouse;child(kristina), adult   Current Living Arrangements house  (2 level , stays on main level)   Home Accessibility no concerns  (stated no steps, )   Living Environment Comments difficulty relaying info, vague at times   Self-Care   Equipment Currently Used at Home wheelchair, manual;walker, rolling;cane, straight   Instrumental Activities of Daily Living (IADL)   IADL Comments indep. drsg, bathing, shared household tasks, no driving, son does driving    Disability/Function   Hearing Difficulty or Deaf no   Wear Glasses or Blind yes   Difficulty Communicating no   Walking or Climbing Stairs Difficulty no   Dressing/Bathing Difficulty no   Toileting issues no   Doing Errands Independently Difficulty (such as shopping) no   Fall history within last six months no   General Information   Onset of Illness/Injury or Date of Surgery 09/17/21   Patient/Family Therapy Goal Statement (OT) none stated    Existing Precautions/Restrictions fall;weight bearing   Limitations/Impairments safety/cognitive   Left Lower Extremity (Weight-bearing Status) non weight-bearing (NWB)   Cognitive Status Examination   Orientation Status time;person;place   Cognitive Status Comments rec. further assessment   Visual Perception   Visual Impairment/Limitations corrective lenses full-time   Sensory   Sensory Quick Adds No deficits were identified   Range of Motion Comprehensive   General Range of Motion no range of motion deficits identified   Strength Comprehensive (MMT)   General Manual Muscle Testing (MMT) Assessment no strength deficits identified  (WFL for ADLs)   Coordination   Upper Extremity Coordination No deficits were identified   Bed Mobility   Comment (Bed Mobility) NT   Transfers   Transfer Comments sit to stand from chair mod Ax2, 2 trials, difficulty maintaining NWB status   Balance   Balance Assessment  standing balance: static, stood x1 minute   Standing Balance: Static fair balance   Activities of Daily Living   BADL Assessment grooming;lower body dressing   Lower Body Dressing Assessment   [place] Level (Lower Body Dressing) supervision  (socks)   Grooming Assessment   [place] Level (Grooming) set up;supervision  (seated after set up (teeth, hair, hands))   Position (Grooming) supported sitting   Clinical Impression   Criteria for Skilled Therapeutic Interventions Met (OT) yes   OT Diagnosis decreased ADLs   OT Problem List-Impairments impacting ADL cognition;mobility   Assessment of Occupational Performance 3-5 Performance Deficits   Identified Performance Deficits cognition, trsfs, drsg,    Planned Therapy Interventions (OT) ADL retraining;transfer training;cognition   Clinical Decision Making Complexity (OT) moderate complexity   Predicted Duration of Therapy 7 days   Anticipated Equipment Needs Upon Discharge (OT)   (cont. to assess)   Risk & Benefits of therapy have been explained patient   OT Discharge Planning    OT Discharge Recommendation (DC Rec) Transitional Care Facility  (vs. 24 hr assist with all transfers d/t NWB status)   OT Rationale for DC Rec unable to maintain NWB status for trsfs, standing,    Total Evaluation Time (Minutes)   Total Evaluation Time (Minutes) 10

## 2021-09-19 NOTE — PLAN OF CARE
Problem: Risk for Delirium  Goal: Improved Behavioral Control  Outcome: No Change  Goal: Improved Attention and Thought Clarity  Outcome: No Change  Goal: Improved Sleep  Outcome: No Change     Problem: Pain Acute  Goal: Acceptable Pain Control and Functional Ability  Outcome: No Change   Pt very confused and agitated all night shift. Unable to be redirected. Pt is NWB, continuously keep getting up and attempted to ambulate, gets very aggressive when she redirected. Pushing and hitting staff. Pt is almost 1:1 this shift. Attempted several times to remove surgical drsg. House provider called and had given Zyprexa and haldol not effective. Pt ripped off the PIV and reinsert another one on right f/arm, and removed d/t pt still attempted  to remove off, and  c/o of pain at the site.  Unable to put another PIV line d/t agitation  and restlessness. House provider updated for possible reinsert iv in am. Pharmacist notifed to adjust the time for zosyn which is schduled at 0400 am. Pt still has the dose has not finished from the previous shift, the pahramcist stated we can dispose and start as fresh, reschduled for 0800. The  prn haldol still not effective, house provider called and started  soft limb restraint x 2. Given prn oxycodone x 2 and scheduled tylenol for pain. Pramod drain output 5 ml. Will continue to monitor.

## 2021-09-19 NOTE — PLAN OF CARE
Problem: Risk for Delirium  Goal: Improved Behavioral Control  Outcome: Declining   Pt calm/cooperative throughout beginning of shift. Pt became anxious/aggitated & continued to attempt to get out of bed. Pt up and not compliant with NWB to LLE, attempting to ambulate down hallway. Writer spoke to VIVIANA Rehman & updated on pts status. 1x dose of zyprexa po admin. Cont. to monitor closely for safety & med effectiveness. Noted some decrease in aggitation, updated NOC RN on status/meds admin.     Problem: Risk for Delirium  Goal: Improved Attention and Thought Clarity  Outcome: Declining   Pt became confused/aggitated at bedtime. See above note.

## 2021-09-19 NOTE — PROGRESS NOTES
soft restraints restraints continued 9/19/2021    Clinical Justification: Pulling lines, pulling tubes, and pulling equipment, Removal of dressing  Less Restrictive Alternative: 1:1 patient care, Repositioning, Disguise equipment, Pain management, Alarm, Reorientation, De-escalation  Attending Physician Notified: Yes,     New orders placed Yes  Length of Order: 1 Day      Gaston Gutierrez MD

## 2021-09-19 NOTE — PLAN OF CARE
Patient was very confused during this shift. Continues to be a 1 to 1 for safety, due to trying to ambulate on her left foot, which is to be non-weight bearing.  She does not remember this and attempts multiple times during the shift. Patient then becomes agitated and verbally abusive when redirecting not to get up and walk. Patient also in bilateral soft limb restraints to wrists. MD started scheduled Seroquel at 1130 first dose given, and patient continue to be agitated and restless. MD then gave PRN orders for IV haldol. Patient appeared a little more calm at the end of the shift, but continues to be a 1 to 1 and in restraints.  Patient also was calling out for  at times, but also yelled at visiting  and son, when they visited. Checked a PVR bladder scan and was 0. Patient was incontinent of bladder. Passed onto continue to monitor. Lina Braun RN     Problem: Risk for Delirium  Goal: Optimal Coping  Outcome: Declining  Goal: Improved Behavioral Control  Outcome: Declining  Goal: Improved Attention and Thought Clarity  Outcome: Declining  Goal: Improved Sleep  Outcome: Declining     Problem: Pain Acute  Goal: Acceptable Pain Control and Functional Ability  Outcome: Improving

## 2021-09-19 NOTE — PROGRESS NOTES
Progress Note    Assessment/Plan  Bernadette Yu is a 81 year old female  with a medical history of hypertension, diabetes, mitral valve replacement, paroxysmal atrial fibrillation, CKD and chronic pain syndrome admitted in the podiatry service for left foot osteomyelitis. [facility] was consulted for medical management.     Left foot osteomyelitis: Osteomyelitis of 2nd digit left foot and 2nd metatarsal left foot. s/p amputation of 2nd digit left foot, partial amputation 2nd metatarsal left foot and incision and drainage.   On 9/17  -Continue zosyn and Vancomycin given history of MRSA. ID was consulted. Further antibiotics per ID.  - Pain control --continue oxycodone for the same  - Follow up intra op culture result and pathology.  No anaerobic organisms isolated after 1 day.  - PT/OT. NWB of left foot     Diabetes, type II: Uncontrolled with recent hemoglobin A1c 9.0.  PTA Novolog 70/30  30 units qam and 15 units qpm.   - Increase Levemir to 30 units and decrease Novolog: CHO ratio to 1:5  -continue diabetic diet 60 gm.     Acute metabolic encephalopathy with delirium  --Order bladder scan to rule out urinary retention  --Nurse claims that patient had a bowel movement yesterday  --Patient did not respond to Zyprexa and Haldol given overnight as needed  --Ordered scheduled Seroquel 25 mg q. 6 hours x 4 doses  --Order soft restraints for 24 hours since patient is at risk for pulling lines and YUKO drain  --Change oxycodone to Dilaudid as needed  --Continue scheduled Tylenol      Left calf swelling  --Duplex ultrasound done on 9/18 is negative for DVT  --INR is subtherapeutic continue Coumadin  As per pharmacy    Essential hypertension: Blood pressure controlled.  Continue home dose Lasix and diltiazem.     Mechanical mitral valve: Chronically anticoagulated on Coumadin.    Continue   Coumadin  --INR is still subtherapeutic.  Goal of 2.5-3.5 due to mechanical mitral valve   will hold off on bridging anticoagulation given  risk of bleeding from the recent amputation.   -Hemoglobin is trending down and therefore continue to monitor      Barriers to discharge: IV antibiotics, delirium    Anticipated discharge date: As per podiatry and ID    Updated patient's  at bedside    Addendum Gaston Gutierrez MD, Hospitalist,09/19/21,2:27 PM  Patient is still not directable after 25 mg of Seroquel.  Ordered IV Haldol 2 mg every 2 hours as needed for agitation.  Also requested nurse to check for postvoid residual and insert Briceño's catheter for PVR greater than 350 mL.  UA was also ordered.    Subjective  Patient had agitation overnight and was put on restraint.  Received Zyprexa and Haldol without any good effect.   at bedside.  Patient is trying to get out of bed.  Does not know that she is in the hospital.  Difficult to reorient.  Restraint ordered.  Will order Seroquel scheduled 25 mg every 6 hours x4 doses.  Blood sugars are uncontrolled.  Ordered post void residual to check for urinary retention.  Patient is able to move all 4 limbs.      Objective    /72 (BP Location: Left arm)   Pulse 113   Temp 97.9  F (36.6  C) (Axillary)   Resp 18   Wt 71 kg (156 lb 9.6 oz)   SpO2 93%   BMI 30.58 kg/m    Weight:   Wt Readings from Last 5 Encounters:   09/17/21 71 kg (156 lb 9.6 oz)   09/14/21 63.5 kg (140 lb)   09/02/21 63.5 kg (140 lb)   08/31/21 67.5 kg (148 lb 14.2 oz)   08/24/21 67.1 kg (148 lb)       I/O last 3 completed shifts:  In: 385 [P.O.:240; I.V.:145]  Out: 20 [Drains:20]  No intake/output data recorded.          Physical Exam  Patient is quite agitated and trying to move out of bed  Confused and does not know that she is in the hospital  No pallor, icterus, clubbing, cyanosis  Body mass index is 30.58 kg/m .    No sinus tenderness  Moist membranes  Neck supple  CVS: S1 S2-N, no murmurs, gallops, rubs  Resp: B/L vesicular breath sounds, no wheezing, crackles  Abd: soft, No t/g/r  Neuro: no involuntary movements such as  tremors  Vasc: Left foot in bandages.  Mild edema of the shin of the left tibia    Pertinent Labs  ----------------------  Recent Labs   Lab 09/19/21  1308 09/19/21  0823 09/18/21  2146 09/17/21  1447 09/14/21  1717   NA  --   --   --   --  140   POTASSIUM  --   --   --   --  4.1   CO2  --   --   --   --  27   BUN  --   --   --   --  17   CR  --   --   --   --  1.00   * 215* 299*   < > 144*    < > = values in this interval not displayed.     Recent Labs   Lab 09/19/21  0655 09/14/21 1717   WBC  --  6.8   HGB 9.9* 10.8*   HCT  --  35.2   PLT  --  239     Recent Labs   Lab 09/19/21 0655 09/18/21 1717   INR 1.57* 1.54*     Glucose Values Latest Ref Rng & Units 8/24/2021 9/14/2021   Bedside Glucose (mg/dl )  - -- --   GLUCOSE 70 - 125 mg/dL 216(H) 144(H)   Some recent data might be hidden         Pertinent Radiology   Radiology Results: Personally reviewed impression/s  No results found.  EKG Results: not reviewed.

## 2021-09-20 NOTE — PLAN OF CARE
Problem: Risk for Delirium  Goal: Optimal Coping  Outcome: Improving  Goal: Improved Behavioral Control  9/20/2021 0237 by Roxane Melton RN  Outcome: Improving  9/20/2021 0234 by Roxane Melton RN  Outcome: Improving  Goal: Improved Sleep  Outcome: Improving     Problem: Pain Acute  Goal: Acceptable Pain Control and Functional Ability  9/20/2021 0237 by Roxane Melton RN  Outcome: Improving  9/20/2021 0234 by Roxane Melton RN  Outcome: Improving  Pt. Is 1:1 for safety.  Slept on and off. Pt is drowsy, restless at times, calling out her  name, trying to get out of bed.  .  VS stable. Received IV ABX. Refused to take scheduled tylenol. Sleeping at this time.

## 2021-09-20 NOTE — PROGRESS NOTES
[facility] ID Inpatient follow up       Patient:  Bernadette Yu  Date of birth 1940, Medical record number [id number]  Date of Visit:  09/20/2021  Attending Physician: Gaston Gutierrez MD         Assessment and Recommendations:   Assessment:  Bernadette Yu is a 81 year old female with  1. Left second toe osteomyelitis. Nonhealing wound on the second toe, seen in clinic by Dr. Cleveland. MRI showing osteomyelitis of the metatarsal head and the proximal and mid phalanx. Status post amputation down to the second metatarsal. Cultures finalized no growth.  Pathology in process. Patient was on cephalexin up until the time of admission.  On IV vancomycin and Zosyn since admission.  2. Mitral valve replacement. On anticoagulation  3. Diabetes. Last A1c 9       Recommendations:  1. Continue IV Vanco and Zosyn for now.  2. Follow-up pending pathology.  If margins are clear, will do a short course of antibiotic orally.  3. Pain control.  4. Drain and wound care per podiatry      Discussed with the patient, nursing staff.      ID will follow.      Russell Funk MD.  [facility].   [facility]  Office Telephone [phone].  Fax [phone]  [facility] paging            Interval History:     HPI:  The interval history was reviewed.   New to me today.  Having a lot of pain.  Cultures reviewed.    Pertinent cultures include:  No results found for: CULT    Recent Inflammatory Biomarkers:   Recent Labs   Lab Test 09/14/21  1717 08/24/21  1541 07/05/21  1354 05/06/21  1403 03/04/21  1330 12/29/20  1723 07/23/20  1520 05/22/20  1230 03/26/20  1208 03/26/20  1208 10/26/19  0946 10/25/19  1756 01/15/19  1221 10/30/18  1354 07/17/18  1249 05/21/18  2222 04/06/18  1158 02/26/18  1224   CRP 1.0*  --   --   --   --   --   --  0.4  --  1.4*  --  1.5*  --  0.4  --   --   --  0.6   PCAL  --   --   --   --   --   --   --   --   --   --   --   --   --   --    --  0.12  --   --    WBC 6.8 9.4 6.6 9.3 8.4 5.7   < > 6.3   < > 7.2   < > 7.7   < > 5.9   < > 8.3   < > 4.7    < > = values in this interval not displayed.            Review of Systems:   CONSTITUTIONAL:    Temp Max: Temp (24hrs), Av.9  F (36.6  C), Min:97  F (36.1  C), Max:98.9  F (37.2  C)   .  Negative except for findings in the HPI.           Current Medications (antimicrobials listed in bold):       acetaminophen  975 mg Oral Q8H     diltiazem ER COATED BEADS  180 mg Oral Daily     ferrous sulfate  325 mg Oral Once per day on      furosemide  80 mg Oral Daily     gabapentin  300 mg Oral At Bedtime     insulin aspart  1-10 Units Subcutaneous TID AC     insulin aspart   Subcutaneous QAM AC     insulin aspart   Subcutaneous Daily with lunch     insulin aspart   Subcutaneous Daily with supper     insulin detemir  30 Units Subcutaneous QAM AC     levothyroxine  125 mcg Oral QAM AC     OLANZapine zydis  5 mg Oral At Bedtime     piperacillin-tazobactam  3.375 g Intravenous Q8H     polyethylene glycol  17 g Oral Daily     QUEtiapine  25 mg Oral 4x Daily     senna-docusate  1 tablet Oral BID     sodium chloride (PF)  3 mL Intracatheter Q8H     triamcinolone   Topical Daily     vancomycin  1,500 mg Intravenous Q24H              Allergies:     Allergies   Allergen Reactions     Amiodarone Other (See Comments)     TORSADES DE POINTES     Aspirin Other (See Comments)     Recurrent severe gastrointestinal bleed.            Physical Exam:   Vitals were reviewed  Patient Vitals for the past 24 hrs:   BP Temp Temp src Pulse Resp SpO2   21 0911 135/78 98.2  F (36.8  C) Oral 117 18 95 %   21 0052 136/88 97  F (36.1  C) Axillary 112 20 95 %   21 2224 95/58 97.4  F (36.3  C) Axillary 115 20 94 %   21 2100 -- -- -- 113 18 94 %   21 1617 126/72 98.9  F (37.2  C) Oral 115 18 94 %       Physical Examination:  Gen: Pleasant in moderate distress secondary to pain.  HEENT: NCAT. EOMI.  PERRL.  Neck: No bruit, JVD or thyromegaly.  Lungs: Clear to ascultation bilat with no crackles or wheezes.  Card: RRR. NSR. No RMG. Peripheral pulses present and symmetric. No edema.  Abd: Soft NT ND. No mass. Normal bowel sounds.  Skin: No rash.  Extr: Surgical dressing and drain in place.  Neuro: Alert and oriented to place time and person.            Laboratory Data:   ID Labs:  Microbiology labs:  Reviewed.  Cultures no growth.    Recent Labs   Lab Test 09/14/21 1717 05/22/20  1230 03/26/20  1208 10/25/19  1756 10/30/18  1354 02/26/18  1224   CRP 1.0* 0.4 1.4* 1.5* 0.4 0.6     Recent Labs   Lab Test 09/14/21 1717 08/24/21  1541 07/05/21  1354 05/06/21  1403 03/04/21  1330 12/29/20  1723   WBC 6.8 9.4 6.6 9.3 8.4 5.7     Recent Labs   Lab Test 09/19/21 2045 09/14/21 1717 08/24/21  1541 07/05/21  1354   CR 0.87 1.00 1.22* 0.94   GFRESTIMATED 62 52* 41* 57*       Hematology Studies  Recent Labs   Lab Test 09/14/21 1717 08/24/21  1541 07/05/21  1354 05/06/21  1403 03/04/21  1330 03/04/21  1330 12/29/20  1723 12/29/20  1723   WBC 6.8 9.4 6.6 9.3  --  8.4  --  5.7   HCT 35.2 35.7 28.0* 37.0   < > 31.6*   < > 30.8*    182 216 232   < > 207   < > 178    < > = values in this interval not displayed.       Metabolic  Recent Labs   Lab Test 09/19/21 2045 09/14/21 1717 08/24/21  1541 07/05/21  1354 07/05/21  1354   NA  --  140 138  --  140   BUN  --  17 22  --  21   CO2  --  27 25  --  24   CR 0.87 1.00 1.22*   < > 0.94   GFRESTIMATED 62 52* 41*  --  57*    < > = values in this interval not displayed.       Hepatic Studies  Recent Labs   Lab Test 12/29/20  1723 11/23/20  2349 10/27/19  0434 10/25/19  1756 10/25/19  1756   BILITOTAL 0.4 0.8  --   --  0.4   ALKPHOS 76 56  --   --  70   ALBUMIN 3.6 4.4 3.2*   < > 3.9   AST 20 30  --   --  21   ALT 20 24  --   --  21    < > = values in this interval not displayed.       Immunologlobulins  Recent Labs   Lab Test 09/14/21  1717 05/22/20  1230 03/26/20  1208   SED  63* 44* 53*            Imaging Data:   Reviewed

## 2021-09-20 NOTE — PROGRESS NOTES
Diabetes Consult was made. Lou Goldstein RN, [facility] talked with Attending Md Dr. Nicolas and he states a consult is not helpful at this time due to confusion.  Patient may go to TCU upon discharge.  Will continue to follow to see if mentation changes.   Marya Gruber RN, [facility]/: [phone]/Pager: [phone]

## 2021-09-20 NOTE — PLAN OF CARE
"  Problem: Risk for Delirium  Goal: Improved Sleep  Outcome: Improving     Sleeping after taking bedtime medications, good sound sleep.  Would cat nap for very short intervals earlier in shift.  Would become restless \"I'm cold\" but would throw off the blankets, \"I need to use the bathroom\" but would take several attempts before voiding large amounts in the bedpan.  Urine specimen set for analysis.        Problem: Pain Acute  Goal: Acceptable Pain Control and Functional Ability  Intervention: Prevent or Manage Pain  Recent Flowsheet Documentation  Taken 9/19/2021 1543 by Emma Schaefer, RN  Medication Review/Management: medications reviewed   Denied pain much of shift but became very restless stating her legs hurt and that she needed to move around.   Scheduled  tylenol given and prn oral Diluadid 2mg at 1930.    Continues on 1:1 due to patient not keeping weight off her foot, and not knowing her limitations for safety.  Pulls at IV, touches and moves the dressing on her foot, and puts legs over the siderails of the bed.  Wrist restraints taken off at 1930 and patient was redirectable related to behaviors, but attendant was needed.    Emma Schaefer RN  "

## 2021-09-20 NOTE — PLAN OF CARE
Problem: Pain Acute  Goal: Acceptable Pain Control and Functional Ability  Intervention: Develop Pain Management Plan  Recent Flowsheet Documentation  Taken 9/20/2021 1617 by Aleida Lopez, RN  Pain Management Interventions: repositioned  Intervention: Prevent or Manage Pain  Recent Flowsheet Documentation  Taken 9/20/2021 1540 by Aleida Lopez, RN  Medication Review/Management: (1:1 monitoring) medications reviewed   Pt C/O pain in her left leg and foot rated at a 9. Pt requested pain medication and had 2mg of Dilaudid without relief. Pt states that she was taking 4mg of dIlaudid every 6 hours for chronic back and leg pain.       Problem: Adult Inpatient Plan of Care  Goal: Absence of Hospital-Acquired Illness or Injury  Intervention: Identify and Manage Fall Risk  Recent Flowsheet Documentation  Taken 9/20/2021 1540 by Aleida Lopez, RN  Safety Promotion/Fall Prevention:   activity supervised   safety round/check completed   increase visualization of patient     Used tomas lift and assistance of 2 staff to assist patient into the chair. Pt is on 1:1 monitoring for safety.

## 2021-09-20 NOTE — PROGRESS NOTES
Progress Note    Assessment/Plan  Bernadette Yu is a 81 year old female  with a medical history of hypertension, diabetes, mitral valve replacement, paroxysmal atrial fibrillation, CKD and chronic pain syndrome admitted in the podiatry service for left foot osteomyelitis. [facility] was consulted for medical management.     Left foot osteomyelitis: Osteomyelitis of 2nd digit left foot and 2nd metatarsal left foot. s/p amputation of 2nd digit left foot, partial amputation 2nd metatarsal left foot and incision and drainage.   On 9/17  -Continue zosyn and Vancomycin given history of MRSA. ID was consulted. Further antibiotics per ID.  --Follow-up with Dr. Cleveland in 10 days starting from 9/17.  - Pain control --continue oxycodone for the same  - Follow up intra op culture result and pathology.  No anaerobic organisms isolated after 1 day.  - PT/OT. NWB of left foot     Diabetes, type II: Controlled after increasing his Levemir to 30 units   Uncontrolled at home with recent hemoglobin A1c 9.0.  PTA Novolog 70/30  30 units qam and 15 units qpm.   - Continue Levemir to 30 units and continue Novolog: CHO ratio to 1:5  -continue diabetic diet 60 gm.     Acute metabolic encephalopathy with delirium  --improving  --No bladder scan recorded   --Nurse claims that patient had a bowel movement yesterday  ----Was given scheduled Seroquel 25 mg q. 6 hours x 4 doses on 9/19  --DC restraints  --Continue using IV Haldol 2 mg as needed  --Patient given Zyprexa last night at bedtime.  --Continue Dilaudid as needed.  Avoid oxycodone  --Continue scheduledTylenol      Left calf swelling  --Duplex ultrasound done on 9/18 is negative for DVT  --INR is subtherapeutic continue Coumadin  As per pharmacy    Essential hypertension: Blood pressure controlled.    Hold Lasix for now    Mechanical mitral valve: Chronically anticoagulated on Coumadin.    Continue   Coumadin  --INR is still subtherapeutic at 1.79.  Goal of 2.5-3.5 due to mechanical mitral  valve   will hold off on bridging anticoagulation given risk of bleeding from the recent amputation.   -Recheck hemoglobin next a.m.      Barriers to discharge: IV antibiotics, delirium    Anticipated discharge date: ID      Subjective    Patient is much calmer today.  She is sleeping.  Did have lunch.  Blood sugars are controlled.      Objective    BP (!) 149/67 (BP Location: Left arm)   Pulse 118   Temp 98.6  F (37  C) (Oral)   Resp 18   Wt 71 kg (156 lb 9.6 oz)   SpO2 96%   BMI 30.58 kg/m    Weight:   Wt Readings from Last 5 Encounters:   09/17/21 71 kg (156 lb 9.6 oz)   09/14/21 63.5 kg (140 lb)   09/02/21 63.5 kg (140 lb)   08/31/21 67.5 kg (148 lb 14.2 oz)   08/24/21 67.1 kg (148 lb)       I/O last 3 completed shifts:  In: 350 [P.O.:350]  Out: 0   No intake/output data recorded.          Physical Exam  Patient issleeping but responsive to touch  Vasc: Left foot in bandages.  Mild edema of the shin of the left tibia    Pertinent Labs  ----------------------  Recent Labs   Lab 09/20/21  1152 09/20/21  0901 09/20/21  0306 09/19/21 2125 09/19/21 2045 09/17/21  1447 09/14/21  1717   NA  --   --   --   --   --   --  140   POTASSIUM  --   --   --   --   --   --  4.1   CO2  --   --   --   --   --   --  27   BUN  --   --   --   --   --   --  17   CR  --   --   --   --  0.87  --  1.00   * 150* 122*   < >  --    < > 144*    < > = values in this interval not displayed.     Recent Labs   Lab 09/19/21 0655 09/14/21 1717   WBC  --  6.8   HGB 9.9* 10.8*   HCT  --  35.2   PLT  --  239     Recent Labs   Lab 09/20/21  0811 09/19/21  0655 09/18/21 1717   INR 1.79* 1.57* 1.54*     Glucose Values Latest Ref Rng & Units 8/24/2021 9/14/2021   Bedside Glucose (mg/dl )  - -- --   GLUCOSE 70 - 125 mg/dL 216(H) 144(H)   Some recent data might be hidden         Pertinent Radiology   Radiology Results: Personally reviewed impression/s  No results found.  EKG Results: not reviewed.

## 2021-09-20 NOTE — PHARMACY-VANCOMYCIN DOSING SERVICE
Pharmacy Vancomycin Note  Date of Service 2021  Patient's  1940   81 year old, female    Indication: Osteomyelitis  Day of Therapy: 3  Current vancomycin regimen:  1250 mg IV q24h  Current vancomycin monitoring method: AUC  Current vancomycin therapeutic monitoring goal: 400-600 mg*h/L    Current estimated CrCl = Estimated Creatinine Clearance: 37.5 mL/min (based on SCr of 1 mg/dL).    Creatinine for last 3 days  No results found for requested labs within last 72 hours.    Recent Vancomycin Levels (past 3 days)  2021:  8:45 PM Vancomycin 8.6 mg/L    Vancomycin IV Administrations (past 72 hours)                   vancomycin (VANCOCIN) 1,250 mg in sodium chloride 0.9 % 250 mL intermittent infusion (mg) 1,250 mg New Bag 21     1,250 mg New Bag 21     1,250 mg New Bag  0029                Nephrotoxins and other renal medications (From now, onward)    Start     Dose/Rate Route Frequency Ordered Stop    21 0900  furosemide (LASIX) tablet 80 mg      80 mg Oral DAILY 21 1923      21 0347  piperacillin-tazobactam (ZOSYN) 3.375 g vial to attach to  mL bag     Note to Pharmacy: Extended infusion dosing to start 6 hours after initial infusion.    3.375 g  over 240 Minutes Intravenous EVERY 8 HOURS 21 2148      21 2300  vancomycin (VANCOCIN) 1,250 mg in sodium chloride 0.9 % 250 mL intermittent infusion      1,250 mg  over 90 Minutes Intravenous EVERY 24 HOURS 21 2228               Contrast Orders - past 72 hours (72h ago, onward)    None          Interpretation of levels and current regimen:  Vancomycin level is reflective of -600    Has serum creatinine changed greater than 50% in last 72 hours: No    Renal Function: Improving    Loading dose: N/A  Regimen: 1500 mg IV every 24 hours.  Start time: 19:00 on 2021  Exposure target: AUC24 (range)400-600 mg/L.hr   AUC24,ss: 532 mg/L.hr  Probability of AUC24 > 400: 93 %  Ctrough,ss:  15.2 mg/L  Probability of Ctrough,ss > 20: 18 %  Probability of nephrotoxicity (Lodise SHAWN 2009): 10 %      Plan:  1. Increase Dose to 1500 mg IV every 24 hours  2. Vancomycin monitoring method: AUC  3. Vancomycin therapeutic monitoring goal: 400-600 mg*h/L  4. Pharmacy will check vancomycin levels as appropriate in 3-5 Days.    Jenna Bryant, [facility]

## 2021-09-21 NOTE — PLAN OF CARE
Problem: Risk for Delirium  Goal: Improved Attention and Thought Clarity  Outcome: Improving   Pt was A&Ox3 with intermittent confusion.  Problem: Risk for Delirium  Goal: Improved Sleep  Outcome: Improving   Pt reported difficulty sleeping around 1am, also c/o rt knee pain which pt stated was new. Received prn dilaudid with little effect, around 230 pt received tylenol and ice applied to rt knee. Pt had a snack and has been sleeping for some time  Problem: Pain Acute  Goal: Acceptable Pain Control and Functional Ability  Outcome: Improving   Reported pain in rt knee 8/10 around 1am. Pt having difficulty sleeping due to pain. Pt states this is new for her. Received prn dilaudid with minimal effect. Around 230am, repositioned pt, administered tylenol and pt had a snack. Shortly thereafter pt fell sleep. Cont to monitor  Problem: Mobility Impairment (Orthopaedic Rehabilitation)  Goal: Optimal Mobility [place] and Safety  Outcome: Improving   Pt is currently a tomas lift with transfers, NWB to left LE  Problem: Adult Inpatient Plan of Care  Goal: Absence of Hospital-Acquired Illness or Injury  Intervention: Identify and Manage Fall Risk  Recent Flowsheet Documentation  Taken 9/21/2021 0106 by Frances López RN  Safety Promotion/Fall Prevention:   activity supervised   bed alarm on   fall prevention program maintained   nonskid shoes/slippers when out of bed   patient and family education   room door open   room near nurse's station  Intervention: Prevent Skin Injury  Recent Flowsheet Documentation  Taken 9/21/2021 0327 by Frances López RN  Body Position:   turned   supine  Taken 9/21/2021 0106 by Frances López RN  Body Position:   turned   left  Taken 9/20/2021 2025 by Frances López RN  Body Position: (Simultaneous filing. User may be unaware of other data.)   right   turned

## 2021-09-21 NOTE — PROGRESS NOTES
[facility] ID Inpatient follow up       Patient:  Bernadette Yu  Date of birth 1940, Medical record number [id number]  Date of Visit:  09/21/2021  Attending Physician: Gaston Gutierrez MD         Assessment and Recommendations:   Assessment:  Bernadette Yu is a 81 year old female with  1. Left second toe osteomyelitis. Nonhealing wound on the second toe, seen in clinic by Dr. Cleveland. MRI showing osteomyelitis of the metatarsal head and the proximal and mid phalanx. Status post amputation down to the second metatarsal. Cultures finalized no growth.  Pathology in process. Patient was on cephalexin up until the time of admission.  On IV vancomycin and Zosyn since admission.  Pathology still in process  2. Mitral valve replacement. On anticoagulation  3. Diabetes. Last A1c 9       Recommendations:  1. Continue IV Vanco and Zosyn for now.  2. Follow-up pending pathology.  If margins are clear, will do a short course of antibiotic orally.  3. Pain control.  4. Drain and wound care per podiatry      Discussed with the patient, son, , and nursing staff.      ID will follow.      Russell Funk MD.  [facility].   [facility] ID Clinic  Office Telephone [phone].  Fax [phone]  [facility] paging            Interval History:     HPI:  The interval history was reviewed.   Pain under control today.   and son at bedside.  Pathology in process.    Pertinent cultures include:  No results found for: CULT    Recent Inflammatory Biomarkers:   Recent Labs   Lab Test 09/21/21  0635 09/14/21  1717 08/24/21  1541 07/05/21  1354 05/06/21  1403 03/04/21  1330 07/23/20  1520 05/22/20  1230 03/26/20  1208 03/26/20  1208 10/26/19  0946 10/25/19  1756 01/15/19  1221 10/30/18  1354 07/17/18  1249 05/21/18  2222 04/06/18  1158 02/26/18  1224   CRP  --  1.0*  --   --   --   --   --  0.4  --  1.4*  --  1.5*  --  0.4  --   --   --  0.6   PCAL  --   --    --   --   --   --   --   --   --   --   --   --   --   --   --  0.12  --   --    WBC 6.8 6.8 9.4 6.6 9.3 8.4   < > 6.3   < > 7.2   < > 7.7   < > 5.9   < > 8.3   < > 4.7    < > = values in this interval not displayed.            Review of Systems:   CONSTITUTIONAL:    Temp Max: Temp (24hrs), Av.9  F (36.6  C), Min:97  F (36.1  C), Max:98.9  F (37.2  C)   .  Negative except for findings in the HPI.           Current Medications (antimicrobials listed in bold):       diltiazem ER COATED BEADS  180 mg Oral Daily     ferrous sulfate  325 mg Oral Once per day on      [Held by provider] furosemide  80 mg Oral Daily     gabapentin  300 mg Oral At Bedtime     insulin aspart  1-10 Units Subcutaneous TID AC     insulin aspart   Subcutaneous QAM AC     insulin aspart   Subcutaneous Daily with lunch     insulin aspart   Subcutaneous Daily with supper     insulin detemir  30 Units Subcutaneous QAM AC     levothyroxine  125 mcg Oral QAM AC     OLANZapine zydis  5 mg Oral At Bedtime     piperacillin-tazobactam  3.375 g Intravenous Q8H     polyethylene glycol  17 g Oral Daily     senna-docusate  1 tablet Oral BID     sodium chloride (PF)  3 mL Intracatheter Q8H     triamcinolone   Topical Daily     vancomycin  1,500 mg Intravenous Q24H     warfarin ANTICOAGULANT  5 mg Oral ONCE at 18:00              Allergies:     Allergies   Allergen Reactions     Amiodarone Other (See Comments)     TORSADES DE POINTES     Aspirin Other (See Comments)     Recurrent severe gastrointestinal bleed.            Physical Exam:   Vitals were reviewed  Patient Vitals for the past 24 hrs:   BP Temp Temp src Pulse Resp SpO2   21 0758 134/76 98.7  F (37.1  C) Oral 119 18 96 %   21 0650 116/68 -- -- 110 16 --   21 2333 128/66 98.1  F (36.7  C) Oral (!) 122 18 93 %   21 1615 120/69 -- -- -- -- --   21 1604 (!) 149/67 98.6  F (37  C) Oral 118 18 96 %       Physical Examination:  Gen: Pleasant in moderate distress  secondary to pain.  HEENT: NCAT. EOMI. PERRL.  Neck: No bruit, JVD or thyromegaly.  Lungs: Clear to ascultation bilat with no crackles or wheezes.  Card: RRR. NSR. No RMG. Peripheral pulses present and symmetric. No edema.  Abd: Soft NT ND. No mass. Normal bowel sounds.  Skin: No rash.  Extr: Surgical dressing and drain in place.  Neuro: Alert and oriented to place time and person.            Laboratory Data:   ID Labs:  Microbiology labs:  Reviewed.  Cultures no growth.    Recent Labs   Lab Test 09/14/21 1717 05/22/20  1230 03/26/20  1208 10/25/19  1756 10/30/18  1354 02/26/18  1224   CRP 1.0* 0.4 1.4* 1.5* 0.4 0.6     Recent Labs   Lab Test 09/21/21  0635 09/14/21 1717 08/24/21  1541 07/05/21  1354 05/06/21  1403 03/04/21  1330   WBC 6.8 6.8 9.4 6.6 9.3 8.4     Recent Labs   Lab Test 09/21/21  0635 09/19/21 2045 09/14/21 1717 08/24/21  1541   CR 0.92 0.87 1.00 1.22*   GFRESTIMATED 58* 62 52* 41*       Hematology Studies  Recent Labs   Lab Test 09/21/21  0635 09/14/21 1717 08/24/21  1541 07/05/21  1354 05/06/21  1403 05/06/21  1403 03/04/21  1330 03/04/21  1330   WBC 6.8 6.8 9.4 6.6  --  9.3  --  8.4   HCT 34.1* 35.2 35.7 28.0*   < > 37.0   < > 31.6*    239 182 216   < > 232   < > 207    < > = values in this interval not displayed.       Metabolic  Recent Labs   Lab Test 09/21/21  0635 09/19/21 2045 09/14/21 1717 08/24/21  1541 08/24/21  1541     --  140  --  138   BUN 13  --  17  --  22   CO2 23  --  27  --  25   CR 0.92 0.87 1.00   < > 1.22*   GFRESTIMATED 58* 62 52*   < > 41*    < > = values in this interval not displayed.       Hepatic Studies  Recent Labs   Lab Test 12/29/20  1723 11/23/20  2349 10/27/19  0434 10/25/19  1756 10/25/19  1756   BILITOTAL 0.4 0.8  --   --  0.4   ALKPHOS 76 56  --   --  70   ALBUMIN 3.6 4.4 3.2*   < > 3.9   AST 20 30  --   --  21   ALT 20 24  --   --  21    < > = values in this interval not displayed.       Immunologlobulins  Recent Labs   Lab Test  09/14/21  1717 05/22/20  1230 03/26/20  1208   SED 63* 44* 53*            Imaging Data:   Reviewed

## 2021-09-21 NOTE — PROGRESS NOTES
Care Management Follow Up    Length of Stay (days): 4    Expected Discharge Date: 09/23/2021     Concerns to be Addressed:       Patient plan of care discussed at interdisciplinary rounds: Yes    Anticipated Discharge Disposition: Transitional Care     Anticipated Discharge Services:    Anticipated Discharge DME:      Patient/family educated on Medicare website which has current facility and service quality ratings: yes  Education Provided on the Discharge Plan:    Patient/Family in Agreement with the Plan:      Referrals Placed by CM/SW:    Private pay costs discussed: Not applicable    Additional Information:  CM following for TCU placement. Referrals have been sent. MGS declined d/t mental status but says to send new referral if mental status improves.       Corine Cabral, RN

## 2021-09-21 NOTE — SIGNIFICANT EVENT
Significant Event Note    Time of event: 7:25 AM September 21, 2021    Description of event:  Rapid response called around 0703. Patient in bed and unresponsive. Had received 4mg of PO dilaudid hours prior and recent tylenol. Not following commands. Had slept well overnight but complained of knee pain overnight - ice pack applied with good relief.     Given 0.2 mg of Narcan with some response. Dose repeated. Slowly patient started to wake up.    /68 (BP Location: Right arm)   Pulse 110   Temp 98.1  F (36.7  C) (Oral)   Resp 16   Wt 71 kg (156 lb 9.6 oz)   SpO2 93%   BMI 30.58 kg/m       Pupils were 2mm b/l, equal and reactive.   RRR  Nonlabored breathing on RA    Didn't respond to sternal rub or nail bed pressure initially. Around 0712, patient slowly woke up and was AOx3. Requested to sleep more    Plan:  Given Narcan 0.4 mg total with good response.   CBC, BMP pending  EKG ordered  Continue to monitor.    Discussed with: bedside nurse    Swathi Schaffer MD

## 2021-09-21 NOTE — PROGRESS NOTES
Spiritual Health Note    Spiritual Assessment:      visited patient due to patient request upon admission screening.  Patient shared about medical condition and history, sharing frustration over the length of her hospitalization due to her foot issues. She is hoping to get home as soon as possible.     Patient comes from [employer] background and derives meaning, purpose, and comfort from henrique. Patient welcomed prayer and expressed appreciation for the visit.      Care Provided:    Introduced self and role of Spiritual Care     Empathic listening and presence     Helped patient in processing of emotions     Offered prayer     Plan of Care: A  will continue to visit as able or per request by patient/family/staff.         Vincent Espinoza MDiv, [facility]

## 2021-09-21 NOTE — PLAN OF CARE
Problem: Risk for Delirium  Goal: Improved Attention and Thought Clarity  9/20/2021 1935 by Yin Segovia, RN  Outcome: Improving  Patient alert & oriented x4, but remains confused, does not recall recent events such as sitting in the chair. Patient has alternated between drowsy and restless for much of this shift. Was observed pulling at lines, 1:1 remains in place for safety.    Problem: Pain Acute  Goal: Acceptable Pain Control and Functional Ability  9/20/2021 1935 by Yin Segovia, RN  Outcome: No Change  Problem: Mobility Impairment (Orthopaedic Rehabilitation)  Goal: Optimal Mobility [place] and Safety  Outcome: No Change  PRN Dilaudid given x1.  Prafo boot in place to LLE, [place] utilized for transfers during this shift as patient does not seem to grasp NWB to LLE at this time.  PT/OT following.    Yin Segovia, RN

## 2021-09-21 NOTE — PROGRESS NOTES
0656 responded to rapid response, patient lethargic, difficult to arouse, room air SpO2 95 %, started on 2 lpm/NC. BS clear, , RR 14, /70. MAP 80.  Narcan given by RN x 2, patient began to respond, MD/RN at bedside. ECG being performed, RT available as needed,

## 2021-09-21 NOTE — PROGRESS NOTES
Progress Note    Assessment/Plan  Bernadette Yu is a 81 year old female  with a medical history of hypertension, diabetes, mitral valve replacement, paroxysmal atrial fibrillation, CKD and chronic pain syndrome admitted in the podiatry service for left foot osteomyelitis. [facility] was consulted for medical management.     Left foot osteomyelitis: Osteomyelitis of 2nd digit left foot and 2nd metatarsal left foot. s/p amputation of 2nd digit left foot, partial amputation 2nd metatarsal left foot and incision and drainage.   On 9/17  -Continue zosyn and Vancomycin given history of MRSA. ID was consulted. Further antibiotics per ID.  --Follow-up with Dr. Cleveland in 10 days starting from 9/17.  - Pain control --continue oxycodone for the same  - Follow up intra op culture result and pathology.  No anaerobic organisms isolated after 1 day.    - PT/OT. NWB of left foot     Insulin-dependent diabetes, type II: Controlled after increasing his Levemir to 30 units   Uncontrolled at home with recent hemoglobin A1c 9.0.  PTA Novolog 70/30  30 units qam and 15 units qpm.   - Continue Levemir to 30 units and continue Novolog: CHO ratio to 1:5  -continue diabetic diet 60 gm.     Acute metabolic encephalopathy with delirium  --improving.  But did have rapid response on 9/20 due to decreased responsiveness.  Did respond to Narcan x2.  Patient is more awake today.  --Order CT head without contrast to rule out stroke.  --No bladder scan recorded   --Nurse claims that patient had a bowel movement yesterday  ----Was given scheduled Seroquel 25 mg q. 6 hours x 4 doses on 9/19  --DC restraints  --Continue using IV Haldol 2 mg as needed  --Continue Dilaudid as needed.  Avoid oxycodone  --Continue scheduled Tylenol    Hypokalemia mild  --Order potassium replacement      Left calf swelling  --Duplex ultrasound done on 9/18 is negative for DVT  --INR is subtherapeutic continue Coumadin  As per pharmacy    Essential hypertension: Blood pressure  controlled.    Hold Lasix for now due to poor p.o. intake    Mechanical mitral valve: Chronically anticoagulated on Coumadin.    Continue   Coumadin  --I INR is near therapeutic range.  Goal of 2.5-3.5 due to mechanical mitral valve   will hold off on bridging anticoagulation given risk of bleeding from the recent amputation.   Hemoglobin is stable.  --Ordered CT head without contrast to rule out stroke or trauma    Barriers to discharge: IV antibiotics, delirium    Anticipated discharge date: ID    Addendum Gaston Gutierrez MD, Hospitalist,09/21/21,5:21 PM  Called patient's son Riley and discussed with him about the discharge planning. According to him postoperative delirium is not new for her mother. Agrees with CT head for evaluating for stroke.    Subjective    Patient is much calmer today.  She is not responsive to basic questions.  Knows that she is in the hospital.  Blood sugars are controlled.  Hemoglobin is stable.  INR is near therapeutic.  Chart reviewed and noted patient had a rapid response last evening due to decreased responsiveness.  Narcan x2 given.    Complete review of systems cannot be obtained.    Objective    BP (P) 124/74 (BP Location: Right arm)   Pulse (!) (P) 122   Temp (P) 98.3  F (36.8  C) (Oral)   Resp (P) 18   Wt 71 kg (156 lb 9.6 oz)   SpO2 (P) 98%   BMI 30.58 kg/m    Weight:   Wt Readings from Last 5 Encounters:   09/17/21 71 kg (156 lb 9.6 oz)   09/14/21 63.5 kg (140 lb)   09/02/21 63.5 kg (140 lb)   08/31/21 67.5 kg (148 lb 14.2 oz)   08/24/21 67.1 kg (148 lb)       I/O last 3 completed shifts:  In: 741 [P.O.:600; I.V.:141]  Out: 1252.5 [Urine:1250; Drains:2.5]  No intake/output data recorded.          Physical Exam  Patient is more responsive and able to tell me that she is in the hospital.  Vasc: Left foot in bandages.  Mild edema of the shin of the left tibia  Systolic murmur heard in the mitral area    Pertinent Labs  ----------------------  Recent Labs   Lab 09/21/21  1301  09/21/21  0852 09/21/21  0653 09/21/21  0635 09/20/21 2041 09/19/21 2125 09/19/21 2045 09/17/21  1447 09/14/21  1717   NA  --   --   --  142  --   --   --   --  140   POTASSIUM  --   --   --  3.2*  --   --   --   --  4.1   CO2  --   --   --  23  --   --   --   --  27   BUN  --   --   --  13  --   --   --   --  17   CR  --   --   --  0.92  --   --  0.87  --  1.00   * 174* 179* 182*   < >   < >  --    < > 144*    < > = values in this interval not displayed.     Recent Labs   Lab 09/21/21  0635 09/19/21  0655 09/14/21 1717   WBC 6.8  --  6.8   HGB 10.2* 9.9* 10.8*   HCT 34.1*  --  35.2     --  239     Recent Labs   Lab 09/21/21  0635 09/20/21  0811 09/19/21  0655   INR 2.45* 1.79* 1.57*     Glucose Values Latest Ref Rng & Units 8/24/2021 9/14/2021 9/21/2021   Bedside Glucose (mg/dl )  - -- -- --   GLUCOSE 70 - 125 mg/dL 216(H) 144(H) 182(H)   Some recent data might be hidden         Pertinent Radiology   Radiology Results: Personally reviewed impression/s  No results found.  EKG Results: not reviewed.

## 2021-09-21 NOTE — PROGRESS NOTES
At roughly 630am writer entered pts room to give am levothyroxine. Writer attempted to arouse pt with no response, pt appeared to be in a deep sleep.  stated the pt did not awaken with blood draw.  Writer turned on bright lights and obtained vitals while trying to arouse pt, VSS besides an irregular tachycardic rate which is within pts normal limits. Pt attempted to acknowledge writer by opening eyes and moaning. Blood sugar check was 179. Sternal rub had little effect to arouse pt. Charge nurse assisted in arousing pt and a rapid response was called. 2 doses narcan given, slowly pt was responding, slow but oriented to place and knew it was morning time...

## 2021-09-22 NOTE — PLAN OF CARE
Problem: Risk for Delirium  Goal: Optimal Coping  Outcome: No Change  Goal: Improved Behavioral Control  Outcome: No Change  Goal: Improved Attention and Thought Clarity  Outcome: No Change  Goal: Improved Sleep  Outcome: No Change     Problem: Pain Acute  Goal: Acceptable Pain Control and Functional Ability  Outcome: No Change  Intervention: Develop Pain Management Plan  Recent Flowsheet Documentation  Taken 9/22/2021 0100 by Aria Hamilton RN  Pain Management Interventions: medication (see MAR)  Intervention: Prevent or Manage Pain  Recent Flowsheet Documentation  Taken 9/22/2021 0200 by Aria Hamilton RN  Medication Review/Management: medications reviewed     Problem: OT General Care Plan  Goal: Transfer (OT)  Description: Transfer (OT)  Outcome: No Change     Problem: Adult Inpatient Plan of Care  Goal: Plan of Care Review  Outcome: No Change  Goal: Patient-Specific Goal (Individualized)  Outcome: No Change  Goal: Absence of Hospital-Acquired Illness or Injury  Outcome: No Change  Intervention: Identify and Manage Fall Risk  Recent Flowsheet Documentation  Taken 9/22/2021 0200 by Aria Hamilton RN  Safety Promotion/Fall Prevention: activity supervised  Intervention: Prevent Skin Injury  Recent Flowsheet Documentation  Taken 9/22/2021 0100 by Aria Hamilton RN  Body Position:   lower extremity elevated   turned   supine  Goal: Optimal Comfort and Wellbeing  Outcome: No Change  Goal: Readiness for Transition of Care  Outcome: No Change     Problem: Diabetes Comorbidity  Goal: Blood Glucose Level Within Targeted Range  Outcome: No Change     Problem: Adjustment to Decreased Mobility and [place] (Orthopaedic Rehabilitation)  Goal: Optimal Coping  Outcome: No Change     Problem: BADL (Basic Activity of Daily Living) Impairment (Orthopaedic Rehabilitation)  Goal: Optimal Safe BADL Performance  Outcome: No Change     Problem: Bowel Elimination Impairment (Orthopaedic Rehabilitation)  Goal: Effective Bowel  Elimination  Outcome: No Change     Problem: IADL (Instrumental Activity of Daily Living) Impairment (Orthopaedic Rehabilitation)  Goal: Optimal Safe IADL Performance  Outcome: No Change     Problem: Infection (Orthopaedic Rehabilitation)  Goal: Absence of Infection Signs/Symptoms  Outcome: No Change     Problem: Mobility Impairment (Orthopaedic Rehabilitation)  Goal: Optimal Mobility [place] and Safety  Outcome: No Change   Pt remains confused and disorientated to time and situation.   Iv antibiotics running at this time.   Pt has been sleeping well since tylenol given at 0100. No further complaints of toe/leg pain.  All due cares done and explained to the patient. Will cont to monitor and treat as needed.

## 2021-09-22 NOTE — PLAN OF CARE
Problem: Pain Acute  Goal: Acceptable Pain Control and Functional Ability  Outcome: Improving  Intervention: Develop Pain Management Plan  Recent Flowsheet Documentation  Taken 9/21/2021 2048 by Zollinger, Nancy K, RN  Pain Management Interventions:   medication (see MAR)   repositioned  Intervention: Prevent or Manage Pain  Recent Flowsheet Documentation  Taken 9/21/2021 1600 by Zollinger, Nancy K, RN  Medication Review/Management: medications reviewed   Orientation fluctuates.  Cooperative with cares.  Patient complaining of intermittent left foot pain.  Only given tylenol for pain control today.  YUKO and surgical dressing intact to left foot.  Had a small hard stool.  Milk of mag given for constipation.  Up to chair with assist x2 using tomas.

## 2021-09-22 NOTE — PROGRESS NOTES
[facility] ID Inpatient follow up       Patient:  Bernadette Yu  Date of birth 1940, Medical record number [id number]  Date of Visit:  09/22/2021  Attending Physician: Gaston Gutierrez MD         Assessment and Recommendations:   Assessment:  Bernadette Yu is a 81 year old female with  1. Left second toe osteomyelitis. Nonhealing wound on the second toe, seen in clinic by Dr. Cleveland. MRI showing osteomyelitis of the metatarsal head and the proximal and mid phalanx. Status post amputation down to the second metatarsal. Cultures finalized no growth.  Pathology in process. Patient was on cephalexin up until the time of admission.  On IV vancomycin and Zosyn since admission.  Pathology still in process  2. Mitral valve replacement. On anticoagulation  3. Diabetes. Last A1c 9       Recommendations:  1. Continue IV Vanco and Zosyn for now.  2. Follow-up pending pathology.  If margins are clear, will do a short course of antibiotic orally.  3. Pain control.  4. Drain and wound care per podiatry      Discussed with the patient, and nursing staff.      ID will follow.      Russell Funk MD.  [facility].   [facility]  Office Telephone [phone].  Fax [phone]  [facility] paging            Interval History:     HPI:  The interval history was reviewed.   Doing better today. Cultures no growth. Pathology remain in process.    Pertinent cultures include:  No results found for: CULT    Recent Inflammatory Biomarkers:   Recent Labs   Lab Test 09/21/21  0635 09/14/21  1717 08/24/21  1541 07/05/21  1354 05/06/21  1403 03/04/21  1330 07/23/20  1520 05/22/20  1230 03/26/20  1208 03/26/20  1208 10/26/19  0946 10/25/19  1756 01/15/19  1221 10/30/18  1354 07/17/18  1249 05/21/18  2222 04/06/18  1158 02/26/18  1224   CRP  --  1.0*  --   --   --   --   --  0.4  --  1.4*  --  1.5*  --  0.4  --   --   --  0.6   PCAL  --   --   --   --   --   --    --   --   --   --   --   --   --   --   --  0.12  --   --    WBC 6.8 6.8 9.4 6.6 9.3 8.4   < > 6.3   < > 7.2   < > 7.7   < > 5.9   < > 8.3   < > 4.7    < > = values in this interval not displayed.            Review of Systems:   CONSTITUTIONAL:    Temp Max: Temp (24hrs), Av.9  F (36.6  C), Min:97  F (36.1  C), Max:98.9  F (37.2  C)   .  Negative except for findings in the HPI.           Current Medications (antimicrobials listed in bold):       diltiazem ER COATED BEADS  180 mg Oral Daily     ferrous sulfate  325 mg Oral Once per day on      [Held by provider] furosemide  80 mg Oral Daily     gabapentin  300 mg Oral At Bedtime     insulin aspart  1-10 Units Subcutaneous TID AC     insulin aspart   Subcutaneous QAM AC     insulin aspart   Subcutaneous Daily with lunch     insulin aspart   Subcutaneous Daily with supper     insulin detemir  30 Units Subcutaneous QAM AC     levothyroxine  125 mcg Oral QAM AC     OLANZapine zydis  5 mg Oral At Bedtime     piperacillin-tazobactam  3.375 g Intravenous Q8H     polyethylene glycol  17 g Oral Daily     senna-docusate  1 tablet Oral BID     sodium chloride (PF)  3 mL Intracatheter Q8H     triamcinolone   Topical Daily     vancomycin  1,500 mg Intravenous Q24H     warfarin ANTICOAGULANT  5 mg Oral ONCE at 18:00              Allergies:     Allergies   Allergen Reactions     Amiodarone Other (See Comments)     TORSADES DE POINTES     Aspirin Other (See Comments)     Recurrent severe gastrointestinal bleed.            Physical Exam:   Vitals were reviewed  Patient Vitals for the past 24 hrs:   BP Temp Temp src Pulse Resp SpO2   21 0809 132/78 97.9  F (36.6  C) Oral 119 18 98 %   21 2335 136/79 98.6  F (37  C) Oral (!) 121 18 97 %   21 1532 124/74 98.3  F (36.8  C) Oral (!) 122 18 98 %       Physical Examination:  Gen: Pleasant in moderate distress secondary to pain.  HEENT: NCAT. EOMI. PERRL.  Neck: No bruit, JVD or thyromegaly.  Lungs: Clear to  ascultation bilat with no crackles or wheezes.  Card: RRR. NSR. No RMG. Peripheral pulses present and symmetric. No edema.  Abd: Soft NT ND. No mass. Normal bowel sounds.  Skin: No rash.  Extr: Surgical dressing and drain in place.  Neuro: Alert and oriented to place time and person.            Laboratory Data:   ID Labs:  Microbiology labs:  Reviewed.  Cultures no growth.    Recent Labs   Lab Test 09/14/21 1717 05/22/20  1230 03/26/20  1208 10/25/19  1756 10/30/18  1354 02/26/18  1224   CRP 1.0* 0.4 1.4* 1.5* 0.4 0.6     Recent Labs   Lab Test 09/21/21  0635 09/14/21 1717 08/24/21  1541 07/05/21  1354 05/06/21  1403 03/04/21  1330   WBC 6.8 6.8 9.4 6.6 9.3 8.4     Recent Labs   Lab Test 09/21/21 0635 09/19/21 2045 09/14/21 1717 08/24/21  1541   CR 0.92 0.87 1.00 1.22*   GFRESTIMATED 58* 62 52* 41*       Hematology Studies  Recent Labs   Lab Test 09/21/21  0635 09/14/21 1717 08/24/21  1541 07/05/21  1354 05/06/21  1403 05/06/21  1403 03/04/21  1330 03/04/21  1330   WBC 6.8 6.8 9.4 6.6  --  9.3  --  8.4   HCT 34.1* 35.2 35.7 28.0*   < > 37.0   < > 31.6*    239 182 216   < > 232   < > 207    < > = values in this interval not displayed.       Metabolic  Recent Labs   Lab Test 09/21/21 0635 09/19/21 2045 09/14/21 1717 08/24/21  1541 08/24/21  1541     --  140  --  138   BUN 13  --  17  --  22   CO2 23  --  27  --  25   CR 0.92 0.87 1.00   < > 1.22*   GFRESTIMATED 58* 62 52*   < > 41*    < > = values in this interval not displayed.       Hepatic Studies  Recent Labs   Lab Test 12/29/20  1723 11/23/20  2349 10/27/19  0434 10/25/19  1756 10/25/19  1756   BILITOTAL 0.4 0.8  --   --  0.4   ALKPHOS 76 56  --   --  70   ALBUMIN 3.6 4.4 3.2*   < > 3.9   AST 20 30  --   --  21   ALT 20 24  --   --  21    < > = values in this interval not displayed.       Immunologlobulins  Recent Labs   Lab Test 09/14/21  1717 05/22/20  1230 03/26/20  1208   SED 63* 44* 53*            Imaging Data:   Reviewed

## 2021-09-22 NOTE — PROGRESS NOTES
Care Management Follow Up    Length of Stay (days): 5    Expected Discharge Date: 09/24/2021     Concerns to be Addressed:       Patient plan of care discussed at interdisciplinary rounds: Yes    Anticipated Discharge Disposition: Transitional Care     Anticipated Discharge Services:    Anticipated Discharge DME:      Patient/family educated on Medicare website which has current facility and service quality ratings: yes  Education Provided on the Discharge Plan:    Patient/Family in Agreement with the Plan:      Referrals Placed by CM/SW:    Private pay costs discussed: Not applicable    Additional Information:  Spoke to admissions at [facility] and [facility]. Both will review and call CM back       Corine Cabral RN

## 2021-09-22 NOTE — PLAN OF CARE
Problem: Pain Acute  Goal: Acceptable Pain Control and Functional Ability  Outcome: No Change  Intervention: Develop Pain Management Plan  Recent Flowsheet Documentation  Taken 9/22/2021 0904 by Christina Chanel RN  Pain Management Interventions:   medication (see MAR)   emotional support  Intervention: Prevent or Manage Pain  Recent Flowsheet Documentation  Taken 9/22/2021 0904 by Christina Chanel RN  Medication Review/Management: medications reviewed     Problem: Risk for Delirium  Goal: Optimal Coping  Outcome: Improving     Problem: Diabetes Comorbidity  Goal: Blood Glucose Level Within Targeted Range  Outcome: Adequate for Discharge       Pt required Tylenol for left foot pain this morning and had improvement.  Only ordered cheerios and coffee for breakfast, but ate 100%.  Pt remains NWB on left foot.  Pt's attention improved, but remains confused to time and situation.  BG this morning 106,

## 2021-09-22 NOTE — PROGRESS NOTES
[facility]    PROGRESS NOTE - Hospitalist Service    Assessment and Plan  Patient is new to me, Bernadette Yu is a 81 year old female with a past medical history of hypertension, diabetes, mitral valve replacement, paroxysmal atrial fibrillation, CKD and chronic pain syndrome admitted in the podiatry service for left foot osteomyelitis. [facility] was consulted for medical management.     Left foot osteomyelitis:   - Osteomyelitis of 2nd digit left foot and 2nd metatarsal left foot. s/p amputation of 2nd digit left foot, partial amputation 2nd metatarsal left foot and incision and drainage.   On 9/17  - Continue zosyn and Vancomycin given history of MRSA. ID was consulted. Further antibiotics per ID.  --Follow-up with Dr. Cleveland in 10 days starting from 9/17.  - Pain control --continue oxycodone for the same  - Follow up intra op culture result and pathology.  No anaerobic organisms isolated after 1 day.    - PT/OT. NWB of left foot     Insulin-dependent diabetes, type II:   - Controlled after increasing his Levemir to 30 units   - Uncontrolled at home with recent hemoglobin A1c 9.0.    - PTA Novolog 70/30  30 units qam and 15 units qpm.   - Continue Levemir to 30 units and continue Novolog: CHO ratio to 1:5  - continue diabetic diet 60 gm.     Acute metabolic encephalopathy with delirium  -Secondary to the above.   - Improving.  But did have rapid response on 9/20 due to decreased responsiveness.    - Did respond to Narcan x2.  Patient is more awake today.  - Negative CT head without contrast for stroke.  - No bladder scan recorded   - Nurse claims that patient had a bowel movement yesterday  - Was given scheduled Seroquel 25 mg q. 6 hours x 4 doses on 9/19  - DC restraints    Hypokalemia   - mild  - Order potassium replacement     Left calf swelling  - Duplex ultrasound done on 9/18 is negative for DVT  - INR is subtherapeutic continue Coumadin  As per pharmacy     Essential hypertension:   -  Blood pressure controlled.      - Hold Lasix for now due to poor p.o. intake     Mechanical mitral valve:   - Chronically anticoagulated on Coumadin.      - Continue Coumadin  - INR is near therapeutic range.  Goal of 2.5-3.5 due to mechanical mitral valve   will hold off on bridging anticoagulation given risk of bleeding from the recent amputation.   Hemoglobin is stable.  - Negative CT head for CVA as above    Weakness and deconditioning  - PT/OT evaluation  - patient needs TCU on discharge     Active Problems:    Diabetes mellitus (H)    Osteomyelitis of ankle and foot (H)      VTE prophylaxis:  Warfarin  DIET: Orders Placed This Encounter      Consistent Carb 60 grams CHO per Meal Diet      Disposition/Barriers to discharge: IV antibiotic, pending culture  Code Status: Full Code    Subjective:  Bernadette is feeling better today, still has mild leg pain. Denies any chest pain or shortness of breath. More awake and alert today.    PHYSICAL EXAM  Vitals:    09/17/21 1450   Weight: 71 kg (156 lb 9.6 oz)     B/P:132/78 T:97.9 P:119 R:18     Intake/Output Summary (Last 24 hours) at 9/22/2021 1523  Last data filed at 9/22/2021 1000  Gross per 24 hour   Intake 560 ml   Output --   Net 560 ml      Body mass index is 30.58 kg/m .    Constitutional: awake, alert, cooperative, no apparent distress, and appears stated age  Eyes: Lids and lashes normal, pupils equal, round and reactive to light, extra ocular muscles intact, sclera clear, conjunctiva normal  ENT: Normocephalic, without obvious abnormality, atraumatic, sinuses nontender on palpation, external ears without lesions, oral pharynx with moist mucous membranes, tonsils without erythema or exudates, gums normal and good dentition.  Respiratory: No increased work of breathing, good air exchange, clear to auscultation bilaterally, no crackles or wheezing  Cardiovascular: Normal apical impulse, regular rate and rhythm, normal S1 and S2, no S3 or S4, and no murmur noted  GI:  No scars, normal bowel sounds, soft, non-distended, non-tender, no masses palpated, no hepatosplenomegally  Skin: no bruising or bleeding and normal skin color, texture, turgor  Musculoskeletal: Left foot incision wound covered with dressing.  Neurologic: Awake, alert, oriented to name, place only.  Cranial nerves II-XII are grossly intact.  Motor is 5 out of 5 bilaterally.   Sensory is intact.    Neuropsychiatric: Appropriate with examiner      PERTINENT LABS/IMAGING:  Recent Labs   Lab 09/22/21  1226 09/22/21  0830 09/22/21  0611 09/22/21  0022 09/21/21  2109 09/21/21  1707 09/21/21  1630 09/21/21  0653 09/21/21  0635 09/20/21  0901 09/20/21  0811 09/19/21  2125 09/19/21  2045 09/19/21  0823 09/19/21  0655   WBC  --   --   --   --   --   --   --   --  6.8  --   --   --   --   --   --    HGB  --   --   --   --   --   --   --   --  10.2*  --   --   --   --   --  9.9*   MCV  --   --   --   --   --   --   --   --  86  --   --   --   --   --   --    PLT  --   --   --   --   --   --   --   --  179  --   --   --   --   --   --    INR  --   --  2.62*  --   --   --   --   --  2.45*  --  1.79*  --   --    < > 1.57*   NA  --   --   --   --   --   --   --   --  142  --   --   --   --   --   --    POTASSIUM  --   --   --  3.9  --   --  3.4*  --  3.2*  --   --   --   --   --   --    CHLORIDE  --   --   --   --   --   --   --   --  111*  --   --   --   --   --   --    CO2  --   --   --   --   --   --   --   --  23  --   --   --   --   --   --    BUN  --   --   --   --   --   --   --   --  13  --   --   --   --   --   --    CR  --   --   --   --   --   --   --   --  0.92  --   --   --  0.87  --   --    ANIONGAP  --   --   --   --   --   --   --   --  8  --   --   --   --   --   --    DI  --   --   --   --   --   --   --   --  8.4*  --   --   --   --   --   --    * 106*  --   --  146*   < >  --    < > 182*   < >  --    < >  --    < >  --     < > = values in this interval not displayed.     Recent Results (from the past 24  hour(s))   CT Head w/o Contrast    Narrative    EXAM: CT HEAD W/O CONTRAST  LOCATION: [facility]  DATE/TIME: 9/21/2021 10:08 PM    INDICATION: Cerebral hemorrhage suspected, increased confusion after surgery.  COMPARISON: 11/23/2020  TECHNIQUE: Routine CT Head without IV contrast. Multiplanar reformats. Dose reduction techniques were used.    FINDINGS:  INTRACRANIAL CONTENTS: No intracranial hemorrhage, extraaxial collection, or mass effect. No CT evidence of acute infarct. Mild to moderate volume loss and mild burden presumed chronic small vessel ischemia. Focal encephalomalacia along the right lateral   mid temporal lobe again noted.    VISUALIZED ORBITS/SINUSES/MASTOIDS: No intraorbital abnormality. Chronic opacification of the right maxillary sinus again noted. No middle ear or mastoid effusion.    BONES/SOFT TISSUES: No acute abnormality.      Impression    IMPRESSION:  1.  No acute intracranial abnormality or significant change compared to 11/23/2020.       Discussed with patient, family, ID, nursing staff and discharge planner    Wilfredo Cohen MD  [facility]  [phone]

## 2021-09-23 NOTE — PROGRESS NOTES
Care Management Follow Up    Length of Stay (days): 6    Expected Discharge Date: 09/24/2021     Concerns to be Addressed:     Medical mgmt  Patient plan of care discussed at interdisciplinary rounds: Yes    Anticipated Discharge Disposition: Transitional Care     Anticipated Discharge Services:  Therapy at TCU  Anticipated Discharge DME:      Patient/family educated on Medicare website which has current facility and service quality ratings: yes  Education Provided on the Discharge Plan:  Yes, ongoing  Patient/Family in Agreement with the Plan:  Yes, ongoing    Referrals Placed by CM/SW:  TCUs  Private pay costs discussed: Not applicable    Additional Information:  Admissions at [facility] reviewing pt.     JACKIE Kumar

## 2021-09-23 NOTE — PROGRESS NOTES
[facility] ID Inpatient follow up       Patient:  Bernadette Yu  Date of birth 1940, Medical record number [id number]  Date of Visit:  09/23/2021  Attending Physician: Gaston Gutierrez MD         Assessment and Recommendations:   Assessment:  Bernadette Yu is a 81 year old female with  1. Left second toe osteomyelitis. Nonhealing wound on the second toe, seen in clinic by Dr. Cleveland. MRI showing osteomyelitis of the metatarsal head and the proximal and mid phalanx. Status post amputation down to the second metatarsal. Cultures finalized no growth.  Pathology in process. Patient was on cephalexin up until the time of admission.  On IV vancomycin and Zosyn since admission.  Pathology still in process. I personally called the path lab on 09/23/21. Specimen still being processed. May get result today 09/23/21  2. Mitral valve replacement. On anticoagulation  3. Diabetes. Last A1c 9       Recommendations:  1. Continue IV Vanco and Zosyn for now.  2. Follow-up pending pathology. Path to call me when result are available.   3. Pain control.  4. Drain and wound care per podiatry      Discussed with the patient, and nursing staff.      ID will follow.      Russell Funk MD.  [facility].   [facility] ID Clinic  Office Telephone [phone].  Fax [phone]  [facility] paging            Interval History:     HPI:  The interval history was reviewed.   Doing better today. Cultures no growth. Pathology remain in process.  I called pathology lab today.  Result may be back later today.    Pertinent cultures include:  No results found for: CULT    Recent Inflammatory Biomarkers:   Recent Labs   Lab Test 09/23/21  0633 09/21/21  0635 09/14/21  1717 08/24/21  1541 07/05/21  1354 05/06/21  1403 07/23/20  1520 05/22/20  1230 03/26/20  1208 03/26/20  1208 10/26/19  0946 10/25/19  1756 01/15/19  1221 10/30/18  1354 07/17/18  1249 05/21/18  2222  18  1158 18  1224   CRP  --   --  1.0*  --   --   --   --  0.4  --  1.4*  --  1.5*  --  0.4  --   --   --  0.6   PCAL  --   --   --   --   --   --   --   --   --   --   --   --   --   --   --  0.12  --   --    WBC 6.5 6.8 6.8 9.4 6.6 9.3   < > 6.3   < > 7.2   < > 7.7   < > 5.9   < > 8.3   < > 4.7    < > = values in this interval not displayed.            Review of Systems:   CONSTITUTIONAL:    Temp Max: Temp (24hrs), Av.9  F (36.6  C), Min:97  F (36.1  C), Max:98.9  F (37.2  C)   .  Negative except for findings in the HPI.           Current Medications (antimicrobials listed in bold):       diltiazem ER COATED BEADS  180 mg Oral Daily     ferrous sulfate  325 mg Oral Once per day on      [Held by provider] furosemide  80 mg Oral Daily     gabapentin  300 mg Oral At Bedtime     insulin aspart  1-10 Units Subcutaneous TID AC     insulin aspart   Subcutaneous QAM AC     insulin aspart   Subcutaneous Daily with lunch     insulin aspart   Subcutaneous Daily with supper     insulin detemir  30 Units Subcutaneous QAM AC     levothyroxine  125 mcg Oral QAM AC     OLANZapine zydis  5 mg Oral At Bedtime     piperacillin-tazobactam  3.375 g Intravenous Q8H     polyethylene glycol  17 g Oral Daily     rosuvastatin  20 mg Oral QPM     senna-docusate  1 tablet Oral BID     sodium chloride (PF)  3 mL Intracatheter Q8H     triamcinolone   Topical Daily     vancomycin  1,500 mg Intravenous Q24H              Allergies:     Allergies   Allergen Reactions     Amiodarone Other (See Comments)     TORSADES DE POINTES     Aspirin Other (See Comments)     Recurrent severe gastrointestinal bleed.            Physical Exam:   Vitals were reviewed  Patient Vitals for the past 24 hrs:   BP Temp Temp src Pulse Resp SpO2   21 0734 123/63 98.1  F (36.7  C) Oral 119 18 96 %   21 0028 (!) 141/82 98.6  F (37  C) Axillary 116 18 96 %   21 1533 125/73 98.2  F (36.8  C) Oral 120 20 97 %       Physical  Examination:  Gen: Pleasant in moderate distress secondary to pain.  HEENT: NCAT. EOMI. PERRL.  Neck: No bruit, JVD or thyromegaly.  Lungs: Clear to ascultation bilat with no crackles or wheezes.  Card: RRR. NSR. No RMG. Peripheral pulses present and symmetric. No edema.  Abd: Soft NT ND. No mass. Normal bowel sounds.  Skin: No rash.  Extr: Surgical dressing and drain in place.  Neuro: Alert and oriented to place time and person.            Laboratory Data:   ID Labs:  Microbiology labs:  Reviewed.  Cultures no growth.    Recent Labs   Lab Test 09/14/21  1717 05/22/20  1230 03/26/20  1208 10/25/19  1756 10/30/18  1354 02/26/18  1224   CRP 1.0* 0.4 1.4* 1.5* 0.4 0.6     Recent Labs   Lab Test 09/23/21  0633 09/21/21  0635 09/14/21 1717 08/24/21  1541 07/05/21  1354 05/06/21  1403   WBC 6.5 6.8 6.8 9.4 6.6 9.3     Recent Labs   Lab Test 09/23/21  0633 09/21/21  0635 09/19/21 2045 09/14/21 1717   CR 0.82  0.82 0.92 0.87 1.00   GFRESTIMATED 66  66 58* 62 52*       Hematology Studies  Recent Labs   Lab Test 09/23/21  0633 09/21/21  0635 09/14/21 1717 08/24/21  1541 07/05/21  1354 07/05/21  1354 05/06/21  1403 05/06/21  1403   WBC 6.5 6.8 6.8 9.4  --  6.6  --  9.3   HCT 37.6 34.1* 35.2 35.7   < > 28.0*   < > 37.0    179 239 182   < > 216   < > 232    < > = values in this interval not displayed.       Metabolic  Recent Labs   Lab Test 09/23/21  0633 09/21/21  0635 09/19/21 2045 09/14/21 1717 09/14/21  1717    142  --   --  140   BUN 13 13  --   --  17   CO2 23 23  --   --  27   CR 0.82  0.82 0.92 0.87   < > 1.00   GFRESTIMATED 66  66 58* 62   < > 52*    < > = values in this interval not displayed.       Hepatic Studies  Recent Labs   Lab Test 09/23/21  0633 12/29/20  1723 11/23/20  2349   BILITOTAL 0.5 0.4 0.8   ALKPHOS 52 76 56   ALBUMIN 3.1* 3.6 4.4   AST 18 20 30   ALT <9 20 24       Immunologlobulins  Recent Labs   Lab Test 09/14/21  1717 05/22/20  1230 03/26/20  1208   SED 63* 44* 53*             Imaging Data:   Reviewed

## 2021-09-23 NOTE — PROGRESS NOTES
"SLUMS    The UMS ([facility] Mental Status Exam) is a 30-point standardized cognitive screen used to identify the presence of cognitive deficits and/or to identify a change in cognition over time.  This screen assesses cognitive abilities in various domains.  [email or web address]    Patient's performance was as follows:    Introduction: Pt oriented to self, age 81, graduated high school    Orientation and Attention: 2/3     Calculation and Registration: 0/3     Category Naming with Time Contraint: 2/3    Memory Delayed Recall with Interference: 0/5    Registration and Digit Span: 0/2    Clock drawin/4    Visual Spatial: 2/2     Story Recall with Executive Function: 0/8     Total Score: 6/30           Scoring If High School Educated If Less than High School Educated   Normal 27-30 25-30   Mild Neurocognitive Disorder 21-26 20-24   Dementia 1-20 1-19       Score Interpretation: Score places patient in the dementia category.  Patient demonstrates deficits in the above areas. Pt required multiple repetitions throughout assessment to ensure adequate understanding of assessment. Pt required encouragement to participate with difficult questions and did show some internal motivation to \"remember answers for next time\" after incorrectly answered questions. Pt reported throughout assessment that she couldn't answer because she \"wasn't paying attention\" and became slightly agitated when discussing results and recommendations.  Please note that this examination is used to screen individuals to look for the presence of cognitive deficits and to identify changes in cognition over time.  This is not a diagnosis.  This examination can be followed by further cognitive assessments if appropriate and deemed necessary.      Total Time with Patient: 18 minutes    NATASHA Bowie on 2021      "

## 2021-09-23 NOTE — PROGRESS NOTES
[facility]    PROGRESS NOTE - Hospitalist Service    Assessment and Plan    Bernadette Yu is a 81 year old female with a past medical history of hypertension, diabetes, mitral valve replacement, paroxysmal atrial fibrillation, CKD and chronic pain syndrome admitted in the podiatry service for left foot osteomyelitis. [facility] was consulted for medical management.     Left foot osteomyelitis:   - Osteomyelitis of 2nd digit left foot and 2nd metatarsal left foot. s/p amputation of 2nd digit left foot, partial amputation 2nd metatarsal left foot and incision and drainage.   On 9/17  - Continue zosyn and Vancomycin given history of MRSA. ID was consulted. Further antibiotics per ID.  --Follow-up with Dr. Cleveland in 10 days starting from 9/17.  - Pain control --continue oxycodone for the same  - Follow up intra op culture result and pathology.  No anaerobic organisms isolated after 1 day.    - PT/OT. NWB of left foot     Insulin-dependent diabetes, type II:   - Controlled after increasing his Levemir to 30 units   - Uncontrolled at home with recent hemoglobin A1c 9.0.    - PTA Novolog 70/30  30 units qam and 15 units qpm.   - Continue Levemir to 30 units and continue Novolog: CHO ratio to 1:5  - continue diabetic diet 60 gm.     Acute metabolic encephalopathy with delirium  -Secondary to the above.   - Improving.  But did have rapid response on 9/20 due to decreased responsiveness.    - Did respond to Narcan x2.  Patient is more awake today.  - Negative CT head without contrast for stroke.  - No bladder scan recorded   - Nurse claims that patient had a bowel movement yesterday  - Was given scheduled Seroquel 25 mg q. 6 hours x 4 doses on 9/19  - DC restraints     Hypokalemia   - mild  - Order potassium replacement     Left calf swelling  - Duplex ultrasound done on 9/18 is negative for DVT  - INR is subtherapeutic continue Coumadin  As per pharmacy     Essential hypertension:   - Blood pressure  controlled.      - Hold Lasix for now due to poor p.o. intake     Mechanical mitral valve:   - Chronically anticoagulated on Coumadin.      - Continue Coumadin  - INR is near therapeutic range.  Goal of 2.5-3.5 due to mechanical mitral valve   will hold off on bridging anticoagulation given risk of bleeding from the recent amputation.   Hemoglobin is stable.  - Negative CT head for CVA as above     Weakness and deconditioning  - PT/OT evaluation  - patient needs TCU on discharge    Active Problems:    Diabetes mellitus (H)    Osteomyelitis of ankle and foot (H)      VTE prophylaxis:  Warfarin  DIET: Orders Placed This Encounter      Consistent Carb 60 grams CHO per Meal Diet      Disposition/Barriers to discharge: IV antibiotic, pending culture  Code Status: Full Code    Subjective:  Bernadette is feeling better today, still has mild leg pain. Denies any chest pain or shortness of breath.  No fever chills overnight.    PHYSICAL EXAM  Vitals:    09/17/21 1450   Weight: 71 kg (156 lb 9.6 oz)     B/P:129/80 T:98.5 P:123 R:20     Intake/Output Summary (Last 24 hours) at 9/23/2021 1707  Last data filed at 9/23/2021 1200  Gross per 24 hour   Intake 680 ml   Output 300 ml   Net 380 ml      Body mass index is 30.58 kg/m .    Constitutional: awake, alert, cooperative, no apparent distress, and appears stated age  Eyes: Lids and lashes normal, pupils equal, round and reactive to light, extra ocular muscles intact, sclera clear, conjunctiva normal  ENT: Normocephalic, without obvious abnormality, atraumatic, sinuses nontender on palpation, external ears without lesions, oral pharynx with moist mucous membranes, tonsils without erythema or exudates, gums normal and good dentition.  Respiratory: No increased work of breathing, good air exchange, clear to auscultation bilaterally, no crackles or wheezing  Cardiovascular: Normal apical impulse, regular rate and rhythm, normal S1 and S2, no S3 or S4, and no murmur noted  GI: No scars,  normal bowel sounds, soft, non-distended, non-tender, no masses palpated, no hepatosplenomegally  Skin: no bruising or bleeding and normal skin color, texture, turgor  Musculoskeletal: Left foot incision wound covered with dressing  Neurologic: Awake, alert, oriented to name, place and time.  Cranial nerves II-XII are grossly intact.  Motor is 5 out of 5 bilaterally.   Sensory is intact.    Neuropsychiatric: Appropriate with examiner      PERTINENT LABS/IMAGING:  Recent Labs   Lab 09/23/21  1650 09/23/21  1324 09/23/21  0733 09/23/21  0633 09/22/21  2113 09/22/21  0830 09/22/21  0611 09/22/21  0022 09/21/21  1707 09/21/21  1630 09/21/21  0653 09/21/21  0635 09/19/21  2125 09/19/21  2045 09/19/21  0823 09/19/21  0655   0000   WBC  --   --   --  6.5  --   --   --   --   --   --   --  6.8  --   --   --   --   --    HGB  --   --   --  11.3*  --   --   --   --   --   --   --  10.2*  --   --   --  9.9*  --    MCV  --   --   --  87  --   --   --   --   --   --   --  86  --   --   --   --   --    PLT  --   --   --  173  --   --   --   --   --   --   --  179  --   --   --   --   --    INR  --   --   --  3.19*  --   --  2.62*  --   --   --   --  2.45*   < >  --   --  1.57*  --    NA  --   --   --  143  --   --   --   --   --   --   --  142  --   --   --   --   --    POTASSIUM  --   --   --  3.8  --   --   --  3.9  --  3.4*   < > 3.2*  --   --   --   --   --    CHLORIDE  --   --   --  114*  --   --   --   --   --   --   --  111*  --   --   --   --   --    CO2  --   --   --  23  --   --   --   --   --   --   --  23  --   --   --   --   --    BUN  --   --   --  13  --   --   --   --   --   --   --  13  --   --   --   --   --    CR  --   --   --  0.82  0.82  --   --   --   --   --   --   --  0.92  --  0.87  --   --   --    ANIONGAP  --   --   --  6  --   --   --   --   --   --   --  8  --   --   --   --   --    DI  --   --   --  9.2  --   --   --   --   --   --   --  8.4*  --   --   --   --   --    * 171* 110* 109   < >    < >  --   --    < >  --    < > 182*   < >  --    < >  --    < >   ALBUMIN  --   --   --  3.1*  --   --   --   --   --   --   --   --   --   --   --   --   --    PROTTOTAL  --   --   --  7.6  --   --   --   --   --   --   --   --   --   --   --   --   --    BILITOTAL  --   --   --  0.5  --   --   --   --   --   --   --   --   --   --   --   --   --    ALKPHOS  --   --   --  52  --   --   --   --   --   --   --   --   --   --   --   --   --    ALT  --   --   --  <9  --   --   --   --   --   --   --   --   --   --   --   --   --    AST  --   --   --  18  --   --   --   --   --   --   --   --   --   --   --   --   --     < > = values in this interval not displayed.     No results found for this or any previous visit (from the past 24 hour(s)).    Discussed with patient, family,ID, nursing staff and discharge planner    Wilfredo Cohen MD  [facility] Medicine Service  [phone]

## 2021-09-23 NOTE — PLAN OF CARE
Problem: Risk for Delirium  Goal: Optimal Coping  Outcome: Improving  Goal: Improved Attention and Thought Clarity  Outcome: Improving  Goal: Improved Sleep  Outcome: Improving     Problem: Pain Acute  Goal: Acceptable Pain Control and Functional Ability  Outcome: Improving  Intervention: Develop Pain Management Plan  Recent Flowsheet Documentation  Taken 9/23/2021 0853 by Chrisitna Chanel, RN  Pain Management Interventions: medication (see MAR)  Intervention: Prevent or Manage Pain  Recent Flowsheet Documentation  Taken 9/23/2021 0853 by Christina Chanel RN  Medication Review/Management: medications reviewed       Pt remains intermittent confused to situation and time, but improved from yesterday.  Calmand cooperative to cares.  Pain improved from yesterday and has used Tylenol x 1 this shift.  Napping between cares and therapy today.  Blood surgar this morning was 110

## 2021-09-23 NOTE — PLAN OF CARE
Problem: Pain Acute  Goal: Acceptable Pain Control and Functional Ability  Outcome: Improving  Intervention: Develop Pain Management Plan  Recent Flowsheet Documentation  Taken 9/23/2021 1820 by Lina Jack RN  Pain Management Interventions: medication (see MAR)  Taken 9/23/2021 1700 by Lina Jack RN  Pain Management Interventions: declines  Intervention: Prevent or Manage Pain  Recent Flowsheet Documentation  Taken 9/23/2021 1700 by Lina Jack RN  Medication Review/Management: medications reviewed   Pain Left foot when sitting up in the chair- Elevated, Tylenol.      Problem: Mobility Impairment (Orthopaedic Rehabilitation)  Goal: Optimal Mobility [place] and Safety  Outcome: Improving   Total Assist, 2-3 [place]    Lina Jack, RN

## 2021-09-23 NOTE — PLAN OF CARE
Problem: Pain Acute  Goal: Acceptable Pain Control and Functional Ability  Outcome: Improving   Patient given prn tylenol X1 on this shift.  Patient reported relief from medication intervention.

## 2021-09-23 NOTE — PROGRESS NOTES
Received call from Sylvia in Admissions at [facility]. She states pt is clinically ok for acceptance, but will need to clarify abx plan prior to full acceptance.    Call placed to pt's spouse as he was not in pt's room. No answer.  Message left requesting return call.

## 2021-09-24 NOTE — PROGRESS NOTES
[facility] Inpatient follow up       Patient:  Bernadette Yu  Date of birth 1940, Medical record number [id number]  Date of Visit:  09/24/2021  Attending Physician: Gaston Gutierrez MD         Assessment and Recommendations:   Assessment:  Bernadette Yu is a 81 year old female with  1. Left second toe osteomyelitis. Nonhealing wound on the second toe, seen in clinic by Dr. Cleveland on 9/17/2021. MRI showing osteomyelitis of the metatarsal head and the proximal and mid phalanx. Status post amputation down to the second metatarsal. Cultures finalized no growth. Patient was on cephalexin up until the time of admission.  On IV vancomycin and Zosyn since admission.  Pathology with clean margins.  2. Mitral valve replacement. On anticoagulation  3. Diabetes. Last A1c 9       Recommendations:  1. Continue IV Vanco and Zosyn until 9/27/2021 for a total of 10 days of antibiotic postop.  2. Can be changed to p.o. Augmentin plus doxycycline if discharged sooner than 9/27/2021.  3. Drain and wound care per podiatry      Discussed with the patient, Dr. Cleveland, and nursing staff.      ID will sign off.      Russell Funk MD.  [facility].   [facility]  Office Telephone [phone].  Fax [phone]  [facility] paging            Interval History:     HPI:  The interval history was reviewed.   Doing better today.  Pathology back.  Margins negative.    Pertinent cultures include:  No results found for: CULT    Recent Inflammatory Biomarkers:   Recent Labs   Lab Test 09/23/21  0633 09/21/21  0635 09/14/21  1717 08/24/21  1541 07/05/21  1354 05/06/21  1403 07/23/20  1520 05/22/20  1230 03/26/20  1208 03/26/20  1208 10/26/19  0946 10/25/19  1756 01/15/19  1221 10/30/18  1354 07/17/18  1249 05/21/18  2222 04/06/18  1158 02/26/18  1224   CRP  --   --  1.0*  --   --   --   --  0.4  --  1.4*  --  1.5*  --  0.4  --   --   --  0.6   PCAL  --   --   --    --   --   --   --   --   --   --   --   --   --   --   --  0.12  --   --    WBC 6.5 6.8 6.8 9.4 6.6 9.3   < > 6.3   < > 7.2   < > 7.7   < > 5.9   < > 8.3   < > 4.7    < > = values in this interval not displayed.            Review of Systems:   CONSTITUTIONAL:    Temp Max: Temp (24hrs), Av.9  F (36.6  C), Min:97  F (36.1  C), Max:98.9  F (37.2  C)   .  Negative except for findings in the HPI.           Current Medications (antimicrobials listed in bold):       diltiazem ER COATED BEADS  180 mg Oral Daily     ferrous sulfate  325 mg Oral Once per day on      furosemide  80 mg Oral Daily     gabapentin  300 mg Oral At Bedtime     insulin aspart  1-10 Units Subcutaneous TID AC     insulin aspart   Subcutaneous QAM AC     insulin aspart   Subcutaneous Daily with lunch     insulin aspart   Subcutaneous Daily with supper     insulin detemir  30 Units Subcutaneous QAM AC     levothyroxine  125 mcg Oral QAM AC     OLANZapine zydis  5 mg Oral At Bedtime     piperacillin-tazobactam  3.375 g Intravenous Q8H     polyethylene glycol  17 g Oral Daily     rosuvastatin  20 mg Oral QPM     senna-docusate  1 tablet Oral BID     sodium chloride (PF)  3 mL Intracatheter Q8H     triamcinolone   Topical Daily     vancomycin  1,500 mg Intravenous Q24H              Allergies:     Allergies   Allergen Reactions     Amiodarone Other (See Comments)     TORSADES DE POINTES     Aspirin Other (See Comments)     Recurrent severe gastrointestinal bleed.            Physical Exam:   Vitals were reviewed  Patient Vitals for the past 24 hrs:   BP Temp Temp src Pulse Resp SpO2   21 0713 (!) 145/90 97.9  F (36.6  C) Oral (!) 121 18 97 %   21 2325 (!) 156/82 98.6  F (37  C) Oral 116 20 94 %   21 2201 -- -- -- (!) 121 -- 96 %   21 1535 129/80 98.5  F (36.9  C) Oral (!) 123 20 98 %       Physical Examination:  Gen: Pleasant in moderate distress secondary to pain.  HEENT: NCAT. EOMI. PERRL.  Neck: No bruit, JVD or  thyromegaly.  Lungs: Clear to ascultation bilat with no crackles or wheezes.  Card: RRR. NSR. No RMG. Peripheral pulses present and symmetric. No edema.  Abd: Soft NT ND. No mass. Normal bowel sounds.  Skin: No rash.  Extr: Surgical dressing and drain in place.  Neuro: Alert and oriented to place time and person.            Laboratory Data:   ID Labs:  Microbiology labs:  Reviewed.  Cultures no growth.    Recent Labs   Lab Test 09/14/21  1717 05/22/20  1230 03/26/20  1208 10/25/19  1756 10/30/18  1354 02/26/18  1224   CRP 1.0* 0.4 1.4* 1.5* 0.4 0.6     Recent Labs   Lab Test 09/23/21  0633 09/21/21  0635 09/14/21 1717 08/24/21  1541 07/05/21  1354 05/06/21  1403   WBC 6.5 6.8 6.8 9.4 6.6 9.3     Recent Labs   Lab Test 09/23/21  0633 09/21/21  0635 09/19/21 2045 09/14/21 1717   CR 0.82  0.82 0.92 0.87 1.00   GFRESTIMATED 66  66 58* 62 52*       Hematology Studies  Recent Labs   Lab Test 09/23/21  0633 09/21/21  0635 09/14/21 1717 08/24/21  1541 07/05/21  1354 07/05/21  1354 05/06/21  1403 05/06/21  1403   WBC 6.5 6.8 6.8 9.4  --  6.6  --  9.3   HCT 37.6 34.1* 35.2 35.7   < > 28.0*   < > 37.0    179 239 182   < > 216   < > 232    < > = values in this interval not displayed.       Metabolic  Recent Labs   Lab Test 09/23/21  0633 09/21/21  0635 09/19/21 2045 09/14/21 1717 09/14/21 1717    142  --   --  140   BUN 13 13  --   --  17   CO2 23 23  --   --  27   CR 0.82  0.82 0.92 0.87   < > 1.00   GFRESTIMATED 66  66 58* 62   < > 52*    < > = values in this interval not displayed.       Hepatic Studies  Recent Labs   Lab Test 09/23/21  0633 12/29/20  1723 11/23/20  2349   BILITOTAL 0.5 0.4 0.8   ALKPHOS 52 76 56   ALBUMIN 3.1* 3.6 4.4   AST 18 20 30   ALT <9 20 24       Immunologlobulins  Recent Labs   Lab Test 09/14/21  1717 05/22/20  1230 03/26/20  1208   SED 63* 44* 53*            Imaging Data:   Reviewed     Pathology:  Final Diagnosis   A. LEFT SECOND TOE, AMPUTATION:   1. GANGRENOUS NECROSIS  WITH ULCERATION AND DEEP SOFT TISSUE ABSCESS FORMATION   2. UNDERLYING ACUTE OSTEOMYELITIS, PROXIMAL AND MIDDLE PHALANX; ACUTE OSTEOMYELITIS PRESENT AT PROXIMAL BONE MARGIN  3. GANGRENOUS SOFT TISSUE PRESENT AT PROXIMAL INKED SURGICAL MARGIN  4. ADJACENT MODERATE CHRONIC DERMATITIS      B. SECOND METATARSAL, LEFT FOOT, EXCISION:   1. PATCHY ACUTE OSTEOMYELITIS, MID AND DISTAL METATARSAL   2. NO EVIDENCE OF OSTEOMYELITIS AT INKED PROXIMAL TRANSECTION MARGIN  3. MODERATE ADJACENT BONE REMODELING  4. FOCAL ABSCESS, PERIOSTEAL SOFT TISSUE

## 2021-09-24 NOTE — PLAN OF CARE
Problem: Risk for Delirium  Goal: Improved Behavioral Control  Outcome: Improving  Pt moderately confused this am.  Thought she heard her brother in the hallway.  Pt has tried multiple times to get out of bed or chair unassisted.  Does not remember weight bearing status.     Problem: Risk for Delirium  Goal: Improved Sleep  Outcome: Improving  Slept off and on all night.     Problem: Pain Acute  Goal: Acceptable Pain Control and Functional Ability  Intervention: Develop Pain Management Plan  Recent Flowsheet Documentation  Taken 9/24/2021 0128 by Deena Vela RN  Pain Management Interventions: medication (see MAR)   Pt c/o back pain this shift.  Tylenol given with good effect.     Problem: Adult Inpatient Plan of Care  Goal: Absence of Hospital-Acquired Illness or Injury  Intervention: Identify and Manage Fall Risk  Recent Flowsheet Documentation  Taken 9/24/2021 0000 by Deena Vela RN  Safety Promotion/Fall Prevention:    bed alarm on    increased rounding and observation    safety round/check completed

## 2021-09-24 NOTE — PROGRESS NOTES
Care Management Follow Up    Length of Stay (days): 7    Expected Discharge Date: 09/24/2021     Concerns to be Addressed:     discharge planning  Patient plan of care discussed at interdisciplinary rounds: Yes    Anticipated Discharge Disposition: Transitional Care     Anticipated Discharge Services:  TCU  Anticipated Discharge DME:  none    Patient/family educated on Medicare website which has current facility and service quality ratings: yes  Education Provided on the Discharge Plan:    Patient/Family in Agreement with the Plan:      Referrals Placed by CM/SW:  TCU  Private pay costs discussed: not at this time    Additional Information:  Per Kim at [facility], they are able to accept pt on Sunday rather than tomorrow as initially indicated. She also states there was one case of Covid in the building so they are not allowing visitors for the next 2 wks. Confirmed plan for pt to transition to oral abx prior to discharge.    Spoke with spouse and sisterAnnia regarding discharge planning and provided update on TCU responses. Spouse deferred all conversations to Annia. After several conversations Annia directed writer to call Riley diaz.    Met with pt to discuss discharge planned. She is very opposed to TCU placement and states her family can provide cares for her at home.    Call placed to sonRiley. He states he should be primary contact since pt's spouse has dementia.  He states after last hospitalization pt had home care services.  He voices his concerns about Covid and current issue with visitor restrictions at [facility].  He states he wants to contact his brother and have a family conference this evening to discuss discharge plans.  He will provide update to Care Manager tomorrow on whether they want to continue to pursue TCU vs home care services.  He does not have a home care agency preference.    Hallie Drew RN

## 2021-09-24 NOTE — PROGRESS NOTES
Clinic Care Coordination Contact    Situation: Patient chart reviewed by care coordinator.    Background: Talked to daughter in law a week ago prior to surgery for toe amputation.     Assessment: Call from son and daughter in law. Patient has been hospitalized for past week [facility]  They called [facility] today and learned that there is a positive covid person in the unit she is to enter. They are concerned about her being exposed to covid and that they will have limited visiting during covid restrictions.  They have left a message for hospital SW already.  Reviewed options of trying to locate a different TCU, but there are risks of covid in any facility. They wonder if they would be able to take her home and get home care in place.      Plan/Recommendations: Directed them to work with SW at hospital who can assist them.  Appreciated to help.  Will call TCU to follow when in TCU.  If discharged to home, call patient at that time to assess needs.     Indy Lopez,   [facility]  [phone]

## 2021-09-24 NOTE — PHARMACY-VANCOMYCIN DOSING SERVICE
Pharmacy Vancomycin Note  Date of Service 2021  Patient's  1940   81 year old, female    Indication: Osteomyelitis and Skin and Soft Tissue Infection  Day of Therapy: 7  Current vancomycin regimen:  1500 mg IV q24h  Current vancomycin monitoring method: AUC  Current vancomycin therapeutic monitoring goal: 400-600 mg*h/L    Current estimated CrCl = Estimated Creatinine Clearance: 45.7 mL/min (based on SCr of 0.82 mg/dL).    Creatinine for last 3 days  2021:  6:35 AM Creatinine 0.92 mg/dL  2021:  6:33 AM Creatinine 0.82 mg/dL;  6:33 AM Creatinine 0.82 mg/dL    Recent Vancomycin Levels (past 3 days)  2021:  6:05 PM Vancomycin 14.9 mg/L    Vancomycin IV Administrations (past 72 hours)                   vancomycin (VANCOCIN) 1,500 mg in sodium chloride 0.9 % 250 mL intermittent infusion (mg) 1,500 mg New Bag 21 1806     1,500 mg New Bag 21 204     1,500 mg New Bag 21 1820      Restarted 21 204                Nephrotoxins and other renal medications (From now, onward)    Start     Dose/Rate Route Frequency Ordered Stop    21 1900  vancomycin (VANCOCIN) 1,500 mg in sodium chloride 0.9 % 250 mL intermittent infusion      1,500 mg  over 90 Minutes Intravenous EVERY 24 HOURS 21 2212      21 0900  [Held by provider]  furosemide (LASIX) tablet 80 mg     (Held by provider since Mon 2021 at 1617 by Gaston Gutierrez MD.Hold Reason: Change in Vitals.)    80 mg Oral DAILY 21 1923      21 0347  piperacillin-tazobactam (ZOSYN) 3.375 g vial to attach to  mL bag     Note to Pharmacy: Extended infusion dosing to start 6 hours after initial infusion.    3.375 g  over 240 Minutes Intravenous EVERY 8 HOURS 21 214               Contrast Orders - past 72 hours (72h ago, onward)    None          Interpretation of levels and current regimen:  Vancomycin level is reflective of -600    Has serum creatinine changed greater than 50% in  last 72 hours: No    Urine output:  unable to determine    Renal Function: Stable    Population pharmacokinetic (pop-pk) data from Bayesian modeling software:  Regimen: 1500 mg IV every 24 hours.  Exposure target: AUC24 (range)400-600 mg/L.hr   AUC24,ss: 486 mg/L.hr  Probability of AUC24 > 400: 90 %  Ctrough,ss: 13 mg/L  Probability of Ctrough,ss > 20: 2 %  Probability of nephrotoxicity (Lodise SHAWN 2009): 8 %    Plan:  1. Continue Current Dose  2. Vancomycin monitoring method: AUC  3. Vancomycin therapeutic monitoring goal: 400-600 mg*h/L  4. Pharmacy will check vancomycin levels as appropriate in 5-7 Days.  5. Serum creatinine levels will be ordered a minimum of twice weekly.    Shanell Bautista RPH

## 2021-09-24 NOTE — PLAN OF CARE
Problem: Risk for Delirium  Goal: Optimal Coping  Outcome: Improving   Pt anxious today, wants to discharge home, states she has company at home  Problem: Risk for Delirium  Goal: Improved Behavioral Control  Outcome: Improving   Pt calm, cooperative and participating in therapies and cares  Problem: Risk for Delirium  Goal: Improved Sleep  Outcome: Improving   Awake most of this shift, states she has trouble sleeping at night  Problem: Pain Acute  Goal: Acceptable Pain Control and Functional Ability  Outcome: Improving  Intervention: Develop Pain Management Plan  Recent Flowsheet Documentation  Taken 9/24/2021 0826 by Frances López, RN  Pain Management Interventions:   medication (see MAR)   distraction   emotional support   repositioned   Reported pain in left leg 8/10, received prn oral dilaudid x1 which was effective  Problem: Infection (Orthopaedic Rehabilitation)  Goal: Absence of Infection Signs/Symptoms  Outcome: Improving   VSS, afebrile, cont on IV abx  Problem: Adult Inpatient Plan of Care  Goal: Absence of Hospital-Acquired Illness or Injury  Intervention: Identify and Manage Fall Risk  Recent Flowsheet Documentation  Taken 9/24/2021 0844 by Frances López RN  Safety Promotion/Fall Prevention:   bed alarm on   chair alarm on   activity supervised   assistive device/personal items within reach   nonskid shoes/slippers when out of bed   patient and family education   room door open   room near nurse's station

## 2021-09-24 NOTE — PROGRESS NOTES
[facility]    PROGRESS NOTE - Hospitalist Service    Assessment and Plan  81 year old female with a past medical history of hypertension, diabetes, mitral valve replacement, paroxysmal atrial fibrillation, CKD and chronic pain syndrome admitted in the podiatry service for left foot osteomyelitis. [facility] was consulted for medical management.     Left foot osteomyelitis:   - Osteomyelitis of 2nd digit left foot and 2nd metatarsal left foot. s/p amputation of 2nd digit left foot, partial amputation 2nd metatarsal left foot and incision and drainage.   On 9/17  - Continue zosyn and Vancomycin given history of MRSA. ID was consulted. Further antibiotics per ID.  --Follow-up with Dr. Cleveland in 10 days starting from 9/17.  - Pain control --continue oxycodone for the same  - Follow up intra op culture result and pathology.  No anaerobic organisms isolated after 1 day.    - PT/OT. NWB of left foot  - Continue IV Vanco and Zosyn till 9/27/2021 with total of 10 days postoperative  - If patient is discharged prior to visit we will continue on Augmentin and doxycycline as per ID     Insulin-dependent diabetes, type II:   - Controlled after increasing his Levemir to 30 units   - Uncontrolled at home with recent hemoglobin A1c 9.0.    - PTA Novolog 70/30  30 units qam and 15 units qpm.   - Continue Levemir to 30 units and continue Novolog: CHO ratio to 1:5  - continue diabetic diet 60 gm.     Acute metabolic encephalopathy with delirium  -Secondary to the above.   - Improving.  But did have rapid response on 9/20 due to decreased responsiveness.    - Did respond to Narcan x2.  Patient is more awake today.  - Negative CT head without contrast for stroke.  - No bladder scan recorded   - Nurse claims that patient had a bowel movement yesterday  - Was given scheduled Seroquel 25 mg q. 6 hours x 4 doses on 9/19  - DC restraints  - Decrease gabapentin dose     Hypokalemia   - mild  - Order potassium  replacement     Left calf swelling  - Duplex ultrasound done on 9/18 is negative for DVT  - INR is subtherapeutic continue Coumadin  As per pharmacy     Essential hypertension:   - Blood pressure controlled.      - Hold Lasix for now due to poor p.o. intake     Mechanical mitral valve:   - Chronically anticoagulated on Coumadin.      - Continue Coumadin  - INR is near therapeutic range.  Goal of 2.5-3.5 due to mechanical mitral valve   will hold off on bridging anticoagulation given risk of bleeding from the recent amputation.   Hemoglobin is stable.  - Negative CT head for CVA as above     Weakness and deconditioning  - PT/OT evaluation  - patient needs TCU on discharge         Active Problems:    Diabetes mellitus (H)    Osteomyelitis of ankle and foot (H)      VTE prophylaxis:  Enoxaparin (Lovenox) SQ  DIET: Orders Placed This Encounter      Consistent Carb 60 grams CHO per Meal Diet      Disposition/Barriers to discharge: TCU placement  Code Status: Full Code    Subjective:  Bernadette is feeling better today, denies any chest pain or shortness of breath.  Was lethargic this morning as per nursing staff.    PHYSICAL EXAM  Vitals:    09/17/21 1450   Weight: 71 kg (156 lb 9.6 oz)     B/P:128/76 T:97.9 P:124 R:18     Intake/Output Summary (Last 24 hours) at 9/24/2021 1515  Last data filed at 9/24/2021 1330  Gross per 24 hour   Intake 303 ml   Output 1600 ml   Net -1297 ml      Body mass index is 30.58 kg/m .    Constitutional: awake, alert, cooperative, no apparent distress, and appears stated age  Eyes: Lids and lashes normal, pupils equal, round and reactive to light, extra ocular muscles intact, sclera clear, conjunctiva normal  ENT: Normocephalic, without obvious abnormality, atraumatic, sinuses nontender on palpation, external ears without lesions, oral pharynx with moist mucous membranes, tonsils without erythema or exudates, gums normal and good dentition.  Respiratory: No increased work of breathing, good air  exchange, clear to auscultation bilaterally, no crackles or wheezing  Cardiovascular: Normal apical impulse, regular rate and rhythm, normal S1 and S2, no S3 or S4, and no murmur noted  GI: No scars, normal bowel sounds, soft, non-distended, non-tender, no masses palpated, no hepatosplenomegally  Skin: no bruising or bleeding and normal skin color, texture, turgor  Musculoskeletal: There is no redness, warmth, or swelling of the joints.  Full range of motion noted.  no lower extremity pitting edema present  Neurologic: Awake, alert, oriented to name, place and time.  Cranial nerves II-XII are grossly intact.  Motor is 5 out of 5 bilaterally.   Sensory is intact.    Neuropsychiatric: Appropriate with examiner      PERTINENT LABS/IMAGING:  Recent Labs   Lab 09/24/21  1141 09/24/21  0817 09/24/21  0629 09/23/21  2109 09/23/21  0733 09/23/21  0633 09/22/21  0830 09/22/21  0611 09/22/21  0022 09/21/21  0653 09/21/21  0635 09/21/21  0635 09/19/21  2125 09/19/21  2045 09/19/21  0823 09/19/21  0655   WBC  --   --   --   --   --  6.5  --   --   --   --   --  6.8  --   --   --   --    HGB  --   --   --   --   --  11.3*  --   --   --   --   --  10.2*  --   --   --  9.9*   MCV  --   --   --   --   --  87  --   --   --   --   --  86  --   --   --   --    PLT  --   --   --   --   --  173  --   --   --   --   --  179  --   --   --   --    INR  --   --  3.37*  --   --  3.19*  --  2.62*  --   --    < > 2.45*   < >  --   --  1.57*   NA  --   --   --   --   --  143  --   --   --   --   --  142  --   --   --   --    POTASSIUM  --   --  3.5  --   --  3.8  --   --  3.9   < >  --  3.2*  --   --   --   --    CHLORIDE  --   --   --   --   --  114*  --   --   --   --   --  111*  --   --   --   --    CO2  --   --   --   --   --  23  --   --   --   --   --  23  --   --   --   --    BUN  --   --   --   --   --  13  --   --   --   --   --  13  --   --   --   --    CR  --   --   --   --   --  0.82  0.82  --   --   --   --   --  0.92  --  0.87   --   --    ANIONGAP  --   --   --   --   --  6  --   --   --   --   --  8  --   --   --   --    DI  --   --   --   --   --  9.2  --   --   --   --   --  8.4*  --   --   --   --    * 173*  --  192*   < > 109   < >  --   --    < >   < > 182*   < >  --    < >  --    ALBUMIN  --   --   --   --   --  3.1*  --   --   --   --   --   --   --   --   --   --    PROTTOTAL  --   --   --   --   --  7.6  --   --   --   --   --   --   --   --   --   --    BILITOTAL  --   --   --   --   --  0.5  --   --   --   --   --   --   --   --   --   --    ALKPHOS  --   --   --   --   --  52  --   --   --   --   --   --   --   --   --   --    ALT  --   --   --   --   --  <9  --   --   --   --   --   --   --   --   --   --    AST  --   --   --   --   --  18  --   --   --   --   --   --   --   --   --   --     < > = values in this interval not displayed.     No results found for this or any previous visit (from the past 24 hour(s)).    Discussed with patient, family, nursing staff and discharge planner    Wilfredo Cohen MD  [facility] Medicine Service  [phone]

## 2021-09-25 NOTE — PLAN OF CARE
Problem: Risk for Delirium  Goal: Improved Behavioral Control  Outcome: Improving   Calm, cooperative and pleasant, participating in cares  Problem: Risk for Delirium  Goal: Improved Attention and Thought Clarity  Outcome: Improving   A&O x4 today  Problem: Risk for Delirium  Goal: Improved Sleep  Outcome: Improving   Pt was awake in chair all day  Problem: Pain Acute  Goal: Acceptable Pain Control and Functional Ability  Outcome: Improving   No prn today, pt reports decrease in pain  Problem: Bowel Elimination Impairment (Orthopaedic Rehabilitation)  Goal: Effective Bowel Elimination  Outcome: Improving   Pt received supp with large results  Problem: Adult Inpatient Plan of Care  Goal: Absence of Hospital-Acquired Illness or Injury  Intervention: Identify and Manage Fall Risk  Recent Flowsheet Documentation  Taken 9/25/2021 0921 by Frances López, RN  Safety Promotion/Fall Prevention:    bed alarm on    chair alarm on    activity supervised    assistive device/personal items within reach    nonskid shoes/slippers when out of bed    patient and family education    room near nurse's station    room door open

## 2021-09-25 NOTE — PLAN OF CARE
Pt is  POD #7 following a left 2nd toe amputation for osteomyelitis. Dressing is clean, dry and intact to left foot. To remain in place through 9/27. Prafo boot is on at all times. Denies pain. Mood is pleasant but confused. Requires frequent reminders that is she NWB to the LLE. Requires assist of 2 using a full mechanical lift for transfers 2/2 inability to refrain from placing weight on LLE with transfers. YUKO drain has 0 ml drainage. Continues on IV abx in the form of Zosyn and Vancomycin. On contact precautions for hx of MRSA. Heart rate has been tachy from 116-124 this shift, which has been her baseline. Asymptomatic. Sepsis protocol fired this shift with a lactic acid of 1.6. She is tolerating a diabetic diet. Appetite is adequate. Blood sugar was 143 before meal and 163 at hs. Receives s/s and carb coverage insulin. On K+ protocol. Recheck in AM. Asymptomatic covid swab obtained and sent per protocol. Results pending. Pure Wick device in place for urinary incontinence and decreased mobility. Pt received dose of Milk of Magnesia at 1930 for complaints of constipation.     Problem: Risk for Delirium  Goal: Improved Attention and Thought Clarity  Outcome: No Change     Problem: Mobility Impairment (Orthopaedic Rehabilitation)  Goal: Optimal Mobility [place] and Safety  Outcome: No Change     Problem: Infection (Orthopaedic Rehabilitation)  Goal: Absence of Infection Signs/Symptoms  Outcome: Improving     Problem: Pain Acute  Goal: Acceptable Pain Control and Functional Ability  Intervention: Prevent or Manage Pain  Recent Flowsheet Documentation  Taken 9/24/2021 1651 by Viviana Lujan, RN  Medication Review/Management: medications reviewed

## 2021-09-25 NOTE — PROGRESS NOTES
[facility]    PROGRESS NOTE - Hospitalist Service    Assessment and Plan  81 year old female with a past medical history of hypertension, diabetes, mitral valve replacement, paroxysmal atrial fibrillation, CKD and chronic pain syndrome admitted in the podiatry service for left foot osteomyelitis. [facility] [place] for medical management.     Left foot osteomyelitis:   - Osteomyelitis of 2nd digit left foot and 2nd metatarsal left foot. s/p amputation of 2nd digit left foot, partial amputation 2nd metatarsal left foot and incision and drainage.   On 9/17  - Continue zosyn and Vancomycin given history of MRSA. ID was consulted. Further antibiotics per ID.  --Follow-up with Dr. Cleveland in 10 days starting from 9/17.  - Pain control --continue oxycodone for the same  - Follow up intra op culture result and pathology.  No anaerobic organisms isolated after 1 day.    - PT/OT. NWB of left foot  - Continue IV Vanco and Zosyn till 9/27/2021 with total of 10 days postoperative  - If patient is discharged prior to visit we will continue on Augmentin and doxycycline as per ID     Insulin-dependent diabetes, type II:   - Controlled after increasing his Levemir to 30 units   - Uncontrolled at home with recent hemoglobin A1c 9.0.    - PTA Novolog 70/30  30 units qam and 15 units qpm.   - Continue Levemir to 30 units and continue Novolog: CHO ratio to 1:5  - continue diabetic diet 60 gm.     Acute metabolic encephalopathy with delirium  -Secondary to the above.   - Improving.  But did have rapid response on 9/20 due to decreased responsiveness.    - Did respond to Narcan x2.  Patient is more awake today.  - Negative CT head without contrast for stroke.  - No bladder scan recorded   - Nurse claims that patient had a bowel movement yesterday  - Was given scheduled Seroquel 25 mg q. 6 hours x 4 doses on 9/19  - D/C restraints  - Decrease gabapentin dose     Hypokalemia   - mild  - Order potassium  replacement     Left calf swelling  - Duplex ultrasound done on 9/18 is negative for DVT  - INR is subtherapeutic continue Coumadin  As per pharmacy     Essential hypertension:   - Blood pressure slightly abated.      - Hold Lasix initially due to poor p.o. intake, restarted   - Start metoprolol to help with heart rate control     Mechanical mitral valve:   - Chronically anticoagulated on Coumadin.      - Continue Coumadin  - INR is near therapeutic range.  Goal of 2.5-3.5 due to mechanical mitral valve   will hold off on bridging anticoagulation given risk of bleeding from the recent amputation.   Hemoglobin is stable.  - Negative CT head for CVA as above     Weakness and deconditioning  - PT/OT evaluation  - patient needs TCU on discharge         Active Problems:    Diabetes mellitus (H)    Osteomyelitis of ankle and foot (H)      VTE prophylaxis:  Warfarin  DIET: Orders Placed This Encounter      Consistent Carb 60 grams CHO per Meal Diet      Disposition/Barriers to discharge: Pending placement  Code Status: Full Code    Subjective:  Bernadette is feeling about the same today, denies any chest pain or shortness of breath.  No fever chills overnight.    PHYSICAL EXAM  Vitals:    09/17/21 1450 09/25/21 1352   Weight: 71 kg (156 lb 9.6 oz) 67.8 kg (149 lb 6.4 oz)     B/P:131/87 T:98.1 P:119 R:18     Intake/Output Summary (Last 24 hours) at 9/25/2021 1522  Last data filed at 9/25/2021 1355  Gross per 24 hour   Intake 3 ml   Output 605 ml   Net -602 ml      Body mass index is 29.18 kg/m .    Constitutional: awake, alert, cooperative, no apparent distress, and appears stated age  Eyes: Lids and lashes normal, pupils equal, round and reactive to light, extra ocular muscles intact, sclera clear, conjunctiva normal  ENT: Normocephalic, without obvious abnormality, atraumatic, sinuses nontender on palpation, external ears without lesions, oral pharynx with moist mucous membranes, tonsils without erythema or exudates, gums  normal and good dentition.  Respiratory: No increased work of breathing, good air exchange, clear to auscultation bilaterally, no crackles or wheezing  Cardiovascular: Normal apical impulse, regular rate and rhythm, normal S1 and S2, no S3 or S4, and no murmur noted  GI: No scars, normal bowel sounds, soft, non-distended, non-tender, no masses palpated, no hepatosplenomegally  Skin: no bruising or bleeding and normal skin color, texture, turgor  Musculoskeletal: Left foot surgical incision covered with dressing.  Neurologic: Awake, alert, oriented to name, place only.  Cranial nerves II-XII are grossly intact.  Motor is 5 out of 5 bilaterally.   Sensory is intact.    Neuropsychiatric: Appropriate with examiner      PERTINENT LABS/IMAGING:  Recent Labs   Lab 09/25/21  1334 09/25/21  0954 09/25/21  0845 09/24/21  2147 09/24/21  0817 09/24/21  0629 09/23/21  0733 09/23/21  0633 09/21/21  0653 09/21/21  0635 09/19/21  0823 09/19/21  0655   WBC  --   --   --   --   --   --   --  6.5  --  6.8  --   --    HGB  --   --   --   --   --   --   --  11.3*  --  10.2*  --  9.9*   MCV  --   --   --   --   --   --   --  87  --  86  --   --    PLT  --   --   --   --   --   --   --  173  --  179  --   --    INR  --  3.18*  --   --   --  3.37*  --  3.19*   < > 2.45*   < > 1.57*   NA  --   --   --   --   --   --   --  143  --  142  --   --    POTASSIUM  --  3.9  --   --   --  3.5  --  3.8   < > 3.2*  --   --    CHLORIDE  --   --   --   --   --   --   --  114*  --  111*  --   --    CO2  --   --   --   --   --   --   --  23  --  23  --   --    BUN  --   --   --   --   --   --   --  13  --  13  --   --    CR  --  0.90  --   --   --   --   --  0.82  0.82  --  0.92   < >  --    ANIONGAP  --   --   --   --   --   --   --  6  --  8  --   --    DI  --   --   --   --   --   --   --  9.2  --  8.4*  --   --    *  --  166* 163*   < >  --    < > 109   < > 182*   < >  --    ALBUMIN  --   --   --   --   --   --   --  3.1*  --   --   --   --     PROTTOTAL  --   --   --   --   --   --   --  7.6  --   --   --   --    BILITOTAL  --   --   --   --   --   --   --  0.5  --   --   --   --    ALKPHOS  --   --   --   --   --   --   --  52  --   --   --   --    ALT  --   --   --   --   --   --   --  <9  --   --   --   --    AST  --   --   --   --   --   --   --  18  --   --   --   --     < > = values in this interval not displayed.     No results found for this or any previous visit (from the past 24 hour(s)).    Discussed with patient, family, nursing staff and discharge planner    Wilfredo Cohen MD  [facility] Medicine Service  [phone]

## 2021-09-25 NOTE — PLAN OF CARE
Patient  a/O x 4  with  some  confusion  and forgetful very restless  wanted  to  get out of  bed   left  foot dressing  CDI  none  weight   has  profo boot at all able  to  make  her  needs know purewick in place   pain  prn  oral  dialudid  given with relief  currently sleeping     Problem: Adjustment to Decreased Mobility and [place] (Orthopaedic Rehabilitation)  Goal: Optimal Coping  Outcome: No Change     Problem: Infection (Orthopaedic Rehabilitation)  Goal: Absence of Infection Signs/Symptoms  Outcome: Improving     Problem: Mobility Impairment (Orthopaedic Rehabilitation)  Goal: Optimal Mobility [place] and Safety  Outcome: Improving     Problem: Adult Inpatient Plan of Care  Goal: Absence of Hospital-Acquired Illness or Injury  Intervention: Identify and Manage Fall Risk  Recent Flowsheet Documentation  Taken 9/25/2021 0030 by Suzi Bragg, RN  Safety Promotion/Fall Prevention: bed alarm on

## 2021-09-26 NOTE — PLAN OF CARE
Problem: Risk for Delirium  Goal: Optimal Coping  Outcome: No Change  Problem: Risk for Delirium  Goal: Improved Attention and Thought Clarity  Outcome: No Change  Problem: Pain Acute  Goal: Acceptable Pain Control and Functional Ability  Outcome: No Change  Intervention: Develop Pain Management Plan    VSS. On IV abx. Patient intermittently yells out in pain and reports severe LLE pain. Restless but re-directable. Given PRN dilaudid. Sleeping on and off all night. Patient confused. Frequent reminders and reorientation needed. Dressing on foot is clean, dry, and intact. YUKO drain had no output. Extremity elevated.    Patient educated on plan of care. Answered all questions and concerns. Verbalized understanding. Continuing to monitor.    Jonna Dumas RN

## 2021-09-26 NOTE — PLAN OF CARE
Physical Therapy Discharge Summary    Reason for therapy discharge:    Discharged to transitional care facility.    Progress towards therapy goal(s). See goals on Care Plan in [facility] electronic health record for goal details.  Goals not met.  Barriers to achieving goals:   Due to weakness and NWB  left foot.    Therapy recommendation(s):    Continued therapy is recommended.  Rationale/Recommendations:  to continue to work on mobility and strengthening. .

## 2021-09-26 NOTE — PLAN OF CARE
Problem: Risk for Delirium  Goal: Improved Behavioral Control  Outcome: Improving   Calm, cooperative and participating in cares  Problem: Risk for Delirium  Goal: Improved Attention and Thought Clarity  Outcome: Improving   A&Ox4, able to make needs known, using call  light  Problem: Pain Acute  Goal: Acceptable Pain Control and Functional Ability  Outcome: Improving  Intervention: Develop Pain Management Plan  Recent Flowsheet Documentation  Taken 9/26/2021 0812 by Frances López, RN  Pain Management Interventions:   medication (see MAR)   distraction   emotional support   repositioned   relaxation techniques promoted   pillow support provided   Reproted pain in left leg, received scheduled tylneol and repositioned leg onto pillows, pt stated relief  Problem: Adult Inpatient Plan of Care  Goal: Readiness for Transition of Care  Outcome: Improving   Discharge to TCU today  Problem: Adult Inpatient Plan of Care  Goal: Absence of Hospital-Acquired Illness or Injury  Intervention: Identify and Manage Fall Risk  Recent Flowsheet Documentation  Taken 9/26/2021 0825 by Frances López, RN  Safety Promotion/Fall Prevention:   activity supervised   bed alarm on   chair alarm on   assistive device/personal items within reach   patient and family education   room door open   room near nurse's station

## 2021-09-26 NOTE — PROGRESS NOTES
Care Management Follow Up    Length of Stay (days): 8    Expected Discharge Date: 09/26/2021     Expected Time of Departure: 1415 (on 9/26/21)    Concerns to be Addressed:  Discharge planning        Patient plan of care discussed at interdisciplinary rounds: Yes    Anticipated Discharge Disposition: Transitional Care     Anticipated Discharge Services:    Anticipated Discharge DME:      Patient/family educated on Medicare website which has current facility and service quality ratings: yes    Education Provided on the Discharge Plan:  Yes    Patient/Family in Agreement with the Plan: yes    Referrals Placed by CM/SW:  N/A    Private pay costs discussed: transportation costs    Additional Information: SW received voicemail message from Abraham Yu (on behalf of pt's son Marcelo) that family agreed for pt to go to TCU and for [facility] to transport pt.  Please call her back at [phone].    FLAVIO spoke to Kim at [facility] to confirm that pt would be admitting to facility tomorrow.  They would like orders by 12 and for pt to be there by 2:30 PM.  SW set up ride for 2:15 PM via W for pt for tomorrow.    FLAVIO spoke with Abraham Yu and confirmed that family was in agreement for pt to go to [facility] tomorrow via [facility].  SW let her know scheduled ride time for pt.  She shared that pt was not thrilled about it but will go.  PAS done.        MATILDE Plascencia, LGSW 09/25/21 7:21 PM

## 2021-09-26 NOTE — PLAN OF CARE
Problem: Risk for Delirium  Goal: Improved Behavioral Control  9/25/2021 1904 by Frances López RN  Outcome: Improving  9/25/2021 1519 by Frances López RN  Outcome: Improving   Calm, cooperative and participating in cares  Problem: Risk for Delirium  Goal: Improved Attention and Thought Clarity  9/25/2021 1904 by Frances López RN  Outcome: Improving  9/25/2021 1519 by Frances López RN  Outcome: Improving   A&Ox4, able to make needs known  Problem: Risk for Delirium  Goal: Improved Sleep  Outcome: Improving   Reports difficulty sleeping at night  Problem: Bowel Elimination Impairment (Orthopaedic Rehabilitation)  Goal: Effective Bowel Elimination  9/25/2021 1904 by Frances López RN  Outcome: Improving  9/25/2021 1519 by Frances López RN  Outcome: Improving   Received supp this afternoon and had large soft bowel movement  Problem: Adult Inpatient Plan of Care  Goal: Absence of Hospital-Acquired Illness or Injury  Intervention: Identify and Manage Fall Risk  Recent Flowsheet Documentation  Taken 9/25/2021 1539 by Frances López RN  Safety Promotion/Fall Prevention:   bed alarm on   chair alarm on   activity supervised   assistive device/personal items within reach   nonskid shoes/slippers when out of bed   patient and family education   room door open   room near nurse's station  Taken 9/25/2021 0921 by Frances López RN  Safety Promotion/Fall Prevention:   bed alarm on   chair alarm on   activity supervised   assistive device/personal items within reach   nonskid shoes/slippers when out of bed   patient and family education   room near nurse's station   room door open  Intervention: Prevent Skin Injury  Recent Flowsheet Documentation  Taken 9/25/2021 1726 by Frances López RN  Body Position: supine  Taken 9/25/2021 1539 by Frances López RN  Body Position:   turned   right     Problem: Adult Inpatient Plan of Care  Goal: Absence of Hospital-Acquired Illness or Injury  Intervention: Prevent  Skin Injury  Recent Flowsheet Documentation  Taken 9/25/2021 1726 by Frances López, RN  Body Position: supine  Taken 9/25/2021 1539 by Frances López, RN  Body Position:   turned   right

## 2021-09-26 NOTE — PLAN OF CARE
Occupational Therapy Discharge Summary    Reason for therapy discharge:    Discharged to transitional care facility.    Progress towards therapy goal(s). See goals on Care Plan in [facility] electronic health record for goal details.  Goals partially met.  Barriers to achieving goals:   discharge from facility.    Joan AGUILAR, OTR/L, CLT 9/26/2021 , 11:16 AM

## 2021-09-26 NOTE — DISCHARGE SUMMARY
[facility]  Hospitalist Discharge Summary      Date of Admission:  9/17/2021  Date of Discharge:  9/26/2021  Discharging Provider: Wilfredo Cohen MD      Discharge Diagnoses   Acute left foot osteomyelitis (unspecified organism )  S/P amputation of second left digit and partial amputation of left second metatarsal  Insulin-dependent diabetes mellitus  Acute metabolic encephalopathy with delirium, resolved  Hypokalemia, replaced  Hypertension  Mechanical mitral valve with anticoagulation on warfarin  Weakness and deconditioning    Follow-ups Needed After Discharge   Follow-up Appointments     Follow Up and recommended labs and tests      Follow up with intermediate physician.  The following labs/tests are   recommended: INR in 2 to 3 days to monitor warfarin dose.  Follow up with specialist, podiatrist as scheduled             Unresulted Labs Ordered in the Past 30 Days of this Admission     No orders found from 8/18/2021 to 9/18/2021.      These results will be followed up by PCP    Discharge Disposition   Discharged to nursing home  Condition at discharge: Stable      Hospital Course   81 year old female with a past medical history of hypertension, diabetes, mitral valve replacement, paroxysmal atrial fibrillation, CKD and chronic pain syndrome admitted in the podiatry service for left foot osteomyelitis. [facility] [place].     Acute left foot osteomyelitis:   - Osteomyelitis of 2nd digit left foot and 2nd metatarsal left foot(unspecified organism).   - s/p amputation of 2nd digit left foot, partial amputation 2nd metatarsal left foot and incision and drainage.   On 9/17  - Received IV zosyn and Vancomycin given history of MRSA.  - ID was consulted.   - Switch to Augmentin and doxycycline as recommended by ID to finish course of antibiotic on 9/27/2021  - Follow-up with podiatry Dr. Cleveland in 10 days starting from 9/17.  - Follow up intra op culture result and  pathology.  No anaerobic organisms isolated after 1 day.    - PT/OT. NWB of left foot     Insulin-dependent diabetes, type II:   - Controlled after increasing his Levemir to 30 units   - Uncontrolled at home with recent hemoglobin A1c 9.0.    - PTA Novolog 70/30  30 units qam and 15 units qpm.   - Continue Levemir to 30 units and continue Novolog: CHO ratio to 1:5  - continue diabetic diet 60 gm.     Acute metabolic encephalopathy with delirium  - Secondary to the above.   - Improving.   - Had rapid response on 9/20 due to decreased responsiveness.    - Did respond to Narcan x2.    - Patient is more awake and back to her baseline  - Negative CT head without contrast for stroke.  - No bladder scan recorded   - Nurse claims that patient had a bowel movement yesterday  - Was given scheduled Seroquel 25 mg q. 6 hours x 4 doses on 9/19  - D/C restraints  - Decrease gabapentin and narcotic dose   - Continue low-dose Zyprexa at night     Hypokalemia   - mild  - Order potassium replacement     Left calf swelling  - Duplex ultrasound done on 9/18 is negative for DVT  - INR is subtherapeutic continue Coumadin  As per pharmacy  - Resolved      Essential hypertension:   - Blood pressure slightly abated.      - Hold Lasix initially due to poor p.o. intake, restarted   - Start metoprolol to help with heart rate control  - Continue diltiazem     Mechanical mitral valve:   - Chronically anticoagulated on Coumadin.      - Continue Coumadin  - INR is  therapeutic range.    - Goal of 2.5-3.5 due to mechanical mitral valve  - Negative CT head for CVA as above  - Continue to monitor INR at TCU    History of A. fib/a flutter  - Anticoagulation with warfarin  - Continue diltiazem  - Add metoprolol as heart rate is slightly     Chronic pain syndrome  - On chronic Dilaudid and gabapentin  - Decrease dose because of encephalopathy     Weakness and deconditioning  - PT/OT evaluation  - patient needs TCU on discharge    Consultations This  Hospital Stay   PHYSICAL THERAPY ADULT IP CONSULT  OCCUPATIONAL THERAPY ADULT IP CONSULT  HOSPITALIST IP CONSULT  INFECTIOUS DISEASES IP CONSULT  ORTHOSIS EXTREMITY LOWER REFERRAL IP CONSULT  SOCIAL WORK IP CONSULT  DIABETES EDUCATION IP CONSULT  PHARMACY TO DOSE VANCO  PHARMACY TO DOSE WARFARIN  PHYSICAL THERAPY ADULT IP CONSULT  OCCUPATIONAL THERAPY ADULT IP CONSULT    Code Status   Full Code    Time Spent on this Encounter   Wilfredo BARBOZA MD, personally saw the patient today and spent greater than 30 minutes discharging this patient.       Wilfredo Cohen MD  [address] [address]  Phone: [phone]  Fax: [phone]  ______________________________________________________________________    Physical Exam   Vital Signs: Temp: 98.1  F (36.7  C) Temp src: Oral BP: 93/59 Pulse: 118   Resp: 18 SpO2: 96 % O2 Device: None (Room air)    Weight: 149 lbs 6.4 oz  Constitutional: awake, alert, cooperative, no apparent distress, and appears stated age  Eyes: Lids and lashes normal, pupils equal, round and reactive to light, extra ocular muscles intact, sclera clear, conjunctiva normal  ENT: Normocephalic, without obvious abnormality, atraumatic, sinuses nontender on palpation, external ears without lesions, oral pharynx with moist mucous membranes, tonsils without erythema or exudates, gums normal and good dentition.  Respiratory: No increased work of breathing, good air exchange, clear to auscultation bilaterally, no crackles or wheezing  Cardiovascular: Normal apical impulse, regular rate and rhythm, normal S1 and S2, no S3 or S4, and no murmur noted  GI: No scars, normal bowel sounds, soft, non-distended, non-tender, no masses palpated, no hepatosplenomegally  Skin: no bruising or bleeding and normal skin color, texture, turgor  Musculoskeletal: Left foot surgical incision covered with dressing.  Neurologic: Awake, alert, oriented to name, place only.   Cranial nerves II-XII are grossly intact.  Motor is 5 out of 5 bilaterally.   Sensory is intact.    Neuropsychiatric: Appropriate with examiner          Primary Care Physician   Gabino Bach    Discharge Orders      General info for SNF    Length of Stay Estimate: Short Term Care: Estimated # of Days <30  Condition at Discharge: Improving  Level of care:skilled   Rehabilitation Potential: Fair  Admission H&P remains valid and up-to-date: Yes  Recent Chemotherapy: N/A  Use Nursing Home Standing Orders: Yes     Mantoux instructions    Give two-step Mantoux (PPD) Per Facility Policy Yes     Follow Up and recommended labs and tests    Follow up with [facility] physician.  The following labs/tests are recommended: INR in 2 to 3 days to monitor warfarin dose.  Follow up with specialist, podiatrist as scheduled     Reason for your hospital stay    Left foot osteomyelitis status post surgery     Wound care    Site:   Left foot  Instructions: As per podiatrist     Activity - Up with assistive device     Weight bearing status    Nonweightbearing on left foot     Physical Therapy Adult Consult    Evaluate and treat as clinically indicated.    Reason: Weakness     Occupational Therapy Adult Consult    Evaluate and treat as clinically indicated.    Reason: Weakness     Fall precautions     Advance Diet as Tolerated    Follow this diet upon discharge: Orders Placed This Encounter      Consistent Carb 60 grams CHO per Meal Diet       Significant Results and Procedures   Most Recent 3 CBC's:Recent Labs   Lab Test 09/23/21  0633 09/21/21  0635 09/19/21  0655 09/14/21  1717 09/14/21  1717   WBC 6.5 6.8  --   --  6.8   HGB 11.3* 10.2* 9.9*   < > 10.8*   MCV 87 86  --   --  84    179  --   --  239    < > = values in this interval not displayed.     Most Recent 3 BMP's:Recent Labs   Lab Test 09/26/21  1215 09/26/21  0642 09/25/21  2214 09/25/21  1653 09/25/21  1334 09/25/21  0954 09/24/21  0817 09/24/21  0629 09/23/21  0733  09/23/21  0633 09/21/21  0653 09/21/21  0635 09/17/21  1447 09/14/21  1717   NA  --   --   --   --   --   --   --   --   --  143  --  142  --  140   POTASSIUM  --  3.7  --   --   --  3.9  --  3.5   < > 3.8   < > 3.2*  --  4.1   CHLORIDE  --   --   --   --   --   --   --   --   --  114*  --  111*  --  101   CO2  --   --   --   --   --   --   --   --   --  23  --  23  --  27   BUN  --   --   --   --   --   --   --   --   --  13  --  13  --  17   CR  --   --   --   --   --  0.90  --   --   --  0.82  0.82  --  0.92   < > 1.00   ANIONGAP  --   --   --   --   --   --   --   --   --  6  --  8  --  12   DI  --   --   --   --   --   --   --   --   --  9.2  --  8.4*  --  9.1   *  --  83 263*   < >  --    < >  --    < > 109   < > 182*   < > 144*    < > = values in this interval not displayed.   ,   Results for orders placed or performed during the hospital encounter of 09/17/21   US Lower Extremity Venous Duplex Left    Narrative    EXAM: US LOWER EXTREMITY VENOUS DUPLEX LEFT  LOCATION: [facility]  DATE/TIME: 9/18/2021 4:11 PM    INDICATION: Left calf swelling.  COMPARISON: None.  TECHNIQUE: Venous Duplex ultrasound of the left lower extremity with and without compression, augmentation and duplex. Color flow and spectral Doppler with waveform analysis performed.    FINDINGS: Exam includes the common femoral, femoral, popliteal, and contralateral common femoral veins as well as segmentally visualized deep calf veins and greater saphenous vein.     LEFT: No deep vein thrombosis. No superficial thrombophlebitis. No popliteal cyst.      Impression    IMPRESSION:  1.  No deep venous thrombosis in the left lower extremity.  2.  Nonspecific subcutaneous edema.   CT Head w/o Contrast    Narrative    EXAM: CT HEAD W/O CONTRAST  LOCATION: [facility]  DATE/TIME: 9/21/2021 10:08 PM    INDICATION: Cerebral hemorrhage suspected, increased confusion after surgery.  COMPARISON:  11/23/2020  TECHNIQUE: Routine CT Head without IV contrast. Multiplanar reformats. Dose reduction techniques were used.    FINDINGS:  INTRACRANIAL CONTENTS: No intracranial hemorrhage, extraaxial collection, or mass effect. No CT evidence of acute infarct. Mild to moderate volume loss and mild burden presumed chronic small vessel ischemia. Focal encephalomalacia along the right lateral   mid temporal lobe again noted.    VISUALIZED ORBITS/SINUSES/MASTOIDS: No intraorbital abnormality. Chronic opacification of the right maxillary sinus again noted. No middle ear or mastoid effusion.    BONES/SOFT TISSUES: No acute abnormality.      Impression    IMPRESSION:  1.  No acute intracranial abnormality or significant change compared to 11/23/2020.       Discharge Medications   Current Discharge Medication List      START taking these medications    Details   acetaminophen (TYLENOL) 325 MG tablet Take 3 tablets (975 mg) by mouth 3 times daily    Associated Diagnoses: Chronic, continuous use of opioids      amoxicillin-clavulanate (AUGMENTIN) 875-125 MG tablet Take 1 tablet by mouth 2 times daily for 2 days  Qty: 4 tablet, Refills: 0    Associated Diagnoses: Osteomyelitis of ankle and foot (H)      doxycycline hyclate (VIBRAMYCIN) 100 MG capsule Take 1 capsule (100 mg) by mouth 2 times daily for 2 days  Qty: 4 capsule, Refills: 0    Associated Diagnoses: Osteomyelitis of ankle and foot (H)      metoprolol tartrate (LOPRESSOR) 50 MG tablet Take 1 tablet (50 mg) by mouth 2 times daily    Associated Diagnoses: Atrial flutter, unspecified type (H)      OLANZapine (ZYPREXA) 2.5 MG tablet Take 1 tablet (2.5 mg) by mouth At Bedtime    Associated Diagnoses: Encephalopathy      senna-docusate (SENOKOT-S/PERICOLACE) 8.6-50 MG tablet Take 1 tablet by mouth 2 times daily    Associated Diagnoses: Constipation, unspecified constipation type         CONTINUE these medications which have CHANGED    Details   gabapentin (NEURONTIN) 100 MG  capsule Take 1 capsule (100 mg) by mouth At Bedtime    Associated Diagnoses: Chronic, continuous use of opioids      HYDROmorphone (DILAUDID) 2 MG tablet Take 1 tablet (2 mg) by mouth every 6 hours as needed for moderate to severe pain  Qty: 10 tablet, Refills: 0    Associated Diagnoses: Chronic pain syndrome; Chronic, continuous use of opioids         CONTINUE these medications which have NOT CHANGED    Details   diltiazem ER COATED BEADS (CARDIZEM CD/CARTIA XT) 180 MG 24 hr capsule Take 1 capsule (180 mg) by mouth daily  Qty: 90 capsule, Refills: 3    Associated Diagnoses: Intermittent atrial fibrillation (H)      ferrous sulfate 325 (65 FE) MG tablet [FERROUS SULFATE 325 (65 FE) MG TABLET] Take 1 tablet (325 mg total) by mouth 3 (three) times a week. Monday, Wednesday, and Friday  Qty: 50 tablet, Refills: 3    Associated Diagnoses: Iron deficiency anemia due to chronic blood loss      furosemide (LASIX) 40 MG tablet [FUROSEMIDE (LASIX) 40 MG TABLET] Take 2 tablets (80 mg total) by mouth daily.  Qty: 120 tablet, Refills: 1    Associated Diagnoses: Bilateral lower extremity edema; Venous stasis dermatitis of both lower extremities      insulin aspart protamine-insulin aspart (NOVOLOG MIX 70-30FLEXPEN U-100) 100 unit/mL (70-30) pen [INSULIN ASPART PROTAMINE-INSULIN ASPART (NOVOLOG MIX 70-30FLEXPEN U-100) 100 UNIT/ML (70-30) PEN] Inject 30 Units under the skin every morning AND 15 Units every evening.  Refills: 3    Associated Diagnoses: Type 2 diabetes mellitus with microalbuminuria, with long-term current use of insulin (H); Type 2 diabetes mellitus with microalbuminuria, with long-term current use of insulin (H)      levothyroxine (SYNTHROID, LEVOTHROID) 125 MCG tablet [LEVOTHYROXINE (SYNTHROID, LEVOTHROID) 125 MCG TABLET] Take 1 tablet (125 mcg total) by mouth daily.  Qty: 90 tablet, Refills: 3    Associated Diagnoses: Acquired hypothyroidism      mupirocin (BACTROBAN) 2 % external ointment Apply topically 2  "times daily  Qty: 22 g, Refills: 0    Associated Diagnoses: Skin ulcer of toe of left foot, limited to breakdown of skin (H)      polyethylene glycol (MIRALAX) 17 gram packet [POLYETHYLENE GLYCOL (MIRALAX) 17 GRAM PACKET] Take 1 packet (17 g total) by mouth daily.  Refills: 0    Associated Diagnoses: Therapeutic opioid induced constipation      triamcinolone (KENALOG) 0.1 % external cream Apply topically daily  Qty: 453.6 g, Refills: 11    Associated Diagnoses: Venous stasis dermatitis of both lower extremities      BD ULTRA-FINE DAVID PEN NEEDLE 32 gauge x 5/32\" Ndle [BD ULTRA-FINE DAVID PEN NEEDLE 32 GAUGE X 5/32\" NDLE] 1 each by abdominal subcutaneous route 2 (two) times a day. USE WITH NOVOLOG PENS.   Please keep this Rx on file for the next RF.  Qty: 100 each, Refills: 11    Associated Diagnoses: Type 2 diabetes mellitus with microalbuminuria, with long-term current use of insulin (H)      !! blood glucose test strips [BLOOD GLUCOSE TEST STRIPS] Use 1 each As Directed 3 (three) times a day. Dispense brand per patient's insurance at pharmacy discretion.  Qty: 300 strip, Refills: 3    Associated Diagnoses: Type 2 diabetes mellitus with microalbuminuria, with long-term current use of insulin (H)      !! blood-glucose meter Misc [BLOOD-GLUCOSE METER MISC] Dispense 1 meter as covered by patient's insurance.  Qty: 1 each, Refills: 0    Comments: Please match to strips if possible.  Associated Diagnoses: Type 2 diabetes mellitus with microalbuminuria, with long-term current use of insulin (H)      generic lancets [GENERIC LANCETS] Use 1 each As Directed 3 (three) times a day. Dx E11.65 DM2, uncontrolled  Qty: 300 each, Refills: 3    Comments: Dispense per insurance coverage.  Associated Diagnoses: Type 2 diabetes mellitus with microalbuminuria, with long-term current use of insulin (H)      warfarin ANTICOAGULANT (COUMADIN/JANTOVEN) 5 MG tablet [WARFARIN ANTICOAGULANT (COUMADIN/JANTOVEN) 5 MG TABLET] Take 1 tablet (5 mg " total) by mouth daily. Please adjust as recommended by anticoagulation nurse.  Qty: 90 tablet, Refills: 3    Associated Diagnoses: Atrial fibrillation with rapid ventricular response (H); S/P mitral valve replacement       !! - Potential duplicate medications found. Please discuss with provider.      STOP taking these medications       cephALEXin (KEFLEX) 500 MG capsule Comments:   Reason for Stopping:             Allergies   Allergies   Allergen Reactions     Amiodarone Other (See Comments)     TORSADES DE POINTES     Aspirin Other (See Comments)     Recurrent severe gastrointestinal bleed.

## 2021-09-27 NOTE — TELEPHONE ENCOUNTER
[facility] INR Nurse Triage    Provider: MADISON Liriano  Facility: [facility]  Facility Type:  TCU    Caller: Pablo  Call Back Number: [phone]  Reason for call: INR  Diagnosis/Goal: A. Fib and MVR    Todays INR: 4.2 - fingerstick  Last INR    9/26 3.18 5mg every day     Heparin/Lovenox:  No  Currently on ABX?: Yes; please explain: Augmentin and Doxycycline for osteomyelitis of left foot until 9/28/21  Other interacting medication:  None  Missed or refused doses: No    New admit to facility on 9/26/21, s/p toe amputation on 9/17/21.    Verbal Order/Direction given by Provider: Coumadin 2.5mg tonight, 5mg Tu,W. Recheck INR on 9/30/21.    Provider Giving Order:  MADISON Liriano    Verbal Order given to: Amber Graham RN

## 2021-09-27 NOTE — PROGRESS NOTES
ANTICOAGULATION  MANAGEMENT: Discharge Review    Bernadette Yu chart reviewed for anticoagulation continuity of care    Hospital Admission on 9/17-9/26 for Left foot osteomyelitis/amputation of second left digit and partial amp of left second metatarsal.     Discharge disposition: TCU. [facility]    Results:    Recent labs: (last 7 days)     09/21/21  0635 09/22/21  0611 09/23/21  0633 09/24/21  0629 09/25/21  0954 09/26/21  0642   INR 2.45* 2.62* 3.19* 3.37* 3.18* 3.18*     Anticoagulation inpatient management:     Held 9/17. Warfarin restarted 9/18, dosed inpatient by pharmacy. Anticoag calendar updated to reflect doses.     Anticoagulation discharge instructions:     Warfarin dosing: home regimen continued   Bridging: No   INR goal change: No      Medication changes affecting anticoagulation: Augmentin and doxycycline.     Additional factors affecting anticoagulation: No    Plan     No adjustment to anticoagulation plan needed    ACC will resume monitoring upon discharge from TCU    Anticoagulation Calendar updated    Meredith Patino RN

## 2021-09-28 PROBLEM — Z89.422: Status: ACTIVE | Noted: 2021-01-01

## 2021-09-28 PROBLEM — Z86.69 HISTORY OF ENCEPHALOPATHY: Status: ACTIVE | Noted: 2021-01-01

## 2021-09-28 NOTE — TELEPHONE ENCOUNTER
Irasema from [facility] is calling stating Bernadette was admitted to them on 8/26 without any wound care instructions, please fax to  [phone].    She is also stating that Bernadette is not following a non weight bearing protical. PT came into her room today, Bernadette was wearing both shoes and standing on one foot, attempting to put on her pants.  If she is to remain non weight bearing they needs new orders for PT.  Irasema can be reached at [phone]

## 2021-09-28 NOTE — PROGRESS NOTES
[facility]    Name:   Bernadette Yu  :   1940  MRN:    [id number]     Facility:   [facility] ([facility]) [[id number]]   Room: [facility] / [place] [address], later moved to [place] [address]  Code Status: FULL CODE and POLST AVAILABLE -     DOS:  2021  Previous visit:  N/A    PCP:  Gabino Bach    CHIEF COMPLAINT / REASON FOR VISIT:  Chief Complaint   Patient presents with     Hospital F/U     Left foot osteomyelitis s/p amputation of second left digit and partial amputation of left second metatarsal; Acute metabolic encephalopathy with delirium      [facility] from 2021 until 2021 (left foot osteomyelitis and abscess of the 2nd ray, s/p amputation of 2nd left digit and partial amputation of left 2nd metatarsal; acute metabolic encephalopathy with delirium)      HPI: Bernadette is a 81 year old female with a history of diabetes mellitus type 2 (with circulatory and kidney involvement), s/p mechanical mitral valve replacement (), paroxysmal atrial fibrillation, essential hypertension, and chronic pain syndrome, who was admitted in the podiatry service for left foot osteomyelitis. [facility] [place] was consulted for medical management. She underwent amputation of the second digit and partial amputation of the second metatarsal, along with I&D, on 2021. She received IV Zosyn and vancomycin given her history of MRSA. ID was consulted, and she was switched to Augmentin and doxycycline to finish course of antibiotics on 2021. She was to follow-up with Dr. Cleveland on 2021, and she was discharged to the TCU for PT/OT with NWB status of the left foot.    Diabetes mellitus type 2  -- Controlled after increasing Levemir to 30 units  -- Uncontrolled at home with recent A1c 9.0  -- PTA NovoLog 70/30, 30 units every morning/15 units every afternoon  -- Diabetic diet and carbohydrate counting ordered; however, TCU/facility does not do this    Acute metabolic encephalopathy  "with delirium  -- Secondary to above  -- Had rapid response on 09/20 due to decreased responsiveness  -- Did respond to Narcan x2  -- Was given scheduled quetiapine 25 mg every 6 hours x 4 doses on 09/19 and also required restraints, but not discharged on this  -- Decreased gabapentin and narcotic dose  -- Continued low dose olanzapine at night and discharged with these orders    Essential hypertension  -- Lasix initially held due to poor oral intake, then restarted  -- Metoprolol added for heart rate control, and diltiazem continued    Paroxysmal atrial fibrillation with RVR  -- Anticoagulation with warfarin    Chronic pain syndrome  -- On chronic hydromorphone and gabapentin with dosing decreased due to encephalopathy (see above)    Weakness and deconditioning  -- TCU with PT and OT upon discharge      CURRENT/RECENT TCU ISSUES    Disposition: Hospital discharge summary appears to indicate that the patient's encephalopathy/delirium had resolved; however, she is currently requiring 1:1 attention by nursing staff. She was up all night, ambulating through the halls despite NWB status (reminded several times), and venturing into other patient's rooms and climbing into their beds. She has not been in a private room, and she kept her roommate up all night. She has been very impulsive. When I saw her, her Ace wrap and Kerlix bandaging was in disarray, and she was attempting to remove the rest of the bandaging and dressing. She was screaming that she wanted the dressing off \"so we can start fresh.\" Attempts at redirection had minimal effect, and she responded with, \"this is not an easy place to do business with.\" There was a mention in the discharge summary of orders per Dr. Cleveland for a PRAFO. Pain appears significant.    She was complaining of pain, particularly in the arch of the left foot.    Pulse has been elevated (116 yesterday, 119 this morning). As noted, metoprolol was added in the hospital.    Patient is very " "confused. While she was able to give me the correct year and told me it was \"probably September,\" cognition is clearly impaired. She scored only 12/30 on the SLUMS. She denied feeling anxious, although she told me, \"I could just scream. I get so nervous in these places.\"    As noted, delirium appears to persist, and she may be hallucinating. She told me her  could help her to the bathroom. When asked where he was, she pointed to her right foot. Later, she asked, \"where did he go?\"    The plan was to move the patient off the TCU and up to the locked unit on the fourth floor. Before this note was completed, she was moved to [place] [address]. They are also looking for a \"busy box\" to keep her occupied.    PLAN: Current hydromorphone orders call for 2 mg every 6 hours as needed. We are going to increase that to every 3 hours as needed. We also going to discontinue her olanzapine and instead go with quetiapine 25 mg 3 times daily. As mentioned, she will also need a private room.    Anticoagulation: According to discharge summary, PCA warfarin dosing resumed.  INR on 09/26/2021 was 3.18.  Appears to be 5 mg daily.      ROS: Difficult to obtain a review of systems, although pain issues are discussed above. Otherwise, no headaches or chest pains, coughing or congestion, nausea or vomiting, dizziness or dyspnea, dysuria, constipation or diarrhea, or difficulty chewing or swallowing.    Past Medical History:   Diagnosis Date     Abdominal bloating 8/21/2016     Anticoagulation goal of INR 2.5 to 3.5 1/27/2016     Anxiety      Aortic stenosis 10/26/2019     ASCVD (arteriosclerotic cardiovascular disease)      Atherosclerosis of native coronary artery without angina pectoris 10/26/2019     Bleeding diathesis (H)      Carotid artery stenosis, left      CHF (congestive heart failure) (H)      CKD (chronic kidney disease) stage 3, GFR 30-59 ml/min      DM (diabetes mellitus), type 2 (H) 1990     Eczema      Former smoker      " Full code status      History of metabolic encephalopathy with delirium 9/28/2021     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     Moderate tricuspid insufficiency 8/28/2021     MRSA (methicillin resistant staph aureus) culture positive 2/9/2017     Normocytic anemia      PAD (peripheral artery disease) (H)     [facility]   DATE: 10/26/2019 4:15 PM   EXAM:  DUPLEX ARTERIAL ULTRASOUND OF THE LOWER EXTREMITIES BILATERALLY   INDICATION: Leg pain, vasculopathy.   COMPARISON: None.   TECHNIQUE: Duplex imaging is performed utilizing gray-scale, two-dimensional images, and color-flow imaging. Doppler waveform analysis and spectral Doppler imaging is also performed.   DUPLEX ARTERIAL ULTRASOUND FINDIN     Paroxysmal atrial fibrillation (H) 11/16/2015    Bilateral pulmonary vein isolation with AtriCure Synergy (bipolar radiofrequency ablation) device, exclusion of the left atrial appendage with the AtriCure AtriClip device.       Paroxysmal atrial fibrillation with RVR (H) 9/29/2021     PN (peripheral neuropathy)      Psoriasis      PVD (peripheral vascular disease) (H) 11/4/2019    Bilateral lower extremities.  See ultrasound 2019.     Recurrent UTI (urinary tract infection)      RLS (restless legs syndrome)      S/P CABG (coronary artery bypass graft) 1/8/2016     S/P colonoscopy 12/12/07     S/P MVR (mitral valve replacement) 12/30/2015     Subclavian artery stenosis, left (H) 11/16/2015    Stented in 2015.      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     Ulcer of heel and midfoot, left, with unspecified severity (H)               Family History   Problem Relation Age of Onset     Colon Cancer Father      Diabetes Father      Hypertension Mother      Heart Disease Mother          congestive heart failure     Arthritis Mother      Diabetes Sister      Heart Disease Brother      Rectal Cancer Son      Colon Cancer Son      Social History     Socioeconomic History     Marital status:      Spouse name: None     Number of children:  4     Years of education: None     Highest education level: None   Occupational History     None   Tobacco Use     Smoking status: Former Smoker     Years: 23.00     Types: Cigarettes     Smokeless tobacco: Never Used     Tobacco comment: quit 1970's   Substance and Sexual Activity     Alcohol use: No     Drug use: No     Sexual activity: Never   Other Topics Concern     None   Social History Narrative    Patient of Dr. Bach since 2001.   to  Aneudy.    4 children.    Former patient of Dr. Harshad Ballard.     Social Determinants of Health     Financial Resource Strain:      Difficulty of Paying Living Expenses:    Food Insecurity:      Worried About Running Out of Food in the Last Year:      Ran Out of Food in the Last Year:    Transportation Needs:      Lack of Transportation (Medical):      Lack of Transportation (Non-Medical):    Physical Activity:      Days of Exercise per Week:      Minutes of Exercise per Session:    Stress:      Feeling of Stress :    Social Connections:      Frequency of Communication with Friends and Family:      Frequency of Social Gatherings with Friends and Family:      Attends Protestant Services:      Active Member of Clubs or Organizations:      Attends Club or Organization Meetings:      Marital Status:    Intimate Partner Violence:      Fear of Current or Ex-Partner:      Emotionally Abused:      Physically Abused:      Sexually Abused:        MEDICATIONS:  "Reviewed from the MAR, physician orders, and/or earlier progress notes.  Post Discharge Medication Reconciliation Status: discharge medications reconciled and changed, per note/orders.  Updated by me today (09/28/2021) with discontinuation of olanzapine, the addition of quetiapine, and adjustment to hydromorphone reflected below.  Current Outpatient Medications   Medication Sig     QUEtiapine (SEROQUEL) 25 MG tablet Take 25 mg by mouth 3 times daily     acetaminophen (TYLENOL) 325 MG tablet Take 3 tablets (975 mg) by mouth 3 times daily     BD ULTRA-FINE DAVID PEN NEEDLE 32 gauge x 5/32\" Ndle [BD ULTRA-FINE DAVID PEN NEEDLE 32 GAUGE X 5/32\" NDLE] 1 each by abdominal subcutaneous route 2 (two) times a day. USE WITH NOVOLOG PENS.   Please keep this Rx on file for the next RF.     blood glucose test strips [BLOOD GLUCOSE TEST STRIPS] Use 1 each As Directed 3 (three) times a day. Dispense brand per patient's insurance at pharmacy discretion.     blood-glucose meter Misc [BLOOD-GLUCOSE METER MISC] Dispense 1 meter as covered by patient's insurance.     diltiazem ER COATED BEADS (CARDIZEM CD/CARTIA XT) 180 MG 24 hr capsule Take 1 capsule (180 mg) by mouth daily     ferrous sulfate 325 (65 FE) MG tablet [FERROUS SULFATE 325 (65 FE) MG TABLET] Take 1 tablet (325 mg total) by mouth 3 (three) times a week. Monday, Wednesday, and Friday     furosemide (LASIX) 40 MG tablet [FUROSEMIDE (LASIX) 40 MG TABLET] Take 2 tablets (80 mg total) by mouth daily.     gabapentin (NEURONTIN) 100 MG capsule Take 1 capsule (100 mg) by mouth At Bedtime     generic lancets [GENERIC LANCETS] Use 1 each As Directed 3 (three) times a day. Dx E11.65 DM2, uncontrolled     HYDROmorphone (DILAUDID) 2 MG tablet Take 1 tablet (2 mg) by mouth every 6 hours as needed for moderate to severe pain (Patient taking differently: Take 2 mg by mouth every 3 hours as needed for moderate to severe pain )     insulin aspart protamine-insulin aspart (NOVOLOG MIX " 70-30FLEXPEN U-100) 100 unit/mL (70-30) pen [INSULIN ASPART PROTAMINE-INSULIN ASPART (NOVOLOG MIX 70-30FLEXPEN U-100) 100 UNIT/ML (70-30) PEN] Inject 30 Units under the skin every morning AND 15 Units every evening.     levothyroxine (SYNTHROID, LEVOTHROID) 125 MCG tablet [LEVOTHYROXINE (SYNTHROID, LEVOTHROID) 125 MCG TABLET] Take 1 tablet (125 mcg total) by mouth daily.     metoprolol tartrate (LOPRESSOR) 50 MG tablet Take 1 tablet (50 mg) by mouth 2 times daily     mupirocin (BACTROBAN) 2 % external ointment Apply topically 2 times daily     polyethylene glycol (MIRALAX) 17 gram packet [POLYETHYLENE GLYCOL (MIRALAX) 17 GRAM PACKET] Take 1 packet (17 g total) by mouth daily.     senna-docusate (SENOKOT-S/PERICOLACE) 8.6-50 MG tablet Take 1 tablet by mouth 2 times daily     triamcinolone (KENALOG) 0.1 % external cream Apply topically daily     warfarin ANTICOAGULANT (COUMADIN/JANTOVEN) 5 MG tablet [WARFARIN ANTICOAGULANT (COUMADIN/JANTOVEN) 5 MG TABLET] Take 1 tablet (5 mg total) by mouth daily. Please adjust as recommended by anticoagulation nurse.     Current Facility-Administered Medications   Medication     lidocaine (XYLOCAINE) 2 % external gel     ALLERGIES:   Allergies   Allergen Reactions     Amiodarone Other (See Comments)     TORSADES DE POINTES     Aspirin Other (See Comments)     Recurrent severe gastrointestinal bleed.     DIET: Diabetic.    Vitals:    09/28/21 1248   BP: 121/82   Pulse: 117   Resp: 18   Temp: 97.8  F (36.6  C)   SpO2: 97%   Weight: 67.8 kg (149 lb 6.4 oz)   Height: 1.524 m (5')     Body mass index is 29.18 kg/m .    EXAMINATION:   General: Elderly female, sitting in a wheelchair, dragging her foot that just underwent surgery, very agitated and calling out.  Head: Normocephalic and atraumatic.   Eyes: PERRLA, sclerae clear.   ENT: Moist oral mucosa. She is edentulous but does not have her dentures. No rhinorrhea or nasal discharge. Hearing seems unimpaired.  Cardiovascular: Regular rate  and rhythm with a rubbing 4/6 pansystolic murmur with valve click at the left sternal border.   Respiratory: Lungs clear to auscultation bilaterally.   Abdomen: Nondistended.   Musculoskeletal/Extremities: Age-related degenerative joint disease. 2+ lower extremity edema on the left, trace on the right. Left lower extremity is loosely wrapped with Kerlix and Ace wrap. There is also a YUKO drain with minimal drainage.  Integument: Several areas of bruising on the arms from IVs and blood draws  Cognitive/Psychiatric: Cognitively impaired at least at this time and very agitated. Possible hallucinatory behavior.    DIAGNOSTICS:   Recent Results (from the past 240 hour(s))   Glucose by meter    Collection Time: 09/19/21  1:08 PM   Result Value Ref Range    GLUCOSE BY METER POCT 297 (H) 70 - 99 mg/dL   Glucose by meter    Collection Time: 09/19/21  4:45 PM   Result Value Ref Range    GLUCOSE BY METER POCT 200 (H) 70 - 99 mg/dL   UA reflex to Microscopic    Collection Time: 09/19/21  7:55 PM   Result Value Ref Range    Color Urine Colorless Colorless, Straw, Light Yellow, Yellow    Appearance Urine Clear Clear    Glucose Urine 100  (A) Negative mg/dL    Bilirubin Urine Negative Negative    Ketones Urine Negative Negative mg/dL    Specific Gravity Urine 1.010 1.001 - 1.030    Blood Urine Negative Negative    pH Urine 7.5 (H) 5.0 - 7.0    Protein Albumin Urine Negative Negative mg/dL    Urobilinogen Urine <2.0 <2.0 mg/dL    Nitrite Urine Negative Negative    Leukocyte Esterase Urine Negative Negative   Vancomycin level    Collection Time: 09/19/21  8:45 PM   Result Value Ref Range    Vancomycin 8.6   mg/L   Creatinine    Collection Time: 09/19/21  8:45 PM   Result Value Ref Range    Creatinine 0.87 0.60 - 1.10 mg/dL    GFR Estimate 62 >60 mL/min/1.73m2   Glucose by meter    Collection Time: 09/19/21  9:25 PM   Result Value Ref Range    GLUCOSE BY METER POCT 145 (H) 70 - 99 mg/dL   Glucose by meter    Collection Time: 09/20/21   3:06 AM   Result Value Ref Range    GLUCOSE BY METER POCT 122 (H) 70 - 99 mg/dL   INR    Collection Time: 09/20/21  8:11 AM   Result Value Ref Range    INR 1.79 (H) 0.85 - 1.15   Extra Red Top Tube    Collection Time: 09/20/21  8:11 AM   Result Value Ref Range    Hold Specimen JIC    Extra Purple Top Tube    Collection Time: 09/20/21  8:11 AM   Result Value Ref Range    Hold Specimen JIC    Glucose by meter    Collection Time: 09/20/21  9:01 AM   Result Value Ref Range    GLUCOSE BY METER POCT 150 (H) 70 - 99 mg/dL   Glucose by meter    Collection Time: 09/20/21 11:52 AM   Result Value Ref Range    GLUCOSE BY METER POCT 173 (H) 70 - 99 mg/dL   Glucose by meter    Collection Time: 09/20/21  5:16 PM   Result Value Ref Range    GLUCOSE BY METER POCT 143 (H) 70 - 99 mg/dL   Glucose by meter    Collection Time: 09/20/21  8:41 PM   Result Value Ref Range    GLUCOSE BY METER POCT 111 (H) 70 - 99 mg/dL   INR    Collection Time: 09/21/21  6:35 AM   Result Value Ref Range    INR 2.45 (H) 0.85 - 1.15   CBC with platelets    Collection Time: 09/21/21  6:35 AM   Result Value Ref Range    WBC Count 6.8 4.0 - 11.0 10e3/uL    RBC Count 3.96 3.80 - 5.20 10e6/uL    Hemoglobin 10.2 (L) 11.7 - 15.7 g/dL    Hematocrit 34.1 (L) 35.0 - 47.0 %    MCV 86 78 - 100 fL    MCH 25.8 (L) 26.5 - 33.0 pg    MCHC 29.9 (L) 31.5 - 36.5 g/dL    RDW 19.4 (H) 10.0 - 15.0 %    Platelet Count 179 150 - 450 10e3/uL   Basic metabolic panel    Collection Time: 09/21/21  6:35 AM   Result Value Ref Range    Sodium 142 136 - 145 mmol/L    Potassium 3.2 (L) 3.5 - 5.0 mmol/L    Chloride 111 (H) 98 - 107 mmol/L    Carbon Dioxide (CO2) 23 22 - 31 mmol/L    Anion Gap 8 5 - 18 mmol/L    Urea Nitrogen 13 8 - 28 mg/dL    Creatinine 0.92 0.60 - 1.10 mg/dL    Calcium 8.4 (L) 8.5 - 10.5 mg/dL    Glucose 182 (H) 70 - 125 mg/dL    GFR Estimate 58 (L) >60 mL/min/1.73m2   Glucose by meter    Collection Time: 09/21/21  6:53 AM   Result Value Ref Range    GLUCOSE BY METER POCT  179 (H) 70 - 99 mg/dL   ECG 12-LEAD WITH MUSE (LHE)    Collection Time: 09/21/21  7:08 AM   Result Value Ref Range    Systolic Blood Pressure  mmHg    Diastolic Blood Pressure  mmHg    Ventricular Rate 117 BPM    Atrial Rate 117 BPM    TX Interval 152 ms    QRS Duration 82 ms     ms    QTc 510 ms    P Axis  degrees    R AXIS -69 degrees    T Axis -64 degrees    Interpretation ECG       Sinus tachycardia  Pulmonary disease pattern  Left anterior fascicular block  Nonspecific ST and T wave abnormality  Abnormal ECG  When compared with ECG of 14-SEP-2020 13:16,  No significant change was found  Confirmed by AKIL BAUMAN MD LOC:JN ([id number]) on 9/22/2021 5:02:45 PM     Glucose by meter    Collection Time: 09/21/21  8:52 AM   Result Value Ref Range    GLUCOSE BY METER POCT 174 (H) 70 - 99 mg/dL   Glucose by meter    Collection Time: 09/21/21  1:01 PM   Result Value Ref Range    GLUCOSE BY METER POCT 199 (H) 70 - 99 mg/dL   Potassium    Collection Time: 09/21/21  4:30 PM   Result Value Ref Range    Potassium 3.4 (L) 3.5 - 5.0 mmol/L   Glucose by meter    Collection Time: 09/21/21  5:07 PM   Result Value Ref Range    GLUCOSE BY METER POCT 134 (H) 70 - 99 mg/dL   Extra Purple Top Tube    Collection Time: 09/21/21  5:24 PM   Result Value Ref Range    Hold Specimen JIC    Glucose by meter    Collection Time: 09/21/21  9:09 PM   Result Value Ref Range    GLUCOSE BY METER POCT 146 (H) 70 - 99 mg/dL   Potassium    Collection Time: 09/22/21 12:22 AM   Result Value Ref Range    Potassium 3.9 3.5 - 5.0 mmol/L   INR    Collection Time: 09/22/21  6:11 AM   Result Value Ref Range    INR 2.62 (H) 0.85 - 1.15   Extra Red Top Tube    Collection Time: 09/22/21  6:12 AM   Result Value Ref Range    Hold Specimen JIC    Glucose by meter    Collection Time: 09/22/21  8:30 AM   Result Value Ref Range    GLUCOSE BY METER POCT 106 (H) 70 - 99 mg/dL   Glucose by meter    Collection Time: 09/22/21 12:26 PM   Result Value Ref Range     GLUCOSE BY METER POCT 162 (H) 70 - 99 mg/dL   Glucose by meter    Collection Time: 09/22/21  6:06 PM   Result Value Ref Range    GLUCOSE BY METER POCT 83 70 - 99 mg/dL   Glucose by meter    Collection Time: 09/22/21  9:13 PM   Result Value Ref Range    GLUCOSE BY METER POCT 138 (H) 70 - 99 mg/dL   INR    Collection Time: 09/23/21  6:33 AM   Result Value Ref Range    INR 3.19 (H) 0.90 - 1.15   Creatinine    Collection Time: 09/23/21  6:33 AM   Result Value Ref Range    Creatinine 0.82 0.60 - 1.10 mg/dL    GFR Estimate 66 >60 mL/min/1.73m2   CBC with platelets    Collection Time: 09/23/21  6:33 AM   Result Value Ref Range    WBC Count 6.5 4.0 - 11.0 10e3/uL    RBC Count 4.33 3.80 - 5.20 10e6/uL    Hemoglobin 11.3 (L) 11.7 - 15.7 g/dL    Hematocrit 37.6 35.0 - 47.0 %    MCV 87 78 - 100 fL    MCH 26.1 (L) 26.5 - 33.0 pg    MCHC 30.1 (L) 31.5 - 36.5 g/dL    RDW 19.6 (H) 10.0 - 15.0 %    Platelet Count 173 150 - 450 10e3/uL   Comprehensive metabolic panel    Collection Time: 09/23/21  6:33 AM   Result Value Ref Range    Sodium 143 136 - 145 mmol/L    Potassium 3.8 3.5 - 5.0 mmol/L    Chloride 114 (H) 98 - 107 mmol/L    Carbon Dioxide (CO2) 23 22 - 31 mmol/L    Anion Gap 6 5 - 18 mmol/L    Urea Nitrogen 13 8 - 28 mg/dL    Creatinine 0.82 0.60 - 1.10 mg/dL    Calcium 9.2 8.5 - 10.5 mg/dL    Glucose 109 70 - 125 mg/dL    Alkaline Phosphatase 52 45 - 120 U/L    AST 18 0 - 40 U/L    ALT <9 0 - 45 U/L    Protein Total 7.6 6.0 - 8.0 g/dL    Albumin 3.1 (L) 3.5 - 5.0 g/dL    Bilirubin Total 0.5 0.0 - 1.0 mg/dL    GFR Estimate 66 >60 mL/min/1.73m2   Glucose by meter    Collection Time: 09/23/21  7:33 AM   Result Value Ref Range    GLUCOSE BY METER POCT 110 (H) 70 - 99 mg/dL   Glucose by meter    Collection Time: 09/23/21  1:24 PM   Result Value Ref Range    GLUCOSE BY METER POCT 171 (H) 70 - 99 mg/dL   Glucose by meter    Collection Time: 09/23/21  4:50 PM   Result Value Ref Range    GLUCOSE BY METER POCT 150 (H) 70 - 99 mg/dL    Vancomycin level    Collection Time: 09/23/21  6:05 PM   Result Value Ref Range    Vancomycin 14.9   mg/L   Glucose by meter    Collection Time: 09/23/21  9:09 PM   Result Value Ref Range    GLUCOSE BY METER POCT 192 (H) 70 - 99 mg/dL   INR    Collection Time: 09/24/21  6:29 AM   Result Value Ref Range    INR 3.37 (H) 0.90 - 1.15   Potassium    Collection Time: 09/24/21  6:29 AM   Result Value Ref Range    Potassium 3.5 3.5 - 5.0 mmol/L   Extra Purple Top Tube    Collection Time: 09/24/21  6:29 AM   Result Value Ref Range    Hold Specimen JIC    Glucose by meter    Collection Time: 09/24/21  8:17 AM   Result Value Ref Range    GLUCOSE BY METER POCT 173 (H) 70 - 99 mg/dL   ECG 12-LEAD WITH MUSE (LHE)    Collection Time: 09/24/21  8:19 AM   Result Value Ref Range    Systolic Blood Pressure  mmHg    Diastolic Blood Pressure  mmHg    Ventricular Rate 122 BPM    Atrial Rate 122 BPM    NE Interval  ms    QRS Duration 82 ms     ms    QTc 501 ms    P Axis  degrees    R AXIS -82 degrees    T Axis 61 degrees    Interpretation ECG       Sinus tachycardia  Pulmonary disease pattern  Left anterior fascicular block  Nonspecific ST and T wave abnormality  Abnormal ECG  When compared with ECG of 21-SEP-2021 07:08,  No significant change was found    Confirmed by JUVENTINO MIRANDA MD LOC:WW ([id number]) on 9/24/2021 3:59:13 PM     Glucose by meter    Collection Time: 09/24/21 11:41 AM   Result Value Ref Range    GLUCOSE BY METER POCT 174 (H) 70 - 99 mg/dL   Glucose by meter    Collection Time: 09/24/21  5:00 PM   Result Value Ref Range    GLUCOSE BY METER POCT 143 (H) 70 - 99 mg/dL   Lactic Acid STAT    Collection Time: 09/24/21  5:32 PM   Result Value Ref Range    Lactic Acid 1.6 0.7 - 2.0 mmol/L   Asymptomatic COVID-19 Virus (Coronavirus) by PCR Nasopharyngeal    Collection Time: 09/24/21  7:36 PM    Specimen: Nasopharyngeal; Swab   Result Value Ref Range    SARS CoV2 PCR Negative Negative   Glucose by meter    Collection Time:  09/24/21  9:47 PM   Result Value Ref Range    GLUCOSE BY METER POCT 163 (H) 70 - 99 mg/dL   Glucose by meter    Collection Time: 09/25/21  8:45 AM   Result Value Ref Range    GLUCOSE BY METER POCT 166 (H) 70 - 99 mg/dL   INR    Collection Time: 09/25/21  9:54 AM   Result Value Ref Range    INR 3.18 (H) 0.90 - 1.15   Creatinine    Collection Time: 09/25/21  9:54 AM   Result Value Ref Range    Creatinine 0.90 0.60 - 1.10 mg/dL    GFR Estimate 59 (L) >60 mL/min/1.73m2   Potassium    Collection Time: 09/25/21  9:54 AM   Result Value Ref Range    Potassium 3.9 3.5 - 5.0 mmol/L   Glucose by meter    Collection Time: 09/25/21  1:34 PM   Result Value Ref Range    GLUCOSE BY METER POCT 153 (H) 70 - 99 mg/dL   Glucose by meter    Collection Time: 09/25/21  4:53 PM   Result Value Ref Range    GLUCOSE BY METER POCT 263 (H) 70 - 99 mg/dL   TSH    Collection Time: 09/25/21  7:18 PM   Result Value Ref Range    TSH 3.84 0.30 - 5.00 uIU/mL   Glucose by meter    Collection Time: 09/25/21 10:14 PM   Result Value Ref Range    GLUCOSE BY METER POCT 83 70 - 99 mg/dL   INR    Collection Time: 09/26/21  6:42 AM   Result Value Ref Range    INR 3.18 (H) 0.90 - 1.15   Potassium    Collection Time: 09/26/21  6:42 AM   Result Value Ref Range    Potassium 3.7 3.5 - 5.0 mmol/L   Glucose by meter    Collection Time: 09/26/21  8:08 AM   Result Value Ref Range    GLUCOSE BY METER POCT 195 (H) 70 - 99 mg/dL   Glucose by meter    Collection Time: 09/26/21 12:15 PM   Result Value Ref Range    GLUCOSE BY METER POCT 176 (H) 70 - 99 mg/dL   COVID-19 Virus (Coronavirus) by PCR Nasopharyngeal    Collection Time: 09/26/21  1:08 PM    Specimen: Nasopharyngeal; Swab   Result Value Ref Range    COVID-19 Virus PCR - Result Not Detected        ASSESSMENT/Plan:      ICD-10-CM    1. Osteomyelitis of left foot, unspecified type (H)  M86.9    2. 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 History of metabolic encephalopathy with  delirium  Z86.69    4. Type 2 diabetes mellitus with other circulatory complications (H)  E11.59    5. Stage 3a chronic kidney disease (H)  N18.31    6. S/P mitral valve replacement (MVR) - mechanical mitral valve placed 2015  Z95.2    7. Paroxysmal atrial fibrillation with RVR (H)  I48.0    8. Essential hypertension  I10    9. Physical deconditioning  R53.81        CHANGES:    1.  Discontinue olanzapine.  2.  Start quetiapine 25 mg 3 times daily.  3.  Increase hydromorphone from 2 mg every 6 hours as needed to 2 mg every 3 hours as needed.  4.  Patient requires private room.    CARE PLAN:    The care plan, medications, vital signs, orders, and nursing notes have been reviewed, and all orders signed. Changes to care plan, if any, as noted. Otherwise, continue current plan of care.  Total time spent with this patient was 52 minutes, with greater than 50% spent in counseling and coordination of care that included 1:1 attention, clarification of orders, and writing of new ones given the patient's current delirium status.    The above has been created using voice recognition software. Please be aware that this may unintentionally  produce inaccuracies and/or nonsensical sentences.      Electronically signed by: ELVIS Roberts CNP

## 2021-09-28 NOTE — TELEPHONE ENCOUNTER
Left message for Irasema, patient is to remain non-weightbearing on left foot.  Also, leave dressing intact until patient's follow up appointment.  Appointment scheduled for 10/1/21, 9:10 AM check in.

## 2021-09-28 NOTE — LETTER
2021        RE: Bernadette Yu  [address] [place] [address]        [facility]    Name:   Bernadette Yu  :   1940  MRN:    [id number]     Facility:   [facility]) [[id number]]   Room: [facility] / [place] [address], later moved to [place] [address]  Code Status: FULL CODE and POLST AVAILABLE -     DOS:  2021  Previous visit:  N/A    PCP:  Gabino Bach    CHIEF COMPLAINT / REASON FOR VISIT:  Chief Complaint   Patient presents with     Hospital F/U     Left foot osteomyelitis s/p amputation of second left digit and partial amputation of left second metatarsal; Acute metabolic encephalopathy with delirium      [facility] from 2021 until 2021 (left foot osteomyelitis and abscess of the 2nd ray, s/p amputation of 2nd left digit and partial amputation of left 2nd metatarsal; acute metabolic encephalopathy with delirium)      HPI: Bernadette is a 81 year old female with a history of diabetes mellitus type 2 (with circulatory and kidney involvement), s/p mechanical mitral valve replacement (), paroxysmal atrial fibrillation, essential hypertension, and chronic pain syndrome, who was admitted in the podiatry service for left foot osteomyelitis. [facility] was consulted for medical management. She underwent amputation of the second digit and partial amputation of the second metatarsal, along with I&D, on 2021. She received IV Zosyn and vancomycin given her history of MRSA. ID was consulted, and she was switched to Augmentin and doxycycline to finish course of antibiotics on 2021. She was to follow-up with Dr. Cleveland on 2021, and she was discharged to the [facility] for PT/OT with NWB status of the left foot.    Diabetes mellitus type 2  -- Controlled after increasing Levemir to 30 units  -- Uncontrolled at home with recent A1c 9.0  -- PTA NovoLog 70/30, 30 units every morning/15 units every afternoon  -- Diabetic diet and carbohydrate  "counting ordered; however, TCU/facility does not do this    Acute metabolic encephalopathy with delirium  -- Secondary to above  -- Had rapid response on 09/20 due to decreased responsiveness  -- Did respond to Narcan x2  -- Was given scheduled quetiapine 25 mg every 6 hours x 4 doses on 09/19 and also required restraints, but not discharged on this  -- Decreased gabapentin and narcotic dose  -- Continued low dose olanzapine at night and discharged with these orders    Essential hypertension  -- Lasix initially held due to poor oral intake, then restarted  -- Metoprolol added for heart rate control, and diltiazem continued    Paroxysmal atrial fibrillation with RVR  -- Anticoagulation with warfarin    Chronic pain syndrome  -- On chronic hydromorphone and gabapentin with dosing decreased due to encephalopathy (see above)    Weakness and deconditioning  -- TCU with PT and OT upon discharge      CURRENT/RECENT TCU ISSUES    Disposition: Hospital discharge summary appears to indicate that the patient's encephalopathy/delirium had resolved; however, she is currently requiring 1:1 attention by nursing staff. She was up all night, ambulating through the halls despite NWB status (reminded several times), and venturing into other patient's rooms and climbing into their beds. She has not been in a private room, and she kept her roommate up all night. She has been very impulsive. When I saw her, her Ace wrap and Kerlix bandaging was in disarray, and she was attempting to remove the rest of the bandaging and dressing. She was screaming that she wanted the dressing off \"so we can start fresh.\" Attempts at redirection had minimal effect, and she responded with, \"this is not an easy place to do business with.\" There was a mention in the discharge summary of orders per Dr. Cleveland for a PRAFO. Pain appears significant.    She was complaining of pain, particularly in the arch of the left foot.    Pulse has been elevated (116 " "yesterday, 119 this morning). As noted, metoprolol was added in the hospital.    Patient is very confused. While she was able to give me the correct year and told me it was \"probably September,\" cognition is clearly impaired. She scored only 12/30 on the SLUMS. She denied feeling anxious, although she told me, \"I could just scream. I get so nervous in these places.\"    As noted, delirium appears to persist, and she may be hallucinating. She told me her  could help her to the bathroom. When asked where he was, she pointed to her right foot. Later, she asked, \"where did he go?\"    The plan was to move the patient off the TCU and up to the locked unit on the fourth floor. Before this note was completed, she was moved to [place] [address]. They are also looking for a \"busy box\" to keep her occupied.    PLAN: Current hydromorphone orders call for 2 mg every 6 hours as needed. We are going to increase that to every 3 hours as needed. We also going to discontinue her olanzapine and instead go with quetiapine 25 mg 3 times daily. As mentioned, she will also need a private room.    Anticoagulation: According to discharge summary, PCA warfarin dosing resumed.  INR on 09/26/2021 was 3.18.  Appears to be 5 mg daily.      ROS: Difficult to obtain a review of systems, although pain issues are discussed above. Otherwise, no headaches or chest pains, coughing or congestion, nausea or vomiting, dizziness or dyspnea, dysuria, constipation or diarrhea, or difficulty chewing or swallowing.    Past Medical History:   Diagnosis Date     Abdominal bloating 8/21/2016     Anticoagulation goal of INR 2.5 to 3.5 1/27/2016     Anxiety      Aortic stenosis 10/26/2019     ASCVD (arteriosclerotic cardiovascular disease)      Atherosclerosis of native coronary artery without angina pectoris 10/26/2019     Bleeding diathesis (H)      Carotid artery stenosis, left      CHF (congestive heart failure) (H)      CKD (chronic kidney disease) stage 3, " GFR 30-59 ml/min      DM (diabetes mellitus), type 2 (H) 1990     Eczema      Former smoker      Full code status      History of metabolic encephalopathy with delirium 9/28/2021     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     Moderate tricuspid insufficiency 8/28/2021     MRSA (methicillin resistant staph aureus) culture positive 2/9/2017     Normocytic anemia      PAD (peripheral artery disease) (H)     [facility]   DATE: 10/26/2019 4:15 PM   EXAM:  DUPLEX ARTERIAL ULTRASOUND OF THE LOWER EXTREMITIES BILATERALLY   INDICATION: Leg pain, vasculopathy.   COMPARISON: None.   TECHNIQUE: Duplex imaging is performed utilizing gray-scale, two-dimensional images, and color-flow imaging. Doppler waveform analysis and spectral Doppler imaging is also performed.   DUPLEX ARTERIAL ULTRASOUND FINDIN     Paroxysmal atrial fibrillation (H) 11/16/2015    Bilateral pulmonary vein isolation with AtriCure Synergy (bipolar radiofrequency ablation) device, exclusion of the left atrial appendage with the AtriCure AtriClip device.       Paroxysmal atrial fibrillation with RVR (H) 9/29/2021     PN (peripheral neuropathy)      Psoriasis      PVD (peripheral vascular disease) (H) 11/4/2019    Bilateral lower extremities.  See ultrasound 2019.     Recurrent UTI (urinary tract infection)      RLS (restless legs syndrome)      S/P CABG (coronary artery bypass graft) 1/8/2016     S/P colonoscopy 12/12/07     S/P MVR (mitral valve replacement) 12/30/2015     Subclavian artery  stenosis, left (H) 11/16/2015    Stented in 2015.      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     Ulcer of heel and midfoot, left, with unspecified severity (H)               Family History   Problem Relation Age of Onset     Colon Cancer Father      Diabetes Father      Hypertension Mother      Heart Disease Mother          congestive heart failure     Arthritis Mother      Diabetes Sister      Heart Disease Brother      Rectal Cancer Son      Colon Cancer Son      Social History     Socioeconomic History     Marital status:      Spouse name: None     Number of children:  4     Years of education: None     Highest education level: None   Occupational History     None   Tobacco Use     Smoking status: Former Smoker     Years: 23.00     Types: Cigarettes     Smokeless tobacco: Never Used     Tobacco comment: quit 1970's   Substance and Sexual Activity     Alcohol use: No     Drug use: No     Sexual activity: Never   Other Topics Concern     None   Social History Narrative    Patient of Dr. Bach since 2001.   to  Aneudy.    4 children.    Former patient of Dr. Harshad Ballard.     Social Determinants of Health     Financial Resource Strain:      Difficulty of Paying Living Expenses:    Food Insecurity:      Worried About Running Out of Food in the Last Year:      Ran Out of Food in the Last Year:    Transportation Needs:      Lack of Transportation (Medical):      Lack of Transportation (Non-Medical):    Physical Activity:      Days of Exercise per Week:      Minutes of Exercise per Session:    Stress:      Feeling of Stress :    Social Connections:      Frequency of Communication with Friends and Family:      Frequency of Social Gatherings with Friends and Family:      Attends Sabianism Services:      Active Member of Clubs or Organizations:      Attends Club or Organization Meetings:      Marital Status:    Intimate Partner Violence:      Fear of Current or Ex-Partner:       "Emotionally Abused:      Physically Abused:      Sexually Abused:        MEDICATIONS: Reviewed from the MAR, physician orders, and/or earlier progress notes.  Post Discharge Medication Reconciliation Status: discharge medications reconciled and changed, per note/orders.  Updated by me today (09/28/2021) with discontinuation of olanzapine, the addition of quetiapine, and adjustment to hydromorphone reflected below.  Current Outpatient Medications   Medication Sig     QUEtiapine (SEROQUEL) 25 MG tablet Take 25 mg by mouth 3 times daily     acetaminophen (TYLENOL) 325 MG tablet Take 3 tablets (975 mg) by mouth 3 times daily     BD ULTRA-FINE DAVID PEN NEEDLE 32 gauge x 5/32\" Ndle [BD ULTRA-FINE DAVID PEN NEEDLE 32 GAUGE X 5/32\" NDLE] 1 each by abdominal subcutaneous route 2 (two) times a day. USE WITH NOVOLOG PENS.   Please keep this Rx on file for the next RF.     blood glucose test strips [BLOOD GLUCOSE TEST STRIPS] Use 1 each As Directed 3 (three) times a day. Dispense brand per patient's insurance at pharmacy discretion.     blood-glucose meter Misc [BLOOD-GLUCOSE METER MISC] Dispense 1 meter as covered by patient's insurance.     diltiazem ER COATED BEADS (CARDIZEM CD/CARTIA XT) 180 MG 24 hr capsule Take 1 capsule (180 mg) by mouth daily     ferrous sulfate 325 (65 FE) MG tablet [FERROUS SULFATE 325 (65 FE) MG TABLET] Take 1 tablet (325 mg total) by mouth 3 (three) times a week. Monday, Wednesday, and Friday     furosemide (LASIX) 40 MG tablet [FUROSEMIDE (LASIX) 40 MG TABLET] Take 2 tablets (80 mg total) by mouth daily.     gabapentin (NEURONTIN) 100 MG capsule Take 1 capsule (100 mg) by mouth At Bedtime     generic lancets [GENERIC LANCETS] Use 1 each As Directed 3 (three) times a day. Dx E11.65 DM2, uncontrolled     HYDROmorphone (DILAUDID) 2 MG tablet Take 1 tablet (2 mg) by mouth every 6 hours as needed for moderate to severe pain (Patient taking differently: Take 2 mg by mouth every 3 hours as needed for " moderate to severe pain )     insulin aspart protamine-insulin aspart (NOVOLOG MIX 70-30FLEXPEN U-100) 100 unit/mL (70-30) pen [INSULIN ASPART PROTAMINE-INSULIN ASPART (NOVOLOG MIX 70-30FLEXPEN U-100) 100 UNIT/ML (70-30) PEN] Inject 30 Units under the skin every morning AND 15 Units every evening.     levothyroxine (SYNTHROID, LEVOTHROID) 125 MCG tablet [LEVOTHYROXINE (SYNTHROID, LEVOTHROID) 125 MCG TABLET] Take 1 tablet (125 mcg total) by mouth daily.     metoprolol tartrate (LOPRESSOR) 50 MG tablet Take 1 tablet (50 mg) by mouth 2 times daily     mupirocin (BACTROBAN) 2 % external ointment Apply topically 2 times daily     polyethylene glycol (MIRALAX) 17 gram packet [POLYETHYLENE GLYCOL (MIRALAX) 17 GRAM PACKET] Take 1 packet (17 g total) by mouth daily.     senna-docusate (SENOKOT-S/PERICOLACE) 8.6-50 MG tablet Take 1 tablet by mouth 2 times daily     triamcinolone (KENALOG) 0.1 % external cream Apply topically daily     warfarin ANTICOAGULANT (COUMADIN/JANTOVEN) 5 MG tablet [WARFARIN ANTICOAGULANT (COUMADIN/JANTOVEN) 5 MG TABLET] Take 1 tablet (5 mg total) by mouth daily. Please adjust as recommended by anticoagulation nurse.     Current Facility-Administered Medications   Medication     lidocaine (XYLOCAINE) 2 % external gel     ALLERGIES:   Allergies   Allergen Reactions     Amiodarone Other (See Comments)     TORSADES DE POINTES     Aspirin Other (See Comments)     Recurrent severe gastrointestinal bleed.     DIET: Diabetic.    Vitals:    09/28/21 1248   BP: 121/82   Pulse: 117   Resp: 18   Temp: 97.8  F (36.6  C)   SpO2: 97%   Weight: 67.8 kg (149 lb 6.4 oz)   Height: 1.524 m (5')     Body mass index is 29.18 kg/m .    EXAMINATION:   General: Elderly female, sitting in a wheelchair, dragging her foot that just underwent surgery, very agitated and calling out.  Head: Normocephalic and atraumatic.   Eyes: PERRLA, sclerae clear.   ENT: Moist oral mucosa. She is edentulous but does not have her dentures. No  rhinorrhea or nasal discharge. Hearing seems unimpaired.  Cardiovascular: Regular rate and rhythm with a rubbing 4/6 pansystolic murmur with valve click at the left sternal border.   Respiratory: Lungs clear to auscultation bilaterally.   Abdomen: Nondistended.   Musculoskeletal/Extremities: Age-related degenerative joint disease. 2+ lower extremity edema on the left, trace on the right. Left lower extremity is loosely wrapped with Kerlix and Ace wrap. There is also a YUKO drain with minimal drainage.  Integument: Several areas of bruising on the arms from IVs and blood draws  Cognitive/Psychiatric: Cognitively impaired at least at this time and very agitated. Possible hallucinatory behavior.    DIAGNOSTICS:   Recent Results (from the past 240 hour(s))   Glucose by meter    Collection Time: 09/19/21  1:08 PM   Result Value Ref Range    GLUCOSE BY METER POCT 297 (H) 70 - 99 mg/dL   Glucose by meter    Collection Time: 09/19/21  4:45 PM   Result Value Ref Range    GLUCOSE BY METER POCT 200 (H) 70 - 99 mg/dL   UA reflex to Microscopic    Collection Time: 09/19/21  7:55 PM   Result Value Ref Range    Color Urine Colorless Colorless, Straw, Light Yellow, Yellow    Appearance Urine Clear Clear    Glucose Urine 100  (A) Negative mg/dL    Bilirubin Urine Negative Negative    Ketones Urine Negative Negative mg/dL    Specific Gravity Urine 1.010 1.001 - 1.030    Blood Urine Negative Negative    pH Urine 7.5 (H) 5.0 - 7.0    Protein Albumin Urine Negative Negative mg/dL    Urobilinogen Urine <2.0 <2.0 mg/dL    Nitrite Urine Negative Negative    Leukocyte Esterase Urine Negative Negative   Vancomycin level    Collection Time: 09/19/21  8:45 PM   Result Value Ref Range    Vancomycin 8.6   mg/L   Creatinine    Collection Time: 09/19/21  8:45 PM   Result Value Ref Range    Creatinine 0.87 0.60 - 1.10 mg/dL    GFR Estimate 62 >60 mL/min/1.73m2   Glucose by meter    Collection Time: 09/19/21  9:25 PM   Result Value Ref Range     GLUCOSE BY METER POCT 145 (H) 70 - 99 mg/dL   Glucose by meter    Collection Time: 09/20/21  3:06 AM   Result Value Ref Range    GLUCOSE BY METER POCT 122 (H) 70 - 99 mg/dL   INR    Collection Time: 09/20/21  8:11 AM   Result Value Ref Range    INR 1.79 (H) 0.85 - 1.15   Extra Red Top Tube    Collection Time: 09/20/21  8:11 AM   Result Value Ref Range    Hold Specimen JIC    Extra Purple Top Tube    Collection Time: 09/20/21  8:11 AM   Result Value Ref Range    Hold Specimen JIC    Glucose by meter    Collection Time: 09/20/21  9:01 AM   Result Value Ref Range    GLUCOSE BY METER POCT 150 (H) 70 - 99 mg/dL   Glucose by meter    Collection Time: 09/20/21 11:52 AM   Result Value Ref Range    GLUCOSE BY METER POCT 173 (H) 70 - 99 mg/dL   Glucose by meter    Collection Time: 09/20/21  5:16 PM   Result Value Ref Range    GLUCOSE BY METER POCT 143 (H) 70 - 99 mg/dL   Glucose by meter    Collection Time: 09/20/21  8:41 PM   Result Value Ref Range    GLUCOSE BY METER POCT 111 (H) 70 - 99 mg/dL   INR    Collection Time: 09/21/21  6:35 AM   Result Value Ref Range    INR 2.45 (H) 0.85 - 1.15   CBC with platelets    Collection Time: 09/21/21  6:35 AM   Result Value Ref Range    WBC Count 6.8 4.0 - 11.0 10e3/uL    RBC Count 3.96 3.80 - 5.20 10e6/uL    Hemoglobin 10.2 (L) 11.7 - 15.7 g/dL    Hematocrit 34.1 (L) 35.0 - 47.0 %    MCV 86 78 - 100 fL    MCH 25.8 (L) 26.5 - 33.0 pg    MCHC 29.9 (L) 31.5 - 36.5 g/dL    RDW 19.4 (H) 10.0 - 15.0 %    Platelet Count 179 150 - 450 10e3/uL   Basic metabolic panel    Collection Time: 09/21/21  6:35 AM   Result Value Ref Range    Sodium 142 136 - 145 mmol/L    Potassium 3.2 (L) 3.5 - 5.0 mmol/L    Chloride 111 (H) 98 - 107 mmol/L    Carbon Dioxide (CO2) 23 22 - 31 mmol/L    Anion Gap 8 5 - 18 mmol/L    Urea Nitrogen 13 8 - 28 mg/dL    Creatinine 0.92 0.60 - 1.10 mg/dL    Calcium 8.4 (L) 8.5 - 10.5 mg/dL    Glucose 182 (H) 70 - 125 mg/dL    GFR Estimate 58 (L) >60 mL/min/1.73m2   Glucose by  meter    Collection Time: 09/21/21  6:53 AM   Result Value Ref Range    GLUCOSE BY METER POCT 179 (H) 70 - 99 mg/dL   ECG 12-LEAD WITH MUSE (LHE)    Collection Time: 09/21/21  7:08 AM   Result Value Ref Range    Systolic Blood Pressure  mmHg    Diastolic Blood Pressure  mmHg    Ventricular Rate 117 BPM    Atrial Rate 117 BPM    NY Interval 152 ms    QRS Duration 82 ms     ms    QTc 510 ms    P Axis  degrees    R AXIS -69 degrees    T Axis -64 degrees    Interpretation ECG       Sinus tachycardia  Pulmonary disease pattern  Left anterior fascicular block  Nonspecific ST and T wave abnormality  Abnormal ECG  When compared with ECG of 14-SEP-2020 13:16,  No significant change was found  Confirmed by AKIL BAUMAN MD LOC:JN ([id number]) on 9/22/2021 5:02:45 PM     Glucose by meter    Collection Time: 09/21/21  8:52 AM   Result Value Ref Range    GLUCOSE BY METER POCT 174 (H) 70 - 99 mg/dL   Glucose by meter    Collection Time: 09/21/21  1:01 PM   Result Value Ref Range    GLUCOSE BY METER POCT 199 (H) 70 - 99 mg/dL   Potassium    Collection Time: 09/21/21  4:30 PM   Result Value Ref Range    Potassium 3.4 (L) 3.5 - 5.0 mmol/L   Glucose by meter    Collection Time: 09/21/21  5:07 PM   Result Value Ref Range    GLUCOSE BY METER POCT 134 (H) 70 - 99 mg/dL   Extra Purple Top Tube    Collection Time: 09/21/21  5:24 PM   Result Value Ref Range    Hold Specimen JIC    Glucose by meter    Collection Time: 09/21/21  9:09 PM   Result Value Ref Range    GLUCOSE BY METER POCT 146 (H) 70 - 99 mg/dL   Potassium    Collection Time: 09/22/21 12:22 AM   Result Value Ref Range    Potassium 3.9 3.5 - 5.0 mmol/L   INR    Collection Time: 09/22/21  6:11 AM   Result Value Ref Range    INR 2.62 (H) 0.85 - 1.15   Extra Red Top Tube    Collection Time: 09/22/21  6:12 AM   Result Value Ref Range    Hold Specimen JIC    Glucose by meter    Collection Time: 09/22/21  8:30 AM   Result Value Ref Range    GLUCOSE BY METER POCT 106 (H) 70 - 99  mg/dL   Glucose by meter    Collection Time: 09/22/21 12:26 PM   Result Value Ref Range    GLUCOSE BY METER POCT 162 (H) 70 - 99 mg/dL   Glucose by meter    Collection Time: 09/22/21  6:06 PM   Result Value Ref Range    GLUCOSE BY METER POCT 83 70 - 99 mg/dL   Glucose by meter    Collection Time: 09/22/21  9:13 PM   Result Value Ref Range    GLUCOSE BY METER POCT 138 (H) 70 - 99 mg/dL   INR    Collection Time: 09/23/21  6:33 AM   Result Value Ref Range    INR 3.19 (H) 0.90 - 1.15   Creatinine    Collection Time: 09/23/21  6:33 AM   Result Value Ref Range    Creatinine 0.82 0.60 - 1.10 mg/dL    GFR Estimate 66 >60 mL/min/1.73m2   CBC with platelets    Collection Time: 09/23/21  6:33 AM   Result Value Ref Range    WBC Count 6.5 4.0 - 11.0 10e3/uL    RBC Count 4.33 3.80 - 5.20 10e6/uL    Hemoglobin 11.3 (L) 11.7 - 15.7 g/dL    Hematocrit 37.6 35.0 - 47.0 %    MCV 87 78 - 100 fL    MCH 26.1 (L) 26.5 - 33.0 pg    MCHC 30.1 (L) 31.5 - 36.5 g/dL    RDW 19.6 (H) 10.0 - 15.0 %    Platelet Count 173 150 - 450 10e3/uL   Comprehensive metabolic panel    Collection Time: 09/23/21  6:33 AM   Result Value Ref Range    Sodium 143 136 - 145 mmol/L    Potassium 3.8 3.5 - 5.0 mmol/L    Chloride 114 (H) 98 - 107 mmol/L    Carbon Dioxide (CO2) 23 22 - 31 mmol/L    Anion Gap 6 5 - 18 mmol/L    Urea Nitrogen 13 8 - 28 mg/dL    Creatinine 0.82 0.60 - 1.10 mg/dL    Calcium 9.2 8.5 - 10.5 mg/dL    Glucose 109 70 - 125 mg/dL    Alkaline Phosphatase 52 45 - 120 U/L    AST 18 0 - 40 U/L    ALT <9 0 - 45 U/L    Protein Total 7.6 6.0 - 8.0 g/dL    Albumin 3.1 (L) 3.5 - 5.0 g/dL    Bilirubin Total 0.5 0.0 - 1.0 mg/dL    GFR Estimate 66 >60 mL/min/1.73m2   Glucose by meter    Collection Time: 09/23/21  7:33 AM   Result Value Ref Range    GLUCOSE BY METER POCT 110 (H) 70 - 99 mg/dL   Glucose by meter    Collection Time: 09/23/21  1:24 PM   Result Value Ref Range    GLUCOSE BY METER POCT 171 (H) 70 - 99 mg/dL   Glucose by meter    Collection Time:  09/23/21  4:50 PM   Result Value Ref Range    GLUCOSE BY METER POCT 150 (H) 70 - 99 mg/dL   Vancomycin level    Collection Time: 09/23/21  6:05 PM   Result Value Ref Range    Vancomycin 14.9   mg/L   Glucose by meter    Collection Time: 09/23/21  9:09 PM   Result Value Ref Range    GLUCOSE BY METER POCT 192 (H) 70 - 99 mg/dL   INR    Collection Time: 09/24/21  6:29 AM   Result Value Ref Range    INR 3.37 (H) 0.90 - 1.15   Potassium    Collection Time: 09/24/21  6:29 AM   Result Value Ref Range    Potassium 3.5 3.5 - 5.0 mmol/L   Extra Purple Top Tube    Collection Time: 09/24/21  6:29 AM   Result Value Ref Range    Hold Specimen JIC    Glucose by meter    Collection Time: 09/24/21  8:17 AM   Result Value Ref Range    GLUCOSE BY METER POCT 173 (H) 70 - 99 mg/dL   ECG 12-LEAD WITH MUSE (LHE)    Collection Time: 09/24/21  8:19 AM   Result Value Ref Range    Systolic Blood Pressure  mmHg    Diastolic Blood Pressure  mmHg    Ventricular Rate 122 BPM    Atrial Rate 122 BPM    ID Interval  ms    QRS Duration 82 ms     ms    QTc 501 ms    P Axis  degrees    R AXIS -82 degrees    T Axis 61 degrees    Interpretation ECG       Sinus tachycardia  Pulmonary disease pattern  Left anterior fascicular block  Nonspecific ST and T wave abnormality  Abnormal ECG  When compared with ECG of 21-SEP-2021 07:08,  No significant change was found    Confirmed by JUVENTINO MIRANDA MD LOC:WW ([id number]) on 9/24/2021 3:59:13 PM     Glucose by meter    Collection Time: 09/24/21 11:41 AM   Result Value Ref Range    GLUCOSE BY METER POCT 174 (H) 70 - 99 mg/dL   Glucose by meter    Collection Time: 09/24/21  5:00 PM   Result Value Ref Range    GLUCOSE BY METER POCT 143 (H) 70 - 99 mg/dL   Lactic Acid STAT    Collection Time: 09/24/21  5:32 PM   Result Value Ref Range    Lactic Acid 1.6 0.7 - 2.0 mmol/L   Asymptomatic COVID-19 Virus (Coronavirus) by PCR Nasopharyngeal    Collection Time: 09/24/21  7:36 PM    Specimen: Nasopharyngeal; Swab   Result  Value Ref Range    SARS CoV2 PCR Negative Negative   Glucose by meter    Collection Time: 09/24/21  9:47 PM   Result Value Ref Range    GLUCOSE BY METER POCT 163 (H) 70 - 99 mg/dL   Glucose by meter    Collection Time: 09/25/21  8:45 AM   Result Value Ref Range    GLUCOSE BY METER POCT 166 (H) 70 - 99 mg/dL   INR    Collection Time: 09/25/21  9:54 AM   Result Value Ref Range    INR 3.18 (H) 0.90 - 1.15   Creatinine    Collection Time: 09/25/21  9:54 AM   Result Value Ref Range    Creatinine 0.90 0.60 - 1.10 mg/dL    GFR Estimate 59 (L) >60 mL/min/1.73m2   Potassium    Collection Time: 09/25/21  9:54 AM   Result Value Ref Range    Potassium 3.9 3.5 - 5.0 mmol/L   Glucose by meter    Collection Time: 09/25/21  1:34 PM   Result Value Ref Range    GLUCOSE BY METER POCT 153 (H) 70 - 99 mg/dL   Glucose by meter    Collection Time: 09/25/21  4:53 PM   Result Value Ref Range    GLUCOSE BY METER POCT 263 (H) 70 - 99 mg/dL   TSH    Collection Time: 09/25/21  7:18 PM   Result Value Ref Range    TSH 3.84 0.30 - 5.00 uIU/mL   Glucose by meter    Collection Time: 09/25/21 10:14 PM   Result Value Ref Range    GLUCOSE BY METER POCT 83 70 - 99 mg/dL   INR    Collection Time: 09/26/21  6:42 AM   Result Value Ref Range    INR 3.18 (H) 0.90 - 1.15   Potassium    Collection Time: 09/26/21  6:42 AM   Result Value Ref Range    Potassium 3.7 3.5 - 5.0 mmol/L   Glucose by meter    Collection Time: 09/26/21  8:08 AM   Result Value Ref Range    GLUCOSE BY METER POCT 195 (H) 70 - 99 mg/dL   Glucose by meter    Collection Time: 09/26/21 12:15 PM   Result Value Ref Range    GLUCOSE BY METER POCT 176 (H) 70 - 99 mg/dL   COVID-19 Virus (Coronavirus) by PCR Nasopharyngeal    Collection Time: 09/26/21  1:08 PM    Specimen: Nasopharyngeal; Swab   Result Value Ref Range    COVID-19 Virus PCR - Result Not Detected        ASSESSMENT/Plan:      ICD-10-CM    1. Osteomyelitis of left foot, unspecified type (H)  M86.9    2. 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 History of metabolic encephalopathy with delirium  Z86.69    4. Type 2 diabetes mellitus with other circulatory complications (H)  E11.59    5. Stage 3a chronic kidney disease (H)  N18.31    6. S/P mitral valve replacement (MVR) - mechanical mitral valve placed 2015  Z95.2    7. Paroxysmal atrial fibrillation with RVR (H)  I48.0    8. Essential hypertension  I10    9. Physical deconditioning  R53.81        CHANGES:    1.  Discontinue olanzapine.  2.  Start quetiapine 25 mg 3 times daily.  3.  Increase hydromorphone from 2 mg every 6 hours as needed to 2 mg every 3 hours as needed.  4.  Patient requires private room.    CARE PLAN:    The care plan, medications, vital signs, orders, and nursing notes have been reviewed, and all orders signed. Changes to care plan, if any, as noted. Otherwise, continue current plan of care.  Total time spent with this patient was 52 minutes, with greater than 50% spent in counseling and coordination of care that included 1:1 attention, clarification of orders, and writing of new ones given the patient's current delirium status.    The above has been created using voice recognition software. Please be aware that this may unintentionally  produce inaccuracies and/or nonsensical sentences.      Electronically signed by: ELVIS Roberts CNP        Sincerely,        ELVIS Roberts CNP

## 2021-09-29 PROBLEM — I48.0 PAROXYSMAL ATRIAL FIBRILLATION WITH RVR (H): Status: ACTIVE | Noted: 2021-01-01

## 2021-09-29 PROBLEM — I48.91 ATRIAL FIBRILLATION WITH RVR (H): Status: ACTIVE | Noted: 2020-02-19

## 2021-09-29 NOTE — TELEPHONE ENCOUNTER
Updated that she does not need to go back for drain replacement. Nurse updated pt is very confused- noncompliant, removing dressings, walking around with out a dressings on foot. They are looking to transfer to a place that can handle confusion.    I let nurse know per reports she has a new dx of acute metabolic encephalopathy with delirium with hospitalization.

## 2021-09-29 NOTE — TELEPHONE ENCOUNTER
Sheba from [facility] is calling stating that the patient removed her uzair drain from her foot yesterday.  They need an order to send her the hospital, she also feel this morning, Sheba can be reached on her cell at [phone]

## 2021-09-30 NOTE — TELEPHONE ENCOUNTER
[facility] INR Nurse Triage    Provider: MADISON Liriano  Facility: [facility]  Facility Type:  TCU    Caller: Sheba  Call Back Number: [phone]  Reason for call: INR  Diagnosis/Goal: MVR(mechanical)/A-fib    Todays INR: 2.8  Last INR 9/27 4.2(2.5mg on 9/27, then 5mg daily thereafter).  Home dose:  5mg daily.      Heparin/Lovenox:  No  Currently on ABX?: No; antibiotics ended on 9/28/21.    Other interacting medication:  None  Missed or refused doses: No     Nurse is also reporting that patient continues to be increasingly more agitated.  She's been noting to be yelling and swearing.  Currently is residing on the locked unit where she's requiring 1:1 care.  Patient's Dilaudid was recently increased to 2mg Q 3 hours PRN.  Other orders:  Tylenol 975mg TID(staff have it scheduled for 8,12,4), Seroquel 25mg TID(staff have it scheduled for 8,12,4), Gabapentin 100mg Q HS.      Verbal Order/Direction given by Provider: Warfarin 5mg daily.  Next INR 10/5/21.  Change Tylenol to 1000mg TID(give at 8am, 2pm, 8pm).  Increase Seroquel to 50mg TID(give at 8am, 2pm, 8pm).      Provider Giving Order:  MADISON Liriano    Verbal Order given to: Negin Sidhu RN

## 2021-10-01 NOTE — PATIENT INSTRUCTIONS
Remain non weight bearing on the left foot with the CAM boot and wheelchair     Steri strips and a gauze dressing was placed today this should remain in place until you follow up with Dr. Cleveland

## 2021-10-01 NOTE — PROGRESS NOTES
Podiatry Progress Note        ASSESSMENT: S/P Amputation 2nd ray left foot      TREATMENT:  -Surgical site on the left foot is progressing well. Superficial gapping along the central incision. Clean proximal margin per pathology report.     -I removed all sutures except for central sutures, steri-strips applied. Gauze dressing applied today which she will keep intact. Continue non-weight bearing on the left foot.     -She will follow-up with me in 2 weeks for suture removal.     Nba Cleveland DPM  [facility] Podiatry/Foot & Ankle Surgery      HPI: Bernadette Yu was seen again today s/p 2nd ray amputation left foot. Doing well today. She has been at [facility], and is returning to her home later today.    Past Medical History:   Diagnosis Date     Abdominal bloating 8/21/2016     Anticoagulation goal of INR 2.5 to 3.5 1/27/2016     Anxiety      Aortic stenosis 10/26/2019     ASCVD (arteriosclerotic cardiovascular disease)      Atherosclerosis of native coronary artery without angina pectoris 10/26/2019     Bleeding diathesis (H)      Carotid artery stenosis, left      CHF (congestive heart failure) (H)      CKD (chronic kidney disease) stage 3, GFR 30-59 ml/min (H)      DM (diabetes mellitus), type 2 (H) 1990     Eczema      Former smoker      Full code status      History of metabolic encephalopathy with delirium 9/28/2021     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     Moderate tricuspid insufficiency 8/28/2021     MRSA (methicillin resistant staph  "aureus) culture positive 2/9/2017     Normocytic anemia      PAD (peripheral artery disease) (H)     [facility]   DATE: 10/26/2019 4:15 PM   EXAM:  DUPLEX ARTERIAL ULTRASOUND OF THE LOWER EXTREMITIES BILATERALLY   INDICATION: Leg pain, vasculopathy.   COMPARISON: None.   TECHNIQUE: Duplex imaging is performed utilizing gray-scale, two-dimensional images, and color-flow imaging. Doppler waveform analysis and spectral Doppler imaging is also performed.   DUPLEX ARTERIAL ULTRASOUND FINDIN     Paroxysmal atrial fibrillation (H) 11/16/2015    Bilateral pulmonary vein isolation with AtriCure Synergy (bipolar radiofrequency ablation) device, exclusion of the left atrial appendage with the AtriCure AtriClip device.       Paroxysmal atrial fibrillation with RVR (H) 9/29/2021     PN (peripheral neuropathy)      Psoriasis      PVD (peripheral vascular disease) (H) 11/4/2019    Bilateral lower extremities.  See ultrasound 2019.     Recurrent UTI (urinary tract infection)      RLS (restless legs syndrome)      S/P CABG (coronary artery bypass graft) 1/8/2016     S/P colonoscopy 12/12/07     S/P MVR (mitral valve replacement) 12/30/2015     Subclavian artery stenosis, left (H) 11/16/2015    Stented in 2015.      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     Ulcer of heel and midfoot, left, with unspecified severity (H)        Allergies   Allergen Reactions     Amiodarone Other (See Comments)     TORSADES DE POINTES     Aspirin Other (See Comments)     Recurrent severe gastrointestinal bleed.         Current Outpatient Medications:      acetaminophen (TYLENOL) 500 MG tablet, Take 1,000 mg by mouth 3 times daily (give at 8am, 2pm, 8pm), Disp: , Rfl:      BD ULTRA-FINE DAVID PEN NEEDLE 32 gauge x 5/32\" Ndle, [BD ULTRA-FINE DAVID PEN NEEDLE 32 GAUGE X 5/32\" NDLE] 1 each by abdominal subcutaneous route 2 (two) times a day. USE WITH NOVOLOG PENS.   Please keep this Rx on file for the next RF., Disp: 100 " each, Rfl: 11     blood glucose test strips, [BLOOD GLUCOSE TEST STRIPS] Use 1 each As Directed 3 (three) times a day. Dispense brand per patient's insurance at pharmacy discretion., Disp: 300 strip, Rfl: 3     blood-glucose meter Misc, [BLOOD-GLUCOSE METER MISC] Dispense 1 meter as covered by patient's insurance., Disp: 1 each, Rfl: 0     diltiazem ER COATED BEADS (CARDIZEM CD/CARTIA XT) 180 MG 24 hr capsule, Take 1 capsule (180 mg) by mouth daily, Disp: 90 capsule, Rfl: 3     ferrous sulfate 325 (65 FE) MG tablet, [FERROUS SULFATE 325 (65 FE) MG TABLET] Take 1 tablet (325 mg total) by mouth 3 (three) times a week. Monday, Wednesday, and Friday, Disp: 50 tablet, Rfl: 3     furosemide (LASIX) 40 MG tablet, [FUROSEMIDE (LASIX) 40 MG TABLET] Take 2 tablets (80 mg total) by mouth daily., Disp: 120 tablet, Rfl: 1     generic lancets, [GENERIC LANCETS] Use 1 each As Directed 3 (three) times a day. Dx E11.65 DM2, uncontrolled, Disp: 300 each, Rfl: 3     HYDROmorphone (DILAUDID) 2 MG tablet, Take 1 tablet (2 mg) by mouth every 6 hours as needed for moderate to severe pain (Patient taking differently: Take 2 mg by mouth every 3 hours as needed for moderate to severe pain ), Disp: 10 tablet, Rfl: 0     insulin aspart protamine-insulin aspart (NOVOLOG MIX 70-30FLEXPEN U-100) 100 unit/mL (70-30) pen, [INSULIN ASPART PROTAMINE-INSULIN ASPART (NOVOLOG MIX 70-30FLEXPEN U-100) 100 UNIT/ML (70-30) PEN] Inject 30 Units under the skin every morning AND 15 Units every evening., Disp: , Rfl: 3     levothyroxine (SYNTHROID, LEVOTHROID) 125 MCG tablet, [LEVOTHYROXINE (SYNTHROID, LEVOTHROID) 125 MCG TABLET] Take 1 tablet (125 mcg total) by mouth daily., Disp: 90 tablet, Rfl: 3     metoprolol tartrate (LOPRESSOR) 50 MG tablet, Take 1 tablet (50 mg) by mouth 2 times daily, Disp: , Rfl:      mupirocin (BACTROBAN) 2 % external ointment, Apply topically 2 times daily, Disp: 22 g, Rfl: 0     polyethylene glycol (MIRALAX) 17 gram packet,  [POLYETHYLENE GLYCOL (MIRALAX) 17 GRAM PACKET] Take 1 packet (17 g total) by mouth daily., Disp: , Rfl: 0     senna-docusate (SENOKOT-S/PERICOLACE) 8.6-50 MG tablet, Take 1 tablet by mouth 2 times daily, Disp: , Rfl:      triamcinolone (KENALOG) 0.1 % external cream, Apply topically daily, Disp: 453.6 g, Rfl: 11     gabapentin (NEURONTIN) 100 MG capsule, Take 1 capsule (100 mg) by mouth At Bedtime (Patient not taking: Reported on 10/1/2021), Disp: , Rfl:      QUEtiapine (SEROQUEL) 50 MG tablet, Take 50 mg by mouth 3 times daily (give at 8am, 2pm, 8pm) (Patient not taking: Reported on 10/1/2021), Disp: , Rfl:      WARFARIN SODIUM PO, 9/30/21 INR 2.8  Take 5mg daily.  Next INR 10/5/21. (Patient not taking: Reported on 10/1/2021), Disp: , Rfl:     Current Facility-Administered Medications:      lidocaine (XYLOCAINE) 2 % external gel, , Topical, Daily PRN, Nba Cleveland DPM, Given at 09/02/21 1601    Review of Systems - Negative       OBJECTIVE:  Appearance: alert, well appearing, and in no distress.    /70 (BP Location: Left arm)   Pulse 115   Temp 98.3  F (36.8  C) (Oral)   Resp 20   SpO2 98%     Surgical site on the left foot has intact skin with skin edges well approximated, with superficial gapping at the central incision. No erythema left foot. Neurovascular status unchanged left foot.

## 2021-10-01 NOTE — TELEPHONE ENCOUNTER
Pt is home from TCU today.  Family thought she would be in TCU longer from Toe Surgery.     Requesting RX for Hydromorphone     Pt has 7 days remaining.    [facility] on [address] in [place] is the preferred pharmacy.    Declined offer to schedule appt will call back later.

## 2021-10-04 NOTE — TELEPHONE ENCOUNTER
Has appt today at 1:30, no discharge summary available  these medications were started in the hospital:    metoprolol tartrate (LOPRESSOR) 50 MG tablet Take 1 tablet (50 mg) by mouth 2 times daily     Associated Diagnoses: Atrial flutter, unspecified type (H)       OLANZapine (ZYPREXA) 2.5 MG tablet Take 1 tablet (2.5 mg) by mouth At Bedtime     Associated Diagnoses: Encephalopathy     This was in the OV note from Dr. Mahan (Geriatrics) 9/28/21     PLAN: Current hydromorphone orders call for 2 mg every 6 hours as needed. We are going to increase that to every 3 hours as needed. We also going to discontinue her olanzapine and instead go with quetiapine 25 mg 3 times daily. As mentioned, she will also need a private room.     Quetiapine was then increased on 9/30 to 50mg tid  After an episode.

## 2021-10-04 NOTE — TELEPHONE ENCOUNTER
Can continue metoprolol and quetiapine at this time and stop olanzapine.    The patient should be scheduled for a follow-up with me.  I could see her on Monday, October 11 at 3:30 PM.

## 2021-10-04 NOTE — TELEPHONE ENCOUNTER
Daughter in law Telly is calling.    Patient is home from [facility] with three new medications.    Family would like to know if she should be taking these medications.      Quetiapine  Olanzapine   Metoptolol     Pt was sent home with two weeks worth of these medications.    Would like to know if she should be taking these medications.    Riley is the son who prepares patient's medications.  [phone] is the best call back number.

## 2021-10-05 NOTE — PROGRESS NOTES
Contact  [facility]/Voicemail    Referral Source: Care Team  Clinical Data:  Outreach.  Chart review - patient was discharged from TCU to home. Has PCP appt on 10-11  Outreach attempted x 1.  Left message on son Sarbjit's voicemail with call back information and requested return call.  Plan:  will try to reach Sarbjit again in 3-5 business days to see if they would like to engage in care coordination.  Indy Lopez,   [facility]  [phone]

## 2021-10-11 PROBLEM — K92.2 UPPER GI BLEED: Status: ACTIVE | Noted: 2021-01-01

## 2021-10-11 NOTE — ED NOTES
Pt has been given 2 doses of ativan for restlessness. Has been repositioned frequently.  Pt attempting to get OOB and putting her feet through the side rails.   She is more calm now, sleeping off and on. Her son left and have updated him on the phone. [place]wick in place.

## 2021-10-11 NOTE — PHARMACY-ADMISSION MEDICATION HISTORY
Pharmacy Note - Admission Medication History    Pertinent Provider Information: Patient's family/caregivers (sons) would like the metoprolol to be assessed directly with them. She was started on metoprolol during her recent admission but family reports not being informed of it, so they haven't been giving the medication to her.      ______________________________________________________________________    Prior To Admission (PTA) med list completed and updated in EMR.       Current Facility-Administered Medications for the 10/11/21 encounter (Hospital Encounter)   Medication     lidocaine (XYLOCAINE) 2 % external gel     PTA Med List   Medication Sig Last Dose     acetaminophen (TYLENOL) 500 MG tablet Take 1,000 mg by mouth 3 times daily (give at 8am, 2pm, 8pm) 10/11/2021 at Unknown time     diltiazem ER COATED BEADS (CARDIZEM CD/CARTIA XT) 180 MG 24 hr capsule Take 1 capsule (180 mg) by mouth daily 10/11/2021 at Unknown time     ferrous sulfate 325 (65 FE) MG tablet [FERROUS SULFATE 325 (65 FE) MG TABLET] Take 1 tablet (325 mg total) by mouth 3 (three) times a week. Monday, Wednesday, and Friday 10/11/2021 at Unknown time     furosemide (LASIX) 40 MG tablet [FUROSEMIDE (LASIX) 40 MG TABLET] Take 2 tablets (80 mg total) by mouth daily. 10/11/2021 at Unknown time     gabapentin (NEURONTIN) 100 MG capsule Take 1 capsule (100 mg) by mouth At Bedtime 10/10/2021 at Unknown time     HYDROmorphone (DILAUDID) 2 MG tablet Take 1 tablet (2 mg) by mouth every 6 hours as needed for moderate to severe pain (Patient taking differently: Take 2 mg by mouth every 4 hours as needed for moderate to severe pain ) 10/11/2021 at Unknown time     insulin aspart protamine-insulin aspart (NOVOLOG MIX 70-30FLEXPEN U-100) 100 unit/mL (70-30) pen [INSULIN ASPART PROTAMINE-INSULIN ASPART (NOVOLOG MIX 70-30FLEXPEN U-100) 100 UNIT/ML (70-30) PEN] Inject 30 Units under the skin every morning AND 15 Units every evening. 10/11/2021 at am      levothyroxine (SYNTHROID, LEVOTHROID) 125 MCG tablet [LEVOTHYROXINE (SYNTHROID, LEVOTHROID) 125 MCG TABLET] Take 1 tablet (125 mcg total) by mouth daily. 10/11/2021 at Unknown time     polyethylene glycol (MIRALAX) 17 gram packet [POLYETHYLENE GLYCOL (MIRALAX) 17 GRAM PACKET] Take 1 packet (17 g total) by mouth daily. 10/11/2021 at Unknown time     temazepam (RESTORIL) 15 MG capsule Take 15-30 mg by mouth At Bedtime 10/10/2021 at Unknown time     WARFARIN SODIUM PO Take 5 mg by mouth daily  10/11/2021 at Unknown time       Information source(s): Family member and CareEverywhere/SureScripts  Method of interview communication: phone    Summary of Changes to PTA Med List  New: Temazepam  Discontinued: Metoprolol tartrate 50 mg bid   Changed: Dilaudid q6h PRN to q4h PRN    Patient was asked about OTC/herbal products specifically.  PTA med list reflects this.    In the past week, patient estimated taking medication this percent of the time:  greater than 90%.    Allergies were reviewed, assessed, and updated with the patient.      Patient did not bring any medications to the hospital and can't retrieve from home. No multi-dose medications are available for use during hospital stay.     The information provided in this note is only as accurate as the sources available at the time of the update(s).    Thank you for the opportunity to participate in the care of this patient.    Mirtha Arias, [facility]  10/11/2021 5:19 PM

## 2021-10-11 NOTE — ED TRIAGE NOTES
Pt had left foot surgery a couple of weeks ago; stayed at [facility] home and returned a week ago; confusion x 3 days and constipation issues and weakness. Uses walker and has had to have help from family for transfers this week. Pt is on coumadin, has had some black stool and did have a fall onto the carpet last night.

## 2021-10-11 NOTE — ED PROVIDER NOTES
EMERGENCY DEPARTMENT ENCOUNTER      NAME: Bernadette Yu  AGE: 81 year old female  YOB: 1940  MRN: [id number]  EVALUATION DATE & TIME: 10/11/2021  1:49 PM    PCP: Gabino Bach    ED PROVIDER: Brennen Banegas M.D.      Chief Complaint   Patient presents with     Altered Mental Status         FINAL IMPRESSION:  1.  Acute altered mental status.  2.  Renal insufficiency.  3.  Hypokalemia.  4.  Possible GI bleeding with blood loss anemia.    ED COURSE & MEDICAL DECISION MAKIN:50 PM I met with the patient to gather history and to perform my initial exam. We discussed plans for the ED course, including diagnostic testing and treatment. PPE worn: cloth mask.  Patient with altered mental status for the last 3 days, weakness, and a fall.  Reportedly black looking stools.  Patient takes Coumadin chronically for mitral valve replacement and atrial fibrillation.  4:45 PM.  White count elevated 14.7.  Hemoglobin 9.1 down from 11.3.   but most recently was 585.  Potassium low 2.7.  BUN/creatinine increased to 37 and 1.17 from previous 13 at 0.9.  Head CT pending.  Urinalysis, INR and Covid testing pending.  Tentatively, patient was signed out to the evening ED physician at 5 PM to follow-up on pending results.  They may add antibiotics as necessary.  No source at this time yet has been identified.  Patient will probably require MedSurg admission.  She has a history of encephalopathy.    Pertinent Labs & Imaging studies reviewed. (See chart for details)  81 year old female presents to the Emergency Department for evaluation of altered mental status.    At the conclusion of the encounter I discussed the results of all of the tests and the disposition. The questions were answered. The patient or family acknowledged understanding and was agreeable with the care plan.       MEDICATIONS GIVEN IN THE EMERGENCY:  Medications   LORazepam (ATIVAN) injection 0.5 mg (0.5 mg Intravenous Given 10/11/21 0821)    potassium chloride ER (KLOR-CON M) CR tablet 40 mEq (40 mEq Oral Given 10/11/21 8885)       NEW PRESCRIPTIONS STARTED AT TODAY'S ER VISIT  New Prescriptions    No medications on file          =================================================================    HPI    Patient information was obtained from: Patient's son    Use of : N/A         Bernadette Yu is a 81 year old female with a pertinent history of paroxysmal afib, s/p CABG, diabetes, s/p mitral valve replacement, CHF, CKD, hypertension, chronic opioid use,who presents to this ED by EMS for evaluation of altered mental status.    Per chart review, patient was admitted to [facility] (9/17-9/26) for acute left foot osteomyelitis, s/p amputation of second left digit and partial amputation of left second metatarsal om 9/17. Received IV zosyn and Vancomycin given history of MRSA. Switch to Augmentin and doxycycline as recommended by ID to finish course of antibiotic on 9/27/2021. Developed acute metabolic encephalopathy with delirium. Had rapid response on 09/20 due to decreased responsiveness. Responded to Narcan x2. Patient followed up with Dr. Cleveland with vascular surgery on 10/1.    Patient unable to obtain history as she is sleeping. History limited.    Per patient's son, she had amputation of the left second digit on the foot 3-4 weeks ago. She was at a TCU and returned home one week ago. She has had confusion, increased drowsiness, and weakness for the past 3 days. Son describes her confusion as a 4/10 in severity. She had a fall last night onto carpet. She is on Coumadin and has been having black stools. She typically uses a walker for ambulation but has been using family for transfers this week. Occasionally, she becomes severely agitated and restless after waking up which is unusual for her. She also has redness to her lower abdomen and pelvis for the past 3-4 days.    REVIEW OF SYSTEMS   Review of Systems   Constitutional:  Positive for activity change (fall).   Genitourinary:        Positive for redness to pelvis   Neurological: Positive for weakness.   Psychiatric/Behavioral: Positive for agitation and confusion.      Provided by patient's son.    PAST MEDICAL HISTORY:  Past Medical History:   Diagnosis Date     Abdominal bloating 8/21/2016     Anticoagulation goal of INR 2.5 to 3.5 1/27/2016     Anxiety      Aortic stenosis 10/26/2019     ASCVD (arteriosclerotic cardiovascular disease)      Atherosclerosis of native coronary artery without angina pectoris 10/26/2019     Bleeding diathesis (H)      Carotid artery stenosis, left      CHF (congestive heart failure) (H)      CKD (chronic kidney disease) stage 3, GFR 30-59 ml/min (H)      DM (diabetes mellitus), type 2 (H) 1990     Eczema      Former smoker      Full code status      History of metabolic encephalopathy with delirium 9/28/2021     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     Moderate tricuspid insufficiency 8/28/2021     MRSA (methicillin resistant staph aureus) culture positive 2/9/2017     Normocytic anemia      PAD (peripheral artery disease) (H)     [facility]   DATE: 10/26/2019 4:15 PM   EXAM:  DUPLEX ARTERIAL ULTRASOUND OF THE LOWER EXTREMITIES BILATERALLY   INDICATION: Leg pain, vasculopathy.   COMPARISON: None.   TECHNIQUE: Duplex imaging is performed utilizing gray-scale, two-dimensional images, and color-flow imaging. Doppler waveform analysis and spectral Doppler imaging is also performed.    DUPLEX ARTERIAL ULTRASOUND FINDIN     Paroxysmal atrial fibrillation (H) 2015    Bilateral pulmonary vein isolation with AtriCure Synergy (bipolar radiofrequency ablation) device, exclusion of the left atrial appendage with the AtriCure AtriClip device.       Paroxysmal atrial fibrillation with RVR (H) 2021     PN (peripheral neuropathy)      Psoriasis      PVD (peripheral vascular disease) (H) 2019    Bilateral lower extremities.  See ultrasound 2019.     Recurrent UTI (urinary tract infection)      RLS (restless legs syndrome)      S/P CABG (coronary artery bypass graft) 2016     S/P colonoscopy 07     S/P MVR (mitral valve replacement) 2015     Subclavian artery stenosis, left (H) 2015    Stented in .      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     Ulcer of heel and midfoot, left, with unspecified severity (H)        PAST SURGICAL HISTORY:  Past Surgical History:   Procedure Laterality Date     AMPUTATE TOE(S) Left 2021    Procedure: AMPUTATION, second ray left foot;  Surgeon: Nba Cleveland DPM;  [place] [facility] [place]     APPENDECTOMY       BYPASS GRAFT ARTERY CORONARY       CARDIAC CATHETERIZATION  11/12/15      SECTION       CHOLECYSTECTOMY       COLONOSCOPY N/A 10/12/2016    Procedure: COLONOSCOPY;  Surgeon: Santiago Duran MD;  Location: [facility];  Service:      COLONOSCOPY N/A 10/13/2016    Procedure: COLONOSCOPY with polypectomy & rectum biopsies;  Surgeon: Santiago Duran MD;  Location: [facility] GI;  Service:      COLONOSCOPY N/A 12/3/2017    Procedure: COLONOSCOPY with multiple polyp removal using hot snare and mansfield net and biopsy forcep;  Surgeon: Marcelo Wade MD;  Location: [facility] GI;  Service:      COLONOSCOPY N/A 3/13/2018    Procedure: COLONOSCOPY; POLYPECTOMY;  Surgeon: Marcelo Wade MD;  Location: [facility] OR;  Service:      ESOPHAGOSCOPY, GASTROSCOPY, DUODENOSCOPY  "(EGD), COMBINED N/A 10/12/2016    Procedure: ESOPHAGOGASTRODUODENOSCOPY with biopsies;  Surgeon: Santiago Duran MD;  Location: [facility];  Service:      ESOPHAGOSCOPY, GASTROSCOPY, DUODENOSCOPY (EGD), COMBINED N/A 12/3/2017    Procedure: ESOPHAGOGASTRODUODENOSCOPY (EGD);  Surgeon: Marcelo Wade MD;  Location: [facility];  Service:      TONSILLECTOMY & ADENOIDECTOMY       UPPER GASTROINTESTINAL ENDOSCOPY             CURRENT MEDICATIONS:    acetaminophen (TYLENOL) 500 MG tablet  BD ULTRA-FINE DAVID PEN NEEDLE 32 gauge x 5/32\" Ndle  blood glucose test strips  blood-glucose meter Misc  diltiazem ER COATED BEADS (CARDIZEM CD/CARTIA XT) 180 MG 24 hr capsule  ferrous sulfate 325 (65 FE) MG tablet  furosemide (LASIX) 40 MG tablet  gabapentin (NEURONTIN) 100 MG capsule  generic lancets  HYDROmorphone (DILAUDID) 2 MG tablet  insulin aspart protamine-insulin aspart (NOVOLOG MIX 70-30FLEXPEN U-100) 100 unit/mL (70-30) pen  levothyroxine (SYNTHROID, LEVOTHROID) 125 MCG tablet  metoprolol tartrate (LOPRESSOR) 50 MG tablet  mupirocin (BACTROBAN) 2 % external ointment  polyethylene glycol (MIRALAX) 17 gram packet  QUEtiapine (SEROQUEL) 50 MG tablet  senna-docusate (SENOKOT-S/PERICOLACE) 8.6-50 MG tablet  triamcinolone (KENALOG) 0.1 % external cream  WARFARIN SODIUM PO        ALLERGIES:  Allergies   Allergen Reactions     Amiodarone Other (See Comments)     TORSADES DE POINTES     Aspirin Other (See Comments)     Recurrent severe gastrointestinal bleed.       FAMILY HISTORY:  Family History   Problem Relation Age of Onset     Colon Cancer Father      Diabetes Father      Hypertension Mother      Heart Disease Mother          congestive heart failure     Arthritis Mother      Diabetes Sister      Heart Disease Brother      Rectal Cancer Son      Colon Cancer Son        SOCIAL HISTORY:   Social History     Socioeconomic History     Marital status:      Spouse name: Not on file     Number of children:  4     " Years of education: Not on file     Highest education level: Not on file   Occupational History     Not on file   Tobacco Use     Smoking status: Former Smoker     Years: 23.00     Types: Cigarettes     Smokeless tobacco: Never Used     Tobacco comment: quit 1970's   Substance and Sexual Activity     Alcohol use: No     Drug use: No     Sexual activity: Never   Other Topics Concern     Not on file   Social History Narrative    Patient of Dr. Bach since 2001.   to  Aneudy.    4 children.    Former patient of Dr. Harshad Ballard.     Social Determinants of Health     Financial Resource Strain:      Difficulty of Paying Living Expenses:    Food Insecurity:      Worried About Running Out of Food in the Last Year:      Ran Out of Food in the Last Year:    Transportation Needs:      Lack of Transportation (Medical):      Lack of Transportation (Non-Medical):    Physical Activity:      Days of Exercise per Week:      Minutes of Exercise per Session:    Stress:      Feeling of Stress :    Social Connections:      Frequency of Communication with Friends and Family:      Frequency of Social Gatherings with Friends and Family:      Attends [employer] Services:      Active Member of Clubs or Organizations:      Attends Club or Organization Meetings:      Marital Status:    Intimate Partner Violence:      Fear of Current or Ex-Partner:      Emotionally Abused:      Physically Abused:      Sexually Abused:        VITALS:  /67   Pulse (!) 129   Temp 98.4  F (36.9  C) (Oral)   Resp 8   Wt 63.5 kg (140 lb)   SpO2 94%   BMI 27.34 kg/m      PHYSICAL EXAM    Vital Signs:  /67   Pulse (!) 129   Temp 98.4  F (36.9  C) (Oral)   Resp 8   Wt 63.5 kg (140 lb)   SpO2 94%   BMI 27.34 kg/m    General:  On entering the room she is in no apparent distress.    Neck:  Neck supple with full range of motion and nontender.    Back:  Back and spine are nontender.  No costovertebral angle tenderness.    HEENT:   Oropharynx clear with moist mucous membranes.  HEENT unremarkable.    Pulmonary:  Chest clear to auscultation without rhonchi rales or wheezing.    Cardiovascular:  Cardiac regular rate and rhythm without murmurs rubs or gallops.    Abdomen:  Abdomen soft nontender.  There is no rebound or guarding.    Muskuloskeletal:  she moves all 4 without any difficulty and has normal neurovascular exams.  Extremities without clubbing, cyanosis, or edema.  Legs and calves are nontender.    Neuro:  she is alert and oriented ×1 and moves all extremities symmetrically.  Intermittently sleepy.  Strength 5/5 in 4 extremities.  Sensation intact 4 extremities.  Patient missing the distal second left toe.  Cranial nerves II through XII intact equal bilaterally.  Pupils reactive to light.  Extraocular movements intact.  Psych:  Normal affect.    Skin:  Unremarkable and warm and dry.       LAB:  All pertinent labs reviewed and interpreted.  Labs Ordered and Resulted from Time of ED Arrival Up to the Time of Departure from the ED   ROUTINE UA WITH MICROSCOPIC REFLEX TO CULTURE - Abnormal; Notable for the following components:       Result Value    Appearance Urine Turbid (*)     Protein Albumin Urine 10  (*)     Leukocyte Esterase Urine 75 Tank/uL (*)     Bacteria Urine Few (*)     Mucus Urine Present (*)     All other components within normal limits    Narrative:     Urine Culture not indicated   BASIC METABOLIC PANEL - Abnormal; Notable for the following components:    Potassium 2.7 (*)     Urea Nitrogen 37 (*)     Creatinine 1.17 (*)     GFR Estimate 43 (*)     All other components within normal limits   CBC WITH PLATELETS AND DIFFERENTIAL - Abnormal; Notable for the following components:    WBC Count 14.7 (*)     RBC Count 3.45 (*)     Hemoglobin 9.1 (*)     Hematocrit 30.7 (*)     MCH 26.4 (*)     MCHC 29.6 (*)     RDW 21.1 (*)     Absolute Neutrophils 12.4 (*)     Absolute Immature Granulocytes 0.2 (*)     All other components within  normal limits   B-TYPE NATRIURETIC PEPTIDE ([facility] ONLY) - Abnormal; Notable for the following components:     (*)     All other components within normal limits   TROPONIN I - Normal   COVID-19 VIRUS (CORONAVIRUS) BY PCR   INR   MAY SALINE LOCK IV   CBC WITH PLATELETS & DIFFERENTIAL    Narrative:     The following orders were created for panel order CBC with platelets + differential.  Procedure                               Abnormality         Status                     ---------                               -----------         ------                     CBC with platelets and d...[[id number]]  Abnormal            Final result                 Please view results for these tests on the individual orders.       RADIOLOGY:  Reviewed all pertinent imaging. Please see official radiology report.  XR Chest Port 1 View   Final Result   IMPRESSION: The patient is significantly rotated limiting evaluation. Patchy interstitial opacities suggestive of mild pulmonary edema. No effusions or pneumothorax. Heart size is stable. Valvuloplasty changes and left atrial appendage occlusion device.    Sternotomy wires. No acute osseous findings.      Head CT w/o contrast    (Results Pending)              EKG:    Sinus tachycardia 129, nonspecific ST segment changes.  Same as previous EKG of September 24, 2021.    I have independently reviewed and interpreted the EKG(s) documented above.      I, Jeane De León, am serving as a scribe to document services personally performed by Dr. Banegas based on my observation and the provider's statements to me. I, Brennen Banegas MD attest that Jeane De León is acting in a scribe capacity, has observed my performance of the services and has documented them in accordance with my direction.    Brennen Banegas M.D.  Emergency Medicine  [facility] EMERGENCY DEPARTMENT  [address] [place] [id number] [phone]  Dept: [phone]        Brennen Banegas MD  10/11/21 8241       Brennen Banegas MD  10/11/21 3609

## 2021-10-11 NOTE — ED NOTES
Nursing assessment--    Pt brought in by her son who she lives with.  Pt had toe surgery 3 weeks ago.  Wound on her foot appears healing without redness.  Her son reports that since her surgery pt has been more confused.  More acute the past 3 days.  Pt is moaning, complaining of hemorrhoid pain. She knows her son but does not know exact date, place or situation. Saying she needs to get up.  Samaria placed and explained. Cream applied to her rectum for comfort.   Pt is moving all extremities equally.

## 2021-10-12 NOTE — H&P
[facility]    History and Physical - Hospitalist Service       Date of Admission:  10/11/2021    Assessment & Plan      Bernadette Yu is a 81 year old female with a history of mitral valve repair, CAD s/p CABG, heart failure, hypertension, paroxysmal atrial fibrillation, PVD, hypothyroidism, normocytic anemia, CKD 3, hyperlipidemia, anxiety, T2DM on insulin that presents 3 weeks after toe surgery with reports of patient being more confused and complaining of multiple different pains such as hemorrhoid pain.  Family does report dark stools.     Melena  -Chronically anemic with reports of melena and hemorrhoid pain, supratherapeutic INR.  Received dose of vitamin K in ED.  -PPI  -N.p.o.  -GI consult  -Trend hemoglobin    PAF  -In RVR  -on diltiazem and Coumadin at home  -Hold Coumadin due to GI bleed    Heart failure in exacerbation  -Last TTE LVEF 50 to 55% on 7/22/2021 with evidence of moderate to severe tricuspid regurgitation, moderate pulmonary hypertension  -Appears volume overloaded on exam and on chest x-ray.    -Start IV Lasix 40 mg every 12 hour    Leukocytosis  -WBC 14.7 with mildly abnormal urinalysis.  No pneumonia seen on chest x-ray.  Foot does not appear infected  -Repeat WBC tomorrow  -Procalcitonin  -If develops fever culture and cover with broad-spectrum antibiotics    CAD/hypertension  -Normotensive at this time, no reports of chest pain  -Continue Lasix  -Reportedly not taking home metoprolol  -Not on aspirin or statin    Hypothyroidism  -continue PTA synthroid    Normocytic anemia  -On iron therapy at home, hemoglobin 9.1 but she fluctuates between 9 and 11    CKD 3  -Serum creatinine 1.17, GFR 43  -at baseline    T2DM  -Grossly uncontrolled, last A1c 9.0 on 9/14/2021  -On glargine 30 units a.m. and 15 units p.m.  -Start glargine 24 units a.m. and 12 units p.m.  -Slight scale insulin every 4 hours due to n.p.o.    Hypokalemia-K 2.6 replace per  protocol  Hypomagnesemia-Mg 1.7 replace per protocol     Diet: NPO for Medical/Clinical Reasons Except for: Meds, Ice Chips  DVT Prophylaxis: Pneumatic Compression Devices  Briceño Catheter: Not present  Central Lines: None  Code Status: Full Code (need to verify with family)      Disposition Plan   Expected discharge: 3-4 days to TCU     The patient's care was discussed with the Patient.    Jared Sales MD  [facility]  Securely message with the Vocera Web Console (learn more here)  Text page via [employer] Paging/Directory      ______________________________________________________________________    Chief Complaint     Increased confusion    History of Present Illness   Bernadette Yu is a 81 year old female with a history of mitral valve repair, CAD s/p CABG, heart failure, hypertension, paroxysmal atrial fibrillation, PVD, hypothyroidism, normocytic anemia, CKD 3, hyperlipidemia, anxiety, T2DM on insulin that presents 3 weeks after toe surgery with reports of patient being more confused and complaining of multiple different pains such as hemorrhoid pain.  Family does report dark stools.    History obtained from chart review as patient is completely obtunded in the ED after 2 doses of Ativan.    Review of Systems    The 10 point Review of Systems is negative other than noted in the HPI or here.     Past Medical History    I have reviewed this patient's medical history and updated it with pertinent information if needed.   Past Medical History:   Diagnosis Date     Abdominal bloating 8/21/2016     Anticoagulation goal of INR 2.5 to 3.5 1/27/2016     Anxiety      Aortic stenosis 10/26/2019     ASCVD (arteriosclerotic cardiovascular disease)      Atherosclerosis of native coronary artery without angina pectoris 10/26/2019     Bleeding diathesis (H)      Carotid artery stenosis, left      CHF (congestive heart failure) (H)      CKD (chronic kidney disease) stage 3, GFR 30-59 ml/min (H)       DM (diabetes mellitus), type 2 (H) 1990     Eczema      Former smoker      Full code status      History of metabolic encephalopathy with delirium 9/28/2021     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     Moderate tricuspid insufficiency 8/28/2021     MRSA (methicillin resistant staph aureus) culture positive 2/9/2017     Normocytic anemia      PAD (peripheral artery disease) (H)     [facility]   DATE: 10/26/2019 4:15 PM   EXAM:  DUPLEX ARTERIAL ULTRASOUND OF THE LOWER EXTREMITIES BILATERALLY   INDICATION: Leg pain, vasculopathy.   COMPARISON: None.   TECHNIQUE: Duplex imaging is performed utilizing gray-scale, two-dimensional images, and color-flow imaging. Doppler waveform analysis and spectral Doppler imaging is also performed.   DUPLEX ARTERIAL ULTRASOUND FINDIN     Paroxysmal atrial fibrillation (H) 11/16/2015    Bilateral pulmonary vein isolation with AtriCure Synergy (bipolar radiofrequency ablation) device, exclusion of the left atrial appendage with the AtriCure AtriClip device.       Paroxysmal atrial fibrillation with RVR (H) 9/29/2021     PN (peripheral neuropathy)      Psoriasis      PVD (peripheral vascular disease) (H) 11/4/2019    Bilateral lower extremities.  See ultrasound 2019.     Recurrent UTI (urinary tract infection)      RLS (restless legs syndrome)      S/P CABG (coronary artery bypass graft) 1/8/2016     S/P colonoscopy 12/12/07     S/P MVR (mitral valve replacement) 12/30/2015     Subclavian artery stenosis, left (H)  2015    Stented in .      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     Ulcer of heel and midfoot, left, with unspecified severity (H)        Past Surgical History   I have reviewed this patient's surgical history and updated it with pertinent information if needed.  Past Surgical History:   Procedure Laterality Date     AMPUTATE TOE(S) Left 2021    Procedure: AMPUTATION, second ray left foot;  Surgeon: Nba Cleveland DPM;  Location: [facility]     APPENDECTOMY       BYPASS GRAFT ARTERY CORONARY       CARDIAC CATHETERIZATION  11/12/15      SECTION       CHOLECYSTECTOMY       COLONOSCOPY N/A 10/12/2016    Procedure: COLONOSCOPY;  Surgeon: Santiago Duran MD;  Location: [facility];  Service:      COLONOSCOPY N/A 10/13/2016    Procedure: COLONOSCOPY with polypectomy & rectum biopsies;  Surgeon: Santiago Duran MD;  Location: [facility];  Service:      COLONOSCOPY N/A 12/3/2017    Procedure: COLONOSCOPY with multiple polyp removal using hot snare and mansfield net and biopsy forcep;  Surgeon: Marcelo Wade MD;  Location: [facility];  Service:      COLONOSCOPY N/A 3/13/2018    Procedure: COLONOSCOPY; POLYPECTOMY;  Surgeon: Marcelo Wade MD;  Location: [facility] OR;  Service:      ESOPHAGOSCOPY, GASTROSCOPY, DUODENOSCOPY (EGD), COMBINED N/A 10/12/2016    Procedure: ESOPHAGOGASTRODUODENOSCOPY with biopsies;  Surgeon: Santiago Duran MD;  Location: [facility];  Service:      ESOPHAGOSCOPY, GASTROSCOPY, DUODENOSCOPY (EGD), COMBINED N/A 12/3/2017    Procedure: ESOPHAGOGASTRODUODENOSCOPY (EGD);  Surgeon: Marcelo Wade MD;  Location: [facility];  Service:      TONSILLECTOMY & ADENOIDECTOMY       UPPER GASTROINTESTINAL ENDOSCOPY         Social History   I have reviewed this patient's social history and updated it with pertinent information if needed.  Social History     Tobacco Use     Smoking status: Former Smoker     Years:  "23.00     Types: Cigarettes     Smokeless tobacco: Never Used     Tobacco comment: quit 1970's   Substance Use Topics     Alcohol use: No     Drug use: No       Family History   I have reviewed this patient's family history and updated it with pertinent information if needed.  Family History   Problem Relation Age of Onset     Colon Cancer Father      Diabetes Father      Hypertension Mother      Heart Disease Mother          congestive heart failure     Arthritis Mother      Diabetes Sister      Heart Disease Brother      Rectal Cancer Son      Colon Cancer Son        Prior to Admission Medications   Prior to Admission Medications   Prescriptions Last Dose Informant Patient Reported? Taking?   BD ULTRA-FINE DAVID PEN NEEDLE 32 gauge x 5/32\" Ndle   No No   Sig: [BD ULTRA-FINE DAVID PEN NEEDLE 32 GAUGE X 5/32\" NDLE] 1 each by abdominal subcutaneous route 2 (two) times a day. USE WITH [id number] PENS.   Please keep this Rx on file for the next RF.   HYDROmorphone (DILAUDID) 2 MG tablet 10/11/2021 at Unknown time  No Yes   Sig: Take 1 tablet (2 mg) by mouth every 6 hours as needed for moderate to severe pain   Patient taking differently: Take 2 mg by mouth every 4 hours as needed for moderate to severe pain    QUEtiapine (SEROQUEL) 50 MG tablet   Yes No   Sig: Take 50 mg by mouth 3 times daily (give at 8am, 2pm, 8pm)   Patient not taking: Reported on 10/1/2021   WARFARIN SODIUM PO 10/11/2021 at Unknown time  Yes Yes   Sig: Take 5 mg by mouth daily    acetaminophen (TYLENOL) 500 MG tablet 10/11/2021 at Unknown time  Yes Yes   Sig: Take 1,000 mg by mouth 3 times daily (give at 8am, 2pm, 8pm)   blood glucose test strips   No No   Sig: [BLOOD GLUCOSE TEST STRIPS] Use 1 each As Directed 3 (three) times a day. Dispense brand per patient's insurance at pharmacy discretion.   blood-glucose meter Misc   No No   Sig: [BLOOD-GLUCOSE METER MISC] Dispense 1 meter as covered by patient's insurance.   diltiazem ER COATED BEADS (CARDIZEM " CD/CARTIA XT) 180 MG 24 hr capsule 10/11/2021 at Unknown time  No Yes   Sig: Take 1 capsule (180 mg) by mouth daily   ferrous sulfate 325 (65 FE) MG tablet 10/11/2021 at Unknown time  No Yes   Sig: [FERROUS SULFATE 325 (65 FE) MG TABLET] Take 1 tablet (325 mg total) by mouth 3 (three) times a week. Monday, Wednesday, and Friday   furosemide (LASIX) 40 MG tablet 10/11/2021 at Unknown time  No Yes   Sig: [FUROSEMIDE (LASIX) 40 MG TABLET] Take 2 tablets (80 mg total) by mouth daily.   gabapentin (NEURONTIN) 100 MG capsule 10/10/2021 at Unknown time  No Yes   Sig: Take 1 capsule (100 mg) by mouth At Bedtime   generic lancets   No No   Sig: [GENERIC LANCETS] Use 1 each As Directed 3 (three) times a day. Dx E11.65 DM2, uncontrolled   insulin aspart protamine-insulin aspart (NOVOLOG MIX 70-30FLEXPEN U-100) 100 unit/mL (70-30) pen 10/11/2021 at am  No Yes   Sig: [INSULIN ASPART PROTAMINE-INSULIN ASPART (NOVOLOG MIX 70-30FLEXPEN U-100) 100 UNIT/ML (70-30) PEN] Inject 30 Units under the skin every morning AND 15 Units every evening.   levothyroxine (SYNTHROID, LEVOTHROID) 125 MCG tablet 10/11/2021 at Unknown time  No Yes   Sig: [LEVOTHYROXINE (SYNTHROID, LEVOTHROID) 125 MCG TABLET] Take 1 tablet (125 mcg total) by mouth daily.   metoprolol tartrate (LOPRESSOR) 50 MG tablet Not Taking at Unknown time  No No   Sig: Take 1 tablet (50 mg) by mouth 2 times daily   Patient not taking: Reported on 10/11/2021   mupirocin (BACTROBAN) 2 % external ointment Not Taking at Unknown time  No No   Sig: Apply topically 2 times daily   Patient not taking: Reported on 10/11/2021   polyethylene glycol (MIRALAX) 17 gram packet 10/11/2021 at Unknown time  No Yes   Sig: [POLYETHYLENE GLYCOL (MIRALAX) 17 GRAM PACKET] Take 1 packet (17 g total) by mouth daily.   senna-docusate (SENOKOT-S/PERICOLACE) 8.6-50 MG tablet Not Taking at Unknown time  No No   Sig: Take 1 tablet by mouth 2 times daily   Patient not taking: Reported on 10/11/2021   temazepam  (RESTORIL) 15 MG capsule 10/10/2021 at Unknown time  Yes Yes   Sig: Take 15-30 mg by mouth At Bedtime   triamcinolone (KENALOG) 0.1 % external cream Not Taking at Unknown time  No No   Sig: Apply topically daily   Patient not taking: Reported on 10/11/2021      Facility-Administered Medications Last Administration Doses Remaining   lidocaine (XYLOCAINE) 2 % external gel 9/2/2021  4:01 PM         Allergies   Allergies   Allergen Reactions     Amiodarone Other (See Comments)     TORSADES DE POINTES     Aspirin Other (See Comments)     Recurrent severe gastrointestinal bleed.       Physical Exam   Vital Signs: Temp: 98.4  F (36.9  C) Temp src: Oral BP: 110/67 Pulse: (!) 126   Resp: 22 SpO2: 97 % O2 Device: Nasal cannula Oxygen Delivery: 3 LPM  Weight: 140 lbs 0 oz    Physical Examination:   General appearance -obtunded, tired appearing with 3 L nasal cannula  Mental status -obtunded  HEENT - sclera anicteric, poor oral hygiene puffy eyes  Neck - supple, no significant adenopathy  Respiratory -basilar crackles bilaterally  Cardiac -tachycardia with irregularly irregular rhythm and harsh murmur  Abdomen - soft, nontender, nondistended,   Neurological -obtunded  Musculoskeletal - no joint tenderness, has missing digits on the left foot with stitching of the second metatarsal  Extremities - peripheral pulses normal, has 2+ lower extremity edema up to the knee  Skin -some tense skin mostly over the right leg but also posterior on the left leg with edema without erythema or cellulitis      Data   Data reviewed today: I reviewed all medications, new labs and imaging results over the last 24 hours.    Recent Labs   Lab 10/11/21  2000 10/11/21  1422   WBC  --  14.7*   HGB  --  9.1*   MCV  --  89   PLT  --  204   INR 4.20* 3.72*   NA  --  140   POTASSIUM  --  2.7*   CHLORIDE  --  104   CO2  --  26   BUN  --  37*   CR  --  1.17*   ANIONGAP  --  10   DI  --  8.7   GLC  --  92     Recent Results (from the past 24 hour(s))   XR  Chest Port 1 View    Narrative    EXAM: XR CHEST PORT 1 VIEW  LOCATION: [facility]  DATE/TIME: 10/11/2021 2:44 PM    INDICATION: Altered mental status  COMPARISON: 11/23/2020      Impression    IMPRESSION: The patient is significantly rotated limiting evaluation. Patchy interstitial opacities suggestive of mild pulmonary edema. No effusions or pneumothorax. Heart size is stable. Valvuloplasty changes and left atrial appendage occlusion device.   Sternotomy wires. No acute osseous findings.   Head CT w/o contrast    Narrative    EXAM: CT HEAD W/O CONTRAST  LOCATION: [facility]  DATE/TIME: 10/11/2021 5:25 PM    INDICATION: Mental status change, unknown cause.  COMPARISON: Head CT 09/21/2021.  TECHNIQUE: Routine CT Head without IV contrast. Multiplanar reformats. Dose reduction techniques were used.    FINDINGS:  INTRACRANIAL CONTENTS: No intracranial hemorrhage, extraaxial collection, or mass effect.  No CT evidence of acute infarct. Scattered vascular calcifications. Moderate presumed chronic small vessel ischemic changes. Small area of chronic right temporal   lobe encephalomalacia, unchanged. Moderate generalized volume loss. No hydrocephalus.     VISUALIZED ORBITS/SINUSES/MASTOIDS: Prior bilateral cataract surgery. Visualized portions of the orbits are otherwise unremarkable. Near-complete opacification of right maxillary sinus with hyperattenuating material and chronic wall thickening/sclerosis.   No middle ear or mastoid effusion.    BONES/SOFT TISSUES: No acute abnormality.      Impression    IMPRESSION:  1.  No CT evidence for acute intracranial abnormality.  2.  Brain atrophy and presumed chronic microvascular ischemic changes as above.  3.  Small area of chronic right temporal lobe encephalomalacia, unchanged.  4.  Near complete opacification of the right maxillary sinus with high-attenuation material and chronic wall thickening/sclerosis.   CT Chest  Pulmonary Embolism w Contrast    Narrative    EXAM: CT CHEST PULMONARY EMBOLISM W CONTRAST  LOCATION: [facility]  DATE/TIME: 10/11/2021 9:30 PM    INDICATION: Postoperative tachycardia and hypoxia. Evaluate for pulmonary embolism.  COMPARISON: None.  TECHNIQUE: CT chest pulmonary angiogram during arterial phase injection of IV contrast. Multiplanar reformats and MIP reconstructions were performed. Dose reduction techniques were used.   CONTRAST: Isovue 370, 75 mL.    FINDINGS:  ANGIOGRAM CHEST: Allowing for respiratory motion artifact, there is no evidence for pulmonary embolism. Normal caliber thoracic aorta without dissection. Atherosclerotic vascular calcification with severe atherosclerotic vascular calcification at the   proximal left subclavian artery with stent placement.    LUNGS AND PLEURA: Minimal right basilar pleural fluid. Interlobular septal thickening with mosaic attenuation bilaterally. Findings can be seen with CHF/volume overload. No definitive evidence for acute focal alveolar infiltrate or consolidation.    MEDIASTINUM/AXILLAE: Cardiac enlargement. No pericardial fluid. Minimal sliding esophageal hiatal hernia. No lymphadenopathy.    CORONARY ARTERY CALCIFICATION: Severe.    UPPER ABDOMEN: Advanced atherosclerotic vascular calcification of the splenic artery with moderate narrowing. Celiac and bilateral renal arteries remain patent with heavy calcification of the origin left renal artery.    MUSCULOSKELETAL: Degenerative changes in the spine.      Impression    IMPRESSION:  1.  Allowing for significant respiratory motion artifact, there is no evidence for pulmonary embolism.    2.  Evidence for CHF/volume overload with right-sided pleural fluid collection with interlobular septal thickening in mosaic attenuation of the pulmonary parenchyma with cardiac enlargement.    3.  Normal caliber aorta without dissection. Advanced atherosclerotic vascular calcification including  severe vascular calcification at the origin of the left subclavian artery with indwelling stent. Severe vascular calcification at the origin left renal   artery. Recommend correlation for renovascular hypertension.   CTA Abdomen Pelvis with Contrast    Narrative    EXAM: CTA ABDOMEN PELVIS WITH CONTRAST  LOCATION: [facility]  DATE/TIME: 10/11/2021 9:30 PM    INDICATION: Gastrointestinal bleeding with black tarry stool with heme positive test in stool. Assess for source of bleeding.  COMPARISON: 02/19/2020.  TECHNIQUE: CT angiogram abdomen pelvis during arterial phase of injection of IV contrast. 2D and 3D MIP reconstructions were performed by the CT technologist. Dose reduction techniques were used.  CONTRAST: Isovue 370, 75 mL.    FINDINGS:  ANGIOGRAM ABDOMEN/PELVIS: Noncontrast imaging demonstrates a focal area of increased density involving the EG junction measuring approximately 2.3 x 1.6 cm. This is indeterminate based on the noncontrast imaging, could represent retained contrast   material from a recent study or postsurgical change. This remains stable in overall appearance on arterial phase study and portal venous phase imaging. Arterial phase imaging demonstrates no evidence for surrounding active contrast extravasation in   distal esophagus, stomach or duodenum. No evidence for active contrast extravasation in the small bowel. Large amount of stool throughout the colon limits evaluation for active contrast extravasation/bleeding. Allowing for this, there is no evidence for   active bleeding within the colon.    Normal caliber lower thoracic and abdominal aorta. Minimal aortic calcification. The celiac, splenic, common hepatic and proper hepatic remain patent. Patent SMA. Patent bilateral renal arteries with marked narrowing in the proximal left renal artery.   CECIL patent. Patent bilateral common, external and internal iliac arteries along with patent bilateral common femoral  arteries. 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    LOWER CHEST: Minimal right pleural fluid. No left-sided pleural fluid. Interlobular septal thickening. Cardiac enlargement.    HEPATOBILIARY: Cholecystectomy. No significant biliary dilatation allowing for postcholecystectomy state. No evidence for abnormal arterial phase enhancement. Homogeneous portal venous phase enhancement.    PANCREAS: No ductal dilatation or inflammatory change.    SPLEEN: Normal size spleen.    ADRENAL GLANDS: No significant nodules.    KIDNEYS/BLADDER: Symmetric contrast enhancement to both kidneys. No urinary collecting system dilatation or calculi. Bladder distention.    BOWEL: Marked amount of stool throughout the colon with severe amount of stool in the rectal vault. No colonic wall thickening or inflammatory change. No small bowel dilatation or inflammatory change.    LYMPH NODES: No lymphadenopathy.    PELVIC ORGANS: Unremarkable. No significant free fluid.    MUSCULOSKELETAL: Diffuse subcutaneous edema. Degenerative hypertrophic changes in the thoracic spine.      Impression    IMPRESSION:  1.  Focal area of increased density involving the EG junction with the EG junction dilatation measuring 2.3 x 1.6 cm on noncontrast imaging. This does not change in its appearance on arterial phase imaging or portal venous phase imaging and may be the   result of retained contrast material within the distal esophagus or retained food material. Another consideration would be postoperative change. Given the lack of change in overall appearance on postcontrast imaging, this is unlikely to be due to   localized bleeding. An underlying high density mass cannot be excluded.    2.  No evidence for arterial contrast extravasation or active bleeding within the lumen of the small bowel, colon, stomach or distal esophagus above the level of the aforementioned high density mass. Evaluation of the colon is somewhat limited due to a   large  amount of stool throughout the colon and a severe amount of stool in the rectal vault.    3.  No evidence for inflammatory change to the bowel.    4.  Atherosclerotic vascular calcification with significant vascular calcification origin left renal artery. Chronic occlusion of the left SFA proximally.    5.  Volume overload.

## 2021-10-12 NOTE — PROVIDER NOTIFICATION
Dr. Nick updated with [facility] concerns from this morning about patient being out of it and her hemoglobin=7.6

## 2021-10-12 NOTE — ED PROVIDER NOTES
EMERGENCY DEPARTMENT ENCOUNTER       ED Course & Medical Decision Making     5:24 PM Patient was signed out to me by Brennen Banegas M.D.   7:25 PM I met with the patient and discussed her pending head CT, Urinalysis, INR and Covid results.  8:05 PM I spoke with Dr. Aden with [facility]  8:31 PM I updated the Hospitalist,  on the patient       I did see and examined the patient.  IV was established and she was placed on the monitor.  She has been signed out to me.  She already had some of her work-up completed.  She is found to be coagulopathic with an INR around 3.7.  Hemoglobin is slightly lower than baseline around 9.    She is ready been started on Protonix.  I did check her stool which is black and heme positive.  At this point, I did order vitamin K to reverse her anticoagulation.    I did discuss the case with gastroenterology and they agree with plan, gentle hydration overnight, reverse INR, they will see her in the morning unless she becomes unstable.    She is tachycardic but she also has a history of heart failure so we are only running her fluids at 75 an hour.      Given that her hemoglobin is similar to baseline and there is no sign of significant endorgan damage there is no indication for transfusion at this time.      She will be admitted at this time.  She is updated, stable and in agreement.      Critical Care     I, Luis Alberto Trujillo M.D., have personally provided 40 minutes of critical care time exclusive of time spent on separately billable procedures. Time includes review of laboratory data, radiology results, discussion with consultants, and monitoring for potential decompensation. Interventions were performed as documented above.  She is tachycardic with an upper GI bleed requiring reversal of anticoagulation        Prior to making a final disposition on this patient the results of patient's tests and other diagnostic studies were discussed with the patient. All questions were  answered. Patient expressed understanding of the plan and was amenable to it.    Medications   LORazepam (ATIVAN) injection 0.5 mg (0.5 mg Intravenous Given 10/11/21 1642)   HOLD: warfarin (COUMADIN) therapy (has no administration in time range)   sodium chloride 0.9% infusion (has no administration in time range)   potassium chloride ER (KLOR-CON M) CR tablet 40 mEq (40 mEq Oral Given 10/11/21 1525)   0.9% sodium chloride BOLUS (0 mLs Intravenous Stopped 10/11/21 2007)   pantoprazole (PROTONIX) IV push injection 40 mg (40 mg Intravenous Given 10/11/21 1809)   phytonadione 2 mg in sodium chloride 0.9 % 50 mL intermittent infusion (0 mg Intravenous Stopped 10/11/21 2044)       Final Impression     1. Upper GI bleed    2. Generalized muscle weakness    3. Anticoagulated on Coumadin    4. Tachycardia            Chief Complaint     Chief Complaint   Patient presents with     Altered Mental Status     HPI     Bernadette Yu is a 81 year old female who presents for evaluation of altered mental status.     Per chart review, patient was admitted to [facility] (9/17-9/26) for acute left foot osteomyelitis, s/p amputation of second left digit and partial amputation of left second metatarsal om 9/17. Received IV zosyn and Vancomycin given history of MRSA. Switch to Augmentin and doxycycline as recommended by ID to finish course of antibiotic on 9/27/2021. Developed acute metabolic encephalopathy with delirium. Had rapid response on 09/20 due to decreased responsiveness. Responded to Narcan x2. Patient followed up with Dr. Cleveland with vascular surgery on 10/1.         Per patient's son, patient underwent an amputation of the left second digit on the foot 3-4 weeks ago (9/17/21). She was at a TCU and returned home one week ago. She has been experiencing confusion, increased drowsiness, and weakness for the past 3 days. Patient describes her confusion as a 4/10 in severity. She had a fall last night onto carpet but  denies any head trauma. Patient is on Coumadin and has been having black stools but doesn't know when it started. She typically uses a walker for ambulation but has been using family for transfers this week. Occasionally, patient becomes severely agitated and restless after waking up which is unusual for her. She also endorses redness to her lower abdomen and pelvis for the past 3-4 days.         I, Mike Rivers am serving as a scribe to document services personally performed by Luis Alberto Trujillo M.D. based on my observation and the provider's statements to me. I, Luis Alberto Trujillo M.D attest that Mike Rivers is acting in a scribe capacity, has observed my performance of the services and has documented them in accordance with my direction.    Past Medical History     Past Medical History:   Diagnosis Date     Abdominal bloating 8/21/2016     Anticoagulation goal of INR 2.5 to 3.5 1/27/2016     Anxiety      Aortic stenosis 10/26/2019     ASCVD (arteriosclerotic cardiovascular disease)      Atherosclerosis of native coronary artery without angina pectoris 10/26/2019     Bleeding diathesis (H)      Carotid artery stenosis, left      CHF (congestive heart failure) (H)      CKD (chronic kidney disease) stage 3, GFR 30-59 ml/min (H)      DM (diabetes mellitus), type 2 (H) 1990     Eczema      Former smoker      Full code status      History of metabolic encephalopathy with delirium 9/28/2021     History of partial ray amputation of second toe of left foot (H) 9/28/2021     HLD (hyperlipidemia)      HTN (hypertension)      Hypothyroidism      IBS (irritable bowel syndrome)      Insomnia      Liver disease      Long Q-T syndrome 10/26/2019     Meningococcal meningitis Age 14     Microalbuminuria due to type 2 diabetes mellitus (H)      Mild nonproliferative diabetic retinopathy of both eyes (H)      Mitral stenosis      Moderate aortic stenosis      Moderate tricuspid insufficiency 8/28/2021     MRSA (methicillin  resistant staph aureus) culture positive 2017     Normocytic anemia      PAD (peripheral artery disease) (H)      Paroxysmal atrial fibrillation (H) 2015     Paroxysmal atrial fibrillation with RVR (H) 2021     PN (peripheral neuropathy)      Psoriasis      PVD (peripheral vascular disease) (H) 2019     Recurrent UTI (urinary tract infection)      RLS (restless legs syndrome)      S/P CABG (coronary artery bypass graft) 2016     S/P colonoscopy 07     S/P MVR (mitral valve replacement) 2015     Subclavian artery stenosis, left (H) 2015     Torsades de pointes (H) 10/26/2019     Ulcer of heel and midfoot, left, with unspecified severity (H)      Past Surgical History:   Procedure Laterality Date     AMPUTATE TOE(S) Left 2021    Procedure: AMPUTATION, second ray left foot;  Surgeon: Nba Cleveland DPM;  Location: [facility]     APPENDECTOMY       BYPASS GRAFT ARTERY CORONARY       CARDIAC CATHETERIZATION  11/12/15      SECTION       CHOLECYSTECTOMY       COLONOSCOPY N/A 10/12/2016    Procedure: COLONOSCOPY;  Surgeon: Santiago Duran MD;  Location: [facility];  Service:      COLONOSCOPY N/A 10/13/2016    Procedure: COLONOSCOPY with polypectomy & rectum biopsies;  Surgeon: Santiago Duran MD;  Location: [facility];  Service:      COLONOSCOPY N/A 12/3/2017    Procedure: COLONOSCOPY with multiple polyp removal using hot snare and mansfield net and biopsy forcep;  Surgeon: Marcelo Wade MD;  Location: [facility];  Service:      COLONOSCOPY N/A 3/13/2018    Procedure: COLONOSCOPY; POLYPECTOMY;  Surgeon: Marcelo Wade MD;  Location: [facility] OR;  Service:      ESOPHAGOSCOPY, GASTROSCOPY, DUODENOSCOPY (EGD), COMBINED N/A 10/12/2016    Procedure: ESOPHAGOGASTRODUODENOSCOPY with biopsies;  Surgeon: Santiago Duran MD;  Location: [facility];  Service:      ESOPHAGOSCOPY, GASTROSCOPY, DUODENOSCOPY (EGD), COMBINED N/A  "12/3/2017    Procedure: ESOPHAGOGASTRODUODENOSCOPY (EGD);  Surgeon: Marcelo Wade MD;  Location: [facility];  Service:      TONSILLECTOMY & ADENOIDECTOMY       UPPER GASTROINTESTINAL ENDOSCOPY       Family History   Problem Relation Age of Onset     Colon Cancer Father      Diabetes Father      Hypertension Mother      Heart Disease Mother          congestive heart failure     Arthritis Mother      Diabetes Sister      Heart Disease Brother      Rectal Cancer Son      Colon Cancer Son       Social History     Tobacco Use     Smoking status: Former Smoker     Years: 23.00     Types: Cigarettes     Smokeless tobacco: Never Used     Tobacco comment: quit 1970's   Substance Use Topics     Alcohol use: No     Drug use: No       Relevant past medical, surgical, family and social history as documented above, has been reviewed and discussed with patient. No changes or additions, unless otherwise noted in the HPI.    Current Medications     acetaminophen (TYLENOL) 500 MG tablet  diltiazem ER COATED BEADS (CARDIZEM CD/CARTIA XT) 180 MG 24 hr capsule  ferrous sulfate 325 (65 FE) MG tablet  furosemide (LASIX) 40 MG tablet  gabapentin (NEURONTIN) 100 MG capsule  HYDROmorphone (DILAUDID) 2 MG tablet  insulin aspart protamine-insulin aspart (NOVOLOG MIX 70-30FLEXPEN U-100) 100 unit/mL (70-30) pen  levothyroxine (SYNTHROID, LEVOTHROID) 125 MCG tablet  polyethylene glycol (MIRALAX) 17 gram packet  temazepam (RESTORIL) 15 MG capsule  WARFARIN SODIUM PO  BD ULTRA-FINE DAVID PEN NEEDLE 32 gauge x 5/32\" Ndle  blood glucose test strips  blood-glucose meter Misc  generic lancets  metoprolol tartrate (LOPRESSOR) 50 MG tablet  mupirocin (BACTROBAN) 2 % external ointment  QUEtiapine (SEROQUEL) 50 MG tablet  senna-docusate (SENOKOT-S/PERICOLACE) 8.6-50 MG tablet  triamcinolone (KENALOG) 0.1 % external cream        Allergies     Allergies   Allergen Reactions     Amiodarone Other (See Comments)     TORSADES DE POINTES     Aspirin Other " (See Comments)     Recurrent severe gastrointestinal bleed.       Review of Systems     Review of Systems   Constitutional: Positive for appetite change and fatigue. Negative for fever.   Respiratory: Negative for shortness of breath.    Cardiovascular: Negative for chest pain.   Gastrointestinal: Positive for blood in stool.   Genitourinary: Negative for flank pain.   Musculoskeletal: Negative for back pain.   Skin: Negative for pallor.   Hematological: Bruises/bleeds easily.   Psychiatric/Behavioral: Positive for confusion.        Remainder of systems reviewed, unless noted in HPI all others negative.    Physical Exam     /59   Pulse (!) 126   Temp 98.4  F (36.9  C) (Oral)   Resp 18   Wt 63.5 kg (140 lb)   SpO2 95%   BMI 27.34 kg/m      Physical Exam  Constitutional:       General: She is not in acute distress.     Appearance: She is not diaphoretic.   HENT:      Head: Atraumatic.      Mouth/Throat:      Pharynx: No oropharyngeal exudate.   Eyes:      General: No scleral icterus.     Pupils: Pupils are equal, round, and reactive to light.   Cardiovascular:      Heart sounds: Normal heart sounds.   Pulmonary:      Effort: No respiratory distress.      Breath sounds: Normal breath sounds.   Abdominal:      General: Bowel sounds are normal.      Palpations: Abdomen is soft.      Tenderness: There is no abdominal tenderness.       Genitourinary:     Comments: Dark black, tarry stools, grossly heme positive  Musculoskeletal:         General: No tenderness.   Skin:     General: Skin is warm.      Findings: No rash.   Neurological:      GCS: GCS eye subscore is 4. GCS verbal subscore is 4. GCS motor subscore is 6.      Cranial Nerves: No cranial nerve deficit.   Psychiatric:      Comments: Sleeps when not stimulated             Labs & Imaging         Labs Ordered and Resulted from Time of ED Arrival Up to the Time of Departure from the ED   ROUTINE UA WITH MICROSCOPIC REFLEX TO CULTURE - Abnormal; Notable for  the following components:       Result Value    Appearance Urine Turbid (*)     Protein Albumin Urine 10  (*)     Leukocyte Esterase Urine 75 Tank/uL (*)     Bacteria Urine Few (*)     Mucus Urine Present (*)     All other components within normal limits    Narrative:     Urine Culture not indicated   BASIC METABOLIC PANEL - Abnormal; Notable for the following components:    Potassium 2.7 (*)     Urea Nitrogen 37 (*)     Creatinine 1.17 (*)     GFR Estimate 43 (*)     All other components within normal limits   INR - Abnormal; Notable for the following components:    INR 3.72 (*)     All other components within normal limits   CBC WITH PLATELETS AND DIFFERENTIAL - Abnormal; Notable for the following components:    WBC Count 14.7 (*)     RBC Count 3.45 (*)     Hemoglobin 9.1 (*)     Hematocrit 30.7 (*)     MCH 26.4 (*)     MCHC 29.6 (*)     RDW 21.1 (*)     Absolute Neutrophils 12.4 (*)     Absolute Immature Granulocytes 0.2 (*)     All other components within normal limits   B-TYPE NATRIURETIC PEPTIDE ( [facility] ONLY) - Abnormal; Notable for the following components:     (*)     All other components within normal limits   INR - Abnormal; Notable for the following components:    INR 4.20 (*)     All other components within normal limits   MAGNESIUM - Abnormal; Notable for the following components:    Magnesium 1.7 (*)     All other components within normal limits   TROPONIN I - Normal   COVID-19 VIRUS (CORONAVIRUS) BY PCR - Normal    Narrative:     Testing was performed using the walt  SARS-CoV-2 & Influenza A/B Assay on the walt  Franny  System.  This test should be ordered for the detection of SARS-COV-2 in individuals who meet SARS-CoV-2 clinical and/or epidemiological criteria. Test performance is unknown in asymptomatic patients.  This test is for in vitro diagnostic use under the FDA EUA for laboratories certified under CLIA to perform moderate and/or high complexity testing. This test has not been FDA  cleared or approved.  A negative test does not rule out the presence of PCR inhibitors in the specimen or target RNA in concentration below the limit of detection for the assay. The possibility of a false negative should be considered if the patient's recent exposure or clinical presentation suggests COVID-19.  [facility] are certified under the Clinical Laboratory Improvement Amendments of 1988 (CLIA-88) as qualified to perform moderate and/or high complexity laboratory testing.   EXTRA GREEN TOP (LITHIUM HEPARIN) TUBE   MAY SALINE LOCK IV   CALL   CALL   TYPE AND SCREEN, ADULT   PREPARE RED BLOOD CELLS (UNIT)   PREPARE RED BLOOD CELLS (UNIT)   CBC WITH PLATELETS & DIFFERENTIAL    Narrative:     The following orders were created for panel order CBC with platelets + differential.  Procedure                               Abnormality         Status                     ---------                               -----------         ------                     CBC with platelets and d...[[id number]]  Abnormal            Final result                 Please view results for these tests on the individual orders.   ABO/RH TYPE AND SCREEN    Narrative:     The following orders were created for panel order ABO/Rh type and screen.  Procedure                               Abnormality         Status                     ---------                               -----------         ------                     Adult Type and Screen[[id number]]                            Final result                 Please view results for these tests on the individual orders.   EXTRA TUBE    Narrative:     The following orders were created for panel order [place] Draw.  Procedure                               Abnormality         Status                     ---------                               -----------         ------                     Extra Green Top (Lithium...[[id number]]                      In process                   Please  view results for these tests on the individual orders.         Results for orders placed or performed during the hospital encounter of 10/11/21   Head CT w/o contrast    Impression    IMPRESSION:  1.  No CT evidence for acute intracranial abnormality.  2.  Brain atrophy and presumed chronic microvascular ischemic changes as above.  3.  Small area of chronic right temporal lobe encephalomalacia, unchanged.  4.  Near complete opacification of the right maxillary sinus with high-attenuation material and chronic wall thickening/sclerosis.   XR Chest Port 1 View    Impression    IMPRESSION: The patient is significantly rotated limiting evaluation. Patchy interstitial opacities suggestive of mild pulmonary edema. No effusions or pneumothorax. Heart size is stable. Valvuloplasty changes and left atrial appendage occlusion device.   Sternotomy wires. No acute osseous findings.   UA with Microscopic reflex to Culture    Specimen: Urine, Clean Catch   Result Value Ref Range    Color Urine Light Yellow Colorless, Straw, Light Yellow, Yellow    Appearance Urine Turbid (A) Clear    Glucose Urine Negative Negative mg/dL    Bilirubin Urine Negative Negative    Ketones Urine Negative Negative mg/dL    Specific Gravity Urine 1.012 1.001 - 1.030    Blood Urine Negative Negative    pH Urine 5.0 5.0 - 7.0    Protein Albumin Urine 10  (A) Negative mg/dL    Urobilinogen Urine <2.0 <2.0 mg/dL    Nitrite Urine Negative Negative    Leukocyte Esterase Urine 75 Tank/uL (A) Negative    Bacteria Urine Few (A) None Seen /HPF    Mucus Urine Present (A) None Seen /LPF    RBC Urine 2 <=2 /HPF    WBC Urine 2 <=5 /HPF    Squamous Epithelials Urine 1 <=1 /HPF   Basic metabolic panel   Result Value Ref Range    Sodium 140 136 - 145 mmol/L    Potassium 2.7 (LL) 3.5 - 5.0 mmol/L    Chloride 104 98 - 107 mmol/L    Carbon Dioxide (CO2) 26 22 - 31 mmol/L    Anion Gap 10 5 - 18 mmol/L    Urea Nitrogen 37 (H) 8 - 28 mg/dL    Creatinine 1.17 (H) 0.60 - 1.10  mg/dL    Calcium 8.7 8.5 - 10.5 mg/dL    Glucose 92 70 - 125 mg/dL    GFR Estimate 43 (L) >60 mL/min/1.73m2   Troponin I (now)   Result Value Ref Range    Troponin I 0.02 0.00 - 0.29 ng/mL   Asymptomatic COVID-19 Virus (Coronavirus) by PCR Nasopharyngeal    Specimen: Nasopharyngeal; Swab   Result Value Ref Range    SARS CoV2 PCR Negative Negative   Result Value Ref Range    INR 3.72 (H) 0.90 - 1.15   CBC with platelets and differential   Result Value Ref Range    WBC Count 14.7 (H) 4.0 - 11.0 10e3/uL    RBC Count 3.45 (L) 3.80 - 5.20 10e6/uL    Hemoglobin 9.1 (L) 11.7 - 15.7 g/dL    Hematocrit 30.7 (L) 35.0 - 47.0 %    MCV 89 78 - 100 fL    MCH 26.4 (L) 26.5 - 33.0 pg    MCHC 29.6 (L) 31.5 - 36.5 g/dL    RDW 21.1 (H) 10.0 - 15.0 %    Platelet Count 204 150 - 450 10e3/uL    % Neutrophils 83 %    % Lymphocytes 8 %    % Monocytes 7 %    % Eosinophils 1 %    % Basophils 0 %    % Immature Granulocytes 1 %    NRBCs per 100 WBC 0 <1 /100    Absolute Neutrophils 12.4 (H) 1.6 - 8.3 10e3/uL    Absolute Lymphocytes 1.2 0.8 - 5.3 10e3/uL    Absolute Monocytes 1.0 0.0 - 1.3 10e3/uL    Absolute Eosinophils 0.1 0.0 - 0.7 10e3/uL    Absolute Basophils 0.0 0.0 - 0.2 10e3/uL    Absolute Immature Granulocytes 0.2 (H) <=0.0 10e3/uL    Absolute NRBCs 0.0 10e3/uL   B-Type Natriuretic Peptide ([facility] Only)   Result Value Ref Range     (H) 0 - 167 pg/mL   Result Value Ref Range    INR 4.20 (H) 0.90 - 1.15   Result Value Ref Range    Magnesium 1.7 (L) 1.8 - 2.6 mg/dL   Adult Type and Screen   Result Value Ref Range    ABO/RH(D) O POS     Antibody Screen Negative Negative    SPECIMEN EXPIRATION DATE [id number]    Prepare red blood cells (unit)   Result Value Ref Range    CROSSMATCH COMPATIBLE     UNIT ABO/RH O Pos     Unit Number [id number]     Unit Status Ready     Blood Component Type Red Blood Cells     Product Code F2465S42     CODING SYSTEM [id number]     UNIT TYPE ISBT 5100    Prepare red blood cells (unit)   Result Value  Ref Range    CROSSMATCH INCOMPATIBLE     UNIT ABO/RH O Pos     Unit Number [id number]     Unit Status Released     Blood Component Type Red Blood Cells     Product Code [id number]     CODING SYSTEM [id number]     UNIT TYPE ISBT 5100          Luis Alberto Trujillo MD  10/11/21         Luis Alberto Trujillo MD  10/11/21 4773

## 2021-10-12 NOTE — CONSULTS
[facility] Consult     Impression:     At this time, patient not ready for EGD.  Mental status changes likely related to Ativan given for agitation-nursing staff aware.  Would give 1 unit(s) pRBC today and observe.  May need EGD this admission in OR to define UGI anatomy.  Melena likely related to coagulopathy.    Recommendation:     1. Transfuse 1 unit(s) pRBC per hiospitalist  2. Hold anticoagulation, if able  3. Continue IV Protonix  4. Reassess for EGD in OR when mental status improves.  5. NPO for now      Name: Bernadette Yu    Medical Record #: [id number]    YOB: 1940    Date/Time: 10/12/2021/8:33 AM    Reason for Consultation: Anitha Nick has asked me to evaluate Bernadette Yu regarding melena and elevated INR.    HPI:     The patient is a 80 yo female who was admitted from the ED for increasing confusion, drowsiness and weakness X 3 days. She was agitated last night and received Ativan this AM and was not arousible at this time.  She has had some melena at home.  Baseline Hgb in 9 range.  Recently, had amputation of left second digit on her foot 3-4 weeks ago and came home about 1 week ago after a TCU stay.  She has a significant cardia history with MV repair, CAD with CABG and CHF, a fib, PVD, T2 DM and CKD3.  Hgb this AM is 7.6 and INR 1.9.  INR 4.2 at 2000 last night and Hgb 9.1.    She was unable to get a history. She had an EGD in 2017 which was normal. Last colonoscopy 2018 and tubulovillous adenoma removed from transverse colon and 4 other smaller polyps removed.    Review of Systems (ROS):    Complete ROS otherwise negative except for as above.    Past Medical History:  Past Medical History:   Diagnosis Date     Abdominal bloating 8/21/2016     Anticoagulation goal of INR 2.5 to 3.5 1/27/2016     Anxiety      Aortic stenosis 10/26/2019     ASCVD (arteriosclerotic cardiovascular disease)      Atherosclerosis of native coronary artery without angina  pectoris 10/26/2019     Bleeding diathesis (H)      Carotid artery stenosis, left      CHF (congestive heart failure) (H)      CKD (chronic kidney disease) stage 3, GFR 30-59 ml/min (H)      DM (diabetes mellitus), type 2 (H) 1990     Eczema      Former smoker      Full code status      History of metabolic encephalopathy with delirium 9/28/2021     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     Moderate tricuspid insufficiency 8/28/2021     MRSA (methicillin resistant staph aureus) culture positive 2/9/2017     Normocytic anemia      PAD (peripheral artery disease) (H)     [facility]   DATE: 10/26/2019 4:15 PM   EXAM:  DUPLEX ARTERIAL ULTRASOUND OF THE LOWER EXTREMITIES BILATERALLY   INDICATION: Leg pain, vasculopathy.   COMPARISON: None.   TECHNIQUE: Duplex imaging is performed utilizing gray-scale, two-dimensional images, and color-flow imaging. Doppler waveform analysis and spectral Doppler imaging is also performed.   DUPLEX ARTERIAL ULTRASOUND FINDIN     Paroxysmal atrial fibrillation (H) 11/16/2015    Bilateral pulmonary vein isolation with AtriCure Synergy (bipolar radiofrequency ablation) device, exclusion of the left atrial appendage with the AtriCure AtriClip device.       Paroxysmal atrial fibrillation with RVR (H) 9/29/2021     PN (peripheral neuropathy)      Psoriasis      PVD (peripheral vascular disease) (H) 11/4/2019    Bilateral lower extremities.  See ultrasound 2019.     Recurrent UTI (urinary tract infection)      RLS (restless  legs syndrome)      S/P CABG (coronary artery bypass graft) 1/8/2016     S/P colonoscopy 12/12/07     S/P MVR (mitral valve replacement) 12/30/2015     Subclavian artery stenosis, left (H) 11/16/2015    Stented in 2015.      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     Ulcer of heel and midfoot, left, with unspecified severity (H)        Medications:   Facility-Administered Medications Prior to Admission   Medication Dose Route Frequency Provider Last Rate Last Admin     lidocaine (XYLOCAINE) 2 % external gel   Topical Daily PRN Nba Cleveland DPM   Given at 09/02/21 1601     Medications Prior to Admission   Medication Sig Dispense Refill Last Dose     acetaminophen (TYLENOL) 500 MG tablet Take 1,000 mg by mouth 3 times daily (give at 8am, 2pm, 8pm)   10/11/2021 at Unknown time     diltiazem ER COATED BEADS (CARDIZEM CD/CARTIA XT) 180 MG 24 hr capsule Take 1 capsule (180 mg) by mouth daily 90 capsule 3 10/11/2021 at Unknown time     ferrous sulfate 325 (65 FE) MG tablet [FERROUS SULFATE 325 (65 FE) MG TABLET] Take 1 tablet (325 mg total) by mouth 3 (three) times a week. Monday, Wednesday, and Friday 50 tablet 3 10/11/2021 at Unknown time     furosemide (LASIX) 40 MG tablet [FUROSEMIDE (LASIX) 40 MG TABLET] Take 2 tablets (80 mg total) by mouth daily. 120 tablet 1 10/11/2021 at Unknown time     gabapentin (NEURONTIN) 100 MG capsule Take 1 capsule (100 mg) by mouth At Bedtime   10/10/2021 at Unknown time     HYDROmorphone (DILAUDID) 2 MG tablet Take 1 tablet (2 mg) by mouth every 6 hours as needed for moderate to severe pain (Patient taking differently: Take 2 mg by mouth every 4 hours as needed for moderate to severe pain ) 120 tablet 0 10/11/2021 at Unknown time     insulin aspart protamine-insulin aspart (NOVOLOG MIX 70-30FLEXPEN U-100) 100 unit/mL (70-30) pen [INSULIN ASPART PROTAMINE-INSULIN ASPART (NOVOLOG MIX 70-30FLEXPEN U-100) 100 UNIT/ML (70-30) PEN] Inject 30 Units under the  "skin every morning AND 15 Units every evening.  3 10/11/2021 at am     levothyroxine (SYNTHROID, LEVOTHROID) 125 MCG tablet [LEVOTHYROXINE (SYNTHROID, LEVOTHROID) 125 MCG TABLET] Take 1 tablet (125 mcg total) by mouth daily. 90 tablet 3 10/11/2021 at Unknown time     polyethylene glycol (MIRALAX) 17 gram packet [POLYETHYLENE GLYCOL (MIRALAX) 17 GRAM PACKET] Take 1 packet (17 g total) by mouth daily.  0 10/11/2021 at Unknown time     temazepam (RESTORIL) 15 MG capsule Take 15-30 mg by mouth At Bedtime   10/10/2021 at Unknown time     WARFARIN SODIUM PO Take 5 mg by mouth daily    10/11/2021 at Unknown time     BD ULTRA-FINE DAVID PEN NEEDLE 32 gauge x 5/32\" Ndle [BD ULTRA-FINE DAVID PEN NEEDLE 32 GAUGE X 5/32\" NDLE] 1 each by abdominal subcutaneous route 2 (two) times a day. USE WITH NOVOLOG PENS.   Please keep this Rx on file for the next RF. 100 each 11      blood glucose test strips [BLOOD GLUCOSE TEST STRIPS] Use 1 each As Directed 3 (three) times a day. Dispense brand per patient's insurance at pharmacy discretion. 300 strip 3      blood-glucose meter Misc [BLOOD-GLUCOSE METER MISC] Dispense 1 meter as covered by patient's insurance. 1 each 0      generic lancets [GENERIC LANCETS] Use 1 each As Directed 3 (three) times a day. Dx E11.65 DM2, uncontrolled 300 each 3      metoprolol tartrate (LOPRESSOR) 50 MG tablet Take 1 tablet (50 mg) by mouth 2 times daily (Patient not taking: Reported on 10/11/2021)   Not Taking at Unknown time     mupirocin (BACTROBAN) 2 % external ointment Apply topically 2 times daily (Patient not taking: Reported on 10/11/2021) 22 g 0 Not Taking at Unknown time     QUEtiapine (SEROQUEL) 50 MG tablet Take 50 mg by mouth 3 times daily (give at 8am, 2pm, 8pm) (Patient not taking: Reported on 10/1/2021)        senna-docusate (SENOKOT-S/PERICOLACE) 8.6-50 MG tablet Take 1 tablet by mouth 2 times daily (Patient not taking: Reported on 10/11/2021)   Not Taking at Unknown time     triamcinolone " (KENALOG) 0.1 % external cream Apply topically daily (Patient not taking: Reported on 10/11/2021) 453.6 g 11 Not Taking at Unknown time       Current Facility-Administered Medications:      acetaminophen (TYLENOL) tablet 975 mg, 975 mg, Oral, Q8H, Jared Sales II, MD, 975 mg at 10/12/21 0100     glucose gel 15-30 g, 15-30 g, Oral, Q15 Min PRN **OR** dextrose 50 % injection 25-50 mL, 25-50 mL, Intravenous, Q15 Min PRN **OR** glucagon injection 1 mg, 1 mg, Subcutaneous, Q15 Min PRN, Jared Sales II, MD     diltiazem (CARDIZEM) tablet 30 mg, 30 mg, Oral, Q6H PRN, Anitha Nick DO     diltiazem (CARDIZEM) tablet 60 mg, 60 mg, Oral, Q6H VINI, Anitha Nick DO     [Held by provider] diltiazem ER COATED BEADS (CARDIZEM CD/CARTIA XT) 24 hr capsule 180 mg, 180 mg, Oral, Daily, Jared Sales II, MD     furosemide (LASIX) injection 40 mg, 40 mg, Intravenous, Q12H, Jared Sales II, MD     [Held by provider] gabapentin (NEURONTIN) capsule 100 mg, 100 mg, Oral, At Bedtime, Jared Sales II, MD     HOLD: warfarin (COUMADIN) therapy, , Does not apply, HOLD, Luis Alberto Trujillo MD     [Held by provider] HYDROmorphone (DILAUDID) tablet 2 mg, 2 mg, Oral, Q4H PRN, Jared Sales II, MD     insulin aspart (NovoLOG) injection (RAPID ACTING), 1-6 Units, Subcutaneous, Q4H, Jared Sales II, MD     insulin glargine (LANTUS PEN) injection 15 Units, 15 Units, Subcutaneous, [facility], Jc Hill MD     insulin glargine (LANTUS PEN) injection 7 Units, 7 Units, Subcutaneous, At Bedtime, Jc Hill MD     levothyroxine (SYNTHROID/LEVOTHROID) tablet 125 mcg, 125 mcg, Oral, Daily, Jared Sales II, MD     lidocaine (LMX4) cream, , Topical, Q1H PRN, Jared Sales II, MD     lidocaine 1 % 0.1-1 mL, 0.1-1 mL, Other, Q1H PRN, Jared Sales II, MD     LORazepam (ATIVAN) injection 0.5 mg, 0.5 mg, Intravenous, Q6H PRN, Khalif Maynard MD, 0.5 mg at 10/12/21 1426      melatonin tablet 1 mg, 1 mg, Oral, At Bedtime PRN, Jared Sales II, MD     metoprolol (LOPRESSOR) injection 2.5 mg, 2.5 mg, Intravenous, Q6H PRN, Anitha Nick DO     naloxone (NARCAN) injection 0.2 mg, 0.2 mg, Intravenous, Q2 Min PRN **OR** naloxone (NARCAN) injection 0.4 mg, 0.4 mg, Intravenous, Q2 Min PRN **OR** naloxone (NARCAN) injection 0.2 mg, 0.2 mg, Intramuscular, Q2 Min PRN **OR** naloxone (NARCAN) injection 0.4 mg, 0.4 mg, Intramuscular, Q2 Min PRN, Jared Sales II, MD     pantoprazole (PROTONIX) IV push injection 40 mg, 40 mg, Intravenous, BID, Jared Sales II, MD     prochlorperazine (COMPAZINE) injection 5 mg, 5 mg, Intravenous, Q6H PRN **OR** prochlorperazine (COMPAZINE) tablet 5 mg, 5 mg, Oral, Q6H PRN **OR** prochlorperazine (COMPAZINE) suppository 12.5 mg, 12.5 mg, Rectal, Q12H PRN, Jared Sales II, MD     sodium chloride (PF) 0.9% PF flush 3 mL, 3 mL, Intracatheter, Q8H, Jared Sales II, MD, 3 mL at 10/12/21 0100     sodium chloride (PF) 0.9% PF flush 3 mL, 3 mL, Intracatheter, q1 min prn, Jared Sales II, MD     temazepam (RESTORIL) capsule 7.5 mg, 7.5 mg, Oral, At Bedtime, Jared Sales II, MD       Allergies: Amiodarone and Aspirin    Family History:  Family History   Problem Relation Age of Onset     Colon Cancer Father      Diabetes Father      Hypertension Mother      Heart Disease Mother          congestive heart failure     Arthritis Mother      Diabetes Sister      Heart Disease Brother      Rectal Cancer Son      Colon Cancer Son        Social History:  Social History     Tobacco Use     Smoking status: Former Smoker     Years: 23.00     Types: Cigarettes     Smokeless tobacco: Never Used     Tobacco comment: quit 1970's   Substance Use Topics     Alcohol use: No     Drug use: No       Physical Exam: /89 (BP Location: Left arm)   Pulse (!) 126   Temp 99.2  F (37.3  C) (Axillary)   Resp 24   Wt 63.5 kg (140 lb)   SpO2 98%    BMI 27.34 kg/m      General: NAD  Eyes: No scleral icterus or conjunctivitis  Oropharynx: WNL  Neck/Thyroid: No neck masses or thyromegaly  Pulmonary: Lungs are clear to auscultation bilaterally  Cardiovascular: Regular, no lower extermity edema   Gastrointestinal: Positive bowel sounds, soft, non-tender, no rebound or guarding. No HSM.  Lymph nodes: No cervical or supraclavicular lymphadenopathy  Skin: The patient is not jaundiced.  Back: No spinal/paraspinal tenderness, no CVA tenderness.  Neurologic: Lethargic; not arousable  Musculoskeletal:  Normal muscle tone    Labs:  CMP Results:   Recent Labs   Lab Test 10/12/21  0621      POTASSIUM 2.8*   CHLORIDE 108*   CO2 26   ANIONGAP 8   GLC 68*   BUN 24   CR 0.81   BILITOTAL 1.0   ALKPHOS 55   ALT 14   AST 23      CBC  Recent Labs   Lab 10/12/21  0621 10/11/21  1422   WBC 9.5 14.7*   RBC 2.82* 3.45*   HGB 7.6* 9.1*   HCT 25.4* 30.7*   MCV 90 89   MCH 27.0 26.4*   MCHC 29.9* 29.6*   RDW 20.5* 21.1*   * 204     INR  Recent Labs   Lab 10/12/21  0621 10/11/21  2000 10/11/21  1422   INR 1.90* 4.20* 3.72*      Lipase   Date Value Ref Range Status   11/23/2020 13 0 - 52 U/L Final       Radiology:  US Lower Extremity Venous Duplex Left    Result Date: 9/18/2021  EXAM: US LOWER EXTREMITY VENOUS DUPLEX LEFT LOCATION: [facility] DATE/TIME: 9/18/2021 4:11 PM INDICATION: Left calf swelling. COMPARISON: None. TECHNIQUE: Venous Duplex ultrasound of the left lower extremity with and without compression, augmentation and duplex. Color flow and spectral Doppler with waveform analysis performed. FINDINGS: Exam includes the common femoral, femoral, popliteal, and contralateral common femoral veins as well as segmentally visualized deep calf veins and greater saphenous vein. LEFT: No deep vein thrombosis. No superficial thrombophlebitis. No popliteal cyst.     IMPRESSION: 1.  No deep venous thrombosis in the left lower extremity. 2.  Nonspecific  subcutaneous edema.    US Lower Extremity Arterial Duplex Bilateral    Result Date: 9/14/2021  Arterial Duplex Ultrasound (Date: 09/14/21) Lower Extremity Artery Evaluation Indication: SURVEILLANCE: LEFT 2ND TOE OSTEOMYELITIS, DIABETIC ULCER, CELLULITIS. HAMMER TOE, PAD. NON COMPRESSIBLE DANAE'S  Previous: 3/3/2020 History: Previous Smoker, Diabetic, PAD and Vascular Ulcers Technique: Duplex imaging is performed utilizing gray-scale, two-dimensional images, and color-flow imaging. Doppler waveform analysis and spectral Doppler imaging is also performed. LOWER EXTREMITY ARTERIAL DUPLEX EXAM WITH WAVEFORMS Right Leg:(cm/s) Location: Velocities Waveforms EIA:   89  B CFA:   66  B PFA:   46  B SFA Proximal:   65  B SFA Mid:   59  B SFA Distal:   64  B Popliteal Artery:   50  B PTA:   20   B BAYLEE:   48  B DPA:   66  B Waveforms: T=Triphasic, M=Monophasic, B=Biphasic Left Leg:(cm/s) Location: Velocities Waveforms EIA:   58  B CFA:   69  B PFA:   48  B SFA Proximal:   35 /43 B SFA Mid:   20  M SFA Distal:   86  M Popliteal Artery:   52  M PTA:   22   M BAYLEE:   76  M DPA:   75  M Waveforms: T=Triphasic, M=Monophasic, B=Biphasic Impression: Right Lower Extremity: Patent lower extremity vasculature with biphasic flow.  No significant stenosis identified. Left Lower Extremity: Patent vasculature to the proximal superficial femoral artery with biphasic flow.  Transition to monophasic flow in the mid superficial femoral artery.  The remaining vasculature is patent with monophasic flow. Reference: Category Normal 1-19% 20-49% 50-99% Occluded PSV <160 cm/sec without spectral broadening <160 cm/sec with spectral broadening Increased Increased Absent Flow Ratio N/A N/A < 2.0 >2.0 N/A Post-Stenotic Turbulence No No No Yes N/A     US DANAE Doppler No Exercise    Result Date: 9/14/2021  BILATERAL RESTING ANKLE-BRACHIAL INDICES (DANAE'S) (Date: 09/14/21) Indication: SURVEILLANCE DANAE'S: LEFT 2ND TOE OSTEOMYELITIS, DIABETIC ULCER, CELLULITIS.  HAMMER TOE, PAD. Previous: 3/3/2020 History: Previous Smoker, Diabetic, PAD and Vascular Ulcers  Resting DANAE's          Right: mmHg Index     Brachial: 126  Ankle-(PT): >254 NC Ankle-(DP): >254 NC           Digit: 124 0.98               Left: mmHg Index     Brachial: 113  Ankle-(PT): 99 0.79 Ankle-(DP): >254 NC           Digit: 45 0.36 Resting ankle-brachial index is non compressible on the right. Toe Pressures of 124 mmHg and TBI of 0.98  Resting ankle-brachial index of noncompressible on the left. Toe Pressures of 45 mmHg and TBI of 0.36  WAVEFORMS: The right dorsalis pedis and posterior tibial arteries show monophasic waveforms. The left dorsalis pedis and posterior tibial arteries show monophasic waveforms.  Impression:  1. RIGHT LOWER EXTREMITY: DANAE is Uninterpretable due to incompressible vessels. and Normal toe pressures of 124 mmHg. 2. LEFT LOWER EXTREMITY: DANAE is Uninterpretable due to incompressible vessels. and Mildly abnormal, but adequate for healing toe pressures of 45 mmHg. Reference: Wound classification Grade DANAE Ankle Systolic Pressure Toe Pressures 0 > 0.80 > 100 mmHg > 60 mmHg 1 0.6 - 0.79 70 - 100 mmHg 40 - 59 mmHg 2 0.4 - 0.59 50-70 mmHg 30 - 39 mmHg 3 < 0.39 < 50 mmHg < 30 mmHg Digit Pressures DBI Disease Category > 0.70 Normal < 0.70 Abnormal > 30 mmHg Potential wound healing < 30 mmHg Impaired wound healing Ankle Brachial Pressures DANAE Disease Category > 1.3  Likely vessel calcification with monophasic waveforms, non-diagnostic 0.95-1.30 Normal with multiphasic waveforms 0.50-0.95 Single level disease 0.30-0.50 Multilevel disease < 0.30 Critical limb ischema Volume Plethysmography Recording (VPR) at all levels Normal Sharp systolic peak, fast upstroke, prominent dicrotic notch in wave Mild Sharp systolic peak, fast upstroke, absent dicrotic notch in wave Moderate Flattened systolic peak, slowed upstroke, absent dicrotic notch in wave Severe Low-amplitude wave with = upslope and down slope  Occluded Flat Line Multiple Level Segmental Pressures  Normal Abnormal Upper Thigh > 1.2 pressure indices, <30 mmHg between lateral and horizontal cuffs < 0.8 pressure indices suggest proximal occlusion, 0.8-1.2 pressure indices suggest inflow disease, > 30 mmHg between lateral and horizontal cuffs  Lower Thigh < 30 mmHg between lateral and horizontal cuffs > 30 mmHg between lateral and horizontal cuffs Upper Calf < 30 mmHg between lateral and horizontal cuffs > 30 mmHg between lateral and horizontal cuffs Note: As limb diameter increases, pressure measurements also increase.    XR Chest Port 1 View    Result Date: 10/11/2021  EXAM: XR CHEST PORT 1 VIEW LOCATION: [facility] DATE/TIME: 10/11/2021 2:44 PM INDICATION: Altered mental status COMPARISON: 11/23/2020     IMPRESSION: The patient is significantly rotated limiting evaluation. Patchy interstitial opacities suggestive of mild pulmonary edema. No effusions or pneumothorax. Heart size is stable. Valvuloplasty changes and left atrial appendage occlusion device. Sternotomy wires. No acute osseous findings.    CTA Abdomen Pelvis with Contrast    Result Date: 10/11/2021  EXAM: CTA ABDOMEN PELVIS WITH CONTRAST LOCATION: [facility] DATE/TIME: 10/11/2021 9:30 PM INDICATION: Gastrointestinal bleeding with black tarry stool with heme positive test in stool. Assess for source of bleeding. COMPARISON: 02/19/2020. TECHNIQUE: CT angiogram abdomen pelvis during arterial phase of injection of IV contrast. 2D and 3D MIP reconstructions were performed by the CT technologist. Dose reduction techniques were used. CONTRAST: Isovue 370, 75 mL. FINDINGS: ANGIOGRAM ABDOMEN/PELVIS: Noncontrast imaging demonstrates a focal area of increased density involving the EG junction measuring approximately 2.3 x 1.6 cm. This is indeterminate based on the noncontrast imaging, could represent retained contrast material from a recent study or  postsurgical change. This remains stable in overall appearance on arterial phase study and portal venous phase imaging. Arterial phase imaging demonstrates no evidence for surrounding active contrast extravasation in distal esophagus, stomach or duodenum. No evidence for active contrast extravasation in the small bowel. Large amount of stool throughout the colon limits evaluation for active contrast extravasation/bleeding. Allowing for this, there is no evidence for active bleeding within the colon. Normal caliber lower thoracic and abdominal aorta. Minimal aortic calcification. The celiac, splenic, common hepatic and proper hepatic remain patent. Patent SMA. Patent bilateral renal arteries with marked narrowing in the proximal left renal artery. CECIL patent. Patent bilateral common, external and internal iliac arteries along with patent bilateral common femoral arteries. 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 LOWER CHEST: Minimal right pleural fluid. No left-sided pleural fluid. Interlobular septal thickening. Cardiac enlargement. HEPATOBILIARY: Cholecystectomy. No significant biliary dilatation allowing for postcholecystectomy state. No evidence for abnormal arterial phase enhancement. Homogeneous portal venous phase enhancement. PANCREAS: No ductal dilatation or inflammatory change. SPLEEN: Normal size spleen. ADRENAL GLANDS: No significant nodules. KIDNEYS/BLADDER: Symmetric contrast enhancement to both kidneys. No urinary collecting system dilatation or calculi. Bladder distention. BOWEL: Marked amount of stool throughout the colon with severe amount of stool in the rectal vault. No colonic wall thickening or inflammatory change. No small bowel dilatation or inflammatory change. LYMPH NODES: No lymphadenopathy. PELVIC ORGANS: Unremarkable. No significant free fluid. MUSCULOSKELETAL: Diffuse subcutaneous edema. Degenerative hypertrophic changes in the thoracic spine.     IMPRESSION: 1.  Focal  area of increased density involving the EG junction with the EG junction dilatation measuring 2.3 x 1.6 cm on noncontrast imaging. This does not change in its appearance on arterial phase imaging or portal venous phase imaging and may be the result of retained contrast material within the distal esophagus or retained food material. Another consideration would be postoperative change. Given the lack of change in overall appearance on postcontrast imaging, this is unlikely to be due to localized bleeding. An underlying high density mass cannot be excluded. 2.  No evidence for arterial contrast extravasation or active bleeding within the lumen of the small bowel, colon, stomach or distal esophagus above the level of the aforementioned high density mass. Evaluation of the colon is somewhat limited due to a large amount of stool throughout the colon and a severe amount of stool in the rectal vault. 3.  No evidence for inflammatory change to the bowel. 4.  Atherosclerotic vascular calcification with significant vascular calcification origin left renal artery. Chronic occlusion of the left SFA proximally. 5.  Volume overload.    CT Chest Pulmonary Embolism w Contrast    Result Date: 10/11/2021  EXAM: CT CHEST PULMONARY EMBOLISM W CONTRAST LOCATION: [facility] DATE/TIME: 10/11/2021 9:30 PM INDICATION: Postoperative tachycardia and hypoxia. Evaluate for pulmonary embolism. COMPARISON: None. TECHNIQUE: CT chest pulmonary angiogram during arterial phase injection of IV contrast. Multiplanar reformats and MIP reconstructions were performed. Dose reduction techniques were used. CONTRAST: Isovue 370, 75 mL. FINDINGS: ANGIOGRAM CHEST: Allowing for respiratory motion artifact, there is no evidence for pulmonary embolism. Normal caliber thoracic aorta without dissection. Atherosclerotic vascular calcification with severe atherosclerotic vascular calcification at the proximal left subclavian artery with stent  placement. LUNGS AND PLEURA: Minimal right basilar pleural fluid. Interlobular septal thickening with mosaic attenuation bilaterally. Findings can be seen with CHF/volume overload. No definitive evidence for acute focal alveolar infiltrate or consolidation. MEDIASTINUM/AXILLAE: Cardiac enlargement. No pericardial fluid. Minimal sliding esophageal hiatal hernia. No lymphadenopathy. CORONARY ARTERY CALCIFICATION: Severe. UPPER ABDOMEN: Advanced atherosclerotic vascular calcification of the splenic artery with moderate narrowing. Celiac and bilateral renal arteries remain patent with heavy calcification of the origin left renal artery. MUSCULOSKELETAL: Degenerative changes in the spine.     IMPRESSION: 1.  Allowing for significant respiratory motion artifact, there is no evidence for pulmonary embolism. 2.  Evidence for CHF/volume overload with right-sided pleural fluid collection with interlobular septal thickening in mosaic attenuation of the pulmonary parenchyma with cardiac enlargement. 3.  Normal caliber aorta without dissection. Advanced atherosclerotic vascular calcification including severe vascular calcification at the origin of the left subclavian artery with indwelling stent. Severe vascular calcification at the origin left renal  artery. Recommend correlation for renovascular hypertension.    Head CT w/o contrast    Result Date: 10/11/2021  EXAM: CT HEAD W/O CONTRAST LOCATION: [facility] DATE/TIME: 10/11/2021 5:25 PM INDICATION: Mental status change, unknown cause. COMPARISON: Head CT 09/21/2021. TECHNIQUE: Routine CT Head without IV contrast. Multiplanar reformats. Dose reduction techniques were used. FINDINGS: INTRACRANIAL CONTENTS: No intracranial hemorrhage, extraaxial collection, or mass effect.  No CT evidence of acute infarct. Scattered vascular calcifications. Moderate presumed chronic small vessel ischemic changes. Small area of chronic right temporal lobe encephalomalacia,  unchanged. Moderate generalized volume loss. No hydrocephalus. VISUALIZED ORBITS/SINUSES/MASTOIDS: Prior bilateral cataract surgery. Visualized portions of the orbits are otherwise unremarkable. Near-complete opacification of right maxillary sinus with hyperattenuating material and chronic wall thickening/sclerosis.  No middle ear or mastoid effusion. BONES/SOFT TISSUES: No acute abnormality.     IMPRESSION: 1.  No CT evidence for acute intracranial abnormality. 2.  Brain atrophy and presumed chronic microvascular ischemic changes as above. 3.  Small area of chronic right temporal lobe encephalomalacia, unchanged. 4.  Near complete opacification of the right maxillary sinus with high-attenuation material and chronic wall thickening/sclerosis.    CT Head w/o Contrast    Result Date: 9/21/2021  EXAM: CT HEAD W/O CONTRAST LOCATION: [facility] DATE/TIME: 9/21/2021 10:08 PM INDICATION: Cerebral hemorrhage suspected, increased confusion after surgery. COMPARISON: 11/23/2020 TECHNIQUE: Routine CT Head without IV contrast. Multiplanar reformats. Dose reduction techniques were used. FINDINGS: INTRACRANIAL CONTENTS: No intracranial hemorrhage, extraaxial collection, or mass effect. No CT evidence of acute infarct. Mild to moderate volume loss and mild burden presumed chronic small vessel ischemia. Focal encephalomalacia along the right lateral  mid temporal lobe again noted. VISUALIZED ORBITS/SINUSES/MASTOIDS: No intraorbital abnormality. Chronic opacification of the right maxillary sinus again noted. No middle ear or mastoid effusion. BONES/SOFT TISSUES: No acute abnormality.     IMPRESSION: 1.  No acute intracranial abnormality or significant change compared to 11/23/2020.                                                    Sarbjit Graham MD  Thank you for the opportunity to participate in the care of this patient.   Please feel free to call me with any questions or concerns.  Phone number [phone]  [phone].

## 2021-10-12 NOTE — PLAN OF CARE
Problem: Adult Inpatient Plan of Care  Goal: Absence of Hospital-Acquired Illness or Injury  Outcome: No Change  Intervention: Identify and Manage Fall Risk  Recent Flowsheet Documentation  Taken 10/12/2021 0020 by Ondina Muro RN  Safety Promotion/Fall Prevention:   activity supervised   bed alarm on   Patient confused/restless all shift. Purewick in place. Bladder scanned for 674 and straight cath for 1100 ml. NPO, requesting for some pop nonstop. Room near nursing station, not oriented to call light. On tele, sinus tachy.

## 2021-10-12 NOTE — PROGRESS NOTES
Clinic Care Coordination Contact  Care Team Conversations  Call from daughter in law who has spoken to writer several times about patient. Since she was discharged from TCU, her dementia has worsened.  She does not sleep at night and family has to stay up with her to monitor her.  Abraham'  has been over more often.  They cannot direct her to sleep and she is anxious and antsy.  She has fallen several times since returning home.  She is not wearing her glasses and is often without her walker.  Her foot wound is doing quite well even though she has not been non weight bearing.  Abraham'  has talked to his dad about her needs and that they both are likely needing to move to a more supportive setting.  Dad has not been in agreement in the past, but may be more accepting of that now.  Abraham wants to start looking for memory care and/or care suites for them.  Was given website [facility] as a place to start.  Abraham lives in [place] and would be open to having them live nearer to them.      Encouraged Abraham, who has left a message for hospital nurse to call, to see if a psychiatrist could do evaluation to help determine appropriate medications.  She will ask.    Plan-assist family as needed and follow with hospital discharge as appropriate.   Indy Lopez,   [facility]  [phone]

## 2021-10-12 NOTE — PROGRESS NOTES
[facility]  Hospitalist Progress Note    Admit Date:  10/11/2021  Date of Service (when I saw the patient): 10/12/2021   Provider:  Anitha Nick, DO    Assessment & Plan   Bernadette Yu is a 81 year old female who was admitted on 10/11/2021 with melena and increased confusion.  She has a PMH of mitral valve repair, CAD s/p CABG, heart failure, hypertension, paroxysmal atrial fibrillation, PVD, hypothyroidism, normocytic anemia, CKD 3, hyperlipidemia, anxiety, T2DM on insulin, chronic pain syndrome (on chronic narcotics) that presents 3 weeks after toe surgery with reports of patient being more confused and complaining of multiple different pains such as hemorrhoid pain and melena    Problem List:  1. Melena  -Chronically anemic with reports of melena and hemorrhoid pain   supratherapeutic INR on admission 4.2 -   2mg IV vit K given in ED  INR improved this am to 1.9  Will continue to hold coumadin, for now    GI consult requested - appreciated  -PPI bid and NPO status  Will continue to monitor closely clinically for further evidence of GI bleeding    2.  Acute blood loss anemia  H/o anemia of chronic disease (hgb 9-11)  Type and screen  hgb dropped this am to 7.6, improved to 8.1 at noon  No further melena noted per nursing  She is now, however, hypotensive   D/w son, Riley, on the phone  He is able to give me verbal consent for blood transfusion over the phone - she has had blood transfusions in the past and has done well with them    hgb in the am  Close monitoring of vitals    3.  AF with RVR  RVR continues  Per riley, there was some confusion after recent discharge from the [facility] and she has not received her po metoprolol (bid) for at least 3 days  She certainly could be having a component of rebound tachycardia  Will order IV metoprolol scheduled with holding parameteres  She also has not been able to consistently take her po cardizem  Likely will need IV cardizem gtt if  "a fib with RVR continues    1600 - So far, has been only able to take 60mg po cardizem x 1 and 2.5mg IV metoprolol  HR still in the 120;s     As above, continue with scheduled IV metoprolol  Po cardizem as able  cardizem gtt for rate control until able to take po safely    Will check TSH with free T4    4.  Hypomagensium levels       Hypokalemia  Supplement per protocol   May be contributing to #3    5.  H/o diastolic CHF  -Last TTE LVEF 50 to 55% on 7/22/2021 with evidence of moderate to severe tricuspid regurgitation, moderate pulmonary hypertension  Has been give 2 doses of IV lasix  Appears more clinically dry to me     Now hypotensive, as above    Will hold of further iv diuretics at this time  Will monitor volume status closely with PRBC and NS bolus ordered    6. Chronic pain syndrome  Family states that she has takes narcotics for \"many years\"  It appears that she had po prn dialudid  Not clear how much she was taking on a daily basis  I have some concern that she is having some narcotic withdrawal contributing to tachyarrythmia  Will order prn iv dilaudid to have available    7. Leukocytosis, resolved  -WBC 14.7 with mildly abnormal urinalysis.    No pneumonia seen on chest x-ray.  Foot does not appear infected  -Procalcitonin WNL  No clear infectious process identified     8. Hypotension  H/o HTN  As above, now is hypotensive  Will give small NS bolus of 250cc x 1 now  Noted to have been given IV lasix dose x 2 on admission - overall volume status appears euvolemic to slightly dry to me    9.  Dementia  Progressive memory difficulty  Pt is requiring 24 hour care from her sons  They give her all of her medications  Recent discharge from the [facility] after recent foot surgery - family brought home to provide 24 hour care    10.  Hypothyroidism  -continue PTA synthroid     11. Normocytic anemia  -On iron therapy at home, hemoglobin 9.1 but she fluctuates between 9 and 11     12. CKD 3  -Serum " "creatinine 1.17, GFR 43  -creat improved today   BMP in the am    13. Hypoglycemia   T2DM  -Grossly uncontrolled, last A1c 9.0 on 9/14/2021  -On glargine 30 units a.m. and 15 units p.m.  Was hypoglycemic last evening and on-call dr changed to   Decreased dose last evening d/t NPO status ---15/7  Still hypoglycemic this am - will hold any further scheduled insulin  -Slight scale insulin every 4 hours due to n.p.o. status    14.  Recent toe amputation  Appears to be healing well with no gross s/s of active infection    Diet: NPO for Medical/Clinical Reasons Except for: Meds, Ice Chips    DVT Prophylaxis: warfarin on hold - scd's  Briceño Catheter: Not present  Code Status: Full Code      Entered: Anitha Nick, DO 10/12/2021, 7:29 AM       The patient's care was discussed with the Bedside Nurse, Patient and Patient's Family. - son, Riley, on the phone    Interval History   Seen with bedside RN in the room.  Received 4 doses of IV ativan between ED and floor---last dose was 0200.  Pt has been very somnulent this am.  Has not been able to take po meds.      Was hypoglycemic overnight -house dr called to adjust insulin orders    No melena or bloody stools reported overnight, per nursing    -Data reviewed today: I reviewed all new labs and imaging results over the last 24 hours. I personally reviewed no images or EKG's today.    Physical Exam   Temp: 99.2  F (37.3  C) Temp src: Axillary BP: 124/89 Pulse: (!) 126   Resp: 24 SpO2: 98 % O2 Device: Nasal cannula Oxygen Delivery: 3 LPM  Vitals:    10/11/21 1337   Weight: 63.5 kg (140 lb)     Vital Signs with Ranges  Temp:  [98.1  F (36.7  C)-99.2  F (37.3  C)] 99.2  F (37.3  C)  Pulse:  [125-129] 126  Resp:  [8-26] 24  BP: (103-137)/(56-89) 124/89  SpO2:  [90 %-100 %] 98 %  I/O last 3 completed shifts:  In: -   Out: 1100 [Urine:1100]    GEN:  Elderly female, will arouse to painful stimuli only.  She says \"ouch\", opens her eyes briefly and then falls back asleep  HEENT: "  Normocephalic/atraumatic, no scleral icterus, no nasal discharge, mouth and membranes appear fairly moist  CV:  Tachy, irreg, moderate murmur to ausc S1 + S2 noted, no S3 or S4.  LUNGS:  Clear to auscultation ant/lat bilaterally. Post exam limited secondary to lack of pt cooperation with exam.   No clear rales/rhonchi/wheezing auscultated bilaterally.  No costal retractions bilaterally.  Symmetric chest rise on inhalation noted.  ABD:  Active bowel sounds, soft, no grimacing to deeper palpation throughout, non-distended.  No clear rebound/guarding/rigidity.  No masses palpated.  No obvious HSM to exam.  EXT:  No significant pretibial edema or cyanosis bilaterally. No joint synovitis noted.  No calf-tenderness or asymmetry noted.  Left foot with toe amputation site clean and dry - no drainage.  Mild erythema and warmth to the lower right pretibial area--no open areas  SKIN:  Dry to touch, no rashes or jaundice noted.  NEURO:  No tremors at rest, as above arouses to painful stimuli to all ext.equally    Data   Labs:  Recent Labs   Lab 10/12/21  1238 10/12/21  1025 10/12/21  0927 10/12/21  0621 10/12/21  0514 10/12/21  0111 10/11/21  1422   NA  --   --   --  142  --   --  140   POTASSIUM  --   --   --  2.8*  --   --  2.7*   CHLORIDE  --   --   --  108*  --   --  104   CO2  --   --   --  26  --   --  26   ANIONGAP  --   --   --  8  --   --  10   GLC 72 93 64* 68*   < >   < > 92   BUN  --   --   --  24  --   --  37*   CR  --   --   --  0.81  --   --  1.17*   GFRESTIMATED  --   --   --  67  --   --  43*   DI  --   --   --  7.7*  --   --  8.7    < > = values in this interval not displayed.     Recent Labs   Lab 10/12/21  1146 10/12/21  0621 10/11/21  1422   WBC  --  9.5 14.7*   HGB 8.1* 7.6* 9.1*   HCT  --  25.4* 30.7*   MCV  --  90 89   PLT  --  139* 204     Recent Labs   Lab 10/12/21  1146 10/12/21  0621 10/11/21  1422   HGB 8.1* 7.6* 9.1*     Recent Labs   Lab 10/12/21  0621 10/11/21  2000 10/11/21  1422   INR 1.90*  4.20* 3.72*      Recent Imaging:   Recent Results (from the past 24 hour(s))   XR Chest Port 1 View    Narrative    EXAM: XR CHEST PORT 1 VIEW  LOCATION: [facility]  DATE/TIME: 10/11/2021 2:44 PM    INDICATION: Altered mental status  COMPARISON: 11/23/2020      Impression    IMPRESSION: The patient is significantly rotated limiting evaluation. Patchy interstitial opacities suggestive of mild pulmonary edema. No effusions or pneumothorax. Heart size is stable. Valvuloplasty changes and left atrial appendage occlusion device.   Sternotomy wires. No acute osseous findings.   Head CT w/o contrast    Narrative    EXAM: CT HEAD W/O CONTRAST  LOCATION: [facility]  DATE/TIME: 10/11/2021 5:25 PM    INDICATION: Mental status change, unknown cause.  COMPARISON: Head CT 09/21/2021.  TECHNIQUE: Routine CT Head without IV contrast. Multiplanar reformats. Dose reduction techniques were used.    FINDINGS:  INTRACRANIAL CONTENTS: No intracranial hemorrhage, extraaxial collection, or mass effect.  No CT evidence of acute infarct. Scattered vascular calcifications. Moderate presumed chronic small vessel ischemic changes. Small area of chronic right temporal   lobe encephalomalacia, unchanged. Moderate generalized volume loss. No hydrocephalus.     VISUALIZED ORBITS/SINUSES/MASTOIDS: Prior bilateral cataract surgery. Visualized portions of the orbits are otherwise unremarkable. Near-complete opacification of right maxillary sinus with hyperattenuating material and chronic wall thickening/sclerosis.   No middle ear or mastoid effusion.    BONES/SOFT TISSUES: No acute abnormality.      Impression    IMPRESSION:  1.  No CT evidence for acute intracranial abnormality.  2.  Brain atrophy and presumed chronic microvascular ischemic changes as above.  3.  Small area of chronic right temporal lobe encephalomalacia, unchanged.  4.  Near complete opacification of the right maxillary sinus with  high-attenuation material and chronic wall thickening/sclerosis.   CT Chest Pulmonary Embolism w Contrast    Narrative    EXAM: CT CHEST PULMONARY EMBOLISM W CONTRAST  LOCATION: [facility]  DATE/TIME: 10/11/2021 9:30 PM    INDICATION: Postoperative tachycardia and hypoxia. Evaluate for pulmonary embolism.  COMPARISON: None.  TECHNIQUE: CT chest pulmonary angiogram during arterial phase injection of IV contrast. Multiplanar reformats and MIP reconstructions were performed. Dose reduction techniques were used.   CONTRAST: Isovue 370, 75 mL.    FINDINGS:  ANGIOGRAM CHEST: Allowing for respiratory motion artifact, there is no evidence for pulmonary embolism. Normal caliber thoracic aorta without dissection. Atherosclerotic vascular calcification with severe atherosclerotic vascular calcification at the   proximal left subclavian artery with stent placement.    LUNGS AND PLEURA: Minimal right basilar pleural fluid. Interlobular septal thickening with mosaic attenuation bilaterally. Findings can be seen with CHF/volume overload. No definitive evidence for acute focal alveolar infiltrate or consolidation.    MEDIASTINUM/AXILLAE: Cardiac enlargement. No pericardial fluid. Minimal sliding esophageal hiatal hernia. No lymphadenopathy.    CORONARY ARTERY CALCIFICATION: Severe.    UPPER ABDOMEN: Advanced atherosclerotic vascular calcification of the splenic artery with moderate narrowing. Celiac and bilateral renal arteries remain patent with heavy calcification of the origin left renal artery.    MUSCULOSKELETAL: Degenerative changes in the spine.      Impression    IMPRESSION:  1.  Allowing for significant respiratory motion artifact, there is no evidence for pulmonary embolism.    2.  Evidence for CHF/volume overload with right-sided pleural fluid collection with interlobular septal thickening in mosaic attenuation of the pulmonary parenchyma with cardiac enlargement.    3.  Normal caliber aorta  without dissection. Advanced atherosclerotic vascular calcification including severe vascular calcification at the origin of the left subclavian artery with indwelling stent. Severe vascular calcification at the origin left renal   artery. Recommend correlation for renovascular hypertension.   CTA Abdomen Pelvis with Contrast    Narrative    EXAM: CTA ABDOMEN PELVIS WITH CONTRAST  LOCATION: [facility]  DATE/TIME: 10/11/2021 9:30 PM    INDICATION: Gastrointestinal bleeding with black tarry stool with heme positive test in stool. Assess for source of bleeding.  COMPARISON: 02/19/2020.  TECHNIQUE: CT angiogram abdomen pelvis during arterial phase of injection of IV contrast. 2D and 3D MIP reconstructions were performed by the CT technologist. Dose reduction techniques were used.  CONTRAST: Isovue 370, 75 mL.    FINDINGS:  ANGIOGRAM ABDOMEN/PELVIS: Noncontrast imaging demonstrates a focal area of increased density involving the EG junction measuring approximately 2.3 x 1.6 cm. This is indeterminate based on the noncontrast imaging, could represent retained contrast   material from a recent study or postsurgical change. This remains stable in overall appearance on arterial phase study and portal venous phase imaging. Arterial phase imaging demonstrates no evidence for surrounding active contrast extravasation in   distal esophagus, stomach or duodenum. No evidence for active contrast extravasation in the small bowel. Large amount of stool throughout the colon limits evaluation for active contrast extravasation/bleeding. Allowing for this, there is no evidence for   active bleeding within the colon.    Normal caliber lower thoracic and abdominal aorta. Minimal aortic calcification. The celiac, splenic, common hepatic and proper hepatic remain patent. Patent SMA. Patent bilateral renal arteries with marked narrowing in the proximal left renal artery.   CECIL patent. Patent bilateral common, external  and internal iliac arteries along with patent bilateral common femoral arteries. 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    LOWER CHEST: Minimal right pleural fluid. No left-sided pleural fluid. Interlobular septal thickening. Cardiac enlargement.    HEPATOBILIARY: Cholecystectomy. No significant biliary dilatation allowing for postcholecystectomy state. No evidence for abnormal arterial phase enhancement. Homogeneous portal venous phase enhancement.    PANCREAS: No ductal dilatation or inflammatory change.    SPLEEN: Normal size spleen.    ADRENAL GLANDS: No significant nodules.    KIDNEYS/BLADDER: Symmetric contrast enhancement to both kidneys. No urinary collecting system dilatation or calculi. Bladder distention.    BOWEL: Marked amount of stool throughout the colon with severe amount of stool in the rectal vault. No colonic wall thickening or inflammatory change. No small bowel dilatation or inflammatory change.    LYMPH NODES: No lymphadenopathy.    PELVIC ORGANS: Unremarkable. No significant free fluid.    MUSCULOSKELETAL: Diffuse subcutaneous edema. Degenerative hypertrophic changes in the thoracic spine.      Impression    IMPRESSION:  1.  Focal area of increased density involving the EG junction with the EG junction dilatation measuring 2.3 x 1.6 cm on noncontrast imaging. This does not change in its appearance on arterial phase imaging or portal venous phase imaging and may be the   result of retained contrast material within the distal esophagus or retained food material. Another consideration would be postoperative change. Given the lack of change in overall appearance on postcontrast imaging, this is unlikely to be due to   localized bleeding. An underlying high density mass cannot be excluded.    2.  No evidence for arterial contrast extravasation or active bleeding within the lumen of the small bowel, colon, stomach or distal esophagus above the level of the aforementioned high  density mass. Evaluation of the colon is somewhat limited due to a   large amount of stool throughout the colon and a severe amount of stool in the rectal vault.    3.  No evidence for inflammatory change to the bowel.    4.  Atherosclerotic vascular calcification with significant vascular calcification origin left renal artery. Chronic occlusion of the left SFA proximally.    5.  Volume overload.       Medications       acetaminophen  975 mg Oral Q8H     diltiazem ER COATED BEADS  180 mg Oral Daily     furosemide  40 mg Intravenous Q12H     [Held by provider] gabapentin  100 mg Oral At Bedtime     insulin aspart  1-6 Units Subcutaneous Q4H     insulin glargine  15 Units Subcutaneous QAM AC     insulin glargine  7 Units Subcutaneous At Bedtime     levothyroxine  125 mcg Oral Daily     pantoprazole (PROTONIX) IV  40 mg Intravenous BID     sodium chloride (PF)  3 mL Intracatheter Q8H     temazepam  7.5 mg Oral At Bedtime

## 2021-10-12 NOTE — PROGRESS NOTES
10/12/21 0930   Quick Adds   Type of Visit Initial Occupational Therapy Evaluation   Living Environment   Living Environment Comments Pt not able to give history   Disability/Function   Hearing Difficulty or Deaf no   Wear Glasses or Blind no   Change in Functional Status Since Onset of Current Illness/Injury yes   General Information   Onset of Illness/Injury or Date of Surgery 10/11/21   Referring Physician Trang   Patient/Family Therapy Goal Statement (OT) none stated   Existing Precautions/Restrictions fall   Cognitive Status Examination   Cognitive Status Comments lethargy, does answer some, does not follow most directions.  Pt keeps eyes closed most of session.   Range of Motion Comprehensive   General Range of Motion no range of motion deficits identified   Coordination   Upper Extremity Coordination   (NT)   Transfers   Transfer Comments MMax A of 2   Activities of Daily Living   BADL Assessment   (NT)   Clinical Impression   Criteria for Skilled Therapeutic Interventions Met (OT) yes;meets criteria;skilled treatment is necessary   OT Problem List-Impairments impacting ADL activity tolerance impaired;balance;cognition;mobility;strength;pain   Assessment of Occupational Performance 3-5 Performance Deficits   Planned Therapy Interventions (OT) ADL retraining;balance training;bed mobility training;cognition;strengthening;transfer training   Clinical Decision Making Complexity (OT) moderate complexity   Therapy Frequency (OT) Daily   Risk & Benefits of therapy have been explained evaluation/treatment results reviewed;participants included;patient   OT Discharge Planning    OT Discharge Recommendation (DC Rec) Home with assist   OT Rationale for DC Rec TCU preferred, Pt currently needs Max A with mobility

## 2021-10-12 NOTE — PLAN OF CARE
Problem: Adult Inpatient Plan of Care  Goal: Plan of Care Review  Outcome: No Change     Problem: Adult Inpatient Plan of Care  Goal: Readiness for Transition of Care  Outcome: No Change     Problem: Risk for Delirium  Goal: Improved Attention and Thought Clarity  Outcome: No Change  Patient is unable to participate in the care plan formulation and will need a plan of care surrounding her delirium, periods of noncompliance/deep sleep, and safety during her periods of agitation.  Patient is kept with 3 bed rails up, has frequent orientation, has frequent observation with room open and curtain drawn back for full viewing, and frequent turning and repositioning for the preservation of her skin.

## 2021-10-12 NOTE — SIGNIFICANT EVENT
House staff was called by the patient's nurse due to blood sugar issue. Briefly, the patient was admitted for melena.    Blood sugar 83 this evening. Currently NPO and scheduled for lantus.     /67   Pulse (!) 126   Temp 98.4  F (36.9  C) (Oral)   Resp 22   Wt 63.5 kg (140 lb)   SpO2 97%   BMI 27.34 kg/m      -Will decrease home regimen to 50% (15 AM, 7 PM) to avoid hypoglycemia while patient is NPO.     Talon Blount MD  [facility] Program, PGY-3  Pager # [phone]

## 2021-10-12 NOTE — CONSULTS
Care Management Initial Consult    General Information  Assessment completed with: Children,  (Sarbjit)  Type of CM/SW Visit: Initial Assessment    Primary Care Provider verified and updated as needed: Yes   Readmission within the last 30 days:      Reason for Consult: discharge planning  Advance Care Planning: Advance Care Planning Reviewed: no concerns identified     General Information Comments:  (Pt resides from home with spouse and adult son  Sarbjit &  Dependent  & uses rolling  Walker at baseline)    Communication Assessment  Patient's communication style: spoken language (English or Bilingual)    Hearing Difficulty or Deaf: no   Wear Glasses or Blind: no    Cognitive  Cognitive/Neuro/Behavioral: arousability, mood/behavior, orientation, speech, level of consciousness  Level of Consciousness: confused, obtunded, somnolent, unresponsive  Arousal Level: arouses to vigorous stimulation  Orientation: disoriented to, place, time, situation  Mood/Behavior: anxious, angry, agitated, hyperactive (agitated, impulsive), restless, uncooperative  Best Language: 0 - No aphasia  Speech: illogical, rambling, word-finding difficulty    Living Environment:   People in home: child(kristina), adult, spouse   (spouse Aneudy and SON Sarbjit)  Current living Arrangements: house      Able to return to prior arrangements: yes  Living Arrangement Comments:  (Pr resides from  home with spouse & adult son Sarbjit and dependent at baseline)    Family/Social Support:  Care provided by: self  Provides care for: no one  Marital Status:   , Children   (Aneudy)       Description of Support System: Supportive, Involved    Support Assessment: Adequate family and caregiver support, Adequate social supports    Current Resources:   Patient receiving home care services: None     Community Resources: None  Equipment currently used at home: none  Supplies currently used at home:  none    Employment/Financial:  Employment Status: retired         Financial Concerns:     Referral to Financial Counselor: None  Finance Comments:  (none)    Lifestyle & Psychosocial Needs:  Social Determinants of Health     Tobacco Use: Medium Risk     Smoking Tobacco Use: Former Smoker     Smokeless Tobacco Use: Never Used   Alcohol Use:      Frequency of Alcohol Consumption:      Average Number of Drinks:      Frequency of Binge Drinking:    Financial Resource Strain:      Difficulty of Paying Living Expenses:    Food Insecurity:      Worried About Running Out of Food in the Last Year:      Ran Out of Food in the Last Year:    Transportation Needs:      Lack of Transportation (Medical):      Lack of Transportation (Non-Medical):    Physical Activity:      Days of Exercise per Week:      Minutes of Exercise per Session:    Stress:      Feeling of Stress :    Social Connections:      Frequency of Communication with Friends and Family:      Frequency of Social Gatherings with Friends and Family:      Attends [employer] Services:      Active Member of Clubs or Organizations:      Attends Club or Organization Meetings:      Marital Status:    Intimate Partner Violence:      Fear of Current or Ex-Partner:      Emotionally Abused:      Physically Abused:      Sexually Abused:    Depression: Not at risk     PHQ-2 Score: 2   Housing Stability:      Unable to Pay for Housing in the Last Year:      Number of Places Lived in the Last Year:      Unstable Housing in the Last Year:        Functional Status:  Prior to admission patient needed assistance:   Dependent ADLs:: Bathing, Dressing, Grooming, Toileting  Dependent IADLs:: Cleaning, Meal Preparation, Medication Management       Mental Health Status:  none          Chemical Dependency Status:  Chemical Dependency Status: No Current Concerns             Values/Beliefs:  Spiritual, Cultural Beliefs, Episcopal Practices, Values that affect care: yes  Description of Beliefs that Will Affect Care:  (Buddhism )    Cultural/Episcopal Practices  Patient Routinely Participates In: prayer       Additional Information:    FLAVIO REYES met with patient in her room  but she is unable to answer at this moment. FLAVIO CM called her son Sarbjit and introduce self, CM role, and complete patient initial assessment and discuss his discharge goal. Pt  Son Sarbjit reports she resides from single family home with spouse Aneudy  and adult son Sarbjit & uses rolling walker, &  cane at baseline. Pt son reports patient does  not drives anymore, patient uses walker and straight cane mostly, patient does not have any home health care, patient is dependent, before she does everything herself  but not now, she needs help cooking, cleaning,  grooming,  medication  set up,  most of all ADL's she need help. Patient son reports patient son  Sarbjit and another son help her out now.  Pt son reports her discharge goal is home with home care or TCU when she is medically to stable. Pt son to transport her if able otherwise [facility] wheelchair to transport her at  discharge.  CM still waiting PT recommendation. CM to continue following as needed.    Bren Wiley, RORYSW

## 2021-10-13 NOTE — CONSULTS
INITIAL PSYCHIATRIC CONSULTATION  This note was created with the help of Dragon dictation system. Grammatical and typing errors are not intentional.          REASON FOR REQUEST: agitated dementia/delerium      ASSESSMENT/RECOMMENDATIONS/PLAN :     Metabolic encephalopathy exacerbated in the context of hospital environment, agitated delirium.  Cognitive and behavioral changes due to above.  Sleep dysregulation.    Recommendations:  Judicious use of medications.  Minimize sedating and or delirium genic medications.  Patient is receiving temazepam 7.5 mg at bedtime for sleep appears to be her home medications.  Consider Seroquel 6.25 mg 4 times a day as needed for agitation if QTC is not prolonged  Melatonin 3 mg at bedtime.      MENTAL STATUS EXAMINATION:   Appearance: Stated age, fluctuating levels of consciousness, restless.  Speech: Confused Devoid of content  Thought Process: Disorganized  Thought content: Does not appear to respond to internally generated stimuli, no acute visual or auditory hallucination; .  No manic symptoms, no paranoia no delusional thoughts.  Thought Formation: No loosening of association or flight of ideas.  Judgment: Impaired  Insight : Impaired  Attention : Distracted, fluctuating levels of consciousness  Memory: Impaired, depressed  Fund Of Knowledge: Depressed  Affect: Flat  Mood: Confused disoriented  Alert and Oriented: Arousable, awake, O x 1  Comprehension: Depressed at present  Generative thought content: Reduced  Language: Intact  Gait and Ambulation: With assist of one.    :   Vital Signs: /67 (BP Location: Left arm)   Pulse 120   Temp 98.3  F (36.8  C) (Oral)   Resp 18   Wt 66.1 kg (145 lb 12.8 oz)   SpO2 93%   BMI 28.47 kg/m        HISTORY OF PRESENT ILLNESS:   Presenting history to include: Per [facility] [place]:   Bernadette Yu is a 81 year old female with a history of mitral valve repair, CAD s/p CABG, heart failure, hypertension, paroxysmal atrial  fibrillation, PVD, hypothyroidism, normocytic anemia, CKD 3, hyperlipidemia, anxiety, T2DM on insulin that presents 3 weeks after toe surgery with reports of patient being more confused and complaining of multiple different pains such as hemorrhoid pain.  Family does report dark stools.  History obtained from chart review as patient is completely obtunded in the ED after 2 doses of Ativan.     Upon assessment, patient was noted to be confused, disoriented, disorganized.  She did not know where she was, did not know what precipitated this hospitalization.  She also could not tell me how long she has been in hospital.  She was not understanding questions pertaining to hallucinations or thoughts of self-harm.  Collateral information was obtained from chart review.  It would appear that patient had had confusion, restlessness and agitation suggestive of agitated exacerbated in the hospital environment.  She is needing one-to-one for safety.  She is unable to express her needs.  Redirectable at times.  She is sleeping poorly.  She had received 2 doses of Ativan in the ER which made her profoundly sedated to a point of being obtunded.    Review of Systems:As per HPI. Remainders of 12 point review of systems negative.  Psychiatric ROS:    Patient is unable to provide any meaningful information.    PFSH reviewed  and not pertinent to chief complaint/reason for visit  /67 (BP Location: Left arm)   Pulse 120   Temp 98.3  F (36.8  C) (Oral)   Resp 18   Wt 66.1 kg (145 lb 12.8 oz)   SpO2 93%   BMI 28.47 kg/m    Alcohol, Blood (mg/dL)   Date Value   11/23/2020 <10     @24HOURRESULTS@  Recent Results (from the past 72 hour(s))   Basic metabolic panel    Collection Time: 10/11/21  2:22 PM   Result Value Ref Range    Sodium 140 136 - 145 mmol/L    Potassium 2.7 (LL) 3.5 - 5.0 mmol/L    Chloride 104 98 - 107 mmol/L    Carbon Dioxide (CO2) 26 22 - 31 mmol/L    Anion Gap 10 5 - 18 mmol/L    Urea Nitrogen 37 (H) 8 - 28  mg/dL    Creatinine 1.17 (H) 0.60 - 1.10 mg/dL    Calcium 8.7 8.5 - 10.5 mg/dL    Glucose 92 70 - 125 mg/dL    GFR Estimate 43 (L) >60 mL/min/1.73m2   Troponin I (now)    Collection Time: 10/11/21  2:22 PM   Result Value Ref Range    Troponin I 0.02 0.00 - 0.29 ng/mL   INR    Collection Time: 10/11/21  2:22 PM   Result Value Ref Range    INR 3.72 (H) 0.90 - 1.15   CBC with platelets and differential    Collection Time: 10/11/21  2:22 PM   Result Value Ref Range    WBC Count 14.7 (H) 4.0 - 11.0 10e3/uL    RBC Count 3.45 (L) 3.80 - 5.20 10e6/uL    Hemoglobin 9.1 (L) 11.7 - 15.7 g/dL    Hematocrit 30.7 (L) 35.0 - 47.0 %    MCV 89 78 - 100 fL    MCH 26.4 (L) 26.5 - 33.0 pg    MCHC 29.6 (L) 31.5 - 36.5 g/dL    RDW 21.1 (H) 10.0 - 15.0 %    Platelet Count 204 150 - 450 10e3/uL    % Neutrophils 83 %    % Lymphocytes 8 %    % Monocytes 7 %    % Eosinophils 1 %    % Basophils 0 %    % Immature Granulocytes 1 %    NRBCs per 100 WBC 0 <1 /100    Absolute Neutrophils 12.4 (H) 1.6 - 8.3 10e3/uL    Absolute Lymphocytes 1.2 0.8 - 5.3 10e3/uL    Absolute Monocytes 1.0 0.0 - 1.3 10e3/uL    Absolute Eosinophils 0.1 0.0 - 0.7 10e3/uL    Absolute Basophils 0.0 0.0 - 0.2 10e3/uL    Absolute Immature Granulocytes 0.2 (H) <=0.0 10e3/uL    Absolute NRBCs 0.0 10e3/uL   B-Type Natriuretic Peptide ([facility] Only)    Collection Time: 10/11/21  2:22 PM   Result Value Ref Range     (H) 0 - 167 pg/mL   Asymptomatic COVID-19 Virus (Coronavirus) by PCR Nasopharyngeal    Collection Time: 10/11/21  2:23 PM    Specimen: Nasopharyngeal; Swab   Result Value Ref Range    SARS CoV2 PCR Negative Negative   ECG 12-LEAD WITH MUSE (L[facility])    Collection Time: 10/11/21  3:38 PM   Result Value Ref Range    Systolic Blood Pressure  mmHg    Diastolic Blood Pressure  mmHg    Ventricular Rate 129 BPM    Atrial Rate 129 BPM    NJ Interval 136 ms    QRS Duration 84 ms     ms    QTc 501 ms    P Axis  degrees    R AXIS -41 degrees    T Axis 207 degrees     Interpretation ECG       Sinus tachycardia  Left axis deviation  Nonspecific ST and T wave abnormality  Abnormal ECG  When compared with ECG of 24-SEP-2021 08:19,  ST now depressed in Anterior leads  Nonspecific T wave abnormality, worse in Anterolateral leads  Confirmed by SEE ED PROVIDER NOTE FOR, ECG INTERPRETATION ([id number]),  JOAQUIN RON ([id number]) on 10/12/2021 5:51:12 AM     UA with Microscopic reflex to Culture    Collection Time: 10/11/21  3:56 PM    Specimen: Urine, Clean Catch   Result Value Ref Range    Color Urine Light Yellow Colorless, Straw, Light Yellow, Yellow    Appearance Urine Turbid (A) Clear    Glucose Urine Negative Negative mg/dL    Bilirubin Urine Negative Negative    Ketones Urine Negative Negative mg/dL    Specific Gravity Urine 1.012 1.001 - 1.030    Blood Urine Negative Negative    pH Urine 5.0 5.0 - 7.0    Protein Albumin Urine 10  (A) Negative mg/dL    Urobilinogen Urine <2.0 <2.0 mg/dL    Nitrite Urine Negative Negative    Leukocyte Esterase Urine 75 Tank/uL (A) Negative    Bacteria Urine Few (A) None Seen /HPF    Mucus Urine Present (A) None Seen /LPF    RBC Urine 2 <=2 /HPF    WBC Urine 2 <=5 /HPF    Squamous Epithelials Urine 1 <=1 /HPF   INR    Collection Time: 10/11/21  8:00 PM   Result Value Ref Range    INR 4.20 (H) 0.90 - 1.15   Adult Type and Screen    Collection Time: 10/11/21  8:02 PM   Result Value Ref Range    ABO/RH(D) O POS     Antibody Screen Negative Negative    SPECIMEN EXPIRATION DATE [id number]    Extra Green Top (Lithium Heparin) Tube    Collection Time: 10/11/21  8:03 PM   Result Value Ref Range    Hold Specimen JIC    Magnesium    Collection Time: 10/11/21  8:03 PM   Result Value Ref Range    Magnesium 1.7 (L) 1.8 - 2.6 mg/dL   Prepare red blood cells (unit)    Collection Time: 10/11/21  9:00 PM   Result Value Ref Range    CROSSMATCH COMPATIBLE     UNIT ABO/RH O Pos     Unit Number [id number]     Unit Status Ready     Blood Component Type Red Blood  Cells     Product Code K0189H33     CODING SYSTEM [id number]     UNIT TYPE ISBT 5100    Prepare red blood cells (unit)    Collection Time: 10/11/21  9:00 PM   Result Value Ref Range    CROSSMATCH INCOMPATIBLE     UNIT ABO/RH O Pos     Unit Number [id number]     Unit Status Released     Blood Component Type Red Blood Cells     Product Code [id number]     CODING SYSTEM [id number]     UNIT TYPE ISBT 5100    Prepare red blood cells (unit)    Collection Time: 10/11/21  9:11 PM   Result Value Ref Range    CROSSMATCH COMPATIBLE     UNIT ABO/RH O Pos     Unit Number [id number]     Unit Status Ready     Blood Component Type Red Blood Cells     Product Code [id number]     CODING SYSTEM [id number]     UNIT TYPE ISBT 5100    Glucose by meter    Collection Time: 10/12/21  1:11 AM   Result Value Ref Range    GLUCOSE BY METER POCT 83 70 - 99 mg/dL   Glucose by meter    Collection Time: 10/12/21  5:14 AM   Result Value Ref Range    GLUCOSE BY METER POCT 77 70 - 99 mg/dL   INR    Collection Time: 10/12/21  6:21 AM   Result Value Ref Range    INR 1.90 (H) 0.90 - 1.15   Comprehensive metabolic panel    Collection Time: 10/12/21  6:21 AM   Result Value Ref Range    Sodium 142 136 - 145 mmol/L    Potassium 2.8 (LL) 3.5 - 5.0 mmol/L    Chloride 108 (H) 98 - 107 mmol/L    Carbon Dioxide (CO2) 26 22 - 31 mmol/L    Anion Gap 8 5 - 18 mmol/L    Urea Nitrogen 24 8 - 28 mg/dL    Creatinine 0.81 0.60 - 1.10 mg/dL    Calcium 7.7 (L) 8.5 - 10.5 mg/dL    Glucose 68 (L) 70 - 125 mg/dL    Alkaline Phosphatase 55 45 - 120 U/L    AST 23 0 - 40 U/L    ALT 14 0 - 45 U/L    Protein Total 5.7 (L) 6.0 - 8.0 g/dL    Albumin 2.7 (L) 3.5 - 5.0 g/dL    Bilirubin Total 1.0 0.0 - 1.0 mg/dL    GFR Estimate 67 >60 mL/min/1.73m2   CBC with platelets    Collection Time: 10/12/21  6:21 AM   Result Value Ref Range    WBC Count 9.5 4.0 - 11.0 10e3/uL    RBC Count 2.82 (L) 3.80 - 5.20 10e6/uL    Hemoglobin 7.6 (L) 11.7 - 15.7 g/dL    Hematocrit 25.4 (L) 35.0 - 47.0 %    MCV 90  78 - 100 fL    MCH 27.0 26.5 - 33.0 pg    MCHC 29.9 (L) 31.5 - 36.5 g/dL    RDW 20.5 (H) 10.0 - 15.0 %    Platelet Count 139 (L) 150 - 450 10e3/uL   Magnesium    Collection Time: 10/12/21  6:21 AM   Result Value Ref Range    Magnesium 1.7 (L) 1.8 - 2.6 mg/dL   Procalcitonin    Collection Time: 10/12/21  6:21 AM   Result Value Ref Range    Procalcitonin 0.06 0.00 - 0.49 ng/mL   Lactic Acid STAT    Collection Time: 10/12/21  6:21 AM   Result Value Ref Range    Lactic Acid 1.0 0.7 - 2.0 mmol/L   TSH with free T4 reflex    Collection Time: 10/12/21  6:21 AM   Result Value Ref Range    TSH 4.55 0.30 - 5.00 uIU/mL   Glucose by meter    Collection Time: 10/12/21  9:27 AM   Result Value Ref Range    GLUCOSE BY METER POCT 64 (L) 70 - 99 mg/dL   Glucose by meter    Collection Time: 10/12/21 10:25 AM   Result Value Ref Range    GLUCOSE BY METER POCT 93 70 - 99 mg/dL   Hemoglobin    Collection Time: 10/12/21 11:46 AM   Result Value Ref Range    Hemoglobin 8.1 (L) 11.7 - 15.7 g/dL   Glucose by meter    Collection Time: 10/12/21 12:38 PM   Result Value Ref Range    GLUCOSE BY METER POCT 72 70 - 99 mg/dL   Prepare red blood cells (unit)    Collection Time: 10/12/21  3:45 PM   Result Value Ref Range    CROSSMATCH COMPATIBLE     UNIT ABO/RH O Pos     Unit Number [id number]     Unit Status Ready     Blood Component Type Red Blood Cells     Product Code [id number]     CODING SYSTEM [id number]     UNIT TYPE ISBT 5100    Glucose by meter    Collection Time: 10/12/21  5:01 PM   Result Value Ref Range    GLUCOSE BY METER POCT 80 70 - 99 mg/dL   Potassium    Collection Time: 10/12/21  7:58 PM   Result Value Ref Range    Potassium 2.9 (L) 3.5 - 5.0 mmol/L   Magnesium    Collection Time: 10/12/21  7:58 PM   Result Value Ref Range    Magnesium 1.8 1.8 - 2.6 mg/dL   Glucose by meter    Collection Time: 10/12/21  8:08 PM   Result Value Ref Range    GLUCOSE BY METER POCT 73 70 - 99 mg/dL   Glucose by meter    Collection Time: 10/12/21  8:22 PM    Result Value Ref Range    GLUCOSE BY METER POCT 75 70 - 99 mg/dL   Lactic acid whole blood    Collection Time: 10/12/21  8:29 PM   Result Value Ref Range    Lactic Acid 1.5 0.7 - 2.0 mmol/L   Comprehensive metabolic panel    Collection Time: 10/12/21  8:29 PM   Result Value Ref Range    Sodium 145 136 - 145 mmol/L    Potassium 3.0 (L) 3.5 - 5.0 mmol/L    Chloride 108 (H) 98 - 107 mmol/L    Carbon Dioxide (CO2) 25 22 - 31 mmol/L    Anion Gap 12 5 - 18 mmol/L    Urea Nitrogen 22 8 - 28 mg/dL    Creatinine 0.87 0.60 - 1.10 mg/dL    Calcium 8.4 (L) 8.5 - 10.5 mg/dL    Glucose 72 70 - 125 mg/dL    Alkaline Phosphatase 58 45 - 120 U/L    AST 33 0 - 40 U/L    ALT 16 0 - 45 U/L    Protein Total 6.2 6.0 - 8.0 g/dL    Albumin 2.9 (L) 3.5 - 5.0 g/dL    Bilirubin Total 1.2 (H) 0.0 - 1.0 mg/dL    GFR Estimate 62 >60 mL/min/1.73m2   Extra Blue Top Tube    Collection Time: 10/12/21  8:29 PM   Result Value Ref Range    Hold Specimen JIC    Extra Red Top Tube    Collection Time: 10/12/21  8:29 PM   Result Value Ref Range    Hold Specimen JIC    Extra Purple Top Tube    Collection Time: 10/12/21  8:29 PM   Result Value Ref Range    Hold Specimen JIC    ECG 12-LEAD WITH MUSE ([facility])    Collection Time: 10/12/21  8:32 PM   Result Value Ref Range    Systolic Blood Pressure  mmHg    Diastolic Blood Pressure  mmHg    Ventricular Rate 121 BPM    Atrial Rate 122 BPM    DC Interval 104 ms    QRS Duration 90 ms     ms    QTc 528 ms    P Axis  degrees    R AXIS -43 degrees    T Axis 0 degrees    Interpretation ECG       Sinus tachycardia with short DC  Left axis deviation  Nonspecific ST and T wave abnormality  Abnormal ECG  When compared with ECG of 11-OCT-2021 15:38,  Nonspecific T wave abnormality, improved in Lateral leads     Glucose by meter    Collection Time: 10/12/21  8:45 PM   Result Value Ref Range    GLUCOSE BY METER POCT 108 (H) 70 - 99 mg/dL   Glucose by meter    Collection Time: 10/13/21 12:39 AM   Result Value Ref Range     GLUCOSE BY METER POCT 118 (H) 70 - 99 mg/dL   Glucose by meter    Collection Time: 10/13/21  4:04 AM   Result Value Ref Range    GLUCOSE BY METER POCT 128 (H) 70 - 99 mg/dL   Potassium    Collection Time: 10/13/21  5:22 AM   Result Value Ref Range    Potassium 3.1 (L) 3.5 - 5.0 mmol/L   CBC with platelets    Collection Time: 10/13/21  5:22 AM   Result Value Ref Range    WBC Count 10.3 4.0 - 11.0 10e3/uL    RBC Count 3.16 (L) 3.80 - 5.20 10e6/uL    Hemoglobin 8.5 (L) 11.7 - 15.7 g/dL    Hematocrit 29.7 (L) 35.0 - 47.0 %    MCV 94 78 - 100 fL    MCH 26.9 26.5 - 33.0 pg    MCHC 28.6 (L) 31.5 - 36.5 g/dL    RDW 20.1 (H) 10.0 - 15.0 %    Platelet Count 154 150 - 450 10e3/uL   Comprehensive metabolic panel    Collection Time: 10/13/21  5:22 AM   Result Value Ref Range    Sodium 143 136 - 145 mmol/L    Potassium 3.1 (L) 3.5 - 5.0 mmol/L    Chloride 109 (H) 98 - 107 mmol/L    Carbon Dioxide (CO2) 25 22 - 31 mmol/L    Anion Gap 9 5 - 18 mmol/L    Urea Nitrogen 19 8 - 28 mg/dL    Creatinine 0.87 0.60 - 1.10 mg/dL    Calcium 8.5 8.5 - 10.5 mg/dL    Glucose 127 (H) 70 - 125 mg/dL    Alkaline Phosphatase 65 45 - 120 U/L    AST 29 0 - 40 U/L    ALT 19 0 - 45 U/L    Protein Total 6.3 6.0 - 8.0 g/dL    Albumin 2.9 (L) 3.5 - 5.0 g/dL    Bilirubin Total 1.0 0.0 - 1.0 mg/dL    GFR Estimate 62 >60 mL/min/1.73m2   Magnesium    Collection Time: 10/13/21  5:22 AM   Result Value Ref Range    Magnesium 2.2 1.8 - 2.6 mg/dL       PMH:   Past Medical History:   Diagnosis Date     Abdominal bloating 8/21/2016     Anticoagulation goal of INR 2.5 to 3.5 1/27/2016     Anxiety      Aortic stenosis 10/26/2019     ASCVD (arteriosclerotic cardiovascular disease)      Atherosclerosis of native coronary artery without angina pectoris 10/26/2019     Bleeding diathesis (H)      Carotid artery stenosis, left      CHF (congestive heart failure) (H)      CKD (chronic kidney disease) stage 3, GFR 30-59 ml/min (H)      DM (diabetes mellitus), type 2 (H)  1990     Eczema      Former smoker      Full code status      History of metabolic encephalopathy with delirium 9/28/2021     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     Moderate tricuspid insufficiency 8/28/2021     MRSA (methicillin resistant staph aureus) culture positive 2/9/2017     Normocytic anemia      PAD (peripheral artery disease) (H)     [facility]   DATE: 10/26/2019 4:15 PM   EXAM:  DUPLEX ARTERIAL ULTRASOUND OF THE LOWER EXTREMITIES BILATERALLY   INDICATION: Leg pain, vasculopathy.   COMPARISON: None.   TECHNIQUE: Duplex imaging is performed utilizing gray-scale, two-dimensional images, and color-flow imaging. Doppler waveform analysis and spectral Doppler imaging is also performed.   DUPLEX ARTERIAL ULTRASOUND FINDIN     Paroxysmal atrial fibrillation (H) 11/16/2015    Bilateral pulmonary vein isolation with AtriCure Synergy (bipolar radiofrequency ablation) device, exclusion of the left atrial appendage with the AtriCure AtriClip device.       Paroxysmal atrial fibrillation with RVR (H) 9/29/2021     PN (peripheral neuropathy)      Psoriasis      PVD (peripheral vascular disease) (H) 11/4/2019    Bilateral lower extremities.  See ultrasound 2019.     Recurrent UTI (urinary tract infection)      RLS (restless legs syndrome)      S/P CABG (coronary artery bypass graft) 1/8/2016     S/P colonoscopy 12/12/07     S/P MVR (mitral valve replacement) 12/30/2015     Subclavian artery stenosis, left (H) 11/16/2015    Stented in 2015.       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     Ulcer of heel and midfoot, left, with unspecified severity (H)            Current Medications:Scheduled Meds:    diltiazem ER COATED BEADS  180 mg Oral Daily     [Held by provider] gabapentin  100 mg Oral At Bedtime     insulin aspart  1-6 Units Subcutaneous Q4H     levothyroxine  125 mcg Oral Daily     metoprolol  2.5 mg Intravenous Q6H     pantoprazole (PROTONIX) IV  40 mg Intravenous BID     potassium chloride  40 mEq Oral Once     sodium chloride (PF)  3 mL Intracatheter Q8H     temazepam  7.5 mg Oral At Bedtime     Continuous Infusions:    dextrose 5% and 0.45% NaCl 50 mL/hr at 10/12/21 2051     dilTIAZem HCl-Sodium Chloride       PRN Meds:.glucose **OR** dextrose **OR** glucagon, dilTIAZem HCl-Sodium Chloride, diltiazem, haloperidol lactate, HOLD MEDICATION, HYDROmorphone, [Held by provider] HYDROmorphone, lidocaine 4%, lidocaine 4%, lidocaine (buffered or not buffered), lidocaine (buffered or not buffered), LORazepam, melatonin, metoprolol, naloxone **OR** naloxone **OR** naloxone **OR** naloxone, prochlorperazine **OR** prochlorperazine **OR** prochlorperazine, QUEtiapine, sodium chloride (PF), sodium chloride (PF)                Family History: PERSONALLY REVIEWED.  Family History   Problem Relation Age of Onset     Colon Cancer Father      Diabetes Father      Hypertension Mother      Heart Disease Mother          congestive heart failure     Arthritis Mother      Diabetes Sister      Heart Disease Brother      Rectal Cancer Son      Colon Cancer Son      Pertinent Family hx not pertinent to Chief Complaint or reason for visit.     Social History:  PERSONALLY REVIEWED.  Social History     Socioeconomic History     Marital status:      Spouse name: Not on file     Number of children:  4     Years of education: Not on file     Highest education level: Not on file   Occupational History     Not on file   Tobacco Use     Smoking status:  Former Smoker     Years: 23.00     Types: Cigarettes     Smokeless tobacco: Never Used     Tobacco comment: quit 1970's   Substance and Sexual Activity     Alcohol use: No     Drug use: No     Sexual activity: Never   Other Topics Concern     Not on file   Social History Narrative    Patient of Dr. Bach since 2001.   to  Aneudy.    4 children.    Former patient of Dr. Harshad Ballard.     Social Determinants of Health     Financial Resource Strain:      Difficulty of Paying Living Expenses:    Food Insecurity:      Worried About Running Out of Food in the Last Year:      Ran Out of Food in the Last Year:    Transportation Needs:      Lack of Transportation (Medical):      Lack of Transportation (Non-Medical):    Physical Activity:      Days of Exercise per Week:      Minutes of Exercise per Session:    Stress:      Feeling of Stress :    Social Connections:      Frequency of Communication with Friends and Family:      Frequency of Social Gatherings with Friends and Family:      Attends [employer] Services:      Active Member of Clubs or Organizations:      Attends Club or Organization Meetings:      Marital Status:    Intimate Partner Violence:      Fear of Current or Ex-Partner:      Emotionally Abused:      Physically Abused:      Sexually Abused:     not pertinent to Chief Complaint or reason for visit.             Allergies as of 06/01/2014 Reviewed     Review of Systems:As per HPI. Remainders of 12 point review of systems negative.    Review of Pertinent Laboratory:      PERSONALLY REVIEWED.    Physical Exam: Temp:  [98  F (36.7  C)-98.4  F (36.9  C)] 98.3  F (36.8  C)  Pulse:  [120-125] 120  Resp:  [18-22] 18  BP: ()/(55-82) 120/67  SpO2:  [93 %-98 %] 93 %   Vitals: reviewed in chart     Physical exam as per medical team: reviewed in chart      diagnoses, risk and benefits of medications discussed with staff. Care coordination with care management team.   Thank you for this consultation.        Kim Neumann; NP  [facility]        This note was created with the help of Dragon dictation system. Grammatical and typing errors are not intentional.

## 2021-10-13 NOTE — PLAN OF CARE
Problem: Dysrhythmia  Goal: Normalized Cardiac Rhythm  Outcome: No Change   Sinus tachycardia on tele. IV metoprolol given per order.     Problem: Adult Inpatient Plan of Care  Goal: Optimal Comfort and Wellbeing  Outcome: Improving   Pt up with assist x1 pivot transfer to chair at bedside. Alert and oriented to self and place. Slept for 2 hours this shift. PRN Seroquel given to help with anxiety. PA dilaudid given x2 for leg pain, pain improved. 1:1 continues for safety. Negative for c.diff, enteric precautions discontinued. PT/OT working with pt.  at bedside this afternoon. Call light within reach, bed alarm on. VSS, will continue to monitor.

## 2021-10-13 NOTE — PROGRESS NOTES
[facility]  Hospitalist Progress Note    Admit Date:  10/11/2021  Date of Service (when I saw the patient): 10/13/2021       Assessment & Plan   Bernadette Yu is a 81 year old female who was admitted on 10/11/2021 with melena and increased confusion.  She has a PMH of mitral valve repair, CAD s/p CABG, heart failure, hypertension, paroxysmal atrial fibrillation, PVD, hypothyroidism, normocytic anemia, CKD 3, hyperlipidemia, anxiety, T2DM on insulin, chronic pain syndrome (on chronic narcotics) that presents 3 weeks after toe surgery with reports of patient being more confused and complaining of multiple different pains such as hemorrhoid pain and melena    Problem List:  1. Melena  -Chronically anemic with reports of melena and hemorrhoid pain   supratherapeutic INR on admission 4.2 -   2mg IV vit K given in ED  INR improved this am to 1.9  Will continue to hold coumadin, for now    GI consult requested - appreciated  -PPI bid  - NPO after midnight for EGD tomroow    2.  Acute blood loss anemia  H/o anemia of chronic disease (hgb 9-11)  Type and screen  hgb dropped this am to 7.6, improved to 8.1 at noon  No further melena noted per nursing  Transfused 1 unit PRBC  hgb in the am  Close monitoring of vitals    3.  AF with RVR  RVR improved  Per theresa, there was some confusion after recent discharge from the [facility] and she has not received her po metoprolol (bid) for at least 3 days  She is taking PO adequately so we will continue:  Diltiazem ER 180m PO daily  Metoprolol 50 mg PO BID  Metoprolol 2.5 mg IV q6h prn    Will check TSH WNL    4.  Hypomagensium levels       Hypokalemia  Supplement per protocol   May be contributing to #3    5.  H/o diastolic CHF  -Last TTE LVEF 50 to 55% on 7/22/2021 with evidence of moderate to severe tricuspid regurgitation, moderate pulmonary hypertension  Has been give 2 doses of IV lasix  Appears more clinically dry now.  Will give gentle IVF while  "NPO.     Will hold of further iv diuretics at this time      6. Chronic pain syndrome  Family states that she has takes narcotics for \"many years\"  It appears that she had po prn dialudid  Not clear how much she was taking on a daily basis  I have some concern that she is having some narcotic withdrawal contributing to tachyarrythmia  Will order prn iv dilaudid to have available    7. Leukocytosis, resolved  -WBC 14.7 with mildly abnormal urinalysis.    No pneumonia seen on chest x-ray.  Foot does not appear infected  -Procalcitonin WNL  No clear infectious process identified     8. Hypotension  H/o HTN  Noted to have been given IV lasix dose x 2 on admission - overall volume status appears euvolemic s/p RBCs overnight  - monitor carefully    9.  Dementia  Progressive memory difficulty  Pt is requiring 24 hour care from her sons  They give her all of her medications  Recent discharge from the [facility] after recent foot surgery - family brought home to provide 24 hour care    10.  Hypothyroidism  -continue PTA synthroid     11. Normocytic anemia  -On iron therapy at home, hemoglobin 9.1 but she fluctuates between 9 and 11     12. CKD 3  -Serum creatinine 1.17, GFR 43  -creat improved today   BMP in the am    13. Hypoglycemia   T2DM  -Grossly uncontrolled, last A1c 9.0 on 9/14/2021  -Previously on glargine 30 units a.m. and 15 units p.m.  Was hypoglycemic, lantus stopped and now sugars are coming up.  Will continue sliding scale insulin while NPO just give 5 of lantus and reassess long acting tomorrow.    14.  Recent toe amputation  Appears to be healing well with no gross s/s of active infection    Diet: Combination Diet Regular Diet Adult; Minced and Moist Diet (level 5); Liquidized/Moderately Thick (level 3)  NPO for Medical/Clinical Reasons Except for: Meds    DVT Prophylaxis: warfarin on hold - scd's  Briceño Catheter: Not present  Code Status: Full Code      Entered: Chandler Erickson MD 10/13/2021, " 6:54 PM         Interval History   Patient seen with nurse.  Her HR remains in 120s but no complaints of sob or palpitations.  She is more alert today.  Denies pain, HA, neuro changes, n/v.    No melena or bloody stools reported overnight, per nursing    -Data reviewed today: I reviewed all new labs and imaging results over the last 24 hours. I personally reviewed no images or EKG's today.    Physical Exam   Temp: 98.3  F (36.8  C) Temp src: Oral BP: 112/59 Pulse: 117   Resp: 18 SpO2: 96 % O2 Device: None (Room air)    Vitals:    10/11/21 1337 10/13/21 0630   Weight: 63.5 kg (140 lb) 66.1 kg (145 lb 12.8 oz)     Vital Signs with Ranges  Temp:  [97.9  F (36.6  C)-98.4  F (36.9  C)] 98.3  F (36.8  C)  Pulse:  [117-123] 117  Resp:  [18-20] 18  BP: ()/(55-78) 112/59  SpO2:  [93 %-98 %] 96 %  I/O last 3 completed shifts:  In: 1257 [P.O.:480; I.V.:777]  Out: -     GEN:  Elderly female, answers purposefully but is not oriented to place/time.   HEENT:  Normocephalic/atraumatic, no scleral icterus, no nasal discharge, mouth and membranes appear fairly moist  CV:  Tachy, irreg, moderate murmur to ausc S1 + S2 noted, no S3 or S4.  LUNGS:  Clear to auscultation ant/lat bilaterally. Post exam limited secondary to lack of pt cooperation with exam.   No clear rales/rhonchi/wheezing auscultated bilaterally.  No costal retractions bilaterally.  Symmetric chest rise on inhalation noted.  ABD:  Active bowel sounds, soft, no grimacing to deeper palpation throughout, non-distended.  No clear rebound/guarding/rigidity.  No masses palpated.  No obvious HSM to exam.  EXT:  No significant pretibial edema or cyanosis bilaterally. No joint synovitis noted.  No calf-tenderness or asymmetry noted.  Left foot with toe amputation site clean and dry - no drainage.  Mild erythema and warmth to the lower right pretibial area--no open areas  SKIN:  Dry to touch, no rashes or jaundice noted.  NEURO:  No tremors at rest, as above arouses to  painful stimuli to all ext.equally    Data   Labs:  Recent Labs   Lab 10/13/21  1314 10/13/21  1246 10/13/21  0522 10/13/21  0404 10/12/21  2045 10/12/21  2029 10/12/21  0927 10/12/21  0621   NA  --   --  143  --   --  145  --  142   POTASSIUM 3.4*  --  3.1*  3.1*  --   --  3.0*   < > 2.8*   CHLORIDE  --   --  109*  --   --  108*  --  108*   CO2  --   --  25  --   --  25  --  26   ANIONGAP  --   --  9  --   --  12  --  8   GLC  --  238* 127* 128*   < > 72   < > 68*   BUN  --   --  19  --   --  22  --  24   CR  --   --  0.87  --   --  0.87  --  0.81   GFRESTIMATED  --   --  62  --   --  62  --  67   DI  --   --  8.5  --   --  8.4*  --  7.7*    < > = values in this interval not displayed.     Recent Labs   Lab 10/13/21  0522 10/12/21  1146 10/12/21  0621 10/11/21  1422 10/11/21  1422   WBC 10.3  --  9.5  --  14.7*   HGB 8.5* 8.1* 7.6*   < > 9.1*   HCT 29.7*  --  25.4*  --  30.7*   MCV 94  --  90  --  89     --  139*  --  204    < > = values in this interval not displayed.     Recent Labs   Lab 10/13/21  0522 10/12/21  1146 10/12/21  0621   HGB 8.5* 8.1* 7.6*     Recent Labs   Lab 10/12/21  0621 10/11/21  2000 10/11/21  1422   INR 1.90* 4.20* 3.72*      Recent Imaging:   Recent Results (from the past 24 hour(s))   XR PICC Chest Placement Port 1vw    Narrative    EXAM: XR CHEST PICC PLACEMENT PORT 1 VW  LOCATION: [facility]  DATE/TIME: 10/12/2021 9:48 PM    INDICATION: picc placement verification  COMPARISON: 10/11/2021 chest radiograph and CT.      Impression    IMPRESSION: A right PICC tip terminates lower SVC. These are pleural effusion is not appreciated on this study. No pneumothorax. The cardiomediastinal silhouette is enlarged. A mitral valve prosthesis is present. There is a left atrial appendage clip.   Sternotomy suture wires are intact.    CT Head w/o Contrast    Narrative    EXAM: CT HEAD W/O CONTRAST  LOCATION: [facility]  DATE/TIME: 10/13/2021 1:58  AM    INDICATION: Mental status change, unknown cause  COMPARISON: 10/11/2021  TECHNIQUE: Routine CT Head without IV contrast. Multiplanar reformats. Dose reduction techniques were used.    FINDINGS:  Motion degraded exam.    INTRACRANIAL CONTENTS: No intracranial hemorrhage, extraaxial collection, or mass effect.  No CT evidence of acute infarct. Mild presumed chronic small vessel ischemic changes. Chronic encephalomalacia right temporal cortex. Mild to moderate generalized   volume loss. No hydrocephalus.     VISUALIZED ORBITS/SINUSES/MASTOIDS: No intraorbital abnormality. Opacified right maxillary sinus. No middle ear or mastoid effusion.    BONES/SOFT TISSUES: No acute abnormality.      Impression    IMPRESSION:  1.  Motion degraded exam.  2.  No acute intracranial process.  3.  Chronic right maxillary sinusitis.       Medications     dextrose 5% and 0.45% NaCl 50 mL/hr at 10/13/21 1619       diltiazem ER COATED BEADS  180 mg Oral Daily     [Held by provider] gabapentin  100 mg Oral At Bedtime     insulin aspart  1-6 Units Subcutaneous Q4H     levothyroxine  125 mcg Oral Daily     metoprolol  2.5 mg Intravenous Q6H     pantoprazole (PROTONIX) IV  40 mg Intravenous BID     sodium chloride (PF)  3 mL Intracatheter Q8H     temazepam  7.5 mg Oral At Bedtime

## 2021-10-13 NOTE — SIGNIFICANT EVENT
Unable to wake pt. No response from sternal rub. Rapid response called. Blood sugar 73. 25 mls D50 given. Narcan 0.4 mg given x2 with some improvement. CXR, EKG (ST with short GA), and labs ordered. Blood sugar improved at 108. PICC team here to place central line. Will get CXR when line placement verified. Pt awoke and c/o leg pain and feeling cold. Rash noted to RLE; Dr Maynard notified. Hgb 8.1, up from AM lab. Per Cathie, will hold off on transfusion at this time and check Hgb with AM labs.

## 2021-10-13 NOTE — CODE DOCUMENTATION
Rapid Response called for unresponsive patient. Sternal rub to induce patient response to localize pain. BG 73. D50 administered. Narcan administered x2. Patient moving all extremities, opening eyes, and talking following Narcan administration. EKG: Sinus tach with short AL and nonspecific ST and T wave abnormality. PO diltiazem, as well as Mg and K replacement to be initiated. Labs pending. Concluding VSS as follows: , /68, RR 20, Sa02 100% on 2L NC. Primary RN Che, ICU charge RN Sarah Souza, ICU resource RN Aria Mayen, SWAT MYRTLE MONTEMAYOR, P3 charge RN Sudha, RT Raudel and Karissa Barrios and Kandi in attendance at patient bedside.

## 2021-10-13 NOTE — PROGRESS NOTES
[facility] Progress Note     Late entry, patient seen earlier this morning    SUBJECTIVE:  Patient just gotten to sleep after period of agitation per 1:1 aide. Per aide she has been having frequent stools, no melena/hematochezia, and she has been complaining of some upper abdominal discomfort and hunger.       OBJECTIVE:  /78 (BP Location: Left arm)   Pulse 120   Temp 97.9  F (36.6  C) (Axillary)   Resp 20   Wt 66.1 kg (145 lb 12.8 oz)   SpO2 96%   BMI 28.47 kg/m    Temp (24hrs), Av.1  F (36.7  C), Min:97.9  F (36.6  C), Max:98.4  F (36.9  C)    Patient Vitals for the past 72 hrs:   Weight   10/13/21 0630 66.1 kg (145 lb 12.8 oz)   10/11/21 1337 63.5 kg (140 lb)       Intake/Output Summary (Last 24 hours) at 10/13/2021 1443  Last data filed at 10/12/2021 1734  Gross per 24 hour   Intake 345 ml   Output --   Net 345 ml        PHYSICAL EXAM  GEN: NAD, elderly female lying in bed sleeping  HRT: no LE edema  RESP: unlabored  ABD: soft, did not respond to palpation while sleeping  SKIN: No rash or jaundice      Additional Data:  I have reviewed the patient's new clinical lab results:     Recent Labs   Lab Test 10/13/21  0522 10/12/21  1146 10/12/21  0621 10/11/21  2000 10/11/21  1422 10/11/21  1422   WBC 10.3  --  9.5  --   --  14.7*   HGB 8.5* 8.1* 7.6*  --    < > 9.1*   MCV 94  --  90  --   --  89     --  139*  --   --  204   INR  --   --  1.90* 4.20*  --  3.72*    < > = values in this interval not displayed.     Recent Labs   Lab Test 10/13/21  1314 10/13/21  0522 10/12/21  2029 10/12/21  1958 10/12/21  0621   POTASSIUM 3.4* 3.1*  3.1* 3.0*   < > 2.8*   CHLORIDE  --  109* 108*  --  108*   CO2  --  25 25  --  26   BUN  --  19 22  --  24   ANIONGAP  --  9 12  --  8    < > = values in this interval not displayed.     Recent Labs   Lab Test 10/13/21  0522 10/12/21  2029 10/12/21  0621 10/11/21  1556 21  0633 21  1955 20  1723 20  2349 20  2236 10/25/19  1756    ALBUMIN 2.9* 2.9* 2.7*  --    < >  --    < > 4.4  --    < >   BILITOTAL 1.0 1.2* 1.0  --    < >  --    < > 0.8  --    < >   ALT 19 16 14  --    < >  --    < > 24  --    < >   AST 29 33 23  --    < >  --    < > 30  --    < >   PROTEIN  --   --   --  10 *  --  Negative  --   --  300 mg/dL*  --    LIPASE  --   --   --   --   --   --   --  13  --   --     < > = values in this interval not displayed.     C diff 10/13/21: negative    Imaging results:  CT head w/o contrast 10/13/21:  IMPRESSION:  1.  Motion degraded exam.  2.  No acute intracranial process.  3.  Chronic right maxillary sinusitis.    CTA abdomen/pelvis 10/11/21:  IMPRESSION:  1.  Focal area of increased density involving the EG junction with the EG junction dilatation measuring 2.3 x 1.6 cm on noncontrast imaging. This does not change in its appearance on arterial phase imaging or portal venous phase imaging and may be the result of retained contrast material within the distal esophagus or retained food material. Another consideration would be postoperative change. Given the lack of change in overall appearance on postcontrast imaging, this is unlikely to be due to localized bleeding. An underlying high density mass cannot be excluded.  2.  No evidence for arterial contrast extravasation or active bleeding within the lumen of the small bowel, colon, stomach or distal esophagus above the level of the aforementioned high density mass. Evaluation of the colon is somewhat limited due to a   large amount of stool throughout the colon and a severe amount of stool in the rectal vault.  3.  No evidence for inflammatory change to the bowel.  4.  Atherosclerotic vascular calcification with significant vascular calcification origin left renal artery. Chronic occlusion of the left SFA proximally.   5.  Volume overload.    CT chest PE study 10/11/21:  IMPRESSION:  1.  Allowing for significant respiratory motion artifact, there is no evidence for pulmonary  embolism.   2.  Evidence for CHF/volume overload with right-sided pleural fluid collection with interlobular septal thickening in mosaic attenuation of the pulmonary parenchyma with cardiac enlargement.  3.  Normal caliber aorta without dissection. Advanced atherosclerotic vascular calcification including severe vascular calcification at the origin of the left subclavian artery with indwelling stent. Severe vascular calcification at the origin left renal   artery. Recommend correlation for renovascular hypertension.       IMPRESSION:  Melena  Abnormal CT esophagus  82 y/o male with PMH CAD s/p CABG, mitral valve repair, heart failure, HTN, a fib, PVD, hypothyroidism, CKD 3, HLD, type 2 diabetes on insulin, anxiety, anemia admitted 10/11 for melena and found to have supratherapeutic INR up to 4.20. Warfarin being held. CT shows abnormality of the GE junction. EGD recommended but has been deferred for now given her mental status changes. She has been having frequent BMs, no more melena, C diff negative. INR down to 1.90. Hemoglobin has drifted down.    PLAN:  - NPO after midnight for possible EGD 10/14 in OR  - Continue IV PPI  - Hemoglobin monitoring per medicine      (Dr. Mcghee)  Christine Guido PA-C  [facility]  10/13/2021 2:43 PM  [phone] (office)  ________________________________________________________________________

## 2021-10-13 NOTE — SIGNIFICANT EVENT
"Significant Event Note    Time of event: 825pm     Subj   Called to rapid response.  Patient suddenly became altered at change of shift.  On my interview, she was nonverbal - history/ROS limited secondary to this.  Had been \"delirious\" throughout the day, but \"not this bad\"  Appears she received benzos earlier today.  Do not see any opioids given, though this is noted in her history.    Background: 81 year old female h xof CAD s/p CABG, mitral valve repair, PVD, CKDIII, chronic pain on opioids PTA among others who was admitted on 10/11/2021 with melena and increased confusion.     Obj:   Vitals near data assessment: Heart rate elevated in 120s, afebrile 98 Fahrenheit, blood pressure 99/56, respirations 20, satting 95% on room air.  General: Initially unresponsive, quickly became more responsive to painful stimuli.  GCS:   (initial) E1 V1-2 M 4-5. Total: 6-8.   (witihin 1 min) E2-3 V2 M5. Total: 9-10.   Eyes: anicteric. Pupils are on the smaller side - would describe as pinpoint.   Neck: No JVD  Resp: Mildly tachypneic.  No overt respiratory distress.  GCS as above, does appear to be protecting airway.  No wheezes or crackles to auscultation.  CV: Tachycardic -irregularly irregular.  Holosystolic murmur.  Peripheral pulses intact.  Abdomen: Nondistended, nontender.  Neuro: as above. Non-focal otherwise (no one-sided paresis, etc)  Extremities: No overt edema.     Assessment/Plan:  Acute encephalopathy, unclear exact etiology, possibly multifactorial  Sounds like she was delirious throughout the day, though concern for cute worsening at shift change here.  Reassuringly, vitals are overall stable, other than tachycardia, which has been present throughout the day.  DDx includes opioid/medication toxicity (especially given pinpoint pupils)    Concern on admission was for opioid withdrawal   Looks as if as needed IV Dilaudid was ordered, though I do not see any doses given   Benzo toxicity/withdrawal possibility as " well  May be a metabolic component with hypokalemia in 2 range, has not been treated as of yet.  Need to consider hypoglycemia, stroke (especially in setting of hx a fib not currently on anticoagulation).  No fall to support intracranial hemorrhage.  CT head from yesterday is unchanged.  Acute worsening of anemia possible, though hemoglobin had improved from 7 range to 8.1 earlier today (and no documented recurrent bleeding).  Cardiac cause is always a possibility.    Stat work-up:  Bedside glucose  CMP  EKG  Lactate  Initially had ordered ABG but canceled given improvement below   Will consider CT head if able.    STAT intervention   IV Narcan.     Discussed with: Bedside nurse, charge, RT, lab, remainder of rapid team.    Anil Brand DO        Addendum 830 pm   Mental status improved following dose of IV Narcan.  BG was low in 70s - given x 1 dose of IV dextrose.   Does seem to be responding more following above interventions.   GCS now E4 V3-4 M5-6. Total: 12-14.   Her neuro exam continues to be nonfocal without overt weakness, spasticity, sensation deficit.  EKG shows sinus tach with nonspecific ST changes, inconsistent with acute ischemia.  Troponin was not sent initially and at this point she was not complaining of chest pain so I did not send 1.  Given ental status improvement, tried another dose of IV Narcan.  She improved even more to GCS of 14.  This persisted for over 5 minutes.  I was clinically reassured by this, and feel that we do not need to consider intubation nor urgent transfer to ICU.  We will continue to monitor symptomatically, and consider repeat head CT pending neurological status.  PICC line was requested by several nurses due to difficult vascular access.  Order placed.  Done by PICC nurse.  Appreciate.  Treat hypokalemia as per protocol.  Continue rate control plan as outlined by day team.  Hold on further blood transfusion at this time given no acute indication.  We will recheck  hemoglobin in the morning as previously ordered.    Addendum 0130am:   Patient is reportedly fully back to her baseline.  Alternating between resting and yelling out at staff.  At this time, I do feel acute head CT is warranted, given sudden worsening of altered mental status in the setting of held anticoagulation (for suspected GI bleed).   Nursing to increase frequency of neurochecks.  Ordered stat head CT without contrast.  Will follow up result.    Addendum 253am  Head CT motion degraded but negative for acute ischemia / hemorrhage.   This taken together with neurologic exam, feel acute stroke is less likely.

## 2021-10-13 NOTE — PLAN OF CARE
Pt more alert following rapid on evenings. Suspect ativan as cause of unresponsiveness. 1:1 at bedside for safety during the night. Pt pulling at lines when awake. Pt had frequent BMs on eves and nights. Sample sent for C-Diff; Results pending. Enteric precautions in place.     Pt remains in ST at 120's . Scheduled cardizem 180 mg, scheduled IV metoprolol  And PRN cardizem given with no change. BP soft at 102/57 so PRN cardizem gtt not initiated. Electrolyte replacement per protocol.

## 2021-10-13 NOTE — PROCEDURES
"PICC Line Insertion Procedure Note    Pt. Name: Bernadette Yu  MRN:        [id number]    Procedure: Insertion of a  TRIPLE Lumen  5 fr  Bard SOLO (valved) Power PICC, Lot number [id number]    Indications: Vascular Access    Contraindications : None    Procedure Details   Patient identified with 2 identifiers and \"Time Out\" conducted.  .     Central line insertion bundle followed: hand hygiene performed prior to procedure, site cleansed with cholraprep, hat, mask, sterile gloves,sterile gown worn, patient draped with maximum barrier head to toe drape, sterile field maintained.    The vein was assessed and found to be compressible and of adequate size. 1.5 ml 1% Lidocaine administered sq to the insertion site. A 5 Fr PICC was inserted into the BRACHIAL vein of the right arm with ultrasound guidance. ONE attempt(s) required to access vein.   Catheter threaded without difficulty. Good blood return noted.    Modified Seldinger Technique used for insertion.    The 8 sharps that are included in the PICC insertion kit were accounted for and disposed of in the sharps container prior to breakdown of the sterile field.    Catheter secured with Statlock, biopatch and Tegaderm dressing applied.    Findings:  Total catheter length  35 cm, with 0 cm exposed. Mid upper arm circumference is 32 cm. Catheter was flushed with 30 cc NS. Patient  tolerated procedure well.    Tip placement verified by xray. Xray read by Dr. Fahrner . Tip placement: \"right PICC tip terminates lower SVC.\"    CLABSI prevention brochure left at bedside.    Patient's primary RN notified PICC is ready for use.    Comments:          Ovidio Baez, RN, MSN, IS, VA-BC  Vascular Access - [facility]        "

## 2021-10-13 NOTE — PROGRESS NOTES
"   10/13/21 0915   General Information   Onset of Illness/Injury or Date of Surgery 10/11/21   Referring Physician Anitha Nick DO   Behavioral Issues   (impulsive; confused)   Patient/Family Therapy Goal Statement (SLP) Patient wants to eat/drink   Pertinent History of Current Problem Per MD note: \"Bernadette Yu is a 81 year old female who was admitted on 10/11/2021 with melena and increased confusion. Progressive memory difficulty Pt is requiring 24 hour care from her sons They give her all of her medications Recent discharge from the [facility] after recent foot surgery - family brought home to provide 24 hour care. Clear to auscultation ant/lat bilaterally. Chest CT: \"Evidence for CHF/volume overload with right-sided pleural fluid collection with interlobular septal thickening in mosaic attenuation of the pulmonary parenchyma with cardiac enlargement.\" HCT negative for acute intracranial process.   General Observations Patient has 1:1 sitter for safety. Note confusion throughout session. Patient appeared short of breath with accessory muscle use. Patient verbalized at short sentence level.   Past History of Dysphagia Bedside swallow evaluation 12/2016 with recommendation for regular diet and thin liquids.   Type of Evaluation   Type of Evaluation Swallow Evaluation   Oral Motor   Oral Musculature generally intact   Structural Abnormalities none present   Mucosal Quality adequate   Dentition (Oral Motor)   Dentition (Oral Motor) some missing teeth  (missing upper front teeth)   Facial Symmetry (Oral Motor)   Facial Symmetry (Oral Motor) WNL   Lip Function (Oral Motor)   Lip Range of Motion (Oral Motor) WNL   Tongue Function (Oral Motor)   Tongue ROM (Oral Motor) WNL   Jaw Function (Oral Motor)   Jaw Function (Oral Motor) WNL   Cough/Swallow/Gag Reflex (Oral Motor)   Soft Palate/Velum (Oral Motor) unable/difficult to assess   Volitional Throat Clear/Cough (Oral Motor) WNL   Volitional Swallow " (Oral Motor) mildly delayed   Vocal Quality/Secretion Management (Oral Motor)   Vocal Quality (Oral Motor) WFL   General Swallowing Observations   Current Diet/Method of Nutritional Intake (General Swallowing Observations, NIS) NPO   Respiratory Support (General Swallowing Observations) none   Swallowing Evaluation Clinical swallow evaluation   Clinical Swallow Evaluation   Feeding Assistance minimal assistance required  (due to confusion and impulsivity)   Clinical Swallow Evaluation Textures Trialed thin liquids;mildly thick liquids;pureed;minced & moist;soft & bite-sized;solid foods   Clinical Swallow Eval: Thin Liquid Texture Trial   Mode of Presentation, Thin Liquids spoon;cup;fed by clinician;self-fed   Volume of Liquid or Food Presented 2 ice chips; 3 sips water by spoon; 3 sips by cup   Oral Phase of Swallow Poor AP movement   Pharyngeal Phase of Swallow coughing/choking   Diagnostic Statement Coughing after larger sips water by cup. Note impulsivity with rate and bolus size.   Clinical Swallow Eval: Mildly Thick Liquids   Mode of Presentation cup;fed by clinician;self-fed  ([place] assist due to impulsivity)   Volume Presented 2 oz   Oral Phase WFL   Pharyngeal Phase intact   Clinical Swallow Evaluation: Puree Solid Texture Trial   Mode of Presentation, Puree spoon;self-fed   Volume of Puree Presented 3 oz applesauce   Oral Phase, Puree WFL   Pharyngeal Phase, Puree intact   Clinical Swallow Eval: Minced & Moist   Mode of Presentation spoon;fed by clinician   Volume Presented 4 bites   Oral Phase poor AP movement  (mildly extended oral phase)   Pharyngeal Phase   (mildly delayed)   Clinical Swallow Eval: Soft & Bite Sized   Mode of Presentation spoon;self-fed;fed by clinician  (assist with loading spoon)   Volume Presented 3 bites   Oral Phase poor AP movement  (mild-moderately extended oral phase)   Pharyngeal Phase   (mildly delayed)   Clinical Swallow Evaluation: Solid Food Texture Trial   Mode of  Presentation self-fed   Volume Presented 1 bite cracker   Oral Phase poor AP movement  (moderately extended oral phase)   Oral Residue mid posterior tongue  (cleared with subsequent swallows)   Pharyngeal Phase   (mildly delayed)   Esophageal Phase of Swallow   Patient reports or presents with symptoms of esophageal dysphagia Yes   Esophageal comments Mild burping on occasion. Chest CT indicated minimal sliding esophageal hiatal hernia.   Swallowing Recommendations   Diet Consistency Recommendations minced & moist (level 5);mildly thick liquids (level 2)   Supervision Level for Intake 1:1 supervision needed   Mode of Delivery Recommendations bolus size, small;no straws;slow rate of intake   Swallowing Maneuver Recommendations alternate food and liquid intake   Monitoring/Assistance Required (Eating/Swallowing) stop eating activities when fatigue is present;monitor for cough or change in vocal quality with intake   Recommended Feeding/Eating Techniques (Swallow Eval) maintain upright posture during/after eating for 30 minutes;set-up and prepare tray   Medication Administration Recommendations, Swallowing (SLP) One at a time.   General Therapy Interventions   Planned Therapy Interventions Dysphagia Treatment   Dysphagia treatment Modified diet education;Instruction of safe swallow strategies   SLP Therapy Assessment/Plan   Criteria for Skilled Therapeutic Interventions Met (SLP Eval) yes;treatment indicated   SLP Diagnosis Mild oropharyngeal dysphagia   Rehab Potential (SLP Eval) good, to achieve stated therapy goals   Therapy Frequency (SLP Eval) 5 times/wk   Predicted Duration of Therapy Intervention (SLP Eval) 1 week   Comment, Therapy Assessment/Plan (SLP) Mild oropharyngeal dysphagia characterized by mild-moderately extended oral phase (moreso with solid texture), mildly delayed swallow response and coughing after sips of thin liquid. Confusion and impulsivity as well as shortness of breath impacting swallow  safety. No overt aspiration signs occurred with mildly thick liquid, puree, minced/moist or solid textures. Recommend initiate IDDSI 5, 2 (minced/moist with mildly thick liquids) given 1:1 assist/supervision and swallow strategies (fully upright position, no straws, small single sips/bites, slow rate, alternate liquids/solids). Defer to MD for readiness for oral diet given GI concerns. Please serve pills one at a time. Hold PO if too lethargic or if aspiration signs occur.   Therapy Plan Review/Discharge Plan (SLP)   Therapy Plan Review (SLP) evaluation/treatment results reviewed;care plan/treatment goals reviewed;risks/benefits reviewed;current/potential barriers reviewed;participants voiced agreement with care plan;participants included;patient   Demonstrates Need for Referral to Another Service (SLP) occupational therapist;physical therapist   SLP Discharge Planning    SLP Discharge Recommendation (DC Rec) Transitional Care Facility   SLP Rationale for DC Rec Below baseline swallow function   SLP Brief overview of current status  Recommend initiate IDDSI 5, 2 (minced/moist with mildly thick liquids) given 1:1 assist/supervision and swallow strategies (fully upright position, no straws, small single sips/bites, slow rate, alternate liquids/solids). Defer to MD for readiness for oral diet given GI concerns. Please serve pills one at a time. Hold PO if too lethargic or if aspiration signs occur.    Total Evaluation Time   Total Evaluation Time (Minutes) 10

## 2021-10-14 NOTE — PLAN OF CARE
Problem: Adult Inpatient Plan of Care  Goal: Plan of Care Review  Outcome: Improving     Problem: Risk for Delirium  Goal: Optimal Coping  Outcome: Improving     Problem: Dysrhythmia  Goal: Normalized Cardiac Rhythm  Outcome: Improving     Problem: Adult Inpatient Plan of Care  Goal: Absence of Hospital-Acquired Illness or Injury  Intervention: Identify and Manage Fall Risk  Recent Flowsheet Documentation  Taken 10/13/2021 7106 by Surekha Patel RN  Safety Promotion/Fall Prevention:   bed alarm on   activity supervised   nonskid shoes/slippers when out of bed   sitter at bedside   Patient is alert but confused. PRN Dilaudid was given x2 for leg pain. PRN sequel was given x1 for agitation, this was somewhat effective. Sinus tachy on the monitor and VSS. NPO at midnight for EGD tomorrow. 1:1 sitter in place. Will continue monitor.

## 2021-10-14 NOTE — PLAN OF CARE
Problem: Adult Inpatient Plan of Care  Goal: Plan of Care Review  10/14/2021 1807 by Jo Ann Booker, RN  Outcome: No Change  10/14/2021 1347 by Jo Ann Booker, RN  Outcome: No Change     Eating well, asking for items that are not part of her diet such as peanut butter sandwich, drinking well

## 2021-10-14 NOTE — ANESTHESIA POSTPROCEDURE EVALUATION
Patient: Bernadette Yu    Procedure: Procedure(s):  ESOPHAGOGASTRODUODENOSCOPY (EGD) WITH STOMACH BIOPSIES       Diagnosis:Upper GI bleed [K92.2]  Diagnosis Additional Information: No value filed.    Anesthesia Type:  MAC    Note:  Disposition: Inpatient   Postop Pain Control: Uneventful            Sign Out: Well controlled pain   PONV: No   Neuro/Psych: Uneventful            Sign Out: Acceptable/Baseline neuro status   Airway/Respiratory: Uneventful            Sign Out: Acceptable/Baseline resp. status   CV/Hemodynamics: Uneventful            Sign Out: Acceptable CV status; No obvious hypovolemia; No obvious fluid overload   Other NRE: NONE   DID A NON-ROUTINE EVENT OCCUR? No           Last vitals:  Vitals Value Taken Time   BP 88/41 10/14/21 1345   Temp     Pulse 110 10/14/21 1345   Resp 16 10/14/21 1345   SpO2 95 % 10/14/21 1345       Electronically Signed By: Richard Bradford MD  October 14, 2021  1:46 PM

## 2021-10-14 NOTE — ANESTHESIA PREPROCEDURE EVALUATION
Anesthesia Pre-Procedure Evaluation    Patient: Bernadette Yu   MRN: [id number] : 1940        Preoperative Diagnosis: Upper GI bleed [K92.2]    Procedure : Procedure(s):  ESOPHAGOGASTRODUODENOSCOPY (EGD)          Past Medical History:   Diagnosis Date     Abdominal bloating 2016     Anticoagulation goal of INR 2.5 to 3.5 2016     Anxiety      Aortic stenosis 10/26/2019     ASCVD (arteriosclerotic cardiovascular disease)      Atherosclerosis of native coronary artery without angina pectoris 10/26/2019     Bleeding diathesis (H)      Carotid artery stenosis, left      CHF (congestive heart failure) (H)      CKD (chronic kidney disease) stage 3, GFR 30-59 ml/min (H)      DM (diabetes mellitus), type 2 (H) 1990     Eczema      Former smoker      Full code status      History of metabolic encephalopathy with delirium 2021     History of partial ray amputation of second toe of left foot (H) 2021     HLD (hyperlipidemia)      HTN (hypertension)      Hypothyroidism      IBS (irritable bowel syndrome)      Insomnia      Liver disease      Long Q-T syndrome 10/26/2019     Meningococcal meningitis Age 14     Microalbuminuria due to type 2 diabetes mellitus (H)      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     Moderate tricuspid insufficiency 2021     MRSA (methicillin resistant staph aureus) culture positive 2017     Normocytic anemia      PAD (peripheral artery disease) (H)     [facility]   DATE: 10/26/2019 4:15 PM   EXAM:  DUPLEX ARTERIAL ULTRASOUND OF THE LOWER EXTREMITIES BILATERALLY   INDICATION: Leg pain, vasculopathy.   COMPARISON: None.   TECHNIQUE: Duplex imaging is performed utilizing gray-scale, two-dimensional images, and color-flow imaging. Doppler waveform analysis and spectral Doppler imaging is also performed.   DUPLEX ARTERIAL ULTRASOUND  FINDIN     Paroxysmal atrial fibrillation (H) 2015    Bilateral pulmonary vein isolation with AtriCure Synergy (bipolar radiofrequency ablation) device, exclusion of the left atrial appendage with the AtriCure AtriClip device.       Paroxysmal atrial fibrillation with RVR (H) 2021     PN (peripheral neuropathy)      Psoriasis      PVD (peripheral vascular disease) (H) 2019    Bilateral lower extremities.  See ultrasound 2019.     Recurrent UTI (urinary tract infection)      RLS (restless legs syndrome)      S/P CABG (coronary artery bypass graft) 2016     S/P colonoscopy 07     S/P MVR (mitral valve replacement) 2015     Subclavian artery stenosis, left (H) 2015    Stented in .      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     Ulcer of heel and midfoot, left, with unspecified severity (H)       Past Surgical History:   Procedure Laterality Date     AMPUTATE TOE(S) Left 2021    Procedure: AMPUTATION, second ray left foot;  Surgeon: Nba Cleveland DPM;  Location: [facility]     APPENDECTOMY       BYPASS GRAFT ARTERY CORONARY       CARDIAC CATHETERIZATION  11/12/15      SECTION       CHOLECYSTECTOMY       COLONOSCOPY N/A 10/12/2016    Procedure: COLONOSCOPY;  Surgeon: Santiago Duran MD;  Location: [facility];  Service:      COLONOSCOPY N/A 10/13/2016    Procedure: COLONOSCOPY with polypectomy & rectum biopsies;  Surgeon: Santiago Duran MD;  Location: [facility];  Service:      COLONOSCOPY N/A 12/3/2017    Procedure: COLONOSCOPY with multiple polyp removal using hot snare and mansfield net and biopsy forcep;  Surgeon: Marcelo Wade MD;  Location: [facility];  Service:      COLONOSCOPY N/A 3/13/2018    Procedure: COLONOSCOPY; POLYPECTOMY;  Surgeon: Marcelo Wade MD;  Location: [facility] OR;  Service:      ESOPHAGOSCOPY, GASTROSCOPY, DUODENOSCOPY (EGD), COMBINED N/A 10/12/2016    Procedure:  ESOPHAGOGASTRODUODENOSCOPY with biopsies;  Surgeon: Santiago Duran MD;  Location: [facility];  Service:      ESOPHAGOSCOPY, GASTROSCOPY, DUODENOSCOPY (EGD), COMBINED N/A 12/3/2017    Procedure: ESOPHAGOGASTRODUODENOSCOPY (EGD);  Surgeon: Marcelo Wade MD;  Location: [facility];  Service:      PICC TRIPLE LUMEN PLACEMENT  10/12/2021          TONSILLECTOMY & ADENOIDECTOMY       UPPER GASTROINTESTINAL ENDOSCOPY        Allergies   Allergen Reactions     Amiodarone Other (See Comments)     TORSADES DE POINTES     Aspirin Other (See Comments)     Recurrent severe gastrointestinal bleed.      Social History     Tobacco Use     Smoking status: Former Smoker     Years: 23.00     Types: Cigarettes     Smokeless tobacco: Never Used     Tobacco comment: quit 1970's   Substance Use Topics     Alcohol use: No      Wt Readings from Last 1 Encounters:   10/13/21 66.1 kg (145 lb 12.8 oz)        Anesthesia Evaluation            ROS/MED HX  ENT/Pulmonary:       Neurologic:     (+) dementia,     Cardiovascular:     (+) Dyslipidemia hypertension--CAD ---Taking blood thinners CHF dysrhythmias, a-fib, Previous cardiac testing   Echo: Date: 9/2021 Results:  Last TTE LVEF 50 to 55% on 7/22/2021 with evidence of moderate to severe tricuspid regurgitation, moderate pulmonary hypertension  Stress Test: Date: Results:    ECG Reviewed: Date: Results:    Cath: Date: Results:      METS/Exercise Tolerance:     Hematologic:     (+) anemia, history of blood transfusion, no previous transfusion reaction, Known PRBC Anitbodies:No     Musculoskeletal:       GI/Hepatic:  - neg GI/hepatic ROS     Renal/Genitourinary:     (+) renal disease, type: CRI, Pt does not require dialysis,     Endo:     (+) type II DM, Using insulin, thyroid problem, hypothyroidism,     Psychiatric/Substance Use:  - neg psychiatric ROS     Infectious Disease:  - neg infectious disease ROS     Malignancy:  - neg malignancy ROS     Other:  - neg other ROS           Physical Exam    Airway  airway exam normal      Mallampati: I   TM distance: > 3 FB   Neck ROM: full   Mouth opening: > 3 cm    Respiratory Devices and Support         Dental           Cardiovascular          Rhythm and rate: irregular and normal     Pulmonary           breath sounds clear to auscultation           OUTSIDE LABS:  CBC:   Lab Results   Component Value Date    WBC 7.9 10/14/2021    WBC 10.3 10/13/2021    HGB 8.4 (L) 10/14/2021    HGB 8.5 (L) 10/13/2021    HCT 29.1 (L) 10/14/2021    HCT 29.7 (L) 10/13/2021     10/14/2021     10/13/2021     BMP:   Lab Results   Component Value Date     10/14/2021     10/13/2021    POTASSIUM 4.5 10/14/2021    POTASSIUM 3.7 10/13/2021    CHLORIDE 107 10/14/2021    CHLORIDE 109 (H) 10/13/2021    CO2 24 10/14/2021    CO2 25 10/13/2021    BUN 19 10/14/2021    BUN 19 10/13/2021    CR 1.14 (H) 10/14/2021    CR 0.87 10/13/2021     (H) 10/14/2021     (H) 10/14/2021     COAGS:   Lab Results   Component Value Date    PTT 41 (H) 11/23/2020    INR 1.90 (H) 10/12/2021     POC: No results found for: BGM, HCG, HCGS  HEPATIC:   Lab Results   Component Value Date    ALBUMIN 2.9 (L) 10/13/2021    PROTTOTAL 6.3 10/13/2021    ALT 19 10/13/2021    AST 29 10/13/2021    ALKPHOS 65 10/13/2021    BILITOTAL 1.0 10/13/2021     OTHER:   Lab Results   Component Value Date    LACT 1.5 10/12/2021    A1C 9.0 (H) 09/14/2021    DI 8.3 (L) 10/14/2021    PHOS 2.6 10/27/2019    MAG 2.2 10/14/2021    LIPASE 13 11/23/2020    TSH 4.55 10/12/2021    CRP 1.0 (H) 09/14/2021    SED 63 (H) 09/14/2021       Anesthesia Plan    ASA Status:  4      Anesthesia Type: MAC.     - Reason for MAC: straight local not clinically adequate              Consents    Anesthesia Plan(s) and associated risks, benefits, and realistic alternatives discussed. Questions answered and patient/representative(s) expressed understanding.     - Discussed with:  Healthcare Power of       -  Extended Intubation/Ventilatory Support Discussed: No.      - Patient is DNR/DNI Status: No    Use of blood products discussed: No .     Postoperative Care            Comments:                Richard Bradford MD

## 2021-10-14 NOTE — PROGRESS NOTES
"Hospitalist Progress Note    Assessment/Plan    Bernadette Yu is a 81 year old female who was admitted on 10/11/2021 with melena and increased confusion.    She has a PMH of mitral valve repair, CAD s/p CABG, heart failure, hypertension, paroxysmal atrial fibrillation, PVD, hypothyroidism, normocytic anemia, CKD 3, hyperlipidemia, anxiety, T2DM on insulin, chronic pain syndrome (on chronic narcotics) that presents 3 weeks after toe surgery with reports of patient being more confused and complaining of multiple different pains such as hemorrhoid pain and melena     Problem List:  1. Melena  -Chronically anemic with reports of melena and hemorrhoid pain   supratherapeutic INR on admission 4.2 -   2mg IV vit K given in ED  INR improved to 1.9  Will continue to hold coumadin, for now     GI consult requested - appreciated  -PPI bid  - NPO for EGD today     2.  Acute blood loss anemia  H/o anemia of chronic disease (hgb 9-11)  Type and screen  No further melena noted per nursing  Transfused 1 unit PRBC  hgb8.4  Close monitoring of vitals     3.  AF with RVR  Per theresa, there was some confusion after recent discharge from the [facility] and she has not received her po metoprolol (bid) for at least 3 days  She is taking PO adequately so we will continue:  Diltiazem ER 180m PO daily  Metoprolol 50 mg PO BID  Metoprolol 2.5 mg IV q6h prn  HR not in control, due to NPO; resume po meds after egd          4.  Hypomagensium levels       Hypokalemia  Resolved     5.  H/o diastolic CHF  -Last TTE LVEF 50 to 55% on 7/22/2021 with evidence of moderate to severe tricuspid regurgitation, moderate pulmonary hypertension  Has been give 2 doses of IV lasix  Appears more clinically dry now.  Will give gentle IVF while NPO.     Will hold of further iv diuretics at this time        6. Chronic pain syndrome  Family states that she has takes narcotics for \"many years\"  It appears that she had po prn dialudid  Not clear how much she " was taking on a daily basis  I have some concern that she is having some narcotic withdrawal contributing to tachyarrythmia  Will order prn iv dilaudid to have available     7. Leukocytosis, resolved  -WBC 14.7 with mildly abnormal urinalysis.    No pneumonia seen on chest x-ray.  Foot does not appear infected  -Procalcitonin WNL  No clear infectious process identified     8. Hypotension  H/o HTN  Noted to have been given IV lasix dose x 2 on admission - overall volume status appears euvolemic s/p RBCs overnight  - monitor carefully     9.  Dementia  Progressive memory difficulty  Pt is requiring 24 hour care from her sons  They give her all of her medications  Recent discharge from the [facility] after recent foot surgery - family brought home to provide 24 hour care     10.  Hypothyroidism  -continue PTA synthroid     11. Normocytic anemia  -On iron therapy at home, hemoglobin 9.1 but she fluctuates between 9 and 11     12. CKD 3  -Serum creatinine 1.14  -creat improved today   BMP in the am     13. Hypoglycemia   T2DM  -Grossly uncontrolled, last A1c 9.0 on 9/14/2021  -Previously on glargine 30 units a.m. and 15 units p.m.  Was hypoglycemic, lantus stopped and now sugars are coming up.  Will continue sliding scale insulin while NPO just give 5 of lantus and reassess long acting tomorrow.  -glucose in 300; will resume lantus after egd     14.  Recent toe amputation  Appears to be healing well with no gross s/s of active infection     Diet: NPO now for EGD, will resume Combination Diet Regular Diet Adult; Minced and Moist Diet (level 5); Liquidized/Moderately Thick (level 3)    DVT Prophylaxis: warfarin on hold - scd's  Briceño Catheter: Not present  Code Status: Full Code                Barriers to Discharge:  EGD today    Anticipated discharge date/Disposition: 2-3 days, need placement    Subjective  Confused, not able to communicate effectively    Objective    Vital signs in last 24 hours  Temp:  [97.9  F (36.6   C)-98.7  F (37.1  C)] 98.1  F (36.7  C)  Pulse:  [] 112  Resp:  [18-20] 20  BP: (104-132)/() 112/53  SpO2:  [93 %-96 %] 93 % [unfilled] O2 Device: None (Room air)    Weight:   [unfilled] Weight change:     Intake/Output last 3 shifts  I/O last 3 completed shifts:  In: 2095 [P.O.:720; I.V.:1375]  Out: 400 [Urine:400]  Body mass index is 28.47 kg/m .    Physical Exam    Physical Exam  Vitals and nursing note reviewed.   Constitutional:       Comments: Confused, agitated   HENT:      Head: Normocephalic and atraumatic.      Right Ear: External ear normal.      Left Ear: External ear normal.      Nose: Nose normal.      Mouth/Throat:      Mouth: Mucous membranes are dry.   Eyes:      General:         Right eye: No discharge.         Left eye: No discharge.   Cardiovascular:      Rate and Rhythm: Tachycardia present. Rhythm irregular.   Pulmonary:      Effort: Pulmonary effort is normal. No respiratory distress.   Abdominal:      General: There is distension.      Tenderness: There is abdominal tenderness.   Musculoskeletal:      Cervical back: No rigidity.      Right lower leg: No edema.      Left lower leg: No edema.   Skin:     General: Skin is dry.      Coloration: Skin is not jaundiced.   Neurological:      General: No focal deficit present.   Psychiatric:         Behavior: Behavior is agitated.         Cognition and Memory: Cognition is impaired. Memory is impaired.           Pertinent Labs   Lab Results: personally reviewed.   Results for ANNA MARIE VALENTIN (MRN [id number]) as of 10/14/2021 12:23   Ref. Range 10/14/2021 05:56 10/14/2021 07:45 10/14/2021 11:07   Sodium Latest Ref Range: 136 - 145 mmol/L 137     Potassium Latest Ref Range: 3.5 - 5.0 mmol/L 4.5     Chloride Latest Ref Range: 98 - 107 mmol/L 107     Carbon Dioxide Latest Ref Range: 22 - 31 mmol/L 24     Urea Nitrogen Latest Ref Range: 8 - 28 mg/dL 19     Creatinine Latest Ref Range: 0.60 - 1.10 mg/dL 1.14 (H)     GFR Estimate Latest Ref  Range: >60 mL/min/1.73m2 45 (L)     Calcium Latest Ref Range: 8.5 - 10.5 mg/dL 8.3 (L)     Anion Gap Latest Ref Range: 5 - 18 mmol/L 6     Magnesium Latest Ref Range: 1.8 - 2.6 mg/dL 2.2     Glucose Latest Ref Range: 70 - 125 mg/dL 310 (H)     GLUCOSE BY METER POCT Latest Ref Range: 70 - 99 mg/dL  324 (H) 195 (H)   WBC Latest Ref Range: 4.0 - 11.0 10e3/uL 7.9     Hemoglobin Latest Ref Range: 11.7 - 15.7 g/dL 8.4 (L)     Hematocrit Latest Ref Range: 35.0 - 47.0 % 29.1 (L)     Platelet Count Latest Ref Range: 150 - 450 10e3/uL 154     RBC Count Latest Ref Range: 3.80 - 5.20 10e6/uL 3.13 (L)     MCV Latest Ref Range: 78 - 100 fL 93     MCH Latest Ref Range: 26.5 - 33.0 pg 26.8     MCHC Latest Ref Range: 31.5 - 36.5 g/dL 28.9 (L)     RDW Latest Ref Range: 10.0 - 15.0 % 19.6 (H)         Medications  Scheduled Meds:    diltiazem ER COATED BEADS  180 mg Oral Daily     [Held by provider] gabapentin  100 mg Oral At Bedtime     insulin aspart  1-6 Units Subcutaneous Q4H     insulin glargine  5 Units Subcutaneous QAM AC     levothyroxine  125 mcg Oral Daily     metoprolol  2.5 mg Intravenous Q6H     metoprolol tartrate  50 mg Oral BID     pantoprazole (PROTONIX) IV  40 mg Intravenous BID     sodium chloride (PF)  3 mL Intracatheter Q8H     sodium chloride (PF)  3 mL Intracatheter Q8H     temazepam  7.5 mg Oral At Bedtime     Continuous Infusions:    dextrose 5% and 0.45% NaCl 75 mL/hr at 10/14/21 0554     PRN Meds:.acetaminophen, glucose **OR** dextrose **OR** glucagon, haloperidol lactate, HOLD MEDICATION, HYDROmorphone, [Held by provider] HYDROmorphone, lidocaine 4%, lidocaine 4%, lidocaine 4%, lidocaine (buffered or not buffered), lidocaine (buffered or not buffered), lidocaine (buffered or not buffered), melatonin, metoprolol, naloxone **OR** naloxone **OR** naloxone **OR** naloxone, prochlorperazine **OR** prochlorperazine **OR** prochlorperazine, QUEtiapine, sodium chloride (PF), sodium chloride (PF), sodium chloride  (PF)    Pertinent Radiology   Radiology Results personally reviewed    EXAM: CT HEAD W/O CONTRAST  LOCATION: [facility]  DATE/TIME: 10/13/2021 1:58 AM     INDICATION: Mental status change, unknown cause  COMPARISON: 10/11/2021  TECHNIQUE: Routine CT Head without IV contrast. Multiplanar reformats. Dose reduction techniques were used.     FINDINGS:  Motion degraded exam.     INTRACRANIAL CONTENTS: No intracranial hemorrhage, extraaxial collection, or mass effect.  No CT evidence of acute infarct. Mild presumed chronic small vessel ischemic changes. Chronic encephalomalacia right temporal cortex. Mild to moderate generalized   volume loss. No hydrocephalus.      VISUALIZED ORBITS/SINUSES/MASTOIDS: No intraorbital abnormality. Opacified right maxillary sinus. No middle ear or mastoid effusion.     BONES/SOFT TISSUES: No acute abnormality.                                                                      IMPRESSION:  1.  Motion degraded exam.  2.  No acute intracranial process.  3.  Chronic right maxillary sinusitis.      Advanced Care Planning:  Discharge planning discussed with         [facility] Medicine Service  Dennis Gunn

## 2021-10-14 NOTE — ANESTHESIA CARE TRANSFER NOTE
Patient: Bernadette Yu    Procedure: Procedure(s):  ESOPHAGOGASTRODUODENOSCOPY (EGD) WITH STOMACH BIOPSIES       Diagnosis: Upper GI bleed [K92.2]  Diagnosis Additional Information: No value filed.    Anesthesia Type:   MAC     Note:    Oropharynx: oropharynx clear of all foreign objects and spontaneously breathing  Level of Consciousness: drowsy  Oxygen Supplementation: room air    Independent Airway: airway patency satisfactory and stable  Dentition: dentition unchanged  Vital Signs Stable: post-procedure vital signs reviewed and stable  Report to RN Given: handoff report given  Patient transferred to: Medical/Surgical Unit  Comments: Report called to floor RN prior to transport, ensured that 1:1 sitter would be immediately available to stay with patient upon arrival.  Handoff Report: Identifed the Patient, Identified the Reponsible Provider, Reviewed the pertinent medical history, Discussed the surgical course, Reviewed Intra-OP anesthesia mangement and issues during anesthesia, Set expectations for post-procedure period and Allowed opportunity for questions and acknowledgement of understanding      Vitals:  Vitals Value Taken Time   BP 90/54    Temp 97    Pulse 107    Resp 16    SpO2 93%        Electronically Signed By: ELVIS Avendaño CRNA  October 14, 2021  1:17 PM

## 2021-10-14 NOTE — H&P
Preop H&P:    Please see consult note and progress notes.  No significant change.    Abdominal pain and CT showing edema at the EG junction.    Exam: Airway intact    Chest clear to auscultation    Cardiovascular exam shows a regular rate and rhythm    Patient is not able to give her consent but the son has consented.    There is nothing on history or physical to preclude this exam    Anesthesia risk 3    EGD will be performed

## 2021-10-14 NOTE — PROGRESS NOTES
Pt's Zoroastrianism henrique is important. She identifies as Jewish and considers God much bigger than denominations. She appears in good spirits. Informal prayer is part of her spiritual practice, sending thoughts to God; sometimes feeling a response, sometimes not.    Writer offered support through conversation and prayer.    Spiritual Care is available as requested or needed.    SANDRO Fish.

## 2021-10-14 NOTE — SIGNIFICANT EVENT
Patient woke up very lethargically and was trying to get to edge of bed, was pulling blankets off and moving to side of bed and PICC got caught in blankets and pulled out, site is clean dry and intact no bleeding

## 2021-10-14 NOTE — PROGRESS NOTES
Diabetes Care Screen Note  Situation:  Diabetes blood glucose screen    Background:  Noted blood sugars remain elevated above hospital goal of < 180 for improved outcomes.  Home PTA diabetes meds:  Novolog Mix 70/30 30 units in am, 15 units in pm  Current Inpatient diabetes meds:  5 units of Lantus in am due to low BG  Novolog correction scale: 1/50 > 140  Values;  A1C: 9.0 9/14           GFR:45 (decreased from yesterday)       BMI:28.47       Intake documented %    Assessment: will need mealtime insulin as eating now and insulin dose adjustment based on BG pattern    Recommendations:    Per the Diabetes Care in the Hospital: Standards of Medical Care in Diabetes--2021:  An insulin regimen with basal, nutritional, and correction components is the preferred treatment for non critically ill hospitalized patient with good nutritional intake.  If oral intake is poor, a safe procedure is to administer Novolog immediately after the patient eats and to carb count to cover the amount ingested.    Hospital BG goals < 180 for improved outcomes. There is impaired immune function at BG levels above 180 mg/dl.    Thanks.     Lou Goldstein RN, Certified Diabetes Care and   [address] [address]  [email or web address]   Office: [phone]  Pager: [phone]

## 2021-10-14 NOTE — PLAN OF CARE
"  Problem: Risk for Delirium  Goal: Improved Behavioral Control  Outcome: No Change     Confused and restless this am, kept asking for water and when explained she couldn't have any she would start screaming \"HELP\", sat up in chair most of morning until she left for surgery,  was able to transfer with a max assist of 1, ambulated to cart with assist of 2, very restless and agitated in pre op area, frequently trying to get off cart and grabbing at items, was swinging items trying to hit staff, then was apologetic, when it was removed from her.     Now back in room and very sedated, difficult to arouse, sats 95% RA  "

## 2021-10-14 NOTE — PLAN OF CARE
Problem: Dysrhythmia  Goal: Normalized Cardiac Rhythm  Outcome: No Change   She has continued in tachycardia 110s.  She has been so fidgety/agitated that it was difficult to interpret some of her rhythms.  It looked like a-fib in the beginning, tho charge RN was saying sinus tach with frequent PACs.  Later this morning it appeared to be junctional tachycardia.  She had scheduled metoprolol at 04 with no change in heart rate.  Problem: Risk for Delirium  Goal: Optimal Coping  Outcome: Declining   She has been agitated most of the night.  She had seraquel, IV dilaudid, and a sleeping pill around 9pm with no effect.  I gave prn haldol at 0025 with no effect, and then 0.2 of dilaudid at 0225 for leg pain.  Problem: Risk for Delirium  Goal: Improved Sleep  Outcome: Declining     She slept for about 30 minutes at 04.  I gave another dose of dilaudid at 0630, as she said her legs still hurt.

## 2021-10-14 NOTE — PROGRESS NOTES
Care Management Follow Up    Length of Stay (days): 3    Expected Discharge Date: [id number]     Concerns to be Addressed: medical progression, confused         Patient plan of care discussed at interdisciplinary rounds: Yes    Anticipated Discharge Disposition:  TCU     Anticipated Discharge Services:  TCU    Anticipated Discharge DME: Unknown     Patient/family educated on Medicare website which has current facility and service quality ratings:      Education Provided on the Discharge Plan:     Patient/Family in Agreement with the Plan: Yes     Referrals Placed by CM/SW: Referral sent TCU     Private pay costs discussed:     Additional Information:      FLAVIO REYES spoke with both son Sarbjit Rodríguez and both are agreeable to go discharge TCU. PT recommendation is TCU. Pt family gave couple TCU referral and SW CM sent TCU referral. Pt son to transport if able other wise [facility]  Wheel chair transport her at discharge. PAS need. CM to continue following.       JACKIE Mancia

## 2021-10-14 NOTE — PROGRESS NOTES
CLINICAL NUTRITION SERVICES - ASSESSMENT NOTE     Nutrition Prescription    RECOMMENDATIONS FOR MDs/PROVIDERS TO ORDER:  None    Malnutrition Status:    Not noted    Recommendations already ordered by Registered Dietitian (RD):  none    Future/Additional Recommendations:  When diet advances Besides IDDSI 5, 2 recommend   Consistent CHO (60 g CHO with each meal) and low  Saturated fat with < 2400 mg Na     REASON FOR ASSESSMENT  Bernadette Yu is a/an 81 year old female assessed by the dietitian for Admission Nutrition Risk Screen for positive-unsure about weight loss.     Pt was admitted with melena and increased confusion.  She has a past medical history of mitral valve repair, CAD s/p CABG, heart failure, hypertension, paroxysmal A fib, PVD, hypothyroidism, normocytic anemia, CKD 3, hyperlipidemia, anxiety, DM 2 on insulin, chronic pain syndrome (on chronic narcotics).  Presents 3 weeks after toe surgery.    NUTRITION HISTORY  Spoke with pt's son.  He states pt had a great appetite PTA.  He does not think she has lost any weight and has maybe gained some weight.  She follows a diabetic diet at home (avoids too much sugar)    CURRENT NUTRITION ORDERS  Diet: NPO  SLP recommended IDDSI 5, 2 (minced/moist with mildly thick liquids) for when diet advances,  RD also Recommends consistent CHO-60 g CHO per meal, low saturated fat < 2400 mg Na     LABS  Labs reviewed: Glu 310, Cr 1.14    MEDICATIONS  Medications reviewed    ANTHROPOMETRICS  Height: 5'  Most Recent Weight: 66.1 kg (145 lb 12.8 oz)    IBW: 45.45 kg (100 lb)  BMI: Overweight BMI 25-29.9  Weight History:   Wt Readings from Last 10 Encounters:   10/13/21 66.1 kg (145 lb 12.8 oz)   09/28/21 67.8 kg (149 lb 6.4 oz)   09/25/21 67.8 kg (149 lb 6.4 oz)   09/14/21 63.5 kg (140 lb)   09/02/21 63.5 kg (140 lb)   08/31/21 67.5 kg (148 lb 14.2 oz)   08/24/21 67.1 kg (148 lb)   07/05/21 76.2 kg (168 lb)   05/06/21 66.7 kg (147 lb)   03/04/21 68.9 kg (152 lb)   weight  down 4 lb in the past 2 weeks (2.7%)    Dosing Weight: 50.6 kg/ adjusted weight    ASSESSED NUTRITION NEEDS  Estimated Energy Needs: 0272-5742 kcals/day (25 - 30 kcals/kg)  Justification: Maintenance  Estimated Protein Needs: 41-51 grams protein/day (0.8 - 1 grams of pro/kg)  Justification: CKD  Estimated Fluid Needs: 9406-8247 mL/day (25 - 30 mL/kg)   Justification: Maintenance    PHYSICAL FINDINGS  See malnutrition section below.    MALNUTRITION  % Intake: No decreased intake noted  % Weight Loss: Weight loss does not meet criteria  Subcutaneous Fat Loss: Unable to assess-pt with agitation and confusion  Muscle Loss: Unable to assess-pt with agitation and confusion  Fluid Accumulation/Edema: Does not meet criteria  Malnutrition Diagnosis: Patient does not meet two of the established criteria necessary for diagnosing malnutrition    NUTRITION DIAGNOSIS  Altered nutrition-related laboratory values related to DM as evidenced by elevated blood glucose      INTERVENTIONS  Follow for diet advancement and po intake    Goals  Diet advancement vs nutrition support within 2-3 days.  Patient to consume % of nutritionally adequate meals three times per day, or the equivalent with supplements/snacks.     Monitoring/Evaluation  Progress toward goals will be monitored and evaluated per protocol.

## 2021-10-15 NOTE — PLAN OF CARE
Problem: Risk for Delirium  Goal: Improved Attention and Thought Clarity  Outcome: No Change  Goal: Improved Sleep  Outcome: No Change     Pt continues to be restless, pulling at lines and telemetry wires. Upon initial assessment, pt had no IV access; found SL in trash although it was not reported to this RN that pt had pulled it out on previous shift. New SL placed. Pt did not sleep this shift despite Melatonin 1mg PO given. Pt is confused, oriented to self and situation at times; continues on 1:1 sitter.    Problem: Anemia  Goal: Anemia Symptom Improvement  Outcome: Improving     Problem: Bleeding (Gastrointestinal Bleeding)  Goal: Hemostasis  Outcome: Improving     No stools this shift. No Hgb checked this AM. Latest Hgb 8.4 drawn yesterday morning.     Problem: Dysrhythmia  Goal: Normalized Cardiac Rhythm  Outcome: Improving     Pt appears to continue to be in junctional tachycardia mostly 112-113 this shift. Metoprolol 2.5mg IV PRN given per PRN Hr>110 without effect. HR remains tachycardic.

## 2021-10-15 NOTE — CONSULTS
[facility]  Palliative Care Consultation Note    Patient: Bernadette Yu  Date of Admission:  10/11/2021    Requesting Clinician / Team: Velia  Reason for consult: Goals of care         Recommendations       1)   GOALS OF CARE are .   ? Restorative  ? I will meet with Riley Monday morning      2)    ADVANCED CARE PLANNING  ? Patient has completed health care directive: N  ? Surrogate/Documented health care agent: Sarbjit is single he is main contact and brother Riley is also a contact  ? Code Status:Full Code      3)    SYMPTOM MANAGEMENT    ? Pain- all over body, she has been on Dilaudid 2 mg 4-5 pills a day for 15 years  ? Comment:  Today pain is in abdomen  ? Recommendations   Dilaudid 2 mg BID to avoid withdrawal  ?   ? Agitation related to potential withdrawal and dementia:   ? Comment:   ? Recommendations:   ? Haldol ordered PRN  ? Do not use ativan since she is on Opioids  ? She has not done well with Seroquel in the past. Made symptoms worse. I discontinued it       Generalized weakness, and worsening walking which is likely related to her underlying blood loss and A. Fib.   o Comment:  o Recommendations:  o PT when able  o     AMS due to Probable Toxic metabolic encephalopahty (combination of medications, anemia, hyperglycemia, and possible respiratory component)   o Comment:Neuro suspect much of her encephalopathy is actually related to agitation in the evening (sundowning).  o Recommendations:    Avoid benzodiazepines, antihistamines, anticholinergics if able.    Lights on and blinds open during the day.  Reorient frequently.    Lights & TV off during the night.  Promote normal circadian rhythm.    Limit sensory deprivation - utilize hearing aids, glasses, etc.     Frequently assess unmet bathroom needs if applicable.    ?       4)    PSYCHOSOCIAL/SPIRITUAL SUPPORT  ? Will request spiritual care support    ? Will continue with palliative social work support  ? Palliative medicine will  continue to follow    These recommendations will be discussed with Dr. Gunn      Thank you for the opportunity to participate in the care of this patient and family. Our team: will continue to follow.     During regular M-F work hours -- if you are not sure who specifically to contact -- please contact us by calling us directly at the Palliative Care Main Line [phone]    After regular work hours and on weekends/holidays, you can leave a message at [phone]         Assessments/HPI        Bernadette Yu is a 81 year old female with PMH of mitral valve repair,aortic stenosis, severe cognitive impairment,  CKD 3, CAD s/p CABG, heart failure, hypertension, paroxysmal atrial fibrillation on Warfarin, PVD, hypothyroidism, normocytic anemia, hyperlipidemia, anxiety, T2DM on insulin, chronic pain syndrome (on chronic narcotics for many years) that presents 3 weeks after toe surgery with reports of patient being more confused and complaining of multiple different pains such as hemorrhoid pain and melena.  Recent toe amputation that appears to be healing well with no gross s/s of active infection and concern for confusion from family.     Per Neuro: severe vascular dementia based on family report over at least the last 2 years     Recent Hospitalizations:  Last inpatient 1 year ago         Prognosis, Goals, & Planning:           Functional Status just prior to hospitalization: 3 (Capable of only limited self-care; needs help with ADLs; in bed/chair >50% of waking hours)      Prognosis, Goals, and/or Advance Care Planning were addressed today: Yes        Summary/Comments:  Discussed with Riley      Patient's decision making preferences: unable to assess          Patient has decision-making capacity today for complex decisions: No            I have concerns about the patient/family's health literacy today: No to family, yes to patient           Patient has a completed Health Care Directive: Reportedly yes, but not  "available to us currently.      Code status: Full Code      Goals of Care Discussion  Daughter in law states that she has been taking Dilaudid 2.0 mg  Four to six times a day (it was ordered 4x PRN or 6 x prn for severe pain).  They are looking for memory care for both parents to go live.          Code status Discussion    Code status Discussion  I explained to daughter in law and her  Riley  That they  can influence her  future health care now by making certain choices regarding future procedures, one being code status..I explained the urgent nature of CPR and that this decision to not even start it need to be made ahead of time as the default is to do everything.  I explained that I am not currently concerned about her heart as this discussion today is \"What If\"  I explained that if CPR is needed, at the time it is needed I.e when heart stops) then it is an urgent matter and too short of time to obtain her opinion then. . I indicated given her  small chest frame, and potential cancer , CPR may cause her more harm than good and based on the amount of time it may take to restart his heart,, she may end up with a significant change of medical and functional status  due to lack of blood flow to vital organs during CPR.  I also explained that choosing DNR does not mean nothing is done. She can still have treatment of cardiac arrhythmias or low BP as medications for that will not cause more harm than good.      Hospice discussion    I also discussed  Hospice services, the focus of care (comfort), care delivery (short  Visits by certified  providers (Hospice team) and where services are held (persons home, LTC or ASL hospice house   (latter two are private pay for room and board) and qualifying Dx.     Coping, Meaning, & Spirituality:         Mood, coping, and/or meaning in the context of serious illness were addressed today: Yes        Social:      Home setting:Lives at home with her . Lives on main floor.  "  had dementia too  Support  at Home:She is now being cared for nearly 24 hours a day by 2  of her children.  They manage and give her all of her medications.   Prior Function:walker when she remembers, falls a lot  Family:3 children Riley, Alirio  ?3rd one  Work Status:retured  Smoking: not a current smoker       ROS        [facility] Symptom Data:    # Pain severity the last 12 hours: severe  # Nausea severity the last 12 hours: low  # Anxiety severity the last 12 hours: moderate    Patient is on opioids: assessed and bowels ok/no needed changes to plan of care today.    Comprehensive ROS is reviewed and is negative except as here & per HPI:      Past Medical History:  Past Medical History:   Diagnosis Date     Abdominal bloating 8/21/2016     Anticoagulation goal of INR 2.5 to 3.5 1/27/2016     Anxiety      Aortic stenosis 10/26/2019     ASCVD (arteriosclerotic cardiovascular disease)      Atherosclerosis of native coronary artery without angina pectoris 10/26/2019     Bleeding diathesis (H)      Carotid artery stenosis, left      CHF (congestive heart failure) (H)      CKD (chronic kidney disease) stage 3, GFR 30-59 ml/min (H)      DM (diabetes mellitus), type 2 (H) 1990     Eczema      Former smoker      Full code status      History of metabolic encephalopathy with delirium 9/28/2021     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     Moderate tricuspid insufficiency 8/28/2021     MRSA (methicillin resistant staph aureus) culture positive 2/9/2017      Normocytic anemia      PAD (peripheral artery disease) (H)     [facility]   DATE: 10/26/2019 4:15 PM   EXAM:  DUPLEX ARTERIAL ULTRASOUND OF THE LOWER EXTREMITIES BILATERALLY   INDICATION: Leg pain, vasculopathy.   COMPARISON: None.   TECHNIQUE: Duplex imaging is performed utilizing gray-scale, two-dimensional images, and color-flow imaging. Doppler waveform analysis and spectral Doppler imaging is also performed.   DUPLEX ARTERIAL ULTRASOUND FINDIN     Paroxysmal atrial fibrillation (H) 2015    Bilateral pulmonary vein isolation with AtriCure Synergy (bipolar radiofrequency ablation) device, exclusion of the left atrial appendage with the AtriCure AtriClip device.       Paroxysmal atrial fibrillation with RVR (H) 2021     PN (peripheral neuropathy)      Psoriasis      PVD (peripheral vascular disease) (H) 2019    Bilateral lower extremities.  See ultrasound 2019.     Recurrent UTI (urinary tract infection)      RLS (restless legs syndrome)      S/P CABG (coronary artery bypass graft) 2016     S/P colonoscopy 07     S/P MVR (mitral valve replacement) 2015     Subclavian artery stenosis, left (H) 2015    Stented in .      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     Ulcer of heel and midfoot, left, with unspecified severity (H)         Past Surgical History:  Past Surgical History:   Procedure Laterality Date     AMPUTATE TOE(S) Left 2021    Procedure: AMPUTATION, second ray left foot;  Surgeon: Nba Cleveland DPM;  Location: [facility]     APPENDECTOMY       BYPASS GRAFT ARTERY CORONARY       CARDIAC CATHETERIZATION  11/12/15      SECTION       CHOLECYSTECTOMY       COLONOSCOPY N/A 10/12/2016    Procedure: COLONOSCOPY;  Surgeon: Santiago Duran MD;  Location: [facility] GI;  Service:      COLONOSCOPY N/A 10/13/2016    Procedure: COLONOSCOPY with polypectomy & rectum biopsies;  Surgeon: Santiago Duran  MD;  Location: [facility];  Service:      COLONOSCOPY N/A 12/3/2017    Procedure: COLONOSCOPY with multiple polyp removal using hot snare and mansfield net and biopsy forcep;  Surgeon: Marcelo Wade MD;  Location: [facility];  Service:      COLONOSCOPY N/A 3/13/2018    Procedure: COLONOSCOPY; POLYPECTOMY;  Surgeon: Marcelo Wade MD;  Location: [facility] OR;  Service:      EGD  10/14/2021     ESOPHAGOSCOPY, GASTROSCOPY, DUODENOSCOPY (EGD), COMBINED N/A 10/12/2016    Procedure: ESOPHAGOGASTRODUODENOSCOPY with biopsies;  Surgeon: Santiago Druan MD;  Location: [facility];  Service:      ESOPHAGOSCOPY, GASTROSCOPY, DUODENOSCOPY (EGD), COMBINED N/A 12/3/2017    Procedure: ESOPHAGOGASTRODUODENOSCOPY (EGD);  Surgeon: Marcelo Wade MD;  Location: [facility];  Service:      PICC TRIPLE LUMEN PLACEMENT  10/12/2021          TONSILLECTOMY & ADENOIDECTOMY       UPPER GASTROINTESTINAL ENDOSCOPY           Family History:  Family History   Problem Relation Age of Onset     Colon Cancer Father      Diabetes Father      Hypertension Mother      Heart Disease Mother          congestive heart failure     Arthritis Mother      Diabetes Sister      Heart Disease Brother      Rectal Cancer Son      Colon Cancer Son          Allergies:  Allergies   Allergen Reactions     Amiodarone Other (See Comments)     TORSADES DE POINTES     Aspirin Other (See Comments)     Recurrent severe gastrointestinal bleed.        Medications:  I have reviewed this patient's medication profile and medications from this hospitalization.            Physical Exam:     GENERAL: laying in bed, very restless, stating she has horrible belly pain, trying to crawl out of bed, restless    SKIN: Warm and dry   HEENT: Normocephalic, anicteric sclera, moist mucous membranes  LUNGS: Clear to auscultation anterolaterally; non-labored, on O2 by RA  CARDIAC: RRR, normal s1/s2, w/o m/r/g   ABDOMINAL: BS (+), large, soft, non distended, non  tender  : koch in place  MUSKL: no gross joint deformities   EXTREMITIES: redness on r shin, 2nd toe amp on left foot pulses 2+ and symmetrical  NEUROLOGIC:Alert    Poor attention.  Oriented to person, and hospital but not month or year.  She is able to name simple objects.  She is not able to follow two-step cross commands.  She is able to follow simple commands.  Simple repetition intact.    PSYCH: Euthymic      Vital Signs: Temp: 97.3  F (36.3  C) Temp src: Axillary BP: 95/56 Pulse: 115   Resp: 20 SpO2: 95 % O2 Device: None (Room air)    Weight: 145 lbs 8 oz     Data Reviewed:      Recent imaging reviewed, my comments on pertinents:   EF 50% severe tricuspid and mod pulm htn  Qtc 489       Recent lab data reviewed, my comments on pertinents:     GFR 45  Hgb 7/1  Albumin 2.0        ====================================================  TT: I have personally spent a total of 85  minutes on the unit in review of medical record, consultation with the medical providers and assessment of patient today, with more than 50% of this time spent in counseling, coordination of care, and discussion with son and DIL on phone re: diagnostic results, prognosis, symptom management, risks and benefits of management options, and development of plan of care as noted above.  ====================================================    Vero Godinez, ELVIS, NP-C, ACHPN   [facility]  Palliative Medicine  Office: [phone]

## 2021-10-15 NOTE — PHARMACY-ANTICOAGULATION SERVICE
Clinical Pharmacy - Warfarin Dosing Consult     Pharmacy has been consulted to manage this patient s warfarin therapy.  Indication: Atrial Fibrillation;Mechanical Mitral Valve Replacement  Therapy Goal: INR 2.5-3.5  Provider/Team: Hospitalist/  Warfarin Prior to Admission: Yes  Warfarin PTA Regimen: warfarin 5mg daily  Recent documented change in oral intake/nutrition: Unknown  Dose Comments: INR was 4.2 on admission. Vitamin K was given. The INR is now rising despite no doses being given. However it is at the lower end of the goal range 2.5-3.5, so will give a dose tonight and monitor. INR ordered for tomorrow    INR   Date Value Ref Range Status   10/15/2021 2.52 (H) 0.85 - 1.15 Final   10/12/2021 1.90 (H) 0.90 - 1.15 Final       Recommend warfarin 4 mg today.  Pharmacy will monitor Bernadette Yu daily and order warfarin doses to achieve specified goal.      Please contact pharmacy as soon as possible if the warfarin needs to be held for a procedure or if the warfarin goals change.

## 2021-10-15 NOTE — PLAN OF CARE
Problem: Adult Inpatient Plan of Care  Goal: Absence of Hospital-Acquired Illness or Injury  Intervention: Identify and Manage Fall Risk     Problem: Risk for Delirium  Goal: Optimal Coping  Outcome: Improving     Problem: Dysrhythmia  Goal: Normalized Cardiac Rhythm  Outcome: Improving     Tele Rhythm- Junctional Tachycardia, rates 100-110's. Has scheduled Metoprolol. IV Sodium Chloride stopped per parameter. Drowsy beginning of shift, awake and up in the chair for meals. Pt is agitated and pleasantly confused but can be redirected. PRN Tylenol for leg pain, helpful. BG at HS-236, sliding scale insulin given. No Bowel movement.

## 2021-10-15 NOTE — PLAN OF CARE
Problem: Adult Inpatient Plan of Care  Goal: Absence of Hospital-Acquired Illness or Injury  10/15/2021 1556 by Susanne Valentine RN  Outcome: Improving     Problem: Risk for Delirium  Goal: Improved Behavioral Control  10/15/2021 1556 by Susanne Valentine RN  Outcome: Improving  10/15/2021 1555 by Susanne Valentine RN  Outcome: No Change     Problem: Dysrhythmia  Goal: Normalized Cardiac Rhythm  10/15/2021 1556 by Susanne Valentine RN  Outcome: Improving  10/15/2021 1555 by Susanne Valentine RN  Outcome: No Change   VS stable. She continuous junctional  tachycardia, denies any  chest  pain and SOB.  During eating  breakfast, pt started coughing and had  emesis unadjusted food. Ml/o of generalized  pain.also she was shallow breathing. Oxygen saturation was 95 to 96% on RA.  MD updated. Pt has been yelling out and trying jump from the bed,  and attempting pulling tube noted. . Prn IV dilaudid and sequel given and was effective.    Her son was at bed side and updated her status.

## 2021-10-15 NOTE — PROGRESS NOTES
"[facility] Progress Note       SUBJECTIVE:  Patient has been having \"vomiting\" all morning per RN. Upon further questioning she has been coughing with eating and seems to have undigested food coming up from her esophagus. Had BM earlier today that was brown. Patient does not respond to my questions.       OBJECTIVE:  BP 95/56 (BP Location: Left arm)   Pulse 115   Temp 97.3  F (36.3  C) (Axillary)   Resp 20   Wt 66 kg (145 lb 8 oz)   SpO2 95%   BMI 28.42 kg/m    Temp (24hrs), Av.1  F (36.7  C), Min:97.9  F (36.6  C), Max:98.4  F (36.9  C)    Patient Vitals for the past 72 hrs:   Weight   10/15/21 0318 66 kg (145 lb 8 oz)   10/13/21 0630 66.1 kg (145 lb 12.8 oz)       Intake/Output Summary (Last 24 hours) at 10/13/2021 1443  Last data filed at 10/12/2021 1734  Gross per 24 hour   Intake 345 ml   Output --   Net 345 ml        PHYSICAL EXAM  GEN: NAD, elderly female sitting on edge of bed  HRT: no LE edema  RESP: unlabored  ABD: soft, did not respond to palpation  SKIN: No rash or jaundice      Additional Data:  I have reviewed the patient's new clinical lab results:     Recent Labs   Lab Test 10/15/21  1141 10/14/21  0556 10/13/21  0522 10/12/21  1146 10/12/21  0621 10/12/21  0621 10/11/21  2000 10/11/21  1422   WBC  --  7.9 10.3  --   --  9.5  --    < >   HGB  --  8.4* 8.5* 8.1*   < > 7.6*  --    < >   MCV  --  93 94  --   --  90  --    < >   PLT  --  154 154  --   --  139*  --    < >   INR 2.52*  --   --   --   --  1.90* 4.20*  --     < > = values in this interval not displayed.     Recent Labs   Lab Test 10/15/21  0440 10/14/21  0556 10/13/21  1846 10/13/21  1314 10/13/21  0522 10/12/21  2029 10/12/21  2029   POTASSIUM 4.4 4.5 3.7   < > 3.1*  3.1*   < > 3.0*   CHLORIDE  --  107  --   --  109*  --  108*   CO2  --  24  --   --  25  --  25   BUN  --  19  --   --  19  --  22   ANIONGAP  --  6  --   --  9  --  12    < > = values in this interval not displayed.     Recent Labs   Lab Test " 10/13/21  0522 10/12/21  2029 10/12/21  0621 10/11/21  1556 09/23/21  0633 09/19/21  1955 12/29/20  1723 11/23/20  2349 11/23/20  2236 10/25/19  1756   ALBUMIN 2.9* 2.9* 2.7*  --    < >  --    < > 4.4  --    < >   BILITOTAL 1.0 1.2* 1.0  --    < >  --    < > 0.8  --    < >   ALT 19 16 14  --    < >  --    < > 24  --    < >   AST 29 33 23  --    < >  --    < > 30  --    < >   PROTEIN  --   --   --  10 *  --  Negative  --   --  300 mg/dL*  --    LIPASE  --   --   --   --   --   --   --  13  --   --     < > = values in this interval not displayed.     C diff 10/13/21: negative    Imaging results:  CT head w/o contrast 10/13/21:  IMPRESSION:  1.  Motion degraded exam.  2.  No acute intracranial process.  3.  Chronic right maxillary sinusitis.    CTA abdomen/pelvis 10/11/21:  IMPRESSION:  1.  Focal area of increased density involving the EG junction with the EG junction dilatation measuring 2.3 x 1.6 cm on noncontrast imaging. This does not change in its appearance on arterial phase imaging or portal venous phase imaging and may be the result of retained contrast material within the distal esophagus or retained food material. Another consideration would be postoperative change. Given the lack of change in overall appearance on postcontrast imaging, this is unlikely to be due to localized bleeding. An underlying high density mass cannot be excluded.  2.  No evidence for arterial contrast extravasation or active bleeding within the lumen of the small bowel, colon, stomach or distal esophagus above the level of the aforementioned high density mass. Evaluation of the colon is somewhat limited due to a   large amount of stool throughout the colon and a severe amount of stool in the rectal vault.  3.  No evidence for inflammatory change to the bowel.  4.  Atherosclerotic vascular calcification with significant vascular calcification origin left renal artery. Chronic occlusion of the left SFA proximally.   5.  Volume  overload.    CT chest PE study 10/11/21:  IMPRESSION:  1.  Allowing for significant respiratory motion artifact, there is no evidence for pulmonary embolism.   2.  Evidence for CHF/volume overload with right-sided pleural fluid collection with interlobular septal thickening in mosaic attenuation of the pulmonary parenchyma with cardiac enlargement.  3.  Normal caliber aorta without dissection. Advanced atherosclerotic vascular calcification including severe vascular calcification at the origin of the left subclavian artery with indwelling stent. Severe vascular calcification at the origin left renal   artery. Recommend correlation for renovascular hypertension.    Procedure results:  EGD 10/14/21 (Rank):  Findings:        The examined esophagus was normal.        The entire examined stomach was normal. Biopsies for histology were        taken with a cold forceps for evaluation of celiac disease. This was        biopsied with a cold forceps for Helicobacter pylori testing.        The in the duodenum was normal.                                                                                     Moderate Sedation:        An independent trained observer was present and continuously monitored        the patient.   Impression: - Normal esophagus.                        - Normal stomach. Biopsied. (pending)                       - Normal.                        - Normal examination.   Recommendation:      - Resume previous diet.                        - Continue present medications.      IMPRESSION:  Melena  Abnormal CT esophagus  82 y/o female with PMH CAD s/p CABG, mitral valve repair, heart failure, HTN, a fib, PVD, hypothyroidism, CKD 3, HLD, type 2 diabetes on insulin, anxiety, anemia admitted 10/11 for melena and found to have supratherapeutic INR up to 4.20. Warfarin held but will restart today. CT showed abnormality of the GE junction. EGD recommended but deferred initially given mental status changes. EGD  "performed 10/14 and is negative for abnormality, gastric biopsy results pending. She has been having frequent BMs, no more melena, C diff negative. Hemoglobin drifted down but is stable. New today is \"vomiting,\" which seems to be more transfer dysphagia versus potential esophageal dysphagia. EGD negative for stricture or cause but dysmotility possible. Would consider repeat swallow evaluation given change since this was done 10/13.    PLAN:  - Diet per SLP, consider repeat evaluation given changes today  - Continue IV PPI  - Await EGD biopsies      (Dr. Mcghee)  Christine Guido PA-C  [facility]  10/15/2021 3:07 PM  [phone] (office)  ________________________________________________________________________    "

## 2021-10-15 NOTE — CONSULTS
Cardiology Consult Note    Thank you, Dr. Gunn, for asking the [facility] team to see Bernadette Yu in consultation at [facility] to evaluate Atrial fibrillation.      Assessment:   1. Tachycardia, h/o Atrial fibrillation  -Prior to admission she was on diltiazem 180 mg daily and metoprolol tartrate 50 mg BID for her paroxysmal atrial fibrillation.  It is noted that she may not have been taking her metoprolol for at last three days prior to admission.  On admission, rates were in the 120s and now in the 110s. On reviewing tele and ECG (10/11), patient's rhythm not clear if sinus tachycardia vs accelerated junctional rhythm vs 2:1 atrial flutter.  To better identify rhythm and presence of p waves, gave one time dose of IV adenosine 6 mg.  Patient was moving around so still hard to identify her rhythm and presence/absence of p waves. Her metoprolol was increased today by primary team to 75 mg BID. We will Increase diltiazem to 240 mg daily starting tomorrow.    2. H/o diastolic CHF- ECHO on 7/22/21 showed EF of 50-55%, moderate-severe tricuspid regurg, moderate to severe aortic valve stenosis. BNP on admission was 523. Mild pulmonary edema was noted on CXR on admission. She received 2 doses of IV lasix since admission.  She is noted by primary team to appear clinically dry now.   3. Melena, Acute blood loss anemia - h/o chronic anemia, GI and primary team managing      Plan:   1. One time IV adenosine 6 mg   2. Increase diltiazem ER to 240 mg daily    Primary cardiologist: Dr. Camacho     Current History:   Ms. Bernadette Yu is a 81 year old female with past medical history of mitral valve repair (2015), CAD s/p CABG, heart failure, hypertension, paroxysmal atrial fibrillation on warfarin, PVD, hypothyroidism, normocytic anemia, CKD 3, and T2DM on insuline who presented to the hospital with increasing confusion and melena.     Unable to get history from patient due to her metal  status. Per chart review, Patient has had no cardiac complaints this admission.Prior to admission she was on diltiazem 180 mg daily and metoprolol tartrate 50 mg BID for her paroxysmal atrial fibrillation as well as warfarin for anticoagulation.   Past Medical History:     Past Medical History:   Diagnosis Date     Abdominal bloating 8/21/2016     Anticoagulation goal of INR 2.5 to 3.5 1/27/2016     Anxiety      Aortic stenosis 10/26/2019     ASCVD (arteriosclerotic cardiovascular disease)      Atherosclerosis of native coronary artery without angina pectoris 10/26/2019     Bleeding diathesis (H)      Carotid artery stenosis, left      CHF (congestive heart failure) (H)      CKD (chronic kidney disease) stage 3, GFR 30-59 ml/min (H)      DM (diabetes mellitus), type 2 (H) 1990     Eczema      Former smoker      Full code status      History of metabolic encephalopathy with delirium 9/28/2021     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     Moderate tricuspid insufficiency 8/28/2021     MRSA (methicillin resistant staph aureus) culture positive 2/9/2017     Normocytic anemia      PAD (peripheral artery disease) (H)     [facility]   DATE: 10/26/2019 4:15 PM   EXAM:  DUPLEX ARTERIAL ULTRASOUND OF THE LOWER EXTREMITIES BILATERALLY   INDICATION: Leg pain, vasculopathy.   COMPARISON: None.   TECHNIQUE: Duplex imaging is performed utilizing gray-scale, two-dimensional images, and color-flow imaging. Doppler waveform analysis and spectral Doppler imaging  is also performed.   DUPLEX ARTERIAL ULTRASOUND FINDIN     Paroxysmal atrial fibrillation (H) 2015    Bilateral pulmonary vein isolation with AtriCure Synergy (bipolar radiofrequency ablation) device, exclusion of the left atrial appendage with the AtriCure AtriClip device.       Paroxysmal atrial fibrillation with RVR (H) 2021     PN (peripheral neuropathy)      Psoriasis      PVD (peripheral vascular disease) (H) 2019    Bilateral lower extremities.  See ultrasound 2019.     Recurrent UTI (urinary tract infection)      RLS (restless legs syndrome)      S/P CABG (coronary artery bypass graft) 2016     S/P colonoscopy 07     S/P MVR (mitral valve replacement) 2015     Subclavian artery stenosis, left (H) 2015    Stented in .      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     Ulcer of heel and midfoot, left, with unspecified severity (H)        Past Surgical History:     Past Surgical History:   Procedure Laterality Date     AMPUTATE TOE(S) Left 2021    Procedure: AMPUTATION, second ray left foot;  Surgeon: Nba Cleveland DPM;  Location: [facility] [place]     APPENDECTOMY       BYPASS GRAFT ARTERY CORONARY       CARDIAC CATHETERIZATION  11/12/15      SECTION       CHOLECYSTECTOMY       COLONOSCOPY N/A 10/12/2016    Procedure: COLONOSCOPY;  Surgeon: Santiago Duran MD;  Location: [facility];  Service:      COLONOSCOPY N/A 10/13/2016    Procedure: COLONOSCOPY with polypectomy & rectum biopsies;  Surgeon: Santiago Duran MD;  Location: [facility];  Service:      COLONOSCOPY N/A 12/3/2017    Procedure: COLONOSCOPY with multiple polyp removal using hot snare and mansfield net and biopsy forcep;  Surgeon: Marcelo Wade MD;  Location: [facility];  Service:      COLONOSCOPY N/A 3/13/2018    Procedure: COLONOSCOPY; POLYPECTOMY;  Surgeon: Marcelo Wade MD;  Location: [facility] OR;  Service:      EGD  10/14/2021      ESOPHAGOSCOPY, GASTROSCOPY, DUODENOSCOPY (EGD), COMBINED N/A 10/12/2016    Procedure: ESOPHAGOGASTRODUODENOSCOPY with biopsies;  Surgeon: Santiago Duran MD;  Location: [facility];  Service:      ESOPHAGOSCOPY, GASTROSCOPY, DUODENOSCOPY (EGD), COMBINED N/A 12/3/2017    Procedure: ESOPHAGOGASTRODUODENOSCOPY (EGD);  Surgeon: Marcelo Wade MD;  Location: [facility] GI;  Service:      ESOPHAGOSCOPY, GASTROSCOPY, DUODENOSCOPY (EGD), COMBINED N/A 10/14/2021    Procedure: ESOPHAGOGASTRODUODENOSCOPY WITH STOMACH BIOPSIES;  Surgeon: Neo Mcghee MD;  Location: [facility] OR     PICC TRIPLE LUMEN PLACEMENT  10/12/2021          TONSILLECTOMY & ADENOIDECTOMY       UPPER GASTROINTESTINAL ENDOSCOPY         Family History:     Family History   Problem Relation Age of Onset     Colon Cancer Father      Diabetes Father      Hypertension Mother      Heart Disease Mother          congestive heart failure     Arthritis Mother      Diabetes Sister      Heart Disease Brother      Rectal Cancer Son      Colon Cancer Son        Social History:    reports that she has quit smoking. Her smoking use included cigarettes. She quit after 23.00 years of use. She has never used smokeless tobacco. She reports that she does not drink alcohol and does not use drugs.    Meds:     Current Facility-Administered Medications:      acetaminophen (TYLENOL) tablet 325 mg, 325 mg, Oral, Q6H PRN, Chandler Erickson MD, 325 mg at 10/15/21 0824     glucose gel 15-30 g, 15-30 g, Oral, Q15 Min PRN **OR** dextrose 50 % injection 25-50 mL, 25-50 mL, Intravenous, Q15 Min PRN, 25 mL at 10/12/21 2026 **OR** glucagon injection 1 mg, 1 mg, Subcutaneous, Q15 Min PRN, Anitha Nick, DO     diltiazem ER COATED BEADS (CARDIZEM CD/CARTIA XT) 24 hr capsule 180 mg, 180 mg, Oral, Daily, Dennis Gunn MD, MD, 180 mg at 10/15/21 0825     [Held by provider] gabapentin (NEURONTIN) capsule 100 mg, 100 mg, Oral, At Bedtime, Jared Sales  MD GRANT     haloperidol lactate (HALDOL) injection 2 mg, 2 mg, Intravenous, Q6H PRN, Vero Godinez NP     HOLD: warfarin (COUMADIN) therapy, , Does not apply, HOLD, Anitha Nick DO     HYDROmorphone (DILAUDID) injection 0.1-0.2 mg, 0.1-0.2 mg, Intravenous, Q3H PRN, Anitha Nick DO, 0.2 mg at 10/15/21 1323     [Held by provider] HYDROmorphone (DILAUDID) tablet 2 mg, 2 mg, Oral, Q4H PRN, Jared Sales II, MD     influenza vac high-dose quad (FLUZONE HD) injection SLICK 0.7 mL, 0.7 mL, Intramuscular, Prior to discharge, Dennis Gunn MD, MD     insulin aspart (NovoLOG) injection (RAPID ACTING), 1-7 Units, Subcutaneous, TID AC, Dennis Gunn MD, MD, 2 Units at 10/15/21 1223     insulin aspart (NovoLOG) injection (RAPID ACTING), 1-5 Units, Subcutaneous, At Bedtime, Dennis Gunn MD, MD, 1 Units at 10/14/21 2138     insulin glargine (LANTUS PEN) injection 10 Units, 10 Units, Subcutaneous, QAM Velia ALDANA Xiaoguang, MD, MD, 10 Units at 10/15/21 0826     levothyroxine (SYNTHROID/LEVOTHROID) tablet 125 mcg, 125 mcg, Oral, Daily, Anitha Nick DO, 125 mcg at 10/15/21 1005     lidocaine (LMX4) cream, , Topical, Q1H PRN, Anil rBand MD     lidocaine (LMX4) cream, , Topical, Q1H PRN, Anitha Nick DO     lidocaine 1 % 0.1-1 mL, 0.1-1 mL, Other, Q1H PRN, Anitha Nick DO     lidocaine 1 % 0.1-5 mL, 0.1-5 mL, Other, Q1H PRN, Anil Brand MD, 1.5 mL at 10/12/21 2135     melatonin tablet 1 mg, 1 mg, Oral, At Bedtime PRN, Anitha Nick DO, 1 mg at 10/15/21 0049     melatonin tablet 3 mg, 3 mg, Oral, Q24H, Seamus Smith MD     metoprolol (LOPRESSOR) injection 2.5 mg, 2.5 mg, Intravenous, Q6H PRN, Chandler Erickson MD, 2.5 mg at 10/15/21 0601     metoprolol tartrate (LOPRESSOR) tablet 75 mg, 75 mg, Oral, BID, Dennis Gunn MD, MD     naloxone (NARCAN) injection 0.2 mg, 0.2 mg, Intravenous, Q2 Min PRN **OR** naloxone  (NARCAN) injection 0.4 mg, 0.4 mg, Intravenous, Q2 Min PRN, 0.4 mg at 10/12/21 2037 **OR** naloxone (NARCAN) injection 0.2 mg, 0.2 mg, Intramuscular, Q2 Min PRN **OR** naloxone (NARCAN) injection 0.4 mg, 0.4 mg, Intramuscular, Q2 Min PRN, Anitha Nick DO     pantoprazole (PROTONIX) IV push injection 40 mg, 40 mg, Intravenous, BID, Anitha Nick DO, 40 mg at 10/15/21 0827     prochlorperazine (COMPAZINE) injection 5 mg, 5 mg, Intravenous, Q6H PRN, 5 mg at 10/15/21 0933 **OR** prochlorperazine (COMPAZINE) tablet 5 mg, 5 mg, Oral, Q6H PRN **OR** prochlorperazine (COMPAZINE) suppository 12.5 mg, 12.5 mg, Rectal, Q12H PRN, Anitha Nick DO     sodium chloride (PF) 0.9% PF flush 10-40 mL, 10-40 mL, Intracatheter, Once PRN, Anil Brand MD     sodium chloride (PF) 0.9% PF flush 3 mL, 3 mL, Intracatheter, Q8H, Anitha Nick DO, 3 mL at 10/15/21 0827     sodium chloride (PF) 0.9% PF flush 3 mL, 3 mL, Intracatheter, q1 min prn, Anitha Nick DO     sodium chloride 0.9% infusion, , Intravenous, Continuous, Dennis Gunn MD, MD, Stopped at 10/14/21 1924     temazepam (RESTORIL) capsule 7.5 mg, 7.5 mg, Oral, At Bedtime, Anitha Nick DO, 7.5 mg at 10/14/21 2008     thiamine (B-1) tablet 100 mg, 100 mg, Oral, Daily, Seamus Smith MD, 100 mg at 10/15/21 1323     warfarin ANTICOAGULANT (COUMADIN) tablet 4 mg, 4 mg, Oral, ONCE at 18:00, Dennis Gunn MD, MD     Warfarin Therapy Reminder (Check START DATE - warfarin may be starting in the FUTURE), 1 each, Does not apply, Continuous PRN, Dennis Gunn MD, MD    diltiazem ER COATED BEADS  180 mg Oral Daily     [Held by provider] gabapentin  100 mg Oral At Bedtime     influenza vac high-dose quad  0.7 mL Intramuscular Prior to discharge     insulin aspart  1-7 Units Subcutaneous TID AC     insulin aspart  1-5 Units Subcutaneous At Bedtime     insulin glargine  10 Units Subcutaneous QAM AC      levothyroxine  125 mcg Oral Daily     melatonin  3 mg Oral Q24H     metoprolol tartrate  75 mg Oral BID     pantoprazole (PROTONIX) IV  40 mg Intravenous BID     sodium chloride (PF)  3 mL Intracatheter Q8H     temazepam  7.5 mg Oral At Bedtime     vitamin B1  100 mg Oral Daily     warfarin ANTICOAGULANT  4 mg Oral ONCE at 18:00       Allergies:   Amiodarone and Aspirin    Review of Systems:   A 12 point comprehensive review of systems was negative except as noted in the current history.    Objective:      Physical Exam  Body mass index is 28.42 kg/m .  BP 95/56 (BP Location: Left arm)   Pulse 115   Temp 97.3  F (36.3  C) (Axillary)   Resp 20   Wt 66 kg (145 lb 8 oz)   SpO2 95%   BMI 28.42 kg/m      General Appearance:   No acute distress, lying in bed   HEENT:  Atraumatic normocephalic    Neck: No JVD   Chest: Symmetric    Lungs:   Bilateral rhonchi    Cardiovascular:   Tachycardic, regular rhythm, normal s1 and s2, no murmur   Abdomen:  Soft, nontender   Extremities: No edema   Skin: Warm and well perfused    Neurologic: Disoriented              Cardiographics (personally reviewed)  ECG (10/11)-accelerated junctional rhythm     Imaging (personally reviewed)  CXR (10/15):  Prior sternotomy with heart valve prosthesis and left atrial occlusion device. Slightly more prominent in the right lung fields could represent early pulmonary vascular congestion. Left pulmonary vessels appear stable. No effusion. No focal     Lab Review (personally reviewed all results)  Recent Labs   Lab Test 07/23/20  1520   CHOL 228*   HDL 31*   LDL 86   TRIG 690*     Recent Labs   Lab Test 07/23/20  1520 11/11/15  0559 01/06/15  1615   LDL 86 96 79     Recent Labs   Lab Test 10/15/21  1219 10/15/21  0822 10/15/21  0440 10/14/21  0745 10/14/21  0556   NA  --   --   --   --  137   POTASSIUM  --   --  4.4   < > 4.5   CHLORIDE  --   --   --   --  107   CO2  --   --   --   --  24   *   < >  --    < > 310*   BUN  --   --   --    --  19   CR  --   --   --   --  1.14*   GFRESTIMATED  --   --   --   --  45*   DI  --   --   --   --  8.3*    < > = values in this interval not displayed.     Recent Labs   Lab Test 10/14/21  0556 10/13/21  0522 10/12/21  2029   CR 1.14* 0.87 0.87     Recent Labs   Lab Test 09/14/21  1717 05/06/21  1403 12/29/20  1723   A1C 9.0* 7.7* 7.0*          Recent Labs   Lab Test 10/14/21  0556   WBC 7.9   HGB 8.4*   HCT 29.1*   MCV 93        Recent Labs   Lab Test 10/14/21  0556 10/13/21  0522 10/12/21  1146   HGB 8.4* 8.5* 8.1*    Recent Labs   Lab Test 10/15/21  1141 10/11/21  1422 11/23/20  2349   TROPONINI <0.01 0.02 0.02     Recent Labs   Lab Test 10/11/21  1422 07/05/21  1354 12/11/20  1305   * 585* 404*     Recent Labs   Lab Test 10/12/21  0621   TSH 4.55     Recent Labs   Lab Test 10/15/21  1141 10/12/21  0621 10/11/21  2000   INR 2.52* 1.90* 4.20*        Kathryn Cleveland, MS4 [facility]    I personally interviewed and examined this patient.  Agree with findings and recommendations as outlined above.

## 2021-10-15 NOTE — PROGRESS NOTES
"Hospitalist Progress Note    Assessment/Plan    Bernadette Yu is a 81 year old female who was admitted on 10/11/2021 with melena and increased confusion.    She has a PMH of mitral valve repair, CAD s/p CABG, heart failure, hypertension, paroxysmal atrial fibrillation on Warfarin, PVD, hypothyroidism, normocytic anemia, CKD 3, hyperlipidemia, anxiety, T2DM on insulin, chronic pain syndrome (on chronic narcotics) that presents 3 weeks after toe surgery with reports of patient being more confused and complaining of multiple different pains such as hemorrhoid pain and melena.     Problem List:  1. Melena  -Chronically anemic with reports of melena and hemorrhoid pain   -supratherapeutic INR on admission 4.2 - down to 1.9  2mg IV vit K given in ED    GI consult requested - appreciated  -PPI bid  - EGD wnl  -back on diet  -resume Warfarin     2.  Acute blood loss anemia  H/o anemia of chronic disease (hgb 9-11)  Type and screen  No further melena noted per nursing  Transfused 1 unit PRBC  hgb8.4  Close monitoring     3.  AF with RVR  HR not in good control.   Will increase Metoprolol from 50 mg bid to 75 mg bid  Diltiazem ER 180m PO daily  Metoprolol 2.5 mg IV q6h prn  Cardiologist consultation      4.  Hypomagensium levels       Hypokalemia  Resolved after replacement     5.  H/o diastolic CHF  -Last TTE LVEF 50 to 55% on 7/22/2021 with evidence of moderate to severe tricuspid regurgitation, moderate pulmonary hypertension  Has been give 2 doses of IV lasix  Appears more clinically dry now.  Will give gentle IVF while NPO.     Will hold of further iv diuretics at this time        6. Chronic pain syndrome  Family states that she has takes narcotics for \"many years\"  It appears that she had po prn dialudid  Not clear how much she was taking on a daily basis  I have some concern that she is having some narcotic withdrawal contributing to tachyarrythmia  Will order prn iv dilaudid to have available  C/o cp, will " order trop     7. Leukocytosis, resolved  -WBC 14.7 with mildly abnormal urinalysis.    No pneumonia seen on chest x-ray.  Foot does not appear infected  -Procalcitonin WNL  No clear infectious process identified     8. Hypotension, resolved  H/o HTN  Noted to have been given IV lasix dose x 2 on admission - overall volume status appears euvolemic s/p RBCs overnight  - monitor carefully     9.  Dementia with aggressive behavior, confusion, agitation  1:1  Progressive memory difficulty  Pt is requiring 24 hour care from her sons  They give her all of her medications  Recent discharge from the [facility] after recent foot surgery - family brought home to provide 24 hour care  Neurology consultation pending     10.  Hypothyroidism  -continue PTA synthroid     11. Normocytic anemia  -On iron therapy at home, hemoglobin 9.1 but she fluctuates between 9 and 11  -now around 8.4     12. CKD 3  -Serum creatinine 1.14  -creat improved today   BMP in the am     13. Hyperglycemia   T2DM  -Grossly uncontrolled, last A1c 9.0 on 9/14/2021  -Previously on glargine 30 units a.m. and 15 units p.m.  - just give 10 of lantus, and ssi  -glucose in 190-212  -poor oral intake, will not increase insulin yet     14.  Recent toe amputation  Appears to be healing well with no gross s/s of active infection     Diet:  Combination Diet Regular Diet Adult; Minced and Moist Diet (level 5); Liquidized/Moderately Thick (level 3)     DVT Prophylaxis: warfarin   Briceño Catheter: Not present  Code Status: Full Code        Barriers to Discharge:  ams, poor oral intake, chest pain, a fib rvr     Anticipated discharge date/Disposition: 2-3 days, need placement    Subjective  Agitated, confused, vomit on oral intake    Objective    Vital signs in last 24 hours  Temp:  [97.5  F (36.4  C)-98.6  F (37  C)] 98.1  F (36.7  C)  Pulse:  [109-114] 112  Resp:  [16-20] 20  BP: ()/(41-82) 134/74  SpO2:  [94 %-98 %] 95 % [unfilled] O2 Device: None (Room air)     Weight:   [unfilled] Weight change:     Intake/Output last 3 shifts  I/O last 3 completed shifts:  In: 1126 [P.O.:425; I.V.:701]  Out: 350 [Urine:350]  Body mass index is 28.42 kg/m .    Physical Exam    Physical Exam  Vitals and nursing note reviewed.   Constitutional:       Appearance: She is obese. She is ill-appearing. She is not toxic-appearing or diaphoretic.   HENT:      Head: Normocephalic and atraumatic.      Right Ear: External ear normal.      Left Ear: External ear normal.      Nose: Nose normal.      Mouth/Throat:      Mouth: Mucous membranes are moist.   Eyes:      General: No scleral icterus.        Right eye: No discharge.         Left eye: No discharge.      Pupils: Pupils are equal, round, and reactive to light.   Cardiovascular:      Rate and Rhythm: Tachycardia present. Rhythm irregular.      Pulses: Normal pulses.      Heart sounds: No murmur heard.     Pulmonary:      Effort: Pulmonary effort is normal.      Breath sounds: Rhonchi present.   Abdominal:      General: There is distension.      Tenderness: There is no abdominal tenderness.   Musculoskeletal:      Cervical back: Normal range of motion and neck supple. No rigidity.      Right lower leg: No edema.      Left lower leg: No edema.   Skin:     General: Skin is warm and dry.      Coloration: Skin is not jaundiced.   Neurological:      Mental Status: She is disoriented.   Psychiatric:         Mood and Affect: Mood is anxious.         Behavior: Behavior is agitated.         Cognition and Memory: Cognition is impaired. Memory is impaired.             Pertinent Labs   Lab Results: personally reviewed.     Results for ANNA MARIE VALENTIN (MRN [id number]) as of 10/15/2021 10:38   Ref. Range 10/15/2021 04:40 10/15/2021 08:22   Potassium Latest Ref Range: 3.5 - 5.0 mmol/L 4.4    Magnesium Latest Ref Range: 1.8 - 2.6 mg/dL 2.1    GLUCOSE BY METER POCT Latest Ref Range: 70 - 99 mg/dL  212 (H)     Medications  Scheduled Meds:    diltiazem ER COATED  BEADS  180 mg Oral Daily     [Held by provider] gabapentin  100 mg Oral At Bedtime     influenza vac high-dose quad  0.7 mL Intramuscular Prior to discharge     insulin aspart  1-7 Units Subcutaneous TID AC     insulin aspart  1-5 Units Subcutaneous At Bedtime     insulin glargine  10 Units Subcutaneous QAM AC     levothyroxine  125 mcg Oral Daily     metoprolol tartrate  50 mg Oral BID     pantoprazole (PROTONIX) IV  40 mg Intravenous BID     sodium chloride (PF)  3 mL Intracatheter Q8H     temazepam  7.5 mg Oral At Bedtime     Continuous Infusions:    sodium chloride Stopped (10/14/21 1924)     PRN Meds:.acetaminophen, glucose **OR** dextrose **OR** glucagon, haloperidol lactate, HOLD MEDICATION, HYDROmorphone, [Held by provider] HYDROmorphone, lidocaine 4%, lidocaine 4%, lidocaine (buffered or not buffered), lidocaine (buffered or not buffered), melatonin, metoprolol, naloxone **OR** naloxone **OR** naloxone **OR** naloxone, prochlorperazine **OR** prochlorperazine **OR** prochlorperazine, QUEtiapine, sodium chloride (PF), sodium chloride (PF)      EGD (10/14) wnl        [facility]  Dennis Gunn

## 2021-10-16 NOTE — PROGRESS NOTES
Addendum:    Brain images results:    EXAM: CT HEAD WITHOUT CONTRAST  LOCATION: [facility]  DATE/TIME: 10/16/2021, 10:40 AM     INDICATION: Confusion, headache.  COMPARISON: Head CT 10/13/2021.                                                          IMPRESSION:  1.  No significant change, no acute intracranial pathology.  2.  Right inferior temporal lobe encephalomalacia, old infarct versus old trauma.  3.  Mild to moderate age-related changes.  4.  Right maxillary sinusitis.       MRI   EXAM: MR BRAIN WITHOUT CONTRAST  LOCATION: [facility]  DATE/TIME: 10/16/2021, 10:49 AM     INDICATION: Neuro deficit, acute, stroke.  COMPARISON: Head CT 10/16/2021 at 1037 hours, head CT 10/13/2021.                                                               IMPRESSION:  1.  No definite acute intracranial pathology.  2.  Mild to moderate age-related changes.  3.  Right inferior temporal lobe encephalomalacia/gliosis with hemosiderin staining, likely posttraumatic.  4.  Scattered small susceptibility changes.    A/p  AMS, encephalopathy  No e/o intracranial hemorrhage or ischemic CVA  Possible posttraumatic changes  Resume warfarin

## 2021-10-16 NOTE — PROGRESS NOTES
[facility] DIGESTIVE HEALTH PROGRESS NOTE      Subjective:            Hemoglobin is stable.  Speech pathology evaluation is pending    Objective:                  Vital signs in last 24 hrs;  Temp:  [97.5  F (36.4  C)-98.6  F (37  C)] 97.5  F (36.4  C)  Pulse:  [109-114] 110  Resp:  [16-24] 20  BP: (115-150)/(58-78) 115/58  SpO2:  [95 %-98 %] 97 %    Physical Exam:   General: Comfortable appearing. Awake.   Cardiovascular: Regular rate. No edema  Chest: Non-labored breathing. Symmetric chest rise.   Abdomen: soft, non-tender, non-distended  Neurologic: Alert, no focal defects.     Current Labs:  CBC RESULTS: Recent Labs   Lab Test 10/16/21  0426 10/14/21  0556 10/14/21  0556   WBC  --   --  7.9   RBC  --   --  3.13*   HGB 8.6*   < > 8.4*   HCT  --   --  29.1*   MCV  --   --  93   MCH  --   --  26.8   MCHC  --   --  28.9*   RDW  --   --  19.6*   PLT  --   --  154    < > = values in this interval not displayed.        CMP Results:   Recent Labs   Lab Test 10/16/21  1003 10/16/21  0858 10/16/21  0426 10/14/21  0745 10/14/21  0556 10/13/21  0801 10/13/21  0522   NA  --   --   --   --  137   < > 143   POTASSIUM  --   --  4.5   < > 4.5   < > 3.1*  3.1*   CHLORIDE  --   --   --   --  107   < > 109*   CO2  --   --   --   --  24   < > 25   ANIONGAP  --   --   --   --  6   < > 9   *   < >  --    < > 310*   < > 127*   BUN  --   --   --   --  19   < > 19   CR  --   --   --   --  1.14*   < > 0.87   BILITOTAL  --   --   --   --   --   --  1.0   ALKPHOS  --   --   --   --   --   --  65   ALT  --   --   --   --   --   --  19   AST  --   --   --   --   --   --  29    < > = values in this interval not displayed.        INR Results:   Recent Labs   Lab Test 10/16/21  0426   INR 2.52*          Relevant Imaging:      Assessment:       Hemoglobin stable.  Issue with swallowing not yet resolved.  If speech pathology does not reveal an answer I would consider EGD with dilation despite the lack of a stricture as this is sometimes helpful  with presbyesophagus.        Plan:     Await speech pathology evaluation.            Neo Mcghee MD  Thank you for the opportunity to participate in the care of this patient.   Please feel free to call me with any questions or concerns.  Phone number [phone].

## 2021-10-16 NOTE — PROGRESS NOTES
HEART CARE CONSULTATON NOTE        Assessment/Recommendations   Assessment:  1.  Tachycardia/history of atrial fibrillation: Accelerated junctional rhythm versus 2-1 atrial flutter with continued mild elevated ventricular response.  2.  Encephalopathy/delirium: MRI brain today and being evaluated by neurology  3.  Progressive dementia  4.  Recent osteomyelitis  5.  Heart failure with preserved ejection fraction  6.  Severe valvular disease: Moderate to severe TR, moderate to severe left ear history of mechanical MVR  7.  Melena with acute blood loss anemia: Followed by GI and primary team    Plan:  1.  Continue on metoprolol and diltiazem for rate control  2.  Could consider TAVR if patient clinically improves.  Work-up for this would all be done as outpatient if she clinically improves.  3.  Given constellation of symptoms and degree of cognitive decline may need to consider palliative care evaluation  Last saw this patient in clinic in 2016     History of Present Illness/Subjective    Patient very lethargic today.  Being taken to MRI for further evaluation       Physical Examination  Review of Systems   VITALS: /68 (BP Location: Left arm)   Pulse 111   Temp 97.5  F (36.4  C) (Tympanic)   Resp 20   Wt 66 kg (145 lb 8 oz)   SpO2 98%   BMI 28.42 kg/m    BMI: Body mass index is 28.42 kg/m .  Wt Readings from Last 3 Encounters:   10/15/21 66 kg (145 lb 8 oz)   09/28/21 67.8 kg (149 lb 6.4 oz)   09/25/21 67.8 kg (149 lb 6.4 oz)       Intake/Output Summary (Last 24 hours) at 10/16/2021 1317  Last data filed at 10/16/2021 0641  Gross per 24 hour   Intake 243 ml   Output 650 ml   Net -407 ml     General Appearance:   no distress, normal body habitus   ENT/Mouth: membranes moist, no oral lesions or bleeding gums.      EYES:  no scleral icterus, normal conjunctivae   Neck: no carotid bruits or thyromegaly   Chest/Lungs:   lungs are clear to auscultation   Cardiovascular:   Regular. Normal first and second heart  sounds with systolic murmur  + edema bilaterally    Abdomen:  no organomegaly, masses, bruits, or tenderness; bowel sounds are present   Extremities: no cyanosis or clubbing   Skin: no xanthelasma, warm.    Neurologic: normal  bilateral, no tremors     Psychiatric: alert and oriented x3, calm     Review Of Systems  Skin: negative  Eyes: negative  Ears/Nose/Throat: negative  Respiratory: Shortness of breath-   Cardiovascular: irregular heart beat  Gastrointestinal: negative  Genitourinary: negative  Musculoskeletal: negative  Neurologic: negative  Psychiatric: negative  Hematologic/Lymphatic/Immunologic: negative  Endocrine: negative          Lab Results    Chemistry/lipid CBC Cardiac Enzymes/BNP/TSH/INR   Recent Labs   Lab Test 07/23/20  1520   CHOL 228*   HDL 31*   LDL 86   TRIG 690*     Recent Labs   Lab Test 07/23/20  1520 11/11/15  0559 01/06/15  1615   LDL 86 96 79     Recent Labs   Lab Test 10/16/21  1003 10/16/21  0858 10/16/21  0426 10/14/21  0745 10/14/21  0556   NA  --   --   --   --  137   POTASSIUM  --   --  4.5   < > 4.5   CHLORIDE  --   --   --   --  107   CO2  --   --   --   --  24   *   < >  --    < > 310*   BUN  --   --   --   --  19   CR  --   --   --   --  1.14*   GFRESTIMATED  --   --   --   --  45*   DI  --   --   --   --  8.3*    < > = values in this interval not displayed.     Recent Labs   Lab Test 10/14/21  0556 10/13/21  0522 10/12/21  2029   CR 1.14* 0.87 0.87     Recent Labs   Lab Test 09/14/21  1717 05/06/21  1403 12/29/20  1723   A1C 9.0* 7.7* 7.0*          Recent Labs   Lab Test 10/16/21  0426 10/14/21  0556 10/14/21  0556   WBC  --   --  7.9   HGB 8.6*   < > 8.4*   HCT  --   --  29.1*   MCV  --   --  93   PLT  --   --  154    < > = values in this interval not displayed.     Recent Labs   Lab Test 10/16/21  0426 10/14/21  0556 10/13/21  0522   HGB 8.6* 8.4* 8.5*    Recent Labs   Lab Test 10/15/21  1141 10/11/21  1422 11/23/20  2349   TROPONINI <0.01 0.02 0.02     Recent  Labs   Lab Test 10/11/21  1422 07/05/21  1354 12/11/20  1305   * 585* 404*     Recent Labs   Lab Test 10/12/21  0621   TSH 4.55     Recent Labs   Lab Test 10/16/21  0426 10/15/21  1141 10/12/21  0621   INR 2.52* 2.52* 1.90*        Medical History  Surgical History Family History Social History   Past Medical History:   Diagnosis Date     Abdominal bloating 8/21/2016     Anticoagulation goal of INR 2.5 to 3.5 1/27/2016     Anxiety      Aortic stenosis 10/26/2019     ASCVD (arteriosclerotic cardiovascular disease)      Atherosclerosis of native coronary artery without angina pectoris 10/26/2019     Bleeding diathesis (H)      Carotid artery stenosis, left      CHF (congestive heart failure) (H)      CKD (chronic kidney disease) stage 3, GFR 30-59 ml/min (H)      DM (diabetes mellitus), type 2 (H) 1990     Eczema      Former smoker      Full code status      History of metabolic encephalopathy with delirium 9/28/2021     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     Moderate tricuspid insufficiency 8/28/2021     MRSA (methicillin resistant staph aureus) culture positive 2/9/2017     Normocytic anemia      PAD (peripheral artery disease) (H)     [facility]   DATE: 10/26/2019 4:15 PM   EXAM:  DUPLEX ARTERIAL ULTRASOUND OF THE LOWER EXTREMITIES BILATERALLY   INDICATION: Leg pain, vasculopathy.   COMPARISON: None.   TECHNIQUE: Duplex imaging is performed utilizing gray-scale, two-dimensional images, and color-flow imaging. Doppler waveform analysis  and spectral Doppler imaging is also performed.   DUPLEX ARTERIAL ULTRASOUND FINDIN     Paroxysmal atrial fibrillation (H) 2015    Bilateral pulmonary vein isolation with AtriCure Synergy (bipolar radiofrequency ablation) device, exclusion of the left atrial appendage with the AtriCure AtriClip device.       Paroxysmal atrial fibrillation with RVR (H) 2021     PN (peripheral neuropathy)      Psoriasis      PVD (peripheral vascular disease) (H) 2019    Bilateral lower extremities.  See ultrasound 2019.     Recurrent UTI (urinary tract infection)      RLS (restless legs syndrome)      S/P CABG (coronary artery bypass graft) 2016     S/P colonoscopy 07     S/P MVR (mitral valve replacement) 2015     Subclavian artery stenosis, left (H) 2015    Stented in .      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     Ulcer of heel and midfoot, left, with unspecified severity (H)      Past Surgical History:   Procedure Laterality Date     AMPUTATE TOE(S) Left 2021    Procedure: AMPUTATION, second ray left foot;  Surgeon: Nba Cleveland DPM;  [place] [facility] [place]     APPENDECTOMY       BYPASS GRAFT ARTERY CORONARY       CARDIAC CATHETERIZATION  11/12/15      SECTION       CHOLECYSTECTOMY       COLONOSCOPY N/A 10/12/2016    Procedure: COLONOSCOPY;  Surgeon: Santiago Duran MD;  Location: [facility];  Service:      COLONOSCOPY N/A 10/13/2016    Procedure: COLONOSCOPY with polypectomy & rectum biopsies;  Surgeon: Santiago Duran MD;  Location: [facility];  Service:      COLONOSCOPY N/A 12/3/2017    Procedure: COLONOSCOPY with multiple polyp removal using hot snare and mansfield net and biopsy forcep;  Surgeon: Marcelo Wade MD;  Location: [facility];  Service:      COLONOSCOPY N/A 3/13/2018    Procedure: COLONOSCOPY; POLYPECTOMY;  Surgeon: Marcelo Wade MD;  Location: [facility] OR;  Service:      EGD  10/14/2021      ESOPHAGOSCOPY, GASTROSCOPY, DUODENOSCOPY (EGD), COMBINED N/A 10/12/2016    Procedure: ESOPHAGOGASTRODUODENOSCOPY with biopsies;  Surgeon: Santiago Duran MD;  Location: [facility];  Service:      ESOPHAGOSCOPY, GASTROSCOPY, DUODENOSCOPY (EGD), COMBINED N/A 12/3/2017    Procedure: ESOPHAGOGASTRODUODENOSCOPY (EGD);  Surgeon: Marcelo Wade MD;  Location: [facility];  Service:      ESOPHAGOSCOPY, GASTROSCOPY, DUODENOSCOPY (EGD), COMBINED N/A 10/14/2021    Procedure: ESOPHAGOGASTRODUODENOSCOPY WITH STOMACH BIOPSIES;  Surgeon: Neo Mcghee MD;  [place] [facility] [place]     PICC TRIPLE LUMEN PLACEMENT  10/12/2021          TONSILLECTOMY & ADENOIDECTOMY       UPPER GASTROINTESTINAL ENDOSCOPY       Family History   Problem Relation Age of Onset     Colon Cancer Father      Diabetes Father      Hypertension Mother      Heart Disease Mother          congestive heart failure     Arthritis Mother      Diabetes Sister      Heart Disease Brother      Rectal Cancer Son      Colon Cancer Son         Social History     Socioeconomic History     Marital status:      Spouse name: Not on file     Number of children:  4     Years of education: Not on file     Highest education level: Not on file   Occupational History     Not on file   Tobacco Use     Smoking status: Former Smoker     Years: 23.00     Types: Cigarettes     Smokeless tobacco: Never Used     Tobacco comment: quit 1970's   Substance and Sexual Activity     Alcohol use: No     Drug use: No     Sexual activity: Never   Other Topics Concern     Parent/sibling w/ CABG, MI or angioplasty before 65F 55M? Not Asked   Social History Narrative    Patient of Dr. Bach since 2001.   to  Aneudy.    4 children.    Former patient of Dr. Harshad Ballard.     Social Determinants of Health     Financial Resource Strain:      Difficulty of Paying Living Expenses:    Food Insecurity:      Worried About Running Out of Food in the Last Year:      Ran  Out of Food in the Last Year:    Transportation Needs:      Lack of Transportation (Medical):      Lack of Transportation (Non-Medical):    Physical Activity:      Days of Exercise per Week:      Minutes of Exercise per Session:    Stress:      Feeling of Stress :    Social Connections:      Frequency of Communication with Friends and Family:      Frequency of Social Gatherings with Friends and Family:      Attends Restoration[employer] Services:      Active Member of Clubs or Organizations:      Attends Club or Organization Meetings:      Marital Status:    Intimate Partner Violence:      Fear of Current or Ex-Partner:      Emotionally Abused:      Physically Abused:      Sexually Abused:          Medications  Allergies   No current outpatient medications on file.        Allergies   Allergen Reactions     Amiodarone Other (See Comments)     TORSADES DE POINTES     Aspirin Other (See Comments)     Recurrent severe gastrointestinal bleed.         Ayanna Camacho MD

## 2021-10-16 NOTE — PROGRESS NOTES
Pt awake during CT and then decreased responsiveness again right after.  Pt brought back to room and changed for stool inc.  Loud audible wheeze when turned on side.  Pt then brought to MRI and back to room with monitor and SWAT RN.

## 2021-10-16 NOTE — PROGRESS NOTES
"Revisit patient in the afternoon with  and RT, nurse in the room.    Patient is awake now and alert. But demented and c/o \"I am feeling ill\". \"Pain\" especially in legs,ankles. No leg edema or calf tenderness, no signs of infection or trauma.    As per , patient looks better. Patient has been on dilaudid po for more than ten years.    D/w nurse and encourage to assist patient up from bed every shift and avoid patient staying in one position in the bed too long. Try her home oral dilaudid dose first before giving iv dilaudid due to unresponsive episode this am. On the other hand, there is concern that patient might experience narcotic withdrawal if not providing Dilaudid.    Appreciate pulm consultation. Lasix and Nebs given.      Total Visit Time: 65 minutes    Total Face-to-Face Prolonged Service Time: 55 minutes    Content of the Prolonged Time: rapid response, family updated, revisiting patient        "

## 2021-10-16 NOTE — PLAN OF CARE
Problem: Risk for Delirium  Goal: Optimal Coping  Outcome: No Change  Goal: Improved Behavioral Control  Outcome: No Change  Goal: Improved Attention and Thought Clarity  Outcome: No Change  Goal: Improved Sleep  Outcome: No Change     Problem: Dysrhythmia  Goal: Normalized Cardiac Rhythm  Outcome: No Change     Pt continues to be restless, yelling out, agitated at times, anxious. Haldol 2mg IV given once this shift, which patient was able to sleep for about 2 hours before waking up and getting restless again.     Attempted to get patient OOB to use bedside commode since patient was complaining that she needed to use the bathroom. Pt very weak, unable to stand with two assist and gait belt. Purewick in place. Bladder scanned at start of shift for 250ml. Bladder scanned again for 396ml this AM. Notified house officer, will straight cath.    Pt continues to be in what appears to be junctional tachycardia 108-110.

## 2021-10-16 NOTE — PLAN OF CARE
Problem: Adult Inpatient Plan of Care  Goal: Optimal Comfort and Wellbeing  Outcome: Improving     Problem: Anemia  Goal: Anemia Symptom Improvement  Outcome: Improving     Tele Rhythm- Junctional Tachycardia, rates in 100's. Restless and confused, continued sitter at bedside for patient safety. BG- 218 and 222, sliding scale insulin given. BPA for Sepsis fired up, Lactic Acid-1.3. Upper airway wheezing with use of accessory muscles for breathing was noted, House Officer notified who will review chart, will continue to monitor. Voided with a small brown stool. PRN Compazine for Nausea given. Poor intake.

## 2021-10-16 NOTE — CONSULTS
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 Today, a rapid response was called for lethargy, unresponsiveness and appearance of labored breathing patter.  She has some transmitted upper airway sounds, possible vocal cord dysfunction with upper airway wheezing.   Recent CT chest showed findings consistent with CHF, with pulmonary edema as well as air trapping. She may have some small airways disease with exacerbation (e.g. reactive airway disease vs. Asthma exacerbation).  Her normal CXR and ABG and the fact that she's on room air with normal oxygenation are reassuring.   Complicating the issue is her encephalopathy likely due to toxic/metabolic causes, and too many sedating meds on board.   Per report from hospitalist and RN, she seems to have respiratory distress when attempting to swallow. May be aspirating which could lead to wheezing and bronchospasm from aspiration pneumonitis.     Plan:  - trial of empiric steroids for possible reactive airways exacerbation: 40mg IV methylpred q8h. Will need to balance risk/benefit of steroids improving her respiratory status and not causing worsening delirium.   - scheduled duonebs q6h through today  - diuresis with Lasix 20mg IV bid for now, keep net neg, strict ins/outs and daily weights please  - would minimize sedating meds (she has scheduled dilaudid, gabapentin as well as temazepam ordered).   - agree with NPO status and SLP evaluation.     Will continue to follow.     Delgado Culver MD (Avi)   [facility]  Pulmonary & Critical Care  Pager [phone]  [phone]  Clinic [phone]  Fax [phone]                                                                                                                                                         HPI:   CCx: respiratory distress, wheezing, labored breathing.    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  Today she was noted to be lethargic. Rapid response was called. Breathing noted to be labored with use of abdominal muscles, although ABG was normal and SpO2 was normal on room air. CXR showed some pulmonary vascular congestion.   Pulmonary consulted regarding the appearance of labored breathing pattern.                                                                                                                                                        Past Medical History:  Past Medical History:   Diagnosis Date     Abdominal bloating 8/21/2016     Anticoagulation goal of INR 2.5 to 3.5 1/27/2016     Anxiety      Aortic stenosis 10/26/2019     ASCVD (arteriosclerotic cardiovascular disease)      Atherosclerosis of native coronary artery without angina pectoris 10/26/2019     Bleeding diathesis (H)      Carotid artery stenosis, left      CHF (congestive heart failure) (H)      CKD (chronic kidney disease) stage 3, GFR 30-59 ml/min (H)      DM (diabetes mellitus), type 2 (H) 1990     Eczema      Former smoker      Full code status      History of metabolic encephalopathy with delirium 9/28/2021     History of partial ray amputation of second toe of left foot (H) 9/28/2021     HLD (hyperlipidemia)      HTN (hypertension)      Hypothyroidism      IBS (irritable bowel syndrome)      Insomnia      Liver disease      Long Q-T syndrome 10/26/2019     Meningococcal meningitis Age 14      Microalbuminuria due to type 2 diabetes mellitus (H)      Mild nonproliferative diabetic retinopathy of both eyes (H)      Mitral stenosis      Moderate aortic stenosis     See transesophageal echocardiogram (ANTOINE) 2019.     Moderate tricuspid insufficiency 8/28/2021     MRSA (methicillin resistant staph aureus) culture positive 2/9/2017     Normocytic anemia      PAD (peripheral artery disease) (H)     [facility]   DATE: 10/26/2019 4:15 PM   EXAM:  DUPLEX ARTERIAL ULTRASOUND OF THE LOWER EXTREMITIES BILATERALLY   INDICATION: Leg pain, vasculopathy.   COMPARISON: None.   TECHNIQUE: Duplex imaging is performed utilizing gray-scale, two-dimensional images, and color-flow imaging. Doppler waveform analysis and spectral Doppler imaging is also performed.   DUPLEX ARTERIAL ULTRASOUND FINDIN     Paroxysmal atrial fibrillation (H) 11/16/2015    Bilateral pulmonary vein isolation with AtriCure Synergy (bipolar radiofrequency ablation) device, exclusion of the left atrial appendage with the AtriCure AtriClip device.       Paroxysmal atrial fibrillation with RVR (H) 9/29/2021     PN (peripheral neuropathy)      Psoriasis      PVD (peripheral vascular disease) (H) 11/4/2019    Bilateral lower extremities.  See ultrasound 2019.     Recurrent UTI (urinary tract infection)      RLS (restless legs syndrome)      S/P CABG (coronary artery bypass graft) 1/8/2016     S/P colonoscopy 12/12/07     S/P MVR (mitral valve replacement) 12/30/2015     Subclavian artery stenosis, left (H) 11/16/2015    Stented in 2015.      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     Ulcer of heel and midfoot, left, with unspecified severity (H)        Past Surgical History  Past Surgical History:   Procedure Laterality Date     AMPUTATE TOE(S) Left 9/17/2021    Procedure: AMPUTATION, second ray left foot;  Surgeon: Nba Cleveland DPM;  Location: [facility] OR     APPENDECTOMY       BYPASS GRAFT ARTERY CORONARY        CARDIAC CATHETERIZATION  11/12/15      SECTION       CHOLECYSTECTOMY       COLONOSCOPY N/A 10/12/2016    Procedure: COLONOSCOPY;  Surgeon: Santiago Duran MD;  Location: [facility];  Service:      COLONOSCOPY N/A 10/13/2016    Procedure: COLONOSCOPY with polypectomy & rectum biopsies;  Surgeon: Santiago Duran MD;  Location: [facility];  Service:      COLONOSCOPY N/A 12/3/2017    Procedure: COLONOSCOPY with multiple polyp removal using hot snare and mansfield net and biopsy forcep;  Surgeon: Marcelo Wade MD;  Location: [facility];  Service:      COLONOSCOPY N/A 3/13/2018    Procedure: COLONOSCOPY; POLYPECTOMY;  Surgeon: Marcelo Wade MD;  [place] [facility]:      EGD  10/14/2021     ESOPHAGOSCOPY, GASTROSCOPY, DUODENOSCOPY (EGD), COMBINED N/A 10/12/2016    Procedure: ESOPHAGOGASTRODUODENOSCOPY with biopsies;  Surgeon: Santiago Duran MD;  Location: [facility];  Service:      ESOPHAGOSCOPY, GASTROSCOPY, DUODENOSCOPY (EGD), COMBINED N/A 12/3/2017    Procedure: ESOPHAGOGASTRODUODENOSCOPY (EGD);  Surgeon: Marcelo Wade MD;  Location: [facility];  Service:      ESOPHAGOSCOPY, GASTROSCOPY, DUODENOSCOPY (EGD), COMBINED N/A 10/14/2021    Procedure: ESOPHAGOGASTRODUODENOSCOPY WITH STOMACH BIOPSIES;  Surgeon: Neo Mcghee MD;  Location: [facility] OR     PICC TRIPLE LUMEN PLACEMENT  10/12/2021          TONSILLECTOMY & ADENOIDECTOMY       UPPER GASTROINTESTINAL ENDOSCOPY         Allergies:  Allergies   Allergen Reactions     Amiodarone Other (See Comments)     TORSADES DE POINTES     Aspirin Other (See Comments)     Recurrent severe gastrointestinal bleed.       PTA medications:  Facility-Administered Medications Prior to Admission   Medication Dose Route Frequency Provider Last Rate Last Admin     lidocaine (XYLOCAINE) 2 % external gel   Topical Daily PRN Nba Cleveland DPM   Given at 21 1601     Medications Prior to  Admission   Medication Sig Dispense Refill Last Dose     acetaminophen (TYLENOL) 500 MG tablet Take 1,000 mg by mouth 3 times daily (give at 8am, 2pm, 8pm)   10/11/2021 at Unknown time     diltiazem ER COATED BEADS (CARDIZEM CD/CARTIA XT) 180 MG 24 hr capsule Take 1 capsule (180 mg) by mouth daily 90 capsule 3 10/11/2021 at Unknown time     ferrous sulfate 325 (65 FE) MG tablet [FERROUS SULFATE 325 (65 FE) MG TABLET] Take 1 tablet (325 mg total) by mouth 3 (three) times a week. Monday, Wednesday, and Friday 50 tablet 3 10/11/2021 at Unknown time     furosemide (LASIX) 40 MG tablet [FUROSEMIDE (LASIX) 40 MG TABLET] Take 2 tablets (80 mg total) by mouth daily. 120 tablet 1 10/11/2021 at Unknown time     gabapentin (NEURONTIN) 100 MG capsule Take 1 capsule (100 mg) by mouth At Bedtime   10/10/2021 at Unknown time     HYDROmorphone (DILAUDID) 2 MG tablet Take 1 tablet (2 mg) by mouth every 6 hours as needed for moderate to severe pain (Patient taking differently: Take 2 mg by mouth every 4 hours as needed for moderate to severe pain ) 120 tablet 0 10/11/2021 at Unknown time     insulin aspart protamine-insulin aspart (NOVOLOG MIX 70-30FLEXPEN U-100) 100 unit/mL (70-30) pen [INSULIN ASPART PROTAMINE-INSULIN ASPART (NOVOLOG MIX 70-30FLEXPEN U-100) 100 UNIT/ML (70-30) PEN] Inject 30 Units under the skin every morning AND 15 Units every evening.  3 10/11/2021 at am     levothyroxine (SYNTHROID, LEVOTHROID) 125 MCG tablet [LEVOTHYROXINE (SYNTHROID, LEVOTHROID) 125 MCG TABLET] Take 1 tablet (125 mcg total) by mouth daily. 90 tablet 3 10/11/2021 at Unknown time     polyethylene glycol (MIRALAX) 17 gram packet [POLYETHYLENE GLYCOL (MIRALAX) 17 GRAM PACKET] Take 1 packet (17 g total) by mouth daily.  0 10/11/2021 at Unknown time     temazepam (RESTORIL) 15 MG capsule Take 15-30 mg by mouth At Bedtime   10/10/2021 at Unknown time     WARFARIN SODIUM PO Take 5 mg by mouth daily    10/11/2021 at Unknown time     BD ULTRA-FINE DAVID  "PEN NEEDLE 32 gauge x 5/32\" Ndle [BD ULTRA-FINE DAVID PEN NEEDLE 32 GAUGE X 5/32\" NDLE] 1 each by abdominal subcutaneous route 2 (two) times a day. USE WITH NOVOLOG PENS.   Please keep this Rx on file for the next RF. 100 each 11      blood glucose test strips [BLOOD GLUCOSE TEST STRIPS] Use 1 each As Directed 3 (three) times a day. Dispense brand per patient's insurance at pharmacy discretion. 300 strip 3      blood-glucose meter Misc [BLOOD-GLUCOSE METER MISC] Dispense 1 meter as covered by patient's insurance. 1 each 0      generic lancets [GENERIC LANCETS] Use 1 each As Directed 3 (three) times a day. Dx E11.65 DM2, uncontrolled 300 each 3      metoprolol tartrate (LOPRESSOR) 50 MG tablet Take 1 tablet (50 mg) by mouth 2 times daily (Patient not taking: Reported on 10/11/2021)   Not Taking at Unknown time     mupirocin (BACTROBAN) 2 % external ointment Apply topically 2 times daily (Patient not taking: Reported on 10/11/2021) 22 g 0 Not Taking at Unknown time     QUEtiapine (SEROQUEL) 50 MG tablet Take 50 mg by mouth 3 times daily (give at 8am, 2pm, 8pm) (Patient not taking: Reported on 10/1/2021)        senna-docusate (SENOKOT-S/PERICOLACE) 8.6-50 MG tablet Take 1 tablet by mouth 2 times daily (Patient not taking: Reported on 10/11/2021)   Not Taking at Unknown time     triamcinolone (KENALOG) 0.1 % external cream Apply topically daily (Patient not taking: Reported on 10/11/2021) 453.6 g 11 Not Taking at Unknown time       Family Hx:  Family History   Problem Relation Age of Onset     Colon Cancer Father      Diabetes Father      Hypertension Mother      Heart Disease Mother          congestive heart failure     Arthritis Mother      Diabetes Sister      Heart Disease Brother      Rectal Cancer Son      Colon Cancer Son        Social Hx:  Social History     Socioeconomic History     Marital status:      Spouse name: Not on file     Number of children:  4     Years of education: Not on file     Highest " education level: Not on file   Occupational History     Not on file   Tobacco Use     Smoking status: Former Smoker     Years: 23.00     Types: Cigarettes     Smokeless tobacco: Never Used     Tobacco comment: quit 1970's   Substance and Sexual Activity     Alcohol use: No     Drug use: No     Sexual activity: Never   Other Topics Concern     Parent/sibling w/ CABG, MI or angioplasty before 65F 55M? Not Asked   Social History Narrative    Patient of Dr. Bach since 2001.   to  Aneudy.    4 children.    Former patient of Dr. Harshad Ballard.     Social Determinants of Health     Financial Resource Strain:      Difficulty of Paying Living Expenses:    Food Insecurity:      Worried About Running Out of Food in the Last Year:      Ran Out of Food in the Last Year:    Transportation Needs:      Lack of Transportation (Medical):      Lack of Transportation (Non-Medical):    Physical Activity:      Days of Exercise per Week:      Minutes of Exercise per Session:    Stress:      Feeling of Stress :    Social Connections:      Frequency of Communication with Friends and Family:      Frequency of Social Gatherings with Friends and Family:      Attends Buddhist Services:      Active Member of Clubs or Organizations:      Attends Club or Organization Meetings:      Marital Status:    Intimate Partner Violence:      Fear of Current or Ex-Partner:      Emotionally Abused:      Physically Abused:      Sexually Abused:        Exam/Data:     Vitals  /58 (BP Location: Left arm)   Pulse 110   Temp 97.5  F (36.4  C) (Tympanic)   Resp 20   Wt 66 kg (145 lb 8 oz)   SpO2 97%   BMI 28.42 kg/m       I/O last 3 completed shifts:  In: 243 [P.O.:240; I.V.:3]  Out: 750 [Urine:750]  Weight change:   [unfilled]  EXAM:  /58 (BP Location: Left arm)   Pulse 110   Temp 97.5  F (36.4  C) (Tympanic)   Resp 20   Wt 66 kg (145 lb 8 oz)   SpO2 97%   BMI 28.42 kg/m      Intake/Output last 3 shifts:  I/O last 3 completed  shifts:  In: 243 [P.O.:240; I.V.:3]  Out: 750 [Urine:750]  Intake/Output this shift:  No intake/output data recorded.    Physical Exam  Gen: sleepy but arousable, denies pain,  no distress  HEENT: NT, no JOSE MIGUEL  CV: RRR, no m/g/r  Resp: upper airway wheezing noted. Good air movement without rhonchi or rales.   Abd: soft, nontender, BS+  Skin: no rashes or lesions  Ext: no edema  Neuro: PERRL, nonfocal exam    ROS: A complete 10-system review of systems was obtained and is negative with the exception of what is noted in the history of present illness.    Medications:       Warfarin Therapy Reminder         diltiazem ER COATED BEADS  240 mg Oral Daily     furosemide  20 mg Intravenous Q12H     [Held by provider] gabapentin  100 mg Oral At Bedtime     HYDROmorphone  0.3 mg Intravenous BID     influenza vac high-dose quad  0.7 mL Intramuscular Prior to discharge     insulin aspart  1-7 Units Subcutaneous TID AC     insulin aspart  1-5 Units Subcutaneous At Bedtime     insulin glargine  10 Units Subcutaneous QAM AC     ipratropium - albuterol 0.5 mg/2.5 mg/3 mL  3 mL Nebulization Q6H     levothyroxine  125 mcg Oral Daily     melatonin  3 mg Oral Q24H     methylPREDNISolone  40 mg Intravenous Q8H     metoprolol tartrate  75 mg Oral BID     pantoprazole (PROTONIX) IV  40 mg Intravenous BID     sodium chloride (PF)  3 mL Intracatheter Q8H     temazepam  7.5 mg Oral At Bedtime     vitamin B1  100 mg Oral Daily     warfarin ANTICOAGULANT  5 mg Oral ONCE at 18:00         DATA  All laboratory and radiology has been personally reviewed by myself today.  Recent Labs   Lab 10/16/21  0426 10/14/21  0556 10/14/21  0556   WBC  --   --  7.9   HGB 8.6*   < > 8.4*   HCT  --   --  29.1*   PLT  --   --  154    < > = values in this interval not displayed.     Recent Labs   Lab 10/14/21  0556 10/13/21  0522 10/12/21  2029 10/12/21  0621 10/12/21  0621    143 145   < > 142   CO2 24 25 25   < > 26   BUN 19 19 22   < > 24   ALKPHOS  --  65  58  --  55   ALT  --  19 16  --  14   AST  --  29 33  --  23    < > = values in this interval not displayed.         PFT DATA  na      IMAGING:   Personally reviewed.

## 2021-10-16 NOTE — PROGRESS NOTES
[facility]  Neurology Consultation - Progress Note    Patient Name:  Bernadette Yu  Date of Service:  October 16, 2021    Subjective:    Noted to be anxious and agitated overnight. She did receive Haldol. Continued upper airway wheezing with use accessory muscles noted.    Objective:    Vitals: BP (!) 140/77 (BP Location: Left arm)   Pulse 110   Temp 97.7  F (36.5  C) (Tympanic)   Resp 20   Wt 66 kg (145 lb 8 oz)   SpO2 98%   BMI 28.42 kg/m    General: Lying in bed, sleepy, but easily awakens. Does not appear overly distressed  Head: Atraumatic, normocephalic   Cardiac: no lower extremity edema, does have clear murmur on cardiac auscultation.  Respiratory: Mild wheezing heard  Neurologic:  Sleepy, but awakens, alert, oriented to person and hospital cannot name specific hospital. She is asking for food with no clear dysarthria. Is able to name simple objects. She is able to follow all simple commands. She did have difficulty with two-step cross commands. Moving all extremities equally and antigravity. Deep tendon reflexes are 1+ in the uppers with only trace patella bilaterally. Absent Achilles. Toes downgoing.    Pertinent Investigations:    I have personally reviewed most recent and pertinent labs, tests, and radiological images.     Vitamin B12: 663  VBG without acidosis or CO2 elevation    Assessment  #Dementia: possibly vascular  #Delirium    81-year-old female with complex medical history most notable neurologically for severe dementia requiring near 24-hour cares from family. When I questioned family it is unclear if her cognition is actually significantly worsened than her baseline, although she is definitely having worsening agitation at night. Main reason for presentation was decreased mobility and weakness which are likely explained by her underlying medical issues including GI bleed and anemia. Her exam is not been consistent with seizures. I suspect this is  more likely delirium in the setting of an underlying cognitive impairment given that agitation and changes are most notable in the evening. Primary team is holding most medications except for Haldol that have CNS side effects. Would recommend continued delirium precautions. Would recommend brain MRI to rule out multiple ischemic events given encephalomalacia in the right temporal lobe, and multiple vascular risk factors.    Recommendations:   - Delirium precautions  - Follow up vitamin B1 level  - Continue thiamine  - MRI w/o contrast ordered    Thank you for involving Neurology in the care of Bernadette NIMCO Yu.      Seamus Smith,     My total floor time today for this patient was at least 35 minutes, greater than half of which was for counseling and coordination of care

## 2021-10-16 NOTE — SMOKING CESSATION
Pt was alert and oriented to person  And she was sleepy this morning but able to wake for sound.around 0900  Pt c/o  of back pain  And primary nurse was ready to give schduled Dilaudid but  IV  Wasn't working and Swat nurse called  for IV started. Later on  RR called around  by Swat nurse due to labor breathing/ using accessory muscle, and response for sternal rub.  VS was stable. PIV applied by  by PICC team. Pt continue sedated and  Narcan given during RR  And she  started awaken slowly.  CT  MRI, chest x-ray done.  Her family is updated.

## 2021-10-16 NOTE — PLAN OF CARE
Problem: Risk for Delirium  Goal: Optimal Coping  10/16/2021 1532 by Susanne Valentine RN  Outcome: No Change  10/16/2021 1531 by Susanne Valentine RN  Outcome: No Change  10/16/2021 1530 by Susanne Valentine RN  Outcome: No Change  10/16/2021 1530 by Susanne Valnetine RN  Outcome: No Change  10/16/2021 1530 by Susanne Valentine RN  Outcome: Improving     Problem: Risk for Delirium  Goal: Improved Behavioral Control  10/16/2021 1531 by Susanne Valentine RN  Outcome: No Change  10/16/2021 1530 by Susanne Valentine RN  Outcome: No Change  10/16/2021 1530 by Susanne Valentine RN  Outcome: Improving   VS stabled .  Up;ppear airway wheeze noted and  junctional tachycardia rhythm. . Pt alert and disoriented time and follow command this afternoon. She was yelling out and asking  and attempting out of bed. No pain medication given this shift.  Bladder scan done and was 181. She started   Lasix IV  20 mg,  had  BM  x1  continue 1'1 due to impulsive behaviors.   Her  art bed side and her family is updated her beck.

## 2021-10-16 NOTE — SIGNIFICANT EVENT
Requested by primary RN to start new IV.  Upon arrival to room pt noted to have labored breathing with a rate of 24. cheyene quinteros.  Audible wheezing.  Pt responding to sternal rub only and keeps eyes open for 4 seconds.  Rapid Response called.

## 2021-10-16 NOTE — PROGRESS NOTES
Pt straight cathed for 400ml dark rajwinder urine.    Bed scale not working properly due to big difference in weight from previous day. Pt unable to stand for bed to be zeroed. Will have day shift re attempt.

## 2021-10-16 NOTE — PROGRESS NOTES
Hospitalist Progress Note    Assessment/Plan    Bernadette Yu is a 81 year old female who was admitted on 10/11/2021 with melena and increased confusion.     She has a PMH of mitral valve repair, CAD s/p CABG, heart failure, hypertension, paroxysmal atrial fibrillation on Warfarin, PVD, hypothyroidism, normocytic anemia, CKD 3, hyperlipidemia, anxiety, T2DM on insulin, chronic pain syndrome (on chronic narcotics) that presents 3 weeks after toe surgery with reports of patient being more confused and complaining of multiple different pains such as hemorrhoid pain and melena.     Problem List:    AMS  -patient c/o severe pain and agitated around 6 am, received Haldol and dilaudid  -patient has been sleepy since then  -rapid response was called around 10:20 due to unresponsiveness, labored breathing; VSS  -Went to bedside with rapid respond team  -ordered Narcan, which seemed to wake up patient slowly  -glucose, pcxr, ABG unremarkable  -CT and MRI of brain pending  -suspected acute metabolic encephalopathy due to medications, in addition to possible dementia (?vascular), delirium  -d/w staff and Neurology    Respiratory distress  -it is observed that patient breathing shallowly, mostly with abdominal muscles; which is new as per family  -spo2 and abg unremarkable  -mild wheezing   -risk of aspiration on modified diet; observed to cough/vomit on oral intake  -consult pulm  -neb prn    Dementia with aggressive behavior, confusion, agitation  1:1  Progressive memory difficulty  Pt is requiring 24 hour care from her sons before admission  They give her all of her medications  Recent discharge from the [facility] after recent foot surgery - family brought home to provide 24 hour care  Neurology consultation appreciated  ?vascular dementia, delirium, metabolic encephalopathy  -ct, mri pending     Melena  -Chronically anemic with reports of melena and hemorrhoid pain   -supratherapeutic INR on admission 4.2 - down to 1.9  "after vit k in er, up to 2.5 after restarting warfrin  -   GI consult requested - appreciated  -PPI bid  - EGD wnl  -back on diet  -resume Warfarin     Acute blood loss anemia  H/o anemia of chronic disease (hgb 9-11)  Type and screen  No further melena noted per nursing  Transfused 1 unit PRBC  hgb stable  Close monitoring     AF with RVR  HR not in good control.   Will increase Metoprolol from 50 mg bid to 75 mg bid  Diltiazem ER 180m PO daily, increase to 240mg by cardiologist  Metoprolol 2.5 mg IV q6h prn  Cardiologist consultation       Hypomagensium levels       Hypokalemia  Resolved after replacement     H/o diastolic CHF  -Last TTE LVEF 50 to 55% on 7/22/2021 with evidence of moderate to severe tricuspid regurgitation, moderate pulmonary hypertension  Has been give 2 doses of IV lasix  Appears more clinically dry now.  Will give gentle IVF while NPO.     Will hold of further iv diuretics at this time        Chronic pain syndrome  Family states that she has takes narcotics for \"many years\"  It appears that she had po prn dialudid  Not clear how much she was taking on a daily basis  I have some concern that she is having some narcotic withdrawal contributing to tachyarrythmia  Will order prn iv dilaudid to have available  C/o cp,  Trop wnl      Leukocytosis, resolved  -WBC 14.7 with mildly abnormal urinalysis.    No pneumonia seen on chest x-ray.  Foot does not appear infected  -Procalcitonin WNL  No clear infectious process identified     Hypotension, resolved  H/o HTN  Noted to have been given IV lasix dose x 2 on admission - overall volume status appears euvolemic s/p RBCs overnight  - monitor carefully     Hypothyroidism  -continue PTA synthroid      Normocytic anemia  -On iron therapy at home, hemoglobin 9.1 but she fluctuates between 9 and 11  -now around 8.4     CKD 3  -Serum creatinine 1.14  -creat improved today   BMP in the am      Hyperglycemia   T2DM  -Grossly uncontrolled, last A1c 9.0 on " 9/14/2021  -Previously on glargine 30 units a.m. and 15 units p.m.  - just give 10 of lantus, and ssi  -glucose in 200 range  -poor oral intake, will not increase insulin yet      Recent toe amputation  Appears to be healing well with no gross s/s of active infection     Diet:  Combination Diet Regular Diet Adult; Minced and Moist Diet (level 5); Liquidized/Moderately Thick (level 3)  Still cough/vomit on oral intake  Palliative care consultation     DVT Prophylaxis: warfarin   Briceño Catheter: Not present  Code Status: Full Code        Barriers to Discharge:  ams, poor oral intake, chest pain, a fib rvr, AMS     Anticipated discharge date/Disposition: 2-3 days, need placement; need to clarify goal of care (palliative care consultation)      Subjective  Poor responsiveness earlier, rapid respond was called, woke up after Narcan during rapid response, now down to ct  As per nurse, patient was restless last night. C/o severe pain at 6am, agitated.     Objective    Vital signs in last 24 hours  Temp:  [97.3  F (36.3  C)-98.6  F (37  C)] 97.7  F (36.5  C)  Pulse:  [109-115] 110  Resp:  [16-24] 20  BP: ()/(56-78) 140/77  SpO2:  [95 %-98 %] 98 % [unfilled] O2 Device: None (Room air)    Weight:   [unfilled] Weight change:     Intake/Output last 3 shifts  I/O last 3 completed shifts:  In: 243 [P.O.:240; I.V.:3]  Out: 750 [Urine:750]  Body mass index is 28.42 kg/m .    Physical Exam    Physical Exam  Vitals and nursing note reviewed.   Constitutional:       Appearance: She is obese. She is ill-appearing.      Comments: Unresponsive earlier and more awake after narcan    HENT:      Head: Normocephalic and atraumatic.      Right Ear: External ear normal.      Left Ear: External ear normal.      Nose: Nose normal.      Mouth/Throat:      Mouth: Mucous membranes are dry.   Eyes:      General:         Right eye: No discharge.         Left eye: No discharge.   Cardiovascular:      Rate and Rhythm: Tachycardia present.  Rhythm irregular.      Pulses: Normal pulses.      Heart sounds: No murmur heard.     Pulmonary:      Effort: Respiratory distress present.      Breath sounds: Wheezing and rhonchi present.   Abdominal:      General: There is distension.   Musculoskeletal:      Cervical back: Neck supple. No rigidity.      Right lower leg: No edema.      Left lower leg: No edema.   Skin:     General: Skin is dry.      Coloration: Skin is pale. Skin is not jaundiced.   Neurological:      Mental Status: She is disoriented, confused and unresponsive.             Pertinent Labs   Lab Results: personally reviewed.   Results for ANNA MARIE VALENTIN (MRN [id number]) as of 10/16/2021 10:50   Ref. Range 10/16/2021 04:26 10/16/2021 08:58 10/16/2021 10:03 10/16/2021 10:17   Potassium Latest Ref Range: 3.5 - 5.0 mmol/L 4.5      Magnesium Latest Ref Range: 1.8 - 2.6 mg/dL 2.1      GLUCOSE BY METER POCT Latest Ref Range: 70 - 99 mg/dL  183 (H) 185 (H)    pH Arterial Latest Ref Range: 7.37 - 7.44     7.43   pCO2 Arterial Latest Ref Range: 35 - 45 mm Hg    36   PO2 Arterial Latest Ref Range: 75 - 85 mm Hg    74 (L)   Bicarbonate Arterial Latest Ref Range: 23 - 29 mmol/L    25   Base Excess Art Latest Ref Range:   mmol/L    0.0   Oxyhemoglobin Latest Ref Range: 95.0 - 96.0 %    93.4 (L)   Hemoglobin Latest Ref Range: 11.7 - 15.7 g/dL 8.6 (L)      INR Latest Ref Range: 0.85 - 1.15  2.52 (H)          Medications  Scheduled Meds:    diltiazem ER COATED BEADS  240 mg Oral Daily     [Held by provider] gabapentin  100 mg Oral At Bedtime     HYDROmorphone  0.3 mg Intravenous BID     influenza vac high-dose quad  0.7 mL Intramuscular Prior to discharge     insulin aspart  1-7 Units Subcutaneous TID AC     insulin aspart  1-5 Units Subcutaneous At Bedtime     insulin glargine  10 Units Subcutaneous QAM AC     levothyroxine  125 mcg Oral Daily     melatonin  3 mg Oral Q24H     metoprolol tartrate  75 mg Oral BID     pantoprazole (PROTONIX) IV  40 mg Intravenous  BID     sodium chloride (PF)  3 mL Intracatheter Q8H     temazepam  7.5 mg Oral At Bedtime     vitamin B1  100 mg Oral Daily     Continuous Infusions:    sodium chloride Stopped (10/14/21 1924)     [Held by provider] Warfarin Therapy Reminder       PRN Meds:.acetaminophen, glucose **OR** dextrose **OR** glucagon, haloperidol lactate, HOLD MEDICATION, HYDROmorphone, [Held by provider] HYDROmorphone, lidocaine 4%, lidocaine (buffered or not buffered), melatonin, metoprolol, naloxone **OR** naloxone **OR** naloxone **OR** naloxone, prochlorperazine **OR** prochlorperazine **OR** prochlorperazine, sodium chloride (PF), [Held by provider] Warfarin Therapy Reminder    Pertinent Radiology   Radiology Results personally reviewed  PCXR pending  CT and MRI of brain pending      [facility] Medicine Service  Dennis Gunn

## 2021-10-17 NOTE — PROGRESS NOTES
Still unable to get accurate weight, as bed weight is 100lbs difference from previous weights. Pt still too weak to stand for staff to zero bed and pt is not in a lift room. Will have day shift zero bed if pt goes to any procedure or tests today.

## 2021-10-17 NOTE — PROGRESS NOTES
"Gastroenterology Brief Note:     Chart reviewed. No SLP re-evaluation and patient still coughing/\"vomiting\" with oral intake. No further melena and will start back on warfarin tonight. Hemoglobin stable.    GI will see patient again tomorrow after SLP sees her given concern for transfer dysphagia.      (Dr. JACQUE Mcghee)  Christine Guido PA-C  10/17/2021 1:57 PM        "

## 2021-10-17 NOTE — PROGRESS NOTES
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 On 10/6 a rapid response was called for lethargy, unresponsiveness and appearance of labored breathing pattern.  She had some transmitted upper airway sounds, possible vocal cord dysfunction with upper airway wheezing.   Recent CT chest showed findings consistent with CHF, with pulmonary edema as well as air trapping. She may have some small airways disease with exacerbation (e.g. reactive airway disease vs. Asthma exacerbation).  Her normal CXR and ABG and the fact that she's on room air with normal oxygenation are reassuring.   Complicating the issue is her encephalopathy likely due to toxic/metabolic causes, and too many sedating meds on board.     We treated empirically for reactive airway disease vs. Small ariways disease/asthma with steroids and nebs and some Lasix for pulmonary edema. She is doing much better today.      Plan:  - change IV steroids to po prednisone for 4 more days  - change duonebs to prn   - Cont IV Lasix, change back to [place] [facility]  - would minimize sedating meds (she has scheduled dilaudid, gabapentin as well as temazepam ordered).      Appears to be at baseline. No further recommendations, will sign off. Please call with additional questions.     Delgado (Mitchel) MD [place]  [facility] Pulmonary [facility] Critical [facility]  Pager [phone]  Clinic [phone]  Fax [phone]     Subjective/Interim Events:   Doing much better. Awake, cooperative. No further wheezing. Breathing improved, per her 1:1 aide who is in  the room.  On room air.       Medications:       Warfarin Therapy Reminder         diltiazem ER COATED BEADS  240 mg Oral Daily     furosemide  20 mg Intravenous Q12H     [Held by provider] gabapentin  100 mg Oral At Bedtime     HYDROmorphone  0.3 mg Intravenous BID     influenza vac high-dose quad  0.7 mL Intramuscular Prior to discharge     insulin aspart  1-7 Units Subcutaneous TID AC     insulin aspart  1-5 Units Subcutaneous At Bedtime     insulin glargine  10 Units Subcutaneous QAM AC     levothyroxine  125 mcg Oral Daily     magnesium oxide  400 mg Oral BID     melatonin  3 mg Oral Q24H     metoprolol tartrate  75 mg Oral BID     pantoprazole (PROTONIX) IV  40 mg Intravenous BID     predniSONE  40 mg Oral Daily     sodium chloride (PF)  3 mL Intracatheter Q8H     temazepam  7.5 mg Oral At Bedtime     vitamin B1  100 mg Oral Daily         Exam/Data:   Vitals  /81 (BP Location: Right arm)   Pulse 110   Temp 97.5  F (36.4  C) (Tympanic)   Resp 22   Wt 66 kg (145 lb 8 oz)   SpO2 96%   BMI 28.42 kg/m       I/O last 3 completed shifts:  In: 243 [P.O.:240; I.V.:3]  Out: 175 [Urine:175]  Weight change:   [unfilled]  Resp: 22      EXAM:  Physical Exam  Gen: sleepy but arousable, denies pain,  no distress  HEENT: NT, no JOSE MIGUEL  CV: RRR, no m/g/r  Resp: no upper airway wheezing noted today. Lungs clear, with equal BS bilaterally, no belly breathing noted today.   Abd: soft, nontender, BS+  Skin: no rashes or lesions  Ext: no edema  Neuro: PERRL, nonfocal exam    ROS:  A 10-system review was obtained and is negative with the exception of the symptoms noted above.    DATA:    PFT DATA  n/a      IMAGING:   XR Chest Port 1 View    Result Date: 10/11/2021  EXAM: XR CHEST PORT 1 VIEW LOCATION: [facility] DATE/TIME: 10/11/2021 2:44 PM INDICATION: Altered mental status COMPARISON: 11/23/2020     IMPRESSION: The patient is significantly rotated limiting evaluation. Patchy interstitial  opacities suggestive of mild pulmonary edema. No effusions or pneumothorax. Heart size is stable. Valvuloplasty changes and left atrial appendage occlusion device. Sternotomy wires. No acute osseous findings.    CTA Abdomen Pelvis with Contrast    Result Date: 10/11/2021  EXAM: CTA ABDOMEN PELVIS WITH CONTRAST LOCATION: [facility] DATE/TIME: 10/11/2021 9:30 PM INDICATION: Gastrointestinal bleeding with black tarry stool with heme positive test in stool. Assess for source of bleeding. COMPARISON: 02/19/2020. TECHNIQUE: CT angiogram abdomen pelvis during arterial phase of injection of IV contrast. 2D and 3D MIP reconstructions were performed by the CT technologist. Dose reduction techniques were used. CONTRAST: Isovue 370, 75 mL. FINDINGS: ANGIOGRAM ABDOMEN/PELVIS: Noncontrast imaging demonstrates a focal area of increased density involving the EG junction measuring approximately 2.3 x 1.6 cm. This is indeterminate based on the noncontrast imaging, could represent retained contrast material from a recent study or postsurgical change. This remains stable in overall appearance on arterial phase study and portal venous phase imaging. Arterial phase imaging demonstrates no evidence for surrounding active contrast extravasation in distal esophagus, stomach or duodenum. No evidence for active contrast extravasation in the small bowel. Large amount of stool throughout the colon limits evaluation for active contrast extravasation/bleeding. Allowing for this, there is no evidence for active bleeding within the colon. Normal caliber lower thoracic and abdominal aorta. Minimal aortic calcification. The celiac, splenic, common hepatic and proper hepatic remain patent. Patent SMA. Patent bilateral renal arteries with marked narrowing in the proximal left renal artery. CECIL patent. Patent bilateral common, external and internal iliac arteries along with patent bilateral common femoral arteries. Chronic  occlusion of the left superficial femoral artery. LOWER CHEST: Minimal right pleural fluid. No left-sided pleural fluid. Interlobular septal thickening. Cardiac enlargement. HEPATOBILIARY: Cholecystectomy. No significant biliary dilatation allowing for postcholecystectomy state. No evidence for abnormal arterial phase enhancement. Homogeneous portal venous phase enhancement. PANCREAS: No ductal dilatation or inflammatory change. SPLEEN: Normal size spleen. ADRENAL GLANDS: No significant nodules. KIDNEYS/BLADDER: Symmetric contrast enhancement to both kidneys. No urinary collecting system dilatation or calculi. Bladder distention. BOWEL: Marked amount of stool throughout the colon with severe amount of stool in the rectal vault. No colonic wall thickening or inflammatory change. No small bowel dilatation or inflammatory change. LYMPH NODES: No lymphadenopathy. PELVIC ORGANS: Unremarkable. No significant free fluid. MUSCULOSKELETAL: Diffuse subcutaneous edema. Degenerative hypertrophic changes in the thoracic spine.     IMPRESSION: 1.  Focal area of increased density involving the EG junction with the EG junction dilatation measuring 2.3 x 1.6 cm on noncontrast imaging. This does not change in its appearance on arterial phase imaging or portal venous phase imaging and may be the result of retained contrast material within the distal esophagus or retained food material. Another consideration would be postoperative change. Given the lack of change in overall appearance on postcontrast imaging, this is unlikely to be due to localized bleeding. An underlying high density mass cannot be excluded. 2.  No evidence for arterial contrast extravasation or active bleeding within the lumen of the small bowel, colon, stomach or distal esophagus above the level of the aforementioned high density mass. Evaluation of the colon is somewhat limited due to a large amount of stool throughout the colon and a severe amount of stool in the  rectal vault. 3.  No evidence for inflammatory change to the bowel. 4.  Atherosclerotic vascular calcification with significant vascular calcification origin left renal artery. Chronic occlusion of the left SFA proximally. 5.  Volume overload.    CT Chest Pulmonary Embolism w Contrast    Result Date: 10/11/2021  EXAM: CT CHEST PULMONARY EMBOLISM W CONTRAST LOCATION: [facility] DATE/TIME: 10/11/2021 9:30 PM INDICATION: Postoperative tachycardia and hypoxia. Evaluate for pulmonary embolism. COMPARISON: None. TECHNIQUE: CT chest pulmonary angiogram during arterial phase injection of IV contrast. Multiplanar reformats and MIP reconstructions were performed. Dose reduction techniques were used. CONTRAST: Isovue 370, 75 mL. FINDINGS: ANGIOGRAM CHEST: Allowing for respiratory motion artifact, there is no evidence for pulmonary embolism. Normal caliber thoracic aorta without dissection. Atherosclerotic vascular calcification with severe atherosclerotic vascular calcification at the proximal left subclavian artery with stent placement. LUNGS AND PLEURA: Minimal right basilar pleural fluid. Interlobular septal thickening with mosaic attenuation bilaterally. Findings can be seen with CHF/volume overload. No definitive evidence for acute focal alveolar infiltrate or consolidation. MEDIASTINUM/AXILLAE: Cardiac enlargement. No pericardial fluid. Minimal sliding esophageal hiatal hernia. No lymphadenopathy. CORONARY ARTERY CALCIFICATION: Severe. UPPER ABDOMEN: Advanced atherosclerotic vascular calcification of the splenic artery with moderate narrowing. Celiac and bilateral renal arteries remain patent with heavy calcification of the origin left renal artery. MUSCULOSKELETAL: Degenerative changes in the spine.     IMPRESSION: 1.  Allowing for significant respiratory motion artifact, there is no evidence for pulmonary embolism. 2.  Evidence for CHF/volume overload with right-sided pleural fluid collection  with interlobular septal thickening in mosaic attenuation of the pulmonary parenchyma with cardiac enlargement. 3.  Normal caliber aorta without dissection. Advanced atherosclerotic vascular calcification including severe vascular calcification at the origin of the left subclavian artery with indwelling stent. Severe vascular calcification at the origin left renal  artery. Recommend correlation for renovascular hypertension.    Head CT w/o contrast    Result Date: 10/11/2021  EXAM: CT HEAD W/O CONTRAST LOCATION: [facility] DATE/TIME: 10/11/2021 5:25 PM INDICATION: Mental status change, unknown cause. COMPARISON: Head CT 09/21/2021. TECHNIQUE: Routine CT Head without IV contrast. Multiplanar reformats. Dose reduction techniques were used. FINDINGS: INTRACRANIAL CONTENTS: No intracranial hemorrhage, extraaxial collection, or mass effect.  No CT evidence of acute infarct. Scattered vascular calcifications. Moderate presumed chronic small vessel ischemic changes. Small area of chronic right temporal lobe encephalomalacia, unchanged. Moderate generalized volume loss. No hydrocephalus. VISUALIZED ORBITS/SINUSES/MASTOIDS: Prior bilateral cataract surgery. Visualized portions of the orbits are otherwise unremarkable. Near-complete opacification of right maxillary sinus with hyperattenuating material and chronic wall thickening/sclerosis.  No middle ear or mastoid effusion. BONES/SOFT TISSUES: No acute abnormality.     IMPRESSION: 1.  No CT evidence for acute intracranial abnormality. 2.  Brain atrophy and presumed chronic microvascular ischemic changes as above. 3.  Small area of chronic right temporal lobe encephalomalacia, unchanged. 4.  Near complete opacification of the right maxillary sinus with high-attenuation material and chronic wall thickening/sclerosis.    XR PICC Chest Placement Port 1vw    Result Date: 10/12/2021  EXAM: XR CHEST PICC PLACEMENT PORT 1 VW LOCATION: [facility]  [facility] DATE/TIME: 10/12/2021 9:48 PM INDICATION: picc placement verification COMPARISON: 10/11/2021 chest radiograph and CT.     IMPRESSION: A right PICC tip terminates lower SVC. These are pleural effusion is not appreciated on this study. No pneumothorax. The cardiomediastinal silhouette is enlarged. A mitral valve prosthesis is present. There is a left atrial appendage clip. Sternotomy suture wires are intact.

## 2021-10-17 NOTE — PROGRESS NOTES
HEART CARE CONSULTATON NOTE        Assessment/Recommendations   Assessment:  1.  Tachycardia/history of atrial fibrillation: Accelerated junctional rhythm versus 2-1 atrial flutter with continued mild elevated ventricular response.  2.  Encephalopathy/delirium: Metabolic/med related  4.  Recent osteomyelitis  5.  Heart failure with preserved ejection fraction: Continue on IV Lasix  6.  Severe valvular disease: Moderate to severe TR, moderate to severe left ear history of mechanical MVR  7.  Melena with acute blood loss anemia: Followed by GI and primary team  8. Reactive airway disease with active wheezing now improved after being started on nebulizers and steroids     Plan:  1.  Continue on metoprolol and diltiazem for rate control  2.  Could consider TAVR if patient clinically improves.  Work-up for this would all be done as outpatient if she clinically improves.  3.  Given constellation of symptoms and degree of cognitive decline may need to consider palliative care evaluation  4. Continue IV Lasix       History of Present Illness/Subjective    Appears comfortable lying flat in bed off of oxygen. No chest pain complaints       Physical Examination  Review of Systems   VITALS: /58 (BP Location: Right arm)   Pulse 111   Temp 97.6  F (36.4  C) (Tympanic)   Resp 20   Wt 66 kg (145 lb 8 oz)   SpO2 97%   BMI 28.42 kg/m    BMI: Body mass index is 28.42 kg/m .  Wt Readings from Last 3 Encounters:   10/15/21 66 kg (145 lb 8 oz)   09/28/21 67.8 kg (149 lb 6.4 oz)   09/25/21 67.8 kg (149 lb 6.4 oz)       Intake/Output Summary (Last 24 hours) at 10/17/2021 1254  Last data filed at 10/17/2021 1157  Gross per 24 hour   Intake 723 ml   Output 525 ml   Net 198 ml     General Appearance:   sleeping, normal body habitus   ENT/Mouth: membranes moist, no oral lesions or bleeding gums.      EYES:  no scleral icterus, normal conjunctivae   Neck: no carotid bruits or thyromegaly   Chest/Lungs:    Decreased at the bases    Cardiovascular:   Regular. Normal first and second heart sounds with loud 3/6 systolic murmur  no edema bilaterally    Abdomen:  no organomegaly, masses, bruits, or tenderness; bowel sounds are present   Extremities: no cyanosis or clubbing   Skin: no xanthelasma, warm.    Neurologic: normal  bilateral, no tremors     Psychiatric: alert and oriented x3, calm     Review Of Systems  Skin: negative  Eyes: negative  Ears/Nose/Throat: negative  Respiratory: Shortness of breath  Cardiovascular: negative  Gastrointestinal: negative  Genitourinary: negative  Musculoskeletal: negative  Neurologic: negative  Psychiatric: negative  Hematologic/Lymphatic/Immunologic: negative  Endocrine: negative          Lab Results    Chemistry/lipid CBC Cardiac Enzymes/BNP/TSH/INR   Recent Labs   Lab Test 07/23/20  1520   CHOL 228*   HDL 31*   LDL 86   TRIG 690*     Recent Labs   Lab Test 07/23/20  1520 11/11/15  0559 01/06/15  1615   LDL 86 96 79     Recent Labs   Lab Test 10/17/21  0911 10/17/21  0549 10/16/21  2114   NA  --  135*  --    POTASSIUM  --  4.2  4.1  --    CHLORIDE  --  105  --    CO2  --  20*  --    * 362*   < >   BUN  --  25  --    CR  --  1.00  --    GFRESTIMATED  --  52*  --    DI  --  8.4*  --     < > = values in this interval not displayed.     Recent Labs   Lab Test 10/17/21  0549 10/14/21  0556 10/13/21  0522   CR 1.00 1.14* 0.87     Recent Labs   Lab Test 09/14/21  1717 05/06/21  1403 12/29/20  1723   A1C 9.0* 7.7* 7.0*          Recent Labs   Lab Test 10/16/21  0426 10/14/21  0556 10/14/21  0556   WBC  --   --  7.9   HGB 8.6*   < > 8.4*   HCT  --   --  29.1*   MCV  --   --  93   PLT  --   --  154    < > = values in this interval not displayed.     Recent Labs   Lab Test 10/16/21  0426 10/14/21  0556 10/13/21  0522   HGB 8.6* 8.4* 8.5*    Recent Labs   Lab Test 10/15/21  1141 10/11/21  1422 11/23/20  9909   TROPONINI <0.01 0.02 0.02     Recent Labs   Lab Test 10/11/21  1422 07/05/21  1354 12/11/20  1305    * 585* 404*     Recent Labs   Lab Test 10/12/21  0621   TSH 4.55     Recent Labs   Lab Test 10/17/21  0549 10/16/21  0426 10/15/21  1141   INR 3.12* 2.52* 2.52*        Medical History  Surgical History Family History Social History   Past Medical History:   Diagnosis Date     Abdominal bloating 8/21/2016     Anticoagulation goal of INR 2.5 to 3.5 1/27/2016     Anxiety      Aortic stenosis 10/26/2019     ASCVD (arteriosclerotic cardiovascular disease)      Atherosclerosis of native coronary artery without angina pectoris 10/26/2019     Bleeding diathesis (H)      Carotid artery stenosis, left      CHF (congestive heart failure) (H)      CKD (chronic kidney disease) stage 3, GFR 30-59 ml/min (H)      DM (diabetes mellitus), type 2 (H) 1990     Eczema      Former smoker      Full code status      History of metabolic encephalopathy with delirium 9/28/2021     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     Moderate tricuspid insufficiency 8/28/2021     MRSA (methicillin resistant staph aureus) culture positive 2/9/2017     Normocytic anemia      PAD (peripheral artery disease) (H)     [facility]   DATE: 10/26/2019 4:15 PM   EXAM:  DUPLEX ARTERIAL ULTRASOUND OF THE LOWER EXTREMITIES BILATERALLY   INDICATION: Leg pain, vasculopathy.   COMPARISON: None.   TECHNIQUE: Duplex imaging is performed utilizing gray-scale, two-dimensional images, and color-flow imaging. Doppler waveform analysis and spectral Doppler imaging is also performed.   DUPLEX  ARTERIAL ULTRASOUND FINDIN     Paroxysmal atrial fibrillation (H) 2015    Bilateral pulmonary vein isolation with AtriCure Synergy (bipolar radiofrequency ablation) device, exclusion of the left atrial appendage with the AtriCure AtriClip device.       Paroxysmal atrial fibrillation with RVR (H) 2021     PN (peripheral neuropathy)      Psoriasis      PVD (peripheral vascular disease) (H) 2019    Bilateral lower extremities.  See ultrasound 2019.     Recurrent UTI (urinary tract infection)      RLS (restless legs syndrome)      S/P CABG (coronary artery bypass graft) 2016     S/P colonoscopy 07     S/P MVR (mitral valve replacement) 2015     Subclavian artery stenosis, left (H) 2015    Stented in .      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     Ulcer of heel and midfoot, left, with unspecified severity (H)      Past Surgical History:   Procedure Laterality Date     AMPUTATE TOE(S) Left 2021    Procedure: AMPUTATION, second ray left foot;  Surgeon: Nba Cleveland DPM;  [place] [facility] [place]     APPENDECTOMY       BYPASS GRAFT ARTERY CORONARY       CARDIAC CATHETERIZATION  11/12/15      SECTION       CHOLECYSTECTOMY       COLONOSCOPY N/A 10/12/2016    Procedure: COLONOSCOPY;  Surgeon: Santiago Duran MD;  Location: [facility] GI;  Service:      COLONOSCOPY N/A 10/13/2016    Procedure: COLONOSCOPY with polypectomy & rectum biopsies;  Surgeon: Santiago Duran MD;  Location: [facility] GI;  Service:      COLONOSCOPY N/A 12/3/2017    Procedure: COLONOSCOPY with multiple polyp removal using hot snare and mansfield net and biopsy forcep;  Surgeon: Marcelo Wade MD;  Location: [facility] GI;  Service:      COLONOSCOPY N/A 3/13/2018    Procedure: COLONOSCOPY; POLYPECTOMY;  Surgeon: Marcelo Wade MD;  Location: [facility] OR;  Service:      EGD  10/14/2021     ESOPHAGOSCOPY, GASTROSCOPY, DUODENOSCOPY (EGD), COMBINED  N/A 10/12/2016    Procedure: ESOPHAGOGASTRODUODENOSCOPY with biopsies;  Surgeon: Santiago Duran MD;  Location[facility];  Service:      ESOPHAGOSCOPY, GASTROSCOPY, DUODENOSCOPY (EGD), COMBINED N/A 12/3/2017    Procedure: ESOPHAGOGASTRODUODENOSCOPY (EGD);  Surgeon: Marcelo Wade MD;  Location: [facility];  Service:      ESOPHAGOSCOPY, GASTROSCOPY, DUODENOSCOPY (EGD), COMBINED N/A 10/14/2021    Procedure: ESOPHAGOGASTRODUODENOSCOPY WITH STOMACH BIOPSIES;  Surgeon: Neo Mcghee MD;  Location: [facility] OR     PICC TRIPLE LUMEN PLACEMENT  10/12/2021          TONSILLECTOMY & ADENOIDECTOMY       UPPER GASTROINTESTINAL ENDOSCOPY       Family History   Problem Relation Age of Onset     Colon Cancer Father      Diabetes Father      Hypertension Mother      Heart Disease Mother          congestive heart failure     Arthritis Mother      Diabetes Sister      Heart Disease Brother      Rectal Cancer Son      Colon Cancer Son         Social History     Socioeconomic History     Marital status:      Spouse name: Not on file     Number of children:  4     Years of education: Not on file     Highest education level: Not on file   Occupational History     Not on file   Tobacco Use     Smoking status: Former Smoker     Years: 23.00     Types: Cigarettes     Smokeless tobacco: Never Used     Tobacco comment: quit 1970's   Substance and Sexual Activity     Alcohol use: No     Drug use: No     Sexual activity: Never   Other Topics Concern     Parent/sibling w/ CABG, MI or angioplasty before 65F 55M? Not Asked   Social History Narrative    Patient of Dr. Bach since 2001.   to  Aneudy.    4 children.    Former patient of Dr. Harshad Ballard.     Social Determinants of Health     Financial Resource Strain:      Difficulty of Paying Living Expenses:    Food Insecurity:      Worried About Running Out of Food in the Last Year:      Ran Out of Food in the Last Year:    Transportation Needs:       Lack of Transportation (Medical):      Lack of Transportation (Non-Medical):    Physical Activity:      Days of Exercise per Week:      Minutes of Exercise per Session:    Stress:      Feeling of Stress :    Social Connections:      Frequency of Communication with Friends and Family:      Frequency of Social Gatherings with Friends and Family:      Attends Jewish Services:      Active Member of Clubs or Organizations:      Attends Club or Organization Meetings:      Marital Status:    Intimate Partner Violence:      Fear of Current or Ex-Partner:      Emotionally Abused:      Physically Abused:      Sexually Abused:          Medications  Allergies   No current outpatient medications on file.        Allergies   Allergen Reactions     Amiodarone Other (See Comments)     TORSADES DE POINTES     Aspirin Other (See Comments)     Recurrent severe gastrointestinal bleed.         Ayanna Camacho MD

## 2021-10-17 NOTE — PROVIDER NOTIFICATION
"    RCAT Treatment Plan    Patient Score: 11    Patient Acuity: 3    Clinical Indication for Therapy:  Transmitted upper airway wheezing. No prior documented hx of pulm disease processes. IP pulm consulted with patient 10/16; suspected  \"possible vocal cord dysfunction with upper airway wheezing;  she may have some small airways disease with exacerbation (e.g. reactive airway disease vs. Asthma exacerbation.)\"    Therapy Ordered: DuoNeb QID + Albu Q6PRN    Assessment Summary: Initially admitted with GI bleed; subsequently AMS; respiratory distress as off 10/16. Possible CHF exac; IP pulm service questioned reactive airway disease vs. Asthma exacerbation vs vocal cord dysfunction. Continues to exhibit upper airway wheezing, unchanged post neb txs. Will continue DuoNeb for another 24 hours and re-assess need for nebs.        10/17/21 0212   RCAT Assessment   Reason for Assessment CHF  (No pulm hx; wheezing )   Pulmonary Status 2   Surgical Status 0   Chest X-ray 0   Respiratory Pattern 1   Mental Status 2   Breath Sounds 4   Cough Effectiveness 0   Level of Activity 2   O2 Required for SpO2>=92% 0   Acuity Level (points) 11   Acuity Level  3   Re-eval Interval Guideline Every 3 days   Breath Sounds   Breath Sounds All Fields   All Lung Fields Breath Sounds diminished  (Upper airway wheezing; ?fluid related)       /86 (BP Location: Right arm)   Pulse 109   Temp 98.1  F (36.7  C) (Axillary)   Resp 20   Wt 66 kg (145 lb 8 oz)   SpO2 97%   BMI 28.42 kg/m       Del Zelaya, RRT   "

## 2021-10-17 NOTE — PLAN OF CARE
Problem: Pain Chronic (Persistent)  Goal: Acceptable Pain Control and Functional Ability  Outcome: Improving: Pain had c/o back pain. PRN IV Dilaudid given x's 1 and PRN oral Dilaudid given x's 2 with good relief.    Problem: Hypertension Acute  Goal: Blood Pressure Within Desired Range  Outcome: Improving: BP: 130/81, 118/58 and 133/71    Problem: Hyperglycemia  Goal: Blood Glucose Level Within Targeted Range  Outcome: Improving: BS: 338, 391 and 305. New orders. Starting to trend down. Doctor updated.    Pt is on Mag and K+ Protocol. Magnesium 3 more doses, recheck on Tues AM and K+ re check tomorrow AM.  Pt had ECHO today, see results.  Pt continues with 1:1 sitter. Began to get really restless/agitated. When charge nurse went to give PRN Haldol Pt was calm. Haldol not given.   HR: Junctional Tachycardia earlier today now goes between Junctional Tachy and A-Fib.

## 2021-10-17 NOTE — PLAN OF CARE
Problem: Risk for Delirium  Goal: Improved Behavioral Control  Outcome: Improving     Problem: Bleeding (Gastrointestinal Bleeding)  Goal: Hemostasis  Outcome: Improving     Pt was restless and yelling, confused, redirectable and needs frequent reorientation. PRN Tylenol for right leg pain was helpful. Incontinent of urine twice and stool x3. BG- 254 and 289, sliding scale insulin given. Tele rhythm- Junctional Tachycardia, rates in the 110's. Lactic Acid- 1.2.

## 2021-10-17 NOTE — PROGRESS NOTES
Hospitalist Progress Note  ADMIT DATE: 10/11/2021     FACILITY: [facility]    PCP: Gabino Bach, [phone]    Assessment/Plan    Bernadette Yu is a 81 year old female who was admitted on 10/11/2021 with melena and increased confusion.     She has a PMH of mitral valve repair, CAD s/p CABG, heart failure, hypertension, paroxysmal atrial fibrillation on Warfarin, PVD, hypothyroidism, normocytic anemia, CKD 3, hyperlipidemia, anxiety, T2DM on insulin, chronic pain syndrome (on chronic narcotics) that presents 3 weeks after toe surgery with reports of patient being more confused and complaining of multiple different pains. On oral dilaudid for long time.     Problem List:      Dementia with aggressive behavior, confusion, agitation  1:1  Moaning this am, confused  Progressive memory difficulty recently  Pt is requiring 24 hour care from her sons before admission  They give her all of her medications  Recent discharge from the [facility] after recent foot surgery - family brought home to provide 24 hour care  Neurology consultation appreciated  ?vascular dementia, delirium, metabolic encephalopathy  -ct, mri didn't show acute changes, some posttraumatic changes although family cannot recall any head trauma history except frequent falls     Melena  -Chronically anemic with reports of melena and hemorrhoid pain  -GI consult requested - appreciated  -PPI bid  - EGD wnl  -back on diet  -resume Warfarin     Acute blood loss anemia  H/o anemia of chronic disease (hgb 9-11)  Type and screen  No further melena noted per nursing  Transfused 1 unit PRBC  hgb stable  Close monitoring     AF with RVR  HR not in good control.   Will increase Metoprolol from 50 mg bid to 75 mg bid  Diltiazem ER 180m PO daily, increase to 240mg by cardiologist  Metoprolol 2.5 mg IV q6h prn  Cardiologist consultation   INR now therapeutic    H/o Mitral Valve repair  Goal for INR 2.5-3.5    Respiratory  "distress  -Multiple factors (aspiration, hypoventilation syndrome, fluid overload, reactive airway?)  -Pulm consulted - start on steroid, nebs, lasix      Hypomagensium   Hypokalemia  Replacement as per protocol     H/o diastolic CHF  -Last TTE LVEF 50 to 55% on 7/22/2021 with evidence of moderate to severe tricuspid regurgitation, moderate pulmonary hypertension  Has been give 2 doses of IV lasix  Appears more clinically dry now.  Will give gentle IVF while NPO.     Will hold of further iv diuretics at this time        Chronic pain syndrome  Family states that she has takes narcotics for \"many years\"  It appears that she had po prn dialudid  Not clear how much she was taking on a daily basis  I have some concern that she is having some narcotic withdrawal contributing to tachyarrythmia  Will order prn iv dilaudid to have available  C/o cp,  Trop wnl      Leukocytosis, resolved  -WBC 14.7 with mildly abnormal urinalysis.    No pneumonia seen on chest x-ray.  Foot does not appear infected  -Procalcitonin WNL  No clear infectious process identified     Hypotension, resolved  H/o HTN  Noted to have been given IV lasix dose x 2 on admission - overall volume status appears euvolemic s/p RBCs overnight  - monitor carefully      Hypothyroidism  -continue PTA synthroid      Normocytic anemia  -On iron therapy at home, hemoglobin 9.1 but she fluctuates between 9 and 11  -now around 8.4     CKD 3  -Serum creatinine 1.14  -creat improved today   BMP in the am      Hyperglycemia   T2DM on insulin chronically  -worse due to steroid  -Grossly uncontrolled, last A1c 9.0 on 9/14/2021  -Previously on Novolog Mix 70/30 30 units a.m. and 15 units p.m., which were not ordered in hospital, due to poor and unpredictable oral intake; Low dose lantus was started initially  -increase lantus due to worsening glucose level due to steroid, and increase prandial and night correction ssi  -glucose in 200-350 range  -Consult DM " educator      Recent toe amputation  Appears to be healing well with no gross s/s of active infection     Diet:  Combination Diet Regular Diet Adult; Minced and Moist Diet (level 5); Liquidized/Moderately Thick (level 3)  Still cough/vomit on oral intake  Palliative care consultation     DVT Prophylaxis: warfarin   Briceño Catheter: Not present  Code Status: Full Code        Barriers to Discharge:  ams, poor oral intake, chest pain, a fib rvr, AMS     Anticipated discharge date/Disposition: 2-3 days, need placement; need to clarify goal of care (palliative care consultation)       Subjective  Moaning and confused    Objective    Vital signs in last 24 hours  Temp:  [97.5  F (36.4  C)-98.3  F (36.8  C)] 97.8  F (36.6  C)  Pulse:  [109-111] 110  Resp:  [18-26] 22  BP: (115-159)/(58-88) 132/64  SpO2:  [96 %-99 %] 98 % [unfilled] O2 Device: None (Room air)    Weight:   [unfilled] Weight change:     Intake/Output last 3 shifts  I/O last 3 completed shifts:  In: 243 [P.O.:240; I.V.:3]  Out: 175 [Urine:175]  Body mass index is 28.42 kg/m .    Physical Exam    Physical Exam  Vitals and nursing note reviewed.   Constitutional:       General: She is not in acute distress.     Appearance: She is obese. She is ill-appearing. She is not toxic-appearing or diaphoretic.   HENT:      Head: Normocephalic.      Right Ear: External ear normal.      Left Ear: External ear normal.      Nose: Nose normal.      Mouth/Throat:      Mouth: Mucous membranes are moist.   Eyes:      General:         Right eye: No discharge.         Left eye: No discharge.   Cardiovascular:      Rate and Rhythm: Tachycardia present. Rhythm irregular.      Pulses: Normal pulses.   Pulmonary:      Effort: Respiratory distress present.      Breath sounds: Wheezing and rhonchi present.   Abdominal:      General: There is distension.   Musculoskeletal:      Cervical back: Neck supple. No rigidity.      Right lower leg: No edema.      Left lower leg: No edema.    Skin:     General: Skin is warm.   Neurological:      General: No focal deficit present.   Psychiatric:         Attention and Perception: She is inattentive.         Behavior: Behavior is agitated.         Cognition and Memory: Cognition is impaired. Memory is impaired.             Pertinent Labs   Lab Results: personally reviewed.     Results for ANNA MARIE VALENTIN (MRN [id number]) as of 10/17/2021 09:15   Ref. Range 10/16/2021 21:14 10/16/2021 21:45 10/17/2021 05:49   Potassium Latest Ref Range: 3.5 - 5.0 mmol/L   4.1   Magnesium Latest Ref Range: 1.8 - 2.6 mg/dL   1.8   Lactic Acid Latest Ref Range: 0.7 - 2.0 mmol/L  1.2    GLUCOSE BY METER POCT Latest Ref Range: 70 - 99 mg/dL 289 (H)     INR Latest Ref Range: 0.85 - 1.15    3.12 (H)     Medications  Scheduled Meds:    diltiazem ER COATED BEADS  240 mg Oral Daily     furosemide  20 mg Intravenous Q12H     [Held by provider] gabapentin  100 mg Oral At Bedtime     HYDROmorphone  0.3 mg Intravenous BID     influenza vac high-dose quad  0.7 mL Intramuscular Prior to discharge     insulin aspart  1-7 Units Subcutaneous TID AC     insulin aspart  1-5 Units Subcutaneous At Bedtime     insulin glargine  10 Units Subcutaneous QAM AC     ipratropium - albuterol 0.5 mg/2.5 mg/3 mL  3 mL Nebulization 4x daily     levothyroxine  125 mcg Oral Daily     magnesium oxide  400 mg Oral BID     melatonin  3 mg Oral Q24H     methylPREDNISolone  40 mg Intravenous Q8H     metoprolol tartrate  75 mg Oral BID     pantoprazole (PROTONIX) IV  40 mg Intravenous BID     sodium chloride (PF)  3 mL Intracatheter Q8H     temazepam  7.5 mg Oral At Bedtime     vitamin B1  100 mg Oral Daily     Continuous Infusions:    Warfarin Therapy Reminder       PRN Meds:.acetaminophen, albuterol, glucose **OR** dextrose **OR** glucagon, haloperidol lactate, HYDROmorphone, HYDROmorphone, lidocaine 4%, lidocaine (buffered or not buffered), melatonin, metoprolol, naloxone **OR** naloxone **OR** naloxone **OR**  naloxone, prochlorperazine **OR** prochlorperazine **OR** prochlorperazine, sodium chloride (PF), Warfarin Therapy Reminder      [facility] Medicine Service  Dennis Gunn

## 2021-10-17 NOTE — PROGRESS NOTES
[facility]  Neurology Consultation - Progress Note    Patient Name:  Bernadette Yu  Date of Service:  October 17, 2021    Subjective:    No acute events overnight.  MRI showed just right temporal encephalomalacia that is likely posttraumatic.  It was also significantly degraded by motion artifact.  Per bedside attendant patient had a rough night and was more agitated.  For me she is calm and denies any acute complaints.  She asked where I have been.  She was evaluated by cardiology for atrial fibrillation, and pulmonary for wheezing.  Started on steroids.    Objective:    Vitals: /81 (BP Location: Right arm)   Pulse 110   Temp 97.5  F (36.4  C) (Tympanic)   Resp 22   Wt 66 kg (145 lb 8 oz)   SpO2 96%   BMI 28.42 kg/m    General: Lying in bed, awakens easily and is conversant.  Does not appear distressed.  Head: Atraumatic, normocephalic   Cardiac: Mild lower extremity edema and loud cardiac murmur heard.  Respiratory: Did not hear wheezing today  Neurologic:  Awake, alert, oriented to person and hospital but cannot name specific hospital.  Her dysarthric.  Face is symmetric.  Pupils are reactive.  Is able to name simple objects, but not low-frequency words.  Able to follow all simple commands today.  He does have difficulty with two-step cross commands.    Pertinent Investigations:    I have personally reviewed most recent and pertinent labs, tests, and radiological images.     Assessment  #Dementia: unspecified subtype  #Delirium  #Toxic-metabolic encephalopathy    81-year-old female with history of severe dementia requiring near 24-hour cares who neurology was consulted for encephalopathy.  Based on my exam and when I heard from family is not clear for daytime mental status is significantly below her baseline.  However, she certainly is becoming more agitated and delirious in the evenings and with sleep transitions.  She does have risk factors including multiple  CNS acting medications.  She is also recently started on steroids.  Her exam and Command following is not consistent with nonconvulsive status epilepticus, and given that her mental status consistently worsens in the evening suspect it is more likely delirium.  MRI with no clear explanatory lesion.  At this time would focusing on minimizing toxic metabolic risk factors, and treating underlying delirium.    Recommendations:   -Limit CNS acting medications such as gabapentin, opioids, benzodiazepines as able while monitoring for withdrawal.  -Steroids may worsen underlying delirium and will need monitoring.  -Continue scheduled melatonin.  - Would recheck creatinine as was uptrending on last check.  (added on)  -Inpatient neurology will sign off at this time, please call if mental status worsens or further questions or concerns.    Thank you for involving Neurology in the care of Bernadette NIMCO Yu.      Seamus Smith DO  Neurolgoy

## 2021-10-17 NOTE — PROGRESS NOTES
Care Management Follow Up    Length of Stay (days): 6    Expected Discharge Date: 10/19/2021     Concerns to be Addressed: Medical progression, discharge planning      Patient plan of care discussed at interdisciplinary rounds: Yes    Anticipated Discharge Disposition:  TCU     Anticipated Discharge Services:  Skilled RN, PT, OT  Anticipated Discharge DME:  Unknown at this time    Patient/family educated on Medicare website which has current facility and service quality ratings:  Yes  Education Provided on the Discharge Plan:  Yes, per treatment team  Patient/Family in Agreement with the Plan:  Yes  Referrals Placed by CM/SW:  TCU  Private pay costs discussed: Yes, transportation    Additional Information:    SURY completed a chart review.  Pt needing TCU at time of discharge which family is agreeable to.  Barrier for placement is need for 1:1 supervision.  CM continues to follow care progression, review recommendations, and follow up on TCU referrals.       YUE Galicia

## 2021-10-17 NOTE — PROGRESS NOTES
Unable to obtain accurate output due to patient's incontinence. Pt was pulling out purewick, so unable to replace until patient more calm mid shift, therefore 175ml output charted is what was in purewick suction cannister after an extra large incontinence mid shift.

## 2021-10-17 NOTE — PLAN OF CARE
Problem: Risk for Delirium  Goal: Optimal Coping  Outcome: No Change   Problem: Risk for Delirium  Goal: Improved Behavioral Control  Outcome: No Change   Problem: Risk for Delirium  Goal: Improved Attention and Thought Clarity  Outcome: No Change   Problem: Risk for Delirium  Goal: Improved Sleep  Outcome: No Change     Pt continues to be confused, oriented to self and place at best. Pt restless, yelling out for MD Sun all night when not sleeping, trying to get OOB, swinging legs over rails, attempting to pull at lines. Pt continues to need 1:1 sitter for safety and prevent from pulling IV line out as pt has pulled out multiple ones previously. Pt did sleep for a couple of hours at start of shift, but has majority been awake and restless.    Pt's breathing appears to be improving, although still appears to be using accessory muscles to breathe. No more upper airway wheezing heard at end of this shift.     Problem: Dysrhythmia  Goal: Normalized Cardiac Rhythm  Outcome: No Change     Pt continues to be junctional tachycardia on telemetry.  VSS.    Problem: Suicide Risk  Goal: Absence of Self-Harm  Outcome: Completed    Pt does not have any suicidal ideation or has made any attempts to self hard. This care plan is completed/resolved.

## 2021-10-18 NOTE — PLAN OF CARE
Problem: Risk for Delirium  Goal: Optimal Coping  Outcome: No Change  Goal: Improved Behavioral Control  Outcome: No Change    Pt was orientated to herself only.  Gave dilaudid IV  1250 for ankle pain with good effect.  Pt when awake is restless swung legs around.  Kept asking for drinks over and over.  Drinks were given.  At breakfast pt had a small emesis during the meal of oatmeal.  No coughing was noted.  Purwick intact 450cc urine.

## 2021-10-18 NOTE — PROGRESS NOTES
CLINICAL NUTRITION SERVICES - REASSESSMENT NOTE     Nutrition Prescription    RECOMMENDATIONS FOR MDs/PROVIDERS TO ORDER:  Recommend adding low saturated fat Na <2400mg diet    Malnutrition Status:    Patient does not meet two of the established criteria necessary for diagnosing malnutrition but is at risk for malnutrition    Recommendations already ordered by Registered Dietitian (RD):  Changed consistent carbohydrate diet to 60 grams CHO per meal, ordered Gelatein 20 with lunch    Future/Additional Recommendations:  Continue to monitor PO intake and POC     EVALUATION OF THE PROGRESS TOWARD GOALS   Diet: Moderate Consistent Carbohydrate, Level 5: Minced & Moist Dysphagia Diet and Mildly Thick Thickened Liquids     Intake: Pt ate 100% of chicken breast and green beans for lunch 10/18. Pt has been consuming 25 - 100% of meals per RN flow sheets.  Estimate pt consuming about 850 kcal and 40 gm protein per day based on meals ordered and RN flow sheets.      NEW FINDINGS   Pt was confused and unable to answer questions. Asked sitter to document intake in food record.    Weight:   10/15/21 0318 66 kg (145 lb 8 oz)   10/13/21 0630 66.1 kg (145 lb 12.8 oz)   10/11/21 1337 63.5 kg (140 lb)     Wt Readings from Last 10 Encounters:   10/15/21 66 kg (145 lb 8 oz)   09/28/21 67.8 kg (149 lb 6.4 oz)   09/25/21 67.8 kg (149 lb 6.4 oz)   09/14/21 63.5 kg (140 lb)   09/02/21 63.5 kg (140 lb)   08/31/21 67.5 kg (148 lb 14.2 oz)   08/24/21 67.1 kg (148 lb)   07/05/21 76.2 kg (168 lb)   05/06/21 66.7 kg (147 lb)   03/04/21 68.9 kg (152 lb)   Wt loss of 2.7% in 2 weeks using 9/28 wt of 67.8 kg and 10/15 wt of 66 kg    Labs:  Glucose by meter: 210 - 391    MALNUTRITION  % Intake: < 75% for > 7 days (moderate)  % Weight Loss: Weight loss does not meet criteria  Subcutaneous Fat Loss: Unable to assess - pt with agitation and confusion  Muscle Loss: Unable to assess - pt with agitation and confusion  Fluid Accumulation/Edema: Does not  meet criteria  Malnutrition Diagnosis: Patient does not meet two of the established criteria necessary for diagnosing malnutrition but is at risk for malnutrition    Previous Goals   Diet advancement vs nutrition support within 2-3 days. - met  Patient to consume % of nutritionally adequate meals three times per day, or the equivalent with supplements/snacks. - not met    Previous Nutrition Diagnosis  Altered nutrition-related laboratory values related to DM as evidenced by elevated blood glucose    Evaluation: No change    CURRENT NUTRITION DIAGNOSIS  Altered nutrition-related laboratory values related to DM as evidenced by elevated blood glucose      INTERVENTIONS  Implementation  Changed consistent carbohydrate diet to 60 grams CHO per meal, ordered Gelatein 20 with lunch    Goals  Patient to consume % of nutritionally adequate meal trays TID, or the equivalent with supplements/snacks.    Monitoring/Evaluation  Progress toward goals will be monitored and evaluated per protocol.    Latha Byrne  Dietetic Intern

## 2021-10-18 NOTE — PROGRESS NOTES
Cardiology Progress Note    Assessment/Plan:    1. Persistent/permanent atrial fibrillation/flutter with rapid ventricular response.  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  We will continue present regimen.  2. Valvular heart disease.  Severe tricuspid insufficiency, possibly severe aortic valve stenosis, increased gradient across prosthetic mitral valve.  Still with supratherapeutic INR  3. Melena with ?acute blood loss anemia, currently stable  4. Toxic encephalopathy  versus dementia.  Appears to be a poor candidate for intervention on valvular disease from a mental status standpoint.    No changes made today.    Active Problems:    Upper GI bleed     LOS: 7 days     Subjective:  Reports left hip pain.  Conversant, with largely nonsensical speech.  Moves all extremities without difficulty.  Follows most commands.  Recalls that she has 5 children, unable to name them.    Objective:   Vital signs in last 24 hours:  Vitals:    10/18/21 0211 10/18/21 0308 10/18/21 0743 10/18/21 1103   BP: 107/52 115/67 117/69 118/69   BP Location:  Right arm Right arm Right arm   Pulse:  111 112 115   Resp:  20 18 17   Temp:  97.5  F (36.4  C) 97.6  F (36.4  C) 98.6  F (37  C)   TempSrc:  Axillary Oral Axillary   SpO2:  96% 95% 95%   Weight:         Weight:   Wt Readings from Last 3 Encounters:   10/15/21 66 kg (145 lb 8 oz)   09/28/21 67.8 kg (149 lb 6.4 oz)   09/25/21 67.8 kg (149 lb 6.4 oz)           PHYSICAL EXAM    Oriented to 2021 ,calls me Dr. Jackson.  Believes she has been  to her  (present) for 30 years.   reports this is actually 60 to 70 years, he is uncertain  Appears comfortable with head of the bed elevated.  Respiratory: Shallow breath sounds, No respiratory distress, No wheezing, No chest tenderness.   Cardiovascular:   Rapid, regular heart rate, harsh 3/6 systolic murmur upper right sternal border.  Crisp prosthetic tones at apex.    GI: Obese.  Bowel sounds normal, Soft, No  tenderness, No masses  Extremities: Trace pretibial edema       Cardiographics:   Telemetry atrial flutter, flat rate at 115 bpm.     Echocardiogram 10/18:  1. The left ventricle is normal in size. Left ventricular systolic performance  is mild to moderately reduced. The ejection fraction is estimated to be 40-45%  2. There is mild to moderate global reduction in left ventricular systolic  performance.  3. There is mild concentric increase in left ventricular wall thickness.  4. There is at least moderate, if not severe, aortic stenosis.  Â  The mean gradient is measured at 22 mmHg with peak velocity of 2.7 m/s.  Calculated valve area 0.67 cmÂ . Dimensionless index measured at 0.21. It is  felt that the mean gradient and peak velocity likely underestimate the true  severity in the setting of mild-moderately reduced left ventricular systolic  performance (stroke-volume index 26.4 mL/mÂ ).  5. There is trace aortic insufficiency.  6. There is a mechanical mitral valve prosthesis (documented 23 mm [employer] valve).  Â  The mean gradient across the mitral valve prosthesis is mildly increased  from expected at 8 mmHg.  Â  Probable trace-mild prosthetic mitral insufficiency (not well visualized due  to acoustic artifact generated by the mitral valve prosthetic sewing ring).  7. There is severe tricuspid insufficiency.  8. There is mild-moderate right ventricular enlargement with mildly reduced  right ventricular systolic performance.  9. There is mild-moderate biatrial enlargement.  10. Although RV systolic pressure is not increased relative to RA pressures,  RA systolic pressures are likely elevated as a result of severe tricuspid  insufficiency. In addition, the IVC appears dilated with decreased phasic  variation in caval diameter consistent with elevated right atrial pressure.     When compared to the prior real-time echocardiogram dated 22 July 2021, there  has been a mild interval diminution in left  ventricular systolic performance.  The degree of tricuspid insufficiency has increased. The mean gradient  obtained across the aortic valve is less when compared to the prior  examination though likely reflects an interval diminution in left ventricular  systolic performance (stroke-volume index measured at 52.8 mL/mÂ  on the prior  study and now 26.4 mL/mÂ ). The mean gradient across the aortic valve was  measured at 34 mmHg on the prior examination. The mean gradient measured  across the mitral valve prosthesis is slightly increased.    Imaging:   EXAM: XR CHEST PORTABLE 1 VIEW  LOCATION: [facility]  DATE/TIME: 10/16/2021, 10:11 AM  INDICATION: Wheezing,  COMPARISON: Chest x-rays, most recently 10/15/2021.  FINDINGS: EKG wires overlie the chest. Median sternotomy and mediastinal surgical clips. The inferior most sternotomy wire is fractured. Left atrial appendage occlusion clip. Mitral valve replacement.  Unchanged mild enlargement of the cardiac silhouette. Low lung volumes with interstitial crowding. However, there is also likely mild pulmonary vascular congestion. No focal airspace opacities. No pleural effusion or pneumothorax.                                                        IMPRESSION:   1.  Unchanged mild enlargement of the cardiac silhouette.  2.  Evaluation is limited by low lung volumes resulting in interstitial crowding. However, there is probable pulmonary vascular congestion.    Lab Results:   Lab Results   Component Value Date    WBC 7.9 10/14/2021    HGB 8.6 (L) 10/16/2021    HCT 29.1 (L) 10/14/2021     10/14/2021    CHOL 228 (H) 07/23/2020    TRIG 690 (H) 07/23/2020    HDL 31 (L) 07/23/2020    ALT 19 10/13/2021    AST 29 10/13/2021     (L) 10/17/2021    BUN 25 10/17/2021    CO2 20 (L) 10/17/2021    TSH 4.55 10/12/2021    INR 4.70 (H) 10/18/2021     Lab Results   Component Value Date    TROPONINI <0.01 10/15/2021         Marcus Tsai MD  FAC  10/18/2021

## 2021-10-18 NOTE — CONSULTS
DIABETES CARE  Situation:  Consulted by Provider for Diabetes Education  81 year old woman with type 2 Diabetes. Patient was admitted for melena and increased confusion.     Background:  Related Co-morbidities include: mitral valve repair, CAD s/p CABG, heart failure, hypertension, paroxysmal atrial fibrillation on Warfarin, PVD, hypothyroidism, normocytic anemia, CKD 3, hyperlipidemia, anxiety, T2DM on insulin, chronic pain syndrome (on chronic narcotics) that presents 3 weeks after toe surgery   PCP: Gabino Bach MD  Social: Received 24/7 care from her sons; now plan is TCU    Meds for BG Management PTA:  30 units of Novolog 70/30 Mix in am  15 units of Novolog 70/30 Mix in pm    Current Inpatient Meds for BG Management:  20 units of Lantus bid  5 units of Novolog at meals  Novolog correction scale: 1/25 > 140    Also ordered:  Prednisone 40 mg ever am through Wednesday    Labs:  Hemoglobin A1C: 9.0 9/14               GFR: 52   Blood Glucose POC: Results for ANNA MARIE VALENTIN (MRN [id number]) as of 10/18/2021 10:54   Ref. Range 10/17/2021 09:11 10/17/2021 13:10 10/17/2021 16:16 10/17/2021 21:53 10/18/2021 07:37   GLUCOSE BY METER POCT Latest Ref Range: 70 - 99 mg/dL 338 (H) 391 (H) 304 (H) 210 (H) 291 (H)       Diet Order:  75 grams CHO   Intake: %   Weight: 66 kg    BMI: 28.42    DM EDUCATION/COUNSELING:  Barriers to Learning and/or DM Self-Management: confused, 1:1, progressive memory loss    Consult for uncontrolled diabetes on steroids  Her home insulin total daily is 45 units  Presently on 40 units of basal insulin and only 15 units of Novolog (total; 5 units at each meal) at meals  AM Prednisone's effect is largely on post meal BG, greatest effect late afternoon.  Its effect on BG weans off overnight.    Assessment  Needs an insulin adjustment to help with BG rise in day created by am steroid.      Recommendations:  1. Could increase mealtime I:C ratio to help with BG rise in day or could add a shot  "of NPH in am to be given with the Prednisone and when Prednisone is done, NPH is done  2. When steroid is done, Lantus will need to be decreased.  3. Assess BG pattern daily and adjust doses as needed.    Refer to \"Guidelines for Insulin Initiation and Care in Hospitalized Adults\"  link in Diabetes Management Order set for dosing guidelines    Hospital BG goals for blood glucose levels are < 180 for improved health outcomes.    Thank you,     Lou Goldstein RN, Certified [place] [address]  [email or web address]   Office: [phone]  Pager: [phone]                                            "

## 2021-10-18 NOTE — PROGRESS NOTES
[facility]  Palliative Care Progress Note    Patient: Bernadette Yu  Date of Admission:  10/11/2021       Recommendations       1)   GOALS OF CARE are .   ? Restorative, family discussing Code status and comfort focused care tonight        2)    ADVANCED CARE PLANNING  ? Patient has completed health care directive: N  ? Surrogate/Documented health care agent: Sarbjit and  brother Riley .  Donovan has dementia and relies on kids  ? Code Status:Full Code  TBD      3)    SYMPTOM MANAGEMENT    ? Pain- all over body, she has been on Dilaudid 2 mg 4-5 pills a day for 15 years  ? Comment:  Today pain is in abdomen  ? Recommendations   Dilaudid 2 mg BID to avoid withdrawal  ?   ? Agitation related to potential withdrawal and dementia:   ? Comment:   ? Recommendations:   ? Haldol ordered PRN  ? Do not use ativan since she is on Opioids  ? She has not done well with Seroquel in the past. Made symptoms worse. I discontinued it       Generalized weakness, and worsening walking which is likely related to her underlying blood loss and A. Fib.   o Comment:  o Recommendations:  o PT when able  o     AMS due to Probable Toxic metabolic encephalopahty (combination of medications, anemia, hyperglycemia, and possible respiratory component)   o Comment:Neuro suspect much of her encephalopathy is actually related to agitation in the evening (sundowning).  MRI showed just right temporal encephalomalacia that is likely posttraumatic  o Recommendations:    Avoid benzodiazepines, antihistamines, anticholinergics if able.    Lights on and blinds open during the day.  Reorient frequently.    Lights & TV off during the night.  Promote normal circadian rhythm.    Limit sensory deprivation - utilize hearing aids, glasses, etc.     Frequently assess unmet bathroom needs if applicable.      4)    PSYCHOSOCIAL/SPIRITUAL SUPPORT  ? Will request spiritual care support    ?  palliative social work support for  "  ? Palliative medicine will continue to follow    These recommendations will be discussed with Dr. Gunn      Thank you for the opportunity to participate in the care of this patient and family. Our team: will continue to follow.     During regular M-F work hours -- if you are not sure who specifically to contact -- please contact us by calling us directly at the Palliative Care Main Line [phone]    After regular work hours and on weekends/holidays, you can leave a message at [phone]         Assessments/HPI        Bernadette Yu is a 81 year old female with PMH of mitral valve repair,aortic stenosis, severe cognitive impairment,  CKD 3, CAD s/p CABG, heart failure, Pulmonary hypertension, paroxysmal atrial fibrillation on Warfarin, PVD, hypothyroidism, normocytic anemia, hyperlipidemia, anxiety, T2DM on insulin, chronic pain syndrome (on chronic narcotics for many years) that presents 3 weeks after toe surgery with reports of patient being more confused and complaining of multiple different pains such as hemorrhoid pain and melena.  Recent toe amputation(osteomyelitis)  that appears to be healing well with no gross s/s of active infection and concern for confusion from family.     Per Neuro: severe vascular dementia based on family report over at least the last 2 years     Recent Hospitalizations:  Last inpatient 1 year ago    Interim Hx  -10/17 Pt continues with 1:1 sitter. Began to get really restless/agitated. When charge nurse went to give PRN Haldol Pt was calm. Haldol not given.HR: Junctional Tachycardia earlier today now goes between Junctional Tachy and A-Fib.    GI:  Chart reviewed. No SLP re-evaluation and patient still coughing/\"vomiting\" with oral intake. No further melena and will start back on warfarin tonight. Hemoglobin stable.    Cards:  Tachycardia/history of atrial fibrillation: Accelerated junctional rhythm versus 2-1 atrial flutter with continued mild elevated ventricular " response.Could consider TAVR if patient clinically improves.  Work-up for this would all be done as outpatient if she clinically improves.    Neuro: Neurologic:  Awake, alert, oriented to person and hospital but cannot name specific hospital.  Her dysarthric.  Face is symmetric.  Pupils are reactive.  Is able to name simple objects, but not low-frequency words.  Able to follow all simple commands today.  He does have difficulty with two-step cross commands.    Pulmonary:  She had some transmitted upper airway sounds, possible vocal cord dysfunction with upper airway wheezing.   Recent CT chest showed findings consistent with CHF, with pulmonary edema as well as air trapping. She may have some small airways disease with exacerbation (e.g. reactive airway disease vs. Asthma exacerbation).  Her normal CXR and ABG and the fact that she's on room air with normal oxygenation are reassuring.   Complicating the issue is her encephalopathy likely due to toxic/metabolic causes, and too many sedating meds on board.  We treated empirically for reactive airway disease vs. Small ariways disease/asthma with steroids and nebs and some Lasix for pulmonary edema. She is doing much better today. Now at baseline   Prognosis, Goals, & Planning:               Patient has a completed Health Care Directive: Reportedly yes, but not available to us currently.      Code status: Full Code      Goals of Care Discussion  10/18  I spoke to 2 sons and  on phone today. I updated them on the above interim hx.  I explained that the MRI showed Right Temporal Encephalomalacia  And explained that this is not a good prognostic indicator and it is very likely that she would be able to return home.   I discussed Memory Care units and indicated that this would be a very good recommendation for after Discharge.  I discussed the cardiac  Issues and the potential recommendation of TAVR and they would not want that option.   I again discussed CPR and per  "the son, his father Donovan became very upset hearing this conversation (he has dementia) and he recommended we discuss this further tomorrow after they have a chance to explain it to their dad.  I reiterated that this is a \"what if \" discussion but based on her current Afib then Junctional rhythm issues, this is a concern.    I explained that she is on 1:1 and actively having a cardiac workup so this is where she currently needs to be.  We will talk again tomorrow.      10/15 Daughter in law states that she has been taking Dilaudid 2.0 mg  Four to six times a day (it was ordered 4x PRN or 6 x prn for severe pain).  They are looking for memory care for both parents to go live. Code status Discussion:I I explained to daughter in law and her  Riley  That they  can influence her  future health care now by making certain choices regarding future procedures, one being code status..I explained the urgent nature of CPR and that this decision to not even start it need to be made ahead of time as the default is to do everything.  I explained that I am not currently concerned about her heart as this discussion today is \"What If\"  I explained that if CPR is needed, at the time it is needed I.e when heart stops) then it is an urgent matter and too short of time to obtain her opinion then. . I indicated given her  small chest frame, and potential cancer , CPR may cause her more harm than good and based on the amount of time it may take to restart his heart,, she may end up with a significant change of medical and functional status  due to lack of blood flow to vital organs during CPR.  I also explained that choosing DNR does not mean nothing is done. She can still have treatment of cardiac arrhythmias or low BP as medications for that will not cause more harm than good.    10/15  Hospice discussion:  I also discussed  Hospice services, the focus of care (comfort), care delivery (short  Visits by certified  providers (Hospice " team) and where services are held (persons home, LTC or ASL hospice house   (latter two are private pay for room and board) and qualifying Dx.     Coping, Meaning, & Spirituality:         Mood, coping, and/or meaning in the context of serious illness were addressed today: Yes          ROS        [facility] Symptom Data:    10/18   Pain Improved  Nausea none  Anxiey mild    10/15   # Pain severity the last 12 hours: severe  # Nausea severity the last 12 hours: low  # Anxiety severity the last 12 hours: moderate    Patient is on opioids: assessed and bowels ok/no needed changes to plan of care today.    Comprehensive ROS is reviewed and is negative except as here & per HPI:         Medications:  I have reviewed this patient's medication profile and medications from this hospitalization.            Physical Exam:     GENERAL: laying in bed, calm, smiles at me.     SKIN: Warm and dry   HEENT: Normocephalic, anicteric sclera, moist mucous membranes  LUNGS: Clear to auscultation anterolaterally; non-labored, on O2 by RA  CARDIAC: RRR, normal s1/s2, w/o m/r/g   ABDOMINAL: BS (+), large, soft, non distended, non tender  : koch in place  MUSKL: no gross joint deformities   EXTREMITIES: redness on r shin, 2nd toe amp on left foot pulses 2+ and symmetrical  NEUROLOGIC:Alert   .  Oriented to person, and hospital but not month or year.  She is able to name simple objects.  She is not able to follow two-step cross commands.  She is able to follow simple commands.  Simple repetition intact.    PSYCH: Euthymic      /69 (BP Location: Right arm)   Pulse 112   Temp 97.6  F (36.4  C) (Oral)   Resp 18   Wt 66 kg (145 lb 8 oz)   SpO2 95%   BMI 28.42 kg/m         Data Reviewed:      Recent imaging reviewed, my comments on pertinents:   EF 50% severe tricuspid and mod pulm htn  Qtc 489       Recent lab data reviewed, my comments on pertinents:     GFR 55 improved        ====================================================  TT: I have personally spent a total of 35   minutes on the unit in review of medical record, consultation with the medical providers and assessment of patient today, with more than 50% of this time spent in counseling, coordination of care, and discussion with son and DIL on phone re: diagnostic results, prognosis, symptom management, risks and benefits of management options, and development of plan of care as noted above.    Total Visit Time:35 min plus 30 min phone call  o For Inpatient, 'Total Visit Time' = Unit Floor + Face-to-Face time.    Total Face-to-Face Prolonged Service Time:65 min     Content of the Prolonged Time: GOC     ====================================================    ELVIS Collins, NP-C, ACHRAFAT   [facility]  Palliative Medicine  Office: [phone]

## 2021-10-18 NOTE — PROGRESS NOTES
Care Management Follow Up    Length of Stay (days): 7    Expected Discharge Date: 10/22/2021 pending     Concerns to be Addressed: confusion, agitation, 1:1 status      Patient plan of care discussed at interdisciplinary rounds: Yes    Anticipated Discharge Disposition:  TCU vs LTC pending ability to participate in therapy     Anticipated Discharge Services:  TBD  Anticipated Discharge DME:      Patient/family educated on Medicare website which has current facility and service quality ratings:  yes  Education Provided on the Discharge Plan:  yes  Patient/Family in Agreement with the Plan:  yes        Additional Information: Patient admitted for agitation, a-fib, GI bleed. On 1:1. Cardiology, Neurology, GI, Psychiatry and Palliative consult.     Assessment History: Prior to hospitalizations, patient was living with her spouse in their home with 24 hour a day care from her 2 sons. Patient's spouse has demential. [facility] Riley and his spouse-Abraham are main contacts. [facility] for transportation.     Spoke with Abraham and Riley today 10/18. Reviewed options between TCU, LTC and memory care. If patient able to participate in therapy and off 1:1, TCU would be appropriate discharge plan. If unable to participate in therapy LTC would be appropriate. Riley does not feel patient can return home unless she can perform ADLs relatively independently. Their ultimate goal is to get patient and spouse into LTC or memory care unit.      Will need to have patient off 1:1 in order for TCU or LTC to review. CM to fax and call referrals once patient is off 1:1.         Jade Montoya RN

## 2021-10-18 NOTE — PROGRESS NOTES
Hospitalist Progress Note  ADMIT DATE: 10/11/2021     FACILITY: [facility]    PCP: Gabino Bach, [phone]    Assessment/Plan    Bernadette Yu is a 81 year old female who was admitted on 10/11/2021 with melena and increased confusion.     She has a PMH of mitral valve repair, CAD s/p CABG, heart failure, hypertension, paroxysmal atrial fibrillation on Warfarin, PVD, hypothyroidism, normocytic anemia, CKD 3, hyperlipidemia, anxiety, T2DM on insulin, chronic pain syndrome (on chronic narcotics) that presents 3 weeks after toe surgery with reports of patient being more confused and complaining of multiple different pains. On oral dilaudid for long time.     Problem List:      Dementia with aggressive behavior, confusion, agitation  1:1  Sleeping, not in distress  Mental status generally improving, less agitation, more calm most of the time  Progressive memory difficulty recently  Pt is requiring 24 hour care from her sons before admission  They give her all of her medications  Recent discharge from the [facility] after recent foot surgery - family brought home to provide 24 hour care  Neurology consultation appreciated  ?vascular dementia, delirium, metabolic encephalopathy  -ct, mri didn't show acute changes, some posttraumatic changes although family cannot recall any head trauma history except frequent falls      AF with RVR  HR not in good control.   Will increase Metoprolol from 50 mg bid to 75 mg bid  Diltiazem ER 180m PO daily, increase to 240mg by cardiologist  Metoprolol 2.5 mg IV q6h prn  Cardiologist consultation   INR now supra therapeutic (4.7); pharmacy to dose to keep inr 2.5-3.5 due to Mitral Valve replacement    H/o diastolic CHF  -Last TTE LVEF 50 to 55% on 7/22/2021 with evidence of moderate to severe tricuspid regurgitation, moderate pulmonary hypertension  -repeated echo (10/17) ef 40-50%  -moderate, if not severe, aortic stenosis  -mechanical mitral valve  -severe  "tricuspid insufficiency.  -mild-moderate right ventricular enlargement      Hyperglycemia   T2DM on insulin chronically  -worse due to steroid  -Grossly uncontrolled, last A1c 9.0 on 9/14/2021  -Previously on Novolog Mix 70/30 30 units a.m. and 15 units p.m., which were not ordered in hospital, due to poor and unpredictable oral intake; Low dose lantus was started initially  -increase lantus due to worsening glucose level due to steroid, and increase prandial and night correction ssi; today increase Lantus from 15 units bid to 20 units bid  -glucose in 290 this am.   -Consult DM educator pending     H/o Mitral Valve repair  Goal for INR 2.5-3.5  Today inr 4.7     Respiratory distress  -Multiple factors (aspiration, hypoventilation syndrome, fluid overload, reactive airway?)  -Pulm consulted - start on steroid, nebs, lasix  -improved      Hypomagensium   Hypokalemia  Replacement as per protocol     Melena  -Chronically anemic with reports of melena and hemorrhoid pain  -GI consult requested - appreciated  -PPI bid  - EGD wnl  -back on diet  -resume Warfarin     Acute blood loss anemia  H/o anemia of chronic disease (hgb 9-11)  Type and screen  No further melena noted per nursing  Transfused 1 unit PRBC  hgb stable  Close monitoring     Chronic pain syndrome  Family states that she has takes narcotics for \"many years\"  It appears that she had po prn dialudid  Not clear how much she was taking on a daily basis  I have some concern that she is having some narcotic withdrawal contributing to tachyarrythmia  Will order prn iv dilaudid to have available  C/o cp,  Trop wnl      Leukocytosis, resolved  -WBC 14.7 with mildly abnormal urinalysis.    No pneumonia seen on chest x-ray.  Foot does not appear infected  -Procalcitonin WNL  No clear infectious process identified     Hypotension, resolved  H/o HTN  Noted to have been given IV lasix dose x 2 on admission - overall volume status appears euvolemic s/p RBCs overnight  - " monitor carefully      Hypothyroidism  -continue PTA synthroid      Normocytic anemia  -On iron therapy at home, hemoglobin 9.1 but she fluctuates between 9 and 11  -now around 8.4     CKD 3  -Serum creatinine 1.14  -creat improved      Recent toe amputation  Appears to be healing well with no gross s/s of active infection     Diet:  Combination Diet Regular Diet Adult; Minced and Moist Diet (level 5); Liquidized/Moderately Thick (level 3)  Still cough/vomit on oral intake  Palliative care consultation     DVT Prophylaxis: warfarin   Briceño Catheter: Not present  Code Status: Full Code        Barriers to Discharge:  ams, poor oral intake, chest pain, a fib rvr, AMS     Anticipated discharge date/Disposition: 2-3 days, need placement; need to clarify goal of care (palliative care consultation)       Subjective  Sleeping comfortably, no acute event overnight    Objective    Vital signs in last 24 hours  Temp:  [97.5  F (36.4  C)-98  F (36.7  C)] 97.6  F (36.4  C)  Pulse:  [109-117] 112  Resp:  [18-20] 18  BP: (107-133)/(52-86) 117/69  SpO2:  [95 %-98 %] 95 % [unfilled] O2 Device: None (Room air)    Weight:   [unfilled] Weight change:     Intake/Output last 3 shifts  I/O last 3 completed shifts:  In: 600 [P.O.:600]  Out: 750 [Urine:750]  Body mass index is 28.42 kg/m .    Physical Exam    Physical Exam  Vitals and nursing note reviewed.   Constitutional:       General: She is not in acute distress.     Appearance: She is obese. She is not ill-appearing or toxic-appearing.      Comments: Sleeping, not in distress   HENT:      Head: Normocephalic and atraumatic.      Right Ear: External ear normal.      Left Ear: External ear normal.      Nose: Nose normal.      Mouth/Throat:      Mouth: Mucous membranes are moist.   Eyes:      General:         Right eye: No discharge.         Left eye: No discharge.   Cardiovascular:      Rate and Rhythm: Tachycardia present. Rhythm irregular.      Pulses: Normal pulses.   Pulmonary:       Effort: Pulmonary effort is normal.      Breath sounds: Rhonchi present. No wheezing.   Abdominal:      General: There is distension.   Musculoskeletal:      Cervical back: Neck supple.      Right lower leg: No edema.      Left lower leg: No edema.   Skin:     General: Skin is warm.      Coloration: Skin is not jaundiced.   Psychiatric:         Cognition and Memory: Cognition is impaired. Memory is impaired.             Pertinent Labs   Lab Results: personally reviewed.     Results for ANNA MARIE VALENTIN (MRN [id number]) as of 10/18/2021 10:16   Ref. Range 10/18/2021 04:37 10/18/2021 07:11 10/18/2021 07:37   Potassium Latest Ref Range: 3.5 - 5.0 mmol/L 4.2     GLUCOSE BY METER POCT Latest Ref Range: 70 - 99 mg/dL   291 (H)   INR Latest Ref Range: 0.85 - 1.15   4.70 (H)      Medications  Scheduled Meds:    diltiazem ER COATED BEADS  240 mg Oral Daily     furosemide  20 mg Intravenous Q12H     [Held by provider] gabapentin  100 mg Oral At Bedtime     HYDROmorphone  0.3 mg Intravenous BID     influenza vac high-dose quad  0.7 mL Intramuscular Prior to discharge     insulin aspart  5 Units Subcutaneous TID w/meals     insulin aspart  1-10 Units Subcutaneous TID AC     insulin aspart  1-7 Units Subcutaneous At Bedtime     insulin glargine  20 Units Subcutaneous BID     levothyroxine  125 mcg Oral Daily     magnesium oxide  400 mg Oral BID     melatonin  3 mg Oral Q24H     metoprolol tartrate  75 mg Oral BID     pantoprazole (PROTONIX) IV  40 mg Intravenous BID     predniSONE  40 mg Oral Daily     sodium chloride (PF)  3 mL Intracatheter Q8H     temazepam  7.5 mg Oral At Bedtime     vitamin B1  100 mg Oral Daily     warfarin-No DOSE today  1 each Does not apply no dose today (warfarin)     Continuous Infusions:    Warfarin Therapy Reminder       PRN Meds:.acetaminophen, albuterol, glucose **OR** dextrose **OR** glucagon, haloperidol lactate, HYDROmorphone, HYDROmorphone, ipratropium - albuterol 0.5 mg/2.5 mg/3 mL,  lidocaine 4%, lidocaine (buffered or not buffered), melatonin, metoprolol, naloxone **OR** naloxone **OR** naloxone **OR** naloxone, prochlorperazine **OR** prochlorperazine **OR** prochlorperazine, sodium chloride (PF), Warfarin Therapy Reminder    Pertinent Image  Radiology Results personally reviewed    Echocardiogram 10/17/21  1. The left ventricle is normal in size. Left ventricular systolic performance  is mild to moderately reduced. The ejection fraction is estimated to be 40-45%  2. There is mild to moderate global reduction in left ventricular systolic  performance.  3. There is mild concentric increase in left ventricular wall thickness.  4. There is at least moderate, if not severe, aortic stenosis.  Â  The mean gradient is measured at 22 mmHg with peak velocity of 2.7 m/s.  Calculated valve area 0.67 cmÂ . Dimensionless index measured at 0.21. It is  felt that the mean gradient and peak velocity likely underestimate the true  severity in the setting of mild-moderately reduced left ventricular systolic  performance (stroke-volume index 26.4 mL/mÂ ).  5. There is trace aortic insufficiency.  6. There is a mechanical mitral valve prosthesis (documented 23 mm [employer]  Medical valve).  Â  The mean gradient across the mitral valve prosthesis is mildly increased  from expected at 8 mmHg.  Â  Probable trace-mild prosthetic mitral insufficiency (not well visualized due  to acoustic artifact generated by the mitral valve prosthetic sewing ring).  7. There is severe tricuspid insufficiency.  8. There is mild-moderate right ventricular enlargement with mildly reduced  right ventricular systolic performance.  9. There is mild-moderate biatrial enlargement.  10. Although RV systolic pressure is not increased relative to RA pressures,  RA systolic pressures are likely elevated as a result of severe tricuspid  insufficiency. In addition, the IVC appears dilated with decreased phasic  variation in caval diameter  consistent with elevated right atrial pressure.      [facility] Medicine Service  Dennis Gunn

## 2021-10-19 NOTE — PLAN OF CARE
Problem: Adult Inpatient Plan of Care  Goal: Plan of Care Review  10/19/2021 0350 by Ayanna Brown RN  Outcome: No Change  10/19/2021 0346 by Ayanna Brown RN  Outcome: No Change     Problem: Risk for Delirium  Goal: Improved Behavioral Control  10/19/2021 0350 by Ayanna Brown RN  Outcome: No Change  10/19/2021 0346 by Ayanna Brown RN  Outcome: No Change     Problem: Risk for Delirium  Goal: Improved Sleep  10/19/2021 0350 by Ayanna Brown RN  Outcome: No Change  10/19/2021 0346 by Ayanna Brown RN  Outcome: No Change     Problem: Violence Risk or Actual  Goal: Anger and Impulse Control  10/19/2021 0350 by Ayanna Brown RN  Outcome: No Change  10/19/2021 0346 by Ayanna Brown RN  Outcome: No Change   Patient continues to have 1:1 supervision, due to pulling off gown, picking at iv and tele patches. Patient moans and complains of pain off and on. Dilaudid 2 mg's po given at 0156 for complaints of pain in right arm and legs. Patient unable to rate pain. Disoriented x4, but knows her home address and name the president. Rhythm= junctional tachycardia, lungs with expiratory wheezes, scratchy and diminished, 02 sat's 94-96% on room air. Patient turned every 2 hours, iv lasix 20 mg's given with good urine output, purwik in place. Will cont to monitor.

## 2021-10-19 NOTE — PROGRESS NOTES
GI CHART CHECK NOTE  10/19/2021  Bernadette Yu  1940  /-60    Chart Reviewed. Spoke with nursing staff. Pt tolerating current diet and medications without issues. SLP evaluation does not appear to have been performed yet. EGD unremarkable, gastric bx pending.     EGD 1/14/21 (Dr. Mcghee).   Findings:        The examined esophagus was normal.        The entire examined stomach was normal. Biopsies for histology were        taken with a cold forceps for evaluation of celiac disease. This was        biopsied with a cold forceps for Helicobacter pylori testing.        The in the duodenum was normal.                                                                                     Impression:          - Normal esophagus.                        - Normal stomach. Biopsied.                        - Normal.                        - Normal examination.       GI will sign off. If further concerns for swallowing issues, next step is SLP eval.                                                                         Caitlyn Ortega PA-C  Thank you for the opportunity to participate in the care of this patient.   Please feel free to call me with any questions or concerns.  Phone number [phone].

## 2021-10-19 NOTE — PLAN OF CARE
Problem: Adult Inpatient Plan of Care  Goal: Optimal Comfort and Wellbeing  Outcome: No Change     1:1 sitter in place for safety. Pt continues to attempt to get out of bed. No violent behaviors this shift. Pt is easily re-directable. Continues to report pain. IV scheduled dilaudid given. Mag and K protocol are rechecks in AM. TELE Junctional Tachy. VSS. Takes pills whole with applesauce.

## 2021-10-19 NOTE — SIGNIFICANT EVENT
"Patient noted to have some controled behavior, that escalated by yelling out \" the pain is unbearable , can I get something to eat \". Patient was given iv dilaudid 0.2 mg's at 0457 and no pain at 0527. Patient then complained that she was very hungry, explained that she had to wait until bfst, then started yelling \" the pain is so bad \". This writer gave patient apple sauce with crushed saltine crackers in the apple sauce, patient had no more behavior's or pain. Patient is calm at this time, able to see the clock and tell time and talking in clear english.  "

## 2021-10-19 NOTE — PROGRESS NOTES
[facility]  Palliative Care Progress Note    Patient: Bernadette RINALDI Aimee       Recommendations       1)   GOALS OF CARE are .   ? Restorative  ? I have talked to 2 sons two  in a row regarding code status and they were going to discuss.  I called Riley and his wife Abraham and he indicated that he wants DNR status but his brother Alirio is still debating this.(they have a 3rd brother but he has been living in the [place] for 21 years and isn't part of the discussion.  Their Dad Donovan is not decisional and does not recall the conversation yesterday.   They are wanting TCU but understand that if patient cannot participate in Therapy then LTC/or ASL  memory care will need to be found.  He called  his brother Alirio  Back and they agreed to DNR DNI.        2)    ADVANCED CARE PLANNING  ? Patient has completed health care directive: N  ? Surrogate/Documented health care agent: Sarbjit and  brother Riley .  Donovan has dementia and relies on kids  ? Code Status: Changed to DNR DNI after long discussion with sons  ? I offered to make an exception so they can come in for a meeting and they indicated they are fine doing this by phone  ?     3)    SYMPTOM MANAGEMENT    ? Pain- all over body, she has been on Dilaudid 2 mg 4-5 pills a day for 15 years  ? Comment:  She was screaming early this morning and treated with PRN Dilaudid  ? Recommendations   Dilaudid 2 mg BID to avoid withdrawal  ?   ? Agitation related to potential withdrawal and dementia:   ? Comment:   ? Recommendations:     ? Do not use ativan since she is on Opioids  ? Upon my initial conversation, I discussed the Scheduled Seroquel as recommended by Kim Neumann (if QTc ok).  Riley's wife (who works in patient care)  indicated that the patient had a terrible reaction to Quetapine in the past and this was DC'd.  I ordered PRN Haldol and she has only had two doses in past 3 days.  ? I discussed again today with  Riley  Regarding patients continued  Delirium and indicated that Seroquel would be the best med for her. At that point he indicated that he was mistaken and she did NOT have a reaction to Seroquel in the past.   ? I reviewed her QTc, it was 673 in Nov 2020 and it was 489 on 9/14/21.  I discussed this with Dr. Gunn and he will obtain EKG and restart Quetapine (seroquel) if safe to do so  ?          AMS due to Probable Toxic metabolic encephalopahty (combination of medications, anemia, hyperglycemia, and possible respiratory component)   o Comment:Neuro suspect much of her encephalopathy is actually related to agitation in the evening (sundowning).  MRI showed just right temporal encephalomalacia that is likely posttraumatic  o Recommendations:    SEE ABOVE under Agitation    Avoid benzodiazepines, antihistamines, anticholinergics if able.    Lights on and blinds open during the day.  Reorient frequently.    Lights & TV off during the night.  Promote normal circadian rhythm.    Limit sensory deprivation - utilize hearing aids, glasses, etc.     Frequently assess unmet bathroom needs if applicable.      4)    PSYCHOSOCIAL/SPIRITUAL SUPPORT  ? Will request spiritual care support    ?  palliative social work support for   ? Palliative medicine will continue to follow    These recommendations will be discussed with Dr. Gunn      Thank you for the opportunity to participate in the care of this patient and family. Our team: will continue to follow.     During regular M-F work hours -- if you are not sure who specifically to contact -- please contact us by calling us directly at the Palliative Care Main Line [phone]    After regular work hours and on weekends/holidays, you can leave a message at [phone]         Assessments/HPI        Bernadette Yu is a 81 year old female with PMH of mitral valve repair,aortic stenosis, severe cognitive impairment,  CKD 3, CAD s/p CABG, heart failure, Pulmonary hypertension, paroxysmal atrial fibrillation on  "Warfarin, PVD, hypothyroidism, normocytic anemia, hyperlipidemia, anxiety, T2DM on insulin, chronic pain syndrome (on chronic narcotics for many years) that presents 3 weeks after toe surgery with reports of patient being more confused and complaining of multiple different pains such as hemorrhoid pain and melena.  Recent toe amputation(osteomyelitis)  that appears to be healing well with no gross s/s of active infection and concern for confusion from family.     Per Neuro: severe vascular dementia based on family report over at least the last 2 years     Recent Hospitalizations:  Last inpatient 1 year ago    Interim Hx  Agitated last night, yelling   Patient continues to have 1:1 supervision,    GI:  Chart reviewed. No SLP re-evaluation and patient still coughing/\"vomiting\" with oral intake. No further melena and will start back on warfarin tonight. Hemoglobin stable.    Cards:  Tachycardia/history of atrial fibrillation: Accelerated junctional rhythm versus 2-1 atrial flutter with continued mild elevated ventricular response.Could consider TAVR if patient clinically improves.  Work-up for this would all be done as outpatient if she clinically improves.    Neuro: Neurologic:  Awake, alert, oriented to person and hospital but cannot name specific hospital.  Her dysarthric.  Face is symmetric.  Pupils are reactive.  Is able to name simple objects, but not low-frequency words.  Able to follow all simple commands today.  He does have difficulty with two-step cross commands.    Pulmonary:  She had some transmitted upper airway sounds, possible vocal cord dysfunction with upper airway wheezing.   Recent CT chest showed findings consistent with CHF, with pulmonary edema as well as air trapping. She may have some small airways disease with exacerbation (e.g. reactive airway disease vs. Asthma exacerbation).  Her normal CXR and ABG and the fact that she's on room air with normal oxygenation are reassuring.   Complicating " "the issue is her encephalopathy likely due to toxic/metabolic causes, and too many sedating meds on board.  We treated empirically for reactive airway disease vs. Small ariways disease/asthma with steroids and nebs and some Lasix for pulmonary edema. She is doing much better today. Now at baseline   Prognosis, Goals, & Planning:               Patient has a completed Health Care Directive: Reportedly yes, but not available to us currently.      Code status: Full Code      Goals of Care Discussion    10/19  Long talk with son Alirio and wife katerina. See above    10/18  I spoke to 2 sons and  on phone today. I updated them on the above interim hx.  I explained that the MRI showed Right Temporal Encephalomalacia  And explained that this is not a good prognostic indicator and it is very likely that she would be able to return home.   I discussed Memory Care units and indicated that this would be a very good recommendation for after Discharge.  I discussed the cardiac  Issues and the potential recommendation of TAVR and they would not want that option.   I again discussed CPR and per the son, his father Donovan became very upset hearing this conversation (he has dementia) and he recommended we discuss this further tomorrow after they have a chance to explain it to their dad.  I reiterated that this is a \"what if \" discussion but based on her current Afib then Junctional rhythm issues, this is a concern.    I explained that she is on 1:1 and actively having a cardiac workup so this is where she currently needs to be.  We will talk again tomorrow.      10/15 Daughter in law states that she has been taking Dilaudid 2.0 mg  Four to six times a day (it was ordered 4x PRN or 6 x prn for severe pain).  They are looking for memory care for both parents to go live. Code status Discussion:I I explained to daughter in law and her  Riley  That they  can influence her  future health care now by making certain choices regarding " "future procedures, one being code status..I explained the urgent nature of CPR and that this decision to not even start it need to be made ahead of time as the default is to do everything.  I explained that I am not currently concerned about her heart as this discussion today is \"What If\"  I explained that if CPR is needed, at the time it is needed I.e when heart stops) then it is an urgent matter and too short of time to obtain her opinion then. . I indicated given her  small chest frame, and potential cancer , CPR may cause her more harm than good and based on the amount of time it may take to restart his heart,, she may end up with a significant change of medical and functional status  due to lack of blood flow to vital organs during CPR.  I also explained that choosing DNR does not mean nothing is done. She can still have treatment of cardiac arrhythmias or low BP as medications for that will not cause more harm than good.    10/15  Hospice discussion:  I also discussed  Hospice services, the focus of care (comfort), care delivery (short  Visits by certified  providers (Hospice team) and where services are held (persons home, LTC or [facility] hospice house   (latter two are private pay for room and board) and qualifying Dx.     Coping, Meaning, & Spirituality:         Mood, coping, and/or meaning in the context of serious illness were addressed today: Yes          ROS        [facility] Symptom Data:    10/19  Pain Improved  Nausea none  Dypsnea None  Anxiey mild         Comprehensive ROS is reviewed and is negative except as here & per HPI:         Medications:  I have reviewed this patient's medication profile and medications from this hospitalization.            Physical Exam:     GENERAL: laying in bed, calm, smiles at me.   Eating lunch by self  SKIN: Warm and dry   HEENT: Normocephalic, anicteric sclera, moist mucous membranes  LUNGS: Clear to auscultation anterolaterally; non-labored, on O2 by RA  CARDIAC: " RRR, normal s1/s2, w/o m/r/g   ABDOMINAL: BS (+), large, soft, non distended, non tender  : koch in place  MUSKL: no gross joint deformities   EXTREMITIES: redness on r shin, 2nd toe amp on left foot pulses 2+ and symmetrical  NEUROLOGIC:Alert   .  Oriented to person, and hospital but not month or year.  She is able to name simple objects.  She is not able to follow two-step cross commands.  She is able to follow simple commands.  Simple repetition intact.    PSYCH: Euthymic      /75 (BP Location: Right arm)   Pulse 118   Temp 98  F (36.7  C) (Oral)   Resp 18   Wt 66 kg (145 lb 8 oz)   SpO2 96%   BMI 28.42 kg/m         Data Reviewed:      Recent lab reviewed, my comments on pertinents:      Qtc 489  On 9/14/21            ====================================================  TT: I have personally spent a total of 35   minutes on the unit in review of medical record, consultation with the medical providers and assessment of patient today, with more than 50% of this time spent in counseling, coordination of care, and discussion with son and DIL on phone re: diagnostic results, prognosis, symptom management, risks and benefits of management options, and development of plan of care as noted above.         ====================================================    ELVIS Collins, NP-C, ACHPN   [facility]  Palliative Medicine  Office: [phone]

## 2021-10-19 NOTE — PROGRESS NOTES
Hospitalist Progress Note  ADMIT DATE: 10/11/2021     FACILITY: [facility]    PCP: Gabino Bach, [phone]    Assessment/Plan    Bernadette Yu is a 81 year old female who was admitted on 10/11/2021 with melena and increased confusion.     She has a PMH of mitral valve repair, CAD s/p CABG, heart failure, hypertension, paroxysmal atrial fibrillation on Warfarin, PVD, hypothyroidism, normocytic anemia, CKD 3, hyperlipidemia, anxiety, T2DM on insulin, chronic pain syndrome (on chronic narcotics) that presents 3 weeks after toe surgery with reports of patient being more confused and complaining of multiple different pains. On oral dilaudid for long time.    Overnight: more stable with occasional c/o pain and yelling, mental status better, but still on 1:1 due to pulling of lines. More alert and communicable.       Problem List:      Dementia with aggressive behavior, confusion, agitation  1:1  Improving, more calm this am and talkative. Can communicate with me most of the time, smiling to me.   Progressive memory difficulty recently  Pt is requiring 24 hour care from her sons before admission  They give her all of her medications  Recent discharge from the [facility] after recent foot surgery - family brought home to provide 24 hour care  Neurology consultation appreciated  ?vascular dementia, delirium, metabolic encephalopathy  -ct, mri didn't show acute changes, some posttraumatic changes although family cannot recall any head trauma history except frequent falls  -goal to titrate off 1:1 to prepare for discharge      AF with RVR  HR not in good control.   On Metoprolol and Diltiazem ER 180m PO daily, increase to 240mg by cardiologist  Metoprolol 2.5 mg IV q6h prn  Cardiologist consultation   INR down to 4.02 from 4.7; pharmacy to dose to keep inr 2.5-3.5 due to Mitral Valve replacement     H/o diastolic CHF  -Last TTE LVEF 50 to 55% on 7/22/2021 with evidence of moderate to severe  "tricuspid regurgitation, moderate pulmonary hypertension  -repeated echo (10/17) ef 40-50%  -moderate, if not severe, aortic stenosis  -mechanical mitral valve  -severe tricuspid insufficiency.  -mild-moderate right ventricular enlargement       Hyperglycemia   T2DM on insulin chronically  -worse due to steroid  -Grossly uncontrolled, last A1c 9.0 on 9/14/2021  -Previously on Novolog Mix 70/30 30 units a.m. and 15 units p.m., which were not ordered in hospital, due to poor and unpredictable oral intake; Low dose lantus was started initially  -in better control now  -glucose in 163 this am.   -Consult DM educator appreciated     H/o Mitral Valve repair  Goal for INR 2.5-3.5     Respiratory distress  -Multiple factors (aspiration, hypoventilation syndrome, fluid overload, reactive airway?)  -Pulm consulted - start on steroidx4 days (will be done 10/20), nebs, lasix  -improved      Hypomagensium   Hypokalemia  Replacement as per protocol     Melena  -Chronically anemic with reports of melena and hemorrhoid pain  -GI consult requested - appreciated  -PPI bid  - EGD wnl  -back on diet  -resume Warfarin, stable     Acute blood loss anemia  H/o anemia of chronic disease (hgb 9-11)  Type and screen  No further melena noted per nursing  Transfused 1 unit PRBC  hgb stable  Close monitoring     Chronic pain syndrome  Family states that she has takes narcotics for \"many years\"  It appears that she had po prn dialudid  Not clear how much she was taking on a daily basis  PO dilaudid      Leukocytosis, resolved  -WBC 14.7 with mildly abnormal urinalysis.    No pneumonia seen on chest x-ray.  Foot does not appear infected  -Procalcitonin WNL  No clear infectious process identified     Hypotension, resolved  H/o HTN  Noted to have been given IV lasix dose x 2 on admission - overall volume status appears euvolemic s/p RBCs overnight  - monitor carefully      Hypothyroidism  -continue PTA synthroid      Normocytic anemia  -On iron " "therapy at home, hemoglobin 9.1 but she fluctuates between 9 and 11  -stable     CKD 3  -Serum creatinine 1.14  -creat improved      Recent toe amputation  Appears to be healing well with no gross s/s of active infection     Diet:  Combination Diet Regular Diet Adult; Minced and Moist Diet (level 5); Liquidized/Moderately Thick (level 3)  Still cough/vomit on oral intake  Palliative care consultation     DVT Prophylaxis: warfarin   Briceño Catheter: Not present  Code Status: Full Code        Barriers to Discharge:  still on 1:1, agitation occasionally, a fib rvr     Anticipated discharge date/Disposition: 2-3 days, need placement; need to clarify goal of care (palliative care consultation)      Subjective  Looks calm and smiling, talk in clear voice and can communicate well, still c/o pain \"terrible\"    Objective    Vital signs in last 24 hours  Temp:  [97.4  F (36.3  C)-98.6  F (37  C)] 98.3  F (36.8  C)  Pulse:  [112-117] 117  Resp:  [16-20] 18  BP: (110-129)/(57-75) 129/75  SpO2:  [95 %-97 %] 96 % [unfilled] O2 Device: None (Room air)    Weight:   [unfilled] Weight change:     Intake/Output last 3 shifts  I/O last 3 completed shifts:  In: 940 [P.O.:940]  Out: 2400 [Urine:2400]  Body mass index is 28.42 kg/m .    Physical Exam    Physical Exam  Vitals and nursing note reviewed.   Constitutional:       General: She is not in acute distress.     Appearance: She is obese. She is not toxic-appearing or diaphoretic.   HENT:      Head: Normocephalic and atraumatic.      Right Ear: External ear normal.      Left Ear: External ear normal.      Nose: Nose normal.      Mouth/Throat:      Mouth: Mucous membranes are moist.   Eyes:      General:         Right eye: No discharge.         Left eye: No discharge.   Cardiovascular:      Rate and Rhythm: Tachycardia present. Rhythm irregular.      Pulses: Normal pulses.      Heart sounds: Murmur heard.     Pulmonary:      Effort: Pulmonary effort is normal. No respiratory distress. "   Abdominal:      General: There is distension.      Tenderness: There is no abdominal tenderness.   Musculoskeletal:      Cervical back: Neck supple. No rigidity.      Right lower leg: No edema.      Left lower leg: No edema.   Skin:     General: Skin is warm and dry.   Neurological:      Mental Status: She is alert. Mental status is at baseline.   Psychiatric:         Behavior: Behavior is cooperative.         Cognition and Memory: Memory is impaired.             Pertinent Labs   Lab Results: personally reviewed.   Results for ANNA MARIE VALENTIN (MRN [id number]) as of 10/19/2021 07:41   Ref. Range 10/18/2021 21:18 10/19/2021 05:11   Potassium Latest Ref Range: 3.5 - 5.0 mmol/L  3.8   Magnesium Latest Ref Range: 1.8 - 2.6 mg/dL  1.9   INR Latest Ref Range: 0.85 - 1.15   4.02 (H)   SARS CoV2 PCR Latest Ref Range: Negative  Negative        Medications  Scheduled Meds:    diltiazem ER COATED BEADS  240 mg Oral Daily     furosemide  20 mg Intravenous Q12H     [Held by provider] gabapentin  100 mg Oral At Bedtime     HYDROmorphone  0.3 mg Intravenous BID     insulin aspart  10 Units Subcutaneous TID w/meals     insulin aspart  1-10 Units Subcutaneous TID AC     insulin aspart  1-7 Units Subcutaneous At Bedtime     insulin glargine  20 Units Subcutaneous BID     insulin NPH  5 Units Subcutaneous QAM     levothyroxine  125 mcg Oral Daily     magnesium oxide  400 mg Oral BID     melatonin  3 mg Oral Q24H     metoprolol tartrate  100 mg Oral BID     pantoprazole (PROTONIX) IV  40 mg Intravenous BID     potassium chloride  10 mEq Oral Once     predniSONE  40 mg Oral Daily     sodium chloride (PF)  3 mL Intracatheter Q8H     temazepam  7.5 mg Oral At Bedtime     vitamin B1  100 mg Oral Daily     Continuous Infusions:    Warfarin Therapy Reminder       PRN Meds:.acetaminophen, albuterol, glucose **OR** dextrose **OR** glucagon, haloperidol lactate, HYDROmorphone, HYDROmorphone, ipratropium - albuterol 0.5 mg/2.5 mg/3 mL,  lidocaine 4%, lidocaine (buffered or not buffered), melatonin, metoprolol, naloxone **OR** naloxone **OR** naloxone **OR** naloxone, prochlorperazine **OR** prochlorperazine **OR** prochlorperazine, sodium chloride (PF), Warfarin Therapy Reminder      [facility] Medicine Service  Dennis Gunn

## 2021-10-20 NOTE — PROGRESS NOTES
Progress Note    Date of Admission: 10/11/2021    Assessment/Plan    Bernadette Yu is a 81 year old female who was admitted on 10/11/2021 with melena and increased confusion.     She has a PMH of mitral valve repair, CAD s/p CABG, heart failure, hypertension, paroxysmal atrial fibrillation on Warfarin, PVD, hypothyroidism, normocytic anemia, CKD 3, hyperlipidemia, anxiety, T2DM on insulin, chronic pain syndrome (on chronic narcotics) that presents 3 weeks after toe surgery with reports of patient being more confused and complaining of multiple different pains. On oral dilaudid for long time. Patient seems to be confused . Not aggressive but unable to follow comands.     Patient still on 1:1 due to pulling of lines. More alert and communicable.       Problem List:      Dementia with aggressive behavior, confusion, agitation  1:1  Having persistent difficulty with memory and orientation.  Pt is requiring 24 hour care from her sons before admission  They give her all of her medications  Recent discharge from the [facility] after recent foot surgery - family brought home to provide 24 hour care  Neurology consultation appreciated  ?vascular dementia, delirium, metabolic encephalopathy  -ct, mri didn't show acute changes, some posttraumatic changes although family cannot recall any head trauma history except frequent falls  -goal to titrate off 1:1 to prepare for discharge      AF with RVR  HR not in good control.   On Metoprolol and Diltiazem ER 180m PO daily, increase to 240mg by cardiologist  Metoprolol 2.5 mg IV q6h prn  Cardiologist consultation appreciated   INR down to 4.02 from 4.7; pharmacy to dose to keep inr 2.5-3.5 due to Mitral Valve replacement     H/o diastolic CHF  -Last TTE LVEF 50 to 55% on 7/22/2021 with evidence of moderate to severe tricuspid regurgitation, moderate pulmonary hypertension  -repeated echo (10/17) ef 40-50%  -moderate, if not severe, aortic stenosis  -mechanical mitral  "valve  -severe tricuspid insufficiency.  -mild-moderate right ventricular enlargement       Hyperglycemia   T2DM on insulin chronically  -worse due to steroid  -Grossly uncontrolled, last A1c 9.0 on 9/14/2021  -lantus 20 units BID  - NPH 5 units AM for prednisone related hyperglycemia  -in better control now  -Consult DM educator appreciated     H/o Mitral Valve repair  Goal for INR 2.5-3.5     Respiratory distress  -Multiple factors (aspiration, hypoventilation syndrome, fluid overload, reactive airway?)  -Pulm consulted - start on steroidx4 days (will be done 10/20), nebs, lasix  -improved      Hypomagensium   Hypokalemia  Replacement as per protocol     Melena  -Chronically anemic with reports of melena and hemorrhoid pain  -GI consult requested - appreciated  -PPI bid  - EGD wnl  -back on diet  -resume Warfarin, stable     Acute blood loss anemia  H/o anemia of chronic disease (hgb 9-11)  Type and screen  No further melena noted per nursing  Transfused 1 unit PRBC  hgb stable  Close monitoring     Chronic pain syndrome  Family states that she has takes narcotics for \"many years\"  It appears that she had po prn dialudid  Not clear how much she was taking on a daily basis  PO dilaudid      Leukocytosis, resolved  -WBC 14.7 with mildly abnormal urinalysis.    No pneumonia seen on chest x-ray.  Foot does not appear infected  -ok to remove sutures on foot   -Procalcitonin WNL  No clear infectious process identified     Hypotension, resolved  H/o HTN  Noted to have been given IV lasix dose x 2 on admission - overall volume status appears euvolemic s/p RBCs overnight  - monitor carefully      Hypothyroidism  -continue PTA synthroid      Normocytic anemia  -On iron therapy at home, hemoglobin 9.1 but she fluctuates between 9 and 11  -stable     CKD 3  -Serum creatinine 1.14  -creat improved      Recent toe amputation  Appears to be healing well with no gross s/s of active infection     Diet:  Combination Diet Regular " Diet Adult; Minced and Moist Diet (level 5); Liquidized/Moderately Thick (level 3)  Still cough/vomit on oral intake  Palliative care consultation     DVT Prophylaxis: warfarin   Briceño Catheter: Not present  Code Status: No CPR, DNI        Subjective  Seems to be confused. Gives unrelated answers. AAOX1    Objective  Vital signs in last 24 hours  Temp:  [97.3  F (36.3  C)-98.5  F (36.9  C)] 97.4  F (36.3  C)  Pulse:  [] 120  Resp:  [14-20] 20  BP: (112-139)/(63-83) 124/78  SpO2:  [95 %-100 %] 98 %    Input and Output in 24 hrs     Intake/Output Summary (Last 24 hours) at 10/20/2021 1827  Last data filed at 10/20/2021 1800  Gross per 24 hour   Intake 930 ml   Output 3425 ml   Net -2495 ml       Physical Exam:  GEN: AAOX1 Not in acute distress. Grimaces when touched on left hip area  HEENT: Atraumatic    Pupils- round and reactive to light bilaterally   Neck- supple, no JVP elevation, no lymphadenopathy or thyromegaly   Sclera- anicteric   Mucous membrane- moist and pink  CHEST: Clear to auscultation bilaterally  HEART: S1S2 regular. No murmurs, rubs or gallops  ABDOMEN: Soft. Non-tender, non-distended. No organomegaly. No guarding or rigidity. Bowel sounds- active  Extremities: No pedal oedema  CNS: No focal neurological deficit. No involuntary movements  SKIN: No skin rash, no cyanosis or clubbing      Pertinent Labs   Lab Results: Personally Reviewed    Recent Results (from the past 24 hour(s))   Glucose by meter    Collection Time: 10/19/21  8:55 PM   Result Value Ref Range    GLUCOSE BY METER POCT 186 (H) 70 - 99 mg/dL   Glucose by meter    Collection Time: 10/20/21  5:00 AM   Result Value Ref Range    GLUCOSE BY METER POCT 77 70 - 99 mg/dL   Extra Purple Top Tube    Collection Time: 10/20/21  7:34 AM   Result Value Ref Range    Hold Specimen JIC    INR    Collection Time: 10/20/21  7:35 AM   Result Value Ref Range    INR 2.54 (H) 0.85 - 1.15   Potassium    Collection Time: 10/20/21  7:35 AM   Result Value  Ref Range    Potassium 3.8 3.5 - 5.0 mmol/L   Glucose by meter    Collection Time: 10/20/21  7:43 AM   Result Value Ref Range    GLUCOSE BY METER POCT 164 (H) 70 - 99 mg/dL   Glucose by meter    Collection Time: 10/20/21 11:27 AM   Result Value Ref Range    GLUCOSE BY METER POCT 256 (H) 70 - 99 mg/dL   Glucose by meter    Collection Time: 10/20/21  5:56 PM   Result Value Ref Range    GLUCOSE BY METER POCT 265 (H) 70 - 99 mg/dL       Pertinent Radiology   Radiology Results reviewed        Advanced Care Planning      Gisele Foster MD   [facility]

## 2021-10-20 NOTE — PROGRESS NOTES
CLINICAL NUTRITION SERVICES - REASSESSMENT NOTE     Nutrition Prescription    RECOMMENDATIONS FOR MDs/PROVIDERS TO ORDER:  None at this time    Malnutrition Status:    Patient does not meet two of the established criteria necessary for diagnosing malnutrition but is at risk for malnutrition    Recommendations already ordered by Registered Dietitian (RD):  None    Future/Additional Recommendations:  Continue to monitor PO/supplement intake     EVALUATION OF THE PROGRESS TOWARD GOALS   Diet: Moderate Consistent Carbohydrate, Level 5: Minced & Moist Dysphagia Diet and Mildly Thick Thickened Liquids and Gelatein 20 with lunch    Intake: Pt eating % of meals per RN flowsheets and eating well per RN notes.  Estimate pt meeting 89% of low-end kcal needs and 100% of high-end protein needs (average 1122 kcal and 52 gm protein)     NEW FINDINGS   Pt has not been receiving Gelatein 20 supplement with lunch trays - added supplement to lunch trays in [employer] (meal ordering system)    Labs:  Glucose by meter: 77 - 237    MALNUTRITION  % Intake: Decreased intake does not meet criteria  % Weight Loss: Weight loss does not meet criteria (last wt taken 10/15)  Subcutaneous Fat Loss: Unable to assess - pt with agitation and confusion  Muscle Loss: Unable to assess - pt with agitation and confusion  Fluid Accumulation/Edema: Does not meet criteria  Malnutrition Diagnosis: Patient does not meet two of the established criteria necessary for diagnosing malnutrition but is at risk for malnutrition    Previous Goals   Patient to consume % of nutritionally adequate meal trays TID, or the equivalent with supplements/snacks.  Evaluation: Met    Previous Nutrition Diagnosis  Altered nutrition-related laboratory values related to DM as evidenced by elevated blood glucose     Evaluation: Improving    CURRENT NUTRITION DIAGNOSIS  Altered nutrition-related laboratory values related to DM as evidenced by elevated blood glucose       INTERVENTIONS  Implementation  Medical food supplement therapy: [facility]    Goals  Patient to consume % of nutritionally adequate meal trays TID, or the equivalent with supplements/snacks.    Monitoring/Evaluation  Progress toward goals will be monitored and evaluated per protocol.    Latha Byrne  Dietetic Intern

## 2021-10-20 NOTE — PROGRESS NOTES
Cardiology Progress Note    Assessment/Plan:    1. Persistent/permanent atrial fibrillation/flutter with rapid ventricular response.  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  We will increase diltiazem to 300 mg daily.  Continue metoprolol 100 mg twice daily.  2. Valvular heart disease.  Severe tricuspid insufficiency, possibly severe aortic valve stenosis, increased gradient across bioprosthetic mitral valve.    3. Melena with ?acute blood loss anemia, currently stable  4. Toxic encephalopathy  versus dementia.  Appears to be a poor candidate for intervention on valvular disease from a mental status standpoint.    Concur with plans for conservative management.  We will sign off.  Please call if questions arise    Active Problems:    Upper GI bleed     LOS: 7 days     Subjective:  Less interactive today.  Denies pain or shortness of breath.  Some verbal response.    Objective:   Vital signs in last 24 hours:  Vitals:    10/20/21 0453 10/20/21 0544 10/20/21 0728 10/20/21 1110   BP: 139/82  129/78 112/63   BP Location: Left arm  Left arm Left arm   Pulse: 119 120 (!) 121 120   Resp:   18 18   Temp:   98.5  F (36.9  C) 97.3  F (36.3  C)   TempSrc:   Oral Oral   SpO2: 99% 98% 99% 100%   Weight:         Weight:   Wt Readings from Last 3 Encounters:   10/15/21 66 kg (145 lb 8 oz)   09/28/21 67.8 kg (149 lb 6.4 oz)   09/25/21 67.8 kg (149 lb 6.4 oz)       Lungs clear, with shallow respirations  Regular rhythm, rapid.  3/6 harsh systolic murmur radiates throughout precordium.  No edema.  Scattered ecchymoses.         Cardiographics:   Telemetry atrial fibrillation/atrial flutter, rate jumping between 80 and 120 bpm.     Echocardiogram 10/18:  1. The left ventricle is normal in size. Left ventricular systolic performance  is mild to moderately reduced. The ejection fraction is estimated to be 40-45%  2. There is mild to moderate global reduction in left ventricular systolic  performance.  3. There is mild  concentric increase in left ventricular wall thickness.  4. There is at least moderate, if not severe, aortic stenosis.  Â  The mean gradient is measured at 22 mmHg with peak velocity of 2.7 m/s.  Calculated valve area 0.67 cmÂ . Dimensionless index measured at 0.21. It is  felt that the mean gradient and peak velocity likely underestimate the true  severity in the setting of mild-moderately reduced left ventricular systolic  performance (stroke-volume index 26.4 mL/mÂ ).  5. There is trace aortic insufficiency.  6. There is a mechanical mitral valve prosthesis (documented 23 mm [employer] valve).  Â  The mean gradient across the mitral valve prosthesis is mildly increased  from expected at 8 mmHg.  Â  Probable trace-mild prosthetic mitral insufficiency (not well visualized due  to acoustic artifact generated by the mitral valve prosthetic sewing ring).  7. There is severe tricuspid insufficiency.  8. There is mild-moderate right ventricular enlargement with mildly reduced  right ventricular systolic performance.  9. There is mild-moderate biatrial enlargement.  10. Although RV systolic pressure is not increased relative to RA pressures,  RA systolic pressures are likely elevated as a result of severe tricuspid  insufficiency. In addition, the IVC appears dilated with decreased phasic  variation in caval diameter consistent with elevated right atrial pressure.     When compared to the prior real-time echocardiogram dated 22 July 2021, there  has been a mild interval diminution in left ventricular systolic performance.  The degree of tricuspid insufficiency has increased. The mean gradient  obtained across the aortic valve is less when compared to the prior  examination though likely reflects an interval diminution in left ventricular  systolic performance (stroke-volume index measured at 52.8 mL/mÂ  on the prior  study and now 26.4 mL/mÂ ). The mean gradient across the aortic valve was  measured at 34 mmHg on  the prior examination. The mean gradient measured  across the mitral valve prosthesis is slightly increased.    Imaging:   EXAM: XR CHEST PORTABLE 1 VIEW  LOCATION: [facility]  DATE/TIME: 10/16/2021, 10:11 AM  INDICATION: Wheezing,  COMPARISON: Chest x-rays, most recently 10/15/2021.  FINDINGS: EKG wires overlie the chest. Median sternotomy and mediastinal surgical clips. The inferior most sternotomy wire is fractured. Left atrial appendage occlusion clip. Mitral valve replacement.  Unchanged mild enlargement of the cardiac silhouette. Low lung volumes with interstitial crowding. However, there is also likely mild pulmonary vascular congestion. No focal airspace opacities. No pleural effusion or pneumothorax.                                                        IMPRESSION:   1.  Unchanged mild enlargement of the cardiac silhouette.  2.  Evaluation is limited by low lung volumes resulting in interstitial crowding. However, there is probable pulmonary vascular congestion.    Lab Results:   Lab Results   Component Value Date    WBC 7.9 10/14/2021    HGB 8.6 (L) 10/16/2021    HCT 29.1 (L) 10/14/2021     10/14/2021    CHOL 228 (H) 07/23/2020    TRIG 690 (H) 07/23/2020    HDL 31 (L) 07/23/2020    ALT 19 10/13/2021    AST 29 10/13/2021     (L) 10/17/2021    BUN 25 10/17/2021    CO2 20 (L) 10/17/2021    TSH 4.55 10/12/2021    INR 2.54 (H) 10/20/2021     Lab Results   Component Value Date    TROPONINI <0.01 10/15/2021         Marcus Tsai MD [facility]  10/18/2021

## 2021-10-20 NOTE — PLAN OF CARE
Problem: Risk for Delirium  Goal: Optimal Coping  Outcome: Improving  Goal: Improved Behavioral Control  Outcome: Improving    Pt still confused.  Much calmer today. Still grabbing at IV line and telemetry leads.  Needs to be fed. Ate well. Purwick 550cc.  Blood sugar 237.  Tele showed junctional rhythm and changed to 2nd degree type II.  Pt shows no symptoms. Informed doctor by text at 2300.

## 2021-10-20 NOTE — PROGRESS NOTES
"Video Fluoroscopic Swallow Study(VFSS):     10/20/21 1500   General Information   Onset of Illness/Injury or Date of Surgery 10/11/21   Pertinent History of Current Problem Pt is impulsive with eating and needs 1:1 supervision to slow down. Coughing noted with drinking mildly thick liquids. Per MD note: \"Bernadette Yu is a 81 year old female who was admitted on 10/11/2021 with melena and increased confusion. Progressive memory difficulty Pt is requiring 24 hour care from her sons They give her all of her medications Recent discharge from the [facility] after recent foot surgery - family brought home to provide 24 hour care. Clear to auscultation ant/lat bilaterally. Chest CT: \"Evidence for CHF/volume overload with right-sided pleural fluid collection with interlobular septal thickening in mosaic attenuation of the pulmonary parenchyma with cardiac enlargement.\" HCT negative for acute intracranial process.   Type of Evaluation   Type of Evaluation Swallow Evaluation   General Swallowing Observations   Swallowing Evaluation Videofluoroscopic swallow study (VFSS)   VFSS Evaluation   Radiologist Dr. Jasso   VFSS Textures Trialed thin liquids;mildly thick liquids;moderately thick liquids/liquidized;pureed   VFSS Eval: Thin Liquid Texture Trial   Mode of Presentation, Thin Liquid cup;self-fed   Order of Presentation 2 oz   Preparatory Phase Poor bolus control   Oral Phase, Thin Liquid Premature pharyngeal entry;Residue in oral cavity   Pharyngeal Phase, Thin Liquid Delayed swallow reflex;Residue in valleculae  (swallow triggered at pyriforms)   Rosenbek's Penetration Aspiration Scale: Thin Liquid Trial Results 7 - contrast passes glottis, visible subglottic residue remains despite patient's response (aspiration)   Diagnostic Statement trace vallecular residue with trace amount of aspiration   VFSS Eval: Mildly Thick Liquids   Mode of Presentation cup;self-fed   Order of Presentation approx 2 oz   Preparatory Phase " Poor bolus control   Oral Phase Premature pharyngeal entry;Residue in oral cavity   Pharyngeal Phase Delayed swallow reflex;Residue in valleculae  (swallow triggered at pyriforms)   Rosenbek's Penetration Aspiration Scale 5 - contrast contacts vocal cords, visible residue remains (penetration)   Response to Aspiration absent response, silent aspiration   Diagnostic Statement trace amount of vallecular residue with trace penetration to cords   VFSS Eval: Moderately Thick Liquids   Mode of Presentation spoon   Order of Presentation 1 presentation   Preparatory Phase Poor bolus control   Oral Phase Premature pharyngeal entry;Residue in oral cavity   Pharyngeal Phase Delayed swallow reflex;Residue in valleculae   Rosenbek's Penetration Aspiration Scale 1 - no aspiration, contrast does not enter airway   VFSS Evaluation: Puree Solid Texture Trial   Mode of Presentation, Puree spoon   Order of Presentation 1 presentation   Preparatory Phase Poor bolus control   Oral Phase, Puree Residue in oral cavity;Premature pharyngeal entry   Pharyngeal Phase, Puree Delayed swallow reflex;Residue in valleculae   Rosenbek's Penetration Aspiration Scale: Puree Food Trial Results 1 - no aspiration, contrast does not enter airway   Swallowing Recommendations   Diet Consistency Recommendations pureed (level 4);moderately thick liquids/liquidized (level 3)   Supervision Level for Intake 1:1 supervision needed   Mode of Delivery Recommendations bolus size, small;no straws;slow rate of intake   Medication Administration Recommendations, Swallowing (SLP) whole in applesauce   Comment, Swallowing Recommendations Pt has poor bolus control with slow AP transfer and prespill of bolus into pharynx prior swallow with all textures. There was diffuce oral stasis going into vallecula with moderatelyl thick and pureed textures with continued stasis to mild/trace amount with midly thick and thin. She had complete epiglottic tilt that was delayed for all  textures. Puree and moderatelyl thick swallow triggered when vallecula filled just prior to pouring over tip of epiglottis. Mildly thick and thin swallow triggered past the tip of the epiglottis into pyrforms placing her at a higher penetration/aspiration risk with mildly thick and thin. Pt cognitive status does not enable her to independently follow swallow strategies of small bites/sips and to slow down(not cram mouth with food). To compensate for her cognitive status and delayed swallow trigger, recommend pureed textures and moderately thick liquids. Pt has immediate, strong cough with aspiration. Recommend ST complete diagnostic dysphagia treatment to determine safe upgrade from puree and moderatelyl thick.   General Therapy Interventions   Planned Therapy Interventions Dysphagia Treatment   Dysphagia treatment Modified diet education;Instruction of safe swallow strategies    Total Evaluation Time   Total Evaluation Time (Minutes) 15

## 2021-10-20 NOTE — PLAN OF CARE
Problem: Risk for Delirium  Goal: Improved Sleep  Outcome: No Change     Problem: Pain Chronic (Persistent)  Goal: Acceptable Pain Control and Functional Ability  Outcome: No Change     Tachycardic, PRN metoprolol with little to no improvement, asymptomatic.  Disoriented x3, confused conversation fluctuates, tends to fixate one thing and becomes irritable/restless if unable to address need (wanted jar of peanuts overnight).  Pt had difficulties falling and staying asleep, writer observed less than 3 cumulative hours.  Continues to c/o pain BLE and left hip, PRN's, positioning, massage and distraction provide temporary relief.  Continues on 1:1 for safety, pulling at lines, calling out, attempts to remove tele patches and gown.   No

## 2021-10-20 NOTE — PLAN OF CARE
0700 to 1930    Problem: Adult Inpatient Plan of Care  Goal: Plan of Care Review  Outcome: No Change   Pt  oriented x 1, was alert more in am and drowsy in afternoon to evening.  Swallow study done today. Diet changed to puree and moderate thick liquids.  came to visit and has many questions but  has memory issues also.  very repetitive and doesn't fully understand what is going on with his wife.  Tylenol given as ordered this evening. Pt has been ok most of the day. Not yelling out or agitated but is very grabby with staff with repositioning.

## 2021-10-20 NOTE — PROGRESS NOTES
[facility]  Palliative Care Daily Progress Note       Recommendations & Counseling         Goals of Care: life-prolonging at this time, not clear to me restorative GOC are possible (progressive dementia, will not be able to regain prior level of function).  Discussed this with Theresa and Sarbjit.  Code status: DNR/DNI.  Family desires NO escalation of cares if clinical decline.  For now, not on comfort care but family considering this.    ACP: no HCD on file.  POLST advised prior to discharge.      Symptoms: Pain: diffuse.  Chronic opioid user (hydromorphone 2mg 4-5 tabs daily x 15 years)           - schedule tylenol 650mg TID while awake (ordered)           - topical lidocaine to abdominal wall prn           - is on 2mg hydromorphone q4H PRN pain (one dose in past 24 hours)           - is on hydromorphone 0.3mg IV BID ( I think b/c of swallowing issues?) and recommend scheduling oral hydromorphone 2mg PO BID (lasts longer than IV).  Cleared to take pills w applesauce.    Agitation/advanced dementia with behavioral disturbance  - refrain from benzodiazepines, antihistamines, anticholinergics  - qTc is 439 on 10/20--I discussed olanzapine as option for agitation, insomnia.  And would suggest 2.5mg at HS (minimal Qt prolongation) scheduled with 2.5mg PO BID prn agitation.  - remains on temazepam (home med), continue for now.    Dysphagia: likely sign of end-stage dementia.  - discussed with family (sons Theresa and Sarbjit)  - recommend family care conference Thursday at 1pm and requested compassionate exception (granted) to visitor policy for both theresa and sarbjit to attend.  Needed for deciding upon discharge site and goals.  - recommend hospice consult (w comorbidities of combined CHF, severe tricuspid insufficiency, dysphagia and advanced dementia I think she would likely qualify for hospice) and order placed.  - sons to consider whether they want liberalize diet (and no escalation of cares, no bipap/ICU  and remain DNR, and whether they'd want to treat w abx or not).  - POLST recommended for fam meeting (DNR/DNI and would be either selective of comfort focused) w sons to sign; pt and her  lack capacity.        Assessments          Bernadette Yu is a 81 year old female with PMH of mitral valve repair,aortic stenosis, severe cognitive impairment,  CKD 3, CAD s/p CABG, heart failure, Pulmonary hypertension, paroxysmal atrial fibrillation on Warfarin, PVD, hypothyroidism, normocytic anemia, hyperlipidemia, anxiety, T2DM on insulin, chronic pain syndrome (on chronic narcotics for many years) that presents 3 weeks after toe surgery with reports of patient being more confused and complaining of multiple different pains such as hemorrhoid pain and melena.  Recent toe amputation(osteomyelitis)  that appears to be healing well with no gross s/s of active infection and concern for confusion from family.    Today, the patient was seen for:  Symptom management (Pain, opioid induced constipation, agitation/encephalopathy)  Support  Goals of care      Prognosis, Goals, or Advance Care Planning was addressed today with: Yes.  Mood, coping, and/or meaning in the context of serious illness were addressed today: Yes.  Summary/Comments: conversation with Sarbjit Lin.  Reviewed end stage dementia, that we are seeing w dysphagia signs that Bernadette will likely be entering last weeks-months of life.  Reviewed options of rehospitalizing after this vs focus on comfort w hospice support either at home or in a living facility (memory care vs SNF).  Reviewed ACP--she has living will and sons will bring a copy to hospital.  Recommended POLST be done prior to discharge.  They would like to meet with hospice to get information.  Sons ask appropriate questions and give consent to use of olanzapine.            Interval History:     Chart review/discussion with unit or clinical team members:   No BM since 10/17 (had 3 on 10/16 and 2 on  10/17).  This is day #3 w/o BM.   has been disoriented, sleep disrupted and complains of pain.  Has been 1:1 due to safety, pulling at her lines, agitation.    Per patient or family/caregivers today:  Bernadette offers no complaints and is unable to give information in a reliable maner.           Review of Systems:     Unable to obtain due to encephalopathy and dementia           Medications:     I have reviewed this patient's medication profile and medications during this hospitalization.    Noted meds:   Current Facility-Administered Medications   Medication     acetaminophen (TYLENOL) tablet 325 mg     albuterol (PROVENTIL) neb solution 2.5 mg     glucose gel 15-30 g    Or     dextrose 50 % injection 25-50 mL    Or     glucagon injection 1 mg     [START ON 10/21/2021] diltiazem ER COATED BEADS (CARDIZEM CD/CARTIA XT) 24 hr capsule 300 mg     furosemide (LASIX) injection 20 mg     [Held by provider] gabapentin (NEURONTIN) capsule 100 mg     haloperidol lactate (HALDOL) injection 2 mg     HYDROmorphone (DILAUDID) injection 0.1-0.2 mg     HYDROmorphone (DILAUDID) tablet 2 mg     HYDROmorphone (PF) (DILAUDID) injection 0.3 mg     insulin aspart (NovoLOG) injection (RAPID ACTING)     insulin aspart (NovoLOG) injection (RAPID ACTING)     insulin aspart (NovoLOG) injection (RAPID ACTING)     insulin glargine (LANTUS PEN) injection 20 Units     insulin NPH injection 5 Units     ipratropium - albuterol 0.5 mg/2.5 mg/3 mL (DUONEB) neb solution 3 mL     levothyroxine (SYNTHROID/LEVOTHROID) tablet 125 mcg     lidocaine (LMX4) cream     lidocaine 1 % 0.1-1 mL     magnesium oxide (MAG-OX) tablet 400 mg     melatonin tablet 1 mg     melatonin tablet 3 mg     metoprolol (LOPRESSOR) injection 2.5 mg     metoprolol tartrate (LOPRESSOR) tablet 100 mg     naloxone (NARCAN) injection 0.2 mg    Or     naloxone (NARCAN) injection 0.4 mg    Or     naloxone (NARCAN) injection 0.2 mg    Or     naloxone (NARCAN) injection 0.4 mg      "pantoprazole (PROTONIX) IV push injection 40 mg     prochlorperazine (COMPAZINE) injection 5 mg    Or     prochlorperazine (COMPAZINE) tablet 5 mg    Or     prochlorperazine (COMPAZINE) suppository 12.5 mg     sodium chloride (PF) 0.9% PF flush 3 mL     sodium chloride (PF) 0.9% PF flush 3 mL     temazepam (RESTORIL) capsule 7.5 mg     thiamine (B-1) tablet 100 mg     warfarin ANTICOAGULANT (COUMADIN) tablet 6 mg     Warfarin Therapy Reminder (Check START DATE - warfarin may be starting in the FUTURE)     Opioid / pain medicine use:   2mg PO hydromorphone in past 24 hours    0.8mg IV hydromorphone in past 24 hours.  325mg APAP in past 24 hours.               Physical Exam:   Vital Signs: Blood pressure 112/63, pulse 120, temperature 97.3  F (36.3  C), temperature source Oral, resp. rate 18, weight 66 kg (145 lb 8 oz), SpO2 100 %.   GENERAL: sleepy 82 yo woman, supine in bed with CMA at bedside.  Appears relaxed.  Oriented to self only.  SKIN: Warm and dry   HEENT: Normocephalic, anicteric sclera, moist mucous membranes  LUNGS: Clear to auscultation anterolaterally; non-labored   CARDIAC: RRR, II-III/VI AMELIA over precordium, high pitched.  ABDOMINAL: BS (+), soft, non distended, non tender and no grimace with palpation.  EXTREMITIES: No edema or cyanosis, pulses 2+ and symmetrical  NEUROLOGIC: oriented to self, nods \"yes\" to every question.  No myoclonus.    PSYCH: confused, minimal speech (3-5 words).             Data Reviewed:     Reviewed recent pertinent imaging:   TTE 10/18/21:  Interpretation Summary       1. The left ventricle is normal in size. Left ventricular systolic performance   is mild to moderately reduced. The ejection fraction is estimated to be 40-45%   2. There is mild to moderate global reduction in left ventricular systolic   performance.   3. There is mild concentric increase in left ventricular wall thickness.   4. There is at least moderate, if not severe, aortic stenosis.   Â  The mean " gradient is measured at 22 mmHg with peak velocity of 2.7 m/s.   Calculated valve area 0.67 cmÂ . Dimensionless index measured at 0.21. It is   felt that the mean gradient and peak velocity likely underestimate the true   severity in the setting of mild-moderately reduced left ventricular systolic   performance (stroke-volume index 26.4 mL/mÂ ).   5. There is trace aortic insufficiency.   6. There is a mechanical mitral valve prosthesis (documented 23 mm [employer] valve).   Â  The mean gradient across the mitral valve prosthesis is mildly increased   from expected at 8 mmHg.   Â  Probable trace-mild prosthetic mitral insufficiency (not well visualized due   to acoustic artifact generated by the mitral valve prosthetic sewing ring).   7. There is severe tricuspid insufficiency.   8. There is mild-moderate right ventricular enlargement with mildly reduced   right ventricular systolic performance.   9. There is mild-moderate biatrial enlargement.   10. Although RV systolic pressure is not increased relative to RA pressures,   RA systolic pressures are likely elevated as a result of severe tricuspid   insufficiency. In addition, the IVC appears dilated with decreased phasic   variation in caval diameter consistent with elevated right atrial pressure.       When compared to the prior real-time echocardiogram dated 22 July 2021, there   has been a mild interval diminution in left ventricular systolic performance.   The degree of tricuspid insufficiency has increased. The mean gradient   obtained across the aortic valve is less when compared to the prior   examination though likely reflects an interval diminution in left ventricular   systolic performance (stroke-volume index measured at 52.8 mL/mÂ  on the prior   study and now 26.4 mL/mÂ ). The mean gradient across the aortic valve was   measured at 34 mmHg on the prior examination. The mean gradient measured   across the mitral valve prosthesis is slightly  increased.     MRI brain 10/16  IMPRESSION:   1.  No definite acute intracranial pathology.   2.  Mild to moderate age-related changes.   3.  Right inferior temporal lobe encephalomalacia/gliosis with hemosiderin staining, likely posttraumatic.   4.  Scattered small susceptibility changes.     Reviewed recent labs:   Last Comprehensive Metabolic Panel:  Sodium   Date Value Ref Range Status   10/17/2021 135 (L) 136 - 145 mmol/L Final     Potassium   Date Value Ref Range Status   10/20/2021 3.8 3.5 - 5.0 mmol/L Final     Chloride   Date Value Ref Range Status   10/17/2021 105 98 - 107 mmol/L Final     Carbon Dioxide (CO2)   Date Value Ref Range Status   10/17/2021 20 (L) 22 - 31 mmol/L Final     Anion Gap   Date Value Ref Range Status   10/17/2021 10 5 - 18 mmol/L Final     Glucose   Date Value Ref Range Status   10/17/2021 362 (H) 70 - 125 mg/dL Final     GLUCOSE BY METER POCT   Date Value Ref Range Status   10/20/2021 256 (H) 70 - 99 mg/dL Final     Urea Nitrogen   Date Value Ref Range Status   10/17/2021 25 8 - 28 mg/dL Final     Creatinine   Date Value Ref Range Status   10/17/2021 1.00 0.60 - 1.10 mg/dL Final     GFR Estimate   Date Value Ref Range Status   10/17/2021 52 (L) >60 mL/min/1.73m2 Final     Comment:     As of July 11, 2021, eGFR is calculated by the CKD-EPI creatinine equation, without race adjustment. eGFR can be influenced by muscle mass, exercise, and diet. The reported eGFR is an estimation only and is only applicable if the renal function is stable.   07/05/2021 57 (L) >60 mL/min/1.73m2 Final     Calcium   Date Value Ref Range Status   10/17/2021 8.4 (L) 8.5 - 10.5 mg/dL Final     CBC RESULTS: Recent Labs   Lab Test 10/16/21  0426 10/14/21  0556 10/14/21  0556   WBC  --   --  7.9   RBC  --   --  3.13*   HGB 8.6*   < > 8.4*   HCT  --   --  29.1*   MCV  --   --  93   MCH  --   --  26.8   MCHC  --   --  28.9*   RDW  --   --  19.6*   PLT  --   --  154    < > = values in this interval not displayed.      Total time on unit 55 minutes discussion with RN, chart review, documentation, with CMA and examining the patient with  34 minutes ([phone]) spent in discussion with surrogate decisionmakers (kimberley Lin and Sarbjit) about results of swallow study/aspiration, modified diet, risks/benefits of starting olanzapine for prn agitation med, scheduling oral dilaudid and focus on comfort.    Analy Lucas MD  [facility],  Palliative Medicine Service  [phone] department number  Can page via "[employer]

## 2021-10-21 NOTE — PLAN OF CARE
Problem: Adult Inpatient Plan of Care  Goal: Plan of Care Review  Outcome: Declining   Pt's family is having a family conference with hospice/palliative care today 1300.  Pt has crackles in upper lobes.  B/p 60/40's at 1200. Notified Dr. Tsai.  No new orders. Continue to encourage po fluids. NA fed pt and encouraged fluids. B/p 80/60's Lactic Acid 4.0 Notified Dr. Foster about Lactic, b/p, LS crackles in upper lobes.  See Mar for changes in meds.  Continue to monitor pt closely   Patient:   Mary Myrick            MRN: [id number]            FIN: [id number]              Age:   38 years     Sex:  FEMALE     :  80   Associated Diagnoses:   None   Author:   Sarah BAEZA     Advanced Heart Failure Consult Note  Subjective: Pt seen sitting up in bed this morning. Examined. She reports feeling extremely exhausted this AM, and did not sleep well o/n. Otherwise no issues o/n. HPI: Pt is a 38year old obese female with a hx of PCOS tx with Nexplanon, ADD on vyvanse, and unclear rheumatologic diagnosis who presents with new onset cardiomyopathy, LV and RV thrombus, bilateral pulmonary emboli, and SOB. She was transferred from [address] [place] [address]. Pt has multiple medical conditions for [facility] she has seen multiple doctors for several years. She has had PCOS for about 6 years and has not had a menstrual cycle in 5  years. No hx of pregnancies was verbalized. She also has hx of joint pains, eczema, carpal tunnel syndrome with bilateral carpal tunnel release, + OSIRIS, and Reynaud's which was treated by Dr. Grace Awad, a rheumatologist. She was treated with plaquenil. Pt reports that she has been on plaquenil for about a year, which has been off now for about 9 months. She has been on a Medrol dose pack 4 times over the past year for various rheumatologic and  infectious etiologies. She has been on vyvanse for about 3 years to treat her ADD. Of note, pt suffers from recurrent migraines. She has a nexplanon progesterone implant for BC. New onset cardiomyopathy, LV/RV thrombus, b/l PE, SOB; transfer from [facility] Dr. Adan Mariscal  Pt states she has been feeling ill for the past several months with SOB and URI symptoms. She reports she had bronchitis 2-3 months ago and laryngitis. She presented to the [address] [place] [address] ER last night with worsening SOB and hemoptysis. It was discovered that she has bilateral PEs as well as bi-ventricular systolic dysfunction along with bi-ventricular thrombii.  She was subsequently placed on a heparin gtt and transferred to [facility] for further care. Key events :  20:  D/c IVP Lasix. Start Lasix gtt at [facility]. cMRI when able. 20: Cr 0.76. BUN 12. proBNP 1.1k. Net -2.8 L/24hrs. D/c Lasix gtt, start IVP Lasix 40mg TID. CVP 9.  20: PE protocol CT completed last night due to increasing left sided chest pain. CT showed PE but no new central emboli. 20:Cw IVP Lasix to 40mg BID. AC therapy: Coumadin. Heparin. PTT 51. INR 1.2.  1/10/19: Cr. 0.84, Na 129. 's. INR 1.2, cw heparin and coumadin. 19: INR 1.3, giving 6mg Coumadin. Increasing Enalapril to 5mg q12h.  20: D/c heparin today. INR 2.1. Na low at 133. Cont fluid restriction. 20: INR 2.2. Off heparin gtt. Cont. enalapril 5mg q12h.   20: D/c Triple lumen today. Begin Toprol XL 12.5mg qd. Review of Systems   CONSTITUTIONAL: +fatigue, tiredness  HEENT: No vision changes. SKIN: + rash. CARDIOVASCULAR: +mild SOB  RESPIRATORY: +improving SOB, +improving coughing  GASTROINTESTINAL: +abd pain. GENITOURINARY: negative. NEUROLOGICAL: No headache. MUSCULOSKELETAL: + joint pain. HEMATOLOGIC: None  LYMPHATICS: No enlarged nodes. PSYCHIATRIC: normal.   ENDOCRINOLOGIC: +hirsuitism  Otherwise negative except as mentioned in the HPI. Fhx:   Father had arthtiritis,  1 year ago. Maternal grandmother had rheumatoid. Brother has hx of alcohol abuse. No fhx of sudden cardiac death or cardiac issues  SHx:  , no children. Smoked about 1-2 packs a month, has been cutting down and quit ~10 days ago. Pt is a  for 2nd-5th graders. Residence in [facility]  Past Gyn hx:   No pregancies. On progesterone for the past 5 years. PCOS. PSHx:   Carpal tunnel release   Appendectomy 10 years ago. Histories   Past Medical History:     All Problems  Migraine / [id number] / Confirmed  History of appendectomy / [id number] / Confirmed  Eczema / [id number] / Confirmed  Carpal tunnel syndrome / [id number] / Confirmed  Acute appendicitis / [id number] / Confirmed   Family History:    No family history items have been selected or recorded. Procedure History:    No active procedure history items have been selected or recorded. Social History:    No qualifying data available. Physical Examination   VS/Measurements:       Documented vital signs:   Vitals between:   04-JAN-2020 07:07:39   TO   05-JAN-2020 07:07:39                   LAST RESULT MINIMUM MAXIMUM  Temperature 36.6 36.6 36.6  Heart Rate 106 106 134  Respiratory Rate 21 11 30  NISBP           110 97 137  NIDBP           61 61 92  NIMBP           72 72 105  SpO2                    98 95 100   . Lines and Tubes   Intake and Output:    I & O between:  04-JAN-2020 07:07 TO 05-JAN-2020 07:07  Med Dosing Weight:  99.7  kg   05-JAN-2020  24 Hour Intake:   634.01  ( 6.36 mL/kg )  24 Hour Output:   1725.00           24 Hour Urine/Stool Output:   0.0  24 Hour Balance:   -1090.99           24 Hour Urine Output:   1725.00  ( 0.72 mL/kg/hr )  . Respiratory:       Support:   Physical Exam:   Overall obese female, nad   HEENT ncat  HEART tachycardic, RR, +s1s2   LUNGS ctab   ABD obese soft distended  EXT 1+ LE edema, warm   SKIN + rash  NEURO a&ox3  PSYCH distressed    Health Status   Allergies:     Allergies (1) Active Reaction  tetracycline None Documented    Medications:    Medications (14) Active  Scheduled: (8)  *Do NOT give IM Injections  1 each, N/A, Daily  cefepime  1,000 mg, IVPB, Q8H  Famotidine 20 mg tab  20 mg 1 tab, Oral, Q12H  Furosemide 20 mg/2 mL inj SDV  20 mg 2 mL, Slow IV Push, Q8H  Mupirocin 2% ointment 22 gm  1 application, Each Nostril, BID  Pneumococcal adult vaccine 23-polyvalent 0.5 mL IM inj SDV  0.5 mL, IM, On Call  vancomycin  1,000 mg, IVPB, Q12H  Vancomycin 500 mg inj SDV  Trough, Misc, Once (scheduled)  Continuous: (2)  heparin-dextrose 5% 25,000 unit  25,000 unit 250 mL, IV  Sodium Chloride 0.9% 1,000 mL  1,000 mL, IV, 10 mL/hr  PRN: (4)  ALPRAZolam 0.25 mg tab  0.5 mg 2 tab, Oral, Q6H  Heparin 1,000 unit/mL inj 10 mL MDV  8,000 unit 8 mL, IV Push, As Directed PRN  Heparin 1,000 unit/mL inj 10 mL MDV  4,000 unit 4 mL, IV Push, As Directed PRN  TraMADol 50 mg tab  50 mg 1 tab, Oral, Q6H      Review / Management   Laboratory Results   Labs between:  04-JAN-2020 07:07 to 05-JAN-2020 07:07  CBC:                 WBC  HgB  Hct  Plt  MCV  RDW   04-JAN-2020 6.5  (L) 11.8  36.5  295  93.1  14.5   DIFF:                 Seg  Neutroph//ABS  Lymph//ABS  Mono//ABS  EOS/ABS  23-[facility]-4306 NOT APPLICABLE  82 // 5.2 16 // 1.1 2 // (L) 0.2  0 // (L) 0.0  BMP:                 Na  Cl  BUN  Glu   04-JAN-2020 137  (H) 108  11  (H) 143                              K  CO2  Cr  Ca                              4.5  22  (L) 0.46  8.4   CMP:                 AST  ALT  AlkPhos  Bili  Albumin   04-JAN-2020 (H) 286  (H) 707  114  0.6  (L) 3.0   Other Chem:             Mg  Phos  Triglycerides  GGTP  DirectBili                           2.1  (L) 2.0         POC GLU:                 Latest Result  Latest Date  Minimum  Min Date  Maximum  Max Date                             (H) 151  05-JAN-2020 (H) 151  05-JAN-2020 (H) 151              COAG:                 INR  PT  PTT  Ddimer  Fibrinogen    04-JAN-2020 1.4  (H) 14.1  (H) 72  (H) 5.11     Blood Gas:            Ph  PCO2  PO2  BiCarb  BaseExcess   Arterial:  05-JAN-2020 7.43  37  105  25  0                              Ionized Ca  Na  K  HgB  Lactic Acid                              (L) 1.13  136  4.3  (L) 11.4  1.5   05-JAN-2020 7.43  (L) 30  (H) 125  (L) 20  NOT APPLICABLE                              Ionized Ca  Na  K  HgB  Lactic Acid                              (L) 1.10  (L) 133  4.4  (L) 11.8  (!) 2.5                      Cardiology Results   Cardiac Monitor   Course     Assessment and Plan:  Pt is a 38year old obese female with a history of PCOS, ADD on vyvanse, unclear rheumatologic diagnosis with a history of Plaquenil use who was transferred from [facility] with new onset cardiomyopathy, LV and RV thrombus, bilateral pulmonary embolii, and SOB. Acute on Chronic Systolic Heart Failure  -Unclear etiology. -DDx include idiopathic vs rheumatologic vs endocrinologic vs infectious vs vyvanse induced vs plaquenil induced. -EKG 1/5 reviewed: Sinus tach. No acute st/t changes. Normal QRS and QT intervals.  -Trop negative x1.   -cMRI 1/6: shows bilateral ventricular thrombus, no evidence of infarct. -ECHO 1/10 report: LVEF 10-15%. RV size normal w/ systolic function severely reduced. There is a large, multilobulated, mobile thrombusrevealed by acoustic contrast opacification. LV thrombus no longer seen. GDMTs:   -Diuretic: PO Lasix 40mg q12h  -ACE I: Enalapril 5mg q12h. -AC: Coumadin. -> holding  -BB: Begin Toprol XL 12.5mg qd (1/14). -Other: Dig 250mcg daily  Bilateral Pulmonary Emboli  -CT (1/07) showed pulmomary emboli, but no new central emboli, plan to cont. IV heparin  -coumadin.   -Pleuritic chest pain has improved  Bi-Ventricular Thrombi  -Likely secondary to CHF.  -Coumadin. -> holding (1/14). -hypercoagulable work-up so far is negative for lupus anticoagulant, cardiolipin antibody, beta-2 glycoprotein antibody. -ECHO 1/10: There is no evidence of an LV thrombus revealed by acoustic contrast opacification. There is a large, multilobulated, mobile. thrombusrevealed by acoustic contrast opacification in the RV. Arthralgia, Carpal Tunnel Syndrome, Rash  -Rheum following.   -concerning for underlying rheumatologic disorder.  -holding plaquenil   -Current OSIRIS is negative, will continue to seek rheum opinion on whether there are rheum diseases that require treatment. ADD  -Psych following.   -On vyvanse for 3 years, now d/c'd  PCOS:   -Gynecology following.    -On Nexplanon, no plans to remove at this time per gyn.    Multiple arterial clots  -Heme/onc following.   -Her hypercoagulable work-up so far is negative for lupus anticoagulant, cardiolipin antibody, beta-2 glycoprotein antibody.  -Possibly related to BiV HF. Hx of recurrent infections  -ID following. -R/o HIV or underlying immunologic disorder  -Currently on Vanco  Today's Plan:  -Cont. with PO Lasix. CVP 6.   -D/c Triple lumen today.  -Begin Toprol XL 12.5mg qd. 35  minutes of critical care. Plan of care was discussed with patient, who expresses understanding. Charting performed by loren Mayo for Dr. Maricel Kennedy record entries made by the scribmilad were at my direction. I have reviewed the chart and agree that the record accurately reflects my personal performance of the history, physical exam, hospital course, and assessment and plan.   Ginette Bowman M.D.

## 2021-10-21 NOTE — PLAN OF CARE
Continues on 1:1 for agitation, pulling at lines, falls risk. Pt. On tele A-Fib tachcardic with heart rates up to 120. Family care conference today to discuss plan of care. Will continue to monitor.    Erin Richmond RN    Problem: Adult Inpatient Plan of Care  Goal: Absence of Hospital-Acquired Illness or Injury  Outcome: No Change  Intervention: Identify and Manage Fall Risk  Recent Flowsheet Documentation  Taken 10/21/2021 0032 by Erin Richmond, RN  Safety Promotion/Fall Prevention: bedside attendant  Taken 10/20/2021 2000 by Erin Richmond, RN  Safety Promotion/Fall Prevention: bedside attendant  Goal: Optimal Comfort and Wellbeing  Outcome: No Change     Problem: Pain Chronic (Persistent)  Goal: Acceptable Pain Control and Functional Ability  Outcome: No Change

## 2021-10-21 NOTE — PROGRESS NOTES
Palliative Care    Family conference,   [facility] Comfort care here, then [facility] once of 1:1  Still on 1:1 no quetapine now      Full note to follow     ELVIS Collins, NP-C, ACHPN

## 2021-10-21 NOTE — PROGRESS NOTES
Progress Note    Date of Admission: 10/11/2021    Assessment/Plan    Bernadette Yu is a 81 year old female who was admitted on 10/11/2021 with melena and increased confusion.     She has a PMH of mitral valve repair, CAD s/p CABG, heart failure, hypertension, paroxysmal atrial fibrillation on Warfarin, PVD, hypothyroidism, normocytic anemia, CKD 3, hyperlipidemia, anxiety, T2DM on insulin, chronic pain syndrome (on chronic narcotics) that presents 3 weeks after toe surgery with reports of patient being more confused and complaining of multiple different pains. On oral dilaudid for long time. Patient seems to be confused . Not aggressive but unable to follow comands.     Patient still on 1:1 due to pulling of lines. Mental status has worsened today     Problem List:      Dementia with aggressive behavior, confusion, agitation  1:1  Having persistent difficulty with memory and orientation.  Pt is requiring 24 hour care from her sons before admission  They give her all of her medications  Recent discharge from the [facility] after recent foot surgery - family brought home to provide 24 hour care  Neurology consultation appreciated  ?vascular dementia, delirium, metabolic encephalopathy  -ct, mri didn't show acute changes, some posttraumatic changes although family cannot recall any head trauma history except frequent falls  -goal to titrate off 1:1 to prepare for discharge to hospice care as to palliative care note  - palliative consult appreciated.     Hypotension/elevated lactate  - all hypertensive medications and lasix is discontinued.  - ml bolus ordered.  - family conference with palliative care was done today and has noted recommendations as to preference for comfort care   - optimize pain and anxiety meds      AF with RVR  HR not in good control.   Metoprolol and Diltiazem ER 180m PO daily, increase to 240mg by cardiologist (on hold)  Metoprolol 2.5 mg IV q6h prn (on hold)  Cardiologist  "consultation appreciated   INR down to 4.02 from 4.7; pharmacy to dose to keep inr 2.5-3.5 due to Mitral Valve replacement     H/o diastolic CHF  -Last TTE LVEF 50 to 55% on 7/22/2021 with evidence of moderate to severe tricuspid regurgitation, moderate pulmonary hypertension  -repeated echo (10/17) ef 40-50%  -moderate, if not severe, aortic stenosis  -mechanical mitral valve  -severe tricuspid insufficiency.  -mild-moderate right ventricular enlargement       Hyperglycemia   T2DM on insulin chronically  -worse due to steroid  -Grossly uncontrolled, last A1c 9.0 on 9/14/2021  -lantus 20 units BID  - NPH 5 units AM for prednisone related hyperglycemia  -in better control now  -Consult DM educator appreciated     H/o Mitral Valve repair  Goal for INR 2.5-3.5     Respiratory distress  -Multiple factors (aspiration, hypoventilation syndrome, fluid overload, reactive airway?)  -Pulm consulted - start on steroidx4 days (will be done 10/20), nebs, lasix  -improved      Hypomagnesemia   Hypokalemia  Replacement as per protocol     Melena  -Chronically anemic with reports of melena and hemorrhoid pain  -GI consult requested - appreciated  -PPI bid  - EGD wnl  -back on diet  -resume Warfarin, stable     Acute blood loss anemia  H/o anemia of chronic disease (hgb 9-11)  Type and screen  No further melena noted per nursing  Transfused 1 unit PRBC  hgb stable  Close monitoring     Chronic pain syndrome  Family states that she has takes narcotics for \"many years\"  It appears that she had po prn dialudid  Not clear how much she was taking on a daily basis      Leukocytosis, resolved  -WBC 14.7 with mildly abnormal urinalysis.    No pneumonia seen on chest x-ray.  Foot does not appear infected  -ok to remove sutures on foot   -Procalcitonin WNL  No clear infectious process identified         Hypothyroidism  -continue PTA synthroid      Normocytic anemia  -On iron therapy at home, hemoglobin 9.1 but she fluctuates between 9 and " 11  -stable     CKD 3  -Serum creatinine 1.14  -creat improved      Recent toe amputation  Appears to be healing well with no gross s/s of active infection     Diet:  Combination Diet Regular Diet Adult; Minced and Moist Diet (level 5); Liquidized/Moderately Thick (level 3)  Still cough/vomit on oral intake  Palliative care consultation     DVT Prophylaxis: warfarin   Briceño Catheter: Not present  Code Status: No CPR, DNI        Subjective  Seems to be confused. Patient's mental status has declined further today.   Objective  Vital signs in last 24 hours  Temp:  [97.6  F (36.4  C)-98.5  F (36.9  C)] 98.1  F (36.7  C)  Pulse:  [107-123] 109  Resp:  [16-20] 20  BP: ()/(46-80) 81/56  SpO2:  [95 %-100 %] 99 %    Input and Output in 24 hrs     Intake/Output Summary (Last 24 hours) at 10/20/2021 1827  Last data filed at 10/20/2021 1800  Gross per 24 hour   Intake 930 ml   Output 3425 ml   Net -2495 ml       Physical Exam:  GEN: AAOX1 Not in acute distress. Grimaces when touched on left hip area  HEENT: Atraumatic    Pupils- round and reactive to light bilaterally   Neck- supple, no JVP elevation, no lymphadenopathy or thyromegaly   Sclera- anicteric   Mucous membrane- moist and pink  CHEST:diffuse coarse creps on bilateral chest  HEART: S1S2 regular. No murmurs, rubs or gallops  ABDOMEN: Soft. Non-tender, non-distended. No organomegaly. No guarding or rigidity. Bowel sounds- active  Extremities: No pedal oedema  CNS: No focal neurological deficit. No involuntary movements  SKIN: No skin rash, no cyanosis or clubbing      Pertinent Labs   Lab Results: Personally Reviewed    Recent Results (from the past 24 hour(s))   Glucose by meter    Collection Time: 10/20/21  9:07 PM   Result Value Ref Range    GLUCOSE BY METER POCT 238 (H) 70 - 99 mg/dL   INR    Collection Time: 10/21/21  4:33 AM   Result Value Ref Range    INR 2.11 (H) 0.90 - 1.15   Magnesium    Collection Time: 10/21/21  4:33 AM   Result Value Ref Range     Magnesium 2.0 1.8 - 2.6 mg/dL   Potassium    Collection Time: 10/21/21  4:33 AM   Result Value Ref Range    Potassium 3.5 3.5 - 5.0 mmol/L   Glucose by meter    Collection Time: 10/21/21  7:57 AM   Result Value Ref Range    GLUCOSE BY METER POCT 83 70 - 99 mg/dL   Glucose by meter    Collection Time: 10/21/21 11:51 AM   Result Value Ref Range    GLUCOSE BY METER POCT 165 (H) 70 - 99 mg/dL   Lactic Acid STAT    Collection Time: 10/21/21  1:00 PM   Result Value Ref Range    Lactic Acid 4.0 (HH) 0.7 - 2.0 mmol/L   Glucose by meter    Collection Time: 10/21/21  4:59 PM   Result Value Ref Range    GLUCOSE BY METER POCT 100 (H) 70 - 99 mg/dL       Pertinent Radiology   Radiology Results reviewed        Advanced Care Planning      Gisele Foster MD   [facility]

## 2021-10-21 NOTE — CONSULTS
HOSPICE - writer met with pt Riley chu and Sarbjit. Dariela GARRETT CNP with Palliative also present. Writer spoke of hospice benefits and philosophy. Writer described three locations hospice takes place.   Family agreed to hospice at time of discharge and is interested in a hospice house. [facility].   [facility] will likely [facility] bed available in a few days. Pt was placed at the top of the wait list.   Pt is not ready for discharge at this time d/t being 1:1 and trialing new medication of olanzapine.   Writer will continue to follow and be available for any questions or concerns r/t hospice.   Writer will continue to update on [facility] status.   FLAVIO Pool will send referral to [facility] and check on availability of [facility].   Thank you for this referral.   Sylvia Levine  [facility]  [phone]

## 2021-10-21 NOTE — PROGRESS NOTES
Care Management Follow Up    Length of Stay (days): 10    Expected Discharge Date: 10/23/2021     Concerns to be Addressed:       Patient plan of care discussed at interdisciplinary rounds: Yes    Anticipated Discharge Disposition:       Anticipated Discharge Services:    Anticipated Discharge DME:      Patient/family educated on Medicare website which has current facility and service quality ratings:    Education Provided on the Discharge Plan:    Patient/Family in Agreement with the Plan:      Referrals Placed by CM/FLAVIO:    Private pay costs discussed: Not applicable    Additional Information:  2:22 PM  FLAVIO left VM with Radha at [facility] ([facility]) requesting return call around bed availability.    FLAVIO spoke with staff at [facility].  No beds anticipated until late next week.    3:16 PM  FLAVIO spoke with Radha at [facility].  She states no beds available this week.  CM to follow up on Monday 10/25 if pt remains in the hospital.      CARLOTTA Fairchild

## 2021-10-22 NOTE — PROGRESS NOTES
[facility]  Palliative Care Progress Note    Patient: Bernadette Yu       Recommendations       1)   GOALS OF CARE are .   ? Comfort  ? Had family meeting today with 2 sons and Hospice.  We discussed her still requiring to be on one-to-one.  I verified that the sons were okay with me starting quetiapine which was restarted yesterday by Dr. Rahman, and they agreed.  Talked about options for hospice and they asked again about taking her home realized that that would not be appropriate at this time.  Sylvia discussed [facility] and other inpatient programs indicated they would like to have [facility].  I asked him if we can start her on comfort care now and he agreed.          2)    ADVANCED CARE PLANNING  ? Patient has completed health care directive: N  ? Surrogate/Documented health care agent: Sarbjit and  brother Riley .  Donovan has dementia and relies on kids  ? Code Status: Changed to DNR DNI after long discussion with sons  ?    ?     3)    SYMPTOM MANAGEMENT        Comfort care     Goal of keep respiratory rate <30, and eliminate signs of distress (grimacing, fear expression, agitation, heavy secretions, use of accessory muscles, nasal flaring, grunting at end-expiration)    If patient is conscious, encourage coughing    Stop BP checks, discontinue oximetry.    If fever suspected, ok to check temp and give PRN tylenol      Med regimen: GFR 52  ? SL Morphine (Roxanal) 20 mg/ml.  Give  10mg q8 hr scheduled for pain/dyspnea     SL Morphine (Roxanal) 20 mg/ml give 10 mg q 2hours PRN dyspnea  ? Dilaudid 0.1-0.2 mg IV  q 3 hours severe pain or dyspnea  ? SL  ativan 1 mg q4 hr PRN for anxiety/seizures  ? SL atropine 1% 2 drops q 2 PRN for secretions.  If refractory, then can use Glycopyrrolate 0.2mg IV q1h PRN  ?     I discontinued non comfort meds and left note for Attending to address the Diabetic meds      AMS due to Probable Toxic metabolic encephalopahty (combination of  medications, anemia, hyperglycemia, and possible respiratory component)   o Comment:Neuro suspect much of her encephalopathy is actually related to agitation in the evening (sundowning).     MRI showed just right temporal encephalomalacia that is likely posttraumatic  o Recommendations:    Avoid benzodiazepines, antihistamines, anticholinergics if able.    Lights on and blinds open during the day.  Reorient frequently.    Lights & TV off during the night.  Promote normal circadian rhythm.    Limit sensory deprivation - utilize hearing aids, glasses, etc.     Frequently assess unmet bathroom needs if applicable.      4)    PSYCHOSOCIAL/SPIRITUAL SUPPORT  ? Appreciate piritual care support    ?  palliative social work support for   ? Palliative medicine will continue to follow    These recommendations will be discussed with Dr. Gunn      Thank you for the opportunity to participate in the care of this patient and family. Our team: will continue to follow.     During regular M-F work hours -- if you are not sure who specifically to contact -- please contact us by calling us directly at the Palliative Care Main Line [phone]    After regular work hours and on weekends/holidays, you can leave a message at [phone]         Assessments/HPI        Bernadette Yu is a 81 year old female with PMH of mitral valve repair,aortic stenosis, severe cognitive impairment,  CKD 3, CAD s/p CABG, heart failure, Pulmonary hypertension, paroxysmal atrial fibrillation on Warfarin, PVD, hypothyroidism, normocytic anemia, hyperlipidemia, anxiety, T2DM on insulin, chronic pain syndrome (on chronic narcotics for many years) that presents 3 weeks after toe surgery with reports of patient being more confused and complaining of multiple different pains such as hemorrhoid pain and melena.  Recent toe amputation(osteomyelitis)  that appears to be healing well with no gross s/s of active infection and concern for confusion from  "family.     Per Neuro: severe vascular dementia based on family report over at least the last 2 years     Recent Hospitalizations:  Last inpatient 1 year ago    Interim Hx  10/21  Had a dose of Diltizam XR and she chewed it, she had a course of Hypotension as a result  Agitated last night, yelling   Patient continues to have 1:1 supervision,  SLP evaluated, required consistency change       Prognosis, Goals, & Planning:               Patient has a completed Health Care Directive: Reportedly yes, but not available to us currently.      Code status: Full Code      Goals of Care Discussion  10/21 See above re: care conf  10/19  Long talk with son Alirio and wife katerina. See above    10/18  I spoke to 2 sons and  on phone today. I updated them on the above interim hx.  I explained that the MRI showed Right Temporal Encephalomalacia  And explained that this is not a good prognostic indicator and it is very likely that she would be able to return home.   I discussed Memory Care units and indicated that this would be a very good recommendation for after Discharge.  I discussed the cardiac  Issues and the potential recommendation of TAVR and they would not want that option.   I again discussed CPR and per the son, his father Donovan became very upset hearing this conversation (he has dementia) and he recommended we discuss this further tomorrow after they have a chance to explain it to their dad.  I reiterated that this is a \"what if \" discussion but based on her current Afib then Junctional rhythm issues, this is a concern.    I explained that she is on 1:1 and actively having a cardiac workup so this is where she currently needs to be.  We will talk again tomorrow.      10/15 Daughter in law states that she has been taking Dilaudid 2.0 mg  Four to six times a day (it was ordered 4x PRN or 6 x prn for severe pain).  They are looking for memory care for both parents to go live. Code status Discussion:I I explained to " "daughter in law and her  Riley  That they  can influence her  future health care now by making certain choices regarding future procedures, one being code status..I explained the urgent nature of CPR and that this decision to not even start it need to be made ahead of time as the default is to do everything.  I explained that I am not currently concerned about her heart as this discussion today is \"What If\"  I explained that if CPR is needed, at the time it is needed I.e when heart stops) then it is an urgent matter and too short of time to obtain her opinion then. . I indicated given her  small chest frame, and potential cancer , CPR may cause her more harm than good and based on the amount of time it may take to restart his heart,, she may end up with a significant change of medical and functional status  due to lack of blood flow to vital organs during CPR.  I also explained that choosing DNR does not mean nothing is done. She can still have treatment of cardiac arrhythmias or low BP as medications for that will not cause more harm than good.    10/15  Hospice discussion:  I also discussed  Hospice services, the focus of care (comfort), care delivery (short  Visits by certified  providers (Hospice team) and where services are held (persons home, LTC or ASL hospice house   (latter two are private pay for room and board) and qualifying Dx.     Coping, Meaning, & Spirituality:         Mood, coping, and/or meaning in the context of serious illness were addressed today: Yes          ROS        [facility] Symptom Data:    10/19  Pain Improved  Nausea none  Dypsnea None  Anxiey mild         Comprehensive ROS is reviewed and is negative except as here & per HPI:         Medications:  I have reviewed this patient's medication profile and medications from this hospitalization.            Physical Exam:     GENERAL: laying in bed, lethargic, RN attending to low BPf  SKIN: Warm and dry   HEENT: Normocephalic, " anicteric sclera, moist mucous membranes  LUNGS: Clear to auscultation anterolaterally; non-labored, on O2 by RA  CARDIAC: RRR, normal s1/s2, w/o m/r/g   ABDOMINAL: BS (+), large, soft, non distended, non tender  : koch in place  MUSKL: no gross joint deformities   EXTREMITIES: redness on r shin, 2nd toe amp on left foot pulses 2+ and symmetrical  NEUROLOGIC:lethargic.       /73   Pulse 120   Temp 98.5  F (36.9  C)   Resp 24   Wt 69.9 kg (154 lb 1.6 oz)   SpO2 99%   BMI 30.10 kg/m         Data Reviewed:      Recent lab reviewed, my comments on pertinents:      Qtc 489  On 9/14/21  10/20/21 QTc 480            ====================================================  TT: I have personally spent a total of 35   minutes on the unit in review of medical record, consultation with the medical providers and assessment of patient today, with more than 50% of this time spent in counseling, coordination of care, and discussion with son and DIL on phone re: diagnostic results, prognosis, symptom management, risks and benefits of management options, and development of plan of care as noted above.   Total Visit Time:35 + 45 min    For Inpatient, 'Total Visit Time' = Unit Floor + Face-to-Face time.  Total Face-to-Face Prolonged Service Time: 45 min  Content of the Prolonged Time: Family conf        ====================================================    ELVIS Collins, NP-C, NAJMA   [facility]  Palliative Medicine  Office: [phone]

## 2021-10-22 NOTE — PLAN OF CARE
Problem: Risk for Delirium  Goal: Optimal Coping  Outcome: No Change     Problem: Risk for Delirium  Goal: Improved Behavioral Control  Outcome: No Change     Problem: Risk for Delirium  Goal: Improved Sleep  Outcome: No Change     Patient resting quietly. Did not eat breakfast or lunch. Tried to wake patient up to try to eat something. She did not respond. Swabbed mouth, lip lubricant applied. Turn and Repo patient. Changed purwick, and brief. Applied oils with lavender to patients chest, arms, hands and feet for comfort. Spoke with patients Sarbjit diaz. Sarbjit (son) and Donovan () visiting, and added 4 other people to visit/swap out since patient is comfort cares. Patient to be transferred to . Awaiting Hospice bed for discharge.

## 2021-10-22 NOTE — PLAN OF CARE
Problem: SLP General Care Plan  Goal: Swallow Goal: Safely tolerate diet without aspiration (SLP)  Description: Safely tolerate diet without signs/symptoms of aspiration  Outcome: Change based on patient need/priority   Noted plan to discharge to hospice care. Current diet of puree with moderately thick liquids. No further ST is warranted at this time given change in medical goals of care. Contact SLP if any questions or concerns.

## 2021-10-22 NOTE — PLAN OF CARE
Physical Therapy Discharge Summary    Reason for therapy discharge:    Change in medical status. Pt on comfort cares.     Therapy recommendation(s):    No further therapy is recommended.    Jenna Salas, PT, DPT  10/22/2021

## 2021-10-22 NOTE — PLAN OF CARE
Occupational Therapy Discharge Summary    Reason for therapy discharge:    Change in medical status.    Progress towards therapy goal(s). See goals on Care Plan in [facility] electronic health record for goal details.  Comfort cares.    Therapy recommendation(s):    No further therapy is recommended.  Mary Reyes, OTR/L  10/22/2021

## 2021-10-22 NOTE — PLAN OF CARE
Problem: Adult Inpatient Plan of Care  Goal: Plan of Care Review  10/21/2021 2236 by Antonia Lilly RN  Outcome: Declining   Pt was put on comfort cares after family conference.  At around 4pm, speech came by, they were unable to wake pt up enough to try to test pt's progress in swallowing. Called Vero Godinez NP from palliative regarding pain medication.  Change pt to Roxanol scheduled and PRN d/t pt being very drowsy at times now. Took pt off of 1:1 at 1600 since pt is not really moving without help now. Frequently checking  on pt with 3 side rails up and bed alarm on.   was at bedside most of evening.    at supper time notified Dr. Foster.  Discontinued S/S with meals and scheduled Insulin with meals.  BG at HS was 224. Gave the HS sliding scale and Lantus at bedtime.  Pt as done well off of 1:1 this evening. Pt had leg pain around 1930 gave Roxanol, pt still crying out that her legs hurt after 30 min. Repositioned pt and put SCD's on. Pt quieted down and fell asleep.   Pt has occasionally woke up when staff is present in room talking a little and smiles than goes back to sleep.     Notified Riley and Sarbjit that, we were trialing pt off of 1:1 sitter but would have someone in with pt to feed her and bed has alarm on.  Sons were ok with that also discussed how Donovan (pt's ) was upset and not understanding what was fully going on.   walking out to the nurse's desk multiple times. Staff gently  directing  back to pt's room encourage  to visit with pt.   seem not as anxious of situation around 1900.  Family very appreciative and understanding for the update.

## 2021-10-22 NOTE — PROGRESS NOTES
[facility]  Palliative Care Daily Progress Note       Recommendations & Counseling       Goals of Care: Now on comfort care, awaiting discharge to hospice facility per family request.  Confirmed with sons today that goal is for comfort, not wanting any medications or interventions unless specifically for comfort.  Per charge RN and bedside RN, patient can have 2 visitors at a time, up to 6 per day per compassionate care exception to visitor policy.  MYRTLE Villanueva will notify family.    Code status: DNR/DNI.      ACP: no HCD on file.  Currently son is surrogate decision maker. Patient and  lack decision making capacity.  POLST advised prior to discharge.  No family present at bedside, if she discharges from hospital, recommend completing prior to discharge.      Symptoms: Pain: diffuse.  Chronic opioid user (hydromorphone 2mg 4-5 tabs daily x 15 years)-Currently appears comfortable, no non-verbal signs of pain noted.      Acetaminophen 650 mg per rectum every 4 hours PRN    Topical lidocaine to abdominal wall prn    Morphine 10 mg every 8 hours and 10 mg every 2 hours PRN for breakthrough pain     Per chart review, patient prefers to have SCDs on for comfort.      Now likely having more difficulty swallowing pills so recommend continuing medications sublingual or rectal in preparation for discharge with hospice, recommend discontinuing IV pain medications.       Agitation/advanced dementia with behavioral disturbance-appears calm today, minimally responsive, no concerns reported from nursing.  Off 1:1  - refrain from benzodiazepines, antihistamines, anticholinergics  - qTc is 439 on 10/20--Continues with 2.5mg at HS (minimal Qt prolongation) scheduled with 2.5mg PO BID prn agitation.  Has not received any PRN doses.   - remains on temazepam (home med), continue for now.     Dysphagia: likely sign of end-stage dementia.  -Family requesting comfort diet at this time, understanding her risk for  aspiration.  Would like her to have food/fluids if she desires for her comfort.         Assessments          Bernadette Yu is a 81 year old female with PMH of mitral valve repair,aortic stenosis, severe cognitive impairment,  CKD 3, CAD s/p CABG, heart failure, Pulmonary hypertension, paroxysmal atrial fibrillation on Warfarin, PVD, hypothyroidism, normocytic anemia, hyperlipidemia, anxiety, T2DM on insulin, chronic pain syndrome (on chronic narcotics for many years) that presents 3 weeks after toe surgery with reports of patient being more confused and complaining of multiple different pains such as hemorrhoid pain and melena.  Recent toe amputation(osteomyelitis)  that appears to be healing well with no gross s/s of active infection and concern for confusion from family.    Today, the patient was seen for:  Symptom management-comfort care, end of life care     Prognosis, Goals, or Advance Care Planning was addressed today with: Yes.  Mood, coping, and/or meaning in the context of serious illness were addressed today: Yes.  Summary/Comments:            Interval History:     Chart review/discussion with unit or clinical team members:   Per chart review, patient now on comfort care.  Family hoping to have patient discharge to a hospice home, possibly [facility] in the next few days.  Per bedside RN, patient appears comfortable, no concerns at this time.  She has received morphine 10 SL every 8 hours scheduled with no PRN doses.  Also had olanzapine at bedtime.  Off 1:1 sitter.     Per patient or family/caregivers today:  Phone call to kimberley Pearl: Updated on her current condition.  They confirmed that goal is for comfort focused care, not wanting any medications or treatments that are not specifically for comfort.  They also stated they understand she is at risk for aspiration resulting in pneumonia or even death.  They would like her to be able to eat/drink what she requests for comfort and quality of life.     Family stating they would like her to discharge to a hospice home, if she has to discharge from the hospital.  I let them know that hospice and care management are exploring options and will reach out to them with updates.  No further questions at this time.               Review of Systems:     Unable to complete ROS as patient is not verbalizing or opening eyes.        Medications:     I have reviewed this patient's medication profile and medications during this hospitalization.           Physical Exam:   Vital Signs: Blood pressure 124/73, pulse 120, temperature 98.5  F (36.9  C), resp. rate 24, weight 69.9 kg (154 lb 1.6 oz), SpO2 99 %.   GENERAL: NAD, eyes closed lying in bed    SKIN: Warm and dry, scattered ecchymotic areas on arms and abdomen  HEENT: Normocephalic, dry mucous membranes  LUNGS: breathing non-labored   CARDIAC: tachycardic  MUSKL: no gross joint deformities   EXTREMITIES: No edema or cyanosis  NEUROLOGIC: not verbally responding or following commands             Data Reviewed:     Reviewed recent pertinent imaging and labs          TT: I have personally spent a total of 35 minutes on the unit in review of medical record, consultation with the medical providers and assessment of patient today, with more than 50% of this time spent in counseling, coordination of care, and discussion with family re: diagnostic results, prognosis, symptom management, risks and benefits of management options, and development of plan of care as noted above.  ====================================================    ELVIS Thomas, NS-BC  Clinical Nurse Specialist  [facility]  [phone]

## 2021-10-22 NOTE — PLAN OF CARE
Pt. Alert with dementia. Complained of pain at beginning of shift which was relieved by scheduled Morphine. Comfortable now the rest of the shift. Repositioned every 2 hours. Ate some applesauce, needs to be fed. Incontinent of bowel and bladder. Continues to be tachycardic up to 120. All other vitals stable. On comfort cares, awaiting a hospice bed. Will continue to monitor.    Erin Richmond RN    Problem: Adult Inpatient Plan of Care  Goal: Absence of Hospital-Acquired Illness or Injury  Outcome: No Change  Intervention: Identify and Manage Fall Risk  Recent Flowsheet Documentation  Taken 10/22/2021 0000 by Erin Richmond, RN  Safety Promotion/Fall Prevention:   activity supervised   bed alarm on   nonskid shoes/slippers when out of bed  Goal: Optimal Comfort and Wellbeing  Outcome: No Change     Problem: Risk for Delirium  Goal: Improved Behavioral Control  Outcome: Improving

## 2021-10-22 NOTE — PROGRESS NOTES
Progress Note    Date of Admission: 10/11/2021    Assessment/Plan    Bernadette Yu is a 81 year old female who was admitted on 10/11/2021 with melena and increased confusion.     She has a PMH of mitral valve repair, CAD s/p CABG, heart failure, hypertension, paroxysmal atrial fibrillation on Warfarin, PVD, hypothyroidism, normocytic anemia, CKD 3, hyperlipidemia, anxiety, T2DM on insulin, chronic pain syndrome (on chronic narcotics) that presents 3 weeks after toe surgery with reports of patient being more confused and complaining of multiple different pains. On oral dilaudid for long time. Patient seems to be confused . Not aggressive but unable to follow comands.     Patient still on 1:1 due to pulling of lines. Mental status has worsened today  After family conference with palliative team patient has been determined to be good fit for comfort care .     Problem List:      Dementia with aggressive behavior, confusion, agitation  1:1  Having persistent difficulty with memory and orientation.  Pt is requiring 24 hour care   Recent discharge from the [facility] after recent foot surgery - family brought home to provide 24 hour care  Neurology consultation appreciated  ?vascular dementia, delirium, metabolic encephalopathy  -ct, mri didn't show acute changes, some posttraumatic changes although family cannot recall any head trauma history except frequent falls  -goal to titrate off 1:1 to prepare for discharge to hospice care   - palliative consult appreciated.     Hypotension/elevated lactate  - all hypertensive medications and lasix is discontinued.  - family conference with palliative care was done today and has noted recommendations as to preference for comfort care   - optimize pain and anxiety meds      AF with RVR  HR not in good control.   Metoprolol and Diltiazem ER 180m PO daily, increase to 240mg by cardiologist (on hold)  Metoprolol 2.5 mg IV q6h prn (on hold)  Cardiologist consultation appreciated  "  INR down to 4.02 from 4.7; pharmacy to dose to keep inr 2.5-3.5 due to Mitral Valve replacement     H/o diastolic CHF  -Last TTE LVEF 50 to 55% on 7/22/2021 with evidence of moderate to severe tricuspid regurgitation, moderate pulmonary hypertension  -repeated echo (10/17) ef 40-50%  -moderate, if not severe, aortic stenosis  -mechanical mitral valve  -severe tricuspid insufficiency.  -mild-moderate right ventricular enlargement       Hyperglycemia   T2DM on insulin chronically  -worse due to steroid  -Grossly uncontrolled, last A1c 9.0 on 9/14/2021  -lantus 20 units BID (on hold)  - NPH 5 units AM for prednisone related hyperglycemia  -in better control now  -Consult DM educator appreciated          Acute blood loss anemia  H/o anemia of chronic disease (hgb 9-11)    No further active mangagement as patient is on comfort care     Chronic pain syndrome  Family states that she has taken narcotics for \"many years\"  It appears that she had po prn dialudid  Not clear how much she was taking on a daily basis      Leukocytosis, resolved  -WBC 14.7 with mildly abnormal urinalysis.    No pneumonia seen on chest x-ray.  Foot does not appear infected  -ok to remove sutures on foot   -Procalcitonin WNL  No clear infectious process identified         Hypothyroidism  -continue PTA synthroid(on hold)      Normocytic anemia  -On iron therapy at home, hemoglobin 9.1 but she fluctuates between 9 and 11  -stable     CKD 3  -Serum creatinine 1.14  -creat improved      Recent toe amputation  Appears to be healing well with no gross s/s of active infection     Diet:  Combination Diet Regular Diet Adult; Minced and Moist Diet (level 5); Liquidized/Moderately Thick (level 3)  Still cough/vomit on oral intake  Palliative care consultation     DVT Prophylaxis: warfarin   Briceño Catheter: Not present  Code Status: No CPR, DNI        Subjective  Seems to be confused. Patient's mental status has declined further today.   Objective  Vital " signs in last 24 hours  Temp:  [98.5  F (36.9  C)-98.8  F (37.1  C)] 98.5  F (36.9  C)  Pulse:  [119-123] 120  Resp:  [20-24] 24  BP: ()/(53-73) 124/73  SpO2:  [94 %-99 %] 99 %    Input and Output in 24 hrs     Intake/Output Summary (Last 24 hours) at 10/20/2021 1827  Last data filed at 10/20/2021 1800  Gross per 24 hour   Intake 930 ml   Output 3425 ml   Net -2495 ml       Physical Exam:  GEN: AAOX0 Not in acute distress. Grimaces when touched on left hip area  HEENT: Atraumatic    Pupils- round and reactive to light bilaterally   Neck- supple, no JVP elevation, no lymphadenopathy or thyromegaly   Sclera- anicteric   Mucous membrane- moist and pink  CHEST:diffuse coarse creps on bilateral chest  HEART: S1S2 regular. No murmurs, rubs or gallops  ABDOMEN: Soft. Non-tender, non-distended. No organomegaly. No guarding or rigidity. Bowel sounds- active  Extremities: No pedal oedema  CNS: No focal neurological deficit. No involuntary movements  SKIN: No skin rash, no cyanosis or clubbing      Pertinent Labs   Lab Results: Personally Reviewed    Recent Results (from the past 24 hour(s))   Glucose by meter    Collection Time: 10/21/21  4:59 PM   Result Value Ref Range    GLUCOSE BY METER POCT 100 (H) 70 - 99 mg/dL   Glucose by meter    Collection Time: 10/21/21  9:13 PM   Result Value Ref Range    GLUCOSE BY METER POCT 224 (H) 70 - 99 mg/dL   Glucose by meter    Collection Time: 10/22/21  8:40 AM   Result Value Ref Range    GLUCOSE BY METER POCT 118 (H) 70 - 99 mg/dL       Pertinent Radiology   Radiology Results reviewed        Advanced Care Planning      Gisele Foster MD   [facility]

## 2021-10-23 NOTE — PROGRESS NOTES
Spoke with Radha from [facility]. She is going to get pt. On waiting list at [facility]. Calls placed to other Hospice homes and LTC facilities. Will await return calls.    today

## 2021-10-23 NOTE — PLAN OF CARE
Problem: Adult Inpatient Plan of Care  Goal: Optimal Comfort and Wellbeing  Outcome: Improving  Patient appears comfortable, has been unresponsive, opens eyes with cares, respirations 18-20, pulse weak and thready, was turn and repositioned, 1700 blood sugar 57, texted MD whether she wanted her to be treated, see new orders, had scheduled morphine held hs meds.

## 2021-10-23 NOTE — PROGRESS NOTES
Progress Note    Date of Admission: 10/11/2021    Assessment/Plan    Bernadette Yu is a 81 year old female who was admitted on 10/11/2021 with melena and increased confusion.     She has a PMH of mitral valve repair, CAD s/p CABG, heart failure, hypertension, paroxysmal atrial fibrillation on Warfarin, PVD, hypothyroidism, normocytic anemia, CKD 3, hyperlipidemia, anxiety, T2DM on insulin, chronic pain syndrome (on chronic narcotics) that presents 3 weeks after toe surgery with reports of patient being more confused and complaining of multiple different pains. On oral dilaudid for long time. Patient seems to be confused . Not aggressive but unable to follow comands.     Patient still on 1:1 due to pulling of lines. Mental status has worsened today  After family conference with palliative team patient has been determined to be good fit for comfort care .     Problem List:      Dementia with aggressive behavior, confusion, agitation  1:1  Having persistent difficulty with memory and orientation.  Pt is requiring 24 hour care   Recent discharge from the [facility] after recent foot surgery - family brought home to provide 24 hour care  Neurology consultation appreciated  ?vascular dementia, delirium, metabolic encephalopathy  -ct, mri didn't show acute changes, some posttraumatic changes although family cannot recall any head trauma history except frequent falls  -goal to titrate off 1:1 to prepare for discharge to hospice care   - palliative consult appreciated.     Hypotension/elevated lactate  - all hypertensive medications and lasix is discontinued.  - family conference with palliative care was done today and has noted recommendations as to preference for comfort care   - optimize pain and anxiety meds      AF with RVR  HR not in good control.   Metoprolol and Diltiazem ER 180m PO daily, increase to 240mg by cardiologist (on hold)  Metoprolol 2.5 mg IV q6h prn (on hold)  Cardiologist consultation appreciated  "  INR down to 4.02 from 4.7; pharmacy to dose to keep inr 2.5-3.5 due to Mitral Valve replacement     H/o diastolic CHF  -Last TTE LVEF 50 to 55% on 7/22/2021 with evidence of moderate to severe tricuspid regurgitation, moderate pulmonary hypertension  -repeated echo (10/17) ef 40-50%  -moderate, if not severe, aortic stenosis  -mechanical mitral valve  -severe tricuspid insufficiency.  -mild-moderate right ventricular enlargement       Hyperglycemia   T2DM on insulin chronically  -worse due to steroid  -Grossly uncontrolled, last A1c 9.0 on 9/14/2021  -lantus 20 units BID (on hold)  - NPH 5 units AM for prednisone related hyperglycemia  -in better control now  -Consult DM educator appreciated          Acute blood loss anemia  H/o anemia of chronic disease (hgb 9-11)    No further active mangagement as patient is on comfort care     Chronic pain syndrome  Family states that she has taken narcotics for \"many years\"  It appears that she had po prn dialudid  Not clear how much she was taking on a daily basis      Leukocytosis, resolved  -WBC 14.7 with mildly abnormal urinalysis.    No pneumonia seen on chest x-ray.  Foot does not appear infected  -ok to remove sutures on foot   -Procalcitonin WNL  No clear infectious process identified         Hypothyroidism  -continue PTA synthroid(on hold)      Normocytic anemia  -On iron therapy at home, hemoglobin 9.1 but she fluctuates between 9 and 11  -stable     CKD 3  -Serum creatinine 1.14  -creat improved      Recent toe amputation  Appears to be healing well with no gross s/s of active infection     Diet:  Combination Diet Regular Diet Adult; Minced and Moist Diet (level 5); Liquidized/Moderately Thick (level 3)  Still cough/vomit on oral intake  Palliative care consultation     DVT Prophylaxis: warfarin   Briceño Catheter: Not present  Code Status: No CPR, DNI        Subjective  Seems to be confused. Patient's mental status has declined further today.   Objective  Vital " signs in last 24 hours  Resp:  [20] 20    Input and Output in 24 hrs     Intake/Output Summary (Last 24 hours) at 10/20/2021 1827  Last data filed at 10/20/2021 1800  Gross per 24 hour   Intake 930 ml   Output 3425 ml   Net -2495 ml       Physical Exam:  GEN: AAOX0 Not in acute distress. Grimaces when touched on left hip area  HEENT: Atraumatic    Pupils- round and reactive to light bilaterally   Neck- supple, no JVP elevation, no lymphadenopathy or thyromegaly   Sclera- anicteric   Mucous membrane- moist and pink  CHEST:diffuse coarse creps on bilateral chest  HEART: S1S2 regular. No murmurs, rubs or gallops  ABDOMEN: Soft. Non-tender, non-distended. No organomegaly. No guarding or rigidity. Bowel sounds- active  Extremities: No pedal oedema  CNS: No focal neurological deficit. No involuntary movements  SKIN: No skin rash, no cyanosis or clubbing      Pertinent Labs   Lab Results: Personally Reviewed    Recent Results (from the past 24 hour(s))   Glucose by meter    Collection Time: 10/22/21  4:57 PM   Result Value Ref Range    GLUCOSE BY METER POCT 57 (L) 70 - 99 mg/dL       Pertinent Radiology   Radiology Results reviewed        Advanced Care Planning      Gisele Foster MD   [facility]

## 2021-10-23 NOTE — PLAN OF CARE
Problem: Adult Inpatient Plan of Care  Goal: Optimal Comfort and Wellbeing  Outcome: No Change   Pt mostly unresponsive the whole shift. She did wake up and try and get up out of bed x1. Changed and repositioned her and she has been comfortable since. Scheduled morphine given. Purewick in place. Voiding in good amounts. Comfort cares. Turn and reposition frequently.  Dory Alicea RN

## 2021-10-23 NOTE — PLAN OF CARE
COMFORT CARES PATIENT  Bernadette has had periods of awakefulness today for ~3 hours this morning.  She was conversing with family during this time.  She did eat some yogurt, and juice today with assist.  Noted to have a delay in swallowing, and mild coughing at times.  HOB >90 degrees when assisting with meals/po fluids.    Patient did become restless, and sat up on side of bed this afternoon.  Concern for acute urinary retention - bladder scanned for 167cc.  Patient was able to eliminate urine on own; external catheter in place.  Voided 600cc.    Known history of constipation per family.  Suppository administered - no results yet/no stool felt in rectum upon insertion.    Reports pain and disapproval today.  PRN Morphine and Lorazepam administered x1 in addition to around-the-clock Morphine.  Good results on reassessment - noted to be sleeping.    Breathing is even, non labored.  No distressful symptoms noted.    Awaiting placement.    Kaya Abreu RN

## 2021-10-24 NOTE — PLAN OF CARE
COMFORT CARES PATIENT    Bernadette has been unresponsive to verbal or tactile stimuli.  She appears very peaceful and comfortable.  She is tolerating turning and repositioning.  Bernadette has not had anything to drink/eat today d/t unresponsive state.  She has not produced any urine this shift.    Bernadette is receiving around-the-clock dosing of Morphine 10mg SL q 8 hours.    Patient has not required any PRN doses of Morphine or Lorazepam.    Spouse, Son, and Grandson visited today.  Family updated on patient status.  Coping appropriately   Compassionate Cares  (up to 6 visitors allowed per day)     Awaiting placement.   On [facility] waiting list per  on 10/23.    Kaya Abreu RN

## 2021-10-24 NOTE — PLAN OF CARE
Problem: Adult Inpatient Plan of Care  Goal: Optimal Comfort and Wellbeing  Outcome: No Change   Pt getting SL morphine for discomfort. Pt restless at times. Trying to take her gown off and her brief off. Teddybear given for comfort. Purewick in place for incontinence. Sl ativan given for restlessness but didn't seem to be very effective. Looking for placement.  Dory Alicea RN

## 2021-10-24 NOTE — PROGRESS NOTES
Progress Note    Date of Admission: 10/11/2021    Assessment/Plan    Bernadette Yu is a 81 year old female who was admitted on 10/11/2021 with melena and increased confusion.     She has a PMH of mitral valve repair, CAD s/p CABG, heart failure, hypertension, paroxysmal atrial fibrillation on Warfarin, PVD, hypothyroidism, normocytic anemia, CKD 3, hyperlipidemia, anxiety, T2DM on insulin, chronic pain syndrome (on chronic narcotics) that presents 3 weeks after toe surgery with reports of patient being more confused and complaining of multiple different pains. On oral dilaudid for long time. Patient seems to be confused . Not aggressive but unable to follow comands.     After family conference with palliative team patient has been determined to be good fit for comfort care .     Problem List:      Dementia with aggressive behavior, confusion, agitation  - Having persistent difficulty with memory and orientation.  - Pt is requiring 24 hour care   - Recent discharge from the [facility] after recent foot surgery - family brought home to provide 24 hour care  - Neurology consultation appreciated  ?vascular dementia, delirium, metabolic encephalopathy  -ct, mri didn't show acute changes, some posttraumatic changes although family cannot recall any head trauma history except frequent falls  -goal to titrate off 1:1 to prepare for discharge to hospice care   - palliative consult appreciated.     Hypotension/elevated lactate  - all hypertensive medications and lasix is discontinued.  - family conference with palliative care was done and has noted recommendations as to preference for comfort care   - optimize pain and anxiety meds      AF with RVR  - HR not in good control.   - Metoprolol and Diltiazem ER 180m PO daily, increase to 240mg by cardiologist (on hold)  - Metoprolol 2.5 mg IV q6h prn (on hold)  - Cardiologist consultation appreciated   - INR down to 4.02 from 4.7; pharmacy to dose to keep inr 2.5-3.5 due to  "Mitral Valve replacement     H/o diastolic CHF  -Last TTE LVEF 50 to 55% on 7/22/2021 with evidence of moderate to severe tricuspid regurgitation, moderate pulmonary hypertension  -repeated echo (10/17) ef 40-50%  -moderate, if not severe, aortic stenosis  -mechanical mitral valve  -severe tricuspid insufficiency.  -mild-moderate right ventricular enlargement       Hyperglycemia   T2DM on insulin chronically  -worse due to steroid  -Grossly uncontrolled, last A1c 9.0 on 9/14/2021  -lantus 20 units BID (on hold)  - NPH 5 units AM for prednisone related hyperglycemia  -in better control now  -Consult DM educator appreciated          Acute blood loss anemia  H/o anemia of chronic disease (hgb 9-11)    No further active mangagement as patient is on comfort care     Chronic pain syndrome  Family states that she has taken narcotics for \"many years\"  It appears that she had po prn dialudid  Not clear how much she was taking on a daily basis      Leukocytosis, resolved  -WBC 14.7 with mildly abnormal urinalysis.    No pneumonia seen on chest x-ray.  Foot does not appear infected  -ok to remove sutures on foot   -Procalcitonin WNL  No clear infectious process identified         Hypothyroidism  -continue PTA synthroid(on hold)      Normocytic anemia  -On iron therapy at home, hemoglobin 9.1 but she fluctuates between 9 and 11  -stable     CKD 3  -Serum creatinine 1.14  -creat improved      Recent toe amputation  Appears to be healing well with no gross s/s of active infection     Diet:  Combination Diet Regular Diet Adult; Minced and Moist Diet (level 5); Liquidized/Moderately Thick (level 3)  Still cough/vomit on oral intake  Palliative care consultation     DVT Prophylaxis: warfarin   Briceño Catheter: Not present  Code Status: No CPR, DNI        Subjective  Seems to be confused. arousable but lethargic  Objective  Vital signs in last 24 hours     Input and Output in 24 hrs     Intake/Output Summary (Last 24 hours) at " 10/20/2021 1827  Last data filed at 10/20/2021 1800  Gross per 24 hour   Intake 930 ml   Output 3425 ml   Net -2495 ml       Physical Exam:  GEN: AAOX0 Not in acute distress. uncommunicative  HEENT: Atraumatic    Pupils- round and reactive to light bilaterally   Neck- supple, no JVP elevation, no lymphadenopathy or thyromegaly   Sclera- anicteric   Mucous membrane- moist and pink  CHEST:diffuse coarse creps on bilateral chest  HEART: S1S2 regular. No murmurs, rubs or gallops  ABDOMEN: Soft. Non-tender, non-distended. No organomegaly. No guarding or rigidity. Bowel sounds- active  Extremities: No pedal oedema  CNS: No focal neurological deficit. No involuntary movements  SKIN: No skin rash, no cyanosis or clubbing      Pertinent Labs   Lab Results: Personally Reviewed    No results found for this or any previous visit (from the past 24 hour(s)).    Pertinent Radiology   Radiology Results reviewed        Advanced Care Planning      Gisele Foster MD   [facility]

## 2021-10-24 NOTE — PLAN OF CARE
Comfort Cares  Writer saw patient talking to family, was at a point in time restless, took purewick and diaper off and tried to get out of bed, had ativan prn,was helpful, later complained of abdominal pain, had morphine prn, was helpful, had schedule melatonin crushed and temazepam floated in apple sauce, took meds well, took bites for super, respirations has been between 18-22, no respiratory distress, slept most part of the shift, no results from suppository given in am.

## 2021-10-25 NOTE — PROGRESS NOTES
Care Management Follow Up    Length of Stay (days): 14    Expected Discharge Date: 10/26/2021     Concerns to be Addressed:     Hospice bed placement  Patient plan of care discussed at interdisciplinary rounds: Yes    Anticipated Discharge Disposition:  [facility] on Tues.     Anticipated Discharge Services:  Stretcher transport.  Anticipated Discharge DME:      Patient/family educated on Medicare website which has current facility and service quality ratings:    Education Provided on the Discharge Plan:  Yes   Patient/Family in Agreement with the Plan:  Son Sarbjit... yes    Referrals Placed by CM/SW:    Private pay costs discussed: transportation costs    Additional Information:  Pt. Was accepted at [facility] for tomorrow. Ride set up for 8:30am stretcher.    Spoke with Sarbjit diaz to inform of bed at [facility]. He was hoping to get her into [facility]. I informed him that [facility] does not anticipate a bed until Thurs. Or later this week. He stated that [facility] stated we could hold pt. Here until open bed on Thurs. I informed him that we are not able to hold the pt. Here another 3 days waiting for bed at [facility], as we really need to open up hospital beds for pt. Coming into hospital. He seemed to understand and was going to talk to his brother to let him know about [facility]. His question was if it was a Private room. I stated I would call Radha at [facility] to inquire.     Called Radha at [facility] to inform that family stated should not be a problem and will accept bed at [facility]. Informed her that family was inquiring if it was a private room. She stated it was a semi-private room, but that she will discuss that with Sarbjit when she calls him.       Deena Arnold RN

## 2021-10-25 NOTE — PROGRESS NOTES
CLINICAL NUTRITION SERVICES - REASSESSMENT NOTE     Nutrition Prescription    RECOMMENDATIONS FOR MDs/PROVIDERS TO ORDER:  None    Malnutrition Status:    Not noted    Recommendations already ordered by Registered Dietitian (RD):  None    Future/Additional Recommendations:  RD will sign off at this time due to comfort cares but available if  Needs arise

## 2021-10-25 NOTE — PLAN OF CARE
Problem: Adult Inpatient Plan of Care  Goal: Plan of Care Review  Outcome: No Change  Flowsheets (Taken 10/25/2021 1737)  Plan of Care Reviewed With: patient   Patient currently comfort cares status waiting hospice placement.  Patient bedrest with every 2 hour repositioning with pillow placement for comfort.      Patient reporting intermittent leg discomfort.  Patient only needed x1 prn to help manage pain otherwise appeared comfortable.    Patient eating well.  1:1 supervision with meals.  Tolerated well.

## 2021-10-25 NOTE — PROGRESS NOTES
Contact   Chart Review     Situation: Patient chart reviewed by .    Background: CCSW following while in hospital for discharge.     Assessment: Patient is enrolling in hospice.     Plan/Recommendations: No further outreach by Care Coordination team.     Indy Lopez,   [facility]  [phone]

## 2021-10-25 NOTE — PROGRESS NOTES
Progress Note    Date of Admission: 10/11/2021    Assessment/Plan    Bernadette Yu is a 81 year old female who was admitted on 10/11/2021 with melena and increased confusion.     She has a PMH of mitral valve repair, CAD s/p CABG, heart failure, hypertension, paroxysmal atrial fibrillation on Warfarin, PVD, hypothyroidism, normocytic anemia, CKD 3, hyperlipidemia, anxiety, T2DM on insulin, chronic pain syndrome (on chronic narcotics) that presents 3 weeks after toe surgery with reports of patient being more confused and complaining of multiple different pains. On oral dilaudid for long time. Patient seems to be confused . Not aggressive but unable to follow comands.     After family conference with palliative care team patient has been determined to be good fit for comfort care .     Problem List:      Dementia with aggressive behavior, confusion, agitation  - Having persistent difficulty with memory and orientation.  - Pt is requiring 24 hour care   - Recent discharge from the [facility] after recent foot surgery - family brought home to provide 24 hour care  - Neurology consultation appreciated  ?vascular dementia, delirium, metabolic encephalopathy  -ct, mri didn't show acute changes, some posttraumatic changes although family cannot recall any head trauma history except frequent falls  -awaiting  hospice care to plan for discharge. Right now on comfort care  - palliative consult appreciated.     Hypotension/elevated lactate  - all hypertensive medications and lasix is discontinued.  - family conference with palliative care was done and has noted recommendations as to preference for comfort care   - optimize pain and anxiety meds      AF with RVR  - Metoprolol and Diltiazem ER 180m PO daily, increase to 240mg by cardiologist (on hold)  - Metoprolol 2.5 mg IV q6h prn (on hold)  - Cardiologist consultation appreciated   - INR down to 4.02 from 4.7; pharmacy to dose to keep inr 2.5-3.5 due to Mitral Valve  "replacement     H/o diastolic CHF  -Last TTE LVEF 50 to 55% on 7/22/2021 with evidence of moderate to severe tricuspid regurgitation, moderate pulmonary hypertension  -repeated echo (10/17) ef 40-50%  -moderate, if not severe, aortic stenosis  -mechanical mitral valve  -severe tricuspid insufficiency.  -mild-moderate right ventricular enlargement       Hyperglycemia   T2DM on insulin chronically  -worse due to steroid  -Grossly uncontrolled, last A1c 9.0 on 9/14/2021  -lantus 20 units BID (on hold)  - NPH 5 units AM for prednisone related hyperglycemia  -in better control now  -Consult DM educator appreciated        Acute blood loss anemia  H/o anemia of chronic disease (hgb 9-11)    No further active mangagement as patient is on comfort care     Chronic pain syndrome  Family states that she has taken narcotics for \"many years\"  It appears that she had po prn dialudid  Not clear how much she was taking on a daily basis      Leukocytosis, resolved  -WBC 14.7 with mildly abnormal urinalysis.    No pneumonia seen on chest x-ray.  Foot does not appear infected  -ok to remove sutures on foot   -Procalcitonin WNL  No clear infectious process identified         Hypothyroidism  -continue PTA synthroid(on hold)      Normocytic anemia  -On iron therapy at home, hemoglobin 9.1 but she fluctuates between 9 and 11  -stable     CKD 3  -Serum creatinine 1.14  -creat improved      Recent toe amputation  Appears to be healing well with no gross s/s of active infection     Diet:  Combination Diet Regular Diet Adult; Minced and Moist Diet (level 5); Liquidized/Moderately Thick (level 3)  Still cough/vomit on oral intake  Palliative care consultation     DVT Prophylaxis: warfarin   Briceño Catheter: Not present  Code Status: No CPR, DNI        Subjective  Seems to be confused. arousable but lethargic  Objective  Vital signs in last 24 hoursTemp:  [98.7  F (37.1  C)-99  F (37.2  C)] 99  F (37.2  C)  Pulse:  [125-134] 125  Resp:  [20-22] " 22  BP: (150-151)/(65-89) 150/65  SpO2:  [95 %-96 %] 96 %    Input and Output in 24 hrs     Intake/Output Summary (Last 24 hours) at 10/20/2021 1827  Last data filed at 10/20/2021 1800  Gross per 24 hour   Intake 930 ml   Output 3425 ml   Net -2495 ml       Physical Exam:  GEN: AAOX0 Not in acute distress. uncommunicative  HEENT: Atraumatic    Pupils- round and reactive to light bilaterally   Neck- supple, no JVP elevation, no lymphadenopathy or thyromegaly   Sclera- anicteric   Mucous membrane- moist and pink  CHEST:diffuse coarse creps on bilateral chest  HEART: S1S2 regular. No murmurs, rubs or gallops  ABDOMEN: Soft. Non-tender, non-distended. No organomegaly. No guarding or rigidity. Bowel sounds- active  Extremities: No pedal oedema  CNS: No focal neurological deficit. No involuntary movements  SKIN: No skin rash, no cyanosis or clubbing      Pertinent Labs   Lab Results: Personally Reviewed    No results found for this or any previous visit (from the past 24 hour(s)).    Pertinent Radiology   Radiology Results reviewed        Advanced Care Planning      Gisele Foster MD   [facility]

## 2021-10-25 NOTE — PLAN OF CARE
Problem: Adult Inpatient Plan of Care  Goal: Optimal Comfort and Wellbeing  Outcome: No Change     Problem: Adult Inpatient Plan of Care  Goal: Readiness for Transition of Care  Outcome: No Change    Patient repositioned every two hours. Frequent mouth care done. Scheduled and as needed Morphine given for moaning with repositioning.    Sedated at start of shift.  Became more alert and was able to communicate with family.    Picked at dinner but was able to drink fluids.  Good urine output - rajwinder with sediments and strong smelling.  Resting comfortably. Continue to monitor.

## 2021-10-25 NOTE — PROGRESS NOTES
[facility]  Palliative Care Daily Progress Note       Recommendations & Counseling     Goals of Care:  comfort.  Is accepted to [facility] tomorrow.  Family supports no interventions except those directly targeted to symptom relief/comfort.  Compassionate exception to visitor policy granted.  Code status: DNR/DNI.       ACP: no HCD on file.  Currently sons are surrogate decision maker. Patient and  lack decision making capacity.  POLST (DNR/comfort) advised prior to discharge.  No family present on my visit today at bedside      Symptoms: Pain: chronic, diffuse.  improved.  Chronic opioid user (hydromorphone 2mg 4-5 tabs daily x 15 years)- does appear comfortable, no non-verbal signs of pain noted.      Acetaminophen 650 mg per rectum every 4 hours PRN    Topical lidocaine to abdominal wall prn    Morphine 10 mg SL every 8 hours and 10 mg every 2 hours PRN for breakthrough pain -- doing well, no myoclonus noted.    Per chart review, patient prefers to have SCDs on for comfort.      Now likely having more difficulty swallowing pills so recommend continuing medications sublingual or rectal in preparation for discharge with hospice, recommend discontinuing IV pain medications.       Agitation/advanced dementia with behavioral disturbance-appears calm today, minimally responsive, no concerns reported from nursing.  Off 1:1 for days and doing well.  - ideally, refrain from benzodiazepines, antihistamines, anticholinergics   --  Has atropine drops ordered, no doses given.   -- last doses of lorazepam given 10/24 (one dose early am) and two doses 10/23.  - hasn't received olanzapine--will order as ODT at hs PRN only.  - qTc is 439 on 10/20--Continues with 2.5mg at HS (minimal Qt prolongation) scheduled with 2.5mg PO BID prn agitation.  Has not received any PRN doses.   - last dose of temazepam (home med) was 10/23.  Would stop at discharge and use lorazepam for sleep PRN.     Dysphagia:  sign of  end-stage dementia.  -Family requested comfort diet at this time, understanding her risk for aspiration.   - Continue  food/fluids if she desires for her comfort.      Opioid induced constipation risk:   - last supp 10/23  - last BM 10/21 documented.  --ordered daily MD supp unless BM the preceding 24 hrs.  -- ordered senna liquid if pt able to take.    Polypharmacy:  - deprescribed diltiazem, metoprolol--hasn't been eating/drinking (stay off at discharge)  - has furosemide IV (can stop w end of life)  - stopped anticoagulation also.         Assessments            Bernadette Yu is a 81 year old female with PMH of mitral valve repair,aortic stenosis, severe cognitive impairment,  CKD 3, CAD s/p CABG, heart failure, Pulmonary hypertension, paroxysmal atrial fibrillation on Warfarin, PVD, hypothyroidism, normocytic anemia, hyperlipidemia, anxiety, T2DM on insulin, chronic pain syndrome (on chronic narcotics for many years) that presents 3 weeks after toe surgery with reports of patient being more confused and complaining of multiple different pains such as hemorrhoid pain and melena.  Recent toe amputation(osteomyelitis)  that appears to be healing well with no gross s/s of active infection and concern for confusion from family.  Today, the patient was seen for:  End of life symptom management of pain, anxiety, agitation in context of progressive vascular dementia, peripheral vascular disease, atrial fib, CAD, hypothyroidism, acute on chronic pain and prior DM2.    Prognosis, Goals, or Advance Care Planning was addressed today with: No.  Mood, coping, and/or meaning in the context of serious illness were addressed today: Yes.               Interval History:     Chart review/discussion with unit or clinical team members:   Diminishing oral intake, had a little yogurt and juice two days ago, coughing at times.   no BM since 10/21.  Restless at times, lorazpem was attempted (tabs of olanzapine not given) with less  success.  10/24 was minimally responsive with diminished urine output.    Per patient or family/caregivers today:  Bernadette endorses an itchy back and requests that I scratch it (I did).  She denies pain.      Key Palliative Symptoms:              Review of Systems:   Unable to obtain due to advanced dementia.             Medications:     I have reviewed this patient's medication profile and medications during this hospitalization.    Noted meds:      Current Facility-Administered Medications   Medication     acetaminophen (TYLENOL) Suppository 650 mg     atropine 1 % sublingual (ophthalmic drops for sublingual use) 2 drop     [START ON 10/26/2021] bisacodyl (DULCOLAX) Suppository 10 mg     lidocaine (XYLOCAINE) 5 % ointment     LORazepam (ATIVAN) 2 MG/ML (HIGH CONC) PO solution 1 mg     melatonin tablet 3 mg     morphine sulfate (ROXANOL) 20 mg/mL (HIGH CONC) soln 10 mg     morphine sulfate (ROXANOL) 20 mg/mL (HIGH CONC) soln 10 mg     sennosides (SENOKOT) syrup 5 mL     sodium chloride (PF) 0.9% PF flush 3 mL     sodium phosphate (FLEET ENEMA) 1 enema     She had not received diltiazem, metoprolol for the past several days.  Had not received oral olanzapine.           Physical Exam:   Vital Signs: Blood pressure (!) 150/65, pulse (!) 125, temperature 99  F (37.2  C), temperature source Axillary, resp. rate 22, weight 69.9 kg (154 lb 1.6 oz), SpO2 96 %.   GENERAL: alert 82 yo woman, arouses to verbal stim, minimal verbal responses, denies pain.    SKIN: Warm and dry   HEENT: Normocephalic,temporal muscle wasting.  Pupils round, miotic.  LUNGS: breathing regular, diminished bases, no rhonchi.   CARDIAC: irreg irreg without murmur.  ABDOMINAL: BS (+), soft, non distended, non tender  : purewick with scant urine.  MUSKL: edema lower extremities.    EXTREMITIES: L foot second toe amputation noted   NEUROLOGIC: confused, not agitated.  No myoclonus or clonus.    PSYCH: disoriented, not anxious.             Data  Reviewed:     Reviewed recent pertinent imaging:   No new imaging    Reviewed recent labs:   No new labs      Analy Lucas MD  [facility],  Palliative Medicine Service  [phone] department number  Can page via tritrue

## 2021-10-26 NOTE — PLAN OF CARE
Patient to discharge to [facility] per stretcher at 0830.  Information faxed to facility and patient and belongings ready to be sent to facility.  No significant changes in presentation. Patient premedicated for comfort with transfer.

## 2021-10-26 NOTE — DISCHARGE SUMMARY
[facility] MEDICINE  DISCHARGE SUMMARY     Primary Care Physician: Gabino Bach  Admission Date: 10/11/2021   Discharge Provider: Radha Villagomez Discharge Date: 10/26/2021   Diet:   Active Diet and Nourishment Order   Procedures     Snacks/Supplements Adult: Gelatein sugar-free; With Meals     Combination Diet Consistent Carb 60 Grams CHO per Meal Diet; Liquidized/Moderately Thick (level 3); Minced and Moist Diet (level 5); Liquidized/Moderately Thick (level 3)     Advance Diet as Tolerated       Code Status: No CPR- Do NOT Intubate   Activity: DCACTIVITY: Activity as tolerated        Condition at Discharge: Terminal     REASON FOR PRESENTATION(See Admission Note for Details)   Melena, confusion    PRINCIPAL & ACTIVE DISCHARGE DIAGNOSES     Active Problems:    Upper GI bleed      PENDING LABS     Unresulted Labs Ordered in the Past 30 Days of this Admission     Date and Time Order Name Status Description    10/14/2021  1:06 PM Surgical Pathology Exam In process     10/12/2021  3:45 PM Prepare red blood cells (unit) Preliminary     10/11/2021  9:11 PM Prepare red blood cells (unit) Preliminary     10/11/2021  9:00 PM Prepare red blood cells (unit) Preliminary     10/11/2021  9:00 PM Prepare red blood cells (unit) Preliminary           PROCEDURES ( this hospitalization only)      Procedure(s):  ESOPHAGOGASTRODUODENOSCOPY WITH STOMACH BIOPSIES    RECOMMENDATIONS TO OUTPATIENT PROVIDER FOR F/U VISIT     Follow-up Appointments     Follow Up and recommended labs and tests      No specific followup needed             DISPOSITION     Hospice facility    SUMMARY OF HOSPITAL COURSE:      Bernadette Yu is a 81 year old female with history of coronary artery disease status post CABG, heart failure, hypertension, A. Fib, mechanical mitral valve replacement, anticoagulated on warfarin, vascular disease status post recent toe amputation, hypothyroidism, CKD 3, hyperlipidemia, diabetes on  insulin therapy, chronic pain syndrome on chronic opioids presented for evaluation of increasing confusion.  During hospital stay she was found to have severe tricuspid insufficiency and possible severe aortic stenosis and was felt to be a poor surgical candidate.  There was question of melena and she was seen by GI, no source of bleeding found and her warfarin was resumed and tolerated. She was diagnosed with dysphagia requiring thickened liquids. She has dementia and had significant agitated delirium and violent behaviors requiring antipsychotics, felt to be high risk due to her history of prolonged QTc. Ultimately Palliative Care were involved and after multiple discussions with family, patient was transitioned to comfort focused care and now discharging to our [facility] home. Patient more somnolent past few days and not requiring any antipsychotics for several days. However does rouse and is painful with repositioning and benefits significantly from being premedicated with oral morphine. Has not had a bowel movement since 10/21, bowel meds prescribed at discharge.    Discharge Medications with Med changes:     Current Discharge Medication List      START taking these medications    Details   atropine 1 % ophthalmic solution Take 1 drop by mouth, place under tongue or place inside cheek every 4 hours as needed for secretions  Qty: 5 mL, Refills: 11    Associated Diagnoses: Late onset Alzheimer's dementia with behavioral disturbance (H)      bisacodyl (DULCOLAX) 10 MG suppository Place 1 suppository (10 mg) rectally daily as needed for constipation  Qty: 2 suppository, Refills: 11    Associated Diagnoses: Late onset Alzheimer's dementia with behavioral disturbance (H)      LORazepam (ATIVAN) 2 MG/ML (HIGH CONC) PO solution Place 0.5 mLs (1 mg) under the tongue every 4 hours as needed for agitation, anxiety or sleep  Qty: 15 mL, Refills: 0    Associated Diagnoses: Late onset Alzheimer's dementia with  "behavioral disturbance (H)      morphine sulfate (ROXANOL) 20 mg/mL (HIGH CONC) soln Take 0.5 mLs (10 mg) by mouth every 8 hours. May also take 0.5 mLs (10 mg) every 2 hours as needed for shortness of breath / dyspnea or moderate to severe pain.  Qty: 15 mL, Refills: 0    Associated Diagnoses: Late onset Alzheimer's dementia with behavioral disturbance (H)      sennosides (SENOKOT) 8.8 MG/5ML syrup Take 5 mLs by mouth At Bedtime  Qty: 30 mL, Refills: 0    Associated Diagnoses: Late onset Alzheimer's dementia with behavioral disturbance (H)         CONTINUE these medications which have NOT CHANGED    Details   acetaminophen (TYLENOL) 500 MG tablet Take 1,000 mg by mouth 3 times daily (give at 8am, 2pm, 8pm)      BD ULTRA-FINE DAVID PEN NEEDLE 32 gauge x 5/32\" Ndle [BD ULTRA-FINE DAVID PEN NEEDLE 32 GAUGE X 5/32\" NDLE] 1 each by abdominal subcutaneous route 2 (two) times a day. USE WITH NOVOLOG PENS.   Please keep this Rx on file for the next RF.  Qty: 100 each, Refills: 11    Associated Diagnoses: Type 2 diabetes mellitus with microalbuminuria, with long-term current use of insulin (H)      !! blood glucose test strips [BLOOD GLUCOSE TEST STRIPS] Use 1 each As Directed 3 (three) times a day. Dispense brand per patient's insurance at pharmacy discretion.  Qty: 300 strip, Refills: 3    Associated Diagnoses: Type 2 diabetes mellitus with microalbuminuria, with long-term current use of insulin (H)      !! blood-glucose meter Misc [BLOOD-GLUCOSE METER MISC] Dispense 1 meter as covered by patient's insurance.  Qty: 1 each, Refills: 0    Comments: Please match to strips if possible.  Associated Diagnoses: Type 2 diabetes mellitus with microalbuminuria, with long-term current use of insulin (H)       !! - Potential duplicate medications found. Please discuss with provider.      STOP taking these medications       diltiazem ER COATED BEADS (CARDIZEM CD/CARTIA XT) 180 MG 24 hr capsule Comments:   Reason for Stopping:         " ferrous sulfate 325 (65 FE) MG tablet Comments:   Reason for Stopping:         furosemide (LASIX) 40 MG tablet Comments:   Reason for Stopping:         gabapentin (NEURONTIN) 100 MG capsule Comments:   Reason for Stopping:         generic lancets Comments:   Reason for Stopping:         HYDROmorphone (DILAUDID) 2 MG tablet Comments:   Reason for Stopping:         insulin aspart protamine-insulin aspart (NOVOLOG MIX 70-30FLEXPEN U-100) 100 unit/mL (70-30) pen Comments:   Reason for Stopping:         levothyroxine (SYNTHROID, LEVOTHROID) 125 MCG tablet Comments:   Reason for Stopping:         metoprolol tartrate (LOPRESSOR) 50 MG tablet Comments:   Reason for Stopping:         mupirocin (BACTROBAN) 2 % external ointment Comments:   Reason for Stopping:         polyethylene glycol (MIRALAX) 17 gram packet Comments:   Reason for Stopping:         QUEtiapine (SEROQUEL) 50 MG tablet Comments:   Reason for Stopping:         senna-docusate (SENOKOT-S/PERICOLACE) 8.6-50 MG tablet Comments:   Reason for Stopping:         temazepam (RESTORIL) 15 MG capsule Comments:   Reason for Stopping:         triamcinolone (KENALOG) 0.1 % external cream Comments:   Reason for Stopping:         WARFARIN SODIUM PO Comments:   Reason for Stopping:                 Consults     PHARMACY TO DOSE PHYTONADIONE  GASTROENTEROLOGY IP CONSULT  PHYSICAL THERAPY ADULT IP CONSULT  OCCUPATIONAL THERAPY ADULT IP CONSULT  SPEECH LANGUAGE PATH ADULT IP CONSULT  SOCIAL WORK IP CONSULT  PSYCHIATRY IP CONSULT  VASCULAR ACCESS ADULT IP CONSULT  NEUROLOGY IP CONSULT  PHARMACY TO DOSE WARFARIN  CARDIOLOGY IP CONSULT  PALLIATIVE CARE ADULT IP CONSULT  PULMONARY IP CONSULT  DIABETES EDUCATION IP CONSULT  HOSPICE IP CONSULT  PHYSICAL THERAPY ADULT IP CONSULT  HOSPICE IP CONSULT       Anticoagulation Information      Recent INR results:   Recent Labs   Lab 10/21/21  0433 10/20/21  0735   INR 2.11* 2.54*     Warfarin doses (if applicable) or name of other  anticoagulant: discontinued due to comfort care      SIGNIFICANT IMAGING FINDINGS     Results for orders placed or performed during the hospital encounter of 10/11/21   Head CT w/o contrast    Impression    IMPRESSION:  1.  No CT evidence for acute intracranial abnormality.  2.  Brain atrophy and presumed chronic microvascular ischemic changes as above.  3.  Small area of chronic right temporal lobe encephalomalacia, unchanged.  4.  Near complete opacification of the right maxillary sinus with high-attenuation material and chronic wall thickening/sclerosis.   XR Chest Port 1 View    Impression    IMPRESSION: The patient is significantly rotated limiting evaluation. Patchy interstitial opacities suggestive of mild pulmonary edema. No effusions or pneumothorax. Heart size is stable. Valvuloplasty changes and left atrial appendage occlusion device.   Sternotomy wires. No acute osseous findings.   CTA Abdomen Pelvis with Contrast    Impression    IMPRESSION:  1.  Focal area of increased density involving the EG junction with the EG junction dilatation measuring 2.3 x 1.6 cm on noncontrast imaging. This does not change in its appearance on arterial phase imaging or portal venous phase imaging and may be the   result of retained contrast material within the distal esophagus or retained food material. Another consideration would be postoperative change. Given the lack of change in overall appearance on postcontrast imaging, this is unlikely to be due to   localized bleeding. An underlying high density mass cannot be excluded.    2.  No evidence for arterial contrast extravasation or active bleeding within the lumen of the small bowel, colon, stomach or distal esophagus above the level of the aforementioned high density mass. Evaluation of the colon is somewhat limited due to a   large amount of stool throughout the colon and a severe amount of stool in the rectal vault.    3.  No evidence for inflammatory change to the  bowel.    4.  Atherosclerotic vascular calcification with significant vascular calcification origin left renal artery. Chronic occlusion of the left SFA proximally.    5.  Volume overload.   CT Chest Pulmonary Embolism w Contrast    Impression    IMPRESSION:  1.  Allowing for significant respiratory motion artifact, there is no evidence for pulmonary embolism.    2.  Evidence for CHF/volume overload with right-sided pleural fluid collection with interlobular septal thickening in mosaic attenuation of the pulmonary parenchyma with cardiac enlargement.    3.  Normal caliber aorta without dissection. Advanced atherosclerotic vascular calcification including severe vascular calcification at the origin of the left subclavian artery with indwelling stent. Severe vascular calcification at the origin left renal   artery. Recommend correlation for renovascular hypertension.   XR PICC Chest Placement Port 1vw    Impression    IMPRESSION: A right PICC tip terminates lower SVC. These are pleural effusion is not appreciated on this study. No pneumothorax. The cardiomediastinal silhouette is enlarged. A mitral valve prosthesis is present. There is a left atrial appendage clip.   Sternotomy suture wires are intact.    CT Head w/o Contrast    Impression    IMPRESSION:  1.  Motion degraded exam.  2.  No acute intracranial process.  3.  Chronic right maxillary sinusitis.   XR Chest Port 1 View    Impression    IMPRESSION: Prior sternotomy with heart valve prosthesis and left atrial occlusion device. Slightly more prominent in the right lung fields could represent early pulmonary vascular congestion. Left pulmonary vessels appear stable. No effusion. No focal   pneumonia.   MR Brain w/o Contrast    Impression    IMPRESSION:  1.  No definite acute intracranial pathology.  2.  Mild to moderate age-related changes.  3.  Right inferior temporal lobe encephalomalacia/gliosis with hemosiderin staining, likely posttraumatic.  4.  Scattered  small susceptibility changes.     XR Chest Port 1 View    Impression    IMPRESSION:   1.  Unchanged mild enlargement of the cardiac silhouette.    2.  Evaluation is limited by low lung volumes resulting in interstitial crowding. However, there is probable pulmonary vascular congestion.     CT Head w/o Contrast    Impression    IMPRESSION:  1.  No significant change, no acute intracranial pathology.  2.  Right inferior temporal lobe encephalomalacia, old infarct versus old trauma.  3.  Mild to moderate age-related changes.  4.  Right maxillary sinusitis.     XR Video Swallow with SLP or OT    Impression    IMPRESSION:    1.  Delayed swallow initiation and early spillover with all consistencies.  2.  Aspiration with thin consistency.  3.  Laryngeal penetration with mildly thick consistency which does not reach the level of the cords.    Please see separately dictated report from speech pathology for additional description, analysis, and management recommendations.       SIGNIFICANT LABORATORY FINDINGS     See emr    Discharge Orders        Care Coordination Referral      General info for SNF    Length of Stay Estimate: Short Term Care: Estimated # of Days <30  Condition at Discharge: Terminal  Level of care:skilled   Admission H&P remains valid and up-to-date: Yes  Use Nursing Home Standing Orders: Yes     Reason for your hospital stay    Dementia, end of life     Briceño catheter    Ok to place Briceño as needed for comfort, or maintain existing Briceño. To straight gravity drainage. Change catheter every 2 weeks and PRN for leaking or decreased uring output with signs of bladder distention. DO NOT change catheter without a specific MD order IF diagnosis of benign prostatic hypertrophy (BPH), neurogenic bladder, or other urological conditions     Follow Up and recommended labs and tests    No specific followup needed     Activity - Up ad edmund     No CPR- Do NOT Intubate     Contact Isolation     Fall precautions     Advance  Diet as Tolerated    Follow this diet upon discharge: regular diet ad edmund       Examination   Physical Exam   Temp:  [99  F (37.2  C)] 99  F (37.2  C)  Pulse:  [128] 128  Resp:  [24] 24  BP: (150)/(73) 150/73  SpO2:  [92 %] 92 %  Wt Readings from Last 1 Encounters:   10/21/21 69.9 kg (154 lb 1.6 oz)       General: somnolent elderly female does not stir to gentle voice/touch, in no distress, resting easily  HEENT: Head normocephalic atraumatic, oral mucosa tacky.   CV: Regular rhythm, normal rate, soft holosystolic murmur heard best LUSB  Resp: No wheezes, no rales or rhonchi, no focal consolidations  GI: Belly soft, nondistended, nontender, bowel sounds hypoactive  Skin: No rashes or lesions  Extremities: No peripheral edema. L 2nd toe incision healing well  Psych: Sleeping  Neuro: Sleeping    Please see EMR for more detailed significant labs, imaging, consultant notes etc.    I, Radha Villagomez MD, personally saw the patient today and spent greater than 30 minutes discharging this patient.    Radha Villagomez MD  [facility]    CC:Gabino Bach

## 2021-10-26 NOTE — PLAN OF CARE
Problem: Adult Inpatient Plan of Care  Goal: Optimal Comfort and Wellbeing  Outcome: Improving     Patient on comfort cares.   Mostly lethargic/sedated overnight. Opening eyes with pain stimuli when repositioning.     Repositioning 1 2-3 hours and prn. Premedicating with prn morphine for repositioning as she will moan when changing positions.     Has been resting comfortably overnight with no issues.     Purewick in place with good amount of urine output, 600 ml.   No BM overnight.    Will discharge via stretcher to OP at 8:30 am.

## 2021-10-26 NOTE — PLAN OF CARE
COMFORT CARES PATIENT     Bernadette has experienced brief episodes of awakefulness this evening.  Spontaneously open eyes to voice.  Noted to converse minimally with staff/family.      Pain cues noted such as grimacing or moaning with reposition changes.  Medicated with Morphine 10mg SL PRN with good relief.    No skin breakdown issues.    Producing some urine.  External catheter in place.    Taking in little to eat or drink at this time.  Last BM >3days.  Suppository administered on 10/23 with no results.    Bernadette will be discharging to [facility] home tomorrow, 10/26 at 8:30AM via stretcher.  AM discharging nurse: Please consider pre-medicate with Morphine and Lorazepam solution to ensure a peaceful transition in transportation ride.    Kaya Abreu RN

## 2021-10-26 NOTE — PLAN OF CARE
Care Management Discharge Note    Discharge Date: 10/26/2021  Expected Time of Departure: 8:30 AM    Discharge Disposition: Hospice    Discharge Services: None    Discharge DME: None    Discharge Transportation: agency ([facility] [place])    Private pay costs discussed: Not applicable    PAS Confirmation Code:  not needed  Patient/family educated on Medicare website which has current facility and service quality ratings: yes    Education Provided on the Discharge Plan:  Yes, per care team  Persons Notified of Discharge Plans: family, patient  Patient/Family in Agreement with the Plan: yes    Handoff Referral Completed: Yes    Additional Information:  Chart review completed.    Patient discharging today to [facility] with [facility] transport at 8:30 AM.  RNCM called to update Radha in Admissions of negative COVID test (10/25/2021).  Discharge orders and copy of COVID lab results faxed.      Amy Palencia RN

## 2021-10-27 NOTE — PROGRESS NOTES
ANTICOAGULATION  MANAGEMENT    Bernadette Yu is being discharged from the [facility] ([facility]).    Reason for discharge: warfarin therapy completed     Pt hospitalized 10/11/21-10/26/21 following toe amputation. Pt was placed on hospice and warfarin discontinued. Discharged to [facility] hospice care facility.    Anticoagulation episode resolved and INR Standing order discontinued    If patient needs warfarin management in the future, please send a new referral    Meredith Patino RN

## 2023-08-15 NOTE — TELEPHONE ENCOUNTER
ANTICOAGULATION  MANAGEMENT    Assessment     6/18's INR result of 3.9 is Supratherapeutic (goal INR of 2.5-3.5)        Warfarin taken as previously instructed    Decreased greens/vitamin K intake may be affecting INR- will resume normal greens.     No new medication/supplements affecting INR    Continues to tolerate warfarin with no reported s/s of bleeding or thromboembolism     Previous INR was Therapeutic    Plan:     Spoke with Bernadette regarding INR result and instructed:     Warfarin Dosing Instructions: (Pt did not get voice message to take lower dose on 6/18).  Take lower dose of 2.5 mg today 6/19 only then continue current warfarin dose    7.5 mg every Sun, Thu; 5 mg all other days         Instructed patient to follow up no later than: 2 weeks.     Education provided: importance of consistent vitamin K intake, impact of vitamin K foods on INR, vitamin K content of foods and importance of taking warfarin as instructed    Bernadette verbalizes understanding and agrees to warfarin dosing plan.    Instructed to call the [facility] for any changes, questions or concerns. (#[phone])   ?   To Burgos RN    Subjective/Objective:      Bernadette Yu, a 79 y.o. female is on warfarin.     Bernadette reports:     Home warfarin dose: verbally confirmed home dose with Bernadette and updated on anticoagulation calendar     Missed doses: No     Medication changes:  No     S/S of bleeding or thromboembolism:  No     New Injury or illness:  No     Changes in diet or alcohol consumption:  Yes: decreased greens.      Upcoming surgery, procedure or cardioversion:  No    Anticoagulation Episode Summary     Current INR goal:   2.5-3.5   TTR:   34.4 % (11.4 mo)   Next INR check:   7/2/2020   INR from last check:   3.90! (6/18/2020)   Weekly max warfarin dose:      Target end date:      INR check location:      Preferred lab:      Send INR reminders to:   PABLO MITCHELL    Indications    S/P mitral valve replacement (MVR) -  mechanical mitral valve placed 2015 [Z95.2]           Comments:            Anticoagulation Care Providers     Provider Role Specialty Phone number    Gabino Bach MD Referring Internal Medicine [phone]         [id number]

## 2023-10-13 NOTE — PLAN OF CARE
Problem: Adult Inpatient Plan of Care  Goal: Plan of Care Review  Outcome: No Change   Pt is alert, opens her eyes with repositioning. Less restless tonight than previous night.  Purewick in place, voided 400ml. Scheduled morphine given for comfort. Uneventful shift.  Dory Alicea RN     no

## (undated) DEVICE — SUCTION IRR SYSTEM W/O TIP INTERPULSE HANDPIECE 0210-100-000

## (undated) DEVICE — BONE CLEANING TIP INTERPULSE  0210-010-000

## (undated) DEVICE — PLATE GROUNDING ADULT W/CORD 9165L

## (undated) DEVICE — SOL NACL 0.9% IRRIG 1000ML BOTTLE 2F7124

## (undated) DEVICE — SUCTION MANIFOLD NEPTUNE 2 SYS 1 PORT 702-025-000

## (undated) DEVICE — DRAIN RND 1/8 10FR SIL 0070210

## (undated) DEVICE — TRAY PREP DRY SKIN SCRUB 067

## (undated) DEVICE — PREP POVIDONE IODINE SOLUTION 10% 4OZ BOTTLE 29906-004

## (undated) DEVICE — SU ETHILON 3-0 PS-2 18" 1669H

## (undated) DEVICE — SOL NACL 0.9% INJ 1000ML BAG 2B1324X

## (undated) DEVICE — DRAIN RESERVOIR 100ML JP 0070740

## (undated) DEVICE — BNDG KLING 4" 2236

## (undated) DEVICE — GOWN LG DISP 9515

## (undated) DEVICE — PREP POVIDONE-IODINE 7.5% SCRUB 4OZ BOTTLE MDS093945

## (undated) DEVICE — HOLDER DRAINAGE BULB 0814-8220

## (undated) DEVICE — TUBING SUCTION MEDI-VAC 1/4"X20' N620A - HE

## (undated) DEVICE — GLOVE BIOGEL PI INDICATOR 8.0 LF 41680

## (undated) DEVICE — BLADE SAW OSCIL/SAG STRK MICRO 9X31X0.38MM 2296-003-225

## (undated) DEVICE — DRSG GAUZE 4X4" TRAY

## (undated) DEVICE — FORCEP BIOPSY 2.3MM DISP COATED 000388

## (undated) DEVICE — CUSTOM PACK LOWER EXTREMITY SOP5BLEHEA

## (undated) DEVICE — SOL WATER IRRIG 1000ML BOTTLE 2F7114

## (undated) DEVICE — Device

## (undated) DEVICE — SU VICRYL+ 3-0 27IN SH UND VCP416H

## (undated) DEVICE — GLOVE BIOGEL PI ORTHOPRO SZ 7.5 47675

## (undated) DEVICE — ESU PENCIL SMOKE EVAC W/ROCKER SWITCH 0703-047-000

## (undated) DEVICE — BANDAGE ELASTIC VELCRO 4IN REB3014

## (undated) DEVICE — DRSG ADAPTIC 3X8" 6113